# Patient Record
Sex: MALE | Race: OTHER | HISPANIC OR LATINO | URBAN - METROPOLITAN AREA
[De-identification: names, ages, dates, MRNs, and addresses within clinical notes are randomized per-mention and may not be internally consistent; named-entity substitution may affect disease eponyms.]

---

## 2017-01-31 ENCOUNTER — EMERGENCY (EMERGENCY)
Facility: HOSPITAL | Age: 52
LOS: 1 days | Discharge: HOME | End: 2017-01-31
Attending: EMERGENCY MEDICINE
Payer: MEDICAID

## 2017-01-31 VITALS
HEART RATE: 89 BPM | WEIGHT: 182.1 LBS | HEIGHT: 65 IN | OXYGEN SATURATION: 98 % | SYSTOLIC BLOOD PRESSURE: 118 MMHG | RESPIRATION RATE: 24 BRPM | TEMPERATURE: 98 F | DIASTOLIC BLOOD PRESSURE: 86 MMHG

## 2017-01-31 VITALS
RESPIRATION RATE: 20 BRPM | SYSTOLIC BLOOD PRESSURE: 122 MMHG | OXYGEN SATURATION: 98 % | HEART RATE: 89 BPM | DIASTOLIC BLOOD PRESSURE: 70 MMHG

## 2017-01-31 DIAGNOSIS — I10 ESSENTIAL (PRIMARY) HYPERTENSION: ICD-10-CM

## 2017-01-31 DIAGNOSIS — R06.02 SHORTNESS OF BREATH: ICD-10-CM

## 2017-01-31 DIAGNOSIS — Z95.0 PRESENCE OF CARDIAC PACEMAKER: ICD-10-CM

## 2017-01-31 DIAGNOSIS — Z95.0 PRESENCE OF CARDIAC PACEMAKER: Chronic | ICD-10-CM

## 2017-01-31 DIAGNOSIS — Z79.01 LONG TERM (CURRENT) USE OF ANTICOAGULANTS: ICD-10-CM

## 2017-01-31 DIAGNOSIS — E78.5 HYPERLIPIDEMIA, UNSPECIFIED: ICD-10-CM

## 2017-01-31 DIAGNOSIS — Z87.81 PERSONAL HISTORY OF (HEALED) TRAUMATIC FRACTURE: Chronic | ICD-10-CM

## 2017-01-31 DIAGNOSIS — I25.10 ATHEROSCLEROTIC HEART DISEASE OF NATIVE CORONARY ARTERY WITHOUT ANGINA PECTORIS: ICD-10-CM

## 2017-01-31 DIAGNOSIS — J45.901 UNSPECIFIED ASTHMA WITH (ACUTE) EXACERBATION: ICD-10-CM

## 2017-01-31 LAB
ALBUMIN SERPL ELPH-MCNC: 4.3 G/DL — SIGNIFICANT CHANGE UP (ref 3.3–5.2)
ALP SERPL-CCNC: 88 U/L — SIGNIFICANT CHANGE UP (ref 40–120)
ALT FLD-CCNC: 54 U/L — HIGH
ANION GAP SERPL CALC-SCNC: 13 MMOL/L — SIGNIFICANT CHANGE UP (ref 5–17)
AST SERPL-CCNC: 35 U/L — SIGNIFICANT CHANGE UP
BASOPHILS # BLD AUTO: 0 K/UL — SIGNIFICANT CHANGE UP (ref 0–0.2)
BASOPHILS NFR BLD AUTO: 0.3 % — SIGNIFICANT CHANGE UP (ref 0–2)
BILIRUB SERPL-MCNC: 0.9 MG/DL — SIGNIFICANT CHANGE UP (ref 0.4–2)
BUN SERPL-MCNC: 14 MG/DL — SIGNIFICANT CHANGE UP (ref 8–20)
CALCIUM SERPL-MCNC: 9.6 MG/DL — SIGNIFICANT CHANGE UP (ref 8.6–10.2)
CHLORIDE SERPL-SCNC: 94 MMOL/L — LOW (ref 98–107)
CK SERPL-CCNC: 81 U/L — SIGNIFICANT CHANGE UP (ref 30–200)
CO2 SERPL-SCNC: 24 MMOL/L — SIGNIFICANT CHANGE UP (ref 22–29)
CREAT SERPL-MCNC: 0.84 MG/DL — SIGNIFICANT CHANGE UP (ref 0.5–1.3)
EOSINOPHIL # BLD AUTO: 0.1 K/UL — SIGNIFICANT CHANGE UP (ref 0–0.5)
EOSINOPHIL NFR BLD AUTO: 1.2 % — SIGNIFICANT CHANGE UP (ref 0–5)
GLUCOSE SERPL-MCNC: 136 MG/DL — HIGH (ref 70–115)
HCT VFR BLD CALC: 42.7 % — SIGNIFICANT CHANGE UP (ref 42–52)
HGB BLD-MCNC: 14.5 G/DL — SIGNIFICANT CHANGE UP (ref 14–18)
INR BLD: 1.24 RATIO — HIGH (ref 0.88–1.16)
LYMPHOCYTES # BLD AUTO: 2.6 K/UL — SIGNIFICANT CHANGE UP (ref 1–4.8)
LYMPHOCYTES # BLD AUTO: 24.9 % — SIGNIFICANT CHANGE UP (ref 20–55)
MAGNESIUM SERPL-MCNC: 1.7 MG/DL — LOW (ref 1.8–2.5)
MCHC RBC-ENTMCNC: 31.2 PG — HIGH (ref 27–31)
MCHC RBC-ENTMCNC: 34 G/DL — SIGNIFICANT CHANGE UP (ref 32–36)
MCV RBC AUTO: 91.8 FL — SIGNIFICANT CHANGE UP (ref 80–94)
MONOCYTES # BLD AUTO: 1.1 K/UL — HIGH (ref 0–0.8)
MONOCYTES NFR BLD AUTO: 10.5 % — HIGH (ref 3–10)
NEUTROPHILS # BLD AUTO: 6.4 K/UL — SIGNIFICANT CHANGE UP (ref 1.8–8)
NEUTROPHILS NFR BLD AUTO: 62.6 % — SIGNIFICANT CHANGE UP (ref 37–73)
NT-PROBNP SERPL-SCNC: 7063 PG/ML — HIGH (ref 0–300)
PHOSPHATE SERPL-MCNC: 2.9 MG/DL — SIGNIFICANT CHANGE UP (ref 2.4–4.7)
PLATELET # BLD AUTO: 234 K/UL — SIGNIFICANT CHANGE UP (ref 150–400)
POTASSIUM SERPL-MCNC: 4.7 MMOL/L — SIGNIFICANT CHANGE UP (ref 3.5–5.3)
POTASSIUM SERPL-SCNC: 4.7 MMOL/L — SIGNIFICANT CHANGE UP (ref 3.5–5.3)
PROT SERPL-MCNC: 8 G/DL — SIGNIFICANT CHANGE UP (ref 6.6–8.7)
PROTHROM AB SERPL-ACNC: 13.7 SEC — HIGH (ref 10–13.1)
RBC # BLD: 4.65 M/UL — SIGNIFICANT CHANGE UP (ref 4.6–6.2)
RBC # FLD: 12.7 % — SIGNIFICANT CHANGE UP (ref 11–15.6)
SODIUM SERPL-SCNC: 131 MMOL/L — LOW (ref 135–145)
TROPONIN T SERPL-MCNC: <0.01 NG/ML — SIGNIFICANT CHANGE UP (ref 0–0.06)
WBC # BLD: 10.25 K/UL — SIGNIFICANT CHANGE UP (ref 4.8–10.8)
WBC # FLD AUTO: 10.25 K/UL — SIGNIFICANT CHANGE UP (ref 4.8–10.8)

## 2017-01-31 PROCEDURE — 82550 ASSAY OF CK (CPK): CPT

## 2017-01-31 PROCEDURE — 99284 EMERGENCY DEPT VISIT MOD MDM: CPT | Mod: 25

## 2017-01-31 PROCEDURE — 85027 COMPLETE CBC AUTOMATED: CPT

## 2017-01-31 PROCEDURE — 96374 THER/PROPH/DIAG INJ IV PUSH: CPT

## 2017-01-31 PROCEDURE — 85610 PROTHROMBIN TIME: CPT

## 2017-01-31 PROCEDURE — 93010 ELECTROCARDIOGRAM REPORT: CPT

## 2017-01-31 PROCEDURE — 83735 ASSAY OF MAGNESIUM: CPT

## 2017-01-31 PROCEDURE — 93005 ELECTROCARDIOGRAM TRACING: CPT

## 2017-01-31 PROCEDURE — 71020: CPT | Mod: 26

## 2017-01-31 PROCEDURE — 80053 COMPREHEN METABOLIC PANEL: CPT

## 2017-01-31 PROCEDURE — 84100 ASSAY OF PHOSPHORUS: CPT

## 2017-01-31 PROCEDURE — 94640 AIRWAY INHALATION TREATMENT: CPT

## 2017-01-31 PROCEDURE — 84484 ASSAY OF TROPONIN QUANT: CPT

## 2017-01-31 PROCEDURE — T1013: CPT

## 2017-01-31 PROCEDURE — 96375 TX/PRO/DX INJ NEW DRUG ADDON: CPT

## 2017-01-31 PROCEDURE — 83880 ASSAY OF NATRIURETIC PEPTIDE: CPT

## 2017-01-31 PROCEDURE — 99285 EMERGENCY DEPT VISIT HI MDM: CPT

## 2017-01-31 PROCEDURE — 71046 X-RAY EXAM CHEST 2 VIEWS: CPT

## 2017-01-31 RX ORDER — IPRATROPIUM/ALBUTEROL SULFATE 18-103MCG
3 AEROSOL WITH ADAPTER (GRAM) INHALATION ONCE
Qty: 0 | Refills: 0 | Status: COMPLETED | OUTPATIENT
Start: 2017-01-31 | End: 2017-01-31

## 2017-01-31 RX ORDER — MAGNESIUM SULFATE 500 MG/ML
2 VIAL (ML) INJECTION ONCE
Qty: 0 | Refills: 0 | Status: COMPLETED | OUTPATIENT
Start: 2017-01-31 | End: 2017-01-31

## 2017-01-31 RX ORDER — ALBUTEROL 90 UG/1
3 AEROSOL, METERED ORAL
Qty: 48 | Refills: 0 | OUTPATIENT
Start: 2017-01-31 | End: 2017-03-02

## 2017-01-31 RX ORDER — AZITHROMYCIN 500 MG/1
1 TABLET, FILM COATED ORAL
Qty: 3 | Refills: 0 | OUTPATIENT
Start: 2017-01-31 | End: 2017-02-03

## 2017-01-31 RX ADMIN — Medication 3 MILLILITER(S): at 10:08

## 2017-01-31 RX ADMIN — Medication 50 GRAM(S): at 17:21

## 2017-01-31 RX ADMIN — Medication 3 MILLILITER(S): at 14:15

## 2017-01-31 RX ADMIN — Medication 125 MILLIGRAM(S): at 10:36

## 2017-01-31 NOTE — ED ADULT NURSE NOTE - OBJECTIVE STATEMENT
pt AOX3 c/o SOB HX of asthma, and throat pain 5/10, states he has been suffering with a productive cough since MOnday. pt AOX3 c/o SOB HX of asthma, and throat pain 5/10, states he has been suffering with a productive cough and abdominal pain since Monday. HX of CAD, HTN, Hyperlipidemia.

## 2017-01-31 NOTE — ED ADULT NURSE NOTE - CHIEF COMPLAINT QUOTE
Patient arrived to ED today with c/o shortness of breath and abdominal pain for the past 4 days.  Patient states he has cardiac history.  Patient states he feels SOB at rest.

## 2017-01-31 NOTE — ED PROVIDER NOTE - OBJECTIVE STATEMENT
This patient is a 51 year old man with a past medical history of asthma and NICM with EF 20-25& with AICD who presents to the ED c/o cough and SOB.  Patient states that he has been experiencing chest and nasal congestion with cough x 1 week.  He was seen by his PMD 4 days ago and started on augmentin and his cough has been improving.  For the past 2 days he has had SOB consistent with asthma.  Patient has been using his inhaler infrequently.  He denies leg swelling and chest pain.

## 2017-01-31 NOTE — ED PROVIDER NOTE - MEDICAL DECISION MAKING DETAILS
SOB and wheezing.  Will check labs, CXR, check peak flow, administer duonebs and solumedrol; Re-eval

## 2017-01-31 NOTE — ED ADULT NURSE NOTE - PMH
Asthma    Atrial fibrillation    CAD (coronary artery disease)    Cardiomyopathy, nonischemic    Mitral regurgitation  severe MR

## 2017-01-31 NOTE — ED ADULT NURSE REASSESSMENT NOTE - NS ED NURSE REASSESS COMMENT FT1
pt aware of low magnesium and the reason he is receiving MG IV, pt verbalized understanding of plan of care.  Denies any pain or discomfort.

## 2017-03-29 ENCOUNTER — OUTPATIENT (OUTPATIENT)
Dept: OUTPATIENT SERVICES | Facility: HOSPITAL | Age: 52
LOS: 1 days | End: 2017-03-29
Payer: SELF-PAY

## 2017-03-29 ENCOUNTER — APPOINTMENT (OUTPATIENT)
Dept: CARDIOLOGY | Facility: CLINIC | Age: 52
End: 2017-03-29

## 2017-03-29 VITALS — RESPIRATION RATE: 16 BRPM | HEART RATE: 75 BPM | DIASTOLIC BLOOD PRESSURE: 83 MMHG | SYSTOLIC BLOOD PRESSURE: 116 MMHG

## 2017-03-29 VITALS — HEART RATE: 78 BPM | RESPIRATION RATE: 16 BRPM | DIASTOLIC BLOOD PRESSURE: 82 MMHG | SYSTOLIC BLOOD PRESSURE: 114 MMHG

## 2017-03-29 DIAGNOSIS — Z95.0 PRESENCE OF CARDIAC PACEMAKER: Chronic | ICD-10-CM

## 2017-03-29 DIAGNOSIS — I25.10 ATHEROSCLEROTIC HEART DISEASE OF NATIVE CORONARY ARTERY WITHOUT ANGINA PECTORIS: ICD-10-CM

## 2017-03-29 DIAGNOSIS — Z87.81 PERSONAL HISTORY OF (HEALED) TRAUMATIC FRACTURE: Chronic | ICD-10-CM

## 2017-03-29 PROCEDURE — G0463: CPT

## 2017-04-30 ENCOUNTER — INPATIENT (INPATIENT)
Facility: HOSPITAL | Age: 52
LOS: 1 days | Discharge: HOME | DRG: 419 | End: 2017-05-02
Attending: SURGERY | Admitting: SURGERY
Payer: MEDICAID

## 2017-04-30 VITALS
HEIGHT: 70.08 IN | SYSTOLIC BLOOD PRESSURE: 142 MMHG | OXYGEN SATURATION: 98 % | HEART RATE: 73 BPM | TEMPERATURE: 97 F | DIASTOLIC BLOOD PRESSURE: 89 MMHG | WEIGHT: 182.1 LBS | RESPIRATION RATE: 20 BRPM

## 2017-04-30 DIAGNOSIS — Z95.0 PRESENCE OF CARDIAC PACEMAKER: Chronic | ICD-10-CM

## 2017-04-30 DIAGNOSIS — Z87.81 PERSONAL HISTORY OF (HEALED) TRAUMATIC FRACTURE: Chronic | ICD-10-CM

## 2017-04-30 DIAGNOSIS — K81.0 ACUTE CHOLECYSTITIS: ICD-10-CM

## 2017-04-30 LAB
ALBUMIN SERPL ELPH-MCNC: 4.2 G/DL — SIGNIFICANT CHANGE UP (ref 3.3–5.2)
ALP SERPL-CCNC: 98 U/L — SIGNIFICANT CHANGE UP (ref 40–120)
ALT FLD-CCNC: 47 U/L — HIGH
ANION GAP SERPL CALC-SCNC: 15 MMOL/L — SIGNIFICANT CHANGE UP (ref 5–17)
ANION GAP SERPL CALC-SCNC: 16 MMOL/L — SIGNIFICANT CHANGE UP (ref 5–17)
APPEARANCE UR: CLEAR — SIGNIFICANT CHANGE UP
AST SERPL-CCNC: 44 U/L — HIGH
BACTERIA # UR AUTO: ABNORMAL
BASOPHILS # BLD AUTO: 0 K/UL — SIGNIFICANT CHANGE UP (ref 0–0.2)
BASOPHILS NFR BLD AUTO: 0.4 % — SIGNIFICANT CHANGE UP (ref 0–2)
BILIRUB SERPL-MCNC: 0.9 MG/DL — SIGNIFICANT CHANGE UP (ref 0.4–2)
BILIRUB UR-MCNC: NEGATIVE — SIGNIFICANT CHANGE UP
BUN SERPL-MCNC: 15 MG/DL — SIGNIFICANT CHANGE UP (ref 8–20)
BUN SERPL-MCNC: 19 MG/DL — SIGNIFICANT CHANGE UP (ref 8–20)
CALCIUM SERPL-MCNC: 8.7 MG/DL — SIGNIFICANT CHANGE UP (ref 8.6–10.2)
CALCIUM SERPL-MCNC: 8.8 MG/DL — SIGNIFICANT CHANGE UP (ref 8.6–10.2)
CHLORIDE SERPL-SCNC: 97 MMOL/L — LOW (ref 98–107)
CHLORIDE SERPL-SCNC: 98 MMOL/L — SIGNIFICANT CHANGE UP (ref 98–107)
CO2 SERPL-SCNC: 23 MMOL/L — SIGNIFICANT CHANGE UP (ref 22–29)
CO2 SERPL-SCNC: 26 MMOL/L — SIGNIFICANT CHANGE UP (ref 22–29)
COLOR SPEC: YELLOW — SIGNIFICANT CHANGE UP
CREAT SERPL-MCNC: 0.67 MG/DL — SIGNIFICANT CHANGE UP (ref 0.5–1.3)
CREAT SERPL-MCNC: 1.01 MG/DL — SIGNIFICANT CHANGE UP (ref 0.5–1.3)
DIFF PNL FLD: ABNORMAL
EOSINOPHIL # BLD AUTO: 0 K/UL — SIGNIFICANT CHANGE UP (ref 0–0.5)
EOSINOPHIL NFR BLD AUTO: 0.4 % — SIGNIFICANT CHANGE UP (ref 0–5)
EPI CELLS # UR: NEGATIVE — SIGNIFICANT CHANGE UP
GLUCOSE SERPL-MCNC: 116 MG/DL — HIGH (ref 70–115)
GLUCOSE SERPL-MCNC: 95 MG/DL — SIGNIFICANT CHANGE UP (ref 70–115)
GLUCOSE UR QL: NEGATIVE MG/DL — SIGNIFICANT CHANGE UP
HCT VFR BLD CALC: 43.1 % — SIGNIFICANT CHANGE UP (ref 42–52)
HGB BLD-MCNC: 14.3 G/DL — SIGNIFICANT CHANGE UP (ref 14–18)
INR BLD: 4.23 RATIO — HIGH (ref 0.88–1.16)
KETONES UR-MCNC: NEGATIVE — SIGNIFICANT CHANGE UP
LACTATE BLDV-MCNC: 1.6 MMOL/L — SIGNIFICANT CHANGE UP (ref 0.7–2)
LEUKOCYTE ESTERASE UR-ACNC: NEGATIVE — SIGNIFICANT CHANGE UP
LIDOCAIN IGE QN: 40 U/L — SIGNIFICANT CHANGE UP (ref 22–51)
LYMPHOCYTES # BLD AUTO: 2 K/UL — SIGNIFICANT CHANGE UP (ref 1–4.8)
LYMPHOCYTES # BLD AUTO: 25.1 % — SIGNIFICANT CHANGE UP (ref 20–55)
MAGNESIUM SERPL-MCNC: 1.7 MG/DL — LOW (ref 1.8–2.5)
MAGNESIUM SERPL-MCNC: 1.9 MG/DL — SIGNIFICANT CHANGE UP (ref 1.8–2.5)
MCHC RBC-ENTMCNC: 30.5 PG — SIGNIFICANT CHANGE UP (ref 27–31)
MCHC RBC-ENTMCNC: 33.2 G/DL — SIGNIFICANT CHANGE UP (ref 32–36)
MCV RBC AUTO: 91.9 FL — SIGNIFICANT CHANGE UP (ref 80–94)
MONOCYTES # BLD AUTO: 0.8 K/UL — SIGNIFICANT CHANGE UP (ref 0–0.8)
MONOCYTES NFR BLD AUTO: 10.7 % — HIGH (ref 3–10)
NEUTROPHILS # BLD AUTO: 4.9 K/UL — SIGNIFICANT CHANGE UP (ref 1.8–8)
NEUTROPHILS NFR BLD AUTO: 63.1 % — SIGNIFICANT CHANGE UP (ref 37–73)
NITRITE UR-MCNC: NEGATIVE — SIGNIFICANT CHANGE UP
PH UR: 5 — SIGNIFICANT CHANGE UP (ref 5–8)
PLATELET # BLD AUTO: 226 K/UL — SIGNIFICANT CHANGE UP (ref 150–400)
POTASSIUM SERPL-MCNC: 3.8 MMOL/L — SIGNIFICANT CHANGE UP (ref 3.5–5.3)
POTASSIUM SERPL-MCNC: 4.2 MMOL/L — SIGNIFICANT CHANGE UP (ref 3.5–5.3)
POTASSIUM SERPL-SCNC: 3.8 MMOL/L — SIGNIFICANT CHANGE UP (ref 3.5–5.3)
POTASSIUM SERPL-SCNC: 4.2 MMOL/L — SIGNIFICANT CHANGE UP (ref 3.5–5.3)
PROT SERPL-MCNC: 8 G/DL — SIGNIFICANT CHANGE UP (ref 6.6–8.7)
PROT UR-MCNC: NEGATIVE MG/DL — SIGNIFICANT CHANGE UP
PROTHROM AB SERPL-ACNC: 47.9 SEC — HIGH (ref 9.8–12.7)
RBC # BLD: 4.69 M/UL — SIGNIFICANT CHANGE UP (ref 4.6–6.2)
RBC # FLD: 14.2 % — SIGNIFICANT CHANGE UP (ref 11–15.6)
RBC CASTS # UR COMP ASSIST: SIGNIFICANT CHANGE UP /HPF (ref 0–4)
SODIUM SERPL-SCNC: 137 MMOL/L — SIGNIFICANT CHANGE UP (ref 135–145)
SODIUM SERPL-SCNC: 138 MMOL/L — SIGNIFICANT CHANGE UP (ref 135–145)
SP GR SPEC: 1.02 — SIGNIFICANT CHANGE UP (ref 1.01–1.02)
TROPONIN T SERPL-MCNC: <0.01 NG/ML — SIGNIFICANT CHANGE UP (ref 0–0.06)
UROBILINOGEN FLD QL: NEGATIVE MG/DL — SIGNIFICANT CHANGE UP
WBC # BLD: 7.8 K/UL — SIGNIFICANT CHANGE UP (ref 4.8–10.8)
WBC # FLD AUTO: 7.8 K/UL — SIGNIFICANT CHANGE UP (ref 4.8–10.8)
WBC UR QL: SIGNIFICANT CHANGE UP

## 2017-04-30 PROCEDURE — 93010 ELECTROCARDIOGRAM REPORT: CPT | Mod: 77

## 2017-04-30 PROCEDURE — 71260 CT THORAX DX C+: CPT | Mod: 26

## 2017-04-30 PROCEDURE — 99285 EMERGENCY DEPT VISIT HI MDM: CPT | Mod: 25

## 2017-04-30 PROCEDURE — 76705 ECHO EXAM OF ABDOMEN: CPT | Mod: 26

## 2017-04-30 PROCEDURE — 74177 CT ABD & PELVIS W/CONTRAST: CPT | Mod: 26

## 2017-04-30 PROCEDURE — 99223 1ST HOSP IP/OBS HIGH 75: CPT

## 2017-04-30 PROCEDURE — 93010 ELECTROCARDIOGRAM REPORT: CPT

## 2017-04-30 RX ORDER — ATORVASTATIN CALCIUM 80 MG/1
10 TABLET, FILM COATED ORAL AT BEDTIME
Qty: 0 | Refills: 0 | Status: DISCONTINUED | OUTPATIENT
Start: 2017-04-30 | End: 2017-05-02

## 2017-04-30 RX ORDER — METOPROLOL TARTRATE 50 MG
12.5 TABLET ORAL
Qty: 0 | Refills: 0 | Status: DISCONTINUED | OUTPATIENT
Start: 2017-04-30 | End: 2017-05-02

## 2017-04-30 RX ORDER — DIGOXIN 250 MCG
0.12 TABLET ORAL DAILY
Qty: 0 | Refills: 0 | Status: DISCONTINUED | OUTPATIENT
Start: 2017-04-30 | End: 2017-05-02

## 2017-04-30 RX ORDER — ENOXAPARIN SODIUM 100 MG/ML
40 INJECTION SUBCUTANEOUS EVERY 24 HOURS
Qty: 0 | Refills: 0 | Status: DISCONTINUED | OUTPATIENT
Start: 2017-04-30 | End: 2017-05-02

## 2017-04-30 RX ORDER — ALBUTEROL 90 UG/1
2.5 AEROSOL, METERED ORAL EVERY 6 HOURS
Qty: 0 | Refills: 0 | Status: DISCONTINUED | OUTPATIENT
Start: 2017-04-30 | End: 2017-05-02

## 2017-04-30 RX ORDER — PHYTONADIONE (VIT K1) 5 MG
10 TABLET ORAL ONCE
Qty: 0 | Refills: 0 | Status: COMPLETED | OUTPATIENT
Start: 2017-04-30 | End: 2017-04-30

## 2017-04-30 RX ORDER — SOTALOL HCL 120 MG
120 TABLET ORAL EVERY 12 HOURS
Qty: 0 | Refills: 0 | Status: DISCONTINUED | OUTPATIENT
Start: 2017-04-30 | End: 2017-05-02

## 2017-04-30 RX ORDER — PIPERACILLIN AND TAZOBACTAM 4; .5 G/20ML; G/20ML
3.38 INJECTION, POWDER, LYOPHILIZED, FOR SOLUTION INTRAVENOUS ONCE
Qty: 0 | Refills: 0 | Status: COMPLETED | OUTPATIENT
Start: 2017-04-30 | End: 2017-04-30

## 2017-04-30 RX ORDER — LISINOPRIL 2.5 MG/1
5 TABLET ORAL DAILY
Qty: 0 | Refills: 0 | Status: DISCONTINUED | OUTPATIENT
Start: 2017-04-30 | End: 2017-05-02

## 2017-04-30 RX ORDER — SODIUM CHLORIDE 9 MG/ML
3 INJECTION INTRAMUSCULAR; INTRAVENOUS; SUBCUTANEOUS ONCE
Qty: 0 | Refills: 0 | Status: COMPLETED | OUTPATIENT
Start: 2017-04-30 | End: 2017-04-30

## 2017-04-30 RX ORDER — SODIUM CHLORIDE 9 MG/ML
1000 INJECTION, SOLUTION INTRAVENOUS
Qty: 0 | Refills: 0 | Status: DISCONTINUED | OUTPATIENT
Start: 2017-04-30 | End: 2017-05-02

## 2017-04-30 RX ORDER — MORPHINE SULFATE 50 MG/1
2 CAPSULE, EXTENDED RELEASE ORAL EVERY 4 HOURS
Qty: 0 | Refills: 0 | Status: DISCONTINUED | OUTPATIENT
Start: 2017-04-30 | End: 2017-05-02

## 2017-04-30 RX ADMIN — ATORVASTATIN CALCIUM 10 MILLIGRAM(S): 80 TABLET, FILM COATED ORAL at 21:56

## 2017-04-30 RX ADMIN — SODIUM CHLORIDE 100 MILLILITER(S): 9 INJECTION, SOLUTION INTRAVENOUS at 17:54

## 2017-04-30 RX ADMIN — SODIUM CHLORIDE 3 MILLILITER(S): 9 INJECTION INTRAMUSCULAR; INTRAVENOUS; SUBCUTANEOUS at 05:34

## 2017-04-30 RX ADMIN — Medication 12.5 MILLIGRAM(S): at 17:54

## 2017-04-30 RX ADMIN — MORPHINE SULFATE 2 MILLIGRAM(S): 50 CAPSULE, EXTENDED RELEASE ORAL at 18:01

## 2017-04-30 RX ADMIN — Medication 120 MILLIGRAM(S): at 17:54

## 2017-04-30 RX ADMIN — MORPHINE SULFATE 2 MILLIGRAM(S): 50 CAPSULE, EXTENDED RELEASE ORAL at 19:53

## 2017-04-30 RX ADMIN — PIPERACILLIN AND TAZOBACTAM 200 GRAM(S): 4; .5 INJECTION, POWDER, LYOPHILIZED, FOR SOLUTION INTRAVENOUS at 07:17

## 2017-04-30 RX ADMIN — SODIUM CHLORIDE 100 MILLILITER(S): 9 INJECTION, SOLUTION INTRAVENOUS at 17:11

## 2017-04-30 NOTE — H&P ADULT - ATTENDING COMMENTS
51 yr old wm with 2 day h/o abd pain ruq   no n/v fever or chills  PE 51 yr old  male NAD  Abd Globus nontendwer no guarding or rebound  PT 43  [facility] wnl  THickened gb   no stones or dilated ducts    Doubty acute carissa  Hida scan

## 2017-04-30 NOTE — ED ADULT NURSE REASSESSMENT NOTE - NS ED NURSE REASSESS COMMENT FT1
Spoke with Dr. Gabriel in regards to patient medication and timing set for medication.  MD states to given medication as ordered.

## 2017-04-30 NOTE — ED ADULT NURSE REASSESSMENT NOTE - NS ED NURSE REASSESS COMMENT FT1
Received report from [place].  Patient currently awake, alert, and oriented times 3, breathing unlabored.  patient states improvement, however pain still present to epigastric area.  Patient was transported to US.  Will continue to monitor

## 2017-04-30 NOTE — ED ADULT NURSE REASSESSMENT NOTE - NS ED NURSE REASSESS COMMENT FT1
With , patient states improvement.  Awake, alert, and oriented times 3, breathing unlabored.  Patient states improvement.  Awaiting US results and plan of care.  Patient aware of plan of care.  Will continue to monitor. Ambulatory without difficulty, steady gait.

## 2017-04-30 NOTE — ED PROVIDER NOTE - PHYSICAL EXAMINATION
VS: reviewed in triage note...  HEENT: NC/AT   CV:s1,s2 no m/r/g  Lungs: cta b/l, no r/r/w  Abd: soft,+ epigastric pain  EXT: no c/c/e  Neuro:no focal defecits  skin: no rash  Pulses: dpp, pt 2+ b/l

## 2017-04-30 NOTE — ED PROVIDER NOTE - OBJECTIVE STATEMENT
50 yo M with ruq pain that is constant, present X 1 day. pt has no h/o similar sx. sx started slowly and got worse. + nausea. no fever, or cp.

## 2017-04-30 NOTE — CONSULT NOTE ADULT - ASSESSMENT
Assessment and plan:      51 M hx Idiopathic Dilated non-ischemic cardiomyopathy (biventricular failure), NYHA class I, EF 20-25% with functional severe mitral regurgitation, PAF on Coumadin, Biotronik ICD, VT storm 3/2016 s/p epicardial VT ablation 3/25/16, PAF on coumadin and sotalol presents with ICD,s/p ICD shock in 12/2016:appropriate 40J shock with conversion to NSR, appropriate 40J shock with conversion to NSR maintained on Sotalol  presented to ED with 2 days h/o  RUQ abdominal pain and diagnosed with bilary colic and surgery is planned preoperative cardiovascular examination requested.  He denied weight gain or loss, dyspnea, orthopnea (uses 2 pillows as baseline which is unchanged), PND, edema, chest pian, pressure, discomfort palpitation, ICD shocks, melena, rectal bleed, syncope, near syncope.  He reports good exercise tolerance Good >4.0 METS, Walks several blocks, Climbs up 1 flight.  He denied exertional symptoms     1) Patient is currently optimized for the planned surgery from cardiac standpoint: Given his h/o arrhyhtmia,  CMP and Mitral regurgitation he represents increased risk. Cardiac monitoring is recommended . Hemodynamic status and Volume status must be closely monitored.    2) Idiopathic Dilated non-ischemic cardiomyopathy (biventricular failure),   NYHA class I   Euvolemic.   Continue: lisinopril, digoxin, sotalol , metoprolol, spironolactone    3) h/o ICD shock in 12/2016: appropriate 40J shock with conversion to NSR, appropriate 40J shock with conversion to NSR  Hx of sustained VT s/p  epicardial ablation in March 2016.  No recent ICD shocks.    4) PAF: CHADSVASC = 1. severe CMP. On sotalol and coumadin. HJold coumadin for the planned surgery and resume when deemed safe.    5) Severe mitral regurgitation, functional :   He had HANNAH in the past which  shows mild flail leaflet tip. with moderate MR with BP of 80.  Underestimation of MR on HANNAH. morphology appears functional. Severe MR based on TTE  NYHA class 1 currently.   Unsure if patient will benefit from MR surgery. unclear if CMP is idiopathic dilated. If considered functional MR, patient is NYHA class I. no recurrent admissions for CHF.   MV surgery may not benefit his functional MR since clinical CHF controlled and deferred.  Continue medical management.  Continue: lisinopril, digoxin, sotalol , metoprolol, spironolactone  TTE is requested.    6) EKG showed prolonged QT interval Check K, Mg, Keep K>4.0, Mg>2.0m repeat EKG     QTc= 480 msec. F/u Qtc tomorrow before the dose.  ct sotalol 120 Q12.   ct PO mag. decrease  beta-blocker dose as heart rate decreasing. May discontinue metoprolol if marked bradycardia on ambulation..Q-T Interval 450 ms  QTC Calculation(Bezet) 492 ms

## 2017-04-30 NOTE — H&P ADULT - HISTORY OF PRESENT ILLNESS
50 yo M who comes to the ED c/o RUQ abdominal pain x2 days. Pain is sharp in nature, 10/10, radiating to the back, worst with food. Associated nausea, He denies any similar episodes in the past, recent sick contact or recent travel. Denies fever, chills, SOB, chest pain or any other symptom

## 2017-04-30 NOTE — ED ADULT NURSE NOTE - OBJECTIVE STATEMENT
Pt received in B14-L c/o generalized abd pain that is tender to palpation x2 days but pt can pin point the pain at his epigastrium. Pt also c/o nausea. Pt abd is distended. +BSx4. Pt denies bladder/bowel sx's and vomiting. Pt with hx of pacemaker, CAD, mitral valve regurg and A-Fib. Pt currently NSR on CM.

## 2017-04-30 NOTE — ED PROVIDER NOTE - PROGRESS NOTE DETAILS
pt signed out to Dr. Jones awaiting surgery consult. antibiotics given Patient stable with mild discomfort. as per signout from Dr. Vale he is currently awaiting surgical consultation for possible cholecystitis. Patient to be admitted to surgical management of cholecystitis. Patient stable with mild discomfort. as per sign-out from Dr. Vale he is currently awaiting surgical consultation for possible cholecystitis.

## 2017-05-01 DIAGNOSIS — I25.10 ATHEROSCLEROTIC HEART DISEASE OF NATIVE CORONARY ARTERY WITHOUT ANGINA PECTORIS: ICD-10-CM

## 2017-05-01 DIAGNOSIS — K81.0 ACUTE CHOLECYSTITIS: ICD-10-CM

## 2017-05-01 PROCEDURE — 78226 HEPATOBILIARY SYSTEM IMAGING: CPT | Mod: 26

## 2017-05-01 RX ORDER — ONDANSETRON 8 MG/1
4 TABLET, FILM COATED ORAL EVERY 6 HOURS
Qty: 0 | Refills: 0 | Status: DISCONTINUED | OUTPATIENT
Start: 2017-05-01 | End: 2017-05-02

## 2017-05-01 RX ORDER — MAGNESIUM SULFATE 500 MG/ML
2 VIAL (ML) INJECTION ONCE
Qty: 0 | Refills: 0 | Status: COMPLETED | OUTPATIENT
Start: 2017-05-01 | End: 2017-05-01

## 2017-05-01 RX ADMIN — ENOXAPARIN SODIUM 40 MILLIGRAM(S): 100 INJECTION SUBCUTANEOUS at 11:59

## 2017-05-01 RX ADMIN — Medication 50 GRAM(S): at 11:58

## 2017-05-01 RX ADMIN — LISINOPRIL 5 MILLIGRAM(S): 2.5 TABLET ORAL at 05:36

## 2017-05-01 RX ADMIN — Medication 12.5 MILLIGRAM(S): at 05:37

## 2017-05-01 RX ADMIN — Medication 12.5 MILLIGRAM(S): at 16:56

## 2017-05-01 RX ADMIN — MORPHINE SULFATE 2 MILLIGRAM(S): 50 CAPSULE, EXTENDED RELEASE ORAL at 23:05

## 2017-05-01 RX ADMIN — Medication 102 MILLIGRAM(S): at 00:05

## 2017-05-01 RX ADMIN — Medication 0.12 MILLIGRAM(S): at 05:36

## 2017-05-01 RX ADMIN — SODIUM CHLORIDE 100 MILLILITER(S): 9 INJECTION, SOLUTION INTRAVENOUS at 16:57

## 2017-05-01 RX ADMIN — Medication 120 MILLIGRAM(S): at 16:56

## 2017-05-01 RX ADMIN — MORPHINE SULFATE 2 MILLIGRAM(S): 50 CAPSULE, EXTENDED RELEASE ORAL at 23:09

## 2017-05-01 RX ADMIN — SODIUM CHLORIDE 100 MILLILITER(S): 9 INJECTION, SOLUTION INTRAVENOUS at 11:59

## 2017-05-01 RX ADMIN — Medication 120 MILLIGRAM(S): at 05:36

## 2017-05-01 RX ADMIN — ATORVASTATIN CALCIUM 10 MILLIGRAM(S): 80 TABLET, FILM COATED ORAL at 23:10

## 2017-05-01 NOTE — PROGRESS NOTE ADULT - PROBLEM SELECTOR PLAN 2
cardiology following, on cardiac medications, EKG and ECHO performed. Supra-therapeutic on coumadin, vitamin k given, f/u INR ordered. Will see if any further reversal agents are needed.

## 2017-05-01 NOTE — PROGRESS NOTE ADULT - SUBJECTIVE AND OBJECTIVE BOX
SUBJECTIVE/24 hour events:  Patient is a 51yMale comes in with bilary colic will go to the OR tomorrow for lap carissa, Dr. Burciaga consulted for rising total bilirubin. INR supratherapeutic to 4.32 follow up INR now, 1 dose of vitamin k given       Vital Signs Last 24 Hrs  T(C): 36.9, Max: 37.1 (04-30 @ 23:15)  T(F): 98.4, Max: 98.8 (04-30 @ 23:15)  HR: 80 (74 - 80)  BP: 110/80 (110/80 - 135/95)  BP(mean): --  RR: 18 (17 - 20)  SpO2: 94% (93% - 98%)  Drug Dosing Weight  Height (cm): 178 (30 Apr 2017 02:58)  Weight (kg): 82.6 (30 Apr 2017 02:58)  BMI (kg/m2): 26.1 (30 Apr 2017 02:58)  BSA (m2): 2.01 (30 Apr 2017 02:58)  I&O's Detail    I & Os for current day (as of 01 May 2017 14:56)  =============================================  IN:    lactated ringers.: 1400 ml    Total IN: 1400 ml  ---------------------------------------------  OUT:    Voided: 475 ml    Total OUT: 475 ml  ---------------------------------------------  Total NET: 925 ml    Allergies    No Known Allergies    Intolerances                              13.4   6.7   )-----------( 214      ( 01 May 2017 06:26 )             40.8   05-01    138  |  98  |  14.0  ----------------------------<  93  4.1   |  24.0  |  0.81    Ca    8.8      01 May 2017 06:26  Phos  3.3     05-01  Mg     1.7     05-01    TPro  7.2  /  Alb  3.9  /  TBili  2.4<H>  /  DBili  x   /  AST  30  /  ALT  37  /  AlkPhos  91  05-01  PT/INR - ( 30 Apr 2017 04:57 )   PT: 47.9 sec;   INR: 4.23 ratio             ROS:    PHYSICAL EXAM:  Constitutional: in good spirits     Respiratory: in no respiratory distress, NSR    Cardiovascular: on coumadin supra-therapeutic , holding dose, on all other home meds. ECHO and EKG performed, [place] [facility] following.     Gastrointestinal: softly distended, no pain on palpation, hida negative, increasing bilirubin, GI to see to evaluate for increasing total bili. Lap carissa  tomorrow 5/2    Extremities: atraumatic , normal rom    Neurological: GCS 15, A&Ox3    Skin: warm, dry, and no rashes       MEDICATIONS  (STANDING):  enoxaparin Injectable 40milliGRAM(s) SubCutaneous every 24 hours  lactated ringers. 1000milliLiter(s) IV Continuous <Continuous>  lisinopril 5milliGRAM(s) Oral daily  digoxin     Tablet 0.125milliGRAM(s) Oral daily  sotalol 120milliGRAM(s) Oral every 12 hours  atorvastatin 10milliGRAM(s) Oral at bedtime  metoprolol 12.5milliGRAM(s) Oral two times a day    MEDICATIONS  (PRN):  morphine  - Injectable 2milliGRAM(s) IV Push every 4 hours PRN Moderate Pain  ALBUTerol   0.5% 2.5milliGRAM(s) Nebulizer every 6 hours PRN Shortness of Breath and/or Wheezing  ondansetron Injectable 4milliGRAM(s) IV Push every 6 hours PRN Nausea and/or Vomiting      RADIOLOGY STUDIES:    CULTURES:

## 2017-05-01 NOTE — CONSULT NOTE ADULT - SUBJECTIVE AND OBJECTIVE BOX
CARDIOLOGY CONSULTATION NOTE        History obtained by: Patient, family and medical record     obtained: No    Chief complaint:      Preoperative cardiovascular examination      HPI:      51 M presented to ED with 2 days h/o  RUQ abdominal pain and diagnosed with bilary colic and surgery is planned preoperative cardiovascular examination requested.  He denied weight gain or loss, dyspnea, orthopnea (uses 2 pillows as baseline which is unchanged), PND, edema, chest pian, pressure, discomfort palpitation,            Functional Capacity:  Good >4.0 METS  Walks several blocks  Climbs up 1 flight  ,      Chronic Stable Conditions:  h/o ICD shock in 2016: appropriate 40J shock with conversion to NSR, appropriate 40J shock with conversion to NSR    Hx of sustained VT s/p  epicardial ablation in 2016.    PAF: CHADSVASC = 1. severe CMP. On sotalol and coumadin    Idiopathic Dilated non-ischemic cardiomyopathy (biventricular failure), NYHA class I. Euvolemic.     Severe mitral regurgitation, functional :   He had HANNAH in the past which  shows mild flail leaflet tip. with moderate MR with BP of 80.  Underestimation of MR on HANNAH. morphology appears functional. Severe MR based on TTE  NYHA class 1 currently.   Unsure if patient will benefit from MR surgery. unclear if CMP is idiopathic dilated. If considered functional MR, patient is NYHA class I. no recurrent admissions for CHF.   MV surgery may not benefit his functional MR since clinical CHF controlled and deferred.       QTc= 480 msec. F/u Qtc tomorrow before the dose.  ct sotalol 120 Q12.   ct PO mag. decrease  beta-blocker dose as heart rate decreasing. May discontinue metoprolol if marked bradycardia on ambulation..  Prior Cardiac Testing:   X-ray:  2016 Cardiomegaly. No radiologically active cardiopulmonary process seen.  EC2016 Sinus rhythm with occasional Premature ventricular complexes. T wave abnormality, consider lateral ischemia. Prolonged QTc 480 msec.   Echocardiogram:	2016  Left ventricular ejection fraction, by visual estimation, is 20 to 25%. The right ventricular size is mildly enlarged. RV systolic function is mildly reduced .  Severe mitral valve regurgitation.    ECHO FINDINGS: Date:  10/2016 : LVEF= 20-25% ; RV function: moderately reduced. ; Valvular abnormalities:  Mitral valve: severe MR  ; Pulmonary pressures:    53 mm Hg. Pericardium:  no effusion.     CATHETERIZATION FINDINGS:  Date: 3/26/2016  :  Normal cath        REVIEW OF SYMPTOMS:   Constitutional: Denied: fever, chills, weight loss or gain  Eyes: Denied: reddened ayes, eye discharge, eye pain  ENMT: Denied: ear pain, nasal discharge, mouth pain, throat pain or swelling  Cardiovascular: Denied: chest pain,  Chest pressure, palpitation, arrhythmia, irregular or fast heart beats, edema  Respiratory: Denied: cough, phlegm production, wheezes, dyspnea, orthopnea, PND,   GI: Denied: diarrhea, constipation, melena, rectal bleed  : Denied: hematuria, frequency  Musculoskeletal: Denied: Muscle aches, weakness, pain  Hematology/lymp: Denied: Bleeding disorder, anemia, blood clotting  Neuro: Denied: Headache, light headedness, dizziness, numbness, aphasia, dysarthria, seizure,  syncope, near syncope  Psych: Denied: depression, anxiety  Integumentary/skin: Denied: rash, bruises, ecchymosis, itching  Allergy/Immunology: denied environmental or drug allergies    ALL OTHER REVIEW OF SYSTEMS ARE NEGATIVE.    MEDICATIONS  (STANDING):  enoxaparin Injectable 40milliGRAM(s) SubCutaneous every 24 hours  lactated ringers. 1000milliLiter(s) IV Continuous <Continuous>  lisinopril 5milliGRAM(s) Oral daily  digoxin     Tablet 0.125milliGRAM(s) Oral daily  sotalol 120milliGRAM(s) Oral every 12 hours  atorvastatin 10milliGRAM(s) Oral at bedtime  metoprolol 12.5milliGRAM(s) Oral two times a day    MEDICATIONS  (PRN):  morphine  - Injectable 2milliGRAM(s) IV Push every 4 hours PRN Moderate Pain  ALBUTerol   0.5% 2.5milliGRAM(s) Nebulizer every 6 hours PRN Shortness of Breath and/or Wheezing        PAST MEDICAL & SURGICAL HISTORY:  Mitral regurgitation: severe MR  Cardiomyopathy, nonischemic  CAD (coronary artery disease)  Asthma  Atrial fibrillation  Heart disease  History of humerus fracture: Metal pins in Left Humerus  Artificial pacemaker      FAMILY HISTORY:  No pertinent family history in first degree relatives      SOCIAL HISTORY:    CIGARETTES:  No    ALCOHOL: No    DRUGS: No    Vital Signs Last 24 Hrs  T(C): 36.6, Max: 36.6 ( @ 11:37)  T(F): 97.9, Max: 97.9 ( @ 11:37)  HR: 74 (61 - 74)  BP: 123/86 (120/87 - 142/89)  BP(mean): --  RR: 20 (20 - 20)  SpO2: 98% (96% - 98%)    PHYSICAL EXAM:      Constitutional: No fever, chills, NAD, Comfortable    Eyes: Not reddened, no discharge    ENMT: No discharge, No pain    Neck: No JVD, No Bruit    Back: No CVA tenderness    Respiratory: Clear to auscultation bilaterally    Cardiovascular: RRR, Normal S1-2, No S3-, No friction rub 3/6 SM at apex    Gastrointestinal: Soft, NT/ND. BS+, No organomegaly    Extremities: No edema    Vascular: Distal pulses intact    Neurological: Alert, awake, oriented, able to move extremities, speech is clear    Skin: No ecchymosis, no rash    Musculoskeletal: Non tender, no weakness    Psychiatric: Appropriate mood, no anxiety        INTERPRETATION OF TELEMETRY:    ECG:  Ventricular Rate 72 BPM    Atrial Rate 72 BPM    P-R Interval 174 ms    QRS Duration 82 ms    Q-T Interval 450 ms    QTC Calculation(Bezet) 492 ms    P Axis 41 degrees    R Axis 39 degrees    T Axis 94 degrees    Diagnosis Line Normal sinus rhythm  Possible Left atrial enlargement  Nonspecific T wave abnormality  Prolonged QT  Abnormal ECG    Confirmed by ALYSHA HUMPHREYS ([id number]) on 2017 3:03:56 PM    I&O's Detail      LABS:                        14.3   7.8   )-----------( 226      ( 2017 04:57 )             43.1     04-30    138  |  97<L>  |  19.0  ----------------------------<  116<H>  4.2   |  26.0  |  1.01    Ca    8.7      2017 04:57  Mg     1.9     04-30    TPro  8.0  /  Alb  4.2  /  TBili  0.9  /  DBili  x   /  AST  44<H>  /  ALT  47<H>  /  AlkPhos  98  04-30    CARDIAC MARKERS ( 2017 04:57 )  x     / <0.01 ng/mL / x     / x     / x          PT/INR - ( 2017 04:57 )   PT: 47.9 sec;   INR: 4.23 ratio           Urinalysis Basic - ( 2017 05:46 )    Color: Yellow / Appearance: Clear / S.020 / pH: x  Gluc: x / Ketone: Negative  / Bili: Negative / Urobili: Negative mg/dL   Blood: x / Protein: Negative mg/dL / Nitrite: Negative   Leuk Esterase: Negative / RBC: 0-2 /HPF / WBC 0-2   Sq Epi: x / Non Sq Epi: Negative / Bacteria: Occasional      I&O's Summary      RADIOLOGY & ADDITIONAL STUDIES:  X-ray:    PREVIOUS DIAGNOSTIC TESTING:
HPI:  50 yo M who comes to the ED c/o RUQ abdominal pain x2 days. Pain is sharp in nature, 10/10, radiating to the back, worst with food. Associated nausea, He denies any similar episodes in the past, recent sick contact or recent travel. Denies fever, chills, SOB, chest pain or any other symptom (2017 09:56)    abdominal sonogram showed: Pericholecystic/ subserosal gallbladder wall edema. The gallbladder is   not dilated. No gallstones or biliary ductal dilatation demonstrated. No   sonographic Ruiz's sign elicited during exam. Although can reflect   cholecystitis, the subserosal gallbladder wall edema is nonspecific and   can be related to right heart failure or liver dysfunction. Correlation   with clinical findings is recommended. Fatty infiltration of the liver.    however, HIDA scan was unremarkable. serum transaminases improved. however, total bilirubin went up to 2.4 today. abd pain improving.        PAST MEDICAL & SURGICAL HISTORY:  Mitral regurgitation: severe MR  Cardiomyopathy, nonischemic  CAD (coronary artery disease)  Asthma  Atrial fibrillation  Heart disease  History of humerus fracture: Metal pins in Left Humerus  Artificial pacemaker      REVIEW OF SYSTEMS    General: unremarkable, no fever    Skin/Breast: unremarkable  	  Ophthalmologic: unremarkable  	  ENMT:	unremarkable    Respiratory and Thorax: unremarkable  	  Cardiovascular:	unremarkable    Gastrointestinal:	 as above    Genitourinary:	unremarkable    Musculoskeletal:	unremarkable    Neurological:	unremarkable    Psychiatric:	unremarkable    Hematology/Lymphatics:	unremarkable    Endocrine:	unremarkable    Allergic/Immunologic:	unremarkable      MEDICATIONS  (STANDING):  enoxaparin Injectable 40milliGRAM(s) SubCutaneous every 24 hours  lactated ringers. 1000milliLiter(s) IV Continuous <Continuous>  lisinopril 5milliGRAM(s) Oral daily  digoxin     Tablet 0.125milliGRAM(s) Oral daily  sotalol 120milliGRAM(s) Oral every 12 hours  atorvastatin 10milliGRAM(s) Oral at bedtime  metoprolol 12.5milliGRAM(s) Oral two times a day    MEDICATIONS  (PRN):  morphine  - Injectable 2milliGRAM(s) IV Push every 4 hours PRN Moderate Pain  ALBUTerol   0.5% 2.5milliGRAM(s) Nebulizer every 6 hours PRN Shortness of Breath and/or Wheezing  ondansetron Injectable 4milliGRAM(s) IV Push every 6 hours PRN Nausea and/or Vomiting      Allergies    No Known Allergies    Intolerances        SOCIAL HISTORY:    FAMILY HISTORY:  No pertinent family history in first degree relatives      Vital Signs Last 24 Hrs  T(C): 36.7, Max: 37.1 ( @ 23:15)  T(F): 98.1, Max: 98.8 ( @ 23:15)  HR: 74 (74 - 80)  BP: 112/77 (110/80 - 124/89)  BP(mean): --  RR: 18 (17 - 20)  SpO2: 94% (93% - 97%)      PHYSICAL EXAM:    Constitutional: no fever, hemodynamically stable    Eyes: unremarkable    ENMT: unremarkable    Neck: unremarkable    Breasts: deferred    Back: unremarkable    Respiratory: unremarkable    Cardiovascular: unremarkable    Gastrointestinal: bowel sounds present, soft, non-tender, no organomegaly    Genitourinary: deferred    Rectal: deferred    Extremities: unremarkable    Vascular: unremarkable    Neurological: Awake, alert, oriented x3, no focal neurological deficit    Skin: unremarkable    Lymph Nodes: deferred    Musculoskeletal: unremarkable    Psychiatric: unremarkable    LABS:                        13.4   6.7   )-----------( 214      ( 01 May 2017 06:26 )             40.8     05-    138  |  98  |  14.0  ----------------------------<  93  4.1   |  24.0  |  0.81    Ca    8.8      01 May 2017 06:26  Phos  3.3     05-  Mg     1.7     -    TPro  7.2  /  Alb  3.9  /  TBili  2.4<H>  /  DBili  x   /  AST  30  /  ALT  37  /  AlkPhos  91  -    PT/INR - ( 2017 04:57 )   PT: 47.9 sec;   INR: 4.23 ratio           Urinalysis Basic - ( 2017 05:46 )    Color: Yellow / Appearance: Clear / S.020 / pH: x  Gluc: x / Ketone: Negative  / Bili: Negative / Urobili: Negative mg/dL   Blood: x / Protein: Negative mg/dL / Nitrite: Negative   Leuk Esterase: Negative / RBC: 0-2 /HPF / WBC 0-2   Sq Epi: x / Non Sq Epi: Negative / Bacteria: Occasional        RADIOLOGY & ADDITIONAL STUDIES:        IMPRESSION:  50 yo M who comes to the ED c/o RUQ abdominal pain x2 days and abnormal LFTs. abd sonogram showed pericholecystic edema. HIDA scan unremarkable. GI consult for abnormal LFTs (serum transaminases improved. However, total bilirubin went up)- due to biliary colic. There is no evidence of cbd stones.      PLAN:  patient going for lap carissa tomorrow  no need for ercp at this time  monitor LFTs. if rise, consider MRI/MRCP    thanks for the consult.
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
## 2017-05-02 ENCOUNTER — TRANSCRIPTION ENCOUNTER (OUTPATIENT)
Age: 52
End: 2017-05-02

## 2017-05-02 ENCOUNTER — RESULT REVIEW (OUTPATIENT)
Age: 52
End: 2017-05-02

## 2017-05-02 VITALS — OXYGEN SATURATION: 94 %

## 2017-05-02 DIAGNOSIS — J45.909 UNSPECIFIED ASTHMA, UNCOMPLICATED: ICD-10-CM

## 2017-05-02 DIAGNOSIS — I48.91 UNSPECIFIED ATRIAL FIBRILLATION: ICD-10-CM

## 2017-05-02 LAB
ALBUMIN SERPL ELPH-MCNC: 3.3 G/DL — SIGNIFICANT CHANGE UP (ref 3.3–5.2)
ALP SERPL-CCNC: 77 U/L — SIGNIFICANT CHANGE UP (ref 40–120)
ALT FLD-CCNC: 29 U/L — SIGNIFICANT CHANGE UP
ANION GAP SERPL CALC-SCNC: 16 MMOL/L — SIGNIFICANT CHANGE UP (ref 5–17)
APTT BLD: 33.8 SEC — SIGNIFICANT CHANGE UP (ref 27.5–37.4)
AST SERPL-CCNC: 25 U/L — SIGNIFICANT CHANGE UP
BILIRUB SERPL-MCNC: 2.2 MG/DL — HIGH (ref 0.4–2)
BUN SERPL-MCNC: 13 MG/DL — SIGNIFICANT CHANGE UP (ref 8–20)
CALCIUM SERPL-MCNC: 8.3 MG/DL — LOW (ref 8.6–10.2)
CHLORIDE SERPL-SCNC: 98 MMOL/L — SIGNIFICANT CHANGE UP (ref 98–107)
CO2 SERPL-SCNC: 23 MMOL/L — SIGNIFICANT CHANGE UP (ref 22–29)
CREAT SERPL-MCNC: 0.75 MG/DL — SIGNIFICANT CHANGE UP (ref 0.5–1.3)
GLUCOSE SERPL-MCNC: 73 MG/DL — SIGNIFICANT CHANGE UP (ref 70–115)
HAV IGM SER-ACNC: SIGNIFICANT CHANGE UP
HBV CORE IGM SER-ACNC: SIGNIFICANT CHANGE UP
HBV SURFACE AG SER-ACNC: SIGNIFICANT CHANGE UP
HCT VFR BLD CALC: 38.3 % — LOW (ref 42–52)
HCV AB S/CO SERPL IA: 0.15 S/CO — SIGNIFICANT CHANGE UP
HCV AB SERPL-IMP: SIGNIFICANT CHANGE UP
HGB BLD-MCNC: 12.7 G/DL — LOW (ref 14–18)
INR BLD: 1.33 RATIO — HIGH (ref 0.88–1.16)
MAGNESIUM SERPL-MCNC: 1.6 MG/DL — LOW (ref 1.8–2.5)
MCHC RBC-ENTMCNC: 30.5 PG — SIGNIFICANT CHANGE UP (ref 27–31)
MCHC RBC-ENTMCNC: 33.2 G/DL — SIGNIFICANT CHANGE UP (ref 32–36)
MCV RBC AUTO: 91.8 FL — SIGNIFICANT CHANGE UP (ref 80–94)
PHOSPHATE SERPL-MCNC: 3.3 MG/DL — SIGNIFICANT CHANGE UP (ref 2.4–4.7)
PLATELET # BLD AUTO: 190 K/UL — SIGNIFICANT CHANGE UP (ref 150–400)
POTASSIUM SERPL-MCNC: 3.6 MMOL/L — SIGNIFICANT CHANGE UP (ref 3.5–5.3)
POTASSIUM SERPL-SCNC: 3.6 MMOL/L — SIGNIFICANT CHANGE UP (ref 3.5–5.3)
PROT SERPL-MCNC: 6.5 G/DL — LOW (ref 6.6–8.7)
PROTHROM AB SERPL-ACNC: 14.7 SEC — HIGH (ref 9.8–12.7)
RBC # BLD: 4.17 M/UL — LOW (ref 4.6–6.2)
RBC # FLD: 13.8 % — SIGNIFICANT CHANGE UP (ref 11–15.6)
SODIUM SERPL-SCNC: 137 MMOL/L — SIGNIFICANT CHANGE UP (ref 135–145)
WBC # BLD: 5.8 K/UL — SIGNIFICANT CHANGE UP (ref 4.8–10.8)
WBC # FLD AUTO: 5.8 K/UL — SIGNIFICANT CHANGE UP (ref 4.8–10.8)

## 2017-05-02 PROCEDURE — 86850 RBC ANTIBODY SCREEN: CPT

## 2017-05-02 PROCEDURE — A9537: CPT

## 2017-05-02 PROCEDURE — 78226 HEPATOBILIARY SYSTEM IMAGING: CPT

## 2017-05-02 PROCEDURE — 85610 PROTHROMBIN TIME: CPT

## 2017-05-02 PROCEDURE — 83735 ASSAY OF MAGNESIUM: CPT

## 2017-05-02 PROCEDURE — 84100 ASSAY OF PHOSPHORUS: CPT

## 2017-05-02 PROCEDURE — 76705 ECHO EXAM OF ABDOMEN: CPT

## 2017-05-02 PROCEDURE — 88304 TISSUE EXAM BY PATHOLOGIST: CPT

## 2017-05-02 PROCEDURE — 93306 TTE W/DOPPLER COMPLETE: CPT

## 2017-05-02 PROCEDURE — 47562 LAPAROSCOPIC CHOLECYSTECTOMY: CPT

## 2017-05-02 PROCEDURE — 74177 CT ABD & PELVIS W/CONTRAST: CPT

## 2017-05-02 PROCEDURE — 86900 BLOOD TYPING SEROLOGIC ABO: CPT

## 2017-05-02 PROCEDURE — T1013: CPT

## 2017-05-02 PROCEDURE — 88304 TISSUE EXAM BY PATHOLOGIST: CPT | Mod: 26

## 2017-05-02 PROCEDURE — 81001 URINALYSIS AUTO W/SCOPE: CPT

## 2017-05-02 PROCEDURE — 84484 ASSAY OF TROPONIN QUANT: CPT

## 2017-05-02 PROCEDURE — 36415 COLL VENOUS BLD VENIPUNCTURE: CPT

## 2017-05-02 PROCEDURE — 83690 ASSAY OF LIPASE: CPT

## 2017-05-02 PROCEDURE — 80048 BASIC METABOLIC PNL TOTAL CA: CPT

## 2017-05-02 PROCEDURE — 85027 COMPLETE CBC AUTOMATED: CPT

## 2017-05-02 PROCEDURE — 80074 ACUTE HEPATITIS PANEL: CPT

## 2017-05-02 PROCEDURE — 93005 ELECTROCARDIOGRAM TRACING: CPT

## 2017-05-02 PROCEDURE — 80053 COMPREHEN METABOLIC PANEL: CPT

## 2017-05-02 PROCEDURE — 94640 AIRWAY INHALATION TREATMENT: CPT

## 2017-05-02 PROCEDURE — 96374 THER/PROPH/DIAG INJ IV PUSH: CPT | Mod: XU

## 2017-05-02 PROCEDURE — 85730 THROMBOPLASTIN TIME PARTIAL: CPT

## 2017-05-02 PROCEDURE — 99285 EMERGENCY DEPT VISIT HI MDM: CPT | Mod: 25

## 2017-05-02 PROCEDURE — 86901 BLOOD TYPING SEROLOGIC RH(D): CPT

## 2017-05-02 PROCEDURE — 83605 ASSAY OF LACTIC ACID: CPT

## 2017-05-02 PROCEDURE — 71260 CT THORAX DX C+: CPT

## 2017-05-02 RX ORDER — ALBUTEROL 90 UG/1
2.5 AEROSOL, METERED ORAL EVERY 6 HOURS
Qty: 0 | Refills: 0 | Status: DISCONTINUED | OUTPATIENT
Start: 2017-05-02 | End: 2017-05-02

## 2017-05-02 RX ORDER — DIGOXIN 250 MCG
0.12 TABLET ORAL DAILY
Qty: 0 | Refills: 0 | Status: DISCONTINUED | OUTPATIENT
Start: 2017-05-02 | End: 2017-05-02

## 2017-05-02 RX ORDER — ENOXAPARIN SODIUM 100 MG/ML
40 INJECTION SUBCUTANEOUS EVERY 24 HOURS
Qty: 0 | Refills: 0 | Status: DISCONTINUED | OUTPATIENT
Start: 2017-05-02 | End: 2017-05-02

## 2017-05-02 RX ORDER — SODIUM CHLORIDE 9 MG/ML
1000 INJECTION, SOLUTION INTRAVENOUS
Qty: 0 | Refills: 0 | Status: DISCONTINUED | OUTPATIENT
Start: 2017-05-02 | End: 2017-05-02

## 2017-05-02 RX ORDER — SOTALOL HCL 120 MG
120 TABLET ORAL EVERY 12 HOURS
Qty: 0 | Refills: 0 | Status: DISCONTINUED | OUTPATIENT
Start: 2017-05-02 | End: 2017-05-02

## 2017-05-02 RX ORDER — IPRATROPIUM/ALBUTEROL SULFATE 18-103MCG
3 AEROSOL WITH ADAPTER (GRAM) INHALATION EVERY 6 HOURS
Qty: 0 | Refills: 0 | Status: DISCONTINUED | OUTPATIENT
Start: 2017-05-02 | End: 2017-05-02

## 2017-05-02 RX ORDER — MAGNESIUM SULFATE 500 MG/ML
2 VIAL (ML) INJECTION ONCE
Qty: 0 | Refills: 0 | Status: COMPLETED | OUTPATIENT
Start: 2017-05-02 | End: 2017-05-02

## 2017-05-02 RX ORDER — LISINOPRIL 2.5 MG/1
5 TABLET ORAL DAILY
Qty: 0 | Refills: 0 | Status: DISCONTINUED | OUTPATIENT
Start: 2017-05-02 | End: 2017-05-02

## 2017-05-02 RX ORDER — METOPROLOL TARTRATE 50 MG
0 TABLET ORAL
Qty: 0 | Refills: 0 | COMMUNITY
Start: 2017-05-02

## 2017-05-02 RX ORDER — MORPHINE SULFATE 50 MG/1
2 CAPSULE, EXTENDED RELEASE ORAL EVERY 4 HOURS
Qty: 0 | Refills: 0 | Status: DISCONTINUED | OUTPATIENT
Start: 2017-05-02 | End: 2017-05-02

## 2017-05-02 RX ORDER — SODIUM CHLORIDE 9 MG/ML
1000 INJECTION INTRAMUSCULAR; INTRAVENOUS; SUBCUTANEOUS
Qty: 0 | Refills: 0 | Status: DISCONTINUED | OUTPATIENT
Start: 2017-05-02 | End: 2017-05-02

## 2017-05-02 RX ORDER — HYDROMORPHONE HYDROCHLORIDE 2 MG/ML
0.5 INJECTION INTRAMUSCULAR; INTRAVENOUS; SUBCUTANEOUS
Qty: 0 | Refills: 0 | Status: DISCONTINUED | OUTPATIENT
Start: 2017-05-02 | End: 2017-05-02

## 2017-05-02 RX ORDER — LISINOPRIL 2.5 MG/1
1 TABLET ORAL
Qty: 0 | Refills: 0 | COMMUNITY
Start: 2017-05-02

## 2017-05-02 RX ORDER — METOPROLOL TARTRATE 50 MG
12.5 TABLET ORAL
Qty: 0 | Refills: 0 | Status: DISCONTINUED | OUTPATIENT
Start: 2017-05-02 | End: 2017-05-02

## 2017-05-02 RX ORDER — ATORVASTATIN CALCIUM 80 MG/1
1 TABLET, FILM COATED ORAL
Qty: 0 | Refills: 0 | COMMUNITY
Start: 2017-05-02

## 2017-05-02 RX ORDER — ATORVASTATIN CALCIUM 80 MG/1
10 TABLET, FILM COATED ORAL AT BEDTIME
Qty: 0 | Refills: 0 | Status: DISCONTINUED | OUTPATIENT
Start: 2017-05-02 | End: 2017-05-02

## 2017-05-02 RX ORDER — ALBUTEROL 90 UG/1
1 AEROSOL, METERED ORAL EVERY 4 HOURS
Qty: 0 | Refills: 0 | Status: DISCONTINUED | OUTPATIENT
Start: 2017-05-02 | End: 2017-05-02

## 2017-05-02 RX ORDER — DIGOXIN 250 MCG
1 TABLET ORAL
Qty: 0 | Refills: 0 | COMMUNITY
Start: 2017-05-02

## 2017-05-02 RX ORDER — SOTALOL HCL 120 MG
1 TABLET ORAL
Qty: 0 | Refills: 0 | COMMUNITY
Start: 2017-05-02

## 2017-05-02 RX ORDER — ALBUTEROL 90 UG/1
1 AEROSOL, METERED ORAL
Qty: 0 | Refills: 0 | COMMUNITY
Start: 2017-05-02

## 2017-05-02 RX ORDER — IPRATROPIUM/ALBUTEROL SULFATE 18-103MCG
3 AEROSOL WITH ADAPTER (GRAM) INHALATION
Qty: 0 | Refills: 0 | COMMUNITY
Start: 2017-05-02

## 2017-05-02 RX ORDER — ONDANSETRON 8 MG/1
4 TABLET, FILM COATED ORAL ONCE
Qty: 0 | Refills: 0 | Status: DISCONTINUED | OUTPATIENT
Start: 2017-05-02 | End: 2017-05-02

## 2017-05-02 RX ADMIN — LISINOPRIL 5 MILLIGRAM(S): 2.5 TABLET ORAL at 06:18

## 2017-05-02 RX ADMIN — Medication 12.5 MILLIGRAM(S): at 06:21

## 2017-05-02 RX ADMIN — Medication 50 GRAM(S): at 13:27

## 2017-05-02 RX ADMIN — HYDROMORPHONE HYDROCHLORIDE 0.5 MILLIGRAM(S): 2 INJECTION INTRAMUSCULAR; INTRAVENOUS; SUBCUTANEOUS at 10:35

## 2017-05-02 RX ADMIN — SODIUM CHLORIDE 125 MILLILITER(S): 9 INJECTION INTRAMUSCULAR; INTRAVENOUS; SUBCUTANEOUS at 10:00

## 2017-05-02 RX ADMIN — Medication 3 MILLILITER(S): at 20:23

## 2017-05-02 RX ADMIN — Medication 120 MILLIGRAM(S): at 06:20

## 2017-05-02 RX ADMIN — ENOXAPARIN SODIUM 40 MILLIGRAM(S): 100 INJECTION SUBCUTANEOUS at 13:27

## 2017-05-02 RX ADMIN — Medication 0.12 MILLIGRAM(S): at 06:18

## 2017-05-02 NOTE — DISCHARGE NOTE ADULT - CARE PLAN
Principal Discharge DX:	Biliary colic  Goal:	GOAL  Instructions for follow-up, activity and diet:	Follow up with your surgeon in 2 weeks. No lifting anything heavier than 10 pounds. Resume usual diet.  Contact a physician or present to the nearest emergency room for fever, chills, or pain not relieved by medications. Do not drive while taking pain medications. Principal Discharge DX:	Biliary colic  Goal:	GOAL  Instructions for follow-up, activity and diet:	Resume all outpatient medications upon discharge.  Follow up with your surgeon in 2 weeks. No lifting anything heavier than 10 pounds. Resume usual diet.  Contact a physician or present to the nearest emergency room for fever, chills, or pain not relieved by medications. Do not drive while taking pain medications.

## 2017-05-02 NOTE — DISCHARGE NOTE ADULT - PATIENT PORTAL LINK FT
“You can access the [facility] Patient Portal, offered by [facility], by registering with the following website: [email or web address]”

## 2017-05-02 NOTE — DISCHARGE NOTE ADULT - HOSPITAL COURSE
Pt is a 51 year old male admitted with biliary colic. On hospital day 1 the total bilirubin was found to be elevated. A GI consult was called. ERCP was recommended only if the bilirubin continued to rise but on hospital day 2 there was a slight decrease in bilirubin. The patient's INR was reversed with vitamin K and a lap carissa was performed on 5/2. Patient is stable: tolerating diet, voiding, ambulating, pain well controlled. Pt will continue all outpatient medications and follow up in 2 weeks.

## 2017-05-02 NOTE — PROGRESS NOTE ADULT - PROBLEM SELECTOR PLAN 1
GI ( ) to evaluate for possible ERCP, going to OR for lap carissa 5/2, NPO, IV fluids, monitor abdominal exams,
Continue regular diet  oral pain management  encourage ambulation  plan for discharge  pt explained about his discharge condition and recommendations, shows verbal understanding

## 2017-05-02 NOTE — DISCHARGE NOTE ADULT - CARE PROVIDER_API CALL
[facility],   [address] [place] [address] [place] [address]  Phone: [phone]  Fax: (   )    -

## 2017-05-02 NOTE — PROGRESS NOTE ADULT - SUBJECTIVE AND OBJECTIVE BOX
Post Op check  Procedure: Lap carissa    Pt examined at bed side  Ambulating  tolerated diet after surgery  has voided  denies N/V/F    OE:  pt alert, Ox3  Chest: CTAB  CVS: S1S2  abd: soft, tender to palpation over the incision site  dressing in place, minimal soakage

## 2017-05-02 NOTE — PROGRESS NOTE ADULT - SUBJECTIVE AND OBJECTIVE BOX
INTERVAL HPI/OVERNIGHT EVENTS: No acute events overnight. Patient went to the OR today for laparoscopic cholecystectomy. Patient in PACU recovering. Pain well controlled at this time. Will re-evaluate when patient more alert.     STATUS POST:  Laparoscopic Cholecystectomy    POST OPERATIVE DAY #: 0    SUBJECTIVE:  Flatus: [ ] YES [ ] NO             Bowel Movement: [ ] YES [ ] NO  Pain (0-10):            Pain Control Adequate: [ ] YES [ ] NO  Nausea: [ ] YES [ ] NO            Vomiting: [ ] YES [ ] NO  Diarrhea: [ ] YES [ ] NO         Constipation: [ ] YES [ ] NO     Chest Pain: [ ] YES [ ] NO    SOB:  [ ] YES [ ] NO    MEDICATIONS  (STANDING):  sodium chloride 0.9%. 1000milliLiter(s) IV Continuous <Continuous>    MEDICATIONS  (PRN):  HYDROmorphone  Injectable 0.5milliGRAM(s) IV Push every 10 minutes PRN Severe Pain (7 - 10)  ondansetron Injectable 4milliGRAM(s) IV Push once PRN Nausea and/or Vomiting      Vital Signs Last 24 Hrs  T(C): 36.4, Max: 37.2 (05-02 @ 00:14)  T(F): 97.5, Max: 99 (05-02 @ 00:14)  HR: 67 (63 - 80)  BP: 115/75 (110/75 - 138/99)  BP(mean): --  RR: 12 (12 - 20)  SpO2: 100% (94% - 100%)    Constitutional: NAD, well-groomed, well-developed  HEENT: PERRLA, EOMI, no drainage or redness  Neck: No bruits; no thyromegaly or nodules,  No JVD  Back: Normal spine flexure, No CVA tenderness, No deformity or limitation of movement  Respiratory: Breath Sounds equal & clear to percussion & auscultation, no accessory muscle use  Cardiovascular: Regular rate & rhythm, normal S1, S2; no murmurs, gallops or rubs; no S3, S4  Gastrointestinal: Soft, non-tender, no hepatosplenomegaly, normal bowel sounds  Extremities: No peripheral edema, No cyanosis, clubbing   Vascular: Equal and normal pulses: 2+ peripheral pulses throughout  Neurological: GCS:   (  /   /   ). A&O x 3; no sensory, motor or coordination deficits, normal reflexes  Psychiatric: Normal mood, normal affect  Musculoskeletal: No joint pain, swelling or deformity; no limitation of movement  Skin: No rashes      I&O's Detail  I & Os for 24h ending 02 May 2017 07:00  =============================================  IN:    lactated ringers.: 1100 ml    Oral Fluid: 150 ml    Total IN: 1250 ml  ---------------------------------------------  OUT:    Total OUT: 0 ml  ---------------------------------------------  Total NET: 1250 ml    I & Os for current day (as of 02 May 2017 10:37)  =============================================  IN:    Oral Fluid: 120 ml    Total IN: 120 ml  ---------------------------------------------  OUT:    Total OUT: 0 ml  ---------------------------------------------  Total NET: 120 ml      LABS:                        12.7   5.8   )-----------( 190      ( 02 May 2017 05:33 )             38.3     05-02    137  |  98  |  13.0  ----------------------------<  73  3.6   |  23.0  |  0.75    Ca    8.3<L>      02 May 2017 05:33  Phos  3.3     05-02  Mg     1.6     05-02    TPro  6.5<L>  /  Alb  3.3  /  TBili  2.2<H>  /  DBili  x   /  AST  25  /  ALT  29  /  AlkPhos  77  05-02    PT/INR - ( 02 May 2017 05:33 )   PT: 14.7 sec;   INR: 1.33 ratio         PTT - ( 02 May 2017 05:33 )  PTT:33.8 sec      RADIOLOGY & ADDITIONAL STUDIES:

## 2017-05-02 NOTE — DISCHARGE NOTE ADULT - PROVIDER TOKENS
FREE:[LAST:[[facility]],PHONE:[phone]],FAX:[(   [phone]    -],ADDRESS:[[address] [place] [address]]]

## 2017-05-02 NOTE — DISCHARGE NOTE ADULT - PLAN OF CARE
GOAL Follow up with your surgeon in 2 weeks. No lifting anything heavier than 10 pounds. Resume usual diet.  Contact a physician or present to the nearest emergency room for fever, chills, or pain not relieved by medications. Do not drive while taking pain medications. Resume all outpatient medications upon discharge.  Follow up with your surgeon in 2 weeks. No lifting anything heavier than 10 pounds. Resume usual diet.  Contact a physician or present to the nearest emergency room for fever, chills, or pain not relieved by medications. Do not drive while taking pain medications.

## 2017-05-02 NOTE — PROGRESS NOTE ADULT - ATTENDING COMMENTS
The patient was seen and examined  I have reviewed his diagnostic imaging  He states his pain is improved.  Will ask GI to see the patient for possible ERCP  Will obtain hepatitis profile  Plan for cholecystectomy tomorrow
Expected post-operative course

## 2017-05-02 NOTE — DISCHARGE NOTE ADULT - MEDICATION SUMMARY - MEDICATIONS TO TAKE
I will START or STAY ON the medications listed below when I get home from the hospital:    predniSONE 20 mg oral tablet  -- 1 tab(s) by mouth once a day  -- It is very important that you take or use this exactly as directed.  Do not skip doses or discontinue unless directed by your doctor.  Obtain medical advice before taking any non-prescription drugs as some may affect the action of this medication.  Take with food or milk.    -- Indication: For Home med    Percocet 5/325 oral tablet  -- 1-2 tab(s) by mouth every 4 hours prn pain MDD:10  -- Indication: For Pain    lisinopril 5 mg oral tablet  -- 1 tab(s) by mouth once a day MDD:4  -- Indication: For Home med    digoxin 125 mcg (0.125 mg) oral tablet  -- 1 tab(s) by mouth once a day  -- Indication: For Home med    sotalol 120 mg oral tablet  -- 1 tab(s) by mouth every 12 hours  -- Indication: For Home med    Coumadin 4 mg oral tablet  -- 1 tab(s) by mouth once a day  -- Do not take this drug if you are pregnant.  It is very important that you take or use this exactly as directed.  Do not skip doses or discontinue unless directed by your doctor.  Obtain medical advice before taking any non-prescription drugs as some may affect the action of this medication.    -- Indication: For Home med    atorvastatin 10 mg oral tablet  -- 1 tab(s) by mouth once a day (at bedtime)  -- Indication: For Home med    metoprolol 25 mg oral tablet  -- 0.5 tab(s) by mouth 2 times a day  -- It is very important that you take or use this exactly as directed.  Do not skip doses or discontinue unless directed by your doctor.  May cause drowsiness.  Alcohol may intensify this effect.  Use care when operating dangerous machinery.  Some non-prescription drugs may aggravate your condition.  Read all labels carefully.  If a warning appears, check with your doctor before taking.  Take with food or milk.  This drug may impair the ability to drive or operate machinery.  Use care until you become familiar with its effects.    -- Indication: For Home med    Dulera 200 mcg-5 mcg/inh inhalation aerosol  --  inhaled   -- Indication: For Home med    albuterol 2.5 mg/3 mL (0.083%) inhalation solution  -- 3 milliliter(s) inhaled every 6 hours, As Needed  -- For inhalation only.  It is very important that you take or use this exactly as directed.  Do not skip doses or discontinue unless directed by your doctor.  Obtain medical advice before taking any non-prescription drugs as some may affect the action of this medication.    -- Indication: For Home med    furosemide 40 mg oral tablet  -- 1 tab(s) by mouth once a day  -- Indication: For Home med    Colace 100 mg oral capsule  -- 1 cap(s) by mouth 2 times a day  -- Indication: For Constipation prevention    magnesium oxide 400 mg (241.3 mg elemental magnesium) oral tablet  -- 1 tab(s) by mouth 3 times a day (with meals)  -- Indication: For Home med

## 2017-05-03 NOTE — PROGRESS NOTE ADULT - SUBJECTIVE AND OBJECTIVE BOX
pt pod 1 sp lap carissa under GETA. Tolerated well. Denies any A/c.   pt with no n/v @ this time. using pain meds as ordered/needed with some pain relief. vss. spont vent. no issues with anesthesia at this time. pt resting comfortably @ this time.

## 2017-05-04 ENCOUNTER — INPATIENT (INPATIENT)
Facility: HOSPITAL | Age: 52
LOS: 91 days | DRG: 356 | End: 2017-08-04
Attending: SURGERY | Admitting: SURGERY
Payer: MEDICAID

## 2017-05-04 VITALS
TEMPERATURE: 97 F | HEIGHT: 65 IN | SYSTOLIC BLOOD PRESSURE: 134 MMHG | OXYGEN SATURATION: 97 % | RESPIRATION RATE: 20 BRPM | WEIGHT: 181 LBS | HEART RATE: 69 BPM | DIASTOLIC BLOOD PRESSURE: 76 MMHG

## 2017-05-04 DIAGNOSIS — Z87.81 PERSONAL HISTORY OF (HEALED) TRAUMATIC FRACTURE: Chronic | ICD-10-CM

## 2017-05-04 DIAGNOSIS — R10.9 UNSPECIFIED ABDOMINAL PAIN: ICD-10-CM

## 2017-05-04 DIAGNOSIS — Z95.0 PRESENCE OF CARDIAC PACEMAKER: Chronic | ICD-10-CM

## 2017-05-04 LAB
ALBUMIN SERPL ELPH-MCNC: 3.6 G/DL — SIGNIFICANT CHANGE UP (ref 3.3–5.2)
ALP SERPL-CCNC: 99 U/L — SIGNIFICANT CHANGE UP (ref 40–120)
ALT FLD-CCNC: 41 U/L — HIGH
ANION GAP SERPL CALC-SCNC: 15 MMOL/L — SIGNIFICANT CHANGE UP (ref 5–17)
APPEARANCE UR: CLEAR — SIGNIFICANT CHANGE UP
AST SERPL-CCNC: 35 U/L — SIGNIFICANT CHANGE UP
BACTERIA # UR AUTO: ABNORMAL
BASOPHILS # BLD AUTO: 0 K/UL — SIGNIFICANT CHANGE UP (ref 0–0.2)
BASOPHILS NFR BLD AUTO: 0 % — SIGNIFICANT CHANGE UP (ref 0–2)
BILIRUB SERPL-MCNC: 2.4 MG/DL — HIGH (ref 0.4–2)
BILIRUB UR-MCNC: ABNORMAL
BLD GP AB SCN SERPL QL: SIGNIFICANT CHANGE UP
BUN SERPL-MCNC: 18 MG/DL — SIGNIFICANT CHANGE UP (ref 8–20)
CALCIUM SERPL-MCNC: 8.4 MG/DL — LOW (ref 8.6–10.2)
CHLORIDE SERPL-SCNC: 88 MMOL/L — LOW (ref 98–107)
CO2 SERPL-SCNC: 24 MMOL/L — SIGNIFICANT CHANGE UP (ref 22–29)
COLOR SPEC: YELLOW — SIGNIFICANT CHANGE UP
CREAT SERPL-MCNC: 0.91 MG/DL — SIGNIFICANT CHANGE UP (ref 0.5–1.3)
DIFF PNL FLD: NEGATIVE — SIGNIFICANT CHANGE UP
EOSINOPHIL # BLD AUTO: 0 K/UL — SIGNIFICANT CHANGE UP (ref 0–0.5)
EOSINOPHIL NFR BLD AUTO: 0 % — SIGNIFICANT CHANGE UP (ref 0–5)
EPI CELLS # UR: SIGNIFICANT CHANGE UP
GLUCOSE SERPL-MCNC: 121 MG/DL — HIGH (ref 70–115)
GLUCOSE UR QL: NEGATIVE MG/DL — SIGNIFICANT CHANGE UP
HCT VFR BLD CALC: 38.5 % — LOW (ref 42–52)
HGB BLD-MCNC: 12.9 G/DL — LOW (ref 14–18)
HYALINE CASTS # UR AUTO: ABNORMAL /LPF
KETONES UR-MCNC: NEGATIVE — SIGNIFICANT CHANGE UP
LEUKOCYTE ESTERASE UR-ACNC: ABNORMAL
LIDOCAIN IGE QN: 26 U/L — SIGNIFICANT CHANGE UP (ref 22–51)
LYMPHOCYTES # BLD AUTO: 1.4 K/UL — SIGNIFICANT CHANGE UP (ref 1–4.8)
LYMPHOCYTES # BLD AUTO: 5 % — LOW (ref 20–55)
MCHC RBC-ENTMCNC: 30.4 PG — SIGNIFICANT CHANGE UP (ref 27–31)
MCHC RBC-ENTMCNC: 33.5 G/DL — SIGNIFICANT CHANGE UP (ref 32–36)
MCV RBC AUTO: 90.8 FL — SIGNIFICANT CHANGE UP (ref 80–94)
MONOCYTES # BLD AUTO: 1.9 K/UL — HIGH (ref 0–0.8)
MONOCYTES NFR BLD AUTO: 5 % — SIGNIFICANT CHANGE UP (ref 3–10)
NEUTROPHILS # BLD AUTO: 9.6 K/UL — HIGH (ref 1.8–8)
NEUTROPHILS NFR BLD AUTO: 89 % — HIGH (ref 37–73)
NITRITE UR-MCNC: NEGATIVE — SIGNIFICANT CHANGE UP
NT-PROBNP SERPL-SCNC: 5328 PG/ML — HIGH (ref 0–300)
PH UR: 5 — SIGNIFICANT CHANGE UP (ref 5–8)
PLAT MORPH BLD: NORMAL — SIGNIFICANT CHANGE UP
PLATELET # BLD AUTO: 201 K/UL — SIGNIFICANT CHANGE UP (ref 150–400)
POTASSIUM SERPL-MCNC: 4 MMOL/L — SIGNIFICANT CHANGE UP (ref 3.5–5.3)
POTASSIUM SERPL-SCNC: 4 MMOL/L — SIGNIFICANT CHANGE UP (ref 3.5–5.3)
PROT SERPL-MCNC: 7.2 G/DL — SIGNIFICANT CHANGE UP (ref 6.6–8.7)
PROT UR-MCNC: 15 MG/DL
RBC # BLD: 4.24 M/UL — LOW (ref 4.6–6.2)
RBC # FLD: 14.3 % — SIGNIFICANT CHANGE UP (ref 11–15.6)
RBC BLD AUTO: NORMAL — SIGNIFICANT CHANGE UP
RBC CASTS # UR COMP ASSIST: SIGNIFICANT CHANGE UP /HPF (ref 0–4)
SODIUM SERPL-SCNC: 127 MMOL/L — LOW (ref 135–145)
SP GR SPEC: 1.01 — SIGNIFICANT CHANGE UP (ref 1.01–1.02)
TYPE + AB SCN PNL BLD: SIGNIFICANT CHANGE UP
UROBILINOGEN FLD QL: 4 MG/DL
VARIANT LYMPHS # BLD: 1 % — SIGNIFICANT CHANGE UP (ref 0–6)
WBC # BLD: 12.8 K/UL — HIGH (ref 4.8–10.8)
WBC # FLD AUTO: 12.8 K/UL — HIGH (ref 4.8–10.8)
WBC UR QL: SIGNIFICANT CHANGE UP

## 2017-05-04 PROCEDURE — 99223 1ST HOSP IP/OBS HIGH 75: CPT | Mod: 57

## 2017-05-04 PROCEDURE — 99285 EMERGENCY DEPT VISIT HI MDM: CPT

## 2017-05-04 PROCEDURE — 76857 US EXAM PELVIC LIMITED: CPT | Mod: 26

## 2017-05-04 PROCEDURE — 74022 RADEX COMPL AQT ABD SERIES: CPT | Mod: 26

## 2017-05-04 PROCEDURE — 76700 US EXAM ABDOM COMPLETE: CPT | Mod: 26

## 2017-05-04 RX ORDER — DOCUSATE SODIUM 100 MG
1 CAPSULE ORAL
Qty: 0 | Refills: 0 | COMMUNITY

## 2017-05-04 RX ORDER — OXYCODONE AND ACETAMINOPHEN 5; 325 MG/1; MG/1
0 TABLET ORAL
Qty: 0 | Refills: 0 | COMMUNITY

## 2017-05-04 RX ORDER — SODIUM CHLORIDE 9 MG/ML
1000 INJECTION, SOLUTION INTRAVENOUS ONCE
Qty: 0 | Refills: 0 | Status: COMPLETED | OUTPATIENT
Start: 2017-05-04 | End: 2017-05-04

## 2017-05-04 RX ORDER — SODIUM CHLORIDE 9 MG/ML
3 INJECTION INTRAMUSCULAR; INTRAVENOUS; SUBCUTANEOUS EVERY 8 HOURS
Qty: 0 | Refills: 0 | Status: DISCONTINUED | OUTPATIENT
Start: 2017-05-04 | End: 2017-05-09

## 2017-05-04 RX ORDER — ACETAMINOPHEN 500 MG
1000 TABLET ORAL ONCE
Qty: 0 | Refills: 0 | Status: COMPLETED | OUTPATIENT
Start: 2017-05-04 | End: 2017-05-04

## 2017-05-04 RX ADMIN — SODIUM CHLORIDE 3 MILLILITER(S): 9 INJECTION INTRAMUSCULAR; INTRAVENOUS; SUBCUTANEOUS at 21:12

## 2017-05-04 RX ADMIN — SODIUM CHLORIDE 3 MILLILITER(S): 9 INJECTION INTRAMUSCULAR; INTRAVENOUS; SUBCUTANEOUS at 16:21

## 2017-05-04 RX ADMIN — Medication 400 MILLIGRAM(S): at 18:24

## 2017-05-04 RX ADMIN — SODIUM CHLORIDE 2000 MILLILITER(S): 9 INJECTION, SOLUTION INTRAVENOUS at 21:12

## 2017-05-04 RX ADMIN — Medication 1000 MILLIGRAM(S): at 18:45

## 2017-05-04 NOTE — ED PROVIDER NOTE - OBJECTIVE STATEMENT
52 yo Male with NICM, AICD placed in 2014, VT and PVC's s/p epicardial VT ablation march 2016(), Severe MR, Afib on coumadin, asthma presents to the ED with abdominal distension and pain. His last BM was yesterday. He is s/p lap cholecystectomy.

## 2017-05-04 NOTE — ED ADULT TRIAGE NOTE - CHIEF COMPLAINT QUOTE
Patient arrived to ED today with c/o abdominal pain.  Patient states on Tuesday he had a cholecystectomy.  Patient states he last had BM yesterday but feels constipated. Patient arrived to ED today with c/o abdominal pain.  Patient states on Tuesday he had a cholecystectomy.  Patient states he last had BM yesterday but feels constipated and has abdominal pain and heartburn.

## 2017-05-04 NOTE — ED ADULT NURSE NOTE - ED STAT RN HANDOFF DETAILS
Pt alert and oriented, in no distress,  transported to unit without incident. Pt handed off in stable condition.

## 2017-05-04 NOTE — ED ADULT NURSE REASSESSMENT NOTE - NS ED NURSE REASSESS COMMENT FT1
pt ambulated to bathroom without incident.  Pt states he did not have a bowel movement. will continue to monitor.

## 2017-05-04 NOTE — ED PROVIDER NOTE - NS ED ROS FT
Review of Systems:  	•	CONSTITUTIONAL : no fever or weight change  	•	SKIN : no rash  	•	HEMATOLOGIC : no petechia, no bruising  	•	EYES : no eye pain, no blurred vision  	•	ENT : no change in hearing, no sore throat  	•	RESPIRATORY : no shortness of breath, no cough  	•	CARDIAC : lower leg swelling  	•	GI : abdominal distention and constipation  	•	MUSCULOSKELETAL : no joint paint, no swelling, no redness  	•	NEUROLOGIC : no weakness, no headache, no anesthesia, no paresthesias

## 2017-05-04 NOTE — ED STATDOCS - PROGRESS NOTE DETAILS
50 y/o M pt w/ PMHx of CAD, A-fib, presents to the ED c/o abd pain, heartburn, abd distension and leg swelling x2 days. Pt also notes vomiting today. Pt is s/p cholecystectomy on Tuesday morning. Pt denies fever, chills, chest pain, diaphoresis, numbness/tingling, focal weakness, constipation, diarrhea, or any other complaints. NKDA. Abd distended, dull to percussion. Bowel sounds hypoactive. Will do CT abd. Focused evaluation, orders entered, placed in main for further evaluation by another provider.

## 2017-05-04 NOTE — ED ADULT NURSE REASSESSMENT NOTE - NS ED NURSE REASSESS COMMENT FT1
Pt care assumed at this time, no apparent distress noted at this time, charting as noted. Pt received Alert and Oriented to person, place, and time sitting in bed with family members at the bedside. Pt states he did not have a bowel movement yet, abd is distended and hard to touch. Pt is awaiting US to be performed. Pt denies any complaints at this time. Will continue to monitor.

## 2017-05-04 NOTE — ED ADULT NURSE NOTE - OBJECTIVE STATEMENT
Pt received Alert and Oriented to person, place, and time, pt had gallbladder surgery laparoscopic on Tuesday.  Pt was sent home on oxycodone.  Pt states he did not have a BM today and has developed abdominal pain, not passing gas, vomited today once.  Steri strips are dry and intact ABD is largely distended with hypoactive bowel sounds in all 4 quads.  Pt has a pacemaker with b/l lower ext edema.  Lung sounds diminished b/l

## 2017-05-04 NOTE — ED ADULT NURSE NOTE - CHIEF COMPLAINT QUOTE
Patient arrived to ED today with c/o abdominal pain.  Patient states on Tuesday he had a cholecystectomy.  Patient states he last had BM yesterday but feels constipated and has abdominal pain and heartburn.

## 2017-05-04 NOTE — ED PROVIDER NOTE - CARE PLAN
Principal Discharge DX:	Abdominal pain  Secondary Diagnosis:	Constipation  Secondary Diagnosis:	Congestive heart failure

## 2017-05-05 DIAGNOSIS — R33.9 RETENTION OF URINE, UNSPECIFIED: ICD-10-CM

## 2017-05-05 DIAGNOSIS — K59.00 CONSTIPATION, UNSPECIFIED: ICD-10-CM

## 2017-05-05 DIAGNOSIS — Z90.49 ACQUIRED ABSENCE OF OTHER SPECIFIED PARTS OF DIGESTIVE TRACT: Chronic | ICD-10-CM

## 2017-05-05 DIAGNOSIS — I42.8 OTHER CARDIOMYOPATHIES: ICD-10-CM

## 2017-05-05 DIAGNOSIS — I50.9 HEART FAILURE, UNSPECIFIED: ICD-10-CM

## 2017-05-05 DIAGNOSIS — R10.9 UNSPECIFIED ABDOMINAL PAIN: ICD-10-CM

## 2017-05-05 DIAGNOSIS — J45.909 UNSPECIFIED ASTHMA, UNCOMPLICATED: ICD-10-CM

## 2017-05-05 DIAGNOSIS — I48.91 UNSPECIFIED ATRIAL FIBRILLATION: ICD-10-CM

## 2017-05-05 LAB — SURGICAL PATHOLOGY FINAL REPORT - CH: SIGNIFICANT CHANGE UP

## 2017-05-05 PROCEDURE — 93306 TTE W/DOPPLER COMPLETE: CPT | Mod: 26

## 2017-05-05 RX ORDER — HYDROMORPHONE HYDROCHLORIDE 2 MG/ML
0.5 INJECTION INTRAMUSCULAR; INTRAVENOUS; SUBCUTANEOUS ONCE
Qty: 0 | Refills: 0 | Status: DISCONTINUED | OUTPATIENT
Start: 2017-05-05 | End: 2017-05-05

## 2017-05-05 RX ORDER — DOCUSATE SODIUM 100 MG
100 CAPSULE ORAL THREE TIMES A DAY
Qty: 0 | Refills: 0 | Status: DISCONTINUED | OUTPATIENT
Start: 2017-05-04 | End: 2017-05-09

## 2017-05-05 RX ORDER — ONDANSETRON 8 MG/1
4 TABLET, FILM COATED ORAL EVERY 6 HOURS
Qty: 0 | Refills: 0 | Status: DISCONTINUED | OUTPATIENT
Start: 2017-05-04 | End: 2017-05-09

## 2017-05-05 RX ORDER — ALBUTEROL 90 UG/1
1 AEROSOL, METERED ORAL EVERY 4 HOURS
Qty: 0 | Refills: 0 | Status: DISCONTINUED | OUTPATIENT
Start: 2017-05-05 | End: 2017-05-09

## 2017-05-05 RX ORDER — SOTALOL HCL 120 MG
120 TABLET ORAL EVERY 12 HOURS
Qty: 0 | Refills: 0 | Status: DISCONTINUED | OUTPATIENT
Start: 2017-05-05 | End: 2017-05-07

## 2017-05-05 RX ORDER — ATORVASTATIN CALCIUM 80 MG/1
10 TABLET, FILM COATED ORAL AT BEDTIME
Qty: 0 | Refills: 0 | Status: DISCONTINUED | OUTPATIENT
Start: 2017-05-05 | End: 2017-05-09

## 2017-05-05 RX ORDER — SODIUM CHLORIDE 9 MG/ML
1000 INJECTION INTRAMUSCULAR; INTRAVENOUS; SUBCUTANEOUS
Qty: 0 | Refills: 0 | Status: DISCONTINUED | OUTPATIENT
Start: 2017-05-05 | End: 2017-05-05

## 2017-05-05 RX ORDER — ENOXAPARIN SODIUM 100 MG/ML
40 INJECTION SUBCUTANEOUS EVERY 24 HOURS
Qty: 0 | Refills: 0 | Status: DISCONTINUED | OUTPATIENT
Start: 2017-05-04 | End: 2017-05-09

## 2017-05-05 RX ORDER — DIGOXIN 250 MCG
0.12 TABLET ORAL DAILY
Qty: 0 | Refills: 0 | Status: DISCONTINUED | OUTPATIENT
Start: 2017-05-05 | End: 2017-05-09

## 2017-05-05 RX ORDER — FUROSEMIDE 40 MG
40 TABLET ORAL ONCE
Qty: 0 | Refills: 0 | Status: COMPLETED | OUTPATIENT
Start: 2017-05-05 | End: 2017-05-05

## 2017-05-05 RX ORDER — SENNA PLUS 8.6 MG/1
2 TABLET ORAL AT BEDTIME
Qty: 0 | Refills: 0 | Status: DISCONTINUED | OUTPATIENT
Start: 2017-05-04 | End: 2017-05-09

## 2017-05-05 RX ORDER — LACTULOSE 10 G/15ML
10 SOLUTION ORAL ONCE
Qty: 0 | Refills: 0 | Status: COMPLETED | OUTPATIENT
Start: 2017-05-04 | End: 2017-05-05

## 2017-05-05 RX ORDER — FUROSEMIDE 40 MG
40 TABLET ORAL DAILY
Qty: 0 | Refills: 0 | Status: DISCONTINUED | OUTPATIENT
Start: 2017-05-05 | End: 2017-05-09

## 2017-05-05 RX ORDER — OXYCODONE HYDROCHLORIDE 5 MG/1
5 TABLET ORAL EVERY 4 HOURS
Qty: 0 | Refills: 0 | Status: DISCONTINUED | OUTPATIENT
Start: 2017-05-05 | End: 2017-05-09

## 2017-05-05 RX ORDER — ACETAMINOPHEN 500 MG
325 TABLET ORAL EVERY 4 HOURS
Qty: 0 | Refills: 0 | Status: DISCONTINUED | OUTPATIENT
Start: 2017-05-05 | End: 2017-05-09

## 2017-05-05 RX ORDER — LISINOPRIL 2.5 MG/1
5 TABLET ORAL DAILY
Qty: 0 | Refills: 0 | Status: DISCONTINUED | OUTPATIENT
Start: 2017-05-05 | End: 2017-05-09

## 2017-05-05 RX ORDER — BUDESONIDE AND FORMOTEROL FUMARATE DIHYDRATE 160; 4.5 UG/1; UG/1
2 AEROSOL RESPIRATORY (INHALATION)
Qty: 0 | Refills: 0 | Status: DISCONTINUED | OUTPATIENT
Start: 2017-05-05 | End: 2017-05-09

## 2017-05-05 RX ADMIN — Medication 0.12 MILLIGRAM(S): at 05:20

## 2017-05-05 RX ADMIN — OXYCODONE HYDROCHLORIDE 5 MILLIGRAM(S): 5 TABLET ORAL at 21:09

## 2017-05-05 RX ADMIN — Medication 100 MILLIGRAM(S): at 21:09

## 2017-05-05 RX ADMIN — OXYCODONE HYDROCHLORIDE 5 MILLIGRAM(S): 5 TABLET ORAL at 22:23

## 2017-05-05 RX ADMIN — SODIUM CHLORIDE 3 MILLILITER(S): 9 INJECTION INTRAMUSCULAR; INTRAVENOUS; SUBCUTANEOUS at 21:05

## 2017-05-05 RX ADMIN — Medication 40 MILLIGRAM(S): at 05:20

## 2017-05-05 RX ADMIN — ENOXAPARIN SODIUM 40 MILLIGRAM(S): 100 INJECTION SUBCUTANEOUS at 05:20

## 2017-05-05 RX ADMIN — BUDESONIDE AND FORMOTEROL FUMARATE DIHYDRATE 2 PUFF(S): 160; 4.5 AEROSOL RESPIRATORY (INHALATION) at 09:09

## 2017-05-05 RX ADMIN — OXYCODONE HYDROCHLORIDE 5 MILLIGRAM(S): 5 TABLET ORAL at 07:00

## 2017-05-05 RX ADMIN — Medication 10 MILLIGRAM(S): at 09:43

## 2017-05-05 RX ADMIN — ALBUTEROL 1 PUFF(S): 90 AEROSOL, METERED ORAL at 11:49

## 2017-05-05 RX ADMIN — ALBUTEROL 1 PUFF(S): 90 AEROSOL, METERED ORAL at 04:40

## 2017-05-05 RX ADMIN — BUDESONIDE AND FORMOTEROL FUMARATE DIHYDRATE 2 PUFF(S): 160; 4.5 AEROSOL RESPIRATORY (INHALATION) at 20:49

## 2017-05-05 RX ADMIN — SODIUM CHLORIDE 3 MILLILITER(S): 9 INJECTION INTRAMUSCULAR; INTRAVENOUS; SUBCUTANEOUS at 13:22

## 2017-05-05 RX ADMIN — LACTULOSE 10 GRAM(S): 10 SOLUTION ORAL at 02:21

## 2017-05-05 RX ADMIN — HYDROMORPHONE HYDROCHLORIDE 0.5 MILLIGRAM(S): 2 INJECTION INTRAMUSCULAR; INTRAVENOUS; SUBCUTANEOUS at 09:06

## 2017-05-05 RX ADMIN — SODIUM CHLORIDE 100 MILLILITER(S): 9 INJECTION INTRAMUSCULAR; INTRAVENOUS; SUBCUTANEOUS at 02:21

## 2017-05-05 RX ADMIN — Medication 20 MILLIGRAM(S): at 05:20

## 2017-05-05 RX ADMIN — LISINOPRIL 5 MILLIGRAM(S): 2.5 TABLET ORAL at 05:20

## 2017-05-05 RX ADMIN — ALBUTEROL 1 PUFF(S): 90 AEROSOL, METERED ORAL at 23:23

## 2017-05-05 RX ADMIN — Medication 120 MILLIGRAM(S): at 17:40

## 2017-05-05 RX ADMIN — HYDROMORPHONE HYDROCHLORIDE 0.5 MILLIGRAM(S): 2 INJECTION INTRAMUSCULAR; INTRAVENOUS; SUBCUTANEOUS at 08:51

## 2017-05-05 RX ADMIN — Medication 100 MILLIGRAM(S): at 05:20

## 2017-05-05 RX ADMIN — ATORVASTATIN CALCIUM 10 MILLIGRAM(S): 80 TABLET, FILM COATED ORAL at 21:09

## 2017-05-05 RX ADMIN — Medication 40 MILLIGRAM(S): at 13:18

## 2017-05-05 RX ADMIN — Medication 100 MILLIGRAM(S): at 00:24

## 2017-05-05 RX ADMIN — Medication 120 MILLIGRAM(S): at 05:20

## 2017-05-05 RX ADMIN — ALBUTEROL 1 PUFF(S): 90 AEROSOL, METERED ORAL at 20:48

## 2017-05-05 RX ADMIN — SODIUM CHLORIDE 3 MILLILITER(S): 9 INJECTION INTRAMUSCULAR; INTRAVENOUS; SUBCUTANEOUS at 05:22

## 2017-05-05 RX ADMIN — Medication 100 MILLIGRAM(S): at 13:22

## 2017-05-05 RX ADMIN — OXYCODONE HYDROCHLORIDE 5 MILLIGRAM(S): 5 TABLET ORAL at 06:33

## 2017-05-05 RX ADMIN — ALBUTEROL 1 PUFF(S): 90 AEROSOL, METERED ORAL at 09:00

## 2017-05-05 RX ADMIN — ALBUTEROL 1 PUFF(S): 90 AEROSOL, METERED ORAL at 15:46

## 2017-05-05 NOTE — H&P ADULT - NSHPPHYSICALEXAM_GEN_ALL_CORE
Vital Signs Last 24 Hrs  T(C): 36.4, Max: 36.4 (05-04 @ 19:23)  T(F): 97.5, Max: 97.5 (05-04 @ 19:23)  HR: 62 (62 - 69)  BP: 113/77 (113/77 - 134/76)  BP(mean): --  RR: 20 (20 - 20)  SpO2: 98% (97% - 98%)    PE  Gen: distress due to pain, alert and oriented  Pulm: Nonlabored respirations  Abd: obese, tense, distended, TTP lower abdomen, no rebound, no guarding  Ext: 5/5 bilateral upper and lower extremity , no sensory or motor deficit  Neuro: GCS15

## 2017-05-05 NOTE — H&P ADULT - ATTENDING COMMENTS
has acute urinary retention post lap carissa, no evidence of fluid collection in RUQ on ultrasound but bladder very distended. Also constipated. Will admit, place montoya, bowel regimen. He also has hyponatremia and hypochloremia c/w his diagnosis of CHF; a BNP is 5600. He may need another ECHO but did have one on Monday immediately pre-op.

## 2017-05-05 NOTE — H&P ADULT - HISTORY OF PRESENT ILLNESS
The patient is a 51 year old male with significant cardiovascular history including atrial fibrillation and cardiomyopathy s/p laparoscopic cholecystectomy POD #2. The patient was discharged and returned to the ED today complaining of lower abdominal pain, constipation, and urinary retention. The patient states that he has not had a bowel movement in 3 days. He also states that he has urinated in approximately 16 hours. The patient is tolerating diet without nausea and vomiting but states that his appetite is diminished due to abdominal distention. The patient denies fever and chills. He also denies chest pain and shortness of breath. Ultrasound of the bladder performed in the ED showed a dilated bladder. Ultrasound of the right upper quadrant did not demonstrate any abdominal collection such as biloma.

## 2017-05-05 NOTE — H&P ADULT - PROBLEM SELECTOR PLAN 1
- PRN analgesia  - serial abdominal exam  - IVF hydration  - regular diet as tolerated  - Rogers catheter insertion for urinary retention

## 2017-05-05 NOTE — PROGRESS NOTE ADULT - SUBJECTIVE AND OBJECTIVE BOX
INTERVAL HPI/OVERNIGHT EVENTS/SUBJECTIVE:  Pt admitted with urinary retention and constipation.  Rogers placed with relief of retention, still no BM overnight.  No other complaints.      ICU Vital Signs Last 24 Hrs  T(C): 36.3, Max: 36.4 ( @ 19:23)  T(F): 97.4, Max: 97.6 ( @ 01:45)  HR: 74 (62 - 79)  BP: 115/80 (113/77 - 134/76)  BP(mean): --  ABP: --  ABP(mean): --  RR: 18 (17 - 20)  SpO2: 91% (91% - 98%)      I&O's Detail    I & Os for current day (as of 05 May 2017 11:35)  =============================================  IN:    sodium chloride 0.9%: 200 ml    Total IN: 200 ml  ---------------------------------------------  OUT:    Total OUT: 0 ml  ---------------------------------------------  Total NET: 200 ml            MEDICATIONS  (STANDING):  sodium chloride 0.9% lock flush 3milliLiter(s) IV Push every 8 hours  enoxaparin Injectable 40milliGRAM(s) SubCutaneous every 24 hours  docusate sodium 100milliGRAM(s) Oral three times a day  predniSONE   Tablet 20milliGRAM(s) Oral daily  lisinopril 5milliGRAM(s) Oral daily  digoxin     Tablet 0.125milliGRAM(s) Oral daily  sotalol 120milliGRAM(s) Oral every 12 hours  atorvastatin 10milliGRAM(s) Oral at bedtime  ALBUTerol    90 MICROgram(s) HFA Inhaler 1Puff(s) Inhalation every 4 hours  furosemide    Tablet 40milliGRAM(s) Oral daily  buDESOnide  80 MICROgram(s)/formoterol 4.5 MICROgram(s) Inhaler 2Puff(s) Inhalation two times a day    MEDICATIONS  (PRN):  ondansetron Injectable 4milliGRAM(s) IV Push every 6 hours PRN Nausea  senna 2Tablet(s) Oral at bedtime PRN Constipation  acetaminophen   Tablet. 325milliGRAM(s) Oral every 4 hours PRN Mild Pain (1 - 3)  oxyCODONE IR 5milliGRAM(s) Oral every 4 hours PRN Moderate Pain (4 - 6)      NUTRITION/IVF: Regular/IVL      PHYSICAL EXAM:      Gen:  NAD, sitting in chair    Eyes:  EOMI's    Neurological:  A&O x3    ENMT:  MMM    Neck:  Supple    Pulmonary:  Unlabored, no wheezes, rales or rhonchi    Cardiovascular:  NSR    Gastrointestinal:  Softly distended, grossly nontender    Genitourinary:  Rogers    Extremities:   AFROM    Skin:  Intact    Musculoskeletal:  +1 pitting edema BL LE          LABS:  CBC Full  -  ( 05 May 2017 08:58 )  WBC Count : 13.2 K/uL  Hemoglobin : 14.4 g/dL  Hematocrit : 42.3 %  Platelet Count - Automated : 223 K/uL  Mean Cell Volume : 91.6 fl  Mean Cell Hemoglobin : 31.2 pg  Mean Cell Hemoglobin Concentration : 34.0 g/dL  Auto Neutrophil # : x  Auto Lymphocyte # : x  Auto Monocyte # : x  Auto Eosinophil # : x  Auto Basophil # : x  Auto Neutrophil % : x  Auto Lymphocyte % : x  Auto Monocyte % : x  Auto Eosinophil % : x  Auto Basophil % : x    05-05    129<L>  |  91<L>  |  14.0  ----------------------------<  117<H>  4.2   |  25.0  |  0.83    Ca    8.7      05 May 2017 08:58  Phos  3.4     05-05  Mg     1.8     05-05    TPro  7.9  /  Alb  3.8  /  TBili  3.4<H>  /  DBili  1.6<H>  /  AST  32  /  ALT  38  /  AlkPhos  123<H>  05-05    PT/INR - ( 05 May 2017 08:58 )   PT: 17.5 sec;   INR: 1.58 ratio         PTT - ( 05 May 2017 08:58 )  PTT:31.6 sec  Urinalysis Basic - ( 04 May 2017 22:04 )    Color: Yellow / Appearance: Clear / S.010 / pH: x  Gluc: x / Ketone: Negative  / Bili: Small / Urobili: 4 mg/dL   Blood: x / Protein: 15 mg/dL / Nitrite: Negative   Leuk Esterase: Trace / RBC: 0-2 /HPF / WBC 0-2   Sq Epi: x / Non Sq Epi: Occasional / Bacteria: Occasional      RECENT CULTURES:      LIVER FUNCTIONS - ( 05 May 2017 08:58 )  Alb: 3.8 g/dL / Pro: 7.9 g/dL / ALK PHOS: 123 U/L / ALT: 38 U/L / AST: 32 U/L / GGT: x               CAPILLARY BLOOD GLUCOSE      RADIOLOGY & ADDITIONAL STUDIES:    ASSESSMENT/PLAN:  51yMale presenting with:  constipation and urinary retention, POD lap carissa    Neuro:  Pain well controlled.    CV:   NSR    Pulm:  IS, ambulate    GI/Nutrition:  Regular diet, suppository given for obstipation    /Renal:  Rogers for now.      ID:  None    Lines/Tubes:  Rogers    Endo:  No issues    Skin:  Intact    Proph:  Lovenox    Dispo:  Will reeval after suppository given.  Rogers today      CRITICAL CARE TIME SPENT:

## 2017-05-05 NOTE — H&P ADULT - PSH
Artificial pacemaker    History of humerus fracture  Metal pins in Left Humerus  S/P laparoscopic cholecystectomy

## 2017-05-05 NOTE — H&P ADULT - NSHPLABSRESULTS_GEN_ALL_CORE
LABS:                        12.9   12.8  )-----------( 201      ( 04 May 2017 17:19 )             38.5     05-04    127<L>  |  88<L>  |  18.0  ----------------------------<  121<H>  4.0   |  24.0  |  0.91    Ca    8.4<L>      04 May 2017 17:19    TPro  7.2  /  Alb  3.6  /  TBili  2.4<H>  /  DBili  1.0<H>  /  AST  35  /  ALT  41<H>  /  AlkPhos  99  05-04      Urinalysis Basic - ( 04 May 2017 22:04 )    Color: Yellow / Appearance: Clear / S.010 / pH: x  Gluc: x / Ketone: Negative  / Bili: Small / Urobili: 4 mg/dL   Blood: x / Protein: 15 mg/dL / Nitrite: Negative   Leuk Esterase: Trace / RBC: 0-2 /HPF / WBC 0-2   Sq Epi: x / Non Sq Epi: Occasional / Bacteria: Occasional

## 2017-05-06 LAB
ALBUMIN SERPL ELPH-MCNC: 3.4 G/DL — SIGNIFICANT CHANGE UP (ref 3.3–5.2)
ALP SERPL-CCNC: 127 U/L — HIGH (ref 40–120)
ALT FLD-CCNC: 28 U/L — SIGNIFICANT CHANGE UP
ANION GAP SERPL CALC-SCNC: 13 MMOL/L — SIGNIFICANT CHANGE UP (ref 5–17)
ANION GAP SERPL CALC-SCNC: 17 MMOL/L — SIGNIFICANT CHANGE UP (ref 5–17)
AST SERPL-CCNC: 25 U/L — SIGNIFICANT CHANGE UP
BILIRUB DIRECT SERPL-MCNC: 1.6 MG/DL — HIGH (ref 0–0.3)
BILIRUB SERPL-MCNC: 3.5 MG/DL — HIGH (ref 0.4–2)
BUN SERPL-MCNC: 12 MG/DL — SIGNIFICANT CHANGE UP (ref 8–20)
BUN SERPL-MCNC: 13 MG/DL — SIGNIFICANT CHANGE UP (ref 8–20)
CALCIUM SERPL-MCNC: 8.6 MG/DL — SIGNIFICANT CHANGE UP (ref 8.6–10.2)
CALCIUM SERPL-MCNC: 8.9 MG/DL — SIGNIFICANT CHANGE UP (ref 8.6–10.2)
CHLORIDE SERPL-SCNC: 91 MMOL/L — LOW (ref 98–107)
CHLORIDE SERPL-SCNC: 94 MMOL/L — LOW (ref 98–107)
CO2 SERPL-SCNC: 27 MMOL/L — SIGNIFICANT CHANGE UP (ref 22–29)
CO2 SERPL-SCNC: 28 MMOL/L — SIGNIFICANT CHANGE UP (ref 22–29)
CREAT SERPL-MCNC: 0.65 MG/DL — SIGNIFICANT CHANGE UP (ref 0.5–1.3)
CREAT SERPL-MCNC: 0.68 MG/DL — SIGNIFICANT CHANGE UP (ref 0.5–1.3)
GLUCOSE SERPL-MCNC: 86 MG/DL — SIGNIFICANT CHANGE UP (ref 70–115)
GLUCOSE SERPL-MCNC: 95 MG/DL — SIGNIFICANT CHANGE UP (ref 70–115)
HCT VFR BLD CALC: 40.2 % — LOW (ref 42–52)
HGB BLD-MCNC: 13.4 G/DL — LOW (ref 14–18)
INR BLD: 1.31 RATIO — HIGH (ref 0.88–1.16)
MAGNESIUM SERPL-MCNC: 1.8 MG/DL — SIGNIFICANT CHANGE UP (ref 1.8–2.5)
MCHC RBC-ENTMCNC: 30.2 PG — SIGNIFICANT CHANGE UP (ref 27–31)
MCHC RBC-ENTMCNC: 33.3 G/DL — SIGNIFICANT CHANGE UP (ref 32–36)
MCV RBC AUTO: 90.7 FL — SIGNIFICANT CHANGE UP (ref 80–94)
NT-PROBNP SERPL-SCNC: 2613 PG/ML — HIGH (ref 0–300)
PHOSPHATE SERPL-MCNC: 2.5 MG/DL — SIGNIFICANT CHANGE UP (ref 2.4–4.7)
PLATELET # BLD AUTO: 229 K/UL — SIGNIFICANT CHANGE UP (ref 150–400)
POTASSIUM SERPL-MCNC: 3.7 MMOL/L — SIGNIFICANT CHANGE UP (ref 3.5–5.3)
POTASSIUM SERPL-MCNC: 3.7 MMOL/L — SIGNIFICANT CHANGE UP (ref 3.5–5.3)
POTASSIUM SERPL-SCNC: 3.7 MMOL/L — SIGNIFICANT CHANGE UP (ref 3.5–5.3)
POTASSIUM SERPL-SCNC: 3.7 MMOL/L — SIGNIFICANT CHANGE UP (ref 3.5–5.3)
PROT SERPL-MCNC: 7 G/DL — SIGNIFICANT CHANGE UP (ref 6.6–8.7)
PROTHROM AB SERPL-ACNC: 14.5 SEC — HIGH (ref 9.8–12.7)
RBC # BLD: 4.43 M/UL — LOW (ref 4.6–6.2)
RBC # FLD: 14.4 % — SIGNIFICANT CHANGE UP (ref 11–15.6)
SODIUM SERPL-SCNC: 135 MMOL/L — SIGNIFICANT CHANGE UP (ref 135–145)
SODIUM SERPL-SCNC: 135 MMOL/L — SIGNIFICANT CHANGE UP (ref 135–145)
WBC # BLD: 10.1 K/UL — SIGNIFICANT CHANGE UP (ref 4.8–10.8)
WBC # FLD AUTO: 10.1 K/UL — SIGNIFICANT CHANGE UP (ref 4.8–10.8)

## 2017-05-06 RX ORDER — TAMSULOSIN HYDROCHLORIDE 0.4 MG/1
0.4 CAPSULE ORAL AT BEDTIME
Qty: 0 | Refills: 0 | Status: DISCONTINUED | OUTPATIENT
Start: 2017-05-06 | End: 2017-05-09

## 2017-05-06 RX ADMIN — Medication 100 MILLIGRAM(S): at 05:29

## 2017-05-06 RX ADMIN — ALBUTEROL 1 PUFF(S): 90 AEROSOL, METERED ORAL at 15:32

## 2017-05-06 RX ADMIN — ALBUTEROL 1 PUFF(S): 90 AEROSOL, METERED ORAL at 23:58

## 2017-05-06 RX ADMIN — ALBUTEROL 1 PUFF(S): 90 AEROSOL, METERED ORAL at 20:32

## 2017-05-06 RX ADMIN — Medication 20 MILLIGRAM(S): at 05:29

## 2017-05-06 RX ADMIN — LISINOPRIL 5 MILLIGRAM(S): 2.5 TABLET ORAL at 05:29

## 2017-05-06 RX ADMIN — ATORVASTATIN CALCIUM 10 MILLIGRAM(S): 80 TABLET, FILM COATED ORAL at 21:54

## 2017-05-06 RX ADMIN — SODIUM CHLORIDE 3 MILLILITER(S): 9 INJECTION INTRAMUSCULAR; INTRAVENOUS; SUBCUTANEOUS at 15:19

## 2017-05-06 RX ADMIN — ALBUTEROL 1 PUFF(S): 90 AEROSOL, METERED ORAL at 12:07

## 2017-05-06 RX ADMIN — SODIUM CHLORIDE 3 MILLILITER(S): 9 INJECTION INTRAMUSCULAR; INTRAVENOUS; SUBCUTANEOUS at 05:30

## 2017-05-06 RX ADMIN — Medication 100 MILLIGRAM(S): at 21:54

## 2017-05-06 RX ADMIN — SODIUM CHLORIDE 3 MILLILITER(S): 9 INJECTION INTRAMUSCULAR; INTRAVENOUS; SUBCUTANEOUS at 21:53

## 2017-05-06 RX ADMIN — ALBUTEROL 1 PUFF(S): 90 AEROSOL, METERED ORAL at 04:24

## 2017-05-06 RX ADMIN — OXYCODONE HYDROCHLORIDE 5 MILLIGRAM(S): 5 TABLET ORAL at 18:48

## 2017-05-06 RX ADMIN — TAMSULOSIN HYDROCHLORIDE 0.4 MILLIGRAM(S): 0.4 CAPSULE ORAL at 21:54

## 2017-05-06 RX ADMIN — OXYCODONE HYDROCHLORIDE 5 MILLIGRAM(S): 5 TABLET ORAL at 13:54

## 2017-05-06 RX ADMIN — Medication 120 MILLIGRAM(S): at 17:10

## 2017-05-06 RX ADMIN — ENOXAPARIN SODIUM 40 MILLIGRAM(S): 100 INJECTION SUBCUTANEOUS at 05:29

## 2017-05-06 RX ADMIN — OXYCODONE HYDROCHLORIDE 5 MILLIGRAM(S): 5 TABLET ORAL at 22:21

## 2017-05-06 RX ADMIN — Medication 10 MILLIGRAM(S): at 17:10

## 2017-05-06 RX ADMIN — BUDESONIDE AND FORMOTEROL FUMARATE DIHYDRATE 2 PUFF(S): 160; 4.5 AEROSOL RESPIRATORY (INHALATION) at 20:32

## 2017-05-06 RX ADMIN — Medication 0.12 MILLIGRAM(S): at 05:29

## 2017-05-06 RX ADMIN — Medication 40 MILLIGRAM(S): at 05:29

## 2017-05-06 RX ADMIN — Medication 120 MILLIGRAM(S): at 05:29

## 2017-05-06 RX ADMIN — OXYCODONE HYDROCHLORIDE 5 MILLIGRAM(S): 5 TABLET ORAL at 12:54

## 2017-05-06 RX ADMIN — Medication 100 MILLIGRAM(S): at 15:19

## 2017-05-06 NOTE — PROGRESS NOTE ADULT - ASSESSMENT
52 yo male s/p lap carissa 5/2/17, returns with urinary retention and constipation and abdominal pain   / continue ambulation   / continue montoya, starting flomax today   / SARAH   / Regular diet   / DVT prophylaxis   / Plan discussed with Dr. Yin

## 2017-05-06 NOTE — PROGRESS NOTE ADULT - SUBJECTIVE AND OBJECTIVE BOX
INTERVAL HPI/OVERNIGHT EVENTS/SUBJECTIVE:  Pt re-admitted s/p lap cholecystectomy 5/2/17, POD #4 with urinary retention and constipation.  Rogers placed with relief of retention, starting flomax today, endorses flatus and small BM last night. Endorses abdominal pain.         Vital Signs Last 24 Hrs  T(C): 36.5, Max: 36.8 (05-05 @ 16:30)  T(F): 97.7, Max: 98.3 (05-05 @ 16:30)  HR: 81 (66 - 81)  BP: 100/58 (100/58 - 125/80)  BP(mean): --  RR: 10 (10 - 20)  SpO2: 94% (94% - 97%)            MEDICATIONS  (STANDING):  sodium chloride 0.9% lock flush 3milliLiter(s) IV Push every 8 hours  enoxaparin Injectable 40milliGRAM(s) SubCutaneous every 24 hours  docusate sodium 100milliGRAM(s) Oral three times a day  predniSONE   Tablet 20milliGRAM(s) Oral daily  lisinopril 5milliGRAM(s) Oral daily  digoxin     Tablet 0.125milliGRAM(s) Oral daily  sotalol 120milliGRAM(s) Oral every 12 hours  atorvastatin 10milliGRAM(s) Oral at bedtime  ALBUTerol    90 MICROgram(s) HFA Inhaler 1Puff(s) Inhalation every 4 hours  furosemide    Tablet 40milliGRAM(s) Oral daily  buDESOnide  80 MICROgram(s)/formoterol 4.5 MICROgram(s) Inhaler 2Puff(s) Inhalation two times a day    MEDICATIONS  (PRN):  ondansetron Injectable 4milliGRAM(s) IV Push every 6 hours PRN Nausea  senna 2Tablet(s) Oral at bedtime PRN Constipation  acetaminophen   Tablet. 325milliGRAM(s) Oral every 4 hours PRN Mild Pain (1 - 3)  oxyCODONE IR 5milliGRAM(s) Oral every 4 hours PRN Moderate Pain (4 - 6)      NUTRITION/IVF: Regular/IVL      PHYSICAL EXAM:      Gen:  NAD, sitting in chair.     Eyes:  EOMI    Neurological:  A&O x3    Pulmonary:  Unlabored, no accessory muscle use, breathing comfortably on room air.     Cardiovascular:  NSR    Gastrointestinal:  Softly distended, grossly nontender, tympanic to palpation.     Genitourinary:  Rogers in place, draining.     Extremities:   ambulating independently, FROM    Skin:  Intact, warm, dry.         05-06    135  |  94<L>  |  12.0  ----------------------------<  95  3.7   |  28.0  |  0.65    Ca    8.6      06 May 2017 06:35  Phos  2.5     05-06  Mg     1.8     05-06    TPro  x   /  Alb  x   /  TBili  x   /  DBili  1.6<H>  /  AST  x   /  ALT  x   /  AlkPhos  x   05-06                            13.4   10.1  )-----------( 229      ( 06 May 2017 06:34 )             40.2             RADIOLOGY & ADDITIONAL STUDIES:    ASSESSMENT/PLAN:    51yMale presenting with:  constipation and urinary retention, POD #4 lap carissa    Neuro:  Pain well controlled.    CV:   NSR    Pulm:  IS, ambulate    GI/Nutrition:  Regular diet, suppository given for obstipation    /Renal:  Rogers for now.      Lines/Tubes:  Rogers    Skin:  Intact    Proph:  Lovenox

## 2017-05-07 LAB
ALBUMIN SERPL ELPH-MCNC: 3.2 G/DL — LOW (ref 3.3–5.2)
ALP SERPL-CCNC: 164 U/L — HIGH (ref 40–120)
ALT FLD-CCNC: 23 U/L — SIGNIFICANT CHANGE UP
ANION GAP SERPL CALC-SCNC: 17 MMOL/L — SIGNIFICANT CHANGE UP (ref 5–17)
AST SERPL-CCNC: 23 U/L — SIGNIFICANT CHANGE UP
BILIRUB SERPL-MCNC: 3.8 MG/DL — HIGH (ref 0.4–2)
BUN SERPL-MCNC: 12 MG/DL — SIGNIFICANT CHANGE UP (ref 8–20)
CALCIUM SERPL-MCNC: 9 MG/DL — SIGNIFICANT CHANGE UP (ref 8.6–10.2)
CHLORIDE SERPL-SCNC: 90 MMOL/L — LOW (ref 98–107)
CO2 SERPL-SCNC: 26 MMOL/L — SIGNIFICANT CHANGE UP (ref 22–29)
CREAT SERPL-MCNC: 0.57 MG/DL — SIGNIFICANT CHANGE UP (ref 0.5–1.3)
GLUCOSE SERPL-MCNC: 100 MG/DL — SIGNIFICANT CHANGE UP (ref 70–115)
POTASSIUM SERPL-MCNC: 3.6 MMOL/L — SIGNIFICANT CHANGE UP (ref 3.5–5.3)
POTASSIUM SERPL-SCNC: 3.6 MMOL/L — SIGNIFICANT CHANGE UP (ref 3.5–5.3)
PROT SERPL-MCNC: 6.8 G/DL — SIGNIFICANT CHANGE UP (ref 6.6–8.7)
SODIUM SERPL-SCNC: 133 MMOL/L — LOW (ref 135–145)

## 2017-05-07 PROCEDURE — 74177 CT ABD & PELVIS W/CONTRAST: CPT | Mod: 26

## 2017-05-07 RX ORDER — ACETAMINOPHEN 500 MG
1000 TABLET ORAL ONCE
Qty: 0 | Refills: 0 | Status: COMPLETED | OUTPATIENT
Start: 2017-05-07 | End: 2017-05-07

## 2017-05-07 RX ORDER — SOTALOL HCL 120 MG
120 TABLET ORAL EVERY 12 HOURS
Qty: 0 | Refills: 0 | Status: DISCONTINUED | OUTPATIENT
Start: 2017-05-07 | End: 2017-05-09

## 2017-05-07 RX ADMIN — Medication 120 MILLIGRAM(S): at 05:24

## 2017-05-07 RX ADMIN — Medication 120 MILLIGRAM(S): at 18:51

## 2017-05-07 RX ADMIN — Medication 100 MILLIGRAM(S): at 22:28

## 2017-05-07 RX ADMIN — Medication 20 MILLIGRAM(S): at 05:23

## 2017-05-07 RX ADMIN — BUDESONIDE AND FORMOTEROL FUMARATE DIHYDRATE 2 PUFF(S): 160; 4.5 AEROSOL RESPIRATORY (INHALATION) at 09:05

## 2017-05-07 RX ADMIN — ENOXAPARIN SODIUM 40 MILLIGRAM(S): 100 INJECTION SUBCUTANEOUS at 05:23

## 2017-05-07 RX ADMIN — Medication 100 MILLIGRAM(S): at 05:23

## 2017-05-07 RX ADMIN — Medication 1000 MILLIGRAM(S): at 03:58

## 2017-05-07 RX ADMIN — ALBUTEROL 1 PUFF(S): 90 AEROSOL, METERED ORAL at 15:33

## 2017-05-07 RX ADMIN — ATORVASTATIN CALCIUM 10 MILLIGRAM(S): 80 TABLET, FILM COATED ORAL at 22:28

## 2017-05-07 RX ADMIN — ALBUTEROL 1 PUFF(S): 90 AEROSOL, METERED ORAL at 23:58

## 2017-05-07 RX ADMIN — ALBUTEROL 1 PUFF(S): 90 AEROSOL, METERED ORAL at 19:40

## 2017-05-07 RX ADMIN — BUDESONIDE AND FORMOTEROL FUMARATE DIHYDRATE 2 PUFF(S): 160; 4.5 AEROSOL RESPIRATORY (INHALATION) at 19:40

## 2017-05-07 RX ADMIN — ALBUTEROL 1 PUFF(S): 90 AEROSOL, METERED ORAL at 09:05

## 2017-05-07 RX ADMIN — SODIUM CHLORIDE 3 MILLILITER(S): 9 INJECTION INTRAMUSCULAR; INTRAVENOUS; SUBCUTANEOUS at 22:27

## 2017-05-07 RX ADMIN — SODIUM CHLORIDE 3 MILLILITER(S): 9 INJECTION INTRAMUSCULAR; INTRAVENOUS; SUBCUTANEOUS at 05:24

## 2017-05-07 RX ADMIN — Medication 400 MILLIGRAM(S): at 02:01

## 2017-05-07 RX ADMIN — OXYCODONE HYDROCHLORIDE 5 MILLIGRAM(S): 5 TABLET ORAL at 09:59

## 2017-05-07 RX ADMIN — LISINOPRIL 5 MILLIGRAM(S): 2.5 TABLET ORAL at 05:23

## 2017-05-07 RX ADMIN — OXYCODONE HYDROCHLORIDE 5 MILLIGRAM(S): 5 TABLET ORAL at 17:16

## 2017-05-07 RX ADMIN — ALBUTEROL 1 PUFF(S): 90 AEROSOL, METERED ORAL at 03:23

## 2017-05-07 RX ADMIN — SODIUM CHLORIDE 3 MILLILITER(S): 9 INJECTION INTRAMUSCULAR; INTRAVENOUS; SUBCUTANEOUS at 14:08

## 2017-05-07 RX ADMIN — Medication 0.12 MILLIGRAM(S): at 05:23

## 2017-05-07 RX ADMIN — OXYCODONE HYDROCHLORIDE 5 MILLIGRAM(S): 5 TABLET ORAL at 05:21

## 2017-05-07 RX ADMIN — OXYCODONE HYDROCHLORIDE 5 MILLIGRAM(S): 5 TABLET ORAL at 08:59

## 2017-05-07 RX ADMIN — OXYCODONE HYDROCHLORIDE 5 MILLIGRAM(S): 5 TABLET ORAL at 18:36

## 2017-05-07 RX ADMIN — TAMSULOSIN HYDROCHLORIDE 0.4 MILLIGRAM(S): 0.4 CAPSULE ORAL at 22:28

## 2017-05-07 RX ADMIN — Medication 10 MILLIGRAM(S): at 09:00

## 2017-05-07 RX ADMIN — ALBUTEROL 1 PUFF(S): 90 AEROSOL, METERED ORAL at 12:27

## 2017-05-07 RX ADMIN — Medication 40 MILLIGRAM(S): at 05:23

## 2017-05-07 NOTE — PROGRESS NOTE ADULT - PROBLEM SELECTOR PLAN 1
-bowel regiment senna, colace  -dulcolax suppositories PRN  -serial abdominal exams  -monitor bowel function

## 2017-05-07 NOTE — PROGRESS NOTE ADULT - SUBJECTIVE AND OBJECTIVE BOX
SUBJECTIVE: Patient seen and examined at bedside. No acute events overnight. Pt still constipated, complaining of abdominal pain, denies flatus. Pt tolerating diet without N/V. Pt denies fever, chest pain, shortness of breath.    MEDICATIONS  (STANDING):  sodium chloride 0.9% lock flush 3milliLiter(s) IV Push every 8 hours  enoxaparin Injectable 40milliGRAM(s) SubCutaneous every 24 hours  docusate sodium 100milliGRAM(s) Oral three times a day  predniSONE   Tablet 20milliGRAM(s) Oral daily  lisinopril 5milliGRAM(s) Oral daily  digoxin     Tablet 0.125milliGRAM(s) Oral daily  atorvastatin 10milliGRAM(s) Oral at bedtime  ALBUTerol    90 MICROgram(s) HFA Inhaler 1Puff(s) Inhalation every 4 hours  furosemide    Tablet 40milliGRAM(s) Oral daily  buDESOnide  80 MICROgram(s)/formoterol 4.5 MICROgram(s) Inhaler 2Puff(s) Inhalation two times a day  tamsulosin 0.4milliGRAM(s) Oral at bedtime  sotalol 120milliGRAM(s) Oral every 12 hours    MEDICATIONS  (PRN):  ondansetron Injectable 4milliGRAM(s) IV Push every 6 hours PRN Nausea  senna 2Tablet(s) Oral at bedtime PRN Constipation  acetaminophen   Tablet. 325milliGRAM(s) Oral every 4 hours PRN Mild Pain (1 - 3)  oxyCODONE IR 5milliGRAM(s) Oral every 4 hours PRN Moderate Pain (4 - 6)  bisacodyl Suppository 10milliGRAM(s) Rectal daily PRN Constipation    Vital Signs Last 24 Hrs  T(C): 37.1, Max: 37.1 (05-07 @ 17:53)  T(F): 98.7, Max: 98.7 (05-07 @ 17:53)  HR: 86 (73 - 91)  BP: 135/95 (106/80 - 135/95)  BP(mean): --  RR: 18 (16 - 18)  SpO2: 95% (95% - 97%)    PE  Gen: NAD, AAOx3  Pulm: CTAB  CV: RRR, normal intensity s1/s2  Abd: soft, distended, tympanic, nontender  Ext: moving all extremities  : montoya in place  Neuro: GCS15, nonfocal      I&O's Detail  I & Os for 24h ending 07 May 2017 07:00  =============================================  IN:    Oral Fluid: 480 ml    Total IN: 480 ml  ---------------------------------------------  OUT:    Voided: 400 ml    Indwelling Catheter - Urethral: 375 ml    Total OUT: 775 ml  ---------------------------------------------  Total NET: -295 ml    I & Os for current day (as of 07 May 2017 20:21)  =============================================  IN:    Total IN: 0 ml  ---------------------------------------------  OUT:    Indwelling Catheter - Urethral: 600 ml    Total OUT: 600 ml  ---------------------------------------------  Total NET: -600 ml      LABS:                        13.4   10.1  )-----------( 229      ( 06 May 2017 06:34 )             40.2     05-07    133<L>  |  90<L>  |  12.0  ----------------------------<  100  3.6   |  26.0  |  0.57    Ca    9.0      07 May 2017 06:52  Phos  2.5     05-06  Mg     1.8     05-06    TPro  6.8  /  Alb  3.2<L>  /  TBili  3.8<H>  /  DBili  x   /  AST  23  /  ALT  23  /  AlkPhos  164<H>  05-07    PT/INR - ( 06 May 2017 06:33 )   PT: 14.5 sec;   INR: 1.31 ratio

## 2017-05-08 DIAGNOSIS — K65.9 PERITONITIS, UNSPECIFIED: ICD-10-CM

## 2017-05-08 LAB
ALBUMIN SERPL ELPH-MCNC: 3.1 G/DL — LOW (ref 3.3–5.2)
ALP SERPL-CCNC: 234 U/L — HIGH (ref 40–120)
ALT FLD-CCNC: 20 U/L — SIGNIFICANT CHANGE UP
ANION GAP SERPL CALC-SCNC: 14 MMOL/L — SIGNIFICANT CHANGE UP (ref 5–17)
ANION GAP SERPL CALC-SCNC: 17 MMOL/L — SIGNIFICANT CHANGE UP (ref 5–17)
APTT BLD: 28.4 SEC — SIGNIFICANT CHANGE UP (ref 27.5–37.4)
AST SERPL-CCNC: 23 U/L — SIGNIFICANT CHANGE UP
BASE EXCESS BLDA CALC-SCNC: 3.2 MMOL/L — HIGH (ref -3–3)
BASOPHILS # BLD AUTO: 0 K/UL — SIGNIFICANT CHANGE UP (ref 0–0.2)
BASOPHILS NFR BLD AUTO: 0.1 % — SIGNIFICANT CHANGE UP (ref 0–2)
BILIRUB DIRECT SERPL-MCNC: 2.1 MG/DL — HIGH (ref 0–0.3)
BILIRUB SERPL-MCNC: 4.3 MG/DL — HIGH (ref 0.4–2)
BLD GP AB SCN SERPL QL: SIGNIFICANT CHANGE UP
BUN SERPL-MCNC: 11 MG/DL — SIGNIFICANT CHANGE UP (ref 8–20)
BUN SERPL-MCNC: 13 MG/DL — SIGNIFICANT CHANGE UP (ref 8–20)
CALCIUM SERPL-MCNC: 9 MG/DL — SIGNIFICANT CHANGE UP (ref 8.6–10.2)
CALCIUM SERPL-MCNC: 9.1 MG/DL — SIGNIFICANT CHANGE UP (ref 8.6–10.2)
CHLORIDE SERPL-SCNC: 89 MMOL/L — LOW (ref 98–107)
CHLORIDE SERPL-SCNC: 91 MMOL/L — LOW (ref 98–107)
CO2 SERPL-SCNC: 27 MMOL/L — SIGNIFICANT CHANGE UP (ref 22–29)
CO2 SERPL-SCNC: 29 MMOL/L — SIGNIFICANT CHANGE UP (ref 22–29)
CREAT SERPL-MCNC: 0.64 MG/DL — SIGNIFICANT CHANGE UP (ref 0.5–1.3)
CREAT SERPL-MCNC: 0.64 MG/DL — SIGNIFICANT CHANGE UP (ref 0.5–1.3)
EOSINOPHIL # BLD AUTO: 0 K/UL — SIGNIFICANT CHANGE UP (ref 0–0.5)
EOSINOPHIL NFR BLD AUTO: 0.1 % — SIGNIFICANT CHANGE UP (ref 0–5)
GAS PNL BLDA: SIGNIFICANT CHANGE UP
GLUCOSE SERPL-MCNC: 113 MG/DL — SIGNIFICANT CHANGE UP (ref 70–115)
GLUCOSE SERPL-MCNC: 134 MG/DL — HIGH (ref 70–115)
HAPTOGLOB SERPL-MCNC: 188 MG/DL — SIGNIFICANT CHANGE UP (ref 34–200)
HCO3 BLDA-SCNC: 27 MMOL/L — HIGH (ref 20–26)
HCT VFR BLD CALC: 41.1 % — LOW (ref 42–52)
HGB BLD-MCNC: 14.1 G/DL — SIGNIFICANT CHANGE UP (ref 14–18)
HOROWITZ INDEX BLDA+IHG-RTO: 0.21 — SIGNIFICANT CHANGE UP
INR BLD: 1.25 RATIO — HIGH (ref 0.88–1.16)
LYMPHOCYTES # BLD AUTO: 1.2 K/UL — SIGNIFICANT CHANGE UP (ref 1–4.8)
LYMPHOCYTES # BLD AUTO: 10.2 % — LOW (ref 20–55)
MAGNESIUM SERPL-MCNC: 1.8 MG/DL — SIGNIFICANT CHANGE UP (ref 1.8–2.5)
MAGNESIUM SERPL-MCNC: 2.4 MG/DL — SIGNIFICANT CHANGE UP (ref 1.8–2.5)
MCHC RBC-ENTMCNC: 30.5 PG — SIGNIFICANT CHANGE UP (ref 27–31)
MCHC RBC-ENTMCNC: 34.3 G/DL — SIGNIFICANT CHANGE UP (ref 32–36)
MCV RBC AUTO: 89 FL — SIGNIFICANT CHANGE UP (ref 80–94)
MONOCYTES # BLD AUTO: 1.3 K/UL — HIGH (ref 0–0.8)
MONOCYTES NFR BLD AUTO: 10.6 % — HIGH (ref 3–10)
NEUTROPHILS # BLD AUTO: 9.5 K/UL — HIGH (ref 1.8–8)
NEUTROPHILS NFR BLD AUTO: 78.6 % — HIGH (ref 37–73)
PCO2 BLDA: 35 MMHG — SIGNIFICANT CHANGE UP (ref 35–45)
PH BLDA: 7.48 — HIGH (ref 7.35–7.45)
PHOSPHATE SERPL-MCNC: 4.2 MG/DL — SIGNIFICANT CHANGE UP (ref 2.4–4.7)
PHOSPHATE SERPL-MCNC: 4.2 MG/DL — SIGNIFICANT CHANGE UP (ref 2.4–4.7)
PLATELET # BLD AUTO: 285 K/UL — SIGNIFICANT CHANGE UP (ref 150–400)
PO2 BLDA: 75 MMHG — LOW (ref 83–108)
POTASSIUM SERPL-MCNC: 4.2 MMOL/L — SIGNIFICANT CHANGE UP (ref 3.5–5.3)
POTASSIUM SERPL-MCNC: 4.3 MMOL/L — SIGNIFICANT CHANGE UP (ref 3.5–5.3)
POTASSIUM SERPL-SCNC: 4.2 MMOL/L — SIGNIFICANT CHANGE UP (ref 3.5–5.3)
POTASSIUM SERPL-SCNC: 4.3 MMOL/L — SIGNIFICANT CHANGE UP (ref 3.5–5.3)
PROT SERPL-MCNC: 7.3 G/DL — SIGNIFICANT CHANGE UP (ref 6.6–8.7)
PROTHROM AB SERPL-ACNC: 13.8 SEC — HIGH (ref 9.8–12.7)
RBC # BLD: 4.62 M/UL — SIGNIFICANT CHANGE UP (ref 4.6–6.2)
RBC # BLD: 4.79 M/UL — SIGNIFICANT CHANGE UP (ref 4.6–6.2)
RBC # FLD: 14.2 % — SIGNIFICANT CHANGE UP (ref 11–15.6)
RETICS #: 132.7 K/UL — HIGH (ref 25–125)
RETICS/RBC NFR: 2.8 % — HIGH (ref 0.5–2.6)
SAO2 % BLDA: 96 % — SIGNIFICANT CHANGE UP (ref 95–99)
SODIUM SERPL-SCNC: 133 MMOL/L — LOW (ref 135–145)
SODIUM SERPL-SCNC: 134 MMOL/L — LOW (ref 135–145)
TYPE + AB SCN PNL BLD: SIGNIFICANT CHANGE UP
WBC # BLD: 12.1 K/UL — HIGH (ref 4.8–10.8)
WBC # FLD AUTO: 12.1 K/UL — HIGH (ref 4.8–10.8)

## 2017-05-08 PROCEDURE — 78226 HEPATOBILIARY SYSTEM IMAGING: CPT | Mod: 26

## 2017-05-08 PROCEDURE — 71010: CPT | Mod: 26

## 2017-05-08 RX ORDER — MAGNESIUM SULFATE 500 MG/ML
2 VIAL (ML) INJECTION ONCE
Qty: 0 | Refills: 0 | Status: COMPLETED | OUTPATIENT
Start: 2017-05-08 | End: 2017-05-08

## 2017-05-08 RX ORDER — HYDROMORPHONE HYDROCHLORIDE 2 MG/ML
1 INJECTION INTRAMUSCULAR; INTRAVENOUS; SUBCUTANEOUS EVERY 4 HOURS
Qty: 0 | Refills: 0 | Status: DISCONTINUED | OUTPATIENT
Start: 2017-05-08 | End: 2017-05-09

## 2017-05-08 RX ORDER — HYDROMORPHONE HYDROCHLORIDE 2 MG/ML
1 INJECTION INTRAMUSCULAR; INTRAVENOUS; SUBCUTANEOUS EVERY 4 HOURS
Qty: 0 | Refills: 0 | Status: DISCONTINUED | OUTPATIENT
Start: 2017-05-08 | End: 2017-05-08

## 2017-05-08 RX ORDER — SODIUM CHLORIDE 9 MG/ML
1000 INJECTION, SOLUTION INTRAVENOUS ONCE
Qty: 0 | Refills: 0 | Status: COMPLETED | OUTPATIENT
Start: 2017-05-08 | End: 2017-05-08

## 2017-05-08 RX ORDER — PIPERACILLIN AND TAZOBACTAM 4; .5 G/20ML; G/20ML
3.38 INJECTION, POWDER, LYOPHILIZED, FOR SOLUTION INTRAVENOUS ONCE
Qty: 0 | Refills: 0 | Status: COMPLETED | OUTPATIENT
Start: 2017-05-08 | End: 2017-05-08

## 2017-05-08 RX ORDER — MAGNESIUM SULFATE 500 MG/ML
2 VIAL (ML) INJECTION ONCE
Qty: 0 | Refills: 0 | Status: DISCONTINUED | OUTPATIENT
Start: 2017-05-08 | End: 2017-05-08

## 2017-05-08 RX ORDER — PIPERACILLIN AND TAZOBACTAM 4; .5 G/20ML; G/20ML
3.38 INJECTION, POWDER, LYOPHILIZED, FOR SOLUTION INTRAVENOUS EVERY 8 HOURS
Qty: 0 | Refills: 0 | Status: DISCONTINUED | OUTPATIENT
Start: 2017-05-08 | End: 2017-05-09

## 2017-05-08 RX ORDER — SODIUM CHLORIDE 9 MG/ML
1000 INJECTION, SOLUTION INTRAVENOUS
Qty: 0 | Refills: 0 | Status: DISCONTINUED | OUTPATIENT
Start: 2017-05-08 | End: 2017-05-09

## 2017-05-08 RX ADMIN — Medication 20 MILLIGRAM(S): at 05:53

## 2017-05-08 RX ADMIN — ENOXAPARIN SODIUM 40 MILLIGRAM(S): 100 INJECTION SUBCUTANEOUS at 05:53

## 2017-05-08 RX ADMIN — HYDROMORPHONE HYDROCHLORIDE 1 MILLIGRAM(S): 2 INJECTION INTRAMUSCULAR; INTRAVENOUS; SUBCUTANEOUS at 18:08

## 2017-05-08 RX ADMIN — Medication 40 MILLIGRAM(S): at 05:53

## 2017-05-08 RX ADMIN — ALBUTEROL 1 PUFF(S): 90 AEROSOL, METERED ORAL at 08:10

## 2017-05-08 RX ADMIN — ALBUTEROL 1 PUFF(S): 90 AEROSOL, METERED ORAL at 03:12

## 2017-05-08 RX ADMIN — Medication 100 MILLIGRAM(S): at 22:17

## 2017-05-08 RX ADMIN — Medication 10 MILLIGRAM(S): at 11:32

## 2017-05-08 RX ADMIN — OXYCODONE HYDROCHLORIDE 5 MILLIGRAM(S): 5 TABLET ORAL at 08:09

## 2017-05-08 RX ADMIN — HYDROMORPHONE HYDROCHLORIDE 1 MILLIGRAM(S): 2 INJECTION INTRAMUSCULAR; INTRAVENOUS; SUBCUTANEOUS at 14:30

## 2017-05-08 RX ADMIN — HYDROMORPHONE HYDROCHLORIDE 1 MILLIGRAM(S): 2 INJECTION INTRAMUSCULAR; INTRAVENOUS; SUBCUTANEOUS at 22:26

## 2017-05-08 RX ADMIN — SODIUM CHLORIDE 100 MILLILITER(S): 9 INJECTION, SOLUTION INTRAVENOUS at 18:16

## 2017-05-08 RX ADMIN — SODIUM CHLORIDE 3 MILLILITER(S): 9 INJECTION INTRAMUSCULAR; INTRAVENOUS; SUBCUTANEOUS at 13:25

## 2017-05-08 RX ADMIN — BUDESONIDE AND FORMOTEROL FUMARATE DIHYDRATE 2 PUFF(S): 160; 4.5 AEROSOL RESPIRATORY (INHALATION) at 20:38

## 2017-05-08 RX ADMIN — Medication 0.12 MILLIGRAM(S): at 05:54

## 2017-05-08 RX ADMIN — Medication 100 MILLIGRAM(S): at 05:53

## 2017-05-08 RX ADMIN — PIPERACILLIN AND TAZOBACTAM 200 GRAM(S): 4; .5 INJECTION, POWDER, LYOPHILIZED, FOR SOLUTION INTRAVENOUS at 13:24

## 2017-05-08 RX ADMIN — HYDROMORPHONE HYDROCHLORIDE 1 MILLIGRAM(S): 2 INJECTION INTRAMUSCULAR; INTRAVENOUS; SUBCUTANEOUS at 18:23

## 2017-05-08 RX ADMIN — Medication 120 MILLIGRAM(S): at 05:53

## 2017-05-08 RX ADMIN — SODIUM CHLORIDE 3 MILLILITER(S): 9 INJECTION INTRAMUSCULAR; INTRAVENOUS; SUBCUTANEOUS at 22:18

## 2017-05-08 RX ADMIN — SODIUM CHLORIDE 100 MILLILITER(S): 9 INJECTION, SOLUTION INTRAVENOUS at 12:20

## 2017-05-08 RX ADMIN — ALBUTEROL 1 PUFF(S): 90 AEROSOL, METERED ORAL at 20:37

## 2017-05-08 RX ADMIN — PIPERACILLIN AND TAZOBACTAM 25 GRAM(S): 4; .5 INJECTION, POWDER, LYOPHILIZED, FOR SOLUTION INTRAVENOUS at 22:17

## 2017-05-08 RX ADMIN — ALBUTEROL 1 PUFF(S): 90 AEROSOL, METERED ORAL at 23:43

## 2017-05-08 RX ADMIN — LISINOPRIL 5 MILLIGRAM(S): 2.5 TABLET ORAL at 05:53

## 2017-05-08 RX ADMIN — OXYCODONE HYDROCHLORIDE 5 MILLIGRAM(S): 5 TABLET ORAL at 04:12

## 2017-05-08 RX ADMIN — OXYCODONE HYDROCHLORIDE 5 MILLIGRAM(S): 5 TABLET ORAL at 09:09

## 2017-05-08 RX ADMIN — TAMSULOSIN HYDROCHLORIDE 0.4 MILLIGRAM(S): 0.4 CAPSULE ORAL at 22:17

## 2017-05-08 RX ADMIN — BUDESONIDE AND FORMOTEROL FUMARATE DIHYDRATE 2 PUFF(S): 160; 4.5 AEROSOL RESPIRATORY (INHALATION) at 08:10

## 2017-05-08 RX ADMIN — SODIUM CHLORIDE 3 MILLILITER(S): 9 INJECTION INTRAMUSCULAR; INTRAVENOUS; SUBCUTANEOUS at 05:54

## 2017-05-08 RX ADMIN — SODIUM CHLORIDE 1000 MILLILITER(S): 9 INJECTION, SOLUTION INTRAVENOUS at 12:50

## 2017-05-08 RX ADMIN — ATORVASTATIN CALCIUM 10 MILLIGRAM(S): 80 TABLET, FILM COATED ORAL at 22:17

## 2017-05-08 RX ADMIN — HYDROMORPHONE HYDROCHLORIDE 1 MILLIGRAM(S): 2 INJECTION INTRAMUSCULAR; INTRAVENOUS; SUBCUTANEOUS at 13:37

## 2017-05-08 RX ADMIN — Medication 50 GRAM(S): at 11:43

## 2017-05-08 RX ADMIN — OXYCODONE HYDROCHLORIDE 5 MILLIGRAM(S): 5 TABLET ORAL at 01:41

## 2017-05-08 NOTE — CONSULT NOTE ADULT - SUBJECTIVE AND OBJECTIVE BOX
HPI:  The patient is a 51 year old male with significant cardiovascular history including atrial fibrillation and cardiomyopathy s/p laparoscopic cholecystectomy POD #2. The patient was discharged and returned to the ED today complaining of lower abdominal pain, constipation, and urinary retention. The patient states that he has not had a bowel movement in 3 days. He also states that he has urinated in approximately 16 hours. The patient is tolerating diet without nausea and vomiting but states that his appetite is diminished due to abdominal distention. The patient denies fever and chills. He also denies chest pain and shortness of breath. Ultrasound of the bladder performed in the ED showed a dilated bladder. Ultrasound of the right upper quadrant did not demonstrate any abdominal collection such as biloma. (05 May 2017 00:11)    ct abdomen: small collection in GB fossa. HIDA scan positive for bile leak      PAST MEDICAL & SURGICAL HISTORY:  Mitral regurgitation: severe MR  Cardiomyopathy, nonischemic  CAD (coronary artery disease)  Asthma  Atrial fibrillation  Heart disease  S/P laparoscopic cholecystectomy  History of humerus fracture: Metal pins in Left Humerus  Artificial pacemaker      REVIEW OF SYSTEMS    General: unremarkable, no fever    Skin/Breast: unremarkable  	  Ophthalmologic: unremarkable  	  ENMT:	unremarkable    Respiratory and Thorax: unremarkable  	  Cardiovascular:	unremarkable    Gastrointestinal:	 as above    Genitourinary:	unremarkable    Musculoskeletal:	unremarkable    Neurological:	unremarkable    Psychiatric:	unremarkable    Hematology/Lymphatics:	unremarkable    Endocrine:	unremarkable    Allergic/Immunologic:	unremarkable      MEDICATIONS  (STANDING):  sodium chloride 0.9% lock flush 3milliLiter(s) IV Push every 8 hours  enoxaparin Injectable 40milliGRAM(s) SubCutaneous every 24 hours  docusate sodium 100milliGRAM(s) Oral three times a day  predniSONE   Tablet 20milliGRAM(s) Oral daily  lisinopril 5milliGRAM(s) Oral daily  digoxin     Tablet 0.125milliGRAM(s) Oral daily  atorvastatin 10milliGRAM(s) Oral at bedtime  ALBUTerol    90 MICROgram(s) HFA Inhaler 1Puff(s) Inhalation every 4 hours  furosemide    Tablet 40milliGRAM(s) Oral daily  buDESOnide  80 MICROgram(s)/formoterol 4.5 MICROgram(s) Inhaler 2Puff(s) Inhalation two times a day  tamsulosin 0.4milliGRAM(s) Oral at bedtime  sotalol 120milliGRAM(s) Oral every 12 hours  lactated ringers. 1000milliLiter(s) IV Continuous <Continuous>  piperacillin/tazobactam IVPB. 3.375Gram(s) IV Intermittent every 8 hours  lactated ringers Bolus 1000milliLiter(s) IV Bolus once    MEDICATIONS  (PRN):  ondansetron Injectable 4milliGRAM(s) IV Push every 6 hours PRN Nausea  senna 2Tablet(s) Oral at bedtime PRN Constipation  acetaminophen   Tablet. 325milliGRAM(s) Oral every 4 hours PRN Mild Pain (1 - 3)  oxyCODONE IR 5milliGRAM(s) Oral every 4 hours PRN Moderate Pain (4 - 6)  bisacodyl Suppository 10milliGRAM(s) Rectal daily PRN Constipation  HYDROmorphone  Injectable 1milliGRAM(s) IV Push every 4 hours PRN Severe Pain (7 - 10)      Allergies    No Known Allergies    Intolerances        SOCIAL HISTORY:    FAMILY HISTORY:  No pertinent family history in first degree relatives      Vital Signs Last 24 Hrs  T(C): 36.7, Max: 37.8 (05-07 @ 23:43)  T(F): 98.1, Max: 100 (05-07 @ 23:43)  HR: 74 (74 - 108)  BP: 95/71 (95/71 - 135/95)  BP(mean): --  RR: 18 (16 - 18)  SpO2: 97% (97% - 99%)      PHYSICAL EXAM:    Constitutional: no fever, hemodynamically stable    Eyes: unremarkable    ENMT: unremarkable    Neck: unremarkable    Breasts: deferred    Back: unremarkable    Respiratory: unremarkable    Cardiovascular: unremarkable    Gastrointestinal: bowel sounds present, soft, non-tender, no organomegaly    Genitourinary: deferred    Rectal: deferred    Extremities: unremarkable    Vascular: unremarkable    Neurological: Awake, alert, oriented x3, no focal neurological deficit    Skin: unremarkable    Lymph Nodes: deferred    Musculoskeletal: unremarkable    Psychiatric: unremarkable    LABS:                        14.1   12.1  )-----------( 285      ( 08 May 2017 13:02 )             41.1     05-08    133<L>  |  89<L>  |  13.0  ----------------------------<  134<H>  4.3   |  27.0  |  0.64    Ca    9.1      08 May 2017 13:02  Phos  4.2     05-08  Mg     2.4     05-08    TPro  7.3  /  Alb  3.1<L>  /  TBili  4.3<H>  /  DBili  2.1<H>  /  AST  23  /  ALT  20  /  AlkPhos  234<H>  05-08    PT/INR - ( 08 May 2017 13:02 )   PT: 13.8 sec;   INR: 1.25 ratio         PTT - ( 08 May 2017 13:02 )  PTT:28.4 sec      RADIOLOGY & ADDITIONAL STUDIES:        IMPRESSION:  The patient is a 51 year old male with significant cardiovascular history including atrial fibrillation and cardiomyopathy s/p laparoscopic cholecystectomy POD #2. GI consult for bile leak on HIDA scan.      PLAN:  NPO  IV antibiotics  ERCP tomorrow  d/w surgery and IR teams    thanks for the consults

## 2017-05-08 NOTE — PROGRESS NOTE ADULT - PROBLEM SELECTOR PLAN 1
elevated bilirubin 4.3 and direct component 2.1  - HIDA scan  - Consult GI for ERCP  - prn analgesia  - serial abdominal exam

## 2017-05-08 NOTE — PROGRESS NOTE ADULT - SUBJECTIVE AND OBJECTIVE BOX
SUBJECTIVE: Patient seen during rounds by attending. Sitting in chair. States that he has severe pain in his abdomen. He denies nausea and vomiting No bowel movement s/p lap carissa POD#6. Endorses very little flatus. Patient endorses decreased appetite. The patient denies chest pain and shortness of breath.       MEDICATIONS  (STANDING):  sodium chloride 0.9% lock flush 3milliLiter(s) IV Push every 8 hours  enoxaparin Injectable 40milliGRAM(s) SubCutaneous every 24 hours  docusate sodium 100milliGRAM(s) Oral three times a day  predniSONE   Tablet 20milliGRAM(s) Oral daily  lisinopril 5milliGRAM(s) Oral daily  digoxin     Tablet 0.125milliGRAM(s) Oral daily  atorvastatin 10milliGRAM(s) Oral at bedtime  ALBUTerol    90 MICROgram(s) HFA Inhaler 1Puff(s) Inhalation every 4 hours  furosemide    Tablet 40milliGRAM(s) Oral daily  buDESOnide  80 MICROgram(s)/formoterol 4.5 MICROgram(s) Inhaler 2Puff(s) Inhalation two times a day  tamsulosin 0.4milliGRAM(s) Oral at bedtime  sotalol 120milliGRAM(s) Oral every 12 hours  lactated ringers. 1000milliLiter(s) IV Continuous <Continuous>  piperacillin/tazobactam IVPB. 3.375Gram(s) IV Intermittent every 8 hours  lactated ringers Bolus 1000milliLiter(s) IV Bolus once    MEDICATIONS  (PRN):  ondansetron Injectable 4milliGRAM(s) IV Push every 6 hours PRN Nausea  senna 2Tablet(s) Oral at bedtime PRN Constipation  acetaminophen   Tablet. 325milliGRAM(s) Oral every 4 hours PRN Mild Pain (1 - 3)  oxyCODONE IR 5milliGRAM(s) Oral every 4 hours PRN Moderate Pain (4 - 6)  bisacodyl Suppository 10milliGRAM(s) Rectal daily PRN Constipation  HYDROmorphone  Injectable 1milliGRAM(s) IV Push every 4 hours PRN Severe Pain (7 - 10)      Vital Signs Last 24 Hrs  T(C): 36.7, Max: 37.8 (05-07 @ 23:43)  T(F): 98.1, Max: 100 (05-07 @ 23:43)  HR: 74 (74 - 108)  BP: 95/71 (95/71 - 135/95)  BP(mean): --  RR: 18 (16 - 18)  SpO2: 97% (97% - 99%)    PE  Gen: sitting in chair, distress due to pain, jaundiced  Pulm: Nonlabored respirations  Abd: tense, TTP diffusely no rebound, + guarding  : montoya intact with dark urine  Neuro: GCS15 alert and oriented x3      I&O's Detail    I & Os for current day (as of 08 May 2017 14:14)  =============================================  IN:    Total IN: 0 ml  ---------------------------------------------  OUT:    Indwelling Catheter - Urethral: 1175 ml    Total OUT: 1175 ml  ---------------------------------------------  Total NET: -1175 ml      LABS:                        14.1   12.1  )-----------( 285      ( 08 May 2017 13:02 )             41.1     05-08    133<L>  |  89<L>  |  13.0  ----------------------------<  134<H>  4.3   |  27.0  |  0.64    Ca    9.1      08 May 2017 13:02  Phos  4.2     05-08  Mg     2.4     05-08    TPro  7.3  /  Alb  3.1<L>  /  TBili  4.3<H>  /  DBili  2.1<H>  /  AST  23  /  ALT  20  /  AlkPhos  234<H>  05-08    PT/INR - ( 08 May 2017 13:02 )   PT: 13.8 sec;   INR: 1.25 ratio         PTT - ( 08 May 2017 13:02 )  PTT:28.4 sec

## 2017-05-08 NOTE — PROGRESS NOTE ADULT - ATTENDING COMMENTS
Patient seen and examined on ACS rounds.  NO toxic but comfortable, icteric sclerae.  abdomen is distended with voluntary guarding and rebound tenderness.  CT from yesterday reviewed, severe ascites.  Patient probably does have bile peritonitis, injury to hollow viscus is unlikely in the absences of a toxic patient and no pneumoperitoneum on CT.  WBC on the rise. plan for HIDA to asses source of leak and then possible ERCP to delineate anatomy before exploring.  Plan discussed with ACS tea, IR and patient, all questions answered.

## 2017-05-09 ENCOUNTER — TRANSCRIPTION ENCOUNTER (OUTPATIENT)
Age: 52
End: 2017-05-09

## 2017-05-09 LAB
ALBUMIN SERPL ELPH-MCNC: 2.2 G/DL — LOW (ref 3.3–5.2)
ALP SERPL-CCNC: 224 U/L — HIGH (ref 40–120)
ALT FLD-CCNC: 58 U/L — HIGH
ANION GAP SERPL CALC-SCNC: 15 MMOL/L — SIGNIFICANT CHANGE UP (ref 5–17)
APTT BLD: 24.6 SEC — LOW (ref 27.5–37.4)
APTT BLD: 27.7 SEC — SIGNIFICANT CHANGE UP (ref 27.5–37.4)
AST SERPL-CCNC: 113 U/L — HIGH
BASE EXCESS BLDV CALC-SCNC: -1.9 MMOL/L — SIGNIFICANT CHANGE UP (ref -3–3)
BASE EXCESS BLDV CALC-SCNC: 2.5 MMOL/L — SIGNIFICANT CHANGE UP (ref -3–3)
BASE EXCESS BLDV CALC-SCNC: 5.9 MMOL/L — HIGH (ref -3–3)
BASOPHILS # BLD AUTO: 0 K/UL — SIGNIFICANT CHANGE UP (ref 0–0.2)
BASOPHILS NFR BLD AUTO: 0.1 % — SIGNIFICANT CHANGE UP (ref 0–2)
BILIRUB SERPL-MCNC: 4.6 MG/DL — HIGH (ref 0.4–2)
BLD GP AB SCN SERPL QL: SIGNIFICANT CHANGE UP
BUN SERPL-MCNC: 17 MG/DL — SIGNIFICANT CHANGE UP (ref 8–20)
CA-I SERPL-SCNC: 1.05 MMOL/L — LOW (ref 1.15–1.29)
CALCIUM SERPL-MCNC: 8.5 MG/DL — LOW (ref 8.6–10.2)
CHLORIDE BLDV-SCNC: 98 MMOL/L — SIGNIFICANT CHANGE UP (ref 98–106)
CHLORIDE SERPL-SCNC: 94 MMOL/L — LOW (ref 98–107)
CO2 SERPL-SCNC: 27 MMOL/L — SIGNIFICANT CHANGE UP (ref 22–29)
CREAT SERPL-MCNC: 0.77 MG/DL — SIGNIFICANT CHANGE UP (ref 0.5–1.3)
EOSINOPHIL # BLD AUTO: 0 K/UL — SIGNIFICANT CHANGE UP (ref 0–0.5)
GAS PNL BLDA: SIGNIFICANT CHANGE UP
GAS PNL BLDV: 132 MMOL/L — LOW (ref 136–146)
GAS PNL BLDV: SIGNIFICANT CHANGE UP
GLUCOSE BLDV-MCNC: 217 MG/DL — HIGH (ref 70–99)
GLUCOSE SERPL-MCNC: 214 MG/DL — HIGH (ref 70–115)
HCO3 BLDV-SCNC: 22 MMOL/L — SIGNIFICANT CHANGE UP (ref 20–26)
HCO3 BLDV-SCNC: 26 MMOL/L — SIGNIFICANT CHANGE UP (ref 20–26)
HCO3 BLDV-SCNC: 29 MMOL/L — HIGH (ref 20–26)
HCT VFR BLD CALC: 35.3 % — LOW (ref 42–52)
HCT VFR BLD CALC: 35.8 % — LOW (ref 42–52)
HCT VFR BLDA CALC: 39 — SIGNIFICANT CHANGE UP (ref 39–50)
HGB BLD CALC-MCNC: 12.8 G/DL — LOW (ref 13–17)
HGB BLD-MCNC: 12 G/DL — LOW (ref 14–18)
HGB BLD-MCNC: 12.2 G/DL — LOW (ref 14–18)
HOROWITZ INDEX BLDV+IHG-RTO: 50 — SIGNIFICANT CHANGE UP
INR BLD: 1.37 RATIO — HIGH (ref 0.88–1.16)
INR BLD: 1.49 RATIO — HIGH (ref 0.88–1.16)
LACTATE BLDV-MCNC: 4.3 MMOL/L — CRITICAL HIGH (ref 0.5–2)
LACTATE SERPL-SCNC: 6 MMOL/L — CRITICAL HIGH (ref 0.5–2)
LYMPHOCYTES # BLD AUTO: 1 K/UL — SIGNIFICANT CHANGE UP (ref 1–4.8)
LYMPHOCYTES # BLD AUTO: 1.9 K/UL — SIGNIFICANT CHANGE UP (ref 1–4.8)
LYMPHOCYTES # BLD AUTO: 5.8 % — LOW (ref 20–55)
LYMPHOCYTES # BLD AUTO: 7 % — LOW (ref 20–55)
MCHC RBC-ENTMCNC: 30 PG — SIGNIFICANT CHANGE UP (ref 27–31)
MCHC RBC-ENTMCNC: 30.6 PG — SIGNIFICANT CHANGE UP (ref 27–31)
MCHC RBC-ENTMCNC: 34 G/DL — SIGNIFICANT CHANGE UP (ref 32–36)
MCHC RBC-ENTMCNC: 34.1 G/DL — SIGNIFICANT CHANGE UP (ref 32–36)
MCV RBC AUTO: 88.3 FL — SIGNIFICANT CHANGE UP (ref 80–94)
MCV RBC AUTO: 89.7 FL — SIGNIFICANT CHANGE UP (ref 80–94)
METAMYELOCYTES # FLD: 1 % — HIGH (ref 0–0)
MONOCYTES # BLD AUTO: 1.4 K/UL — HIGH (ref 0–0.8)
MONOCYTES # BLD AUTO: 2.2 K/UL — HIGH (ref 0–0.8)
MONOCYTES NFR BLD AUTO: 8 % — SIGNIFICANT CHANGE UP (ref 3–10)
MONOCYTES NFR BLD AUTO: 8.5 % — SIGNIFICANT CHANGE UP (ref 3–10)
MYELOCYTES NFR BLD: 3 % — HIGH (ref 0–0)
NEUTROPHILS # BLD AUTO: 14.3 K/UL — HIGH (ref 1.8–8)
NEUTROPHILS # BLD AUTO: 21.2 K/UL — HIGH (ref 1.8–8)
NEUTROPHILS NFR BLD AUTO: 76 % — HIGH (ref 37–73)
NEUTROPHILS NFR BLD AUTO: 84.9 % — HIGH (ref 37–73)
NEUTS BAND # BLD: 5 % — SIGNIFICANT CHANGE UP (ref 0–8)
OTHER CELLS CSF MANUAL: 12 ML/DL — LOW (ref 18–22)
PCO2 BLDV: 46 MMHG — SIGNIFICANT CHANGE UP (ref 35–50)
PCO2 BLDV: 53 MMHG — HIGH (ref 35–50)
PCO2 BLDV: 56 MMHG — HIGH (ref 35–50)
PH BLDV: 7.27 — LOW (ref 7.35–7.45)
PH BLDV: 7.35 — SIGNIFICANT CHANGE UP (ref 7.35–7.45)
PH BLDV: 7.44 — SIGNIFICANT CHANGE UP (ref 7.35–7.45)
PLAT MORPH BLD: NORMAL — SIGNIFICANT CHANGE UP
PLATELET # BLD AUTO: 262 K/UL — SIGNIFICANT CHANGE UP (ref 150–400)
PLATELET # BLD AUTO: 268 K/UL — SIGNIFICANT CHANGE UP (ref 150–400)
PO2 BLDV: 38 MMHG — SIGNIFICANT CHANGE UP (ref 25–45)
PO2 BLDV: 39 MMHG — SIGNIFICANT CHANGE UP (ref 25–45)
PO2 BLDV: 41 MMHG — SIGNIFICANT CHANGE UP (ref 25–45)
POTASSIUM BLDV-SCNC: 3.3 MMOL/L — LOW (ref 3.4–4.5)
POTASSIUM SERPL-MCNC: 3.8 MMOL/L — SIGNIFICANT CHANGE UP (ref 3.5–5.3)
POTASSIUM SERPL-SCNC: 3.8 MMOL/L — SIGNIFICANT CHANGE UP (ref 3.5–5.3)
PROT SERPL-MCNC: 5.1 G/DL — LOW (ref 6.6–8.7)
PROTHROM AB SERPL-ACNC: 15.2 SEC — HIGH (ref 9.8–12.7)
PROTHROM AB SERPL-ACNC: 16.5 SEC — HIGH (ref 9.8–12.7)
RBC # BLD: 3.99 M/UL — LOW (ref 4.6–6.2)
RBC # BLD: 4 M/UL — LOW (ref 4.6–6.2)
RBC # FLD: 14.1 % — SIGNIFICANT CHANGE UP (ref 11–15.6)
RBC # FLD: 14.1 % — SIGNIFICANT CHANGE UP (ref 11–15.6)
RBC BLD AUTO: NORMAL — SIGNIFICANT CHANGE UP
SAO2 % BLDV: 60 % — LOW (ref 67–88)
SAO2 % BLDV: 68 % — SIGNIFICANT CHANGE UP (ref 67–88)
SAO2 % BLDV: 68 % — SIGNIFICANT CHANGE UP (ref 67–88)
SODIUM SERPL-SCNC: 136 MMOL/L — SIGNIFICANT CHANGE UP (ref 135–145)
TROPONIN T SERPL-MCNC: 0.03 NG/ML — SIGNIFICANT CHANGE UP (ref 0–0.06)
TYPE + AB SCN PNL BLD: SIGNIFICANT CHANGE UP
WBC # BLD: 16.8 K/UL — HIGH (ref 4.8–10.8)
WBC # BLD: 25.9 K/UL — HIGH (ref 4.8–10.8)
WBC # FLD AUTO: 16.8 K/UL — HIGH (ref 4.8–10.8)
WBC # FLD AUTO: 25.9 K/UL — HIGH (ref 4.8–10.8)

## 2017-05-09 PROCEDURE — 71010: CPT | Mod: 26

## 2017-05-09 PROCEDURE — 49000 EXPLORATION OF ABDOMEN: CPT

## 2017-05-09 PROCEDURE — 36013 PLACE CATHETER IN ARTERY: CPT

## 2017-05-09 RX ORDER — NOREPINEPHRINE BITARTRATE/D5W 8 MG/250ML
0.1 PLASTIC BAG, INJECTION (ML) INTRAVENOUS
Qty: 16 | Refills: 0 | Status: DISCONTINUED | OUTPATIENT
Start: 2017-05-09 | End: 2017-05-10

## 2017-05-09 RX ORDER — DEXTROSE 50 % IN WATER 50 %
12.5 SYRINGE (ML) INTRAVENOUS ONCE
Qty: 0 | Refills: 0 | Status: DISCONTINUED | OUTPATIENT
Start: 2017-05-09 | End: 2017-05-18

## 2017-05-09 RX ORDER — VASOPRESSIN 20 [USP'U]/ML
0.04 INJECTION INTRAVENOUS
Qty: 100 | Refills: 0 | Status: DISCONTINUED | OUTPATIENT
Start: 2017-05-09 | End: 2017-05-10

## 2017-05-09 RX ORDER — INSULIN LISPRO 100/ML
VIAL (ML) SUBCUTANEOUS
Qty: 0 | Refills: 0 | Status: DISCONTINUED | OUTPATIENT
Start: 2017-05-09 | End: 2017-05-10

## 2017-05-09 RX ORDER — DEXTROSE 50 % IN WATER 50 %
25 SYRINGE (ML) INTRAVENOUS ONCE
Qty: 0 | Refills: 0 | Status: DISCONTINUED | OUTPATIENT
Start: 2017-05-09 | End: 2017-05-18

## 2017-05-09 RX ORDER — FLUCONAZOLE 150 MG/1
TABLET ORAL
Qty: 0 | Refills: 0 | Status: DISCONTINUED | OUTPATIENT
Start: 2017-05-09 | End: 2017-05-11

## 2017-05-09 RX ORDER — DOBUTAMINE HCL 250MG/20ML
2.5 VIAL (ML) INTRAVENOUS
Qty: 500 | Refills: 0 | Status: DISCONTINUED | OUTPATIENT
Start: 2017-05-09 | End: 2017-05-10

## 2017-05-09 RX ORDER — FENTANYL CITRATE 50 UG/ML
1 INJECTION INTRAVENOUS
Qty: 5000 | Refills: 0 | Status: DISCONTINUED | OUTPATIENT
Start: 2017-05-09 | End: 2017-05-09

## 2017-05-09 RX ORDER — FENTANYL CITRATE 50 UG/ML
100 INJECTION INTRAVENOUS ONCE
Qty: 0 | Refills: 0 | Status: DISCONTINUED | OUTPATIENT
Start: 2017-05-09 | End: 2017-05-09

## 2017-05-09 RX ORDER — AMIODARONE HYDROCHLORIDE 400 MG/1
1 TABLET ORAL
Qty: 900 | Refills: 0 | Status: DISCONTINUED | OUTPATIENT
Start: 2017-05-09 | End: 2017-05-10

## 2017-05-09 RX ORDER — GLUCAGON INJECTION, SOLUTION 0.5 MG/.1ML
1 INJECTION, SOLUTION SUBCUTANEOUS ONCE
Qty: 0 | Refills: 0 | Status: DISCONTINUED | OUTPATIENT
Start: 2017-05-09 | End: 2017-05-18

## 2017-05-09 RX ORDER — MEROPENEM 1 G/30ML
500 INJECTION INTRAVENOUS EVERY 8 HOURS
Qty: 0 | Refills: 0 | Status: DISCONTINUED | OUTPATIENT
Start: 2017-05-09 | End: 2017-05-11

## 2017-05-09 RX ORDER — DEXTROSE 50 % IN WATER 50 %
1 SYRINGE (ML) INTRAVENOUS ONCE
Qty: 0 | Refills: 0 | Status: DISCONTINUED | OUTPATIENT
Start: 2017-05-09 | End: 2017-05-18

## 2017-05-09 RX ORDER — SODIUM CHLORIDE 9 MG/ML
1000 INJECTION, SOLUTION INTRAVENOUS
Qty: 0 | Refills: 0 | Status: DISCONTINUED | OUTPATIENT
Start: 2017-05-09 | End: 2017-05-18

## 2017-05-09 RX ORDER — FLUCONAZOLE 150 MG/1
400 TABLET ORAL ONCE
Qty: 0 | Refills: 0 | Status: COMPLETED | OUTPATIENT
Start: 2017-05-09 | End: 2017-05-09

## 2017-05-09 RX ORDER — AMIODARONE HYDROCHLORIDE 400 MG/1
150 TABLET ORAL ONCE
Qty: 0 | Refills: 0 | Status: COMPLETED | OUTPATIENT
Start: 2017-05-09 | End: 2017-05-09

## 2017-05-09 RX ORDER — MEROPENEM 1 G/30ML
INJECTION INTRAVENOUS
Qty: 0 | Refills: 0 | Status: DISCONTINUED | OUTPATIENT
Start: 2017-05-09 | End: 2017-05-11

## 2017-05-09 RX ORDER — MEROPENEM 1 G/30ML
500 INJECTION INTRAVENOUS ONCE
Qty: 0 | Refills: 0 | Status: COMPLETED | OUTPATIENT
Start: 2017-05-09 | End: 2017-05-09

## 2017-05-09 RX ORDER — SODIUM CHLORIDE 9 MG/ML
1000 INJECTION, SOLUTION INTRAVENOUS
Qty: 0 | Refills: 0 | Status: DISCONTINUED | OUTPATIENT
Start: 2017-05-09 | End: 2017-05-15

## 2017-05-09 RX ORDER — HEPARIN SODIUM 5000 [USP'U]/ML
5000 INJECTION INTRAVENOUS; SUBCUTANEOUS EVERY 8 HOURS
Qty: 0 | Refills: 0 | Status: DISCONTINUED | OUTPATIENT
Start: 2017-05-09 | End: 2017-05-13

## 2017-05-09 RX ORDER — CHLORHEXIDINE GLUCONATE 213 G/1000ML
15 SOLUTION TOPICAL
Qty: 0 | Refills: 0 | Status: DISCONTINUED | OUTPATIENT
Start: 2017-05-09 | End: 2017-05-27

## 2017-05-09 RX ORDER — FENTANYL CITRATE 50 UG/ML
1 INJECTION INTRAVENOUS
Qty: 5000 | Refills: 0 | Status: DISCONTINUED | OUTPATIENT
Start: 2017-05-09 | End: 2017-05-11

## 2017-05-09 RX ORDER — PANTOPRAZOLE SODIUM 20 MG/1
40 TABLET, DELAYED RELEASE ORAL EVERY 12 HOURS
Qty: 0 | Refills: 0 | Status: DISCONTINUED | OUTPATIENT
Start: 2017-05-09 | End: 2017-05-20

## 2017-05-09 RX ORDER — DOBUTAMINE HCL 250MG/20ML
5 VIAL (ML) INTRAVENOUS
Qty: 500 | Refills: 0 | Status: DISCONTINUED | OUTPATIENT
Start: 2017-05-09 | End: 2017-05-09

## 2017-05-09 RX ORDER — SODIUM BICARBONATE 1 MEQ/ML
0.24 SYRINGE (ML) INTRAVENOUS
Qty: 150 | Refills: 0 | Status: DISCONTINUED | OUTPATIENT
Start: 2017-05-09 | End: 2017-05-09

## 2017-05-09 RX ORDER — AMIODARONE HYDROCHLORIDE 400 MG/1
0.5 TABLET ORAL
Qty: 900 | Refills: 0 | Status: DISCONTINUED | OUTPATIENT
Start: 2017-05-09 | End: 2017-05-11

## 2017-05-09 RX ORDER — EPINEPHRINE 0.3 MG/.3ML
0.01 INJECTION INTRAMUSCULAR; SUBCUTANEOUS
Qty: 4 | Refills: 0 | Status: DISCONTINUED | OUTPATIENT
Start: 2017-05-09 | End: 2017-05-10

## 2017-05-09 RX ORDER — FLUCONAZOLE 150 MG/1
400 TABLET ORAL EVERY 24 HOURS
Qty: 0 | Refills: 0 | Status: DISCONTINUED | OUTPATIENT
Start: 2017-05-10 | End: 2017-05-11

## 2017-05-09 RX ADMIN — CHLORHEXIDINE GLUCONATE 15 MILLILITER(S): 213 SOLUTION TOPICAL at 17:06

## 2017-05-09 RX ADMIN — PANTOPRAZOLE SODIUM 40 MILLIGRAM(S): 20 TABLET, DELAYED RELEASE ORAL at 18:44

## 2017-05-09 RX ADMIN — ENOXAPARIN SODIUM 40 MILLIGRAM(S): 100 INJECTION SUBCUTANEOUS at 05:04

## 2017-05-09 RX ADMIN — FENTANYL CITRATE 7.04 MICROGRAM(S)/KG/HR: 50 INJECTION INTRAVENOUS at 14:00

## 2017-05-09 RX ADMIN — FENTANYL CITRATE 100 MICROGRAM(S): 50 INJECTION INTRAVENOUS at 13:00

## 2017-05-09 RX ADMIN — SODIUM CHLORIDE 100 MILLILITER(S): 9 INJECTION, SOLUTION INTRAVENOUS at 15:06

## 2017-05-09 RX ADMIN — FLUCONAZOLE 100 MILLIGRAM(S): 150 TABLET ORAL at 13:08

## 2017-05-09 RX ADMIN — Medication 2 MILLIGRAM(S): at 16:48

## 2017-05-09 RX ADMIN — Medication 0.12 MILLIGRAM(S): at 05:04

## 2017-05-09 RX ADMIN — HYDROMORPHONE HYDROCHLORIDE 1 MILLIGRAM(S): 2 INJECTION INTRAMUSCULAR; INTRAVENOUS; SUBCUTANEOUS at 05:39

## 2017-05-09 RX ADMIN — Medication 120 MILLIGRAM(S): at 05:04

## 2017-05-09 RX ADMIN — VASOPRESSIN 2.4 UNIT(S)/MIN: 20 INJECTION INTRAVENOUS at 11:00

## 2017-05-09 RX ADMIN — LISINOPRIL 5 MILLIGRAM(S): 2.5 TABLET ORAL at 05:05

## 2017-05-09 RX ADMIN — Medication 40 MILLIGRAM(S): at 05:04

## 2017-05-09 RX ADMIN — AMIODARONE HYDROCHLORIDE 618 MILLIGRAM(S): 400 TABLET ORAL at 10:30

## 2017-05-09 RX ADMIN — PIPERACILLIN AND TAZOBACTAM 25 GRAM(S): 4; .5 INJECTION, POWDER, LYOPHILIZED, FOR SOLUTION INTRAVENOUS at 05:05

## 2017-05-09 RX ADMIN — Medication 1 MILLIGRAM(S): at 18:32

## 2017-05-09 RX ADMIN — HEPARIN SODIUM 5000 UNIT(S): 5000 INJECTION INTRAVENOUS; SUBCUTANEOUS at 23:03

## 2017-05-09 RX ADMIN — AMIODARONE HYDROCHLORIDE 33.33 MG/MIN: 400 TABLET ORAL at 13:00

## 2017-05-09 RX ADMIN — Medication 21.13 MICROGRAM(S)/KG/MIN: at 14:55

## 2017-05-09 RX ADMIN — MEROPENEM 200 MILLIGRAM(S): 1 INJECTION INTRAVENOUS at 13:00

## 2017-05-09 RX ADMIN — AMIODARONE HYDROCHLORIDE 16.67 MG/MIN: 400 TABLET ORAL at 19:50

## 2017-05-09 RX ADMIN — Medication 100 MILLIGRAM(S): at 05:04

## 2017-05-09 RX ADMIN — Medication 2 MILLIGRAM(S): at 14:47

## 2017-05-09 RX ADMIN — SODIUM CHLORIDE 3 MILLILITER(S): 9 INJECTION INTRAMUSCULAR; INTRAVENOUS; SUBCUTANEOUS at 05:05

## 2017-05-09 RX ADMIN — ALBUTEROL 1 PUFF(S): 90 AEROSOL, METERED ORAL at 04:57

## 2017-05-09 RX ADMIN — MEROPENEM 200 MILLIGRAM(S): 1 INJECTION INTRAVENOUS at 23:03

## 2017-05-09 RX ADMIN — Medication 20 MILLIGRAM(S): at 05:04

## 2017-05-09 RX ADMIN — FENTANYL CITRATE 4.7 MICROGRAM(S)/KG/HR: 50 INJECTION INTRAVENOUS at 13:00

## 2017-05-09 RX ADMIN — HEPARIN SODIUM 5000 UNIT(S): 5000 INJECTION INTRAVENOUS; SUBCUTANEOUS at 16:50

## 2017-05-09 RX ADMIN — Medication 14.09 MICROGRAM(S)/KG/MIN: at 11:00

## 2017-05-09 RX ADMIN — Medication 2 MILLIGRAM(S): at 20:35

## 2017-05-09 RX ADMIN — HYDROMORPHONE HYDROCHLORIDE 1 MILLIGRAM(S): 2 INJECTION INTRAMUSCULAR; INTRAVENOUS; SUBCUTANEOUS at 00:05

## 2017-05-09 RX ADMIN — FENTANYL CITRATE 100 MICROGRAM(S): 50 INJECTION INTRAVENOUS at 13:15

## 2017-05-09 RX ADMIN — EPINEPHRINE 3.52 MICROGRAM(S)/KG/MIN: 0.3 INJECTION INTRAMUSCULAR; SUBCUTANEOUS at 11:00

## 2017-05-09 NOTE — BRIEF OPERATIVE NOTE - OPERATION/FINDINGS
ercp: papilla appeared to be stenosed. During attempts at biliary cannulation, patient developed hypotension. Procedure aborted. Patient coded. Intubated. Patient resuscitated. ACS team took over, and performed exploratory laparotomy.

## 2017-05-09 NOTE — BRIEF OPERATIVE NOTE - POST-OP DX
Bile leak, postoperative  05/09/2017    Active  Sue Christopher Bile leak, postoperative  05/09/2017    Active  Shree Burciaga

## 2017-05-09 NOTE — BRIEF OPERATIVE NOTE - PRE-OP DX
Non-traumatic compartment syndrome of abdomen  05/09/2017    Active  Sue Christopher Bile leak, postoperative  05/09/2017    Active  Shree Burciaga  Non-traumatic compartment syndrome of abdomen  05/09/2017    Active  Sue Christopher

## 2017-05-09 NOTE — BRIEF OPERATIVE NOTE - PROCEDURE
Exploratory laparotomy  05/09/2017    Active  AUBRIE Abdominal washout  05/09/2017    Active  RICARDA  Exploratory laparotomy  05/09/2017    Active  Sue Christopher

## 2017-05-09 NOTE — PROCEDURE NOTE - SUPERVISORY STATEMENT
I placed the PAC with resident and confirmed position by appropriate waveform analysis and blood gas analysis; positioned in left pulmonary artery with PAOP at 52 cm.

## 2017-05-09 NOTE — BRIEF OPERATIVE NOTE - OPERATION/FINDINGS
2000 ml bile intraperitoneally. small bowel, stomach, d1 showed no perforation. clip seen near gall bladder fossa. No active bile leakage noted 2000 ml bile intraperitoneally. small bowel, stomach, d1 showed no perforation. clip seen on possible ?duct near gallbladder fossa. No active bile leakage noted

## 2017-05-09 NOTE — PROGRESS NOTE ADULT - SUBJECTIVE AND OBJECTIVE BOX
Called into OR to interrogate patient's device emergently.     Make and Model: Biotronik Ilest 7 VR-T implanted by Dr. Bowling at [facility] on 11/19/2014  Battery: JUSTYN, 3.11 Volts  Charge time: 9.1s  Programmed: VVI 40  VT-1: 150: ATP only  VT-2: 182: ATP, 40J  VF: 207: ATPx1 40J    RV lead impedance: 1288 Ohms  Sensitivity: 0.3mV  Atrial Sensing Threshold: 1.7mV  Ventricular Sensing Threshold: 11.7mV  RV Pacing output: 4.5V @ 0.4ms  RV Pacing Threshold: Not performed due to rapid Afib  Note: Patient has VDD RV lead.     Arrhythmias: Multiple episodes of rapid atrial fibrillation most recently on 5/9/217 which started around 8:11AM. Overall Mode Switch: 10.2%  Impression: Excellent single chamber ICD pacing and sensing. Please place a magnet over the device if you choose to use electrocautery. Note rapid Afib with rates up to 140-150bpm. Patient may follow up with [facility] for future device checks.

## 2017-05-09 NOTE — PROCEDURE NOTE - ADDITIONAL PROCEDURE DETAILS
A previously placed right IJ cordis introducer was cleaned and area around catheter cleaned with chlorhexidine scrub.  Patient was draped in sterile manner, and swan noel catheter was inserted into the introducer up to 20cm.  The balloon was inflated and waveform watched on the monitor.  The catheter was advanced through the sheath slowly as arterial waveform identified signifying right ventricular location of catheter tip.  The catheter was advanced further until PA waveform identified, at which point the catheter was manipulated until it wedged with balloon inflated at approximately 53cm.  The Wedge pressure was 19 and PAD was 21.  A dry sterile dressing was applied, and a confirmatory CXR shows the tip of the catheter in a branch of the Left PA.  There were no immediate complications.

## 2017-05-09 NOTE — CHART NOTE - NSCHARTNOTEFT_GEN_A_CORE
during ERCP in the or , we were informed that the pt developed PEA . Pt received cpr by anesthesia services and ROSC was achieved and pt was started on pressors and an epinephrine drip.  It was at this point that we suspected compartment syndrome and decided to perform a laparotomy, details of the procedure will be dictated. during ERCP in the or , we were informed that the pt developed PEA . Pt received cpr by anesthesia services and ROSC was achieved and pt was started on pressors and an epinephrine drip.  It was at this point that we suspected compartment syndrome and decided to perform a laparotomy, details of the procedure will be dictated.    Attending attestation:  Came to OR after patient went into cardiac arrest during ERCP.   ACLS started by anesthesia team, ROSC achieved.  On my arrival, patient appeared to have an abdominal compartment syndrome, proceeded with emergent laparotomy.  See operative dictation.

## 2017-05-09 NOTE — BRIEF OPERATIVE NOTE - PRE-OP DX
Bile leak, postoperative  05/09/2017    Active  Shree Burciaga  Non-traumatic compartment syndrome of abdomen  05/09/2017    Active  Sue Christopher

## 2017-05-09 NOTE — CONSULT NOTE ADULT - SUBJECTIVE AND OBJECTIVE BOX
INTERVAL HPI/OVERNIGHT EVENTS:  ercp today went into asystole in the OR, converted to EX lap, washout and AB THERA vac placed.    PRESSORS: [x ] YES [ ] NO  WHICH: epi, dobutamine, vaso  DOSE:    ANTIBIOTICS:     merrem             DATE STARTED:  ANTIBIOTICS:      fluconazole            DATE STARTED:  ANTIBIOTICS:                  DATE STARTED:    MEDICATIONS  (STANDING):  amiodarone Infusion 1mG/Min IV Continuous <Continuous>  EPINEPHrine     Infusion 0.01MICROgram(s)/kG/Min IV Continuous <Continuous>  vasopressin Infusion 0.04Unit(s)/Min IV Continuous <Continuous>  meropenem IVPB  IV Intermittent   fluconAZOLE IVPB  IV Intermittent   meropenem IVPB 500milliGRAM(s) IV Intermittent every 8 hours  fentaNYL   Infusion 1MICROgram(s)/kG/Hr IV Continuous <Continuous>  heparin  Injectable 5000Unit(s) SubCutaneous every 8 hours  multiple electrolytes Injection Type 1 1000milliLiter(s) IV Continuous <Continuous>  DOBUTamine Infusion 7.5MICROgram(s)/kG/Min IV Continuous <Continuous>    MEDICATIONS  (PRN):      Drug Dosing Weight  Height (cm): 165.1 (04 May 2017 13:12)  Weight (kg): 93.9 (09 May 2017 05:12)  BMI (kg/m2): 34.4 (09 May 2017 05:12)  BSA (m2): 2.01 (09 May 2017 05:12)    CENTRAL LINE: [x ] YES [ ] NO  LOCATION:   DATE INSERTED: today  REMOVE: [ ] YES [ ] NO  EXPLAIN:    ARMAS: [x ] YES [ ] NO    DATE INSERTED: today  REMOVE: [ ] YES [ ] NO  EXPLAIN:    A-LINE: [x ] YES [ ] NO  LOCATION:   DATE INSERTED: today  REMOVE: [ ] YES [ ] NO  EXPLAIN:    PAST MEDICAL & SURGICAL HISTORY:  Mitral regurgitation: severe MR  Cardiomyopathy, nonischemic  CAD (coronary artery disease)  Asthma  Atrial fibrillation  Heart disease  S/P laparoscopic cholecystectomy  History of humerus fracture: Metal pins in Left Humerus  Artificial pacemaker      ICU Vital Signs Last 24 Hrs  T(C): 36.7, Max: 37 (05-08 @ 23:56)  T(F): 98.1, Max: 98.6 (05-08 @ 23:56)  HR: 87 (0 - 154)  BP: 103/70 (88/62 - 191/79)  BP(mean): 79 (70 - 114)  ABP: 104/72 (77/57 - 195/60)  ABP(mean): 84 (65 - 142)  RR: 23 (16 - 23)  SpO2: 99% (81% - 100%)      ABG - ( 09 May 2017 14:15 )  pH: 7.36  /  pCO2: 47    /  pO2: 133   / HCO3: 25    / Base Excess: 0.5   /  SaO2: 99                  I&O's Detail  I & Os for 24h ending 09 May 2017 07:00  =============================================  IN:    Total IN: 0 ml  ---------------------------------------------  OUT:    Indwelling Catheter - Urethral: 450 ml    Total OUT: 450 ml  ---------------------------------------------  Total NET: -450 ml    I & Os for current day (as of 09 May 2017 14:54)  =============================================  IN:    IV PiggyBack: 300 ml    EPINEPHrine Infusion: 135 ml    amiodarone Infusion: 99.9 ml    DOBUTamine Infusion: 42 ml    fentaNYL  Infusion: 9.8 ml    vasopressin Infusion: 7.2 ml    Total IN: 593.9 ml  ---------------------------------------------  OUT:    Indwelling Catheter - Urethral: 1690 ml    Total OUT: 1690 ml  ---------------------------------------------  Total NET: -1096.1 ml      Mode: AC/ CMV (Assist Control/ Continuous Mandatory Ventilation)  RR (machine): 14  TV (machine): 500  FiO2: 50  PEEP: 5  MAP: 13  PIP: 35      Physical Exam:    Neurological:  gcs 3 T currently     HEENT: PERRLA, EOMI, no drainage or redness    Neck: No bruits; no thyromegaly or nodules,  No JVD    Back: Normal spine flexure, No CVA tenderness, No deformity or limitation of movement    Respiratory: Breath Sounds equal & clear to auscultation, no accessory muscle use, ET tube in good position    Cardiovascular: tachycardic,     Gastrointestinal:open abd, abthera vac inplace    Extremities: No peripheral edema, No cyanosis, clubbing     Vascular: Equal and normal pulses: 2+ peripheral pulses throughout    Musculoskeletal: No joint pain, swelling or deformity; no limitation of movement    Skin: No rashes    LABS:  CBC Full  -  ( 09 May 2017 11:09 )  WBC Count : 25.9 K/uL  Hemoglobin : 12.2 g/dL  Hematocrit : 35.8 %  Platelet Count - Automated : 268 K/uL  Mean Cell Volume : 89.7 fl  Mean Cell Hemoglobin : 30.6 pg  Mean Cell Hemoglobin Concentration : 34.1 g/dL  Auto Neutrophil # : 21.2 K/uL  Auto Lymphocyte # : 1.9 K/uL  Auto Monocyte # : 2.2 K/uL  Auto Eosinophil # : 0.0 K/uL  Auto Basophil # : x  Auto Neutrophil % : 76.0 %  Auto Lymphocyte % : 7.0 %  Auto Monocyte % : 8.0 %  Auto Eosinophil % : x  Auto Basophil % : x    05-09    136  |  94<L>  |  17.0  ----------------------------<  214<H>  3.8   |  27.0  |  0.77    Ca    8.5<L>      09 May 2017 11:56  Phos  4.2     05-08  Mg     2.4     05-08    TPro  5.1<L>  /  Alb  2.2<L>  /  TBili  4.6<H>  /  DBili  x   /  AST  113<H>  /  ALT  58<H>  /  AlkPhos  224<H>  05-09    PT/INR - ( 09 May 2017 11:09 )   PT: 16.5 sec;   INR: 1.49 ratio         PTT - ( 09 May 2017 11:09 )  PTT:24.6 sec      Assessment and Plan:    Neuro: . Monitor for delirium.  Continue to optimize pain control. Serial Neurologic assessments. ativan for sedation fentanyl for pain     HEENT: no issues    CV: Continue hemodynamic monitoring, on epi / dobu / vaso . added PA catheter    Pulm: Pulmonary toilet.  ET tube in place, Vent management    GI/Nutrition: Bowel Regimen, npo, plan for re exploration tomorrow.     /Renal: monitor UOP. Monitor BMP.  Replete Lytes as needed      HEME- DVT prophylaxis, SCDs    ID: change to merrem / fluconazole     Lines/Tubes: keep central access    Endo:     Skin:     Dispo: sicu ,critical

## 2017-05-09 NOTE — PROCEDURE NOTE - ADDITIONAL PROCEDURE DETAILS
Patient is septic, in the OR.  Need for central venous monitoring and swan que catheter placement was needed.  Right neck prepped and draped in maximal sterile manner.  Ultrasound was used to visualize the right internal jugular vein, which was entered with a needle and venous blood withdrawn.  A guidewire was inserted without difficulty.  A nick was made with #11 blade scalpel and cordis introducer exchanged over the guidewire.  The line was flushed and sutured in place with silk suture.  Patient was taken to SICU where [place] Que catheter was placed. Patient is septic, in the OR.  Need for central venous monitoring and swan que catheter placement was needed.  Right neck prepped and draped in maximal sterile manner.  Ultrasound was used to visualize the right internal jugular vein, which was entered with a needle and venous blood withdrawn.  A guidewire was inserted without difficulty.  A nick was made with #11 blade scalpel and cordis introducer exchanged over the guidewire.  The line was flushed and sutured in place with silk suture.  Patient was taken to SICU where [place] Que catheter was placed.  Confirmatory CXR shows line in good position without pneumothorax.

## 2017-05-10 LAB
ALBUMIN SERPL ELPH-MCNC: 2 G/DL — LOW (ref 3.3–5.2)
ALBUMIN SERPL ELPH-MCNC: 2.3 G/DL — LOW (ref 3.3–5.2)
ALP SERPL-CCNC: 166 U/L — HIGH (ref 40–120)
ALP SERPL-CCNC: 195 U/L — HIGH (ref 40–120)
ALT FLD-CCNC: 40 U/L — SIGNIFICANT CHANGE UP
ALT FLD-CCNC: 49 U/L — HIGH
ANION GAP SERPL CALC-SCNC: 11 MMOL/L — SIGNIFICANT CHANGE UP (ref 5–17)
ANION GAP SERPL CALC-SCNC: 12 MMOL/L — SIGNIFICANT CHANGE UP (ref 5–17)
ANION GAP SERPL CALC-SCNC: 13 MMOL/L — SIGNIFICANT CHANGE UP (ref 5–17)
ANISOCYTOSIS BLD QL: SLIGHT — SIGNIFICANT CHANGE UP
AST SERPL-CCNC: 51 U/L — HIGH
AST SERPL-CCNC: 88 U/L — HIGH
BASE EXCESS BLDV CALC-SCNC: 5.2 MMOL/L — HIGH (ref -3–3)
BASE EXCESS BLDV CALC-SCNC: 5.3 MMOL/L — HIGH (ref -3–3)
BASE EXCESS BLDV CALC-SCNC: 6.8 MMOL/L — HIGH (ref -3–3)
BASOPHILS # BLD AUTO: 0 K/UL — SIGNIFICANT CHANGE UP (ref 0–0.2)
BASOPHILS NFR BLD AUTO: 0.1 % — SIGNIFICANT CHANGE UP (ref 0–2)
BILIRUB SERPL-MCNC: 1.9 MG/DL — SIGNIFICANT CHANGE UP (ref 0.4–2)
BILIRUB SERPL-MCNC: 2.2 MG/DL — HIGH (ref 0.4–2)
BUN SERPL-MCNC: 12 MG/DL — SIGNIFICANT CHANGE UP (ref 8–20)
BUN SERPL-MCNC: 14 MG/DL — SIGNIFICANT CHANGE UP (ref 8–20)
BUN SERPL-MCNC: 16 MG/DL — SIGNIFICANT CHANGE UP (ref 8–20)
CALCIUM SERPL-MCNC: 7.6 MG/DL — LOW (ref 8.6–10.2)
CALCIUM SERPL-MCNC: 8.1 MG/DL — LOW (ref 8.6–10.2)
CALCIUM SERPL-MCNC: 8.1 MG/DL — LOW (ref 8.6–10.2)
CHLORIDE SERPL-SCNC: 94 MMOL/L — LOW (ref 98–107)
CHLORIDE SERPL-SCNC: 95 MMOL/L — LOW (ref 98–107)
CHLORIDE SERPL-SCNC: 95 MMOL/L — LOW (ref 98–107)
CHLORIDE UR-SCNC: 49 MMOL/L — SIGNIFICANT CHANGE UP
CO2 SERPL-SCNC: 28 MMOL/L — SIGNIFICANT CHANGE UP (ref 22–29)
CO2 SERPL-SCNC: 29 MMOL/L — SIGNIFICANT CHANGE UP (ref 22–29)
CO2 SERPL-SCNC: 30 MMOL/L — HIGH (ref 22–29)
CREAT ?TM UR-MCNC: 70 MG/DL — SIGNIFICANT CHANGE UP
CREAT SERPL-MCNC: 0.65 MG/DL — SIGNIFICANT CHANGE UP (ref 0.5–1.3)
CREAT SERPL-MCNC: 0.73 MG/DL — SIGNIFICANT CHANGE UP (ref 0.5–1.3)
CREAT SERPL-MCNC: 0.9 MG/DL — SIGNIFICANT CHANGE UP (ref 0.5–1.3)
EOSINOPHIL # BLD AUTO: 0 K/UL — SIGNIFICANT CHANGE UP (ref 0–0.5)
EOSINOPHIL NFR BLD AUTO: 0.1 % — SIGNIFICANT CHANGE UP (ref 0–5)
GAS PNL BLDA: SIGNIFICANT CHANGE UP
GAS PNL BLDV: SIGNIFICANT CHANGE UP
GLUCOSE SERPL-MCNC: 122 MG/DL — HIGH (ref 70–115)
GLUCOSE SERPL-MCNC: 147 MG/DL — HIGH (ref 70–115)
GLUCOSE SERPL-MCNC: 178 MG/DL — HIGH (ref 70–115)
HBA1C BLD-MCNC: 5.6 % — SIGNIFICANT CHANGE UP (ref 4–5.6)
HCO3 BLDV-SCNC: 28 MMOL/L — HIGH (ref 20–26)
HCO3 BLDV-SCNC: 28 MMOL/L — HIGH (ref 20–26)
HCO3 BLDV-SCNC: 30 MMOL/L — HIGH (ref 20–26)
HCT VFR BLD CALC: 33.8 % — LOW (ref 42–52)
HCT VFR BLD CALC: 34.8 % — LOW (ref 42–52)
HGB BLD-MCNC: 11.3 G/DL — LOW (ref 14–18)
HGB BLD-MCNC: 11.9 G/DL — LOW (ref 14–18)
HOROWITZ INDEX BLDV+IHG-RTO: SIGNIFICANT CHANGE UP
LYMPHOCYTES # BLD AUTO: 1 K/UL — SIGNIFICANT CHANGE UP (ref 1–4.8)
LYMPHOCYTES # BLD AUTO: 8 % — LOW (ref 20–55)
LYMPHOCYTES # BLD AUTO: 8.3 % — LOW (ref 20–55)
MACROCYTES BLD QL: SLIGHT — SIGNIFICANT CHANGE UP
MAGNESIUM SERPL-MCNC: 1.5 MG/DL — LOW (ref 1.8–2.5)
MAGNESIUM SERPL-MCNC: 2 MG/DL — SIGNIFICANT CHANGE UP (ref 1.8–2.5)
MAGNESIUM SERPL-MCNC: 2.5 MG/DL — SIGNIFICANT CHANGE UP (ref 1.8–2.5)
MCHC RBC-ENTMCNC: 29.5 PG — SIGNIFICANT CHANGE UP (ref 27–31)
MCHC RBC-ENTMCNC: 30.1 PG — SIGNIFICANT CHANGE UP (ref 27–31)
MCHC RBC-ENTMCNC: 33.4 G/DL — SIGNIFICANT CHANGE UP (ref 32–36)
MCHC RBC-ENTMCNC: 34.2 G/DL — SIGNIFICANT CHANGE UP (ref 32–36)
MCV RBC AUTO: 88.1 FL — SIGNIFICANT CHANGE UP (ref 80–94)
MCV RBC AUTO: 88.3 FL — SIGNIFICANT CHANGE UP (ref 80–94)
MONOCYTES # BLD AUTO: 1.1 K/UL — HIGH (ref 0–0.8)
MONOCYTES NFR BLD AUTO: 8.8 % — SIGNIFICANT CHANGE UP (ref 3–10)
MONOCYTES NFR BLD AUTO: 9 % — SIGNIFICANT CHANGE UP (ref 3–10)
MYELOCYTES NFR BLD: 1 % — HIGH (ref 0–0)
NEUTROPHILS # BLD AUTO: 10.4 K/UL — HIGH (ref 1.8–8)
NEUTROPHILS NFR BLD AUTO: 82 % — HIGH (ref 37–73)
NEUTROPHILS NFR BLD AUTO: 82.2 % — HIGH (ref 37–73)
OVALOCYTES BLD QL SMEAR: SLIGHT — SIGNIFICANT CHANGE UP
PCO2 BLDV: 43 MMHG — SIGNIFICANT CHANGE UP (ref 35–50)
PCO2 BLDV: 54 MMHG — HIGH (ref 35–50)
PCO2 BLDV: 58 MMHG — HIGH (ref 35–50)
PH BLDV: 7.35 — SIGNIFICANT CHANGE UP (ref 7.35–7.45)
PH BLDV: 7.38 — SIGNIFICANT CHANGE UP (ref 7.35–7.45)
PH BLDV: 7.47 — HIGH (ref 7.35–7.45)
PHOSPHATE SERPL-MCNC: 2.6 MG/DL — SIGNIFICANT CHANGE UP (ref 2.4–4.7)
PHOSPHATE SERPL-MCNC: 2.9 MG/DL — SIGNIFICANT CHANGE UP (ref 2.4–4.7)
PHOSPHATE SERPL-MCNC: 3.8 MG/DL — SIGNIFICANT CHANGE UP (ref 2.4–4.7)
PLAT MORPH BLD: NORMAL — SIGNIFICANT CHANGE UP
PLATELET # BLD AUTO: 230 K/UL — SIGNIFICANT CHANGE UP (ref 150–400)
PLATELET # BLD AUTO: 261 K/UL — SIGNIFICANT CHANGE UP (ref 150–400)
PO2 BLDV: 36 MMHG — SIGNIFICANT CHANGE UP (ref 25–45)
PO2 BLDV: 37 MMHG — SIGNIFICANT CHANGE UP (ref 25–45)
PO2 BLDV: 45 MMHG — SIGNIFICANT CHANGE UP (ref 25–45)
POIKILOCYTOSIS BLD QL AUTO: SLIGHT — SIGNIFICANT CHANGE UP
POTASSIUM SERPL-MCNC: 3.3 MMOL/L — LOW (ref 3.5–5.3)
POTASSIUM SERPL-MCNC: 3.5 MMOL/L — SIGNIFICANT CHANGE UP (ref 3.5–5.3)
POTASSIUM SERPL-MCNC: 3.8 MMOL/L — SIGNIFICANT CHANGE UP (ref 3.5–5.3)
POTASSIUM SERPL-SCNC: 3.3 MMOL/L — LOW (ref 3.5–5.3)
POTASSIUM SERPL-SCNC: 3.5 MMOL/L — SIGNIFICANT CHANGE UP (ref 3.5–5.3)
POTASSIUM SERPL-SCNC: 3.8 MMOL/L — SIGNIFICANT CHANGE UP (ref 3.5–5.3)
POTASSIUM UR-SCNC: 45 MMOL/L — SIGNIFICANT CHANGE UP
PROT SERPL-MCNC: 5.1 G/DL — LOW (ref 6.6–8.7)
PROT SERPL-MCNC: 5.4 G/DL — LOW (ref 6.6–8.7)
RBC # BLD: 3.83 M/UL — LOW (ref 4.6–6.2)
RBC # BLD: 3.95 M/UL — LOW (ref 4.6–6.2)
RBC # FLD: 14.2 % — SIGNIFICANT CHANGE UP (ref 11–15.6)
RBC # FLD: 14.3 % — SIGNIFICANT CHANGE UP (ref 11–15.6)
RBC BLD AUTO: ABNORMAL
SAO2 % BLDV: 64 % — LOW (ref 67–88)
SAO2 % BLDV: 65 % — LOW (ref 67–88)
SAO2 % BLDV: 73 % — SIGNIFICANT CHANGE UP (ref 67–88)
SODIUM SERPL-SCNC: 135 MMOL/L — SIGNIFICANT CHANGE UP (ref 135–145)
SODIUM SERPL-SCNC: 136 MMOL/L — SIGNIFICANT CHANGE UP (ref 135–145)
SODIUM SERPL-SCNC: 136 MMOL/L — SIGNIFICANT CHANGE UP (ref 135–145)
SODIUM UR-SCNC: 76 MMOL/L — SIGNIFICANT CHANGE UP
TROPONIN T SERPL-MCNC: 0.04 NG/ML — SIGNIFICANT CHANGE UP (ref 0–0.06)
WBC # BLD: 12.7 K/UL — HIGH (ref 4.8–10.8)
WBC # BLD: 14.8 K/UL — HIGH (ref 4.8–10.8)
WBC # FLD AUTO: 12.7 K/UL — HIGH (ref 4.8–10.8)
WBC # FLD AUTO: 14.8 K/UL — HIGH (ref 4.8–10.8)

## 2017-05-10 PROCEDURE — 74000: CPT | Mod: 26

## 2017-05-10 PROCEDURE — 49000 EXPLORATION OF ABDOMEN: CPT | Mod: 78

## 2017-05-10 PROCEDURE — 71010: CPT | Mod: 26,77

## 2017-05-10 PROCEDURE — 49000 EXPLORATION OF ABDOMEN: CPT | Mod: AS,78

## 2017-05-10 PROCEDURE — 71010: CPT | Mod: 26

## 2017-05-10 RX ORDER — INSULIN LISPRO 100/ML
VIAL (ML) SUBCUTANEOUS EVERY 4 HOURS
Qty: 0 | Refills: 0 | Status: DISCONTINUED | OUTPATIENT
Start: 2017-05-10 | End: 2017-05-11

## 2017-05-10 RX ORDER — SODIUM CHLORIDE 9 MG/ML
1000 INJECTION, SOLUTION INTRAVENOUS ONCE
Qty: 0 | Refills: 0 | Status: COMPLETED | OUTPATIENT
Start: 2017-05-10 | End: 2017-05-10

## 2017-05-10 RX ORDER — MAGNESIUM SULFATE 500 MG/ML
2 VIAL (ML) INJECTION
Qty: 0 | Refills: 0 | Status: COMPLETED | OUTPATIENT
Start: 2017-05-10 | End: 2017-05-10

## 2017-05-10 RX ORDER — SODIUM CHLORIDE 9 MG/ML
1000 INJECTION INTRAMUSCULAR; INTRAVENOUS; SUBCUTANEOUS ONCE
Qty: 0 | Refills: 0 | Status: COMPLETED | OUTPATIENT
Start: 2017-05-10 | End: 2017-05-10

## 2017-05-10 RX ORDER — POTASSIUM CHLORIDE 20 MEQ
20 PACKET (EA) ORAL
Qty: 0 | Refills: 0 | Status: COMPLETED | OUTPATIENT
Start: 2017-05-10 | End: 2017-05-10

## 2017-05-10 RX ORDER — FENTANYL CITRATE 50 UG/ML
100 INJECTION INTRAVENOUS ONCE
Qty: 0 | Refills: 0 | Status: DISCONTINUED | OUTPATIENT
Start: 2017-05-10 | End: 2017-05-10

## 2017-05-10 RX ORDER — POTASSIUM PHOSPHATE, MONOBASIC POTASSIUM PHOSPHATE, DIBASIC 236; 224 MG/ML; MG/ML
15 INJECTION, SOLUTION INTRAVENOUS ONCE
Qty: 0 | Refills: 0 | Status: COMPLETED | OUTPATIENT
Start: 2017-05-10 | End: 2017-05-10

## 2017-05-10 RX ADMIN — FLUCONAZOLE 100 MILLIGRAM(S): 150 TABLET ORAL at 12:45

## 2017-05-10 RX ADMIN — HEPARIN SODIUM 5000 UNIT(S): 5000 INJECTION INTRAVENOUS; SUBCUTANEOUS at 14:03

## 2017-05-10 RX ADMIN — Medication 100 MILLIEQUIVALENT(S): at 01:08

## 2017-05-10 RX ADMIN — PANTOPRAZOLE SODIUM 40 MILLIGRAM(S): 20 TABLET, DELAYED RELEASE ORAL at 06:14

## 2017-05-10 RX ADMIN — MEROPENEM 200 MILLIGRAM(S): 1 INJECTION INTRAVENOUS at 21:57

## 2017-05-10 RX ADMIN — PANTOPRAZOLE SODIUM 40 MILLIGRAM(S): 20 TABLET, DELAYED RELEASE ORAL at 18:26

## 2017-05-10 RX ADMIN — Medication 8.8 MICROGRAM(S)/KG/MIN: at 00:26

## 2017-05-10 RX ADMIN — SODIUM CHLORIDE 50 MILLILITER(S): 9 INJECTION, SOLUTION INTRAVENOUS at 23:22

## 2017-05-10 RX ADMIN — HEPARIN SODIUM 5000 UNIT(S): 5000 INJECTION INTRAVENOUS; SUBCUTANEOUS at 06:15

## 2017-05-10 RX ADMIN — FENTANYL CITRATE 100 MICROGRAM(S): 50 INJECTION INTRAVENOUS at 17:20

## 2017-05-10 RX ADMIN — Medication 50 GRAM(S): at 01:07

## 2017-05-10 RX ADMIN — Medication 100 MILLIEQUIVALENT(S): at 18:25

## 2017-05-10 RX ADMIN — HEPARIN SODIUM 5000 UNIT(S): 5000 INJECTION INTRAVENOUS; SUBCUTANEOUS at 21:58

## 2017-05-10 RX ADMIN — FENTANYL CITRATE 100 MICROGRAM(S): 50 INJECTION INTRAVENOUS at 17:11

## 2017-05-10 RX ADMIN — Medication 100 MILLIEQUIVALENT(S): at 14:03

## 2017-05-10 RX ADMIN — POTASSIUM PHOSPHATE, MONOBASIC POTASSIUM PHOSPHATE, DIBASIC 62.5 MILLIMOLE(S): 236; 224 INJECTION, SOLUTION INTRAVENOUS at 08:07

## 2017-05-10 RX ADMIN — MEROPENEM 200 MILLIGRAM(S): 1 INJECTION INTRAVENOUS at 06:14

## 2017-05-10 RX ADMIN — SODIUM CHLORIDE 2000 MILLILITER(S): 9 INJECTION, SOLUTION INTRAVENOUS at 08:15

## 2017-05-10 RX ADMIN — Medication 100 MILLIEQUIVALENT(S): at 02:09

## 2017-05-10 RX ADMIN — CHLORHEXIDINE GLUCONATE 15 MILLILITER(S): 213 SOLUTION TOPICAL at 06:14

## 2017-05-10 RX ADMIN — Medication 50 GRAM(S): at 02:09

## 2017-05-10 RX ADMIN — CHLORHEXIDINE GLUCONATE 15 MILLILITER(S): 213 SOLUTION TOPICAL at 18:25

## 2017-05-10 RX ADMIN — SODIUM CHLORIDE 1000 MILLILITER(S): 9 INJECTION INTRAMUSCULAR; INTRAVENOUS; SUBCUTANEOUS at 22:03

## 2017-05-10 RX ADMIN — Medication 100 MILLIEQUIVALENT(S): at 15:22

## 2017-05-10 RX ADMIN — MEROPENEM 200 MILLIGRAM(S): 1 INJECTION INTRAVENOUS at 14:03

## 2017-05-10 NOTE — DIETITIAN INITIAL EVALUATION ADULT. - ETIOLOGY
Related to recent lap carissa, post op Bile leak, exp lap with bile removal, cardiac arrest now on vent support

## 2017-05-10 NOTE — BRIEF OPERATIVE NOTE - OPERATION/FINDINGS
ercp: papillary stenosis and bile leak near cystic duct stump; s/p biliary sphincterotomy and placement of a 10 Fr by 7 cm plastic stent

## 2017-05-10 NOTE — PROGRESS NOTE ADULT - SUBJECTIVE AND OBJECTIVE BOX
surgeon pre-op note  the patient was admitted one week ago about 3 days post lap carissa. He had some vague abdominal complaints mostly describing constipation. On physical exam at that time he was mildly distended with no tenderness. We performed an abdominal ultrasound to see if a fluid collection was present and we saw none. We had planned MRCP if the US was positive but a pacer / AICD would have precluded this. His bladder seemed a bit distended and a montoya was placed. The patient had a normal WBC at that time. Over the ensuing 3 day period he did not improve and became more distended. On the third hospital day a CT scan was done that DID show free intra-abdominal fluid and we approached radiology about MRCP but were again counseled it was not possible due to pacer / AICD. A HIDA was done that then showed uptake of tracer but no entrance of same into GI tract with pooling in subhepatic space and lesser sac. We discussed with GI that an ERCP would be of benefit in identifying anatomy and possibly treating the post-operative bile leak. See ERCP note from Dr Lofton, stenosis of papilla identified. The patient became very unstable, an attempt at intubation resulted in esophageal intubation and cardiac arrest. This was rapidly recognized and corrected, a brief period of ACLS driven CPR occurred and ROSC was obtained. Due to concern about contribution of abdominal hypertension, compartment syndrome, laparotomy was performed. The patient was found to have 2 liters of bile in the abdomen and clips on cystic duct and artery were identified without obvious source of bile leakage. Due to clinical condition drain placed and abdomen left open. Over night marked improvement; decreased pressor requirement and increased wakefulness. Lactate cleared, no evidence of myocardial ischemia, MVO2 65%.     Today we plan return to OR, attempt to image biliary tree via intraoperative ERCP. If successful furhter plans dependent upon findings of that study. If not successful attempt intraoperative cholangiogram and subsequent treatment based upon findings. IF a bile duct injury is identified we do NOT plan to attempt definitive repair given high-risk time period (intermediate) and will focus on drainage / control. We will attempt to close abdomen.    All of the above discussed through  with patients wife at bedside.

## 2017-05-11 ENCOUNTER — TRANSCRIPTION ENCOUNTER (OUTPATIENT)
Age: 52
End: 2017-05-11

## 2017-05-11 DIAGNOSIS — A41.9 SEPSIS, UNSPECIFIED ORGANISM: ICD-10-CM

## 2017-05-11 DIAGNOSIS — J96.01 ACUTE RESPIRATORY FAILURE WITH HYPOXIA: ICD-10-CM

## 2017-05-11 DIAGNOSIS — I46.9 CARDIAC ARREST, CAUSE UNSPECIFIED: ICD-10-CM

## 2017-05-11 DIAGNOSIS — K91.89 OTHER POSTPROCEDURAL COMPLICATIONS AND DISORDERS OF DIGESTIVE SYSTEM: ICD-10-CM

## 2017-05-11 LAB
ALBUMIN SERPL ELPH-MCNC: 2.4 G/DL — LOW (ref 3.3–5.2)
ALP SERPL-CCNC: 162 U/L — HIGH (ref 40–120)
ALT FLD-CCNC: 34 U/L — SIGNIFICANT CHANGE UP
AMMONIA BLD-MCNC: 15 UMOL/L — SIGNIFICANT CHANGE UP (ref 11–55)
ANION GAP SERPL CALC-SCNC: 10 MMOL/L — SIGNIFICANT CHANGE UP (ref 5–17)
ANION GAP SERPL CALC-SCNC: 13 MMOL/L — SIGNIFICANT CHANGE UP (ref 5–17)
ANISOCYTOSIS BLD QL: SLIGHT — SIGNIFICANT CHANGE UP
APTT BLD: 27.3 SEC — LOW (ref 27.5–37.4)
AST SERPL-CCNC: 42 U/L — HIGH
BASE EXCESS BLDV CALC-SCNC: 5.8 MMOL/L — HIGH (ref -3–3)
BASE EXCESS BLDV CALC-SCNC: 7.2 MMOL/L — HIGH (ref -3–3)
BASOPHILS # BLD AUTO: 0 K/UL — SIGNIFICANT CHANGE UP (ref 0–0.2)
BASOPHILS NFR BLD AUTO: 0.1 % — SIGNIFICANT CHANGE UP (ref 0–2)
BILIRUB DIRECT SERPL-MCNC: 0.7 MG/DL — HIGH (ref 0–0.3)
BILIRUB INDIRECT FLD-MCNC: 0.6 MG/DL — SIGNIFICANT CHANGE UP (ref 0.2–1)
BILIRUB SERPL-MCNC: 1.3 MG/DL — SIGNIFICANT CHANGE UP (ref 0.4–2)
BLOOD GAS COMMENTS, VENOUS: SIGNIFICANT CHANGE UP
BUN SERPL-MCNC: 19 MG/DL — SIGNIFICANT CHANGE UP (ref 8–20)
BUN SERPL-MCNC: 22 MG/DL — HIGH (ref 8–20)
CA-I SERPL-SCNC: 1.22 MMOL/L — SIGNIFICANT CHANGE UP (ref 1.15–1.29)
CALCIUM SERPL-MCNC: 7.8 MG/DL — LOW (ref 8.6–10.2)
CALCIUM SERPL-MCNC: 8.3 MG/DL — LOW (ref 8.6–10.2)
CHLORIDE BLDV-SCNC: 99 MMOL/L — SIGNIFICANT CHANGE UP (ref 98–106)
CHLORIDE SERPL-SCNC: 98 MMOL/L — SIGNIFICANT CHANGE UP (ref 98–107)
CHLORIDE SERPL-SCNC: 98 MMOL/L — SIGNIFICANT CHANGE UP (ref 98–107)
CO2 SERPL-SCNC: 27 MMOL/L — SIGNIFICANT CHANGE UP (ref 22–29)
CO2 SERPL-SCNC: 30 MMOL/L — HIGH (ref 22–29)
CREAT SERPL-MCNC: 0.79 MG/DL — SIGNIFICANT CHANGE UP (ref 0.5–1.3)
CREAT SERPL-MCNC: 0.89 MG/DL — SIGNIFICANT CHANGE UP (ref 0.5–1.3)
GAS PNL BLDA: SIGNIFICANT CHANGE UP
GAS PNL BLDA: SIGNIFICANT CHANGE UP
GAS PNL BLDV: 134 MMOL/L — LOW (ref 136–146)
GAS PNL BLDV: SIGNIFICANT CHANGE UP
GLUCOSE BLDV-MCNC: 128 MG/DL — HIGH (ref 70–99)
GLUCOSE SERPL-MCNC: 124 MG/DL — HIGH (ref 70–115)
GLUCOSE SERPL-MCNC: 129 MG/DL — HIGH (ref 70–115)
HCO3 BLDV-SCNC: 29 MMOL/L — HIGH (ref 20–26)
HCO3 BLDV-SCNC: 30 MMOL/L — HIGH (ref 20–26)
HCT VFR BLD CALC: 32.9 % — LOW (ref 42–52)
HCT VFR BLD CALC: 35.9 % — LOW (ref 42–52)
HCT VFR BLDA CALC: 32 — LOW (ref 39–50)
HGB BLD CALC-MCNC: 10.5 G/DL — LOW (ref 13–17)
HGB BLD-MCNC: 10.6 G/DL — LOW (ref 14–18)
HGB BLD-MCNC: 11.6 G/DL — LOW (ref 14–18)
HOROWITZ INDEX BLDV+IHG-RTO: 40 — SIGNIFICANT CHANGE UP
INR BLD: 1.21 RATIO — HIGH (ref 0.88–1.16)
LACTATE BLDV-MCNC: 1.6 MMOL/L — SIGNIFICANT CHANGE UP (ref 0.5–2)
LYMPHOCYTES # BLD AUTO: 0.9 K/UL — LOW (ref 1–4.8)
LYMPHOCYTES # BLD AUTO: 3 % — LOW (ref 20–55)
LYMPHOCYTES # BLD AUTO: 5.9 % — LOW (ref 20–55)
MACROCYTES BLD QL: SLIGHT — SIGNIFICANT CHANGE UP
MAGNESIUM SERPL-MCNC: 2 MG/DL — SIGNIFICANT CHANGE UP (ref 1.6–2.6)
MAGNESIUM SERPL-MCNC: 2.2 MG/DL — SIGNIFICANT CHANGE UP (ref 1.8–2.5)
MCHC RBC-ENTMCNC: 29.6 PG — SIGNIFICANT CHANGE UP (ref 27–31)
MCHC RBC-ENTMCNC: 29.7 PG — SIGNIFICANT CHANGE UP (ref 27–31)
MCHC RBC-ENTMCNC: 32.2 G/DL — SIGNIFICANT CHANGE UP (ref 32–36)
MCHC RBC-ENTMCNC: 32.3 G/DL — SIGNIFICANT CHANGE UP (ref 32–36)
MCV RBC AUTO: 91.9 FL — SIGNIFICANT CHANGE UP (ref 80–94)
MCV RBC AUTO: 92.1 FL — SIGNIFICANT CHANGE UP (ref 80–94)
MONOCYTES # BLD AUTO: 1.3 K/UL — HIGH (ref 0–0.8)
MONOCYTES NFR BLD AUTO: 3 % — SIGNIFICANT CHANGE UP (ref 3–10)
MONOCYTES NFR BLD AUTO: 8.4 % — SIGNIFICANT CHANGE UP (ref 3–10)
NEUTROPHILS # BLD AUTO: 13.3 K/UL — HIGH (ref 1.8–8)
NEUTROPHILS NFR BLD AUTO: 85.3 % — HIGH (ref 37–73)
NEUTROPHILS NFR BLD AUTO: 94 % — HIGH (ref 37–73)
NRBC # BLD: 1 /100 — HIGH (ref 0–0)
OTHER CELLS CSF MANUAL: 10 ML/DL — LOW (ref 18–22)
OVALOCYTES BLD QL SMEAR: SLIGHT — SIGNIFICANT CHANGE UP
PCO2 BLDV: 46 MMHG — SIGNIFICANT CHANGE UP (ref 35–50)
PCO2 BLDV: 50 MMHG — SIGNIFICANT CHANGE UP (ref 35–50)
PH BLDV: 7.43 — SIGNIFICANT CHANGE UP (ref 7.35–7.45)
PH BLDV: 7.43 — SIGNIFICANT CHANGE UP (ref 7.35–7.45)
PHOSPHATE SERPL-MCNC: 2.7 MG/DL — SIGNIFICANT CHANGE UP (ref 2.4–4.7)
PHOSPHATE SERPL-MCNC: 3.7 MG/DL — SIGNIFICANT CHANGE UP (ref 2.4–4.7)
PLAT MORPH BLD: NORMAL — SIGNIFICANT CHANGE UP
PLATELET # BLD AUTO: 250 K/UL — SIGNIFICANT CHANGE UP (ref 150–400)
PLATELET # BLD AUTO: 281 K/UL — SIGNIFICANT CHANGE UP (ref 150–400)
PO2 BLDV: 38 MMHG — SIGNIFICANT CHANGE UP (ref 25–45)
PO2 BLDV: 45 MMHG — SIGNIFICANT CHANGE UP (ref 25–45)
POIKILOCYTOSIS BLD QL AUTO: SLIGHT — SIGNIFICANT CHANGE UP
POTASSIUM BLDV-SCNC: 4.3 MMOL/L — SIGNIFICANT CHANGE UP (ref 3.4–4.5)
POTASSIUM SERPL-MCNC: 4.4 MMOL/L — SIGNIFICANT CHANGE UP (ref 3.5–5.3)
POTASSIUM SERPL-MCNC: 4.9 MMOL/L — SIGNIFICANT CHANGE UP (ref 3.5–5.3)
POTASSIUM SERPL-SCNC: 4.4 MMOL/L — SIGNIFICANT CHANGE UP (ref 3.5–5.3)
POTASSIUM SERPL-SCNC: 4.9 MMOL/L — SIGNIFICANT CHANGE UP (ref 3.5–5.3)
PROT SERPL-MCNC: 5.7 G/DL — LOW (ref 6.6–8.7)
PROTHROM AB SERPL-ACNC: 13.4 SEC — HIGH (ref 9.8–12.7)
RBC # BLD: 3.58 M/UL — LOW (ref 4.6–6.2)
RBC # BLD: 3.9 M/UL — LOW (ref 4.6–6.2)
RBC # FLD: 14.4 % — SIGNIFICANT CHANGE UP (ref 11–15.6)
RBC # FLD: 14.8 % — SIGNIFICANT CHANGE UP (ref 11–15.6)
RBC BLD AUTO: ABNORMAL
SAO2 % BLDV: 69 % — SIGNIFICANT CHANGE UP (ref 67–88)
SAO2 % BLDV: 78 % — SIGNIFICANT CHANGE UP (ref 67–88)
SODIUM SERPL-SCNC: 138 MMOL/L — SIGNIFICANT CHANGE UP (ref 135–145)
SODIUM SERPL-SCNC: 138 MMOL/L — SIGNIFICANT CHANGE UP (ref 135–145)
WBC # BLD: 13.8 K/UL — HIGH (ref 4.8–10.8)
WBC # BLD: 15.6 K/UL — HIGH (ref 4.8–10.8)
WBC # FLD AUTO: 13.8 K/UL — HIGH (ref 4.8–10.8)
WBC # FLD AUTO: 15.6 K/UL — HIGH (ref 4.8–10.8)

## 2017-05-11 PROCEDURE — 99291 CRITICAL CARE FIRST HOUR: CPT

## 2017-05-11 PROCEDURE — 71010: CPT | Mod: 26

## 2017-05-11 RX ORDER — SODIUM BICARBONATE 1 MEQ/ML
50 SYRINGE (ML) INTRAVENOUS ONCE
Qty: 0 | Refills: 0 | Status: COMPLETED | OUTPATIENT
Start: 2017-05-11 | End: 2017-05-11

## 2017-05-11 RX ORDER — CALCIUM CHLORIDE
1000 POWDER (GRAM) MISCELLANEOUS ONCE
Qty: 0 | Refills: 0 | Status: COMPLETED | OUTPATIENT
Start: 2017-05-11 | End: 2017-05-11

## 2017-05-11 RX ORDER — MAGNESIUM SULFATE 500 MG/ML
1 VIAL (ML) INJECTION ONCE
Qty: 0 | Refills: 0 | Status: COMPLETED | OUTPATIENT
Start: 2017-05-11 | End: 2017-05-11

## 2017-05-11 RX ORDER — AMIODARONE HYDROCHLORIDE 400 MG/1
0.5 TABLET ORAL
Qty: 900 | Refills: 0 | Status: DISCONTINUED | OUTPATIENT
Start: 2017-05-11 | End: 2017-05-12

## 2017-05-11 RX ORDER — AMIODARONE HYDROCHLORIDE 400 MG/1
150 TABLET ORAL ONCE
Qty: 0 | Refills: 0 | Status: COMPLETED | OUTPATIENT
Start: 2017-05-11 | End: 2017-05-11

## 2017-05-11 RX ORDER — SODIUM CHLORIDE 9 MG/ML
250 INJECTION, SOLUTION INTRAVENOUS ONCE
Qty: 0 | Refills: 0 | Status: COMPLETED | OUTPATIENT
Start: 2017-05-11 | End: 2017-05-11

## 2017-05-11 RX ORDER — DOBUTAMINE HCL 250MG/20ML
2.5 VIAL (ML) INTRAVENOUS
Qty: 500 | Refills: 0 | Status: DISCONTINUED | OUTPATIENT
Start: 2017-05-11 | End: 2017-05-12

## 2017-05-11 RX ORDER — PROPOFOL 10 MG/ML
10 INJECTION, EMULSION INTRAVENOUS
Qty: 1000 | Refills: 0 | Status: DISCONTINUED | OUTPATIENT
Start: 2017-05-11 | End: 2017-05-12

## 2017-05-11 RX ORDER — FENTANYL CITRATE 50 UG/ML
50 INJECTION INTRAVENOUS ONCE
Qty: 0 | Refills: 0 | Status: DISCONTINUED | OUTPATIENT
Start: 2017-05-11 | End: 2017-05-11

## 2017-05-11 RX ORDER — AMIODARONE HYDROCHLORIDE 400 MG/1
200 TABLET ORAL DAILY
Qty: 0 | Refills: 0 | Status: DISCONTINUED | OUTPATIENT
Start: 2017-05-11 | End: 2017-05-11

## 2017-05-11 RX ORDER — MEROPENEM 1 G/30ML
1000 INJECTION INTRAVENOUS EVERY 8 HOURS
Qty: 0 | Refills: 0 | Status: DISCONTINUED | OUTPATIENT
Start: 2017-05-11 | End: 2017-05-13

## 2017-05-11 RX ADMIN — SODIUM CHLORIDE 50 MILLILITER(S): 9 INJECTION, SOLUTION INTRAVENOUS at 14:29

## 2017-05-11 RX ADMIN — MEROPENEM 200 MILLIGRAM(S): 1 INJECTION INTRAVENOUS at 05:02

## 2017-05-11 RX ADMIN — FENTANYL CITRATE 50 MICROGRAM(S): 50 INJECTION INTRAVENOUS at 12:20

## 2017-05-11 RX ADMIN — HEPARIN SODIUM 5000 UNIT(S): 5000 INJECTION INTRAVENOUS; SUBCUTANEOUS at 21:43

## 2017-05-11 RX ADMIN — MEROPENEM 200 MILLIGRAM(S): 1 INJECTION INTRAVENOUS at 13:34

## 2017-05-11 RX ADMIN — FENTANYL CITRATE 4.7 MICROGRAM(S)/KG/HR: 50 INJECTION INTRAVENOUS at 00:45

## 2017-05-11 RX ADMIN — PROPOFOL 5.63 MICROGRAM(S)/KG/MIN: 10 INJECTION, EMULSION INTRAVENOUS at 12:00

## 2017-05-11 RX ADMIN — Medication 1000 MILLIGRAM(S): at 18:41

## 2017-05-11 RX ADMIN — HEPARIN SODIUM 5000 UNIT(S): 5000 INJECTION INTRAVENOUS; SUBCUTANEOUS at 13:33

## 2017-05-11 RX ADMIN — PANTOPRAZOLE SODIUM 40 MILLIGRAM(S): 20 TABLET, DELAYED RELEASE ORAL at 17:20

## 2017-05-11 RX ADMIN — HEPARIN SODIUM 5000 UNIT(S): 5000 INJECTION INTRAVENOUS; SUBCUTANEOUS at 05:02

## 2017-05-11 RX ADMIN — CHLORHEXIDINE GLUCONATE 15 MILLILITER(S): 213 SOLUTION TOPICAL at 05:02

## 2017-05-11 RX ADMIN — Medication 100 GRAM(S): at 12:06

## 2017-05-11 RX ADMIN — Medication 7.04 MICROGRAM(S)/KG/MIN: at 09:50

## 2017-05-11 RX ADMIN — Medication 50 MILLIEQUIVALENT(S): at 18:40

## 2017-05-11 RX ADMIN — CHLORHEXIDINE GLUCONATE 15 MILLILITER(S): 213 SOLUTION TOPICAL at 17:19

## 2017-05-11 RX ADMIN — AMIODARONE HYDROCHLORIDE 618 MILLIGRAM(S): 400 TABLET ORAL at 18:42

## 2017-05-11 RX ADMIN — PANTOPRAZOLE SODIUM 40 MILLIGRAM(S): 20 TABLET, DELAYED RELEASE ORAL at 05:02

## 2017-05-11 RX ADMIN — FENTANYL CITRATE 50 MICROGRAM(S): 50 INJECTION INTRAVENOUS at 12:03

## 2017-05-11 RX ADMIN — AMIODARONE HYDROCHLORIDE 16.67 MG/MIN: 400 TABLET ORAL at 19:33

## 2017-05-11 RX ADMIN — SODIUM CHLORIDE 750 MILLILITER(S): 9 INJECTION, SOLUTION INTRAVENOUS at 09:30

## 2017-05-11 RX ADMIN — AMIODARONE HYDROCHLORIDE 16.67 MG/MIN: 400 TABLET ORAL at 08:49

## 2017-05-11 RX ADMIN — AMIODARONE HYDROCHLORIDE 200 MILLIGRAM(S): 400 TABLET ORAL at 11:28

## 2017-05-11 RX ADMIN — MEROPENEM 200 MILLIGRAM(S): 1 INJECTION INTRAVENOUS at 21:43

## 2017-05-11 NOTE — PROGRESS NOTE ADULT - ASSESSMENT
IMPRESSION:  The patient is a 51 year old male with significant cardiovascular history including atrial fibrillation and cardiomyopathy s/p laparoscopic cholecystectomy POD #2. GI f/u for bile leak.   - first ercp 5/9/17: papilla appeared to be stenosed. During attempts at biliary cannulation, patient developed hypotension. Procedure aborted. Patient coded. Intubated. Patient resuscitated. ACS team took over, and performed exploratory laparotomy.  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 Off vasopressors now. still intubated      PLAN:  NPO  IV antibiotics  SICU monitoring  monitor cbc, serum lytes, and LFts  d/w surgery team  will f/u

## 2017-05-11 NOTE — PROGRESS NOTE ADULT - SUBJECTIVE AND OBJECTIVE BOX
Anesthesia post op note:    POD #2, S/P incomplete ERCP and Exploratory laparotomy on May 9th. Procedure initiated under sedation.     Patient developed severe hypotension during the procedure. BP not responding to 1 mg epinephrine IVP. Patient developed bradycardia and progressed to PEA. CPR started, patient intubated with low end tidal CO2 (<8) noticed.  ETT removed. Patient reintubated, still low end tidal CO2 detected (<8). ? due to low blood pressure and decreased lung perfusion. With continued CPR and epinephrine 1 mg IVP x2. BP increased and end tidal CO2 detected. An endoscopy performed by the GI attending after intubation revealed trace amount of air in the stomach.     Patient maintained on epinephrine and vasopressin IV drips until transfer to ICU.    Currently, patient remain intubated, sedated with IV propofol and fentanyl drips. Vital signs stable.

## 2017-05-11 NOTE — PROGRESS NOTE ADULT - ATTENDING COMMENTS
Pt seen and examined.      Remains poorly responsive.      No pressors.  Dobutamine titrated off this AM.  UOP decreased since.  SVO2 also decreased now.  Will restart as fluid challenges did not help.      Turn fentanyl off and monitor for neurologic state.  has not woken up yet.      Attempt CPAP/PS.  Follow paco2 to determine if spontaneous MV sufficient.  Given resolution of apparent sepsis and OR findings of ductal, not bowel injury, will stop antifungal coverage.  Plan for 4 days of abx from closure if no signs of infection.    CC time 45 min

## 2017-05-11 NOTE — PROGRESS NOTE ADULT - SUBJECTIVE AND OBJECTIVE BOX
INTERVAL HPI/OVERNIGHT EVENTS:  Patient seen and examined    MEDICATIONS  (STANDING):  heparin  Injectable 5000Unit(s) SubCutaneous every 8 hours  multiple electrolytes Injection Type 1 1000milliLiter(s) IV Continuous <Continuous>  pantoprazole  Injectable 40milliGRAM(s) IV Push every 12 hours  chlorhexidine 0.12% Liquid 15milliLiter(s) Swish and Spit two times a day  dextrose 5%. 1000milliLiter(s) IV Continuous <Continuous>  dextrose 50% Injectable 12.5Gram(s) IV Push once  dextrose 50% Injectable 25Gram(s) IV Push once  dextrose 50% Injectable 25Gram(s) IV Push once  DOBUTamine Infusion 2.5MICROgram(s)/kG/Min IV Continuous <Continuous>  amiodarone    Tablet 200milliGRAM(s) Oral daily  meropenem IVPB 1000milliGRAM(s) IV Intermittent every 8 hours  propofol Infusion 10MICROgram(s)/kG/Min IV Continuous <Continuous>  calcium chloride Injectable 1000milliGRAM(s) IV Push once  sodium bicarbonate  Injectable 50milliEquivalent(s) IV Push once    MEDICATIONS  (PRN):  dextrose Gel 1Dose(s) Oral once PRN Blood Glucose LESS THAN 70 milliGRAM(s)/deciliter  glucagon  Injectable 1milliGRAM(s) IntraMuscular once PRN Glucose LESS THAN 70 milligrams/deciliter      Allergies    No Known Allergies    Intolerances        Vital Signs Last 24 Hrs  T(C): 37.1, Max: 37.9 (05-11 @ 04:00)  T(F): 98.8, Max: 100.2 (05-11 @ 04:00)  HR: 88 (83 - 125)  BP: 123/71 (123/71 - 132/94)  BP(mean): 90 (90 - 108)  RR: 14 (11 - 16)  SpO2: 100% (98% - 100%)    PHYSICAL EXAM:  General: NAD.  CVS: S1, S2  Chest: air entry bilaterally present  Abd: BS present, soft, non-tender      LABS:                        10.6   15.6  )-----------( 250      ( 11 May 2017 18:34 )             32.9     05-11    138  |  98  |  19.0  ----------------------------<  124<H>  4.9   |  27.0  |  0.89    Ca    8.3<L>      11 May 2017 06:12  Phos  3.7     05-11  Mg     2.2     05-11    TPro  5.7<L>  /  Alb  2.4<L>  /  TBili  1.3  /  DBili  0.7<H>  /  AST  42<H>  /  ALT  34  /  AlkPhos  162<H>  05-11    PT/INR - ( 11 May 2017 06:12 )   PT: 13.4 sec;   INR: 1.21 ratio         PTT - ( 11 May 2017 06:12 )  PTT:27.3 sec    RADIOLOGY & ADDITIONAL TESTS:

## 2017-05-11 NOTE — PROGRESS NOTE ADULT - SUBJECTIVE AND OBJECTIVE BOX
INTERVAL HPI/OVERNIGHT EVENTS/SUBJECTIVE:Taken to OR with GI and Surgery for Stent placement and sphincterotomy. Tolerated well. Off pressors and inotropes. [place] d/c. Cordis removed. Remains on vent. Afebrile. Difficult to arouse. BOlus overnight for presumed contraction alkalosis    ICU Vital Signs Last 24 Hrs  T(C): 37.8, Max: 37.8 (05-11 @ 00:00)  T(F): 100, Max: 100 (05-11 @ 00:00)  HR: 85 (71 - 103)  BP: 132/94 (132/94 - 132/94)  BP(mean): 108 (108 - 108)  ABP: 132/87 (103/61 - 144/94)  ABP(mean): 100 (70 - 109)  RR: 11 (11 - 20)  SpO2: 99% (98% - 100%)      I&O's Detail  I & Os for 24h ending 10 May 2017 07:00  =============================================  IN:    multiple electrolytes Injection Type 1: 1000 ml    Solution: 400 ml    EPINEPHrine Infusion: 295 ml    DOBUTamine Infusion: 279 ml    amiodarone Infusion: 266.4 ml    amiodarone Infusion: 200.4 ml    Solution: 200 ml    Solution: 200 ml    fentaNYL  Infusion: 103.6 ml    norepinephrine Infusion: 61.6 ml    vasopressin Infusion: 43.2 ml    DOBUTamine Infusion: 42 ml    fentaNYL  Infusion: 32 ml    DOBUTamine Infusion: 14 ml    Total IN: 3137.2 ml  ---------------------------------------------  OUT:    Indwelling Catheter - Urethral: 3475 ml    VAC (Vacuum Assisted Closure) System: 675 ml    Nasoenteral Tube: 70 ml    Bulb: 5 ml    Total OUT: 4225 ml  ---------------------------------------------  Total NET: -1087.8 ml    I & Os for current day (as of 11 May 2017 01:20)  =============================================  IN:    Lactated Ringers IV Bolus: 1000 ml    multiple electrolytes Injection Type 1: 850 ml    Solution: 575 ml    Solution: 300 ml    amiodarone Infusion: 250.5 ml    Solution: 100 ml    DOBUTamine Infusion: 42 ml    fentaNYL  Infusion: 39.2 ml    fentaNYL  Infusion: 37 ml    Total IN: 3193.7 ml  ---------------------------------------------  OUT:    Indwelling Catheter - Urethral: 1000 ml    Bulb: 50 ml    Total OUT: 1050 ml  ---------------------------------------------  Total NET: 2143.7 ml      Mode: SIMV (Synchronized Intermittent Mandatory Ventilation)  RR (machine): 6  TV (machine): 500  FiO2: 40  PEEP: 5  PS: 12  MAP: 7  PIP: 26    ABG - ( 10 May 2017 17:53 )  pH: 7.40  /  pCO2: 49    /  pO2: 116   / HCO3: 28    / Base Excess: 4.5   /  SaO2: 99        MEDICATIONS  (STANDING):  meropenem IVPB  IV Intermittent   fluconAZOLE IVPB  IV Intermittent   fluconAZOLE IVPB 400milliGRAM(s) IV Intermittent every 24 hours  meropenem IVPB 500milliGRAM(s) IV Intermittent every 8 hours  heparin  Injectable 5000Unit(s) SubCutaneous every 8 hours  multiple electrolytes Injection Type 1 1000milliLiter(s) IV Continuous <Continuous>  pantoprazole  Injectable 40milliGRAM(s) IV Push every 12 hours  chlorhexidine 0.12% Liquid 15milliLiter(s) Swish and Spit two times a day  fentaNYL   Infusion 1MICROgram(s)/kG/Hr IV Continuous <Continuous>  dextrose 5%. 1000milliLiter(s) IV Continuous <Continuous>  dextrose 50% Injectable 12.5Gram(s) IV Push once  dextrose 50% Injectable 25Gram(s) IV Push once  dextrose 50% Injectable 25Gram(s) IV Push once  amiodarone Infusion 0.5mG/Min IV Continuous <Continuous>  insulin lispro (HumaLOG) corrective regimen sliding scale  SubCutaneous every 4 hours    MEDICATIONS  (PRN):  dextrose Gel 1Dose(s) Oral once PRN Blood Glucose LESS THAN 70 milliGRAM(s)/deciliter  glucagon  Injectable 1milliGRAM(s) IntraMuscular once PRN Glucose LESS THAN 70 milligrams/deciliter      NUTRITION/IVF: NPO. Plasmalyte @ 75cc/hr    CENTRAL LINE: YES  LOCATION: [facility]  DATE INSERTED:  CVP:  12 SVO2:68%    MONTOYA:  YES  DATE INSERTED:    A-LINE:   YES  LOCATION:  Right Femoral  DATE INSERTED:   CO/CI: /2    CHEST TUBE:  LOCATION:  DATE INSERTED: OUTPUT/24 HRS:  SUCTION/WATER SEAL:     NG/OG TUBE: yes    MISC:     PHYSICAL EXAM:    Gen: intubated and sedated    Neurological: RASS -3    Pulmonary: Coarse BS Bilaterally    Cardiovascular: NSR     Gastrointestinal: Midline  incision dressing c/d/i, Drain in place.     Genitourinary:Montoya in place    Extremities: no cc/ce    Skin: intact      LABS:  CBC Full  -  ( 10 May 2017 12:31 )  WBC Count : 12.7 K/uL  Hemoglobin : 11.3 g/dL  Hematocrit : 33.8 %  Platelet Count - Automated : 230 K/uL  Mean Cell Volume : 88.3 fl  Mean Cell Hemoglobin : 29.5 pg  Mean Cell Hemoglobin Concentration : 33.4 g/dL  Auto Neutrophil # : 10.4 K/uL  Auto Lymphocyte # : 1.0 K/uL  Auto Monocyte # : 1.1 K/uL  Auto Eosinophil # : 0.0 K/uL  Auto Basophil # : 0.0 K/uL  Auto Neutrophil % : 82.2 %  Auto Lymphocyte % : 8.3 %  Auto Monocyte % : 8.8 %  Auto Eosinophil % : 0.1 %  Auto Basophil % : 0.1 %    05-10    136  |  95<L>  |  12.0  ----------------------------<  122<H>  3.3<L>   |  30.0<H>  |  0.65    Ca    7.6<L>      10 May 2017 12:31  Phos  2.6     05-10  Mg     2.0     05-10    TPro  5.1<L>  /  Alb  2.0<L>  /  TBili  1.9  /  DBili  x   /  AST  51<H>  /  ALT  40  /  AlkPhos  166<H>  05-10    PT/INR - ( 09 May 2017 23:45 )   PT: 15.2 sec;   INR: 1.37 ratio         PTT - ( 09 May 2017 23:45 )  PTT:27.7 sec    RECENT CULTURES:      LIVER FUNCTIONS - ( 10 May 2017 12:31 )  Alb: 2.0 g/dL / Pro: 5.1 g/dL / ALK PHOS: 166 U/L / ALT: 40 U/L / AST: 51 U/L / GGT: x           CARDIAC MARKERS ( 10 May 2017 12:31 )  x     / 0.04 ng/mL / x     / x     / x      CARDIAC MARKERS ( 09 May 2017 11:09 )  x     / 0.03 ng/mL / x     / x     / x          CAPILLARY BLOOD GLUCOSE  133 (10 May 2017 04:00)      RADIOLOGY & ADDITIONAL STUDIES:    ASSESSMENT/PLAN:  51yMale presenting with:    Neuro:d/c continuous narcotic infusion in hopes of extubating the patient. monitor mental status.    CV: observe off dobutamine. Noted extraction ratio worse with discontinuation, but meeting HD endpoitns    Pulm: SIMV at present. Transition to CPAP and extubate if meets criteria    GI/Nutrition: start enteral feeds this day. Monitor abdominal exam, drain output.     /Renal: maintain montoya for strict I&Os    ID: continue abx    Lines/Tubes: Maintain all lines and tubes at present time    Endo: No issue    Skin: intact    Proph: SQH    Dispo: continue SICU care. wean to extubate      CRITICAL CARE TIME SPENT: 45 min INTERVAL HPI/OVERNIGHT EVENTS/SUBJECTIVE:Taken to OR with GI and Surgery for Stent placement and sphincterotomy. Tolerated well. Off pressors and inotropes. [place] d/c. Cordis removed. Remains on vent. Afebrile. Difficult to arouse. BOlus overnight for presumed contraction alkalosis    ICU Vital Signs Last 24 Hrs  T(C): 37.8, Max: 37.8 (05-11 @ 00:00)  T(F): 100, Max: 100 (05-11 @ 00:00)  HR: 85 (71 - 103)  BP: 132/94 (132/94 - 132/94)  BP(mean): 108 (108 - 108)  ABP: 132/87 (103/61 - 144/94)  ABP(mean): 100 (70 - 109)  RR: 11 (11 - 20)  SpO2: 99% (98% - 100%)      I&O's Detail  I & Os for 24h ending 10 May 2017 07:00  =============================================  IN:    multiple electrolytes Injection Type 1: 1000 ml    Solution: 400 ml    EPINEPHrine Infusion: 295 ml    DOBUTamine Infusion: 279 ml    amiodarone Infusion: 266.4 ml    amiodarone Infusion: 200.4 ml    Solution: 200 ml    Solution: 200 ml    fentaNYL  Infusion: 103.6 ml    norepinephrine Infusion: 61.6 ml    vasopressin Infusion: 43.2 ml    DOBUTamine Infusion: 42 ml    fentaNYL  Infusion: 32 ml    DOBUTamine Infusion: 14 ml    Total IN: 3137.2 ml  ---------------------------------------------  OUT:    Indwelling Catheter - Urethral: 3475 ml    VAC (Vacuum Assisted Closure) System: 675 ml    Nasoenteral Tube: 70 ml    Bulb: 5 ml    Total OUT: 4225 ml  ---------------------------------------------  Total NET: -1087.8 ml    I & Os for current day (as of 11 May 2017 01:20)  =============================================  IN:    Lactated Ringers IV Bolus: 1000 ml    multiple electrolytes Injection Type 1: 850 ml    Solution: 575 ml    Solution: 300 ml    amiodarone Infusion: 250.5 ml    Solution: 100 ml    DOBUTamine Infusion: 42 ml    fentaNYL  Infusion: 39.2 ml    fentaNYL  Infusion: 37 ml    Total IN: 3193.7 ml  ---------------------------------------------  OUT:    Indwelling Catheter - Urethral: 1000 ml    Bulb: 50 ml    Total OUT: 1050 ml  ---------------------------------------------  Total NET: 2143.7 ml      Mode: SIMV (Synchronized Intermittent Mandatory Ventilation)  RR (machine): 6  TV (machine): 500  FiO2: 40  PEEP: 5  PS: 12  MAP: 7  PIP: 26    ABG - ( 10 May 2017 17:53 )  pH: 7.40  /  pCO2: 49    /  pO2: 116   / HCO3: 28    / Base Excess: 4.5   /  SaO2: 99    ABG - ( 11 May 2017 04:45 )  pH: 7.40  /  pCO2: 46    /  pO2: 114   / HCO3: 27    / Base Excess: 3.1   /  SaO2: 99                    MEDICATIONS  (STANDING):  meropenem IVPB  IV Intermittent   fluconAZOLE IVPB  IV Intermittent   fluconAZOLE IVPB 400milliGRAM(s) IV Intermittent every 24 hours  meropenem IVPB 500milliGRAM(s) IV Intermittent every 8 hours  heparin  Injectable 5000Unit(s) SubCutaneous every 8 hours  multiple electrolytes Injection Type 1 1000milliLiter(s) IV Continuous <Continuous>  pantoprazole  Injectable 40milliGRAM(s) IV Push every 12 hours  chlorhexidine 0.12% Liquid 15milliLiter(s) Swish and Spit two times a day  fentaNYL   Infusion 1MICROgram(s)/kG/Hr IV Continuous <Continuous>  dextrose 5%. 1000milliLiter(s) IV Continuous <Continuous>  dextrose 50% Injectable 12.5Gram(s) IV Push once  dextrose 50% Injectable 25Gram(s) IV Push once  dextrose 50% Injectable 25Gram(s) IV Push once  amiodarone Infusion 0.5mG/Min IV Continuous <Continuous>  insulin lispro (HumaLOG) corrective regimen sliding scale  SubCutaneous every 4 hours    MEDICATIONS  (PRN):  dextrose Gel 1Dose(s) Oral once PRN Blood Glucose LESS THAN 70 milliGRAM(s)/deciliter  glucagon  Injectable 1milliGRAM(s) IntraMuscular once PRN Glucose LESS THAN 70 milligrams/deciliter      NUTRITION/IVF: NPO. Plasmalyte @ 75cc/hr    CENTRAL LINE: YES  LOCATION: [facility]  DATE INSERTED:  CVP:  12 SVO2:68%    MONTOYA:  YES  DATE INSERTED:    A-LINE:   YES  LOCATION:  Right Femoral  DATE INSERTED:   CO/CI: /2    CHEST TUBE:  LOCATION:  DATE INSERTED: OUTPUT/24 HRS:  SUCTION/WATER SEAL:     NG/OG TUBE: yes    MISC:     PHYSICAL EXAM:    Gen: intubated and sedated    Neurological: RASS -3    Pulmonary: Coarse BS Bilaterally    Cardiovascular: NSR     Gastrointestinal: Midline  incision dressing c/d/i, Drain in place.     Genitourinary:Montoya in place    Extremities: no cc/ce    Skin: intact      LABS:  CBC Full  -  ( 10 May 2017 12:31 )  WBC Count : 12.7 K/uL  Hemoglobin : 11.3 g/dL  Hematocrit : 33.8 %  Platelet Count - Automated : 230 K/uL  Mean Cell Volume : 88.3 fl  Mean Cell Hemoglobin : 29.5 pg  Mean Cell Hemoglobin Concentration : 33.4 g/dL  Auto Neutrophil # : 10.4 K/uL  Auto Lymphocyte # : 1.0 K/uL  Auto Monocyte # : 1.1 K/uL  Auto Eosinophil # : 0.0 K/uL  Auto Basophil # : 0.0 K/uL  Auto Neutrophil % : 82.2 %  Auto Lymphocyte % : 8.3 %  Auto Monocyte % : 8.8 %  Auto Eosinophil % : 0.1 %  Auto Basophil % : 0.1 %  CBC Full  -  ( 11 May 2017 06:12 )  WBC Count : 13.8 K/uL  Hemoglobin : 11.6 g/dL  Hematocrit : 35.9 %  Platelet Count - Automated : 281 K/uL  Mean Cell Volume : 92.1 fl  Mean Cell Hemoglobin : 29.7 pg  Mean Cell Hemoglobin Concentration : 32.3 g/dL  Auto Neutrophil # : x  Auto Lymphocyte # : x  Auto Monocyte # : x  Auto Eosinophil # : x  Auto Basophil # : x  Auto Neutrophil % : 94.0 %  Auto Lymphocyte % : 3.0 %  Auto Monocyte % : 3.0 %  Auto Eosinophil % : x  Auto Basophil % : x      05-10    136  |  95<L>  |  12.0  ----------------------------<  122<H>  3.3<L>   |  30.0<H>  |  0.65    Ca    7.6<L>      10 May 2017 12:31  Phos  2.6     05-10  Mg     2.0     05-10  05-11    138  |  98  |  19.0  ----------------------------<  124<H>  4.9   |  27.0  |  0.89    Ca    8.3<L>      11 May 2017 06:12  Phos  3.7     05-11  Mg     2.2     05-11    TPro  5.7<L>  /  Alb  2.4<L>  /  TBili  1.3  /  DBili  0.7<H>  /  AST  42<H>  /  ALT  34  /  AlkPhos  162<H>  05-11    TPro  5.1<L>  /  Alb  2.0<L>  /  TBili  1.9  /  DBili  x   /  AST  51<H>  /  ALT  40  /  AlkPhos  166<H>  05-10    PT/INR - ( 09 May 2017 23:45 )   PT: 15.2 sec;   INR: 1.37 ratio         PTT - ( 09 May 2017 23:45 )  PTT:27.7 sec    RECENT CULTURES:      LIVER FUNCTIONS - ( 10 May 2017 12:31 )  Alb: 2.0 g/dL / Pro: 5.1 g/dL / ALK PHOS: 166 U/L / ALT: 40 U/L / AST: 51 U/L / GGT: x           CARDIAC MARKERS ( 10 May 2017 12:31 )  x     / 0.04 ng/mL / x     / x     / x      CARDIAC MARKERS ( 09 May 2017 11:09 )  x     / 0.03 ng/mL / x     / x     / x          CAPILLARY BLOOD GLUCOSE  133 (10 May 2017 04:00)      RADIOLOGY & ADDITIONAL STUDIES:    ASSESSMENT/PLAN:  51yMale presenting with:    Neuro:d/c continuous narcotic infusion in hopes of extubating the patient. monitor mental status.    CV: observe off dobutamine. Noted extraction ratio worse with discontinuation, but meeting HD endpoitns    Pulm: SIMV at present. Transition to CPAP and extubate if meets criteria    GI/Nutrition: start enteral feeds this day. Monitor abdominal exam, drain output.     /Renal: maintain montoya for strict I&Os    ID: continue abx    Lines/Tubes: Maintain all lines and tubes at present time    Endo: No issue    Skin: intact    Proph: SQH    Dispo: continue SICU care. wean to extubate      CRITICAL CARE TIME SPENT: 45 min

## 2017-05-12 DIAGNOSIS — I47.2 VENTRICULAR TACHYCARDIA: ICD-10-CM

## 2017-05-12 LAB
ALBUMIN SERPL ELPH-MCNC: 2.6 G/DL — LOW (ref 3.3–5.2)
ALP SERPL-CCNC: 172 U/L — HIGH (ref 40–120)
ALT FLD-CCNC: 30 U/L — SIGNIFICANT CHANGE UP
ANION GAP SERPL CALC-SCNC: 12 MMOL/L — SIGNIFICANT CHANGE UP (ref 5–17)
AST SERPL-CCNC: 39 U/L — SIGNIFICANT CHANGE UP
BILIRUB DIRECT SERPL-MCNC: 0.5 MG/DL — HIGH (ref 0–0.3)
BILIRUB INDIRECT FLD-MCNC: 0.7 MG/DL — SIGNIFICANT CHANGE UP (ref 0.2–1)
BILIRUB SERPL-MCNC: 1.2 MG/DL — SIGNIFICANT CHANGE UP (ref 0.4–2)
BUN SERPL-MCNC: 25 MG/DL — HIGH (ref 8–20)
CALCIUM SERPL-MCNC: 8.3 MG/DL — LOW (ref 8.6–10.2)
CHLORIDE SERPL-SCNC: 97 MMOL/L — LOW (ref 98–107)
CO2 SERPL-SCNC: 28 MMOL/L — SIGNIFICANT CHANGE UP (ref 22–29)
CREAT SERPL-MCNC: 0.85 MG/DL — SIGNIFICANT CHANGE UP (ref 0.5–1.3)
GAS PNL BLDA: SIGNIFICANT CHANGE UP
GLUCOSE SERPL-MCNC: 147 MG/DL — HIGH (ref 70–115)
HCT VFR BLD CALC: 35.8 % — LOW (ref 42–52)
HGB BLD-MCNC: 11.5 G/DL — LOW (ref 14–18)
LYMPHOCYTES # BLD AUTO: 1.6 K/UL — SIGNIFICANT CHANGE UP (ref 1–4.8)
LYMPHOCYTES # BLD AUTO: 10.1 % — LOW (ref 20–55)
MAGNESIUM SERPL-MCNC: 2 MG/DL — SIGNIFICANT CHANGE UP (ref 1.6–2.6)
MCHC RBC-ENTMCNC: 29.4 PG — SIGNIFICANT CHANGE UP (ref 27–31)
MCHC RBC-ENTMCNC: 32.1 G/DL — SIGNIFICANT CHANGE UP (ref 32–36)
MCV RBC AUTO: 91.6 FL — SIGNIFICANT CHANGE UP (ref 80–94)
MONOCYTES # BLD AUTO: 1.3 K/UL — HIGH (ref 0–0.8)
MONOCYTES NFR BLD AUTO: 8.2 % — SIGNIFICANT CHANGE UP (ref 3–10)
NEUTROPHILS # BLD AUTO: 12.9 K/UL — HIGH (ref 1.8–8)
NEUTROPHILS NFR BLD AUTO: 81.2 % — HIGH (ref 37–73)
PHOSPHATE SERPL-MCNC: 3.3 MG/DL — SIGNIFICANT CHANGE UP (ref 2.4–4.7)
PLATELET # BLD AUTO: 295 K/UL — SIGNIFICANT CHANGE UP (ref 150–400)
POTASSIUM SERPL-MCNC: 4.4 MMOL/L — SIGNIFICANT CHANGE UP (ref 3.5–5.3)
POTASSIUM SERPL-SCNC: 4.4 MMOL/L — SIGNIFICANT CHANGE UP (ref 3.5–5.3)
PROT SERPL-MCNC: 6 G/DL — LOW (ref 6.6–8.7)
RBC # BLD: 3.91 M/UL — LOW (ref 4.6–6.2)
RBC # FLD: 14.6 % — SIGNIFICANT CHANGE UP (ref 11–15.6)
SODIUM SERPL-SCNC: 137 MMOL/L — SIGNIFICANT CHANGE UP (ref 135–145)
WBC # BLD: 15.9 K/UL — HIGH (ref 4.8–10.8)
WBC # FLD AUTO: 15.9 K/UL — HIGH (ref 4.8–10.8)

## 2017-05-12 PROCEDURE — 71010: CPT | Mod: 26

## 2017-05-12 PROCEDURE — 70450 CT HEAD/BRAIN W/O DYE: CPT | Mod: 26

## 2017-05-12 PROCEDURE — 71010: CPT | Mod: 26,77

## 2017-05-12 PROCEDURE — 99291 CRITICAL CARE FIRST HOUR: CPT

## 2017-05-12 RX ORDER — FENTANYL CITRATE 50 UG/ML
50 INJECTION INTRAVENOUS ONCE
Qty: 0 | Refills: 0 | Status: DISCONTINUED | OUTPATIENT
Start: 2017-05-12 | End: 2017-05-12

## 2017-05-12 RX ORDER — FENTANYL CITRATE 50 UG/ML
100 INJECTION INTRAVENOUS ONCE
Qty: 0 | Refills: 0 | Status: DISCONTINUED | OUTPATIENT
Start: 2017-05-12 | End: 2017-05-12

## 2017-05-12 RX ORDER — AMIODARONE HYDROCHLORIDE 400 MG/1
0.5 TABLET ORAL
Qty: 900 | Refills: 0 | Status: DISCONTINUED | OUTPATIENT
Start: 2017-05-12 | End: 2017-05-14

## 2017-05-12 RX ORDER — METOPROLOL TARTRATE 50 MG
2.5 TABLET ORAL EVERY 6 HOURS
Qty: 0 | Refills: 0 | Status: DISCONTINUED | OUTPATIENT
Start: 2017-05-12 | End: 2017-05-16

## 2017-05-12 RX ORDER — DEXMEDETOMIDINE HYDROCHLORIDE IN 0.9% SODIUM CHLORIDE 4 UG/ML
0.5 INJECTION INTRAVENOUS
Qty: 200 | Refills: 0 | Status: DISCONTINUED | OUTPATIENT
Start: 2017-05-12 | End: 2017-05-12

## 2017-05-12 RX ORDER — METOPROLOL TARTRATE 50 MG
5 TABLET ORAL EVERY 6 HOURS
Qty: 0 | Refills: 0 | Status: DISCONTINUED | OUTPATIENT
Start: 2017-05-12 | End: 2017-05-12

## 2017-05-12 RX ORDER — ACETAMINOPHEN 500 MG
1000 TABLET ORAL ONCE
Qty: 0 | Refills: 0 | Status: COMPLETED | OUTPATIENT
Start: 2017-05-12 | End: 2017-05-12

## 2017-05-12 RX ORDER — MAGNESIUM SULFATE 500 MG/ML
2 VIAL (ML) INJECTION ONCE
Qty: 0 | Refills: 0 | Status: COMPLETED | OUTPATIENT
Start: 2017-05-12 | End: 2017-05-12

## 2017-05-12 RX ADMIN — FENTANYL CITRATE 50 MICROGRAM(S): 50 INJECTION INTRAVENOUS at 17:54

## 2017-05-12 RX ADMIN — CHLORHEXIDINE GLUCONATE 15 MILLILITER(S): 213 SOLUTION TOPICAL at 17:53

## 2017-05-12 RX ADMIN — MEROPENEM 200 MILLIGRAM(S): 1 INJECTION INTRAVENOUS at 21:44

## 2017-05-12 RX ADMIN — FENTANYL CITRATE 100 MICROGRAM(S): 50 INJECTION INTRAVENOUS at 09:58

## 2017-05-12 RX ADMIN — FENTANYL CITRATE 100 MICROGRAM(S): 50 INJECTION INTRAVENOUS at 10:10

## 2017-05-12 RX ADMIN — PANTOPRAZOLE SODIUM 40 MILLIGRAM(S): 20 TABLET, DELAYED RELEASE ORAL at 17:54

## 2017-05-12 RX ADMIN — DEXMEDETOMIDINE HYDROCHLORIDE IN 0.9% SODIUM CHLORIDE 11.74 MICROGRAM(S)/KG/HR: 4 INJECTION INTRAVENOUS at 08:31

## 2017-05-12 RX ADMIN — FENTANYL CITRATE 50 MICROGRAM(S): 50 INJECTION INTRAVENOUS at 17:47

## 2017-05-12 RX ADMIN — CHLORHEXIDINE GLUCONATE 15 MILLILITER(S): 213 SOLUTION TOPICAL at 06:23

## 2017-05-12 RX ADMIN — FENTANYL CITRATE 100 MICROGRAM(S): 50 INJECTION INTRAVENOUS at 23:00

## 2017-05-12 RX ADMIN — FENTANYL CITRATE 100 MICROGRAM(S): 50 INJECTION INTRAVENOUS at 22:26

## 2017-05-12 RX ADMIN — AMIODARONE HYDROCHLORIDE 16.67 MG/MIN: 400 TABLET ORAL at 21:50

## 2017-05-12 RX ADMIN — FENTANYL CITRATE 50 MICROGRAM(S): 50 INJECTION INTRAVENOUS at 08:28

## 2017-05-12 RX ADMIN — Medication 2.5 MILLIGRAM(S): at 17:55

## 2017-05-12 RX ADMIN — MEROPENEM 200 MILLIGRAM(S): 1 INJECTION INTRAVENOUS at 06:24

## 2017-05-12 RX ADMIN — HEPARIN SODIUM 5000 UNIT(S): 5000 INJECTION INTRAVENOUS; SUBCUTANEOUS at 14:19

## 2017-05-12 RX ADMIN — HEPARIN SODIUM 5000 UNIT(S): 5000 INJECTION INTRAVENOUS; SUBCUTANEOUS at 06:23

## 2017-05-12 RX ADMIN — MEROPENEM 200 MILLIGRAM(S): 1 INJECTION INTRAVENOUS at 14:20

## 2017-05-12 RX ADMIN — FENTANYL CITRATE 50 MICROGRAM(S): 50 INJECTION INTRAVENOUS at 17:32

## 2017-05-12 RX ADMIN — Medication 400 MILLIGRAM(S): at 18:42

## 2017-05-12 RX ADMIN — PROPOFOL 5.63 MICROGRAM(S)/KG/MIN: 10 INJECTION, EMULSION INTRAVENOUS at 08:14

## 2017-05-12 RX ADMIN — FENTANYL CITRATE 50 MICROGRAM(S): 50 INJECTION INTRAVENOUS at 08:13

## 2017-05-12 RX ADMIN — FENTANYL CITRATE 50 MICROGRAM(S): 50 INJECTION INTRAVENOUS at 18:06

## 2017-05-12 RX ADMIN — PANTOPRAZOLE SODIUM 40 MILLIGRAM(S): 20 TABLET, DELAYED RELEASE ORAL at 06:25

## 2017-05-12 RX ADMIN — Medication 50 GRAM(S): at 17:54

## 2017-05-12 RX ADMIN — HEPARIN SODIUM 5000 UNIT(S): 5000 INJECTION INTRAVENOUS; SUBCUTANEOUS at 21:44

## 2017-05-12 RX ADMIN — Medication 2.5 MILLIGRAM(S): at 23:35

## 2017-05-12 NOTE — PROGRESS NOTE ADULT - ASSESSMENT
ASSESSMENT/PLAN:  51yMale presenting with: Cardiac arrest SP ERCP leading to septic and worsened cardiogenic shock/ respiratory failure.  Increased Ectopy / V-Tach over past 24 hr.        Neurological: Will give PRN push analgesics or sedatives and avoid deeper sedation or drips. Head CT Non acute.  Will contact BIU when more purposeful      Pulmonary: Wean ventilator as tolerated.      Cardiovascular: Continue Amio drip given continued V-tach and improved ectopy after.  Contact [facility] again for interrogation.    Gastrointestinal: Will hold TF at this point.    Genitourinary: Continue [place] to monitor for signs of DI etc.    Heme: Continue SQH    ID: Continue Meropenum.    Skin: Avoid pressure ulcer.    Lines/ Tubes: Will transition Femoral A-line out to UE.    Dispo: Critical care as above.  Re-address goals of care with family.            CRITICAL CARE TIME SPENT: 60 min

## 2017-05-12 NOTE — PROGRESS NOTE ADULT - SUBJECTIVE AND OBJECTIVE BOX
INTERVAL HPI/OVERNIGHT EVENTS:  Patient seen and examined    MEDICATIONS  (STANDING):  heparin  Injectable 5000Unit(s) SubCutaneous every 8 hours  multiple electrolytes Injection Type 1 1000milliLiter(s) IV Continuous <Continuous>  pantoprazole  Injectable 40milliGRAM(s) IV Push every 12 hours  chlorhexidine 0.12% Liquid 15milliLiter(s) Swish and Spit two times a day  dextrose 5%. 1000milliLiter(s) IV Continuous <Continuous>  dextrose 50% Injectable 12.5Gram(s) IV Push once  dextrose 50% Injectable 25Gram(s) IV Push once  dextrose 50% Injectable 25Gram(s) IV Push once  meropenem IVPB 1000milliGRAM(s) IV Intermittent every 8 hours  propofol Infusion 10MICROgram(s)/kG/Min IV Continuous <Continuous>  amiodarone Infusion 0.5mG/Min IV Continuous <Continuous>  dexmedetomidine Infusion 0.5MICROgram(s)/kG/Hr IV Continuous <Continuous>    MEDICATIONS  (PRN):  dextrose Gel 1Dose(s) Oral once PRN Blood Glucose LESS THAN 70 milliGRAM(s)/deciliter  glucagon  Injectable 1milliGRAM(s) IntraMuscular once PRN Glucose LESS THAN 70 milligrams/deciliter      Allergies    No Known Allergies    Intolerances        Vital Signs Last 24 Hrs  T(C): 37.6, Max: 38.1 (05-12 @ 04:00)  T(F): 99.7, Max: 100.6 (05-12 @ 04:00)  HR: 96 (76 - 125)  BP: 131/93 (107/61 - 143/104)  BP(mean): 107 (79 - 111)  RR: 18 (11 - 18)  SpO2: 100% (98% - 100%)    PHYSICAL EXAM:  General: NAD.  CVS: S1, S2  Chest: air entry bilaterally present  Abd: BS present, soft, non-tender      LABS:                        11.5   15.9  )-----------( 295      ( 12 May 2017 04:37 )             35.8     05-12    137  |  97<L>  |  25.0<H>  ----------------------------<  147<H>  4.4   |  28.0  |  0.85    Ca    8.3<L>      12 May 2017 04:37  Phos  3.3     05-12  Mg     2.0     05-12    TPro  6.0<L>  /  Alb  2.6<L>  /  TBili  1.2  /  DBili  0.5<H>  /  AST  39  /  ALT  30  /  AlkPhos  172<H>  05-12    PT/INR - ( 11 May 2017 06:12 )   PT: 13.4 sec;   INR: 1.21 ratio         PTT - ( 11 May 2017 06:12 )  PTT:27.3 sec

## 2017-05-12 NOTE — PROGRESS NOTE ADULT - SUBJECTIVE AND OBJECTIVE BOX
INTERVAL HPI/OVERNIGHT EVENTS/SUBJECTIVE: On/off various forms of sedation such as Fentanyl then Propofol then Precidex but became hypotensive with Precedex and Propofol.  Pt had significantly increased runs of V-tach last night.  Dobutamine stopped and Amio load started again which improved runs. Still poor neuro function.  Only needs sedation for biting tube.    ICU Vital Signs Last 24 Hrs  T(C): 37.9, Max: 38.1 (05-12 @ 04:00)  T(F): 100.2, Max: 100.6 (05-12 @ 04:00)  HR: 82 (76 - 119)  BP: 106/82 (106/82 - 143/104)  BP(mean): 90 (79 - 111)  ABP: 132/80 (94/74 - 177/96)  ABP(mean): 95 (83 - 130)  RR: 18 (11 - 18)  SpO2: 100% (98% - 100%)      I&O's Detail  I & Os for 24h ending 12 May 2017 07:00  =============================================  IN:    multiple electrolytes Injection Type 1: 1200 ml    Solution: 300 ml    amiodarone Infusion: 200.4 ml    Oral Fluid: 120 ml    Pivot: 110 ml    Solution: 100 ml    amiodarone Infusion: 66.8 ml    DOBUTamine Infusion: 61.2 ml    Solution: 50 ml    propofol Infusion: 30.5 ml    fentaNYL  Infusion: 9.8 ml    Total IN: 2248.7 ml  ---------------------------------------------  OUT:    Indwelling Catheter - Urethral: 1085 ml    Bulb: 195 ml    Total OUT: 1280 ml  ---------------------------------------------  Total NET: 968.7 ml    I & Os for current day (as of 12 May 2017 15:02)  =============================================  IN:    multiple electrolytes Injection Type 1: 400 ml    amiodarone Infusion: 66.8 ml    amiodarone Infusion: 66.8 ml    propofol Infusion: 8.4 ml    dexmedetomidine Infusion: 7 ml    Total IN: 549 ml  ---------------------------------------------  OUT:    Indwelling Catheter - Urethral: 485 ml    Bulb: 350 ml    Total OUT: 835 ml  ---------------------------------------------  Total NET: -286 ml      Mode: AC/ CMV (Assist Control/ Continuous Mandatory Ventilation)  RR (machine): 6  TV (machine): 500  FiO2: 40  PEEP: 5  MAP: 9  PIP: 25    ABG - ( 12 May 2017 05:53 )  pH: 7.45  /  pCO2: 42    /  pO2: 127   / HCO3: 29    / Base Excess: 4.8   /  SaO2: 100                 MEDICATIONS  (STANDING):  heparin  Injectable 5000Unit(s) SubCutaneous every 8 hours  multiple electrolytes Injection Type 1 1000milliLiter(s) IV Continuous <Continuous>  pantoprazole  Injectable 40milliGRAM(s) IV Push every 12 hours  chlorhexidine 0.12% Liquid 15milliLiter(s) Swish and Spit two times a day  dextrose 5%. 1000milliLiter(s) IV Continuous <Continuous>  dextrose 50% Injectable 12.5Gram(s) IV Push once  dextrose 50% Injectable 25Gram(s) IV Push once  dextrose 50% Injectable 25Gram(s) IV Push once  meropenem IVPB 1000milliGRAM(s) IV Intermittent every 8 hours  dexmedetomidine Infusion 0.5MICROgram(s)/kG/Hr IV Continuous <Continuous>  amiodarone Infusion 0.5mG/Min IV Continuous <Continuous>    MEDICATIONS  (PRN):  dextrose Gel 1Dose(s) Oral once PRN Blood Glucose LESS THAN 70 milliGRAM(s)/deciliter  glucagon  Injectable 1milliGRAM(s) IntraMuscular once PRN Glucose LESS THAN 70 milligrams/deciliter      NUTRITION/IVF:     CENTRAL LINE:  LOCATION:   DATE INSERTED:  CVP:  SCVO2:    ARMAS:   DATE INSERTED:    A-LINE:    LOCATION:   DATE INSERTED:   SVV:  CO/CI:     CHEST TUBE:  LOCATION:  DATE INSERTED: OUTPUT/24 HRS:  SUCTION/WATER SEAL:     NG/OG TUBE:  DATE INSERTED:  OUTPUT/24 HRS:    MISC:     PHYSICAL EXAM:     Gen:NAD, Well appearing, No cyanosis, Pallor.  On Ventilator    Eyes: PERRL ~ 3mm, EOMI,     Neurological: A&O, GCS 4/1T/6, No focal defficit.     ENMT: Clear canals,    Neck: Supple. NT AT, FROM no pain.  No JVD. No meningeal signs    Pulmonary: NAD, CTA, = BL .  Minimal secreations.    Cardiovascular: RRR, S1, S2, No murmurs noted.    Gastrointestinal:ND, Soft, NT.    Genitourinary:     Back:     Extremities: NT, AT, Mild-Moderate 2+ pitting edema.  No erythema or palpable cord noted.  FROM, = 2+ pulses throughout.    Skin:     Musculoskeletal:          LABS:  CBC Full  -  ( 12 May 2017 04:37 )  WBC Count : 15.9 K/uL  Hemoglobin : 11.5 g/dL  Hematocrit : 35.8 %  Platelet Count - Automated : 295 K/uL  Mean Cell Volume : 91.6 fl  Mean Cell Hemoglobin : 29.4 pg  Mean Cell Hemoglobin Concentration : 32.1 g/dL  Auto Neutrophil # : 12.9 K/uL  Auto Lymphocyte # : 1.6 K/uL  Auto Monocyte # : 1.3 K/uL  Auto Eosinophil # : x  Auto Basophil # : x  Auto Neutrophil % : 81.2 %  Auto Lymphocyte % : 10.1 %  Auto Monocyte % : 8.2 %  Auto Eosinophil % : x  Auto Basophil % : x    05-12    137  |  97<L>  |  25.0<H>  ----------------------------<  147<H>  4.4   |  28.0  |  0.85    Ca    8.3<L>      12 May 2017 04:37  Phos  3.3     05-12  Mg     2.0     05-12    TPro  6.0<L>  /  Alb  2.6<L>  /  TBili  1.2  /  DBili  0.5<H>  /  AST  39  /  ALT  30  /  AlkPhos  172<H>  05-12    PT/INR - ( 11 May 2017 06:12 )   PT: 13.4 sec;   INR: 1.21 ratio         PTT - ( 11 May 2017 06:12 )  PTT:27.3 sec    RECENT CULTURES:  05-09 .Blood Blood XXXX XXXX   No growth at 48 hours    05-09 .Blood Blood XXXX XXXX   No growth at 48 hours        LIVER FUNCTIONS - ( 12 May 2017 04:37 )  Alb: 2.6 g/dL / Pro: 6.0 g/dL / ALK PHOS: 172 U/L / ALT: 30 U/L / AST: 39 U/L / GGT: x               CAPILLARY BLOOD GLUCOSE  131 (12 May 2017 12:00)  134 (12 May 2017 08:00)  144 (12 May 2017 04:00)  138 (12 May 2017 00:00)      RADIOLOGY & ADDITIONAL STUDIES:    ASSESSMENT/PLAN:  51yMale presenting with:        Neurological:    ENMT:    Neck:    Pulmonary:    Cardiovascular:    Gastrointestinal:    Genitourinary:    Heme:    ID:    Skin:    Lines/ Tubes:    Dispo:            CRITICAL CARE TIME SPENT: INTERVAL HPI/OVERNIGHT EVENTS/SUBJECTIVE: On/off various forms of sedation such as Fentanyl then Propofol then Precidex but became hypotensive with Precedex and Propofol.  Pt had significantly increased runs of V-tach last night.  Dobutamine stopped and Amio load started again which improved runs. Still poor neuro function.  Only needs sedation for biting tube.    ICU Vital Signs Last 24 Hrs  T(C): 37.9, Max: 38.1 (05-12 @ 04:00)  T(F): 100.2, Max: 100.6 (05-12 @ 04:00)  HR: 82 (76 - 119)  BP: 106/82 (106/82 - 143/104)  BP(mean): 90 (79 - 111)  ABP: 132/80 (94/74 - 177/96)  ABP(mean): 95 (83 - 130)  RR: 18 (11 - 18)  SpO2: 100% (98% - 100%)      I&O's Detail  I & Os for 24h ending 12 May 2017 07:00  =============================================  IN:    multiple electrolytes Injection Type 1: 1200 ml    Solution: 300 ml    amiodarone Infusion: 200.4 ml    Oral Fluid: 120 ml    Pivot: 110 ml    Solution: 100 ml    amiodarone Infusion: 66.8 ml    DOBUTamine Infusion: 61.2 ml    Solution: 50 ml    propofol Infusion: 30.5 ml    fentaNYL  Infusion: 9.8 ml    Total IN: 2248.7 ml  ---------------------------------------------  OUT:    Indwelling Catheter - Urethral: 1085 ml    Bulb: 195 ml    Total OUT: 1280 ml  ---------------------------------------------  Total NET: 968.7 ml    I & Os for current day (as of 12 May 2017 15:02)  =============================================  IN:    multiple electrolytes Injection Type 1: 400 ml    amiodarone Infusion: 66.8 ml    amiodarone Infusion: 66.8 ml    propofol Infusion: 8.4 ml    dexmedetomidine Infusion: 7 ml    Total IN: 549 ml  ---------------------------------------------  OUT:    Indwelling Catheter - Urethral: 485 ml    Bulb: 350 ml    Total OUT: 835 ml  ---------------------------------------------  Total NET: -286 ml      Mode: AC/ CMV (Assist Control/ Continuous Mandatory Ventilation)  RR (machine): 6  TV (machine): 500  FiO2: 40  PEEP: 5  MAP: 9  PIP: 25    ABG - ( 12 May 2017 05:53 )  pH: 7.45  /  pCO2: 42    /  pO2: 127   / HCO3: 29    / Base Excess: 4.8   /  SaO2: 100                 MEDICATIONS  (STANDING):  heparin  Injectable 5000Unit(s) SubCutaneous every 8 hours  multiple electrolytes Injection Type 1 1000milliLiter(s) IV Continuous <Continuous>  pantoprazole  Injectable 40milliGRAM(s) IV Push every 12 hours  chlorhexidine 0.12% Liquid 15milliLiter(s) Swish and Spit two times a day  dextrose 5%. 1000milliLiter(s) IV Continuous <Continuous>  dextrose 50% Injectable 12.5Gram(s) IV Push once  dextrose 50% Injectable 25Gram(s) IV Push once  dextrose 50% Injectable 25Gram(s) IV Push once  meropenem IVPB 1000milliGRAM(s) IV Intermittent every 8 hours  dexmedetomidine Infusion 0.5MICROgram(s)/kG/Hr IV Continuous <Continuous>  amiodarone Infusion 0.5mG/Min IV Continuous <Continuous>    MEDICATIONS  (PRN):  dextrose Gel 1Dose(s) Oral once PRN Blood Glucose LESS THAN 70 milliGRAM(s)/deciliter  glucagon  Injectable 1milliGRAM(s) IntraMuscular once PRN Glucose LESS THAN 70 milligrams/deciliter      NUTRITION/IVF: NPO/ P-lyte @ 50    CENTRAL LINE:  LOCATION:   DATE INSERTED:  CVP:  SCVO2: R SC TLC 5/10/  R Fem A-line    ARMAS:   DATE INSERTED:    A-LINE:    LOCATION:   DATE INSERTED:   SVV:  CO/CI: R Fem A-line 5/9      NG/OG TUBE:  DATE INSERTED:  OUTPUT/24 HRS:    PHYSICAL EXAM:     Gen: NAD, Well appearing, No cyanosis, Pallor.  On Ventilator    Eyes: PERRL ~ 3mm, EOMI,     Neurological: GCS 3/1T/4.  + Corneal , + Gag reflex.  PERRL ~ 3 mm    ENMT: Clear canals,    Neck: Supple. NT AT, FROM no pain.  No JVD. No meningeal signs    Pulmonary: NAD, CTA, = BL .  Minimal secreations.    Cardiovascular: Irreg Irreg, S1, S2, No murmurs noted.    Gastrointestinal:ND, Soft, NT.    Genitourinary: Mild edema.  Armas in place.    Back: No SN of skin breakdown    Extremities: NT, AT, Mild-Moderate 2+ pitting edema.  No erythema or palpable cord noted.  FROM, = 2+ pulses throughout.      LABS:  CBC Full  -  ( 12 May 2017 04:37 )  WBC Count : 15.9 K/uL  Hemoglobin : 11.5 g/dL  Hematocrit : 35.8 %  Platelet Count - Automated : 295 K/uL  Mean Cell Volume : 91.6 fl  Mean Cell Hemoglobin : 29.4 pg  Mean Cell Hemoglobin Concentration : 32.1 g/dL  Auto Neutrophil # : 12.9 K/uL  Auto Lymphocyte # : 1.6 K/uL  Auto Monocyte # : 1.3 K/uL  Auto Eosinophil # : x  Auto Basophil # : x  Auto Neutrophil % : 81.2 %  Auto Lymphocyte % : 10.1 %  Auto Monocyte % : 8.2 %  Auto Eosinophil % : x  Auto Basophil % : x    05-12    137  |  97<L>  |  25.0<H>  ----------------------------<  147<H>  4.4   |  28.0  |  0.85    Ca    8.3<L>      12 May 2017 04:37  Phos  3.3     05-12  Mg     2.0     05-12    TPro  6.0<L>  /  Alb  2.6<L>  /  TBili  1.2  /  DBili  0.5<H>  /  AST  39  /  ALT  30  /  AlkPhos  172<H>  05-12    PT/INR - ( 11 May 2017 06:12 )   PT: 13.4 sec;   INR: 1.21 ratio         PTT - ( 11 May 2017 06:12 )  PTT:27.3 sec    RECENT CULTURES:  05-09 .Blood Blood XXXX XXXX   No growth at 48 hours    05-09 .Blood Blood XXXX XXXX   No growth at 48 hours        LIVER FUNCTIONS - ( 12 May 2017 04:37 )  Alb: 2.6 g/dL / Pro: 6.0 g/dL / ALK PHOS: 172 U/L / ALT: 30 U/L / AST: 39 U/L / GGT: x               CAPILLARY BLOOD GLUCOSE  131 (12 May 2017 12:00)  134 (12 May 2017 08:00)  144 (12 May 2017 04:00)  138 (12 May 2017 00:00)      RADIOLOGY & ADDITIONAL STUDIES:    ASSESSMENT/PLAN:  51yMale presenting with: Cardiac arrest SP ERCP leading to septic and worsened cardiogenic shock/ respiratory failure.  Increased Ectopy / V-Tach over past 24 hr.        Neurological: Will give PRN push analgesics or sedatives and avoid deeper sedation or drips. Head CT Non acute.  Will contact BIU when more purposeful      Pulmonary: Wean ventilator as tolerated.      Cardiovascular: Continue Amio drip given continued V-tach and improved ectopy after.  Contact varinode again for interrogation.    Gastrointestinal: Will hold TF at this point.    Genitourinary: Continue Armas to monitor for signs of DI etc.    Heme: Continue SQH    ID: Continue Meropenum.    Skin: Avoid pressure ulcer.    Lines/ Tubes: Will transition Femoral A-line out to UE.    Dispo: Critical care as above.  Re-address goals of care with family.            CRITICAL CARE TIME SPENT: 60 min

## 2017-05-12 NOTE — PROGRESS NOTE ADULT - ASSESSMENT
IMPRESSION:  The patient is a 51 year old male with significant cardiovascular history including atrial fibrillation and cardiomyopathy s/p laparoscopic cholecystectomy POD #2. GI f/u for bile leak.   - first ercp 5/9/17: papilla appeared to be stenosed. During attempts at biliary cannulation, patient developed hypotension. Procedure aborted. Patient coded. Intubated. Patient resuscitated. ACS team took over, and performed exploratory laparotomy.  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   - Off vasopressors now. Still intubated. LFTs improving. On NGT feeding.      PLAN:  NGT feeding  IV antibiotics  SICU monitoring  monitor cbc, serum lytes, and LFTs  d/w surgery team  will f/u

## 2017-05-12 NOTE — PROGRESS NOTE ADULT - ATTENDING COMMENTS
Pt seen and examined.    HD stable.  Intolerant of tube feeds.  Able to breath spontaneously.  Of all sedation desynchronous with vent.  Does not wake up appropriately yet.  Not tolerating propofol or precedex.  Will use PRN push dose fentanyl.  CT head today w/o signs of significant anoxic injury. Gray white differentiation appears intact given motion degradation.  Will continue to monitor neuro state closely.  Reloaded on IV amio for worsening ectopy.  Continue to hold inotropic agents.  Hold tube feeds today.  Will retry tomorrow if NG output small.      CC time 35 min

## 2017-05-13 LAB
ANION GAP SERPL CALC-SCNC: 13 MMOL/L — SIGNIFICANT CHANGE UP (ref 5–17)
APTT BLD: 48.1 SEC — HIGH (ref 27.5–37.4)
BASOPHILS # BLD AUTO: 0 K/UL — SIGNIFICANT CHANGE UP (ref 0–0.2)
BASOPHILS NFR BLD AUTO: 0.1 % — SIGNIFICANT CHANGE UP (ref 0–2)
BUN SERPL-MCNC: 31 MG/DL — HIGH (ref 8–20)
CALCIUM SERPL-MCNC: 8 MG/DL — LOW (ref 8.6–10.2)
CHLORIDE SERPL-SCNC: 98 MMOL/L — SIGNIFICANT CHANGE UP (ref 98–107)
CO2 SERPL-SCNC: 28 MMOL/L — SIGNIFICANT CHANGE UP (ref 22–29)
CREAT SERPL-MCNC: 0.91 MG/DL — SIGNIFICANT CHANGE UP (ref 0.5–1.3)
GAS PNL BLDA: SIGNIFICANT CHANGE UP
GAS PNL BLDA: SIGNIFICANT CHANGE UP
GLUCOSE SERPL-MCNC: 153 MG/DL — HIGH (ref 70–115)
HCT VFR BLD CALC: 37.3 % — LOW (ref 42–52)
HGB BLD-MCNC: 12 G/DL — LOW (ref 14–18)
INR BLD: 1.51 RATIO — HIGH (ref 0.88–1.16)
LYMPHOCYTES # BLD AUTO: 14.6 % — LOW (ref 20–55)
LYMPHOCYTES # BLD AUTO: 2 K/UL — SIGNIFICANT CHANGE UP (ref 1–4.8)
MAGNESIUM SERPL-MCNC: 2.2 MG/DL — SIGNIFICANT CHANGE UP (ref 1.6–2.6)
MCHC RBC-ENTMCNC: 29.6 PG — SIGNIFICANT CHANGE UP (ref 27–31)
MCHC RBC-ENTMCNC: 32.2 G/DL — SIGNIFICANT CHANGE UP (ref 32–36)
MCV RBC AUTO: 91.9 FL — SIGNIFICANT CHANGE UP (ref 80–94)
MONOCYTES # BLD AUTO: 1.3 K/UL — HIGH (ref 0–0.8)
MONOCYTES NFR BLD AUTO: 9 % — SIGNIFICANT CHANGE UP (ref 3–10)
NEUTROPHILS # BLD AUTO: 10.6 K/UL — HIGH (ref 1.8–8)
NEUTROPHILS NFR BLD AUTO: 75.7 % — HIGH (ref 37–73)
PHOSPHATE SERPL-MCNC: 4 MG/DL — SIGNIFICANT CHANGE UP (ref 2.4–4.7)
PLATELET # BLD AUTO: 316 K/UL — SIGNIFICANT CHANGE UP (ref 150–400)
POTASSIUM SERPL-MCNC: 4.1 MMOL/L — SIGNIFICANT CHANGE UP (ref 3.5–5.3)
POTASSIUM SERPL-SCNC: 4.1 MMOL/L — SIGNIFICANT CHANGE UP (ref 3.5–5.3)
PROTHROM AB SERPL-ACNC: 16.8 SEC — HIGH (ref 9.8–12.7)
RBC # BLD: 4.06 M/UL — LOW (ref 4.6–6.2)
RBC # FLD: 14.8 % — SIGNIFICANT CHANGE UP (ref 11–15.6)
SODIUM SERPL-SCNC: 139 MMOL/L — SIGNIFICANT CHANGE UP (ref 135–145)
WBC # BLD: 13.9 K/UL — HIGH (ref 4.8–10.8)
WBC # FLD AUTO: 13.9 K/UL — HIGH (ref 4.8–10.8)

## 2017-05-13 PROCEDURE — 71010: CPT | Mod: 26

## 2017-05-13 PROCEDURE — 93010 ELECTROCARDIOGRAM REPORT: CPT

## 2017-05-13 RX ORDER — FENTANYL CITRATE 50 UG/ML
100 INJECTION INTRAVENOUS ONCE
Qty: 0 | Refills: 0 | Status: DISCONTINUED | OUTPATIENT
Start: 2017-05-13 | End: 2017-05-13

## 2017-05-13 RX ORDER — HALOPERIDOL DECANOATE 100 MG/ML
5 INJECTION INTRAMUSCULAR ONCE
Qty: 0 | Refills: 0 | Status: COMPLETED | OUTPATIENT
Start: 2017-05-13 | End: 2017-05-13

## 2017-05-13 RX ORDER — DIGOXIN 250 MCG
0.12 TABLET ORAL DAILY
Qty: 0 | Refills: 0 | Status: DISCONTINUED | OUTPATIENT
Start: 2017-05-13 | End: 2017-05-25

## 2017-05-13 RX ORDER — HEPARIN SODIUM 5000 [USP'U]/ML
1100 INJECTION INTRAVENOUS; SUBCUTANEOUS
Qty: 25000 | Refills: 0 | Status: DISCONTINUED | OUTPATIENT
Start: 2017-05-13 | End: 2017-05-22

## 2017-05-13 RX ORDER — SODIUM CHLORIDE 9 MG/ML
1000 INJECTION, SOLUTION INTRAVENOUS ONCE
Qty: 0 | Refills: 0 | Status: COMPLETED | OUTPATIENT
Start: 2017-05-13 | End: 2017-05-13

## 2017-05-13 RX ADMIN — PANTOPRAZOLE SODIUM 40 MILLIGRAM(S): 20 TABLET, DELAYED RELEASE ORAL at 05:26

## 2017-05-13 RX ADMIN — Medication 1 APPLICATION(S): at 18:13

## 2017-05-13 RX ADMIN — SODIUM CHLORIDE 2000 MILLILITER(S): 9 INJECTION, SOLUTION INTRAVENOUS at 05:03

## 2017-05-13 RX ADMIN — HEPARIN SODIUM 5000 UNIT(S): 5000 INJECTION INTRAVENOUS; SUBCUTANEOUS at 05:27

## 2017-05-13 RX ADMIN — Medication 2.5 MILLIGRAM(S): at 05:26

## 2017-05-13 RX ADMIN — Medication 2.5 MILLIGRAM(S): at 11:33

## 2017-05-13 RX ADMIN — CHLORHEXIDINE GLUCONATE 15 MILLILITER(S): 213 SOLUTION TOPICAL at 05:27

## 2017-05-13 RX ADMIN — Medication 2.5 MILLIGRAM(S): at 23:46

## 2017-05-13 RX ADMIN — FENTANYL CITRATE 100 MICROGRAM(S): 50 INJECTION INTRAVENOUS at 05:03

## 2017-05-13 RX ADMIN — HEPARIN SODIUM 11 UNIT(S)/HR: 5000 INJECTION INTRAVENOUS; SUBCUTANEOUS at 13:27

## 2017-05-13 RX ADMIN — FENTANYL CITRATE 100 MICROGRAM(S): 50 INJECTION INTRAVENOUS at 04:20

## 2017-05-13 RX ADMIN — SODIUM CHLORIDE 50 MILLILITER(S): 9 INJECTION, SOLUTION INTRAVENOUS at 06:09

## 2017-05-13 RX ADMIN — Medication 0.12 MILLIGRAM(S): at 13:27

## 2017-05-13 RX ADMIN — CHLORHEXIDINE GLUCONATE 15 MILLILITER(S): 213 SOLUTION TOPICAL at 18:13

## 2017-05-13 RX ADMIN — MEROPENEM 200 MILLIGRAM(S): 1 INJECTION INTRAVENOUS at 05:27

## 2017-05-13 RX ADMIN — Medication 2.5 MILLIGRAM(S): at 18:13

## 2017-05-13 RX ADMIN — PANTOPRAZOLE SODIUM 40 MILLIGRAM(S): 20 TABLET, DELAYED RELEASE ORAL at 18:13

## 2017-05-13 RX ADMIN — HALOPERIDOL DECANOATE 5 MILLIGRAM(S): 100 INJECTION INTRAMUSCULAR at 12:33

## 2017-05-13 NOTE — PROGRESS NOTE ADULT - SUBJECTIVE AND OBJECTIVE BOX
INTERVAL HPI/OVERNIGHT EVENTS/SUBJECTIVE: Patient continues to     Vital Signs Last 24 Hrs  T(C): 38.1, Max: 38.2 (05-12 @ 19:00)  T(F): 100.6, Max: 100.8 (05-12 @ 19:00)  HR: 107 (73 - 143)  BP: 123/76 (106/68 - 155/103)  BP(mean): 90 (82 - 119)  RR: 29 (14 - 29)  SpO2: 98% (98% - 100%)    I&O's Detail  I & Os for 24h ending 12 May 2017 07:00  =============================================  IN:    multiple electrolytes Injection Type 1: 1200 ml    Solution: 300 ml    amiodarone Infusion: 200.4 ml    Oral Fluid: 120 ml    Pivot: 110 ml    Solution: 100 ml    amiodarone Infusion: 66.8 ml    DOBUTamine Infusion: 61.2 ml    Solution: 50 ml    propofol Infusion: 30.5 ml    fentaNYL  Infusion: 9.8 ml    Total IN: 2248.7 ml  ---------------------------------------------  OUT:    Indwelling Catheter - Urethral: 1085 ml    Bulb: 195 ml    Total OUT: 1280 ml  ---------------------------------------------  Total NET: 968.7 ml    I & Os for current day (as of 13 May 2017 04:17)  =============================================  IN:    multiple electrolytes Injection Type 1: 1000 ml    amiodarone Infusion: 267.2 ml    Solution: 200 ml    Solution: 100 ml    amiodarone Infusion: 66.8 ml    Solution: 50 ml    propofol Infusion: 8.4 ml    dexmedetomidine Infusion: 7 ml    Total IN: 1699.4 ml  ---------------------------------------------  OUT:    Indwelling Catheter - Urethral: 911 ml    Bulb: 670 ml    Nasoenteral Tube: 100 ml    Total OUT: 1681 ml  ---------------------------------------------  Total NET: 18.4 ml      ABG - ( 12 May 2017 05:53 )  pH: 7.45  /  pCO2: 42    /  pO2: 127   / HCO3: 29    / Base Excess: 4.8   /  SaO2: 100         MEDICATIONS  (STANDING):  heparin  Injectable 5000Unit(s) SubCutaneous every 8 hours  multiple electrolytes Injection Type 1 1000milliLiter(s) IV Continuous <Continuous>  pantoprazole  Injectable 40milliGRAM(s) IV Push every 12 hours  chlorhexidine 0.12% Liquid 15milliLiter(s) Swish and Spit two times a day  dextrose 5%. 1000milliLiter(s) IV Continuous <Continuous>  dextrose 50% Injectable 12.5Gram(s) IV Push once  dextrose 50% Injectable 25Gram(s) IV Push once  dextrose 50% Injectable 25Gram(s) IV Push once  meropenem IVPB 1000milliGRAM(s) IV Intermittent every 8 hours  amiodarone Infusion 0.5mG/Min IV Continuous <Continuous>  metoprolol Injectable 2.5milliGRAM(s) IV Push every 6 hours  fentaNYL    Injectable 100MICROGram(s) IV Push once    MEDICATIONS  (PRN):  dextrose Gel 1Dose(s) Oral once PRN Blood Glucose LESS THAN 70 milliGRAM(s)/deciliter  glucagon  Injectable 1milliGRAM(s) IntraMuscular once PRN Glucose LESS THAN 70 milligrams/deciliter    NUTRITION/IVF: NPO/ P-lyte @ 50    CENTRAL LINE:R IJ TLC 5/10    ARMAS:   Yes    A-LINE:    R Fem A-line 5/9      NG/OG TUBE:  DATE INSERTED:  OUTPUT/24 HRS:    PHYSICAL EXAM:     Gen: NAD, Well appearing, No cyanosis, Pallor.  On Ventilator    Eyes: PERRL ~ 3mm, EOMI,     Neurological: GCS 3/1T/1  + Corneal , + Gag reflex.  PERRL ~ 3 mm    ENMT: Clear canals,    Neck: Supple. NT AT, FROM no pain.  No JVD. No meningeal signs    Pulmonary: NAD, CTA, = BL .  Minimal secretions.    Cardiovascular: AflutterS1, S2, No murmurs noted.    Gastrointestinal:ND, Soft, NT.midline incision packed wet-dry. Drain with bilious output    Genitourinary: Mild edema.  Armas in place.    Back: No SN of skin breakdown    Extremities: NT, AT, Mild-Moderate 2+ pitting edema.  No erythema or palpable cord noted.  FROM, = 2+ pulses throughout.      LABS:      CAPILLARY BLOOD GLUCOSE  113 (12 May 2017 23:00)  130 (12 May 2017 18:00)  131 (12 May 2017 12:00)  134 (12 May 2017 08:00)        RADIOLOGY & ADDITIONAL STUDIES:    ASSESSMENT/PLAN:  51yMale presenting with: Cardiac arrest SP ERCP leading to septic and worsened cardiogenic shock/ respiratory failure.  Increased Ectopy / V-Tach over past 24 hr.        Neurological: Will give PRN push analgesics or sedatives and avoid deeper sedation or drips. Head CT Non acute.  Will contact BIU when more purposeful      Pulmonary: Wean ventilator as tolerated. currently on SIMV     Cardiovascular: Continue Amio drip given continued V-tach and improved ectopy after.     Gastrointestinal: Will hold TF at this point.    Genitourinary: Continue Armas to monitor for signs of DI etc.    Heme: Continue SQH    ID: Continue Meropenem.    Skin: Avoid pressure ulcer.    Lines/ Tubes: Maintain all lines and tubes    Dispo: Critical care as above.  Re-address goals of care with family.            CRITICAL CARE TIME SPENT: 60 min INTERVAL HPI/OVERNIGHT EVENTS/SUBJECTIVE: Patient continues to     Vital Signs Last 24 Hrs  T(C): 38.1, Max: 38.2 (05-12 @ 19:00)  T(F): 100.6, Max: 100.8 (05-12 @ 19:00)  HR: 107 (73 - 143)  BP: 123/76 (106/68 - 155/103)  BP(mean): 90 (82 - 119)  RR: 29 (14 - 29)  SpO2: 98% (98% - 100%)    I&O's Detail  I & Os for 24h ending 12 May 2017 07:00  =============================================  IN:    multiple electrolytes Injection Type 1: 1200 ml    Solution: 300 ml    amiodarone Infusion: 200.4 ml    Oral Fluid: 120 ml    Pivot: 110 ml    Solution: 100 ml    amiodarone Infusion: 66.8 ml    DOBUTamine Infusion: 61.2 ml    Solution: 50 ml    propofol Infusion: 30.5 ml    fentaNYL  Infusion: 9.8 ml    Total IN: 2248.7 ml  ---------------------------------------------  OUT:    Indwelling Catheter - Urethral: 1085 ml    Bulb: 195 ml    Total OUT: 1280 ml  ---------------------------------------------  Total NET: 968.7 ml    I & Os for current day (as of 13 May 2017 04:17)  =============================================  IN:    multiple electrolytes Injection Type 1: 1000 ml    amiodarone Infusion: 267.2 ml    Solution: 200 ml    Solution: 100 ml    amiodarone Infusion: 66.8 ml    Solution: 50 ml    propofol Infusion: 8.4 ml    dexmedetomidine Infusion: 7 ml    Total IN: 1699.4 ml  ---------------------------------------------  OUT:    Indwelling Catheter - Urethral: 911 ml    Bulb: 670 ml    Nasoenteral Tube: 100 ml    Total OUT: 1681 ml  ---------------------------------------------  Total NET: 18.4 ml      ABG - ( 12 May 2017 05:53 )  pH: 7.45  /  pCO2: 42    /  pO2: 127   / HCO3: 29    / Base Excess: 4.8   /  SaO2: 100         MEDICATIONS  (STANDING):  heparin  Injectable 5000Unit(s) SubCutaneous every 8 hours  multiple electrolytes Injection Type 1 1000milliLiter(s) IV Continuous <Continuous>  pantoprazole  Injectable 40milliGRAM(s) IV Push every 12 hours  chlorhexidine 0.12% Liquid 15milliLiter(s) Swish and Spit two times a day  dextrose 5%. 1000milliLiter(s) IV Continuous <Continuous>  dextrose 50% Injectable 12.5Gram(s) IV Push once  dextrose 50% Injectable 25Gram(s) IV Push once  dextrose 50% Injectable 25Gram(s) IV Push once  meropenem IVPB 1000milliGRAM(s) IV Intermittent every 8 hours  amiodarone Infusion 0.5mG/Min IV Continuous <Continuous>  metoprolol Injectable 2.5milliGRAM(s) IV Push every 6 hours  fentaNYL    Injectable 100MICROGram(s) IV Push once    MEDICATIONS  (PRN):  dextrose Gel 1Dose(s) Oral once PRN Blood Glucose LESS THAN 70 milliGRAM(s)/deciliter  glucagon  Injectable 1milliGRAM(s) IntraMuscular once PRN Glucose LESS THAN 70 milligrams/deciliter    NUTRITION/IVF: NPO/ P-lyte @ 50    CENTRAL LINE:R IJ TLC 5/10    ARMAS:   Yes    A-LINE:    R Fem A-line 5/9      NG/OG TUBE:  DATE INSERTED:  OUTPUT/24 HRS:    PHYSICAL EXAM:     Gen: NAD, Well appearing, No cyanosis, Pallor.  On Ventilator    Eyes: PERRL ~ 3mm, EOMI,     Neurological: GCS 3/1T/1  + Corneal , + Gag reflex.  PERRL ~ 3 mm    ENMT: Clear canals,    Neck: Supple. NT AT, FROM no pain.  No JVD. No meningeal signs    Pulmonary: NAD, CTA, = BL .  Minimal secretions.    Cardiovascular: AflutterS1, S2, No murmurs noted.    Gastrointestinal:ND, Soft, NT.midline incision packed wet-dry. Drain with bilious output    Genitourinary: Mild edema.  Armas in place.    Back: No SN of skin breakdown    Extremities: NT, AT, Mild-Moderate 2+ pitting edema.  No erythema or palpable cord noted.  FROM, = 2+ pulses throughout.      LABS:  05-13    139  |  98  |  31.0<H>  ----------------------------<  153<H>  4.1   |  28.0  |  0.91    Ca    8.0<L>      13 May 2017 04:52  Phos  4.0     05-13  Mg     2.2     05-13    TPro  6.0<L>  /  Alb  2.6<L>  /  TBili  1.2  /  DBili  0.5<H>  /  AST  39  /  ALT  30  /  AlkPhos  172<H>  05-12                        12.0   13.9  )-----------( 316      ( 13 May 2017 04:52 )             37.3       CAPILLARY BLOOD GLUCOSE  113 (12 May 2017 23:00)  130 (12 May 2017 18:00)  131 (12 May 2017 12:00)  134 (12 May 2017 08:00)        RADIOLOGY & ADDITIONAL STUDIES:    ASSESSMENT/PLAN:  51yMale presenting with: Cardiac arrest SP ERCP leading to septic and worsened cardiogenic shock/ respiratory failure.  Increased Ectopy / V-Tach over past 24 hr.        Neurological: Will give PRN push analgesics or sedatives and avoid deeper sedation or drips. Head CT Non acute.  Will contact BIU when more purposeful      Pulmonary: Wean ventilator as tolerated. currently on SIMV     Cardiovascular: Continue Amio drip given continued V-tach and improved ectopy after.     Gastrointestinal: Will hold TF at this point.    Genitourinary: Continue Armas to monitor for signs of DI etc.    Heme: Continue SQH    ID: Continue Meropenem.    Skin: Avoid pressure ulcer.    Lines/ Tubes: Maintain all lines and tubes    Dispo: Critical care as above.  Re-address goals of care with family.            CRITICAL CARE TIME SPENT: 60 min

## 2017-05-13 NOTE — PROGRESS NOTE ADULT - ATTENDING COMMENTS
The patient was seen and examined  No new problems    Neurologic:  GCS=5T, pupils are equal  Respiratory:  SIMV with PEEP+5--AHV=706  Hemodynamically okay, lactate=2.6 mmol/L  Renal:  Urine flow okay  GI:  NPO  Hgb Okay  ID:  On meropenem and fluconazole, blood cultures are negative    Plan:  Lung recruitment and increase PEEP.  Walk down IMV rate   Start nutritional support  Restart anticoagulation  Stop antibiotics  Hold narcotics

## 2017-05-13 NOTE — PROGRESS NOTE ADULT - ASSESSMENT
ASSESSMENT/PLAN:  51yMale presenting with: Cardiac arrest SP ERCP leading to septic and worsened cardiogenic shock/ respiratory failure.  Increased Ectopy / V-Tach over past 24 hr.        Neurological: Will give PRN push analgesics or sedatives and avoid deeper sedation or drips. Head CT Non acute.  Will contact BIU when more purposeful      Pulmonary: Wean ventilator as tolerated.      Cardiovascular: Continue Amio drip given continued V-tach and improved ectopy after.  Contact [employer] again for interrogation.    Gastrointestinal: Will hold TF at this point.    Genitourinary: Continue Rogers to monitor for signs of DI etc.    Heme: Continue SQH    ID: Continue Meropenum.    Skin: Avoid pressure ulcer.    Lines/ Tubes: Will transition Femoral A-line out to UE.    Dispo: Critical care as above.  Re-address goals of care with family.            CRITICAL CARE TIME SPENT: 60 min

## 2017-05-13 NOTE — PROGRESS NOTE ADULT - SUBJECTIVE AND OBJECTIVE BOX
INTERVAL HPI/OVERNIGHT EVENTS:  Patient seen and examined. Remains intubated to vent. Febrile. RN reporting VANESSA drain outpt increased, serosanguinous, non-bilious appearing.     MEDICATIONS  (STANDING):  heparin  Injectable 5000Unit(s) SubCutaneous every 8 hours  multiple electrolytes Injection Type 1 1000milliLiter(s) IV Continuous <Continuous>  pantoprazole  Injectable 40milliGRAM(s) IV Push every 12 hours  chlorhexidine 0.12% Liquid 15milliLiter(s) Swish and Spit two times a day  dextrose 5%. 1000milliLiter(s) IV Continuous <Continuous>  dextrose 50% Injectable 12.5Gram(s) IV Push once  dextrose 50% Injectable 25Gram(s) IV Push once  dextrose 50% Injectable 25Gram(s) IV Push once  meropenem IVPB 1000milliGRAM(s) IV Intermittent every 8 hours  amiodarone Infusion 0.5mG/Min IV Continuous <Continuous>  metoprolol Injectable 2.5milliGRAM(s) IV Push every 6 hours    MEDICATIONS  (PRN):  dextrose Gel 1Dose(s) Oral once PRN Blood Glucose LESS THAN 70 milliGRAM(s)/deciliter  glucagon  Injectable 1milliGRAM(s) IntraMuscular once PRN Glucose LESS THAN 70 milligrams/deciliter      Allergies    No Known Allergies        Vital Signs Last 24 Hrs  T(C): 38.1, Max: 38.2 (05-12 @ 19:00)  T(F): 100.6, Max: 100.8 (05-12 @ 19:00)  HR: 80 (73 - 143)  BP: 148/70 (106/68 - 155/103)  BP(mean): 88 (82 - 119)  RR: 18 (15 - 29)  SpO2: 99% (98% - 100%)    PHYSICAL EXAM:  General: NAD. intubated to vent  HEENT: mmm  Abd: s, mild distention    LABS:                        12.0   13.9  )-----------( 316      ( 13 May 2017 04:52 )             37.3     05-13    139  |  98  |  31.0<H>  ----------------------------<  153<H>  4.1   |  28.0  |  0.91    Ca    8.0<L>      13 May 2017 04:52  Phos  4.0     05-13  Mg     2.2     05-13    TPro  6.0<L>  /  Alb  2.6<L>  /  TBili  1.2  /  DBili  0.5<H>  /  AST  39  /  ALT  30  /  AlkPhos  172<H>  05-12

## 2017-05-13 NOTE — PROGRESS NOTE ADULT - ASSESSMENT
IMPRESSION:  The patient is a 51 year old male with significant cardiovascular history including atrial fibrillation and cardiomyopathy s/p laparoscopic cholecystectomy. GI f/u for bile leak.   - first ercp 5/9/17: papilla appeared to be stenosed. During attempts at biliary cannulation, patient developed hypotension. Procedure aborted. Patient coded. Intubated. Patient resuscitated. ACS team took over, and performed exploratory laparotomy.  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   - Off vasopressors now. Still intubated. LFTs improving. On NGT feeding.      PLAN:  NGT feeding  IV antibiotics  SICU monitoring  monitor cbc, serum lytes, and LFTs  consider culture of VANESSA drain fluid given persistent fevers

## 2017-05-14 LAB
ALBUMIN SERPL ELPH-MCNC: 2.3 G/DL — LOW (ref 3.3–5.2)
ALBUMIN SERPL ELPH-MCNC: 2.5 G/DL — LOW (ref 3.3–5.2)
ALP SERPL-CCNC: 166 U/L — HIGH (ref 40–120)
ALP SERPL-CCNC: 193 U/L — HIGH (ref 40–120)
ALT FLD-CCNC: 106 U/L — HIGH
ALT FLD-CCNC: 242 U/L — HIGH
AMMONIA BLD-MCNC: 50 UMOL/L — SIGNIFICANT CHANGE UP (ref 11–55)
ANION GAP SERPL CALC-SCNC: 12 MMOL/L — SIGNIFICANT CHANGE UP (ref 5–17)
ANION GAP SERPL CALC-SCNC: 16 MMOL/L — SIGNIFICANT CHANGE UP (ref 5–17)
APPEARANCE UR: ABNORMAL
APTT BLD: 107.6 SEC — HIGH (ref 27.5–37.4)
APTT BLD: 52.6 SEC — HIGH (ref 27.5–37.4)
APTT BLD: 71.4 SEC — HIGH (ref 27.5–37.4)
APTT BLD: 89.5 SEC — HIGH (ref 27.5–37.4)
AST SERPL-CCNC: 150 U/L — HIGH
AST SERPL-CCNC: 394 U/L — HIGH
BACTERIA # UR AUTO: ABNORMAL
BASE EXCESS BLDV CALC-SCNC: 7.2 MMOL/L — HIGH (ref -3–3)
BASOPHILS # BLD AUTO: 0 K/UL — SIGNIFICANT CHANGE UP (ref 0–0.2)
BASOPHILS # BLD AUTO: 0 K/UL — SIGNIFICANT CHANGE UP (ref 0–0.2)
BASOPHILS NFR BLD AUTO: 0 % — SIGNIFICANT CHANGE UP (ref 0–2)
BASOPHILS NFR BLD AUTO: 0.1 % — SIGNIFICANT CHANGE UP (ref 0–2)
BILIRUB SERPL-MCNC: 1.1 MG/DL — SIGNIFICANT CHANGE UP (ref 0.4–2)
BILIRUB SERPL-MCNC: 1.4 MG/DL — SIGNIFICANT CHANGE UP (ref 0.4–2)
BILIRUB UR-MCNC: ABNORMAL
BLOOD GAS COMMENTS, VENOUS: SIGNIFICANT CHANGE UP
BUN SERPL-MCNC: 34 MG/DL — HIGH (ref 8–20)
BUN SERPL-MCNC: 39 MG/DL — HIGH (ref 8–20)
CALCIUM SERPL-MCNC: 7.2 MG/DL — LOW (ref 8.6–10.2)
CALCIUM SERPL-MCNC: 7.7 MG/DL — LOW (ref 8.6–10.2)
CHLORIDE SERPL-SCNC: 102 MMOL/L — SIGNIFICANT CHANGE UP (ref 98–107)
CHLORIDE SERPL-SCNC: 103 MMOL/L — SIGNIFICANT CHANGE UP (ref 98–107)
CK MB CFR SERPL CALC: 3.6 NG/ML — SIGNIFICANT CHANGE UP (ref 0–6.7)
CK SERPL-CCNC: 259 U/L — HIGH (ref 30–200)
CO2 SERPL-SCNC: 25 MMOL/L — SIGNIFICANT CHANGE UP (ref 22–29)
CO2 SERPL-SCNC: 28 MMOL/L — SIGNIFICANT CHANGE UP (ref 22–29)
COLOR SPEC: ABNORMAL
CREAT SERPL-MCNC: 0.92 MG/DL — SIGNIFICANT CHANGE UP (ref 0.5–1.3)
CREAT SERPL-MCNC: 1.28 MG/DL — SIGNIFICANT CHANGE UP (ref 0.5–1.3)
CULTURE RESULTS: SIGNIFICANT CHANGE UP
CULTURE RESULTS: SIGNIFICANT CHANGE UP
DIFF PNL FLD: ABNORMAL
EOSINOPHIL # BLD AUTO: 0 K/UL — SIGNIFICANT CHANGE UP (ref 0–0.5)
EOSINOPHIL # BLD AUTO: 0 K/UL — SIGNIFICANT CHANGE UP (ref 0–0.5)
EOSINOPHIL NFR BLD AUTO: 0.3 % — SIGNIFICANT CHANGE UP (ref 0–5)
GAS PNL BLDA: SIGNIFICANT CHANGE UP
GAS PNL BLDV: SIGNIFICANT CHANGE UP
GLUCOSE SERPL-MCNC: 161 MG/DL — HIGH (ref 70–115)
GLUCOSE SERPL-MCNC: 168 MG/DL — HIGH (ref 70–115)
GLUCOSE UR QL: NEGATIVE MG/DL — SIGNIFICANT CHANGE UP
HCO3 BLDV-SCNC: 30 MMOL/L — HIGH (ref 20–26)
HCT VFR BLD CALC: 35.3 % — LOW (ref 42–52)
HCT VFR BLD CALC: 37.2 % — LOW (ref 42–52)
HGB BLD-MCNC: 11.5 G/DL — LOW (ref 14–18)
HGB BLD-MCNC: 12 G/DL — LOW (ref 14–18)
HOROWITZ INDEX BLDV+IHG-RTO: 0.5 — SIGNIFICANT CHANGE UP
INR BLD: 1.43 RATIO — HIGH (ref 0.88–1.16)
INR BLD: 1.59 RATIO — HIGH (ref 0.88–1.16)
INR BLD: 1.67 RATIO — HIGH (ref 0.88–1.16)
KETONES UR-MCNC: NEGATIVE — SIGNIFICANT CHANGE UP
LEUKOCYTE ESTERASE UR-ACNC: ABNORMAL
LYMPHOCYTES # BLD AUTO: 0.6 K/UL — LOW (ref 1–4.8)
LYMPHOCYTES # BLD AUTO: 1.5 K/UL — SIGNIFICANT CHANGE UP (ref 1–4.8)
LYMPHOCYTES # BLD AUTO: 10.3 % — LOW (ref 20–55)
LYMPHOCYTES # BLD AUTO: 8 % — LOW (ref 20–55)
MAGNESIUM SERPL-MCNC: 2.1 MG/DL — SIGNIFICANT CHANGE UP (ref 1.8–2.6)
MAGNESIUM SERPL-MCNC: 2.2 MG/DL — SIGNIFICANT CHANGE UP (ref 1.6–2.6)
MCHC RBC-ENTMCNC: 29.8 PG — SIGNIFICANT CHANGE UP (ref 27–31)
MCHC RBC-ENTMCNC: 30 PG — SIGNIFICANT CHANGE UP (ref 27–31)
MCHC RBC-ENTMCNC: 32.3 G/DL — SIGNIFICANT CHANGE UP (ref 32–36)
MCHC RBC-ENTMCNC: 32.6 G/DL — SIGNIFICANT CHANGE UP (ref 32–36)
MCV RBC AUTO: 91.5 FL — SIGNIFICANT CHANGE UP (ref 80–94)
MCV RBC AUTO: 93 FL — SIGNIFICANT CHANGE UP (ref 80–94)
MONOCYTES # BLD AUTO: 1.5 K/UL — HIGH (ref 0–0.8)
MONOCYTES # BLD AUTO: 2 K/UL — HIGH (ref 0–0.8)
MONOCYTES NFR BLD AUTO: 10.2 % — HIGH (ref 3–10)
MONOCYTES NFR BLD AUTO: 7 % — SIGNIFICANT CHANGE UP (ref 3–10)
MYELOCYTES NFR BLD: 3 % — HIGH (ref 0–0)
NEUTROPHILS # BLD AUTO: 11.3 K/UL — HIGH (ref 1.8–8)
NEUTROPHILS # BLD AUTO: 17.2 K/UL — HIGH (ref 1.8–8)
NEUTROPHILS NFR BLD AUTO: 78.1 % — HIGH (ref 37–73)
NEUTROPHILS NFR BLD AUTO: 82 % — HIGH (ref 37–73)
NITRITE UR-MCNC: POSITIVE
PCO2 BLDV: 51 MMHG — HIGH (ref 35–50)
PH BLDV: 7.42 — SIGNIFICANT CHANGE UP (ref 7.35–7.45)
PH UR: 5 — SIGNIFICANT CHANGE UP (ref 5–8)
PHOSPHATE SERPL-MCNC: 2.2 MG/DL — LOW (ref 2.4–4.7)
PHOSPHATE SERPL-MCNC: 5.1 MG/DL — HIGH (ref 2.4–4.7)
PLAT MORPH BLD: NORMAL — SIGNIFICANT CHANGE UP
PLATELET # BLD AUTO: 317 K/UL — SIGNIFICANT CHANGE UP (ref 150–400)
PLATELET # BLD AUTO: 375 K/UL — SIGNIFICANT CHANGE UP (ref 150–400)
PO2 BLDV: 38 MMHG — SIGNIFICANT CHANGE UP (ref 25–45)
POTASSIUM SERPL-MCNC: 3.8 MMOL/L — SIGNIFICANT CHANGE UP (ref 3.5–5.3)
POTASSIUM SERPL-MCNC: 4.3 MMOL/L — SIGNIFICANT CHANGE UP (ref 3.5–5.3)
POTASSIUM SERPL-SCNC: 3.8 MMOL/L — SIGNIFICANT CHANGE UP (ref 3.5–5.3)
POTASSIUM SERPL-SCNC: 4.3 MMOL/L — SIGNIFICANT CHANGE UP (ref 3.5–5.3)
PROT SERPL-MCNC: 5.6 G/DL — LOW (ref 6.6–8.7)
PROT SERPL-MCNC: 6 G/DL — LOW (ref 6.6–8.7)
PROT UR-MCNC: 500 MG/DL
PROTHROM AB SERPL-ACNC: 15.9 SEC — HIGH (ref 9.8–12.7)
PROTHROM AB SERPL-ACNC: 17.6 SEC — HIGH (ref 9.8–12.7)
PROTHROM AB SERPL-ACNC: 18.6 SEC — HIGH (ref 9.8–12.7)
RBC # BLD: 3.86 M/UL — LOW (ref 4.6–6.2)
RBC # BLD: 4 M/UL — LOW (ref 4.6–6.2)
RBC # FLD: 14.8 % — SIGNIFICANT CHANGE UP (ref 11–15.6)
RBC # FLD: 15.1 % — SIGNIFICANT CHANGE UP (ref 11–15.6)
RBC BLD AUTO: NORMAL — SIGNIFICANT CHANGE UP
RBC CASTS # UR COMP ASSIST: SIGNIFICANT CHANGE UP /HPF (ref 0–4)
SAO2 % BLDV: 68 % — SIGNIFICANT CHANGE UP (ref 67–88)
SODIUM SERPL-SCNC: 142 MMOL/L — SIGNIFICANT CHANGE UP (ref 135–145)
SODIUM SERPL-SCNC: 144 MMOL/L — SIGNIFICANT CHANGE UP (ref 135–145)
SP GR SPEC: 1.02 — SIGNIFICANT CHANGE UP (ref 1.01–1.02)
SPECIMEN SOURCE: SIGNIFICANT CHANGE UP
SPECIMEN SOURCE: SIGNIFICANT CHANGE UP
UROBILINOGEN FLD QL: 8 MG/DL
WBC # BLD: 14.4 K/UL — HIGH (ref 4.8–10.8)
WBC # BLD: 20.2 K/UL — HIGH (ref 4.8–10.8)
WBC # FLD AUTO: 14.4 K/UL — HIGH (ref 4.8–10.8)
WBC # FLD AUTO: 20.2 K/UL — HIGH (ref 4.8–10.8)
WBC UR QL: SIGNIFICANT CHANGE UP

## 2017-05-14 PROCEDURE — 71010: CPT | Mod: 26,77

## 2017-05-14 PROCEDURE — 99291 CRITICAL CARE FIRST HOUR: CPT

## 2017-05-14 PROCEDURE — 71010: CPT | Mod: 26

## 2017-05-14 RX ORDER — HALOPERIDOL DECANOATE 100 MG/ML
5 INJECTION INTRAMUSCULAR ONCE
Qty: 0 | Refills: 0 | Status: COMPLETED | OUTPATIENT
Start: 2017-05-14 | End: 2017-05-14

## 2017-05-14 RX ORDER — SODIUM CHLORIDE 9 MG/ML
1000 INJECTION, SOLUTION INTRAVENOUS
Qty: 0 | Refills: 0 | Status: DISCONTINUED | OUTPATIENT
Start: 2017-05-14 | End: 2017-05-14

## 2017-05-14 RX ORDER — POTASSIUM PHOSPHATE, MONOBASIC POTASSIUM PHOSPHATE, DIBASIC 236; 224 MG/ML; MG/ML
15 INJECTION, SOLUTION INTRAVENOUS ONCE
Qty: 0 | Refills: 0 | Status: COMPLETED | OUTPATIENT
Start: 2017-05-14 | End: 2017-05-14

## 2017-05-14 RX ORDER — IBUPROFEN 200 MG
400 TABLET ORAL ONCE
Qty: 0 | Refills: 0 | Status: COMPLETED | OUTPATIENT
Start: 2017-05-14 | End: 2017-05-14

## 2017-05-14 RX ORDER — VANCOMYCIN HCL 1 G
1400 VIAL (EA) INTRAVENOUS EVERY 12 HOURS
Qty: 0 | Refills: 0 | Status: DISCONTINUED | OUTPATIENT
Start: 2017-05-15 | End: 2017-05-18

## 2017-05-14 RX ORDER — HYDROMORPHONE HYDROCHLORIDE 2 MG/ML
0.5 INJECTION INTRAMUSCULAR; INTRAVENOUS; SUBCUTANEOUS
Qty: 0 | Refills: 0 | Status: DISCONTINUED | OUTPATIENT
Start: 2017-05-14 | End: 2017-05-21

## 2017-05-14 RX ORDER — CEFEPIME 1 G/1
INJECTION, POWDER, FOR SOLUTION INTRAMUSCULAR; INTRAVENOUS
Qty: 0 | Refills: 0 | Status: DISCONTINUED | OUTPATIENT
Start: 2017-05-14 | End: 2017-05-18

## 2017-05-14 RX ORDER — ACETAMINOPHEN 500 MG
650 TABLET ORAL EVERY 6 HOURS
Qty: 0 | Refills: 0 | Status: DISCONTINUED | OUTPATIENT
Start: 2017-05-14 | End: 2017-05-25

## 2017-05-14 RX ORDER — VANCOMYCIN HCL 1 G
VIAL (EA) INTRAVENOUS
Qty: 0 | Refills: 0 | Status: DISCONTINUED | OUTPATIENT
Start: 2017-05-14 | End: 2017-05-18

## 2017-05-14 RX ORDER — HYDROMORPHONE HYDROCHLORIDE 2 MG/ML
1 INJECTION INTRAMUSCULAR; INTRAVENOUS; SUBCUTANEOUS ONCE
Qty: 0 | Refills: 0 | Status: DISCONTINUED | OUTPATIENT
Start: 2017-05-14 | End: 2017-05-14

## 2017-05-14 RX ORDER — CEFEPIME 1 G/1
2000 INJECTION, POWDER, FOR SOLUTION INTRAMUSCULAR; INTRAVENOUS ONCE
Qty: 0 | Refills: 0 | Status: COMPLETED | OUTPATIENT
Start: 2017-05-14 | End: 2017-05-14

## 2017-05-14 RX ORDER — SODIUM CHLORIDE 9 MG/ML
1000 INJECTION, SOLUTION INTRAVENOUS ONCE
Qty: 0 | Refills: 0 | Status: COMPLETED | OUTPATIENT
Start: 2017-05-14 | End: 2017-05-14

## 2017-05-14 RX ORDER — ACETAMINOPHEN 500 MG
650 TABLET ORAL EVERY 6 HOURS
Qty: 0 | Refills: 0 | Status: DISCONTINUED | OUTPATIENT
Start: 2017-05-14 | End: 2017-05-14

## 2017-05-14 RX ORDER — FENTANYL CITRATE 50 UG/ML
100 INJECTION INTRAVENOUS ONCE
Qty: 0 | Refills: 0 | Status: DISCONTINUED | OUTPATIENT
Start: 2017-05-14 | End: 2017-05-14

## 2017-05-14 RX ORDER — CEFEPIME 1 G/1
2000 INJECTION, POWDER, FOR SOLUTION INTRAMUSCULAR; INTRAVENOUS EVERY 8 HOURS
Qty: 0 | Refills: 0 | Status: DISCONTINUED | OUTPATIENT
Start: 2017-05-15 | End: 2017-05-18

## 2017-05-14 RX ORDER — SODIUM CHLORIDE 9 MG/ML
500 INJECTION, SOLUTION INTRAVENOUS ONCE
Qty: 0 | Refills: 0 | Status: COMPLETED | OUTPATIENT
Start: 2017-05-14 | End: 2017-05-14

## 2017-05-14 RX ORDER — AMIODARONE HYDROCHLORIDE 400 MG/1
200 TABLET ORAL DAILY
Qty: 0 | Refills: 0 | Status: DISCONTINUED | OUTPATIENT
Start: 2017-05-14 | End: 2017-05-26

## 2017-05-14 RX ORDER — VANCOMYCIN HCL 1 G
1400 VIAL (EA) INTRAVENOUS ONCE
Qty: 0 | Refills: 0 | Status: COMPLETED | OUTPATIENT
Start: 2017-05-14 | End: 2017-05-14

## 2017-05-14 RX ADMIN — AMIODARONE HYDROCHLORIDE 200 MILLIGRAM(S): 400 TABLET ORAL at 22:07

## 2017-05-14 RX ADMIN — Medication 650 MILLIGRAM(S): at 11:16

## 2017-05-14 RX ADMIN — SODIUM CHLORIDE 2000 MILLILITER(S): 9 INJECTION, SOLUTION INTRAVENOUS at 22:04

## 2017-05-14 RX ADMIN — HALOPERIDOL DECANOATE 5 MILLIGRAM(S): 100 INJECTION INTRAMUSCULAR at 10:09

## 2017-05-14 RX ADMIN — HALOPERIDOL DECANOATE 5 MILLIGRAM(S): 100 INJECTION INTRAMUSCULAR at 18:21

## 2017-05-14 RX ADMIN — HYDROMORPHONE HYDROCHLORIDE 0.5 MILLIGRAM(S): 2 INJECTION INTRAMUSCULAR; INTRAVENOUS; SUBCUTANEOUS at 17:53

## 2017-05-14 RX ADMIN — Medication 1 APPLICATION(S): at 06:21

## 2017-05-14 RX ADMIN — SODIUM CHLORIDE 50 MILLILITER(S): 9 INJECTION, SOLUTION INTRAVENOUS at 22:11

## 2017-05-14 RX ADMIN — SODIUM CHLORIDE 50 MILLILITER(S): 9 INJECTION, SOLUTION INTRAVENOUS at 00:10

## 2017-05-14 RX ADMIN — HYDROMORPHONE HYDROCHLORIDE 1 MILLIGRAM(S): 2 INJECTION INTRAMUSCULAR; INTRAVENOUS; SUBCUTANEOUS at 10:40

## 2017-05-14 RX ADMIN — Medication 0.12 MILLIGRAM(S): at 06:21

## 2017-05-14 RX ADMIN — CHLORHEXIDINE GLUCONATE 15 MILLILITER(S): 213 SOLUTION TOPICAL at 06:20

## 2017-05-14 RX ADMIN — POTASSIUM PHOSPHATE, MONOBASIC POTASSIUM PHOSPHATE, DIBASIC 62.5 MILLIMOLE(S): 236; 224 INJECTION, SOLUTION INTRAVENOUS at 06:22

## 2017-05-14 RX ADMIN — CEFEPIME 100 MILLIGRAM(S): 1 INJECTION, POWDER, FOR SOLUTION INTRAMUSCULAR; INTRAVENOUS at 23:55

## 2017-05-14 RX ADMIN — Medication 333.33 MILLIGRAM(S): at 23:56

## 2017-05-14 RX ADMIN — PANTOPRAZOLE SODIUM 40 MILLIGRAM(S): 20 TABLET, DELAYED RELEASE ORAL at 06:21

## 2017-05-14 RX ADMIN — AMIODARONE HYDROCHLORIDE 16.67 MG/MIN: 400 TABLET ORAL at 06:22

## 2017-05-14 RX ADMIN — Medication 400 MILLIGRAM(S): at 22:02

## 2017-05-14 RX ADMIN — Medication 2.5 MILLIGRAM(S): at 06:20

## 2017-05-14 RX ADMIN — FENTANYL CITRATE 100 MICROGRAM(S): 50 INJECTION INTRAVENOUS at 20:01

## 2017-05-14 RX ADMIN — Medication 1 APPLICATION(S): at 17:26

## 2017-05-14 RX ADMIN — Medication 650 MILLIGRAM(S): at 06:21

## 2017-05-14 RX ADMIN — FENTANYL CITRATE 100 MICROGRAM(S): 50 INJECTION INTRAVENOUS at 19:40

## 2017-05-14 RX ADMIN — Medication 2.5 MILLIGRAM(S): at 11:16

## 2017-05-14 RX ADMIN — CHLORHEXIDINE GLUCONATE 15 MILLILITER(S): 213 SOLUTION TOPICAL at 17:25

## 2017-05-14 RX ADMIN — PANTOPRAZOLE SODIUM 40 MILLIGRAM(S): 20 TABLET, DELAYED RELEASE ORAL at 17:26

## 2017-05-14 RX ADMIN — Medication 400 MILLIGRAM(S): at 20:42

## 2017-05-14 RX ADMIN — Medication 650 MILLIGRAM(S): at 00:07

## 2017-05-14 RX ADMIN — HYDROMORPHONE HYDROCHLORIDE 1 MILLIGRAM(S): 2 INJECTION INTRAMUSCULAR; INTRAVENOUS; SUBCUTANEOUS at 10:25

## 2017-05-14 RX ADMIN — HYDROMORPHONE HYDROCHLORIDE 0.5 MILLIGRAM(S): 2 INJECTION INTRAMUSCULAR; INTRAVENOUS; SUBCUTANEOUS at 18:10

## 2017-05-14 RX ADMIN — Medication 2.5 MILLIGRAM(S): at 17:26

## 2017-05-14 RX ADMIN — Medication 650 MILLIGRAM(S): at 17:26

## 2017-05-14 NOTE — PROGRESS NOTE ADULT - SUBJECTIVE AND OBJECTIVE BOX
INTERVAL HPI/OVERNIGHT EVENTS/SUBJECTIVE:  Increased activity and responsiveness.      ICU Vital Signs Last 24 Hrs  T(C): 38.9, Max: 38.9 (05-14 @ 12:00)  T(F): 102, Max: 102 (05-14 @ 12:00)  HR: 92 (60 - 143)  BP: 118/75 (91/71 - 154/106)  BP(mean): 99 (77 - 112)  ABP: 118/86 (93/68 - 153/90)  ABP(mean): 109 (76 - 121)  RR: 20 (14 - 67)  SpO2: 100% (99% - 100%)      I&O's Detail  I & Os for 24h ending 14 May 2017 07:00  =============================================  IN:    multiple electrolytes Injection Type 1: 1200 ml    Pivot: 891 ml    amiodarone Infusion: 400.8 ml    heparin Infusion: 215 ml    Oral Fluid: 190 ml    Solution: 125 ml    Total IN: 3021.8 ml  ---------------------------------------------  OUT:    Indwelling Catheter - Urethral: 1177 ml    Bulb: 765 ml    Nasoenteral Tube: 60 ml    Total OUT: 2002 ml  ---------------------------------------------  Total NET: 1019.8 ml    I & Os for current day (as of 14 May 2017 14:03)  =============================================  IN:    Pivot: 357 ml    multiple electrolytes Injection Type 1: 350 ml    Solution: 187.5 ml    amiodarone Infusion: 116.9 ml    heparin Infusion: 98 ml    Total IN: 1109.4 ml  ---------------------------------------------  OUT:    Indwelling Catheter - Urethral: 230 ml    Total OUT: 230 ml  ---------------------------------------------  Total NET: 879.4 ml      Mode: CPAP with PS  FiO2: 30  PEEP: 8  PS: 12  MAP: 12  PIP: 21    ABG - ( 14 May 2017 09:17 )  pH: 7.52  /  pCO2: 29    /  pO2: 114   / HCO3: 26    / Base Excess: 1.6   /  SaO2: 99                  MEDICATIONS  (STANDING):  multiple electrolytes Injection Type 1 1000milliLiter(s) IV Continuous <Continuous>  pantoprazole  Injectable 40milliGRAM(s) IV Push every 12 hours  chlorhexidine 0.12% Liquid 15milliLiter(s) Swish and Spit two times a day  dextrose 5%. 1000milliLiter(s) IV Continuous <Continuous>  dextrose 50% Injectable 12.5Gram(s) IV Push once  dextrose 50% Injectable 25Gram(s) IV Push once  dextrose 50% Injectable 25Gram(s) IV Push once  amiodarone Infusion 0.5mG/Min IV Continuous <Continuous>  metoprolol Injectable 2.5milliGRAM(s) IV Push every 6 hours  petrolatum Ophthalmic Ointment 1Application(s) Both EYES two times a day  heparin  Infusion 1100Unit(s)/Hr IV Continuous <Continuous>  digoxin     Tablet 0.125milliGRAM(s) Oral daily  acetaminophen   Tablet 650milliGRAM(s) Oral every 6 hours    MEDICATIONS  (PRN):  dextrose Gel 1Dose(s) Oral once PRN Blood Glucose LESS THAN 70 milliGRAM(s)/deciliter  glucagon  Injectable 1milliGRAM(s) IntraMuscular once PRN Glucose LESS THAN 70 milligrams/deciliter  HYDROmorphone  Injectable 0.5milliGRAM(s) IV Push every 3 hours PRN Severe Pain (7 - 10)      NUTRITION/IVF: Tube feeds      PHYSICAL EXAM:    Gen:  Aggitated and unconfortable appearing, biting ET tube    Eyes: Open spontaneously, PERRLA    Neurological: Withdrawal to noxious stimuli, Spontaneously moving head and extremities.    Pulmonary: Rapid breathing with high minute ventilation    Cardiovascular: Rapid and irregular    Gastrointestinal: soft abd, tolerating tube feeds    Genitourinary:Rogers              LABS:  CBC Full  -  ( 14 May 2017 04:28 )  WBC Count : 14.4 K/uL  Hemoglobin : 11.5 g/dL  Hematocrit : 35.3 %  Platelet Count - Automated : 317 K/uL  Mean Cell Volume : 91.5 fl  Mean Cell Hemoglobin : 29.8 pg  Mean Cell Hemoglobin Concentration : 32.6 g/dL  Auto Neutrophil # : 11.3 K/uL  Auto Lymphocyte # : 1.5 K/uL  Auto Monocyte # : 1.5 K/uL  Auto Eosinophil # : 0.0 K/uL  Auto Basophil # : 0.0 K/uL  Auto Neutrophil % : 78.1 %  Auto Lymphocyte % : 10.3 %  Auto Monocyte % : 10.2 %  Auto Eosinophil % : 0.3 %  Auto Basophil % : 0.1 %    05-14    142  |  102  |  34.0<H>  ----------------------------<  161<H>  3.8   |  28.0  |  0.92    Ca    7.7<L>      14 May 2017 04:28  Phos  2.2     05-14  Mg     2.1     05-14    TPro  5.6<L>  /  Alb  2.3<L>  /  TBili  1.1  /  DBili  x   /  AST  150<H>  /  ALT  106<H>  /  AlkPhos  166<H>  05-14    PT/INR - ( 14 May 2017 04:28 )   PT: 15.9 sec;   INR: 1.43 ratio         PTT - ( 14 May 2017 11:49 )  PTT:71.4 sec    RECENT CULTURES:  05-09 .Blood Blood XXXX XXXX   No growth at 48 hours    05-09 .Blood Blood XXXX XXXX   No growth at 48 hours        LIVER FUNCTIONS - ( 14 May 2017 04:28 )  Alb: 2.3 g/dL / Pro: 5.6 g/dL / ALK PHOS: 166 U/L / ALT: 106 U/L / AST: 150 U/L / GGT: x               CAPILLARY BLOOD GLUCOSE  132 (14 May 2017 06:00)  147 (13 May 2017 23:38)  140 (13 May 2017 16:00)      RADIOLOGY & ADDITIONAL STUDIES:    ASSESSMENT/PLAN:  51yMale presenting with:    Neuro: Encephalopathy, likely multifactorial, meds, cardiac arrest and possibel general ischemic insult.  Appears in pain.  Will give IV pain meds and revaluate.    CV: Rapid a-fib.  Pain control.  Start rate control if not improved with agitation control.  Home cardiac meds.  Anticoagulation.    Pulm: Resp failure.  Continue vent support.  Tolerating CPAP/PS.      GI/Nutrition:  Tube feeds.    /Renal: UOP adeqaute.    Lines/Tubes: Fem a-line to be relocated.              CRITICAL CARE TIME SPENT: 45 min

## 2017-05-15 LAB
AMYLASE P1 CFR SERPL: 141 U/L — HIGH (ref 36–128)
ANION GAP SERPL CALC-SCNC: 10 MMOL/L — SIGNIFICANT CHANGE UP (ref 5–17)
ANISOCYTOSIS BLD QL: SLIGHT — SIGNIFICANT CHANGE UP
APTT BLD: 104.9 SEC — HIGH (ref 27.5–37.4)
APTT BLD: 69.2 SEC — HIGH (ref 27.5–37.4)
APTT BLD: 99 SEC — HIGH (ref 27.5–37.4)
BUN SERPL-MCNC: 38 MG/DL — HIGH (ref 8–20)
CALCIUM SERPL-MCNC: 6.9 MG/DL — LOW (ref 8.6–10.2)
CHLORIDE SERPL-SCNC: 103 MMOL/L — SIGNIFICANT CHANGE UP (ref 98–107)
CK MB CFR SERPL CALC: 3.4 NG/ML — SIGNIFICANT CHANGE UP (ref 0–6.7)
CK SERPL-CCNC: 211 U/L — HIGH (ref 30–200)
CO2 SERPL-SCNC: 32 MMOL/L — HIGH (ref 22–29)
CREAT SERPL-MCNC: 1.03 MG/DL — SIGNIFICANT CHANGE UP (ref 0.5–1.3)
GAS PNL BLDA: SIGNIFICANT CHANGE UP
GLUCOSE SERPL-MCNC: 119 MG/DL — HIGH (ref 70–115)
GRAM STN FLD: SIGNIFICANT CHANGE UP
HCT VFR BLD CALC: 36.3 % — LOW (ref 42–52)
HGB BLD-MCNC: 11.7 G/DL — LOW (ref 14–18)
INR BLD: 1.88 RATIO — HIGH (ref 0.88–1.16)
INR BLD: 1.88 RATIO — HIGH (ref 0.88–1.16)
LIDOCAIN IGE QN: 179 U/L — HIGH (ref 22–51)
LYMPHOCYTES # BLD AUTO: 10 % — LOW (ref 20–55)
MACROCYTES BLD QL: SLIGHT — SIGNIFICANT CHANGE UP
MAGNESIUM SERPL-MCNC: 2 MG/DL — SIGNIFICANT CHANGE UP (ref 1.6–2.6)
MCHC RBC-ENTMCNC: 30 PG — SIGNIFICANT CHANGE UP (ref 27–31)
MCHC RBC-ENTMCNC: 32.2 G/DL — SIGNIFICANT CHANGE UP (ref 32–36)
MCV RBC AUTO: 93.1 FL — SIGNIFICANT CHANGE UP (ref 80–94)
MONOCYTES NFR BLD AUTO: 9 % — SIGNIFICANT CHANGE UP (ref 3–10)
NEUTROPHILS NFR BLD AUTO: 81 % — HIGH (ref 37–73)
NRBC # BLD: 1 /100 — HIGH (ref 0–0)
OVALOCYTES BLD QL SMEAR: SLIGHT — SIGNIFICANT CHANGE UP
PHOSPHATE SERPL-MCNC: 2.1 MG/DL — LOW (ref 2.4–4.7)
PLAT MORPH BLD: NORMAL — SIGNIFICANT CHANGE UP
PLATELET # BLD AUTO: 312 K/UL — SIGNIFICANT CHANGE UP (ref 150–400)
POIKILOCYTOSIS BLD QL AUTO: SLIGHT — SIGNIFICANT CHANGE UP
POLYCHROMASIA BLD QL SMEAR: SLIGHT — SIGNIFICANT CHANGE UP
POTASSIUM SERPL-MCNC: 4.1 MMOL/L — SIGNIFICANT CHANGE UP (ref 3.5–5.3)
POTASSIUM SERPL-SCNC: 4.1 MMOL/L — SIGNIFICANT CHANGE UP (ref 3.5–5.3)
PROTHROM AB SERPL-ACNC: 20.9 SEC — HIGH (ref 9.8–12.7)
PROTHROM AB SERPL-ACNC: 21 SEC — HIGH (ref 9.8–12.7)
RBC # BLD: 3.9 M/UL — LOW (ref 4.6–6.2)
RBC # FLD: 15.3 % — SIGNIFICANT CHANGE UP (ref 11–15.6)
RBC BLD AUTO: ABNORMAL
SODIUM SERPL-SCNC: 145 MMOL/L — SIGNIFICANT CHANGE UP (ref 135–145)
SPECIMEN SOURCE: SIGNIFICANT CHANGE UP
WBC # BLD: 26.4 K/UL — HIGH (ref 4.8–10.8)
WBC # FLD AUTO: 26.4 K/UL — HIGH (ref 4.8–10.8)

## 2017-05-15 PROCEDURE — 71010: CPT | Mod: 26

## 2017-05-15 PROCEDURE — 99291 CRITICAL CARE FIRST HOUR: CPT | Mod: 24

## 2017-05-15 RX ORDER — MAGNESIUM SULFATE 500 MG/ML
2 VIAL (ML) INJECTION ONCE
Qty: 0 | Refills: 0 | Status: COMPLETED | OUTPATIENT
Start: 2017-05-15 | End: 2017-05-15

## 2017-05-15 RX ORDER — HYDROMORPHONE HYDROCHLORIDE 2 MG/ML
1 INJECTION INTRAMUSCULAR; INTRAVENOUS; SUBCUTANEOUS ONCE
Qty: 0 | Refills: 0 | Status: DISCONTINUED | OUTPATIENT
Start: 2017-05-15 | End: 2017-05-15

## 2017-05-15 RX ORDER — FENTANYL CITRATE 50 UG/ML
50 INJECTION INTRAVENOUS ONCE
Qty: 0 | Refills: 0 | Status: DISCONTINUED | OUTPATIENT
Start: 2017-05-15 | End: 2017-05-15

## 2017-05-15 RX ORDER — PROPOFOL 10 MG/ML
5 INJECTION, EMULSION INTRAVENOUS
Qty: 1000 | Refills: 0 | Status: DISCONTINUED | OUTPATIENT
Start: 2017-05-15 | End: 2017-05-16

## 2017-05-15 RX ORDER — CALCIUM GLUCONATE 100 MG/ML
2 VIAL (ML) INTRAVENOUS
Qty: 0 | Refills: 0 | Status: COMPLETED | OUTPATIENT
Start: 2017-05-15 | End: 2017-05-15

## 2017-05-15 RX ADMIN — Medication 650 MILLIGRAM(S): at 00:14

## 2017-05-15 RX ADMIN — Medication 63.75 MILLIMOLE(S): at 08:34

## 2017-05-15 RX ADMIN — Medication 2.5 MILLIGRAM(S): at 06:08

## 2017-05-15 RX ADMIN — Medication 0.12 MILLIGRAM(S): at 06:11

## 2017-05-15 RX ADMIN — Medication 333.33 MILLIGRAM(S): at 06:08

## 2017-05-15 RX ADMIN — Medication 2.5 MILLIGRAM(S): at 17:21

## 2017-05-15 RX ADMIN — AMIODARONE HYDROCHLORIDE 200 MILLIGRAM(S): 400 TABLET ORAL at 06:11

## 2017-05-15 RX ADMIN — Medication 50 GRAM(S): at 06:37

## 2017-05-15 RX ADMIN — PANTOPRAZOLE SODIUM 40 MILLIGRAM(S): 20 TABLET, DELAYED RELEASE ORAL at 17:21

## 2017-05-15 RX ADMIN — PROPOFOL 2.82 MICROGRAM(S)/KG/MIN: 10 INJECTION, EMULSION INTRAVENOUS at 20:02

## 2017-05-15 RX ADMIN — CEFEPIME 100 MILLIGRAM(S): 1 INJECTION, POWDER, FOR SOLUTION INTRAMUSCULAR; INTRAVENOUS at 15:05

## 2017-05-15 RX ADMIN — HYDROMORPHONE HYDROCHLORIDE 0.5 MILLIGRAM(S): 2 INJECTION INTRAMUSCULAR; INTRAVENOUS; SUBCUTANEOUS at 15:20

## 2017-05-15 RX ADMIN — Medication 650 MILLIGRAM(S): at 17:33

## 2017-05-15 RX ADMIN — Medication 333.33 MILLIGRAM(S): at 17:21

## 2017-05-15 RX ADMIN — FENTANYL CITRATE 50 MICROGRAM(S): 50 INJECTION INTRAVENOUS at 07:30

## 2017-05-15 RX ADMIN — PANTOPRAZOLE SODIUM 40 MILLIGRAM(S): 20 TABLET, DELAYED RELEASE ORAL at 06:11

## 2017-05-15 RX ADMIN — Medication 200 GRAM(S): at 09:20

## 2017-05-15 RX ADMIN — Medication 1 APPLICATION(S): at 06:12

## 2017-05-15 RX ADMIN — PROPOFOL 2.82 MICROGRAM(S)/KG/MIN: 10 INJECTION, EMULSION INTRAVENOUS at 10:19

## 2017-05-15 RX ADMIN — Medication 650 MILLIGRAM(S): at 11:52

## 2017-05-15 RX ADMIN — FENTANYL CITRATE 50 MICROGRAM(S): 50 INJECTION INTRAVENOUS at 07:15

## 2017-05-15 RX ADMIN — CHLORHEXIDINE GLUCONATE 15 MILLILITER(S): 213 SOLUTION TOPICAL at 17:21

## 2017-05-15 RX ADMIN — Medication 1 APPLICATION(S): at 17:17

## 2017-05-15 RX ADMIN — CHLORHEXIDINE GLUCONATE 15 MILLILITER(S): 213 SOLUTION TOPICAL at 06:08

## 2017-05-15 RX ADMIN — CEFEPIME 100 MILLIGRAM(S): 1 INJECTION, POWDER, FOR SOLUTION INTRAMUSCULAR; INTRAVENOUS at 06:08

## 2017-05-15 RX ADMIN — Medication 2.5 MILLIGRAM(S): at 11:52

## 2017-05-15 RX ADMIN — HYDROMORPHONE HYDROCHLORIDE 1 MILLIGRAM(S): 2 INJECTION INTRAMUSCULAR; INTRAVENOUS; SUBCUTANEOUS at 10:30

## 2017-05-15 RX ADMIN — Medication 650 MILLIGRAM(S): at 06:11

## 2017-05-15 RX ADMIN — HYDROMORPHONE HYDROCHLORIDE 0.5 MILLIGRAM(S): 2 INJECTION INTRAMUSCULAR; INTRAVENOUS; SUBCUTANEOUS at 15:05

## 2017-05-15 RX ADMIN — CEFEPIME 100 MILLIGRAM(S): 1 INJECTION, POWDER, FOR SOLUTION INTRAMUSCULAR; INTRAVENOUS at 21:31

## 2017-05-15 RX ADMIN — Medication 2.5 MILLIGRAM(S): at 00:00

## 2017-05-15 RX ADMIN — HYDROMORPHONE HYDROCHLORIDE 1 MILLIGRAM(S): 2 INJECTION INTRAMUSCULAR; INTRAVENOUS; SUBCUTANEOUS at 10:14

## 2017-05-15 RX ADMIN — Medication 200 GRAM(S): at 08:34

## 2017-05-15 NOTE — PROGRESS NOTE ADULT - ATTENDING COMMENTS
Agree with note as written; assessment of events follows. high temp and physiologic evidence of sepsis; all invasive lines removed, cultures pending, immediate improvement after removal. This am much better. Normotensive with HR still in 70's, no further v-tach. Pacer interrogated. On exam today he will open eyes but has low tone all extremities; hyporeflexic. Appears as polyneuropathy of critical illness vs anoxic injury. NO bile leak, bilirubin 1.2. We will recommend trach and peg and full support.

## 2017-05-15 NOTE — PROGRESS NOTE ADULT - ASSESSMENT
· Assessment		  IMPRESSION:  The patient is a 51 year old male with significant cardiovascular history including atrial fibrillation and cardiomyopathy s/p laparoscopic cholecystectomy. GI f/u for bile leak.   - first ercp 5/9/17: papilla appeared to be stenosed. During attempts at biliary cannulation, patient developed hypotension. Procedure aborted. Patient coded. Intubated. Patient resuscitated. ACS team took over, and performed exploratory laparotomy.  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   - Off vasopressors now. Still intubated. LFTs improving. On NGT feeding.  - febrile 101.8 F    PLAN:  NGT feeding  IV antibiotics  SICU monitoring  monitor cbc, serum lytes, and LFTs  f/u septic w/u  d/w SICU team  will f/u

## 2017-05-15 NOTE — PROCEDURE NOTE - ADDITIONAL PROCEDURE DETAILS
Patient with multiple lacerations and crush injury to the tongue. He was agitated in an altered state and bit down on the tongue. multiple flaps aligned and devitalized tissue removed.

## 2017-05-15 NOTE — PROGRESS NOTE ADULT - SUBJECTIVE AND OBJECTIVE BOX
INTERVAL HPI/OVERNIGHT EVENTS/SUBJECTIVE: Was agitated yesterday AM causing tongue bite.  Still not following commands. Given Haldol and Dilaudid.  Febrile, Tachycardic, increased RR.  Increased La of ~ 5. CK NL and still flacid exam.  Improved after increased PEEP, Fentanyl and IVF.  Started on Abx. Heparin adjusted for .    ICU Vital Signs Last 24 Hrs  T(C): 37.7, Max: 40.3 (-14 @ 20:00)  T(F): 99.9, Max: 104.5 (05-14 @ 20:00)  HR: 78 (75 - 143)  BP: 103/76 (99/62 - 154/106)  BP(mean): 86 (75 - 110)  ABP: 138/89 (112/86 - 149/95)  ABP(mean): 103 (103 - 121)  RR: 20 (14 - 42)  SpO2: 100% (78% - 100%)      I&O's Detail  I & Os for 24h ending 14 May 2017 07:00  =============================================  IN:    multiple electrolytes Injection Type 1: 1200 ml    Pivot: 891 ml    amiodarone Infusion: 400.8 ml    heparin Infusion: 215 ml    Oral Fluid: 190 ml    Solution: 125 ml    Total IN: 3021.8 ml  ---------------------------------------------  OUT:    Indwelling Catheter - Urethral: 1177 ml    Bulb: 765 ml    Nasoenteral Tube: 60 ml    Total OUT: 2002 ml  ---------------------------------------------  Total NET: 1019.8 ml    I & Os for current day (as of 15 May 2017 04:28)  =============================================  IN:    multiple electrolytes Injection Type 1: 1955 ml    Pivot: 969 ml    Solution: 550 ml    amiodarone Infusion: 350.7 ml    heparin Infusion: 269.5 ml    Solution: 187.5 ml    Oral Fluid: 80 ml    Total IN: 4361.7 ml  ---------------------------------------------  OUT:    Indwelling Catheter - Urethral: 1065 ml    Bulb: 270 ml    Rectal Tube: 170 ml    Total OUT: 1505 ml  ---------------------------------------------  Total NET: 2856.7 ml      Mode: SIMV with PS  RR (machine): 10  TV (machine): 500  FiO2: 50  PEEP: 12  PS: 15  MAP: 16  PIP: 28    ABG - ( 15 May 2017 04:14 )  pH: 7.51  /  pCO2: 38    /  pO2: 195   / HCO3: 31    / Base Excess: 6.7   /  SaO2: 100                 MEDICATIONS  (STANDING):  multiple electrolytes Injection Type 1 1000milliLiter(s) IV Continuous <Continuous>  pantoprazole  Injectable 40milliGRAM(s) IV Push every 12 hours  chlorhexidine 0.12% Liquid 15milliLiter(s) Swish and Spit two times a day  dextrose 5%. 1000milliLiter(s) IV Continuous <Continuous>  dextrose 50% Injectable 12.5Gram(s) IV Push once  dextrose 50% Injectable 25Gram(s) IV Push once  dextrose 50% Injectable 25Gram(s) IV Push once  metoprolol Injectable 2.5milliGRAM(s) IV Push every 6 hours  petrolatum Ophthalmic Ointment 1Application(s) Both EYES two times a day  heparin  Infusion 1100Unit(s)/Hr IV Continuous <Continuous>  digoxin     Tablet 0.125milliGRAM(s) Oral daily  amiodarone    Tablet 200milliGRAM(s) Oral daily  cefepime  IVPB 2000milliGRAM(s) IV Intermittent every 8 hours  vancomycin  IVPB 1400milliGRAM(s) IV Intermittent every 12 hours  cefepime  IVPB  IV Intermittent   vancomycin  IVPB  IV Intermittent   acetaminophen    Suspension 650milliGRAM(s) Oral every 6 hours    MEDICATIONS  (PRN):  dextrose Gel 1Dose(s) Oral once PRN Blood Glucose LESS THAN 70 milliGRAM(s)/deciliter  glucagon  Injectable 1milliGRAM(s) IntraMuscular once PRN Glucose LESS THAN 70 milligrams/deciliter  HYDROmorphone  Injectable 0.5milliGRAM(s) IV Push every 3 hours PRN Severe Pain (7 - 10)      NUTRITION/IVF: Pivot @ 51.    CENTRAL LINE:  LOCATION:   DATE INSERTED:  R SC  CVP:  SCVO2 68%    ARMAS:  Yes    A-LINE:   No       NGT    PHYSICAL EXAM:     Gen: NAD, No cyanosis, Pallor.  On Ventilator    Eyes: PERRL ~ 3mm    Neurological: GCS 4/1T/5. Not moving BL LE.  Minimal response to BL UE. Does not track to movement or attend to voice    Neck: Supple. NT AT, FROM no pain.  No JVD. No meningeal signs    Pulmonary: NAD, CTA, = BL .  Minimal secretions.    Cardiovascular: Sinus tachy, S1, S2, No murmurs noted.    Gastrointestinal: ND, Soft, NT.    Extremities: NT, AT, Mild-Moderate 2+ pitting edema.  No erythema or palpable cord noted.  FROM, = 2+ pulses throughout.          LABS:  CBC Full  -  ( 14 May 2017 20:45 )  WBC Count : 20.2 K/uL  Hemoglobin : 12.0 g/dL  Hematocrit : 37.2 %  Platelet Count - Automated : 375 K/uL  Mean Cell Volume : 93.0 fl  Mean Cell Hemoglobin : 30.0 pg  Mean Cell Hemoglobin Concentration : 32.3 g/dL  Auto Neutrophil # : 17.2 K/uL  Auto Lymphocyte # : 0.6 K/uL  Auto Monocyte # : 2.0 K/uL  Auto Eosinophil # : 0.0 K/uL  Auto Basophil # : 0.0 K/uL  Auto Neutrophil % : 82.0 %  Auto Lymphocyte % : 8.0 %  Auto Monocyte % : 7.0 %  Auto Eosinophil % : x  Auto Basophil % : 0.0 %    -    144  |  103  |  39.0<H>  ----------------------------<  168<H>  4.3   |  25.0  |  1.28    Ca    7.2<L>      14 May 2017 20:45  Phos  5.1       Mg     2.2         TPro  6.0<L>  /  Alb  2.5<L>  /  TBili  1.4  /  DBili  x   /  AST  394<H>  /  ALT  242<H>  /  AlkPhos  193<H>      PT/INR - ( 14 May 2017 20:45 )   PT: 18.6 sec;   INR: 1.67 ratio         PTT - ( 14 May 2017 20:45 )  PTT:107.6 sec  Urinalysis Basic - ( 14 May 2017 20:48 )    Color: Red / Appearance: Slightly Turbid / S.025 / pH: x  Gluc: x / Ketone: Negative  / Bili: Small / Urobili: 8 mg/dL   Blood: x / Protein: 500 mg/dL / Nitrite: Positive   Leuk Esterase: Small / RBC: TNTC /HPF / WBC 0-2   Sq Epi: x / Non Sq Epi: x / Bacteria: Occasional      RECENT CULTURES:   .Blood Blood XXXX XXXX   No growth at 5 days.     .Blood Blood XXXX XXXX   No growth at 5 days.        LIVER FUNCTIONS - ( 14 May 2017 20:45 )  Alb: 2.5 g/dL / Pro: 6.0 g/dL / ALK PHOS: 193 U/L / ALT: 242 U/L / AST: 394 U/L / GGT: x           CARDIAC MARKERS ( 14 May 2017 20:45 )  x     / x     / 259 U/L / x     / 3.6 ng/mL      CAPILLARY BLOOD GLUCOSE  119 (15 May 2017 00:00)  132 (14 May 2017 06:00)      RADIOLOGY & ADDITIONAL STUDIES:    ASSESSMENT/PLAN:  51yMale presenting with: Cardiac arrest following ERCP for bile leak SP Lap carissa.    Neurological:Continue to allow pt to awake.  Avoid sedatives or deleriogenic meds since possible hypoactive delirium is in DDX.    Pulmonary: Maintain current PEEP and vent settings. Discuss Trach with family    Cardiovascular: Continue rate control meds as written.    Gastrointestinal: Continue TF at goal. Discuss PEG with family    Genitourinary: Maintain Armas at this time for monitoring since pt newly septic    Heme:     ID: Continue Abx for UTI vs VAP.  FU CX.  Will consider Repeat Abd CT to look for additional source.    Skin: Avoid skin breakdown.    Lines/ Tubes: Maintain as above.    Dispo: Critical care as above.            CRITICAL CARE TIME SPENT: 60 min

## 2017-05-15 NOTE — PROGRESS NOTE ADULT - ASSESSMENT
51yMale presenting with: Cardiac arrest following ERCP for bile leak SP Lap carissa.    Neurological:Continue to allow pt to awake.  Avoid sedatives or deleriogenic meds since possible hypoactive delirium is in DDX.    Pulmonary: Maintain current PEEP and vent settings. Discuss Trach with family    Cardiovascular: Continue rate control meds as written.    Gastrointestinal: Continue TF at goal. Discuss PEG with family    Genitourinary: Maintain Rogers at this time for monitoring since pt newly septic    Heme:     ID: Continue Abx for UTI vs VAP.  FU CX.  Will consider Repeat Abd CT to look for additional source.    Skin: Avoid skin breakdown.    Lines/ Tubes: Maintain as above.    Dispo: Critical care as above.            CRITICAL CARE TIME SPENT: 60 min

## 2017-05-15 NOTE — PROGRESS NOTE ADULT - SUBJECTIVE AND OBJECTIVE BOX
INTERVAL HPI/OVERNIGHT EVENTS:  Patient seen and examined    MEDICATIONS  (STANDING):  multiple electrolytes Injection Type 1 1000milliLiter(s) IV Continuous <Continuous>  pantoprazole  Injectable 40milliGRAM(s) IV Push every 12 hours  chlorhexidine 0.12% Liquid 15milliLiter(s) Swish and Spit two times a day  dextrose 5%. 1000milliLiter(s) IV Continuous <Continuous>  dextrose 50% Injectable 12.5Gram(s) IV Push once  dextrose 50% Injectable 25Gram(s) IV Push once  dextrose 50% Injectable 25Gram(s) IV Push once  metoprolol Injectable 2.5milliGRAM(s) IV Push every 6 hours  petrolatum Ophthalmic Ointment 1Application(s) Both EYES two times a day  heparin  Infusion 1100Unit(s)/Hr IV Continuous <Continuous>  digoxin     Tablet 0.125milliGRAM(s) Oral daily  amiodarone    Tablet 200milliGRAM(s) Oral daily  cefepime  IVPB 2000milliGRAM(s) IV Intermittent every 8 hours  vancomycin  IVPB 1400milliGRAM(s) IV Intermittent every 12 hours  cefepime  IVPB  IV Intermittent   vancomycin  IVPB  IV Intermittent   acetaminophen    Suspension 650milliGRAM(s) Oral every 6 hours  propofol Infusion 5MICROgram(s)/kG/Min IV Continuous <Continuous>    MEDICATIONS  (PRN):  dextrose Gel 1Dose(s) Oral once PRN Blood Glucose LESS THAN 70 milliGRAM(s)/deciliter  glucagon  Injectable 1milliGRAM(s) IntraMuscular once PRN Glucose LESS THAN 70 milligrams/deciliter  HYDROmorphone  Injectable 0.5milliGRAM(s) IV Push every 3 hours PRN Severe Pain (7 - 10)      Allergies    No Known Allergies    Intolerances        Vital Signs Last 24 Hrs  T(C): 37.4, Max: 40.3 (05-14 @ 20:00)  T(F): 99.3, Max: 104.5 (05-14 @ 20:00)  HR: 69 (65 - 138)  BP: 109/67 (99/62 - 185/83)  BP(mean): 119 (75 - 119)  RR: 24 (14 - 33)  SpO2: 100% (78% - 100%)    PHYSICAL EXAM:  General: NAD.  CVS: S1, S2  Chest: air entry bilaterally present  Abd: BS present, soft, non-tender      LABS:                        11.7   26.4  )-----------( 312      ( 15 May 2017 05:04 )             36.3     05-15    145  |  103  |  38.0<H>  ----------------------------<  119<H>  4.1   |  32.0<H>  |  1.03    Ca    6.9<L>      15 May 2017 05:04  Phos  2.1     05-15  Mg     2.0     05-15    TPro  6.0<L>  /  Alb  2.5<L>  /  TBili  1.4  /  DBili  x   /  AST  394<H>  /  ALT  242<H>  /  AlkPhos  193<H>  05-14    PT/INR - ( 15 May 2017 05:05 )   PT: 21.0 sec;   INR: 1.88 ratio         PTT - ( 15 May 2017 05:05 )  PTT:104.9 sec

## 2017-05-16 LAB
ANION GAP SERPL CALC-SCNC: 8 MMOL/L — SIGNIFICANT CHANGE UP (ref 5–17)
APTT BLD: 73.7 SEC — HIGH (ref 27.5–37.4)
BASOPHILS # BLD AUTO: 0 K/UL — SIGNIFICANT CHANGE UP (ref 0–0.2)
BUN SERPL-MCNC: 28 MG/DL — HIGH (ref 8–20)
CALCIUM SERPL-MCNC: 7 MG/DL — LOW (ref 8.6–10.2)
CHLORIDE SERPL-SCNC: 104 MMOL/L — SIGNIFICANT CHANGE UP (ref 98–107)
CO2 SERPL-SCNC: 33 MMOL/L — HIGH (ref 22–29)
CREAT SERPL-MCNC: 0.65 MG/DL — SIGNIFICANT CHANGE UP (ref 0.5–1.3)
CULTURE RESULTS: SIGNIFICANT CHANGE UP
EOSINOPHIL # BLD AUTO: 0.1 K/UL — SIGNIFICANT CHANGE UP (ref 0–0.5)
EOSINOPHIL NFR BLD AUTO: 0.4 % — SIGNIFICANT CHANGE UP (ref 0–5)
GAS PNL BLDA: SIGNIFICANT CHANGE UP
GLUCOSE SERPL-MCNC: 128 MG/DL — HIGH (ref 70–115)
HCT VFR BLD CALC: 33 % — LOW (ref 42–52)
HGB BLD-MCNC: 10.5 G/DL — LOW (ref 14–18)
INR BLD: 1.81 RATIO — HIGH (ref 0.88–1.16)
LYMPHOCYTES # BLD AUTO: 1.3 K/UL — SIGNIFICANT CHANGE UP (ref 1–4.8)
LYMPHOCYTES # BLD AUTO: 6.2 % — LOW (ref 20–55)
MAGNESIUM SERPL-MCNC: 1.7 MG/DL — SIGNIFICANT CHANGE UP (ref 1.6–2.6)
MCHC RBC-ENTMCNC: 29.7 PG — SIGNIFICANT CHANGE UP (ref 27–31)
MCHC RBC-ENTMCNC: 31.8 G/DL — LOW (ref 32–36)
MCV RBC AUTO: 93.2 FL — SIGNIFICANT CHANGE UP (ref 80–94)
MONOCYTES # BLD AUTO: 1.2 K/UL — HIGH (ref 0–0.8)
MONOCYTES NFR BLD AUTO: 5.9 % — SIGNIFICANT CHANGE UP (ref 3–10)
NEUTROPHILS # BLD AUTO: 18.2 K/UL — HIGH (ref 1.8–8)
NEUTROPHILS NFR BLD AUTO: 86.6 % — HIGH (ref 37–73)
PHOSPHATE SERPL-MCNC: 1.7 MG/DL — LOW (ref 2.4–4.7)
PLATELET # BLD AUTO: 292 K/UL — SIGNIFICANT CHANGE UP (ref 150–400)
POTASSIUM SERPL-MCNC: 3.3 MMOL/L — LOW (ref 3.5–5.3)
POTASSIUM SERPL-SCNC: 3.3 MMOL/L — LOW (ref 3.5–5.3)
PROTHROM AB SERPL-ACNC: 20.1 SEC — HIGH (ref 9.8–12.7)
RBC # BLD: 3.54 M/UL — LOW (ref 4.6–6.2)
RBC # FLD: 15.4 % — SIGNIFICANT CHANGE UP (ref 11–15.6)
SODIUM SERPL-SCNC: 145 MMOL/L — SIGNIFICANT CHANGE UP (ref 135–145)
SPECIMEN SOURCE: SIGNIFICANT CHANGE UP
VANCOMYCIN TROUGH SERPL-MCNC: 12.3 UG/ML — SIGNIFICANT CHANGE UP (ref 10–20)
WBC # BLD: 21 K/UL — HIGH (ref 4.8–10.8)
WBC # FLD AUTO: 21 K/UL — HIGH (ref 4.8–10.8)

## 2017-05-16 PROCEDURE — 99291 CRITICAL CARE FIRST HOUR: CPT | Mod: 24

## 2017-05-16 RX ORDER — POTASSIUM CHLORIDE 20 MEQ
40 PACKET (EA) ORAL ONCE
Qty: 0 | Refills: 0 | Status: DISCONTINUED | OUTPATIENT
Start: 2017-05-16 | End: 2017-05-16

## 2017-05-16 RX ORDER — MAGNESIUM SULFATE 500 MG/ML
2 VIAL (ML) INJECTION ONCE
Qty: 0 | Refills: 0 | Status: COMPLETED | OUTPATIENT
Start: 2017-05-16 | End: 2017-05-16

## 2017-05-16 RX ORDER — LANOLIN ALCOHOL/MO/W.PET/CERES
6 CREAM (GRAM) TOPICAL AT BEDTIME
Qty: 0 | Refills: 0 | Status: DISCONTINUED | OUTPATIENT
Start: 2017-05-16 | End: 2017-05-25

## 2017-05-16 RX ORDER — METOPROLOL TARTRATE 50 MG
2.5 TABLET ORAL EVERY 6 HOURS
Qty: 0 | Refills: 0 | Status: DISCONTINUED | OUTPATIENT
Start: 2017-05-16 | End: 2017-05-20

## 2017-05-16 RX ORDER — PROPOFOL 10 MG/ML
5 INJECTION, EMULSION INTRAVENOUS
Qty: 1000 | Refills: 0 | Status: DISCONTINUED | OUTPATIENT
Start: 2017-05-16 | End: 2017-05-19

## 2017-05-16 RX ORDER — POTASSIUM PHOSPHATE, MONOBASIC POTASSIUM PHOSPHATE, DIBASIC 236; 224 MG/ML; MG/ML
15 INJECTION, SOLUTION INTRAVENOUS ONCE
Qty: 0 | Refills: 0 | Status: COMPLETED | OUTPATIENT
Start: 2017-05-16 | End: 2017-05-16

## 2017-05-16 RX ORDER — POTASSIUM CHLORIDE 20 MEQ
40 PACKET (EA) ORAL ONCE
Qty: 0 | Refills: 0 | Status: COMPLETED | OUTPATIENT
Start: 2017-05-16 | End: 2017-05-16

## 2017-05-16 RX ORDER — QUETIAPINE FUMARATE 200 MG/1
50 TABLET, FILM COATED ORAL DAILY
Qty: 0 | Refills: 0 | Status: DISCONTINUED | OUTPATIENT
Start: 2017-05-16 | End: 2017-05-25

## 2017-05-16 RX ADMIN — Medication 650 MILLIGRAM(S): at 01:09

## 2017-05-16 RX ADMIN — Medication 2.5 MILLIGRAM(S): at 01:08

## 2017-05-16 RX ADMIN — CEFEPIME 100 MILLIGRAM(S): 1 INJECTION, POWDER, FOR SOLUTION INTRAMUSCULAR; INTRAVENOUS at 22:34

## 2017-05-16 RX ADMIN — Medication 1 APPLICATION(S): at 17:03

## 2017-05-16 RX ADMIN — CEFEPIME 100 MILLIGRAM(S): 1 INJECTION, POWDER, FOR SOLUTION INTRAMUSCULAR; INTRAVENOUS at 13:30

## 2017-05-16 RX ADMIN — QUETIAPINE FUMARATE 50 MILLIGRAM(S): 200 TABLET, FILM COATED ORAL at 22:33

## 2017-05-16 RX ADMIN — PROPOFOL 2.82 MICROGRAM(S)/KG/MIN: 10 INJECTION, EMULSION INTRAVENOUS at 17:02

## 2017-05-16 RX ADMIN — HYDROMORPHONE HYDROCHLORIDE 0.5 MILLIGRAM(S): 2 INJECTION INTRAMUSCULAR; INTRAVENOUS; SUBCUTANEOUS at 06:52

## 2017-05-16 RX ADMIN — Medication 6 MILLIGRAM(S): at 22:33

## 2017-05-16 RX ADMIN — Medication 40 MILLIEQUIVALENT(S): at 06:51

## 2017-05-16 RX ADMIN — Medication 1 APPLICATION(S): at 06:12

## 2017-05-16 RX ADMIN — CHLORHEXIDINE GLUCONATE 15 MILLILITER(S): 213 SOLUTION TOPICAL at 17:01

## 2017-05-16 RX ADMIN — PANTOPRAZOLE SODIUM 40 MILLIGRAM(S): 20 TABLET, DELAYED RELEASE ORAL at 06:13

## 2017-05-16 RX ADMIN — POTASSIUM PHOSPHATE, MONOBASIC POTASSIUM PHOSPHATE, DIBASIC 62.5 MILLIMOLE(S): 236; 224 INJECTION, SOLUTION INTRAVENOUS at 06:52

## 2017-05-16 RX ADMIN — AMIODARONE HYDROCHLORIDE 200 MILLIGRAM(S): 400 TABLET ORAL at 06:12

## 2017-05-16 RX ADMIN — Medication 650 MILLIGRAM(S): at 11:21

## 2017-05-16 RX ADMIN — Medication 333.33 MILLIGRAM(S): at 07:03

## 2017-05-16 RX ADMIN — Medication 0.12 MILLIGRAM(S): at 06:12

## 2017-05-16 RX ADMIN — HYDROMORPHONE HYDROCHLORIDE 0.5 MILLIGRAM(S): 2 INJECTION INTRAMUSCULAR; INTRAVENOUS; SUBCUTANEOUS at 07:03

## 2017-05-16 RX ADMIN — CHLORHEXIDINE GLUCONATE 15 MILLILITER(S): 213 SOLUTION TOPICAL at 06:12

## 2017-05-16 RX ADMIN — PANTOPRAZOLE SODIUM 40 MILLIGRAM(S): 20 TABLET, DELAYED RELEASE ORAL at 17:02

## 2017-05-16 RX ADMIN — Medication 650 MILLIGRAM(S): at 17:01

## 2017-05-16 RX ADMIN — Medication 333.33 MILLIGRAM(S): at 17:02

## 2017-05-16 RX ADMIN — Medication 50 GRAM(S): at 06:52

## 2017-05-16 RX ADMIN — Medication 650 MILLIGRAM(S): at 06:12

## 2017-05-16 RX ADMIN — CEFEPIME 100 MILLIGRAM(S): 1 INJECTION, POWDER, FOR SOLUTION INTRAMUSCULAR; INTRAVENOUS at 06:13

## 2017-05-16 RX ADMIN — Medication 2.5 MILLIGRAM(S): at 11:21

## 2017-05-16 NOTE — PROGRESS NOTE ADULT - ATTENDING COMMENTS
agree with some elements of note as written: he is better neurologically, opens eyes, lifts both UE less his LE. seems to track weakly. All off propofol. seems to tolerate PSV pretty well. placed back on AC late last night. No issues with drain or stent, principal barrier to recovery is neurologic recovery and ability to separate from vent.

## 2017-05-16 NOTE — PROGRESS NOTE ADULT - SUBJECTIVE AND OBJECTIVE BOX
INTERVAL HPI/OVERNIGHT EVENTS: Tongue sewed up, no change in mental status, likely trach and PEG tomorrow, sepsis improved. Started back on sedation in evening for agitation.    PRESSORS: [ ] YES [x] NO  WHICH:  DOSE:    ANTIBIOTICS: Cefepime                 DATE STARTED:   ANTIBIOTICS:  Vanco                DATE STARTED:   ANTIBIOTICS:                  DATE STARTED:    MEDICATIONS  (STANDING):  pantoprazole  Injectable 40milliGRAM(s) IV Push every 12 hours  chlorhexidine 0.12% Liquid 15milliLiter(s) Swish and Spit two times a day  dextrose 5%. 1000milliLiter(s) IV Continuous <Continuous>  dextrose 50% Injectable 12.5Gram(s) IV Push once  dextrose 50% Injectable 25Gram(s) IV Push once  dextrose 50% Injectable 25Gram(s) IV Push once  metoprolol Injectable 2.5milliGRAM(s) IV Push every 6 hours  petrolatum Ophthalmic Ointment 1Application(s) Both EYES two times a day  heparin  Infusion 1100Unit(s)/Hr IV Continuous <Continuous>  digoxin     Tablet 0.125milliGRAM(s) Oral daily  amiodarone    Tablet 200milliGRAM(s) Oral daily  cefepime  IVPB 2000milliGRAM(s) IV Intermittent every 8 hours  vancomycin  IVPB 1400milliGRAM(s) IV Intermittent every 12 hours  cefepime  IVPB  IV Intermittent   vancomycin  IVPB  IV Intermittent   acetaminophen    Suspension 650milliGRAM(s) Oral every 6 hours  propofol Infusion 5MICROgram(s)/kG/Min IV Continuous <Continuous>    MEDICATIONS  (PRN):  dextrose Gel 1Dose(s) Oral once PRN Blood Glucose LESS THAN 70 milliGRAM(s)/deciliter  glucagon  Injectable 1milliGRAM(s) IntraMuscular once PRN Glucose LESS THAN 70 milligrams/deciliter  HYDROmorphone  Injectable 0.5milliGRAM(s) IV Push every 3 hours PRN Severe Pain (7 - 10)      Drug Dosing Weight  Height (cm): 165.1 (04 May 2017 13:12)  Weight (kg): 93.9 (09 May 2017 05:12)  BMI (kg/m2): 34.4 (09 May 2017 05:12)  BSA (m2): 2.01 (09 May 2017 05:12)    CENTRAL LINE: [x] YES [ ] NO  LOCATION:   DATE INSERTED:  REMOVE: [ ] YES [x] NO  EXPLAIN:    ARMAS: [x] YES [ ] NO    DATE INSERTED:  REMOVE: [ ] YES [x] NO  EXPLAIN:    A-LINE: [ ] YES [x] NO  LOCATION:   DATE INSERTED:  REMOVE: [ ] YES [x] NO  EXPLAIN:    PAST MEDICAL & SURGICAL HISTORY:  Mitral regurgitation: severe MR  Cardiomyopathy, nonischemic  CAD (coronary artery disease)  Asthma  Atrial fibrillation  Heart disease  S/P laparoscopic cholecystectomy  History of humerus fracture: Metal pins in Left Humerus  Artificial pacemaker      ICU Vital Signs Last 24 Hrs  T(C): 37.6, Max: 37.8 (05-15 @ 20:00)  T(F): 99.7, Max: 100 (05-15 @ 20:00)  HR: 74 (60 - 106)  BP: 91/57 (86/60 - 185/83)  BP(mean): 70 (70 - 119)  ABP: --  ABP(mean): --  RR: 23 (13 - 46)  SpO2: 100% (96% - 100%)      ABG - ( 15 May 2017 04:14 )  pH: 7.51  /  pCO2: 38    /  pO2: 195   / HCO3: 31    / Base Excess: 6.7   /  SaO2: 100                 I&O's Detail  I & Os for 24h ending 15 May 2017 07:00  =============================================  IN:    multiple electrolytes Injection Type 1multiple electrolytes Injection Type 1: 2105 ml    Pivot: 1122 ml    Solution: 1110 ml    amiodarone Infusion: 350.7 ml    heparin Infusion: 293 ml    Oral Fluid: 200 ml    Solution: 187.5 ml    Solution: 50 ml    Total IN: 5418.2 ml  ---------------------------------------------  OUT:    Indwelling Catheter - Urethral: 1190 ml    Bulb: 350 ml    Rectal Tube: 170 ml    Total OUT: 1710 ml  ---------------------------------------------  Total NET: 3708.2 ml    I & Os for current day (as of 16 May 2017 03:15)  =============================================  IN:    Pivot: 969 ml    Solution: 600 ml    Solution: 250 ml    heparin Infusion: 209 ml    multiple electrolytes Injection Type 1multiple electrolytes Injection Type 1: 200 ml    Solution: 200 ml    Enteral Tube Flush: 100 ml    propofol Infusion: 36.4 ml    Total IN: 2564.4 ml  ---------------------------------------------  OUT:    Indwelling Catheter - Urethral: 1240 ml    Bulb: 460 ml    Rectal Tube: 200 ml    Total OUT: 1900 ml  ---------------------------------------------  Total NET: 664.4 ml      Mode: SIMV with PS  RR (machine): 10  TV (machine): 450  FiO2: 50  PEEP: 12  PS: 15  MAP: 17  PIP: 27      PHYSICAL EXAM:    Gen: NAD, No cyanosis, Pallor.  On Ventilator  Eyes: PERRL ~ 3mm  Neurological: GCS 4/1T/5. Not moving BL LE.  Minimal response to BL UE. Does not track to movement or attend to voice  Neck: Supple. NT AT, FROM no pain.  No JVD. No meningeal signs  Pulmonary: NAD, CTA, = BL .  Minimal secretions.  Cardiovascular: Sinus tachy, S1, S2, No murmurs noted.  Gastrointestinal: ND, Soft, NT.  Extremities: NT, AT, Mild-Moderate 2+ pitting edema.  No erythema or palpable cord noted.  FROM, = 2+ pulses throughout.          LABS:  CBC Full  -  ( 15 May 2017 05:04 )  WBC Count : 26.4 K/uL  Hemoglobin : 11.7 g/dL  Hematocrit : 36.3 %  Platelet Count - Automated : 312 K/uL  Mean Cell Volume : 93.1 fl  Mean Cell Hemoglobin : 30.0 pg  Mean Cell Hemoglobin Concentration : 32.2 g/dL  Auto Neutrophil # : x  Auto Lymphocyte # : x  Auto Monocyte # : x  Auto Eosinophil # : x  Auto Basophil # : x  Auto Neutrophil % : 81.0 %  Auto Lymphocyte % : 10.0 %  Auto Monocyte % : 9.0 %  Auto Eosinophil % : x  Auto Basophil % : x    05-15    145  |  103  |  38.0<H>  ----------------------------<  119<H>  4.1   |  32.0<H>  |  1.03    Ca    6.9<L>      15 May 2017 05:04  Phos  2.1     05-15  Mg     2.0     05-15    TPro  6.0<L>  /  Alb  2.5<L>  /  TBili  1.4  /  DBili  x   /  AST  394<H>  /  ALT  242<H>  /  AlkPhos  193<H>      PT/INR - ( 15 May 2017 21:39 )   PT: 20.9 sec;   INR: 1.88 ratio         PTT - ( 15 May 2017 21:39 )  PTT:99.0 sec  Urinalysis Basic - ( 14 May 2017 20:48 )    Color: Red / Appearance: Slightly Turbid / S.025 / pH: x  Gluc: x / Ketone: Negative  / Bili: Small / Urobili: 8 mg/dL   Blood: x / Protein: 500 mg/dL / Nitrite: Positive   Leuk Esterase: Small / RBC: TNTC /HPF / WBC 0-2   Sq Epi: x / Non Sq Epi: x / Bacteria: Occasional        Assessment and Plan:    51yMale presenting with: Cardiac arrest following ERCP for bile leak SP Lap carissa.    Neurological:Continue to allow pt to awake.  Avoid sedatives or deleriogenic meds since possible hypoactive delirium is in DDX.    Pulmonary: Maintain current PEEP and vent settings. Discuss Trach with family    Cardiovascular: Continue rate control meds as written.    Gastrointestinal: Continue TF at goal. Discuss PEG with family    Genitourinary: Maintain Armas at this time for monitoring since pt newly septic    Heme:     ID: Continue Abx for UTI vs VAP.  FU CX.  Will consider Repeat Abd CT to look for additional source.    Skin: Avoid skin breakdown.    Lines/ Tubes: Maintain as above.    Dispo: Critical care as above.

## 2017-05-17 LAB
ANION GAP SERPL CALC-SCNC: 7 MMOL/L — SIGNIFICANT CHANGE UP (ref 5–17)
APTT BLD: 65.9 SEC — HIGH (ref 27.5–37.4)
BASOPHILS # BLD AUTO: 0 K/UL — SIGNIFICANT CHANGE UP (ref 0–0.2)
BASOPHILS NFR BLD AUTO: 0.1 % — SIGNIFICANT CHANGE UP (ref 0–2)
BUN SERPL-MCNC: 24 MG/DL — HIGH (ref 8–20)
CALCIUM SERPL-MCNC: 7.1 MG/DL — LOW (ref 8.6–10.2)
CHLORIDE SERPL-SCNC: 103 MMOL/L — SIGNIFICANT CHANGE UP (ref 98–107)
CO2 SERPL-SCNC: 33 MMOL/L — HIGH (ref 22–29)
CREAT SERPL-MCNC: 0.63 MG/DL — SIGNIFICANT CHANGE UP (ref 0.5–1.3)
CULTURE RESULTS: SIGNIFICANT CHANGE UP
EOSINOPHIL # BLD AUTO: 0.2 K/UL — SIGNIFICANT CHANGE UP (ref 0–0.5)
EOSINOPHIL NFR BLD AUTO: 1.3 % — SIGNIFICANT CHANGE UP (ref 0–5)
GAS PNL BLDA: SIGNIFICANT CHANGE UP
GLUCOSE SERPL-MCNC: 112 MG/DL — SIGNIFICANT CHANGE UP (ref 70–115)
HCT VFR BLD CALC: 31.4 % — LOW (ref 42–52)
HGB BLD-MCNC: 10.1 G/DL — LOW (ref 14–18)
LYMPHOCYTES # BLD AUTO: 1.3 K/UL — SIGNIFICANT CHANGE UP (ref 1–4.8)
LYMPHOCYTES # BLD AUTO: 10.5 % — LOW (ref 20–55)
MAGNESIUM SERPL-MCNC: 1.9 MG/DL — SIGNIFICANT CHANGE UP (ref 1.6–2.6)
MCHC RBC-ENTMCNC: 30 PG — SIGNIFICANT CHANGE UP (ref 27–31)
MCHC RBC-ENTMCNC: 32.2 G/DL — SIGNIFICANT CHANGE UP (ref 32–36)
MCV RBC AUTO: 93.2 FL — SIGNIFICANT CHANGE UP (ref 80–94)
MONOCYTES # BLD AUTO: 0.8 K/UL — SIGNIFICANT CHANGE UP (ref 0–0.8)
MONOCYTES NFR BLD AUTO: 6.3 % — SIGNIFICANT CHANGE UP (ref 3–10)
NEUTROPHILS # BLD AUTO: 10.2 K/UL — HIGH (ref 1.8–8)
NEUTROPHILS NFR BLD AUTO: 80.7 % — HIGH (ref 37–73)
PHOSPHATE SERPL-MCNC: 1.8 MG/DL — LOW (ref 2.4–4.7)
PLATELET # BLD AUTO: 288 K/UL — SIGNIFICANT CHANGE UP (ref 150–400)
POTASSIUM SERPL-MCNC: 3.3 MMOL/L — LOW (ref 3.5–5.3)
POTASSIUM SERPL-SCNC: 3.3 MMOL/L — LOW (ref 3.5–5.3)
RBC # BLD: 3.37 M/UL — LOW (ref 4.6–6.2)
RBC # FLD: 15.7 % — HIGH (ref 11–15.6)
SODIUM SERPL-SCNC: 143 MMOL/L — SIGNIFICANT CHANGE UP (ref 135–145)
SPECIMEN SOURCE: SIGNIFICANT CHANGE UP
TSH SERPL-MCNC: 3.99 UIU/ML — SIGNIFICANT CHANGE UP (ref 0.27–4.2)
VANCOMYCIN TROUGH SERPL-MCNC: 12 UG/ML — SIGNIFICANT CHANGE UP (ref 10–20)
WBC # BLD: 12.6 K/UL — HIGH (ref 4.8–10.8)
WBC # FLD AUTO: 12.6 K/UL — HIGH (ref 4.8–10.8)

## 2017-05-17 PROCEDURE — 99291 CRITICAL CARE FIRST HOUR: CPT | Mod: 24

## 2017-05-17 RX ORDER — POTASSIUM PHOSPHATE, MONOBASIC POTASSIUM PHOSPHATE, DIBASIC 236; 224 MG/ML; MG/ML
15 INJECTION, SOLUTION INTRAVENOUS ONCE
Qty: 0 | Refills: 0 | Status: DISCONTINUED | OUTPATIENT
Start: 2017-05-17 | End: 2017-05-17

## 2017-05-17 RX ORDER — POTASSIUM CHLORIDE 20 MEQ
40 PACKET (EA) ORAL EVERY 4 HOURS
Qty: 0 | Refills: 0 | Status: COMPLETED | OUTPATIENT
Start: 2017-05-17 | End: 2017-05-17

## 2017-05-17 RX ORDER — POTASSIUM PHOSPHATE, MONOBASIC POTASSIUM PHOSPHATE, DIBASIC 236; 224 MG/ML; MG/ML
15 INJECTION, SOLUTION INTRAVENOUS EVERY 6 HOURS
Qty: 0 | Refills: 0 | Status: COMPLETED | OUTPATIENT
Start: 2017-05-17 | End: 2017-05-17

## 2017-05-17 RX ORDER — SODIUM CHLORIDE 9 MG/ML
1000 INJECTION, SOLUTION INTRAVENOUS ONCE
Qty: 0 | Refills: 0 | Status: COMPLETED | OUTPATIENT
Start: 2017-05-17 | End: 2017-05-17

## 2017-05-17 RX ORDER — POTASSIUM CHLORIDE 20 MEQ
40 PACKET (EA) ORAL EVERY 4 HOURS
Qty: 0 | Refills: 0 | Status: DISCONTINUED | OUTPATIENT
Start: 2017-05-17 | End: 2017-05-17

## 2017-05-17 RX ADMIN — Medication 2.5 MILLIGRAM(S): at 23:46

## 2017-05-17 RX ADMIN — Medication 6 MILLIGRAM(S): at 21:16

## 2017-05-17 RX ADMIN — Medication 40 MILLIEQUIVALENT(S): at 18:19

## 2017-05-17 RX ADMIN — Medication 1 APPLICATION(S): at 06:19

## 2017-05-17 RX ADMIN — AMIODARONE HYDROCHLORIDE 200 MILLIGRAM(S): 400 TABLET ORAL at 06:17

## 2017-05-17 RX ADMIN — CHLORHEXIDINE GLUCONATE 15 MILLILITER(S): 213 SOLUTION TOPICAL at 18:19

## 2017-05-17 RX ADMIN — Medication 0.12 MILLIGRAM(S): at 06:17

## 2017-05-17 RX ADMIN — Medication 2.5 MILLIGRAM(S): at 18:19

## 2017-05-17 RX ADMIN — HEPARIN SODIUM 11 UNIT(S)/HR: 5000 INJECTION INTRAVENOUS; SUBCUTANEOUS at 07:31

## 2017-05-17 RX ADMIN — Medication 650 MILLIGRAM(S): at 18:19

## 2017-05-17 RX ADMIN — CEFEPIME 100 MILLIGRAM(S): 1 INJECTION, POWDER, FOR SOLUTION INTRAMUSCULAR; INTRAVENOUS at 14:22

## 2017-05-17 RX ADMIN — CEFEPIME 100 MILLIGRAM(S): 1 INJECTION, POWDER, FOR SOLUTION INTRAMUSCULAR; INTRAVENOUS at 21:16

## 2017-05-17 RX ADMIN — POTASSIUM PHOSPHATE, MONOBASIC POTASSIUM PHOSPHATE, DIBASIC 62.5 MILLIMOLE(S): 236; 224 INJECTION, SOLUTION INTRAVENOUS at 12:23

## 2017-05-17 RX ADMIN — CEFEPIME 100 MILLIGRAM(S): 1 INJECTION, POWDER, FOR SOLUTION INTRAMUSCULAR; INTRAVENOUS at 06:18

## 2017-05-17 RX ADMIN — HEPARIN SODIUM 11 UNIT(S)/HR: 5000 INJECTION INTRAVENOUS; SUBCUTANEOUS at 14:23

## 2017-05-17 RX ADMIN — Medication 2.5 MILLIGRAM(S): at 00:27

## 2017-05-17 RX ADMIN — Medication 650 MILLIGRAM(S): at 12:21

## 2017-05-17 RX ADMIN — Medication 2.5 MILLIGRAM(S): at 12:22

## 2017-05-17 RX ADMIN — Medication 650 MILLIGRAM(S): at 06:17

## 2017-05-17 RX ADMIN — Medication 40 MILLIEQUIVALENT(S): at 12:21

## 2017-05-17 RX ADMIN — PANTOPRAZOLE SODIUM 40 MILLIGRAM(S): 20 TABLET, DELAYED RELEASE ORAL at 06:17

## 2017-05-17 RX ADMIN — Medication 333.33 MILLIGRAM(S): at 06:21

## 2017-05-17 RX ADMIN — Medication 1 APPLICATION(S): at 18:19

## 2017-05-17 RX ADMIN — POTASSIUM PHOSPHATE, MONOBASIC POTASSIUM PHOSPHATE, DIBASIC 62.5 MILLIMOLE(S): 236; 224 INJECTION, SOLUTION INTRAVENOUS at 06:20

## 2017-05-17 RX ADMIN — Medication 650 MILLIGRAM(S): at 23:46

## 2017-05-17 RX ADMIN — Medication 40 MILLIEQUIVALENT(S): at 14:21

## 2017-05-17 RX ADMIN — Medication 333.33 MILLIGRAM(S): at 18:18

## 2017-05-17 RX ADMIN — QUETIAPINE FUMARATE 50 MILLIGRAM(S): 200 TABLET, FILM COATED ORAL at 12:23

## 2017-05-17 RX ADMIN — PANTOPRAZOLE SODIUM 40 MILLIGRAM(S): 20 TABLET, DELAYED RELEASE ORAL at 18:20

## 2017-05-17 RX ADMIN — Medication 650 MILLIGRAM(S): at 00:28

## 2017-05-17 RX ADMIN — Medication 2.5 MILLIGRAM(S): at 06:18

## 2017-05-17 RX ADMIN — CHLORHEXIDINE GLUCONATE 15 MILLILITER(S): 213 SOLUTION TOPICAL at 06:17

## 2017-05-17 RX ADMIN — SODIUM CHLORIDE 2000 MILLILITER(S): 9 INJECTION, SOLUTION INTRAVENOUS at 06:18

## 2017-05-17 NOTE — PROGRESS NOTE ADULT - SUBJECTIVE AND OBJECTIVE BOX
S: no major changes over past 24 h, continues very gradual improvement in neurologic exam off propofol. Better UE movement.    O:   N: as documented above weakly opens eyes to voice  R P/F ration > 400, mild metabolic alkalosis 7.5 /40/ 235/ 32, cl- is normal. on AC now but will switch to psv with sigh breath IMV of 2 / minute x 1000 cc, lungs clear on exam  FEN: remains mildly fluid positive, 800 cc p 24h, creat normal, lytes also wnl except mild persistent hypokalemia, alkalosis as noted   GI: minimal drain output, abdomen soft non-tender, tolerating TF well  ID: wbc 12.6, no fevers, blood cx neg to date    A  1. 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 sepsis improved from Sunday  3. vent dependent resp failure secondary to 1  4. mild metabolic alkalosis  5. mild hypokalemia    p  1. replace K to above 4 may excrete more HCO3  2. diamox if this doesnt work  3. push PSV down and likely trach   4. dc antibiotics for sepsis (lines?) if cx still NG late tomorrow  5. conitnue goal tf

## 2017-05-17 NOTE — PROGRESS NOTE ADULT - SUBJECTIVE AND OBJECTIVE BOX
INTERVAL HPI/OVERNIGHT EVENTS:  Patient seen and examined    MEDICATIONS  (STANDING):  pantoprazole  Injectable 40milliGRAM(s) IV Push every 12 hours  chlorhexidine 0.12% Liquid 15milliLiter(s) Swish and Spit two times a day  dextrose 5%. 1000milliLiter(s) IV Continuous <Continuous>  dextrose 50% Injectable 12.5Gram(s) IV Push once  dextrose 50% Injectable 25Gram(s) IV Push once  dextrose 50% Injectable 25Gram(s) IV Push once  petrolatum Ophthalmic Ointment 1Application(s) Both EYES two times a day  heparin  Infusion 1100Unit(s)/Hr IV Continuous <Continuous>  digoxin     Tablet 0.125milliGRAM(s) Oral daily  amiodarone    Tablet 200milliGRAM(s) Oral daily  cefepime  IVPB 2000milliGRAM(s) IV Intermittent every 8 hours  vancomycin  IVPB 1400milliGRAM(s) IV Intermittent every 12 hours  cefepime  IVPB  IV Intermittent   vancomycin  IVPB  IV Intermittent   acetaminophen    Suspension 650milliGRAM(s) Oral every 6 hours  melatonin 6milliGRAM(s) Oral at bedtime  QUEtiapine 50milliGRAM(s) Oral daily  propofol Infusion 5MICROgram(s)/kG/Min IV Continuous <Continuous>  metoprolol Injectable 2.5milliGRAM(s) IV Push every 6 hours  potassium phosphate IVPB 15milliMole(s) IV Intermittent every 6 hours  potassium chloride   Powder 40milliEquivalent(s) Oral every 4 hours    MEDICATIONS  (PRN):  dextrose Gel 1Dose(s) Oral once PRN Blood Glucose LESS THAN 70 milliGRAM(s)/deciliter  glucagon  Injectable 1milliGRAM(s) IntraMuscular once PRN Glucose LESS THAN 70 milligrams/deciliter  HYDROmorphone  Injectable 0.5milliGRAM(s) IV Push every 3 hours PRN Severe Pain (7 - 10)      Allergies    No Known Allergies    Intolerances        Vital Signs Last 24 Hrs  T(C): 37.6, Max: 37.9 (05-16 @ 12:00)  T(F): 99.7, Max: 100.2 (05-16 @ 12:00)  HR: 84 (38 - 87)  BP: 111/76 (89/63 - 130/89)  BP(mean): 89 (72 - 104)  RR: 23 (18 - 27)  SpO2: 100% (98% - 100%)    PHYSICAL EXAM:  General: NAD.  CVS: S1, S2  Chest: air entry bilaterally present  Abd: BS present, soft, non-tender      LABS:                        10.1   12.6  )-----------( 288      ( 17 May 2017 04:42 )             31.4     05-17    143  |  103  |  24.0<H>  ----------------------------<  112  3.3<L>   |  33.0<H>  |  0.63    Ca    7.1<L>      17 May 2017 04:42  Phos  1.8     05-17  Mg     1.9     05-17      PT/INR - ( 16 May 2017 04:27 )   PT: 20.1 sec;   INR: 1.81 ratio         PTT - ( 17 May 2017 04:42 )  PTT:65.9 sec

## 2017-05-18 LAB
ANION GAP SERPL CALC-SCNC: 6 MMOL/L — SIGNIFICANT CHANGE UP (ref 5–17)
APTT BLD: 64.3 SEC — HIGH (ref 27.5–37.4)
BASOPHILS # BLD AUTO: 0 K/UL — SIGNIFICANT CHANGE UP (ref 0–0.2)
BASOPHILS NFR BLD AUTO: 0.1 % — SIGNIFICANT CHANGE UP (ref 0–2)
BUN SERPL-MCNC: 23 MG/DL — HIGH (ref 8–20)
CALCIUM SERPL-MCNC: 7.3 MG/DL — LOW (ref 8.6–10.2)
CHLORIDE SERPL-SCNC: 104 MMOL/L — SIGNIFICANT CHANGE UP (ref 98–107)
CO2 SERPL-SCNC: 30 MMOL/L — HIGH (ref 22–29)
CREAT SERPL-MCNC: 0.57 MG/DL — SIGNIFICANT CHANGE UP (ref 0.5–1.3)
EOSINOPHIL # BLD AUTO: 0.1 K/UL — SIGNIFICANT CHANGE UP (ref 0–0.5)
EOSINOPHIL NFR BLD AUTO: 1.1 % — SIGNIFICANT CHANGE UP (ref 0–5)
GAS PNL BLDA: SIGNIFICANT CHANGE UP
GLUCOSE SERPL-MCNC: 134 MG/DL — HIGH (ref 70–115)
HCT VFR BLD CALC: 31.6 % — LOW (ref 42–52)
HGB BLD-MCNC: 10 G/DL — LOW (ref 14–18)
LYMPHOCYTES # BLD AUTO: 1.3 K/UL — SIGNIFICANT CHANGE UP (ref 1–4.8)
LYMPHOCYTES # BLD AUTO: 11.7 % — LOW (ref 20–55)
MAGNESIUM SERPL-MCNC: 1.7 MG/DL — SIGNIFICANT CHANGE UP (ref 1.6–2.6)
MCHC RBC-ENTMCNC: 29.7 PG — SIGNIFICANT CHANGE UP (ref 27–31)
MCHC RBC-ENTMCNC: 31.6 G/DL — LOW (ref 32–36)
MCV RBC AUTO: 93.8 FL — SIGNIFICANT CHANGE UP (ref 80–94)
MONOCYTES # BLD AUTO: 1 K/UL — HIGH (ref 0–0.8)
MONOCYTES NFR BLD AUTO: 8.6 % — SIGNIFICANT CHANGE UP (ref 3–10)
NEUTROPHILS # BLD AUTO: 8.6 K/UL — HIGH (ref 1.8–8)
NEUTROPHILS NFR BLD AUTO: 76.7 % — HIGH (ref 37–73)
PHOSPHATE SERPL-MCNC: 2 MG/DL — LOW (ref 2.4–4.7)
PLATELET # BLD AUTO: 300 K/UL — SIGNIFICANT CHANGE UP (ref 150–400)
POTASSIUM SERPL-MCNC: 4.4 MMOL/L — SIGNIFICANT CHANGE UP (ref 3.5–5.3)
POTASSIUM SERPL-SCNC: 4.4 MMOL/L — SIGNIFICANT CHANGE UP (ref 3.5–5.3)
RBC # BLD: 3.37 M/UL — LOW (ref 4.6–6.2)
RBC # FLD: 16.2 % — HIGH (ref 11–15.6)
SODIUM SERPL-SCNC: 140 MMOL/L — SIGNIFICANT CHANGE UP (ref 135–145)
WBC # BLD: 11.2 K/UL — HIGH (ref 4.8–10.8)
WBC # FLD AUTO: 11.2 K/UL — HIGH (ref 4.8–10.8)

## 2017-05-18 PROCEDURE — 99291 CRITICAL CARE FIRST HOUR: CPT | Mod: 24

## 2017-05-18 RX ORDER — MAGNESIUM SULFATE 500 MG/ML
2 VIAL (ML) INJECTION ONCE
Qty: 0 | Refills: 0 | Status: COMPLETED | OUTPATIENT
Start: 2017-05-18 | End: 2017-05-18

## 2017-05-18 RX ORDER — CALCIUM GLUCONATE 100 MG/ML
2 VIAL (ML) INTRAVENOUS ONCE
Qty: 0 | Refills: 0 | Status: COMPLETED | OUTPATIENT
Start: 2017-05-18 | End: 2017-05-18

## 2017-05-18 RX ADMIN — PANTOPRAZOLE SODIUM 40 MILLIGRAM(S): 20 TABLET, DELAYED RELEASE ORAL at 17:30

## 2017-05-18 RX ADMIN — HYDROMORPHONE HYDROCHLORIDE 0.5 MILLIGRAM(S): 2 INJECTION INTRAMUSCULAR; INTRAVENOUS; SUBCUTANEOUS at 00:58

## 2017-05-18 RX ADMIN — Medication 650 MILLIGRAM(S): at 23:07

## 2017-05-18 RX ADMIN — CEFEPIME 100 MILLIGRAM(S): 1 INJECTION, POWDER, FOR SOLUTION INTRAMUSCULAR; INTRAVENOUS at 05:20

## 2017-05-18 RX ADMIN — Medication 650 MILLIGRAM(S): at 14:52

## 2017-05-18 RX ADMIN — Medication 1 APPLICATION(S): at 05:26

## 2017-05-18 RX ADMIN — HEPARIN SODIUM 11 UNIT(S)/HR: 5000 INJECTION INTRAVENOUS; SUBCUTANEOUS at 08:55

## 2017-05-18 RX ADMIN — CHLORHEXIDINE GLUCONATE 15 MILLILITER(S): 213 SOLUTION TOPICAL at 05:26

## 2017-05-18 RX ADMIN — Medication 6 MILLIGRAM(S): at 23:05

## 2017-05-18 RX ADMIN — AMIODARONE HYDROCHLORIDE 200 MILLIGRAM(S): 400 TABLET ORAL at 05:26

## 2017-05-18 RX ADMIN — Medication 1 APPLICATION(S): at 17:30

## 2017-05-18 RX ADMIN — Medication 650 MILLIGRAM(S): at 17:29

## 2017-05-18 RX ADMIN — HYDROMORPHONE HYDROCHLORIDE 0.5 MILLIGRAM(S): 2 INJECTION INTRAMUSCULAR; INTRAVENOUS; SUBCUTANEOUS at 17:31

## 2017-05-18 RX ADMIN — HYDROMORPHONE HYDROCHLORIDE 0.5 MILLIGRAM(S): 2 INJECTION INTRAMUSCULAR; INTRAVENOUS; SUBCUTANEOUS at 17:45

## 2017-05-18 RX ADMIN — Medication 2.5 MILLIGRAM(S): at 11:43

## 2017-05-18 RX ADMIN — Medication 50 GRAM(S): at 09:33

## 2017-05-18 RX ADMIN — Medication 0.12 MILLIGRAM(S): at 05:26

## 2017-05-18 RX ADMIN — Medication 200 GRAM(S): at 06:20

## 2017-05-18 RX ADMIN — Medication 650 MILLIGRAM(S): at 05:26

## 2017-05-18 RX ADMIN — Medication 2.5 MILLIGRAM(S): at 05:26

## 2017-05-18 RX ADMIN — Medication 63.75 MILLIMOLE(S): at 05:21

## 2017-05-18 RX ADMIN — QUETIAPINE FUMARATE 50 MILLIGRAM(S): 200 TABLET, FILM COATED ORAL at 11:43

## 2017-05-18 RX ADMIN — HYDROMORPHONE HYDROCHLORIDE 0.5 MILLIGRAM(S): 2 INJECTION INTRAMUSCULAR; INTRAVENOUS; SUBCUTANEOUS at 00:43

## 2017-05-18 RX ADMIN — Medication 2.5 MILLIGRAM(S): at 17:30

## 2017-05-18 RX ADMIN — CHLORHEXIDINE GLUCONATE 15 MILLILITER(S): 213 SOLUTION TOPICAL at 17:30

## 2017-05-18 RX ADMIN — Medication 2.5 MILLIGRAM(S): at 23:07

## 2017-05-18 RX ADMIN — HYDROMORPHONE HYDROCHLORIDE 0.5 MILLIGRAM(S): 2 INJECTION INTRAMUSCULAR; INTRAVENOUS; SUBCUTANEOUS at 23:30

## 2017-05-18 RX ADMIN — Medication 333.33 MILLIGRAM(S): at 06:39

## 2017-05-18 RX ADMIN — PANTOPRAZOLE SODIUM 40 MILLIGRAM(S): 20 TABLET, DELAYED RELEASE ORAL at 05:28

## 2017-05-18 RX ADMIN — HYDROMORPHONE HYDROCHLORIDE 0.5 MILLIGRAM(S): 2 INJECTION INTRAMUSCULAR; INTRAVENOUS; SUBCUTANEOUS at 23:18

## 2017-05-18 RX ADMIN — PROPOFOL 2.82 MICROGRAM(S)/KG/MIN: 10 INJECTION, EMULSION INTRAVENOUS at 12:07

## 2017-05-18 RX ADMIN — HEPARIN SODIUM 11 UNIT(S)/HR: 5000 INJECTION INTRAVENOUS; SUBCUTANEOUS at 07:08

## 2017-05-18 NOTE — PROGRESS NOTE ADULT - SUBJECTIVE AND OBJECTIVE BOX
INTERVAL HPI/OVERNIGHT EVENTS:  Patient seen and examined    MEDICATIONS  (STANDING):  pantoprazole  Injectable 40milliGRAM(s) IV Push every 12 hours  chlorhexidine 0.12% Liquid 15milliLiter(s) Swish and Spit two times a day  dextrose 5%. 1000milliLiter(s) IV Continuous <Continuous>  dextrose 50% Injectable 12.5Gram(s) IV Push once  dextrose 50% Injectable 25Gram(s) IV Push once  dextrose 50% Injectable 25Gram(s) IV Push once  petrolatum Ophthalmic Ointment 1Application(s) Both EYES two times a day  heparin  Infusion 1100Unit(s)/Hr IV Continuous <Continuous>  digoxin     Tablet 0.125milliGRAM(s) Oral daily  amiodarone    Tablet 200milliGRAM(s) Oral daily  cefepime  IVPB 2000milliGRAM(s) IV Intermittent every 8 hours  vancomycin  IVPB 1400milliGRAM(s) IV Intermittent every 12 hours  cefepime  IVPB  IV Intermittent   vancomycin  IVPB  IV Intermittent   acetaminophen    Suspension 650milliGRAM(s) Oral every 6 hours  melatonin 6milliGRAM(s) Oral at bedtime  QUEtiapine 50milliGRAM(s) Oral daily  propofol Infusion 5MICROgram(s)/kG/Min IV Continuous <Continuous>  metoprolol Injectable 2.5milliGRAM(s) IV Push every 6 hours    MEDICATIONS  (PRN):  dextrose Gel 1Dose(s) Oral once PRN Blood Glucose LESS THAN 70 milliGRAM(s)/deciliter  glucagon  Injectable 1milliGRAM(s) IntraMuscular once PRN Glucose LESS THAN 70 milligrams/deciliter  HYDROmorphone  Injectable 0.5milliGRAM(s) IV Push every 3 hours PRN Severe Pain (7 - 10)      Allergies    No Known Allergies    Intolerances        Vital Signs Last 24 Hrs  T(C): 37.3, Max: 38.2 (05-17 @ 16:00)  T(F): 99.1, Max: 100.8 (05-17 @ 16:00)  HR: 84 (73 - 116)  BP: 117/73 (95/71 - 124/91)  BP(mean): 88 (76 - 102)  RR: 20 (13 - 36)  SpO2: 100% (98% - 100%)    PHYSICAL EXAM:  General: NAD.  CVS: S1, S2  Chest: air entry bilaterally present  Abd: BS present, soft, non-tender      LABS:                        10.0   11.2  )-----------( 300      ( 18 May 2017 04:24 )             31.6     05-18    140  |  104  |  23.0<H>  ----------------------------<  134<H>  4.4   |  30.0<H>  |  0.57    Ca    7.3<L>      18 May 2017 04:24  Phos  2.0     05-18  Mg     1.7     05-18      PTT - ( 18 May 2017 04:24 )  PTT:64.3 sec

## 2017-05-18 NOTE — PROGRESS NOTE ADULT - ATTENDING COMMENTS
I believe he continues to make slow but consistent progress. He becomes somewhat agitated but definitely opens eyes and moves both ue and now some right le movement as well. hemodynamics are fine; on PSV he will generate reasonable Vt 300-450 on 8/8 and good volumes on high support 550-800 on 15/8. gas exchange is good with PF greater than 300. No major lab abnormalities; mild alkalosis correcting with attention to K above 4. We will trach tomorrow. His drain puts out 350-400 serous fluid a day, bile in stool, no Gi issues, wound doing well.

## 2017-05-18 NOTE — PROGRESS NOTE ADULT - SUBJECTIVE AND OBJECTIVE BOX
INTERVAL HPI/OVERNIGHT EVENTS:    Neurologic exam remains without significant improvement. Moved Bilateral upper extremities off Propofol. TSH WNL. Metabolic alkalosis noted.  Vanco 12.  ON:  Propofol increased due to agitation and attempting to bite tongue again. No new trauma noted to oropharynx.  No further acute events.        MEDICATIONS  (STANDING):  pantoprazole  Injectable 40milliGRAM(s) IV Push every 12 hours  chlorhexidine 0.12% Liquid 15milliLiter(s) Swish and Spit two times a day  dextrose 5%. 1000milliLiter(s) IV Continuous <Continuous>  dextrose 50% Injectable 12.5Gram(s) IV Push once  dextrose 50% Injectable 25Gram(s) IV Push once  dextrose 50% Injectable 25Gram(s) IV Push once  petrolatum Ophthalmic Ointment 1Application(s) Both EYES two times a day  heparin  Infusion 1100Unit(s)/Hr IV Continuous <Continuous>  digoxin     Tablet 0.125milliGRAM(s) Oral daily  amiodarone    Tablet 200milliGRAM(s) Oral daily  cefepime  IVPB 2000milliGRAM(s) IV Intermittent every 8 hours  vancomycin  IVPB 1400milliGRAM(s) IV Intermittent every 12 hours  cefepime  IVPB  IV Intermittent   vancomycin  IVPB  IV Intermittent   acetaminophen    Suspension 650milliGRAM(s) Oral every 6 hours  melatonin 6milliGRAM(s) Oral at bedtime  QUEtiapine 50milliGRAM(s) Oral daily  propofol Infusion 5MICROgram(s)/kG/Min IV Continuous <Continuous>  metoprolol Injectable 2.5milliGRAM(s) IV Push every 6 hours  magnesium sulfate  IVPB 2Gram(s) IV Intermittent once    MEDICATIONS  (PRN):  dextrose Gel 1Dose(s) Oral once PRN Blood Glucose LESS THAN 70 milliGRAM(s)/deciliter  glucagon  Injectable 1milliGRAM(s) IntraMuscular once PRN Glucose LESS THAN 70 milligrams/deciliter  HYDROmorphone  Injectable 0.5milliGRAM(s) IV Push every 3 hours PRN Severe Pain (7 - 10)      Drug Dosing Weight  Height (cm): 165.1 (04 May 2017 13:12)  Weight (kg): 93.9 (09 May 2017 05:12)  BMI (kg/m2): 34.4 (09 May 2017 05:12)  BSA (m2): 2.01 (09 May 2017 05:12)    CENTRAL LINE: [ ] YES [ ] NO  LOCATION:   DATE INSERTED:  REMOVE: [ ] YES [ ] NO  EXPLAIN:    ARMAS: [ ] YES [ ] NO    DATE INSERTED:  REMOVE: [ ] YES [ ] NO  EXPLAIN:    A-LINE: [ ] YES [ ] NO  LOCATION:   DATE INSERTED:  REMOVE: [ ] YES [ ] NO  EXPLAIN:    PAST MEDICAL & SURGICAL HISTORY:  Mitral regurgitation: severe MR  Cardiomyopathy, nonischemic  CAD (coronary artery disease)  Asthma  Atrial fibrillation  Heart disease  S/P laparoscopic cholecystectomy  History of humerus fracture: Metal pins in Left Humerus  Artificial pacemaker      ICU Vital Signs Last 24 Hrs  T(C): 37.3, Max: 38.2 ( @ 16:00)  T(F): 99.1, Max: 100.8 ( @ 16:00)  HR: 89 (73 - 116)  BP: 111/82 (94/63 - 124/91)  BP(mean): 93 (75 - 102)  ABP: --  ABP(mean): --  RR: 23 (13 - 36)  SpO2: 100% (98% - 100%)      ABG - ( 18 May 2017 04:27 )  pH: 7.47  /  pCO2: 41    /  pO2: 187   / HCO3: 30    / Base Excess: 5.6   /  SaO2: 100                 I&O's Detail  I & Os for 24h ending 17 May 2017 07:00  =============================================  IN:    Pivot: 1224 ml    Solution: 667 ml    Enteral Tube Flush: 400 ml    heparin Infusion: 264 ml    Solution: 150 ml    Solution: 62.5 ml    propofol Infusion: 42 ml    Total IN: 2809.5 ml  ---------------------------------------------  OUT:    Indwelling Catheter - Urethral: 1130 ml    Bulb: 350 ml    Rectal Tube: 300 ml    Total OUT: 1780 ml  ---------------------------------------------  Total NET: 1029.5 ml    I & Os for current day (as of 18 May 2017 06:21)  =============================================  IN:    Pivot: 1173 ml    Solution: 500 ml    Enteral Tube Flush: 255 ml    heparin Infusion: 253 ml    Solution: 150 ml    propofol Infusion: 134.7 ml    Solution: 100 ml    Solution: 50 ml    Total IN: 2615.7 ml  ---------------------------------------------  OUT:    Indwelling Catheter - Urethral: 1295 ml    Bulb: 470 ml    Rectal Tube: 200 ml    Total OUT: 1965 ml  ---------------------------------------------  Total NET: 650.7 ml      Mode: SIMV with PS  RR (machine): 2  TV (machine): 900  FiO2: 40  PEEP: 10  PS: 15  MAP: 16  PIP: 25      PHYSICAL EXAM:    Gen: NAD, No cyanosis, Pallor.  On Ventilator    Eyes: PERRL ~ 3mm    Neurological: GCS 4/1T/5. Not moving BL LE.  Minimal response to BL UE. Does not track to movement or attend to voice    Neck: Supple. NT AT, FROM no pain.  No JVD. No meningeal signs    Pulmonary: NAD, CTA, = BL .  Minimal secretions.    Cardiovascular: Sinus tachy, S1, S2, No murmurs noted.    Gastrointestinal: ND, Soft, NT.    Extremities: NT, AT, Mild-Moderate 2+ pitting edema.  No erythema or palpable cord noted.  FROM, = 2+ pulses throughout.      LABS:  CBC Full  -  ( 18 May 2017 04:24 )  WBC Count : 11.2 K/uL  Hemoglobin : 10.0 g/dL  Hematocrit : 31.6 %  Platelet Count - Automated : 300 K/uL  Mean Cell Volume : 93.8 fl  Mean Cell Hemoglobin : 29.7 pg  Mean Cell Hemoglobin Concentration : 31.6 g/dL  Auto Neutrophil # : 8.6 K/uL  Auto Lymphocyte # : 1.3 K/uL  Auto Monocyte # : 1.0 K/uL  Auto Eosinophil # : 0.1 K/uL  Auto Basophil # : 0.0 K/uL  Auto Neutrophil % : 76.7 %  Auto Lymphocyte % : 11.7 %  Auto Monocyte % : 8.6 %  Auto Eosinophil % : 1.1 %  Auto Basophil % : 0.1 %    -18    140  |  104  |  23.0<H>  ----------------------------<  134<H>  4.4   |  30.0<H>  |  0.57    Ca    7.3<L>      18 May 2017 04:24  Phos  2.0     05-18  Mg     1.7     -18      PTT - ( 18 May 2017 04:24 )  PTT:64.3 sec        Cardiovascular: Sinus tachy, S1, S2, No murmurs noted.  Gastrointestinal: ND, Soft, NT.  Extremities: NT, AT, Mild-Moderate 2+ pitting edema.  No erythema or palpable cord noted.  FROM, = 2+ pulses throughout.          LABS:  CBC Full  -  ( 15 May 2017 05:04 )  WBC Count : 26.4 K/uL  Hemoglobin : 11.7 g/dL  Hematocrit : 36.3 %  Platelet Count - Automated : 312 K/uL  Mean Cell Volume : 93.1 fl  Mean Cell Hemoglobin : 30.0 pg  Mean Cell Hemoglobin Concentration : 32.2 g/dL  Auto Neutrophil # : x  Auto Lymphocyte # : x  Auto Monocyte # : x  Auto Eosinophil # : x  Auto Basophil # : x  Auto Neutrophil % : 81.0 %  Auto Lymphocyte % : 10.0 %  Auto Monocyte % : 9.0 %  Auto Eosinophil % : x  Auto Basophil % : x    05-15    145  |  103  |  38.0<H>  ----------------------------<  119<H>  4.1   |  32.0<H>  |  1.03    Ca    6.9<L>      15 May 2017 05:04  Phos  2.1     05-15  Mg     2.0     05-15    TPro  6.0<L>  /  Alb  2.5<L>  /  TBili  1.4  /  DBili  x   /  AST  394<H>  /  ALT  242<H>  /  AlkPhos  193<H>  05-14    PT/INR - ( 15 May 2017 21:39 )   PT: 20.9 sec;   INR: 1.88 ratio         PTT - ( 15 May 2017 21:39 )  PTT:99.0 sec  Urinalysis Basic - ( 14 May 2017 20:48 )    Color: Red / Appearance: Slightly Turbid / S.025 / pH: x  Gluc: x / Ketone: Negative  / Bili: Small / Urobili: 8 mg/dL   Blood: x / Protein: 500 mg/dL / Nitrite: Positive   Leuk Esterase: Small / RBC: TNTC /HPF / WBC 0-2   Sq Epi: x / Non Sq Epi: x / Bacteria: Occasional        Assessment and Plan:    50 yo Male presenting with: Cardiac arrest following ERCP for bile leak SP Lap carissa.    Neurological: Continue to allow pt to awake.  Avoid sedatives or deleriogenic meds as much as possible.  Low dose propofol for agitation.      Pulmonary: Maintain current PEEP and vent settings. Discuss Trach with family    Cardiovascular: Continue rate control meds as written.    Gastrointestinal: Continue TF at goal. Discuss PEG with family    Genitourinary: Maintain Armas at this time for monitoring since pt newly septic    Heme:     ID: Continue Abx for UTI vs VAP.  FU CX.  Will consider Repeat Abd CT to look for additional source.    Skin: Avoid skin breakdown.    Lines/ Tubes: Maintain as above.    Dispo: Critical care as above.

## 2017-05-18 NOTE — PROGRESS NOTE ADULT - ASSESSMENT
· Assessment		  IMPRESSION:  The patient is a 51 year old male with significant cardiovascular history including atrial fibrillation and cardiomyopathy s/p laparoscopic cholecystectomy. GI f/u for bile leak.   - first ercp 5/9/17: papilla appeared to be stenosed. During attempts at biliary cannulation, patient developed hypotension. Procedure aborted. Patient coded. Intubated. Patient resuscitated. ACS team took over, and performed exploratory laparotomy.  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   - Off vasopressors now. Intubated. LFTs improving. On NGT feeding.  - afebrile today    PLAN:  NGT feeding  IV antibiotics  SICU monitoring  monitor cbc, serum lytes, and LFTs  f/u septic w/u  will f/u

## 2017-05-19 DIAGNOSIS — I25.118 ATHEROSCLEROTIC HEART DISEASE OF NATIVE CORONARY ARTERY WITH OTHER FORMS OF ANGINA PECTORIS: ICD-10-CM

## 2017-05-19 DIAGNOSIS — I48.2 CHRONIC ATRIAL FIBRILLATION: ICD-10-CM

## 2017-05-19 LAB
ANION GAP SERPL CALC-SCNC: 8 MMOL/L — SIGNIFICANT CHANGE UP (ref 5–17)
ANION GAP SERPL CALC-SCNC: 9 MMOL/L — SIGNIFICANT CHANGE UP (ref 5–17)
APTT BLD: 82.1 SEC — HIGH (ref 27.5–37.4)
BASOPHILS # BLD AUTO: 0 K/UL — SIGNIFICANT CHANGE UP (ref 0–0.2)
BASOPHILS NFR BLD AUTO: 0.2 % — SIGNIFICANT CHANGE UP (ref 0–2)
BUN SERPL-MCNC: 25 MG/DL — HIGH (ref 8–20)
BUN SERPL-MCNC: 26 MG/DL — HIGH (ref 8–20)
CALCIUM SERPL-MCNC: 7.7 MG/DL — LOW (ref 8.6–10.2)
CALCIUM SERPL-MCNC: 7.8 MG/DL — LOW (ref 8.6–10.2)
CHLORIDE SERPL-SCNC: 100 MMOL/L — SIGNIFICANT CHANGE UP (ref 98–107)
CHLORIDE SERPL-SCNC: 100 MMOL/L — SIGNIFICANT CHANGE UP (ref 98–107)
CO2 SERPL-SCNC: 30 MMOL/L — HIGH (ref 22–29)
CO2 SERPL-SCNC: 32 MMOL/L — HIGH (ref 22–29)
CREAT SERPL-MCNC: 0.58 MG/DL — SIGNIFICANT CHANGE UP (ref 0.5–1.3)
CREAT SERPL-MCNC: 0.7 MG/DL — SIGNIFICANT CHANGE UP (ref 0.5–1.3)
CULTURE RESULTS: SIGNIFICANT CHANGE UP
CULTURE RESULTS: SIGNIFICANT CHANGE UP
EOSINOPHIL # BLD AUTO: 0.1 K/UL — SIGNIFICANT CHANGE UP (ref 0–0.5)
EOSINOPHIL NFR BLD AUTO: 1 % — SIGNIFICANT CHANGE UP (ref 0–5)
GAS PNL BLDA: SIGNIFICANT CHANGE UP
GLUCOSE SERPL-MCNC: 113 MG/DL — SIGNIFICANT CHANGE UP (ref 70–115)
GLUCOSE SERPL-MCNC: 134 MG/DL — HIGH (ref 70–115)
HCT VFR BLD CALC: 27.7 % — LOW (ref 42–52)
HGB BLD-MCNC: 8.7 G/DL — LOW (ref 14–18)
LYMPHOCYTES # BLD AUTO: 1.2 K/UL — SIGNIFICANT CHANGE UP (ref 1–4.8)
LYMPHOCYTES # BLD AUTO: 10.6 % — LOW (ref 20–55)
MAGNESIUM SERPL-MCNC: 1.8 MG/DL — SIGNIFICANT CHANGE UP (ref 1.6–2.6)
MAGNESIUM SERPL-MCNC: 1.9 MG/DL — SIGNIFICANT CHANGE UP (ref 1.6–2.6)
MCHC RBC-ENTMCNC: 29.4 PG — SIGNIFICANT CHANGE UP (ref 27–31)
MCHC RBC-ENTMCNC: 31.4 G/DL — LOW (ref 32–36)
MCV RBC AUTO: 93.6 FL — SIGNIFICANT CHANGE UP (ref 80–94)
MONOCYTES # BLD AUTO: 1.2 K/UL — HIGH (ref 0–0.8)
MONOCYTES NFR BLD AUTO: 10.3 % — HIGH (ref 3–10)
NEUTROPHILS # BLD AUTO: 8.9 K/UL — HIGH (ref 1.8–8)
NEUTROPHILS NFR BLD AUTO: 75.4 % — HIGH (ref 37–73)
PHOSPHATE SERPL-MCNC: 2.9 MG/DL — SIGNIFICANT CHANGE UP (ref 2.4–4.7)
PHOSPHATE SERPL-MCNC: 3 MG/DL — SIGNIFICANT CHANGE UP (ref 2.4–4.7)
PLATELET # BLD AUTO: 310 K/UL — SIGNIFICANT CHANGE UP (ref 150–400)
POTASSIUM SERPL-MCNC: 4.3 MMOL/L — SIGNIFICANT CHANGE UP (ref 3.5–5.3)
POTASSIUM SERPL-MCNC: 4.3 MMOL/L — SIGNIFICANT CHANGE UP (ref 3.5–5.3)
POTASSIUM SERPL-SCNC: 4.3 MMOL/L — SIGNIFICANT CHANGE UP (ref 3.5–5.3)
POTASSIUM SERPL-SCNC: 4.3 MMOL/L — SIGNIFICANT CHANGE UP (ref 3.5–5.3)
RBC # BLD: 2.96 M/UL — LOW (ref 4.6–6.2)
RBC # FLD: 16.8 % — HIGH (ref 11–15.6)
SODIUM SERPL-SCNC: 139 MMOL/L — SIGNIFICANT CHANGE UP (ref 135–145)
SODIUM SERPL-SCNC: 140 MMOL/L — SIGNIFICANT CHANGE UP (ref 135–145)
SPECIMEN SOURCE: SIGNIFICANT CHANGE UP
SPECIMEN SOURCE: SIGNIFICANT CHANGE UP
WBC # BLD: 11.8 K/UL — HIGH (ref 4.8–10.8)
WBC # FLD AUTO: 11.8 K/UL — HIGH (ref 4.8–10.8)

## 2017-05-19 PROCEDURE — 99291 CRITICAL CARE FIRST HOUR: CPT | Mod: 24

## 2017-05-19 RX ORDER — SODIUM,POTASSIUM PHOSPHATES 278-250MG
1 POWDER IN PACKET (EA) ORAL ONCE
Qty: 0 | Refills: 0 | Status: DISCONTINUED | OUTPATIENT
Start: 2017-05-19 | End: 2017-05-19

## 2017-05-19 RX ORDER — MAGNESIUM SULFATE 500 MG/ML
2 VIAL (ML) INJECTION ONCE
Qty: 0 | Refills: 0 | Status: COMPLETED | OUTPATIENT
Start: 2017-05-19 | End: 2017-05-19

## 2017-05-19 RX ORDER — DEXMEDETOMIDINE HYDROCHLORIDE IN 0.9% SODIUM CHLORIDE 4 UG/ML
0.2 INJECTION INTRAVENOUS
Qty: 200 | Refills: 0 | Status: DISCONTINUED | OUTPATIENT
Start: 2017-05-19 | End: 2017-05-22

## 2017-05-19 RX ORDER — CALCIUM GLUCONATE 100 MG/ML
1 VIAL (ML) INTRAVENOUS ONCE
Qty: 0 | Refills: 0 | Status: COMPLETED | OUTPATIENT
Start: 2017-05-19 | End: 2017-05-19

## 2017-05-19 RX ORDER — FUROSEMIDE 40 MG
40 TABLET ORAL ONCE
Qty: 0 | Refills: 0 | Status: COMPLETED | OUTPATIENT
Start: 2017-05-19 | End: 2017-05-19

## 2017-05-19 RX ADMIN — Medication 1 APPLICATION(S): at 05:08

## 2017-05-19 RX ADMIN — Medication 650 MILLIGRAM(S): at 23:22

## 2017-05-19 RX ADMIN — Medication 650 MILLIGRAM(S): at 11:06

## 2017-05-19 RX ADMIN — DEXMEDETOMIDINE HYDROCHLORIDE IN 0.9% SODIUM CHLORIDE 4.7 MICROGRAM(S)/KG/HR: 4 INJECTION INTRAVENOUS at 17:21

## 2017-05-19 RX ADMIN — Medication 1 APPLICATION(S): at 17:19

## 2017-05-19 RX ADMIN — PANTOPRAZOLE SODIUM 40 MILLIGRAM(S): 20 TABLET, DELAYED RELEASE ORAL at 05:07

## 2017-05-19 RX ADMIN — PANTOPRAZOLE SODIUM 40 MILLIGRAM(S): 20 TABLET, DELAYED RELEASE ORAL at 17:18

## 2017-05-19 RX ADMIN — Medication 0.12 MILLIGRAM(S): at 05:07

## 2017-05-19 RX ADMIN — CHLORHEXIDINE GLUCONATE 15 MILLILITER(S): 213 SOLUTION TOPICAL at 05:07

## 2017-05-19 RX ADMIN — HYDROMORPHONE HYDROCHLORIDE 0.5 MILLIGRAM(S): 2 INJECTION INTRAMUSCULAR; INTRAVENOUS; SUBCUTANEOUS at 02:22

## 2017-05-19 RX ADMIN — HYDROMORPHONE HYDROCHLORIDE 0.5 MILLIGRAM(S): 2 INJECTION INTRAMUSCULAR; INTRAVENOUS; SUBCUTANEOUS at 02:40

## 2017-05-19 RX ADMIN — PROPOFOL 2.82 MICROGRAM(S)/KG/MIN: 10 INJECTION, EMULSION INTRAVENOUS at 00:26

## 2017-05-19 RX ADMIN — Medication 650 MILLIGRAM(S): at 05:07

## 2017-05-19 RX ADMIN — QUETIAPINE FUMARATE 50 MILLIGRAM(S): 200 TABLET, FILM COATED ORAL at 11:06

## 2017-05-19 RX ADMIN — Medication 200 GRAM(S): at 05:47

## 2017-05-19 RX ADMIN — CHLORHEXIDINE GLUCONATE 15 MILLILITER(S): 213 SOLUTION TOPICAL at 17:18

## 2017-05-19 RX ADMIN — Medication 40 MILLIGRAM(S): at 11:02

## 2017-05-19 RX ADMIN — Medication 6 MILLIGRAM(S): at 21:01

## 2017-05-19 RX ADMIN — Medication 50 GRAM(S): at 23:29

## 2017-05-19 RX ADMIN — DEXMEDETOMIDINE HYDROCHLORIDE IN 0.9% SODIUM CHLORIDE 4.7 MICROGRAM(S)/KG/HR: 4 INJECTION INTRAVENOUS at 11:01

## 2017-05-19 RX ADMIN — PROPOFOL 2.82 MICROGRAM(S)/KG/MIN: 10 INJECTION, EMULSION INTRAVENOUS at 08:09

## 2017-05-19 RX ADMIN — Medication 2.5 MILLIGRAM(S): at 11:06

## 2017-05-19 RX ADMIN — HEPARIN SODIUM 11 UNIT(S)/HR: 5000 INJECTION INTRAVENOUS; SUBCUTANEOUS at 06:29

## 2017-05-19 RX ADMIN — Medication 50 GRAM(S): at 06:49

## 2017-05-19 RX ADMIN — AMIODARONE HYDROCHLORIDE 200 MILLIGRAM(S): 400 TABLET ORAL at 05:07

## 2017-05-19 RX ADMIN — Medication 650 MILLIGRAM(S): at 17:18

## 2017-05-19 NOTE — PROGRESS NOTE ADULT - SUBJECTIVE AND OBJECTIVE BOX
INTERVAL HPI/OVERNIGHT EVENTS:  Patient seen and examined    MEDICATIONS  (STANDING):  pantoprazole  Injectable 40milliGRAM(s) IV Push every 12 hours  chlorhexidine 0.12% Liquid 15milliLiter(s) Swish and Spit two times a day  petrolatum Ophthalmic Ointment 1Application(s) Both EYES two times a day  heparin  Infusion 1100Unit(s)/Hr IV Continuous <Continuous>  digoxin     Tablet 0.125milliGRAM(s) Oral daily  amiodarone    Tablet 200milliGRAM(s) Oral daily  acetaminophen    Suspension 650milliGRAM(s) Oral every 6 hours  melatonin 6milliGRAM(s) Oral at bedtime  QUEtiapine 50milliGRAM(s) Oral daily  propofol Infusion 5MICROgram(s)/kG/Min IV Continuous <Continuous>  metoprolol Injectable 2.5milliGRAM(s) IV Push every 6 hours    MEDICATIONS  (PRN):  HYDROmorphone  Injectable 0.5milliGRAM(s) IV Push every 3 hours PRN Severe Pain (7 - 10)      Allergies    No Known Allergies    Intolerances        Vital Signs Last 24 Hrs  T(C): 36.9, Max: 37.9 (05-18 @ 16:00)  T(F): 98.4, Max: 100.2 (05-18 @ 16:00)  HR: 93 (73 - 109)  BP: 112/74 (90/60 - 134/79)  BP(mean): 86 (72 - 101)  RR: 20 (7 - 49)  SpO2: 100% (98% - 100%)    PHYSICAL EXAM:  General: NAD.  CVS: S1, S2  Chest: air entry bilaterally present  Abd: BS present, soft, non-tender      LABS:                        8.7    11.8  )-----------( 310      ( 19 May 2017 05:25 )             27.7     05-19    139  |  100  |  26.0<H>  ----------------------------<  113  4.3   |  30.0<H>  |  0.58    Ca    7.7<L>      19 May 2017 05:25  Phos  2.9     05-19  Mg     1.8     05-19      PTT - ( 19 May 2017 05:25 )  PTT:82.1 sec    RADIOLOGY & ADDITIONAL TESTS:

## 2017-05-19 NOTE — PROGRESS NOTE ADULT - SUBJECTIVE AND OBJECTIVE BOX
INTERVAL HPI/OVERNIGHT EVENTS:    Similar Neuro status to yesterday. Bicarb 30 from 32.  No Diamox.  Abx off, Fs OFF. PTT 63.  Worked on PS. 6 beats V-T.  ON:  Pt opened eyes and attempted to punch RN.  Intermittent bursts of agitation controlled with propofol. Back to previous neurologic exam.        MEDICATIONS  (STANDING):  pantoprazole  Injectable 40milliGRAM(s) IV Push every 12 hours  chlorhexidine 0.12% Liquid 15milliLiter(s) Swish and Spit two times a day  petrolatum Ophthalmic Ointment 1Application(s) Both EYES two times a day  heparin  Infusion 1100Unit(s)/Hr IV Continuous <Continuous>  digoxin     Tablet 0.125milliGRAM(s) Oral daily  amiodarone    Tablet 200milliGRAM(s) Oral daily  acetaminophen    Suspension 650milliGRAM(s) Oral every 6 hours  melatonin 6milliGRAM(s) Oral at bedtime  QUEtiapine 50milliGRAM(s) Oral daily  propofol Infusion 5MICROgram(s)/kG/Min IV Continuous <Continuous>  metoprolol Injectable 2.5milliGRAM(s) IV Push every 6 hours  calcium gluconate IVPB 1Gram(s) IV Intermittent once    MEDICATIONS  (PRN):  HYDROmorphone  Injectable 0.5milliGRAM(s) IV Push every 3 hours PRN Severe Pain (7 - 10)      Drug Dosing Weight  Height (cm): 165.1 (04 May 2017 13:12)  Weight (kg): 93.9 (09 May 2017 05:12)  BMI (kg/m2): 34.4 (09 May 2017 05:12)  BSA (m2): 2.01 (09 May 2017 05:12)    CENTRAL LINE: [ ] YES [ ] NO  LOCATION:   DATE INSERTED:  REMOVE: [ ] YES [ ] NO  EXPLAIN:    ARMAS: [ ] YES [ ] NO    DATE INSERTED:  REMOVE: [ ] YES [ ] NO  EXPLAIN:    A-LINE: [ ] YES [ ] NO  LOCATION:   DATE INSERTED:  REMOVE: [ ] YES [ ] NO  EXPLAIN:    PAST MEDICAL & SURGICAL HISTORY:  Mitral regurgitation: severe MR  Cardiomyopathy, nonischemic  CAD (coronary artery disease)  Asthma  Atrial fibrillation  Heart disease  S/P laparoscopic cholecystectomy  History of humerus fracture: Metal pins in Left Humerus  Artificial pacemaker      ICU Vital Signs Last 24 Hrs  T(C): 37.5, Max: 37.9 (05-18 @ 16:00)  T(F): 99.5, Max: 100.2 (05-18 @ 16:00)  HR: 81 (73 - 109)  BP: 92/72 (90/60 - 134/79)  BP(mean): 79 (72 - 101)  ABP: --  ABP(mean): --  RR: 17 (7 - 49)  SpO2: 100% (98% - 100%)      ABG - ( 19 May 2017 04:00 )  pH: 7.44  /  pCO2: 45    /  pO2: 119   / HCO3: 30    / Base Excess: 5.9   /  SaO2: 100                 I&O's Detail  I & Os for 24h ending 18 May 2017 07:00  =============================================  IN:    Pivot: 1224 ml    Solution: 1000 ml    heparin Infusion: 264 ml    Enteral Tube Flush: 255 ml    Solution: 187.5 ml    Solution: 150 ml    propofol Infusion: 134.7 ml    Solution: 100 ml    Total IN: 3315.2 ml  ---------------------------------------------  OUT:    Indwelling Catheter - Urethral: 1360 ml    Rectal Tube: 500 ml    Bulb: 470 ml    Total OUT: 2330 ml  ---------------------------------------------  Total NET: 985.2 ml    I & Os for current day (as of 19 May 2017 05:43)  =============================================  IN:    Pivot: 1173 ml    heparin Infusion: 242 ml    Enteral Tube Flush: 150 ml    propofol Infusion: 123.2 ml    Solution: 62.5 ml    Solution: 50 ml    Total IN: 1800.7 ml  ---------------------------------------------  OUT:    Indwelling Catheter - Urethral: 1275 ml    Bulb: 320 ml    Rectal Tube: 50 ml    Total OUT: 1645 ml  ---------------------------------------------  Total NET: 155.7 ml      Mode: SIMV (Synchronized Intermittent Mandatory Ventilation)  RR (machine): 2  TV (machine): 900  FiO2: 30  PEEP: 8  PS: 10  MAP: 12  PIP: 19      PHYSICAL EXAM:    Neurological: GCS 4/1T/5. Not moving BL LE.  Minimal response to BL UE with painful stimuli, will attempt to localize. Pt with occasional purposeful mocementDoes not track to movement or attend to voice    Eyes: PERRL ~ 3mm    Neck: Supple. NT AT, FROM no pain.  No JVD. No meningeal signs    Pulmonary: NAD, CTA, = BL .  Minimal secretions.    Cardiovascular: Sinus tachy, S1, S2, No murmurs noted.    Gastrointestinal: ND, Soft, NT.    Extremities: NT, AT, Mild-Moderate 2+ pitting edema.  No erythema or palpable cord noted.  FROM, = 2+ pulses throughout.    LABS:  CBC Full  -  ( 18 May 2017 04:24 )  WBC Count : 11.2 K/uL  Hemoglobin : 10.0 g/dL  Hematocrit : 31.6 %  Platelet Count - Automated : 300 K/uL  Mean Cell Volume : 93.8 fl  Mean Cell Hemoglobin : 29.7 pg  Mean Cell Hemoglobin Concentration : 31.6 g/dL  Auto Neutrophil # : 8.6 K/uL  Auto Lymphocyte # : 1.3 K/uL  Auto Monocyte # : 1.0 K/uL  Auto Eosinophil # : 0.1 K/uL  Auto Basophil # : 0.0 K/uL  Auto Neutrophil % : 76.7 %  Auto Lymphocyte % : 11.7 %  Auto Monocyte % : 8.6 %  Auto Eosinophil % : 1.1 %  Auto Basophil % : 0.1 %    05-18    140  |  104  |  23.0<H>  ----------------------------<  134<H>  4.4   |  30.0<H>  |  0.57    Ca    7.3<L>      18 May 2017 04:24  Phos  2.0     05-18  Mg     1.7     05-18      PTT - ( 18 May 2017 04:24 )  PTT:64.3 sec      Assessment and Plan:    50 yo Male presenting with: Cardiac arrest following ERCP for bile leak SP Lap carissa.    Neurological: Continue to allow pt to awake.  Avoid sedatives or deleriogenic meds as much as possible.  Low dose propofol for agitation.      Pulmonary: Maintain current PEEP and vent settings. Discuss Trach with family    Cardiovascular: Continue rate control meds as written.    Gastrointestinal: Continue TF at goal. Discuss PEG with family    Genitourinary: Maintain Armas at this time for monitoring since pt newly septic    Heme: SCD, Hep gtt.  Monitor ptt    ID: Continue Abx for UTI vs VAP.  FU CX.  Will consider Repeat Abd CT to look for additional source.    Skin: Avoid skin breakdown.    Lines/ Tubes: Maintain as above.    Dispo: Critical care as above.  Plan for trach/PEG today INTERVAL HPI/OVERNIGHT EVENTS:    Similar Neuro status to yesterday. Bicarb 30 from 32.  No Diamox.  Abx off, Fs OFF. PTT 63.  Worked on PS. 6 beats V-T.  ON:  Pt opened eyes and attempted to punch RN.  Intermittent bursts of agitation controlled with propofol. Back to previous neurologic exam.        MEDICATIONS  (STANDING):  pantoprazole  Injectable 40milliGRAM(s) IV Push every 12 hours  chlorhexidine 0.12% Liquid 15milliLiter(s) Swish and Spit two times a day  petrolatum Ophthalmic Ointment 1Application(s) Both EYES two times a day  heparin  Infusion 1100Unit(s)/Hr IV Continuous <Continuous>  digoxin     Tablet 0.125milliGRAM(s) Oral daily  amiodarone    Tablet 200milliGRAM(s) Oral daily  acetaminophen    Suspension 650milliGRAM(s) Oral every 6 hours  melatonin 6milliGRAM(s) Oral at bedtime  QUEtiapine 50milliGRAM(s) Oral daily  propofol Infusion 5MICROgram(s)/kG/Min IV Continuous <Continuous>  metoprolol Injectable 2.5milliGRAM(s) IV Push every 6 hours  calcium gluconate IVPB 1Gram(s) IV Intermittent once    MEDICATIONS  (PRN):  HYDROmorphone  Injectable 0.5milliGRAM(s) IV Push every 3 hours PRN Severe Pain (7 - 10)      Drug Dosing Weight  Height (cm): 165.1 (04 May 2017 13:12)  Weight (kg): 93.9 (09 May 2017 05:12)  BMI (kg/m2): 34.4 (09 May 2017 05:12)  BSA (m2): 2.01 (09 May 2017 05:12)    CENTRAL LINE: [ ] YES [ ] NO  LOCATION:   DATE INSERTED:  REMOVE: [ ] YES [ ] NO  EXPLAIN:    ARMAS: [ ] YES [ ] NO    DATE INSERTED:  REMOVE: [ ] YES [ ] NO  EXPLAIN:    A-LINE: [ ] YES [ ] NO  LOCATION:   DATE INSERTED:  REMOVE: [ ] YES [ ] NO  EXPLAIN:    PAST MEDICAL & SURGICAL HISTORY:  Mitral regurgitation: severe MR  Cardiomyopathy, nonischemic  CAD (coronary artery disease)  Asthma  Atrial fibrillation  Heart disease  S/P laparoscopic cholecystectomy  History of humerus fracture: Metal pins in Left Humerus  Artificial pacemaker      ICU Vital Signs Last 24 Hrs  T(C): 37.5, Max: 37.9 (05-18 @ 16:00)  T(F): 99.5, Max: 100.2 (05-18 @ 16:00)  HR: 81 (73 - 109)  BP: 92/72 (90/60 - 134/79)  BP(mean): 79 (72 - 101)  ABP: --  ABP(mean): --  RR: 17 (7 - 49)  SpO2: 100% (98% - 100%)      ABG - ( 19 May 2017 04:00 )  pH: 7.44  /  pCO2: 45    /  pO2: 119   / HCO3: 30    / Base Excess: 5.9   /  SaO2: 100                 I&O's Detail  I & Os for 24h ending 18 May 2017 07:00  =============================================  IN:    Pivot: 1224 ml    Solution: 1000 ml    heparin Infusion: 264 ml    Enteral Tube Flush: 255 ml    Solution: 187.5 ml    Solution: 150 ml    propofol Infusion: 134.7 ml    Solution: 100 ml    Total IN: 3315.2 ml  ---------------------------------------------  OUT:    Indwelling Catheter - Urethral: 1360 ml    Rectal Tube: 500 ml    Bulb: 470 ml    Total OUT: 2330 ml  ---------------------------------------------  Total NET: 985.2 ml    I & Os for current day (as of 19 May 2017 05:43)  =============================================  IN:    Pivot: 1173 ml    heparin Infusion: 242 ml    Enteral Tube Flush: 150 ml    propofol Infusion: 123.2 ml    Solution: 62.5 ml    Solution: 50 ml    Total IN: 1800.7 ml  ---------------------------------------------  OUT:    Indwelling Catheter - Urethral: 1275 ml    Bulb: 320 ml    Rectal Tube: 50 ml    Total OUT: 1645 ml  ---------------------------------------------  Total NET: 155.7 ml      Mode: SIMV (Synchronized Intermittent Mandatory Ventilation)  RR (machine): 2  TV (machine): 900  FiO2: 30  PEEP: 8  PS: 10  MAP: 12  PIP: 19      PHYSICAL EXAM:    Neurological: GCS 4/1T/5. Not moving BL LE.  Minimal response to BL UE with painful stimuli, will attempt to localize. Pt with occasional purposeful mocementDoes not track to movement or attend to voice.  Pt began to briskly localize as morning progressed    Eyes: PERRL ~ 3mm    Neck: Supple. NT AT, FROM no pain.  No JVD. No meningeal signs    Pulmonary: NAD, CTA, = BL .  Minimal secretions.    Cardiovascular: Sinus tachy, S1, S2, No murmurs noted.    Gastrointestinal: ND, Soft, NT.    Extremities: NT, AT, Mild-Moderate 2+ pitting edema.  No erythema or palpable cord noted.  FROM, = 2+ pulses throughout.    LABS:  CBC Full  -  ( 18 May 2017 04:24 )  WBC Count : 11.2 K/uL  Hemoglobin : 10.0 g/dL  Hematocrit : 31.6 %  Platelet Count - Automated : 300 K/uL  Mean Cell Volume : 93.8 fl  Mean Cell Hemoglobin : 29.7 pg  Mean Cell Hemoglobin Concentration : 31.6 g/dL  Auto Neutrophil # : 8.6 K/uL  Auto Lymphocyte # : 1.3 K/uL  Auto Monocyte # : 1.0 K/uL  Auto Eosinophil # : 0.1 K/uL  Auto Basophil # : 0.0 K/uL  Auto Neutrophil % : 76.7 %  Auto Lymphocyte % : 11.7 %  Auto Monocyte % : 8.6 %  Auto Eosinophil % : 1.1 %  Auto Basophil % : 0.1 %    05-18    140  |  104  |  23.0<H>  ----------------------------<  134<H>  4.4   |  30.0<H>  |  0.57    Ca    7.3<L>      18 May 2017 04:24  Phos  2.0     05-18  Mg     1.7     05-18      PTT - ( 18 May 2017 04:24 )  PTT:64.3 sec      Assessment and Plan:    52 yo Male presenting with: Cardiac arrest following ERCP for bile leak SP Lap carissa.    Neurological: Propofol d/c, recedex started to allow pt to awake more.  Comfortable.  Localizing briskly    Pulmonary: Ventilator settings switched to PS 5/8, pt maintaining TV >400.  Lasix 40give w/ 1.5L output.  Possible extubation in am    Cardiovascular: Continue rate control meds as written.    Gastrointestinal: Continue TF at goal. Discuss PEG with family    Genitourinary: Maintain Armas at this time for monitoring since pt newly septic    Heme: SCD, Hep gtt.  Monitor ptt, no greater than 90.    ID: Continue Abx for UTI vs VAP.  FU CX.  Will consider Repeat Abd CT to look for additional source.    Skin: Avoid skin breakdown.    Lines/ Tubes: Maintain as above.    Dispo: Critical care as above.  Plan for trach/PEG next week if unable to extubate

## 2017-05-19 NOTE — PROGRESS NOTE ADULT - ASSESSMENT
IMPRESSION:  The patient is a 51 year old male with significant cardiovascular history including atrial fibrillation and cardiomyopathy s/p laparoscopic cholecystectomy. GI f/u for bile leak.   - first ercp 5/9/17: papilla appeared to be stenosed. During attempts at biliary cannulation, patient developed hypotension. Procedure aborted. Patient coded. Intubated. Patient resuscitated. ACS team took over, and performed exploratory laparotomy.  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   - Off vasopressors now. Intubated. LFTs improving. On NGT feeding.  - remains afebrile     PLAN:  NGT feeding  IV antibiotics  SICU monitoring  monitor cbc, serum lytes, and LFTs  d/w family (brother) bedside  will f/u

## 2017-05-20 LAB
ANION GAP SERPL CALC-SCNC: 9 MMOL/L — SIGNIFICANT CHANGE UP (ref 5–17)
APTT BLD: 74.4 SEC — HIGH (ref 27.5–37.4)
BASOPHILS # BLD AUTO: 0 K/UL — SIGNIFICANT CHANGE UP (ref 0–0.2)
BASOPHILS NFR BLD AUTO: 0.1 % — SIGNIFICANT CHANGE UP (ref 0–2)
BUN SERPL-MCNC: 27 MG/DL — HIGH (ref 8–20)
CALCIUM SERPL-MCNC: 7.9 MG/DL — LOW (ref 8.6–10.2)
CHLORIDE SERPL-SCNC: 102 MMOL/L — SIGNIFICANT CHANGE UP (ref 98–107)
CO2 SERPL-SCNC: 30 MMOL/L — HIGH (ref 22–29)
CREAT SERPL-MCNC: 0.64 MG/DL — SIGNIFICANT CHANGE UP (ref 0.5–1.3)
EOSINOPHIL # BLD AUTO: 0.1 K/UL — SIGNIFICANT CHANGE UP (ref 0–0.5)
EOSINOPHIL NFR BLD AUTO: 0.5 % — SIGNIFICANT CHANGE UP (ref 0–5)
GLUCOSE SERPL-MCNC: 129 MG/DL — HIGH (ref 70–115)
HCT VFR BLD CALC: 28.9 % — LOW (ref 42–52)
HGB BLD-MCNC: 9 G/DL — LOW (ref 14–18)
LYMPHOCYTES # BLD AUTO: 1.4 K/UL — SIGNIFICANT CHANGE UP (ref 1–4.8)
LYMPHOCYTES # BLD AUTO: 11.6 % — LOW (ref 20–55)
MAGNESIUM SERPL-MCNC: 2.3 MG/DL — SIGNIFICANT CHANGE UP (ref 1.6–2.6)
MCHC RBC-ENTMCNC: 29.3 PG — SIGNIFICANT CHANGE UP (ref 27–31)
MCHC RBC-ENTMCNC: 31.1 G/DL — LOW (ref 32–36)
MCV RBC AUTO: 94.1 FL — HIGH (ref 80–94)
MONOCYTES # BLD AUTO: 1 K/UL — HIGH (ref 0–0.8)
MONOCYTES NFR BLD AUTO: 8.2 % — SIGNIFICANT CHANGE UP (ref 3–10)
NEUTROPHILS # BLD AUTO: 9.6 K/UL — HIGH (ref 1.8–8)
NEUTROPHILS NFR BLD AUTO: 77.6 % — HIGH (ref 37–73)
PHOSPHATE SERPL-MCNC: 3.3 MG/DL — SIGNIFICANT CHANGE UP (ref 2.4–4.7)
PLATELET # BLD AUTO: 304 K/UL — SIGNIFICANT CHANGE UP (ref 150–400)
POTASSIUM SERPL-MCNC: 4.5 MMOL/L — SIGNIFICANT CHANGE UP (ref 3.5–5.3)
POTASSIUM SERPL-SCNC: 4.5 MMOL/L — SIGNIFICANT CHANGE UP (ref 3.5–5.3)
RBC # BLD: 3.07 M/UL — LOW (ref 4.6–6.2)
RBC # FLD: 16.7 % — HIGH (ref 11–15.6)
SODIUM SERPL-SCNC: 141 MMOL/L — SIGNIFICANT CHANGE UP (ref 135–145)
WBC # BLD: 12.3 K/UL — HIGH (ref 4.8–10.8)
WBC # FLD AUTO: 12.3 K/UL — HIGH (ref 4.8–10.8)

## 2017-05-20 PROCEDURE — 71010: CPT | Mod: 26

## 2017-05-20 RX ORDER — KETOROLAC TROMETHAMINE 30 MG/ML
15 SYRINGE (ML) INJECTION ONCE
Qty: 0 | Refills: 0 | Status: DISCONTINUED | OUTPATIENT
Start: 2017-05-20 | End: 2017-05-20

## 2017-05-20 RX ORDER — METOPROLOL TARTRATE 50 MG
12.5 TABLET ORAL
Qty: 0 | Refills: 0 | Status: DISCONTINUED | OUTPATIENT
Start: 2017-05-20 | End: 2017-05-25

## 2017-05-20 RX ORDER — PANTOPRAZOLE SODIUM 20 MG/1
40 TABLET, DELAYED RELEASE ORAL DAILY
Qty: 0 | Refills: 0 | Status: DISCONTINUED | OUTPATIENT
Start: 2017-05-20 | End: 2017-05-26

## 2017-05-20 RX ADMIN — HYDROMORPHONE HYDROCHLORIDE 0.5 MILLIGRAM(S): 2 INJECTION INTRAMUSCULAR; INTRAVENOUS; SUBCUTANEOUS at 21:47

## 2017-05-20 RX ADMIN — Medication 12.5 MILLIGRAM(S): at 17:53

## 2017-05-20 RX ADMIN — Medication 15 MILLIGRAM(S): at 04:05

## 2017-05-20 RX ADMIN — DEXMEDETOMIDINE HYDROCHLORIDE IN 0.9% SODIUM CHLORIDE 4.7 MICROGRAM(S)/KG/HR: 4 INJECTION INTRAVENOUS at 04:05

## 2017-05-20 RX ADMIN — HEPARIN SODIUM 11 UNIT(S)/HR: 5000 INJECTION INTRAVENOUS; SUBCUTANEOUS at 07:10

## 2017-05-20 RX ADMIN — Medication 650 MILLIGRAM(S): at 17:50

## 2017-05-20 RX ADMIN — Medication 650 MILLIGRAM(S): at 05:05

## 2017-05-20 RX ADMIN — DEXMEDETOMIDINE HYDROCHLORIDE IN 0.9% SODIUM CHLORIDE 4.7 MICROGRAM(S)/KG/HR: 4 INJECTION INTRAVENOUS at 10:51

## 2017-05-20 RX ADMIN — Medication 650 MILLIGRAM(S): at 11:02

## 2017-05-20 RX ADMIN — AMIODARONE HYDROCHLORIDE 200 MILLIGRAM(S): 400 TABLET ORAL at 05:05

## 2017-05-20 RX ADMIN — CHLORHEXIDINE GLUCONATE 15 MILLILITER(S): 213 SOLUTION TOPICAL at 17:50

## 2017-05-20 RX ADMIN — Medication 0.12 MILLIGRAM(S): at 05:05

## 2017-05-20 RX ADMIN — HYDROMORPHONE HYDROCHLORIDE 0.5 MILLIGRAM(S): 2 INJECTION INTRAMUSCULAR; INTRAVENOUS; SUBCUTANEOUS at 01:45

## 2017-05-20 RX ADMIN — PANTOPRAZOLE SODIUM 40 MILLIGRAM(S): 20 TABLET, DELAYED RELEASE ORAL at 05:05

## 2017-05-20 RX ADMIN — CHLORHEXIDINE GLUCONATE 15 MILLILITER(S): 213 SOLUTION TOPICAL at 05:04

## 2017-05-20 RX ADMIN — Medication 1 APPLICATION(S): at 17:50

## 2017-05-20 RX ADMIN — Medication 6 MILLIGRAM(S): at 22:45

## 2017-05-20 RX ADMIN — QUETIAPINE FUMARATE 50 MILLIGRAM(S): 200 TABLET, FILM COATED ORAL at 11:02

## 2017-05-20 RX ADMIN — Medication 15 MILLIGRAM(S): at 04:12

## 2017-05-20 RX ADMIN — Medication 1 APPLICATION(S): at 05:05

## 2017-05-20 RX ADMIN — HYDROMORPHONE HYDROCHLORIDE 0.5 MILLIGRAM(S): 2 INJECTION INTRAMUSCULAR; INTRAVENOUS; SUBCUTANEOUS at 01:54

## 2017-05-20 RX ADMIN — Medication 650 MILLIGRAM(S): at 23:00

## 2017-05-20 RX ADMIN — PANTOPRAZOLE SODIUM 40 MILLIGRAM(S): 20 TABLET, DELAYED RELEASE ORAL at 11:02

## 2017-05-20 NOTE — PROGRESS NOTE ADULT - SUBJECTIVE AND OBJECTIVE BOX
INTERVAL HPI/OVERNIGHT EVENTS:    Changed to PS, doing well w/ TV in 4-500’s.  More awake today, localizing, tracking slightly.  Propofol d/c, Precedex started.  Lasix 40mg x1 given with 1 L given.  ON:  Awoke agitated and attempted to self extubate.  Pt still not following commands and does not track but more purposeful with movements.     MEDICATIONS  (STANDING):  pantoprazole  Injectable 40milliGRAM(s) IV Push every 12 hours  chlorhexidine 0.12% Liquid 15milliLiter(s) Swish and Spit two times a day  petrolatum Ophthalmic Ointment 1Application(s) Both EYES two times a day  heparin  Infusion 1100Unit(s)/Hr IV Continuous <Continuous>  digoxin     Tablet 0.125milliGRAM(s) Oral daily  amiodarone    Tablet 200milliGRAM(s) Oral daily  acetaminophen    Suspension 650milliGRAM(s) Oral every 6 hours  melatonin 6milliGRAM(s) Oral at bedtime  QUEtiapine 50milliGRAM(s) Oral daily  metoprolol Injectable 2.5milliGRAM(s) IV Push every 6 hours  dexmedetomidine Infusion 0.2MICROgram(s)/kG/Hr IV Continuous <Continuous>    MEDICATIONS  (PRN):  HYDROmorphone  Injectable 0.5milliGRAM(s) IV Push every 3 hours PRN Severe Pain (7 - 10)      Drug Dosing Weight  Height (cm): 165.1 (04 May 2017 13:12)  Weight (kg): 93.9 (09 May 2017 05:12)  BMI (kg/m2): 34.4 (09 May 2017 05:12)  BSA (m2): 2.01 (09 May 2017 05:12)    CENTRAL LINE: [ ] YES [ ] NO  LOCATION:   DATE INSERTED:  REMOVE: [ ] YES [ ] NO  EXPLAIN:    ARMAS: [ ] YES [ ] NO    DATE INSERTED:  REMOVE: [ ] YES [ ] NO  EXPLAIN:    A-LINE: [ ] YES [ ] NO  LOCATION:   DATE INSERTED:  REMOVE: [ ] YES [ ] NO  EXPLAIN:    PAST MEDICAL & SURGICAL HISTORY:  Mitral regurgitation: severe MR  Cardiomyopathy, nonischemic  CAD (coronary artery disease)  Asthma  Atrial fibrillation  Heart disease  S/P laparoscopic cholecystectomy  History of humerus fracture: Metal pins in Left Humerus  Artificial pacemaker      ICU Vital Signs Last 24 Hrs  T(C): 38.4, Max: 38.4 (05-20 @ 04:00)  T(F): 101.1, Max: 101.1 (05-20 @ 04:00)  HR: 67 (64 - 129)  BP: 97/69 (87/62 - 124/88)  BP(mean): 78 (69 - 103)  ABP: --  ABP(mean): --  RR: 24 (17 - 39)  SpO2: 100% (100% - 100%)      ABG - ( 19 May 2017 04:00 )  pH: 7.44  /  pCO2: 45    /  pO2: 119   / HCO3: 30    / Base Excess: 5.9   /  SaO2: 100                 I&O's Detail  I & Os for 24h ending 19 May 2017 07:00  =============================================  IN:    Pivot: 1224 ml    Enteral Tube Flush: 270 ml    heparin Infusion: 264 ml    propofol Infusion: 134.4 ml    Solution: 100 ml    Solution: 100 ml    Solution: 62.5 ml    Total IN: 2154.9 ml  ---------------------------------------------  OUT:    Indwelling Catheter - Urethral: 1340 ml    Bulb: 350 ml    Rectal Tube: 300 ml    Total OUT: 1990 ml  ---------------------------------------------  Total NET: 164.9 ml    I & Os for current day (as of 20 May 2017 04:23)  =============================================  IN:    Pivot: 969 ml    Enteral Tube Flush: 275 ml    heparin Infusion: 231 ml    dexmedetomidine Infusion: 117.1 ml    Solution: 50 ml    propofol Infusion: 11.2 ml    Total IN: 1653.3 ml  ---------------------------------------------  OUT:    Indwelling Catheter - Urethral: 2390 ml    Bulb: 295 ml    Total OUT: 2685 ml  ---------------------------------------------  Total NET: -1031.7 ml      Mode: CPAP with PS  FiO2: 40  PEEP: 8  PS: 10  MAP: 12    PHYSICAL EXAM:    Neurological: GCS 4/1T/5. Not moving BL LE.  Minimal response to BL UE with painful stimuli, will attempt to localize. Pt with occasional purposeful movements. Does not track to movement or attend to voice.  Pt does briskly localize    Eyes: PERRL ~ 3mm    Neck: Supple. NT AT, FROM no pain.  No JVD. No meningeal signs    Pulmonary: NAD, CTA, = BL .  Minimal secretions.    Cardiovascular: Sinus tachy, S1, S2, No murmurs noted.    Gastrointestinal: ND, Soft, NT.    Extremities: NT, AT, Mild-Moderate 2+ pitting edema.  No erythema or palpable cord noted.  FROM, = 2+ pulses throughout.      LABS:  CBC Full  -  ( 19 May 2017 05:25 )  WBC Count : 11.8 K/uL  Hemoglobin : 8.7 g/dL  Hematocrit : 27.7 %  Platelet Count - Automated : 310 K/uL  Mean Cell Volume : 93.6 fl  Mean Cell Hemoglobin : 29.4 pg  Mean Cell Hemoglobin Concentration : 31.4 g/dL  Auto Neutrophil # : 8.9 K/uL  Auto Lymphocyte # : 1.2 K/uL  Auto Monocyte # : 1.2 K/uL  Auto Eosinophil # : 0.1 K/uL  Auto Basophil # : 0.0 K/uL  Auto Neutrophil % : 75.4 %  Auto Lymphocyte % : 10.6 %  Auto Monocyte % : 10.3 %  Auto Eosinophil % : 1.0 %  Auto Basophil % : 0.2 %    05-19    140  |  100  |  25.0<H>  ----------------------------<  134<H>  4.3   |  32.0<H>  |  0.70    Ca    7.8<L>      19 May 2017 22:48  Phos  3.0     05-19  Mg     1.9     05-19      PTT - ( 19 May 2017 05:25 )  PTT:82.1 sec        Assessment and Plan:    52 yo Male presenting with: Cardiac arrest following ERCP for bile leak SP Lap carissa.    Neurological: Propofol d/c, Precedex started to allow pt to awake more.  Comfortable.  Localizing briskly    Pulmonary: Ventilator settings switched to PS 5/8, pt maintaining TV >400.  Lasix 40give w/ 1.5L output.  Possible extubation in am    Cardiovascular: Continue rate control meds as written.    Gastrointestinal: Continue TF at goal. Discuss PEG with family    Genitourinary: Maintain Armas at this time for monitoring since pt newly septic    Heme: SCD, Hep gtt.  Monitor ptt, no greater than 90.    ID: Continue Abx for UTI vs VAP.  FU CX.  Will consider Repeat Abd CT to look for additional source.    Skin: Avoid skin breakdown.    Lines/ Tubes: Maintain as above.    Dispo: Critical care as above.  Plan for trach/PEG next week if unable to extubate

## 2017-05-21 LAB
ANION GAP SERPL CALC-SCNC: 10 MMOL/L — SIGNIFICANT CHANGE UP (ref 5–17)
APTT BLD: 52.4 SEC — HIGH (ref 27.5–37.4)
APTT BLD: 63.6 SEC — HIGH (ref 27.5–37.4)
BASE EXCESS BLDA CALC-SCNC: 5.5 MMOL/L — HIGH (ref -3–3)
BASOPHILS # BLD AUTO: 0 K/UL — SIGNIFICANT CHANGE UP (ref 0–0.2)
BASOPHILS NFR BLD AUTO: 0.2 % — SIGNIFICANT CHANGE UP (ref 0–2)
BLOOD GAS COMMENTS ARTERIAL: SIGNIFICANT CHANGE UP
BUN SERPL-MCNC: 37 MG/DL — HIGH (ref 8–20)
CALCIUM SERPL-MCNC: 8.1 MG/DL — LOW (ref 8.6–10.2)
CHLORIDE SERPL-SCNC: 99 MMOL/L — SIGNIFICANT CHANGE UP (ref 98–107)
CO2 SERPL-SCNC: 30 MMOL/L — HIGH (ref 22–29)
CREAT SERPL-MCNC: 0.73 MG/DL — SIGNIFICANT CHANGE UP (ref 0.5–1.3)
CULTURE RESULTS: NO GROWTH — SIGNIFICANT CHANGE UP
EOSINOPHIL # BLD AUTO: 0 K/UL — SIGNIFICANT CHANGE UP (ref 0–0.5)
EOSINOPHIL NFR BLD AUTO: 0.3 % — SIGNIFICANT CHANGE UP (ref 0–5)
GAS PNL BLDA: SIGNIFICANT CHANGE UP
GLUCOSE SERPL-MCNC: 131 MG/DL — HIGH (ref 70–115)
HCO3 BLDA-SCNC: 29 MMOL/L — HIGH (ref 20–26)
HCT VFR BLD CALC: 28.9 % — LOW (ref 42–52)
HGB BLD-MCNC: 9.2 G/DL — LOW (ref 14–18)
HOROWITZ INDEX BLDA+IHG-RTO: 30 — SIGNIFICANT CHANGE UP
LYMPHOCYTES # BLD AUTO: 17.4 % — LOW (ref 20–55)
LYMPHOCYTES # BLD AUTO: 2.2 K/UL — SIGNIFICANT CHANGE UP (ref 1–4.8)
MAGNESIUM SERPL-MCNC: 2 MG/DL — SIGNIFICANT CHANGE UP (ref 1.6–2.6)
MCHC RBC-ENTMCNC: 30.2 PG — SIGNIFICANT CHANGE UP (ref 27–31)
MCHC RBC-ENTMCNC: 31.8 G/DL — LOW (ref 32–36)
MCV RBC AUTO: 94.8 FL — HIGH (ref 80–94)
MONOCYTES # BLD AUTO: 1.1 K/UL — HIGH (ref 0–0.8)
MONOCYTES NFR BLD AUTO: 8.7 % — SIGNIFICANT CHANGE UP (ref 3–10)
NEUTROPHILS # BLD AUTO: 8.9 K/UL — HIGH (ref 1.8–8)
NEUTROPHILS NFR BLD AUTO: 72 % — SIGNIFICANT CHANGE UP (ref 37–73)
PCO2 BLDA: 37 MMHG — SIGNIFICANT CHANGE UP (ref 35–45)
PH BLDA: 7.5 — HIGH (ref 7.35–7.45)
PHOSPHATE SERPL-MCNC: 3.2 MG/DL — SIGNIFICANT CHANGE UP (ref 2.4–4.7)
PLATELET # BLD AUTO: 307 K/UL — SIGNIFICANT CHANGE UP (ref 150–400)
PO2 BLDA: 96 MMHG — SIGNIFICANT CHANGE UP (ref 83–108)
POTASSIUM SERPL-MCNC: 4.8 MMOL/L — SIGNIFICANT CHANGE UP (ref 3.5–5.3)
POTASSIUM SERPL-SCNC: 4.8 MMOL/L — SIGNIFICANT CHANGE UP (ref 3.5–5.3)
RBC # BLD: 3.05 M/UL — LOW (ref 4.6–6.2)
RBC # FLD: 16.7 % — HIGH (ref 11–15.6)
SAO2 % BLDA: 99 % — SIGNIFICANT CHANGE UP (ref 95–99)
SODIUM SERPL-SCNC: 139 MMOL/L — SIGNIFICANT CHANGE UP (ref 135–145)
SPECIMEN SOURCE: SIGNIFICANT CHANGE UP
WBC # BLD: 12.4 K/UL — HIGH (ref 4.8–10.8)
WBC # FLD AUTO: 12.4 K/UL — HIGH (ref 4.8–10.8)

## 2017-05-21 PROCEDURE — 71010: CPT | Mod: 26

## 2017-05-21 RX ORDER — VANCOMYCIN HCL 1 G
VIAL (EA) INTRAVENOUS
Qty: 0 | Refills: 0 | Status: DISCONTINUED | OUTPATIENT
Start: 2017-05-21 | End: 2017-05-21

## 2017-05-21 RX ORDER — VANCOMYCIN HCL 1 G
VIAL (EA) INTRAVENOUS
Qty: 0 | Refills: 0 | Status: DISCONTINUED | OUTPATIENT
Start: 2017-05-21 | End: 2017-05-25

## 2017-05-21 RX ORDER — VANCOMYCIN HCL 1 G
1000 VIAL (EA) INTRAVENOUS EVERY 12 HOURS
Qty: 0 | Refills: 0 | Status: DISCONTINUED | OUTPATIENT
Start: 2017-05-21 | End: 2017-05-21

## 2017-05-21 RX ORDER — PIPERACILLIN AND TAZOBACTAM 4; .5 G/20ML; G/20ML
3.38 INJECTION, POWDER, LYOPHILIZED, FOR SOLUTION INTRAVENOUS EVERY 8 HOURS
Qty: 0 | Refills: 0 | Status: DISCONTINUED | OUTPATIENT
Start: 2017-05-21 | End: 2017-05-28

## 2017-05-21 RX ORDER — HYDROMORPHONE HYDROCHLORIDE 2 MG/ML
0.5 INJECTION INTRAMUSCULAR; INTRAVENOUS; SUBCUTANEOUS ONCE
Qty: 0 | Refills: 0 | Status: DISCONTINUED | OUTPATIENT
Start: 2017-05-21 | End: 2017-05-21

## 2017-05-21 RX ORDER — IBUPROFEN 200 MG
400 TABLET ORAL ONCE
Qty: 0 | Refills: 0 | Status: COMPLETED | OUTPATIENT
Start: 2017-05-21 | End: 2017-05-21

## 2017-05-21 RX ORDER — ACETAMINOPHEN 500 MG
1000 TABLET ORAL ONCE
Qty: 0 | Refills: 0 | Status: COMPLETED | OUTPATIENT
Start: 2017-05-21 | End: 2017-05-21

## 2017-05-21 RX ORDER — PIPERACILLIN AND TAZOBACTAM 4; .5 G/20ML; G/20ML
3.38 INJECTION, POWDER, LYOPHILIZED, FOR SOLUTION INTRAVENOUS ONCE
Qty: 0 | Refills: 0 | Status: COMPLETED | OUTPATIENT
Start: 2017-05-21 | End: 2017-05-21

## 2017-05-21 RX ORDER — VANCOMYCIN HCL 1 G
1000 VIAL (EA) INTRAVENOUS EVERY 8 HOURS
Qty: 0 | Refills: 0 | Status: DISCONTINUED | OUTPATIENT
Start: 2017-05-21 | End: 2017-05-25

## 2017-05-21 RX ORDER — VANCOMYCIN HCL 1 G
1000 VIAL (EA) INTRAVENOUS ONCE
Qty: 0 | Refills: 0 | Status: COMPLETED | OUTPATIENT
Start: 2017-05-21 | End: 2017-05-21

## 2017-05-21 RX ADMIN — CHLORHEXIDINE GLUCONATE 15 MILLILITER(S): 213 SOLUTION TOPICAL at 17:03

## 2017-05-21 RX ADMIN — Medication 12.5 MILLIGRAM(S): at 05:18

## 2017-05-21 RX ADMIN — Medication 250 MILLIGRAM(S): at 15:50

## 2017-05-21 RX ADMIN — PANTOPRAZOLE SODIUM 40 MILLIGRAM(S): 20 TABLET, DELAYED RELEASE ORAL at 14:12

## 2017-05-21 RX ADMIN — Medication 650 MILLIGRAM(S): at 05:17

## 2017-05-21 RX ADMIN — HYDROMORPHONE HYDROCHLORIDE 0.5 MILLIGRAM(S): 2 INJECTION INTRAMUSCULAR; INTRAVENOUS; SUBCUTANEOUS at 00:22

## 2017-05-21 RX ADMIN — DEXMEDETOMIDINE HYDROCHLORIDE IN 0.9% SODIUM CHLORIDE 4.7 MICROGRAM(S)/KG/HR: 4 INJECTION INTRAVENOUS at 11:00

## 2017-05-21 RX ADMIN — Medication 1 APPLICATION(S): at 05:15

## 2017-05-21 RX ADMIN — PIPERACILLIN AND TAZOBACTAM 25 GRAM(S): 4; .5 INJECTION, POWDER, LYOPHILIZED, FOR SOLUTION INTRAVENOUS at 22:10

## 2017-05-21 RX ADMIN — Medication 250 MILLIGRAM(S): at 22:10

## 2017-05-21 RX ADMIN — Medication 400 MILLIGRAM(S): at 10:56

## 2017-05-21 RX ADMIN — HYDROMORPHONE HYDROCHLORIDE 0.5 MILLIGRAM(S): 2 INJECTION INTRAMUSCULAR; INTRAVENOUS; SUBCUTANEOUS at 13:45

## 2017-05-21 RX ADMIN — DEXMEDETOMIDINE HYDROCHLORIDE IN 0.9% SODIUM CHLORIDE 4.7 MICROGRAM(S)/KG/HR: 4 INJECTION INTRAVENOUS at 15:50

## 2017-05-21 RX ADMIN — HYDROMORPHONE HYDROCHLORIDE 0.5 MILLIGRAM(S): 2 INJECTION INTRAMUSCULAR; INTRAVENOUS; SUBCUTANEOUS at 05:15

## 2017-05-21 RX ADMIN — HYDROMORPHONE HYDROCHLORIDE 0.5 MILLIGRAM(S): 2 INJECTION INTRAMUSCULAR; INTRAVENOUS; SUBCUTANEOUS at 13:30

## 2017-05-21 RX ADMIN — CHLORHEXIDINE GLUCONATE 15 MILLILITER(S): 213 SOLUTION TOPICAL at 05:15

## 2017-05-21 RX ADMIN — Medication 400 MILLIGRAM(S): at 22:10

## 2017-05-21 RX ADMIN — Medication 400 MILLIGRAM(S): at 15:50

## 2017-05-21 RX ADMIN — Medication 0.12 MILLIGRAM(S): at 05:17

## 2017-05-21 RX ADMIN — Medication 1 APPLICATION(S): at 17:03

## 2017-05-21 RX ADMIN — PIPERACILLIN AND TAZOBACTAM 200 GRAM(S): 4; .5 INJECTION, POWDER, LYOPHILIZED, FOR SOLUTION INTRAVENOUS at 15:49

## 2017-05-21 RX ADMIN — Medication 6 MILLIGRAM(S): at 22:10

## 2017-05-21 RX ADMIN — AMIODARONE HYDROCHLORIDE 200 MILLIGRAM(S): 400 TABLET ORAL at 05:17

## 2017-05-21 RX ADMIN — QUETIAPINE FUMARATE 50 MILLIGRAM(S): 200 TABLET, FILM COATED ORAL at 14:11

## 2017-05-21 NOTE — PROGRESS NOTE ADULT - ATTENDING COMMENTS
Patient seen and examined on ICU rounds.  febrile, leukocytosis.  TLC insertion with no erythema.  D/C TLC, panculture,   continue SBT/

## 2017-05-21 NOTE — PROGRESS NOTE ADULT - SUBJECTIVE AND OBJECTIVE BOX
INTERVAL HPI/OVERNIGHT EVENTS:    Placed on PSV 5/5, struggled. Placed on 12/5 with a better response. Awake, not following commands during the day, began following commands when asked by family in [place]. Dressing changed. Looks good. Lopressor changed to PO. Febrile to 101.5 . Cultures sent.      MEDICATIONS  (STANDING):  pantoprazole  Injectable 40milliGRAM(s) IV Push every 12 hours  chlorhexidine 0.12% Liquid 15milliLiter(s) Swish and Spit two times a day  petrolatum Ophthalmic Ointment 1Application(s) Both EYES two times a day  heparin  Infusion 1100Unit(s)/Hr IV Continuous <Continuous>  digoxin     Tablet 0.125milliGRAM(s) Oral daily  amiodarone    Tablet 200milliGRAM(s) Oral daily  acetaminophen    Suspension 650milliGRAM(s) Oral every 6 hours  melatonin 6milliGRAM(s) Oral at bedtime  QUEtiapine 50milliGRAM(s) Oral daily  metoprolol Injectable 2.5milliGRAM(s) IV Push every 6 hours  dexmedetomidine Infusion 0.2MICROgram(s)/kG/Hr IV Continuous <Continuous>    MEDICATIONS  (PRN):  HYDROmorphone  Injectable 0.5milliGRAM(s) IV Push every 3 hours PRN Severe Pain (7 - 10)      Drug Dosing Weight  Height (cm): 165.1 (04 May 2017 13:12)  Weight (kg): 93.9 (09 May 2017 05:12)  BMI (kg/m2): 34.4 (09 May 2017 05:12)  BSA (m2): 2.01 (09 May 2017 05:12)    CENTRAL LINE:  YES 5/14 RT SC TLC    ARMAS:YES    A-LINE:NO    PAST MEDICAL & SURGICAL HISTORY:  Mitral regurgitation: severe MR  Cardiomyopathy, nonischemic  CAD (coronary artery disease)  Asthma  Atrial fibrillation  Heart disease  S/P laparoscopic cholecystectomy  History of humerus fracture: Metal pins in Left Humerus  Artificial pacemaker    ICU Vital Signs Last 24 Hrs  T(C): 36.7, Max: 38.6 (05-20 @ 16:00)  T(F): 98, Max: 101.5 (05-20 @ 16:00)  HR: 80 (56 - 126)  BP: 145/94 (86/53 - 173/80)  BP(mean): 113 (63 - 117)  ABP: --  ABP(mean): --  RR: 12 (12 - 94)  SpO2: 100% (97% - 100%)      ABG - ( 21 May 2017 04:46 )  pH: 7.50  /  pCO2: 37    /  pO2: 96    / HCO3: 29    / Base Excess: 5.5   /  SaO2: 99          I&O's Summary  I & Os for 24h ending 20 May 2017 07:00  =============================================  IN: 2035.4 ml / OUT: 2795 ml / NET: -759.6 ml    I & Os for current day (as of 21 May 2017 05:26)  =============================================  IN: 1923 ml / OUT: 1170 ml / NET: 753 ml    Mode: CPAP with PS  FiO2: 30  PEEP: 8  PS: 12  MAP: 12    PHYSICAL EXAM:    Neurological: GCS 11T 4/1T/6. Not moving BL LE.  Minimal response to BL UE with painful stimuli, will attempt to give thumbs up on left. Pt with occasional purposeful movements. Attempts to track to voice.  Pt does briskly localize    Eyes: PERRL ~ 3mm    Neck: Supple. NT AT, FROM no pain.  No JVD. No meningeal signs    Pulmonary: NAD, CTA,on RT, light rhonchi on left.  Minimal secretions.    Cardiovascular: Sinus tachy, S1, S2, No murmurs noted.    Gastrointestinal: ND, Soft, NT.    Extremities: NT, AT, Mild-Moderate 2+ pitting edema.  No erythema or palpable cord noted.  FROM, = 2+ pulses throughout.      LABS:            Assessment and Plan:    52 yo Male presenting with: Cardiac arrest following ERCP for bile leak SP Lap carissa.    Neurological: Precedex to continue.  Comfortable.  Localizing briskly    Pulmonary: Ventilator settings switched to PS 5/8, pt maintaining TV >400.  Lasix 40give w/ 1.5L output.  Possible extubation in am    Cardiovascular: Continue rate control meds as written.    Gastrointestinal: Continue TF at goal. Discuss PEG with family    Genitourinary: Maintain Armas at this time for monitoring since pt newly septic    Heme: SCD, Hep gtt.  Monitor ptt, no greater than 90.    ID: Continue Abx for UTI vs VAP.  FU CX.  Will consider Repeat Abd CT to look for additional source.    Skin: Avoid skin breakdown.    Lines/ Tubes: Maintain as above.    Dispo: Critical care as above.  Plan for trach/PEG next week if unable to extubate INTERVAL HPI/OVERNIGHT EVENTS:    Placed on PSV 5/5, struggled. Placed on 12/5 with a better response. Awake, not following commands during the day, began following commands when asked by family in [place]. Dressing changed. Looks good. Lopressor changed to PO. Febrile to 101.5 . Cultures sent.      MEDICATIONS  (STANDING):  pantoprazole  Injectable 40milliGRAM(s) IV Push every 12 hours  chlorhexidine 0.12% Liquid 15milliLiter(s) Swish and Spit two times a day  petrolatum Ophthalmic Ointment 1Application(s) Both EYES two times a day  heparin  Infusion 1100Unit(s)/Hr IV Continuous <Continuous>  digoxin     Tablet 0.125milliGRAM(s) Oral daily  amiodarone    Tablet 200milliGRAM(s) Oral daily  acetaminophen    Suspension 650milliGRAM(s) Oral every 6 hours  melatonin 6milliGRAM(s) Oral at bedtime  QUEtiapine 50milliGRAM(s) Oral daily  metoprolol Injectable 2.5milliGRAM(s) IV Push every 6 hours  dexmedetomidine Infusion 0.2MICROgram(s)/kG/Hr IV Continuous <Continuous>    MEDICATIONS  (PRN):  HYDROmorphone  Injectable 0.5milliGRAM(s) IV Push every 3 hours PRN Severe Pain (7 - 10)      Drug Dosing Weight  Height (cm): 165.1 (04 May 2017 13:12)  Weight (kg): 93.9 (09 May 2017 05:12)  BMI (kg/m2): 34.4 (09 May 2017 05:12)  BSA (m2): 2.01 (09 May 2017 05:12)    CENTRAL LINE:  YES 5/14 RT SC TLC    ARMAS:YES    A-LINE:NO    PAST MEDICAL & SURGICAL HISTORY:  Mitral regurgitation: severe MR  Cardiomyopathy, nonischemic  CAD (coronary artery disease)  Asthma  Atrial fibrillation  Heart disease  S/P laparoscopic cholecystectomy  History of humerus fracture: Metal pins in Left Humerus  Artificial pacemaker    ICU Vital Signs Last 24 Hrs  T(C): 36.7, Max: 38.6 (05-20 @ 16:00)  T(F): 98, Max: 101.5 (05-20 @ 16:00)  HR: 80 (56 - 126)  BP: 145/94 (86/53 - 173/80)  BP(mean): 113 (63 - 117)  ABP: --  ABP(mean): --  RR: 12 (12 - 94)  SpO2: 100% (97% - 100%)      ABG - ( 21 May 2017 04:46 )  pH: 7.50  /  pCO2: 37    /  pO2: 96    / HCO3: 29    / Base Excess: 5.5   /  SaO2: 99          I&O's Summary  I & Os for 24h ending 20 May 2017 07:00  =============================================  IN: 2035.4 ml / OUT: 2795 ml / NET: -759.6 ml    I & Os for current day (as of 21 May 2017 05:26)  =============================================  IN: 1923 ml / OUT: 1170 ml / NET: 753 ml    Mode: CPAP with PS  FiO2: 30  PEEP: 8  PS: 12  MAP: 12    PHYSICAL EXAM:    Neurological: GCS 11T 4/1T/6. Not moving BL LE.  Minimal response to BL UE with painful stimuli, will attempt to give thumbs up on left. Pt with occasional purposeful movements. Attempts to track to voice.  Pt does briskly localize    Eyes: PERRL ~ 3mm    Neck: Supple. NT AT, FROM no pain.  No JVD. No meningeal signs    Pulmonary: NAD, CTA,on RT, light rhonchi on left.  Minimal secretions.    Cardiovascular: Sinus tachy, S1, S2, No murmurs noted.    Gastrointestinal: ND, Soft, NT.    Extremities: NT, AT, Mild-Moderate 2+ pitting edema.  No erythema or palpable cord noted.  FROM, = 2+ pulses throughout.      LABS:                          9.2    12.4  )-----------( 307      ( 21 May 2017 04:54 )             28.9   05-21    139  |  99  |  37.0<H>  ----------------------------<  131<H>  4.8   |  30.0<H>  |  0.73    Ca    8.1<L>      21 May 2017 04:54  Phos  3.2     05-21  Mg     2.0     05-21            Assessment and Plan:    52 yo Male presenting with: Cardiac arrest following ERCP for bile leak SP Lap carissa.    Neurological: Precedex to continue.  Comfortable.  Localizing briskly    Pulmonary: Ventilator settings switched to PS 5/8, pt maintaining TV >400.  Lasix 40give w/ 1.5L output.  Possible extubation in am    Cardiovascular: Continue rate control meds as written.    Gastrointestinal: Continue TF at goal. Discuss PEG with family    Genitourinary: Maintain Armas at this time for monitoring since pt newly septic    Heme: SCD, Hep gtt.  Monitor ptt, no greater than 90.    ID: Continue Abx for UTI vs VAP.  FU CX.  Will consider Repeat Abd CT to look for additional source.    Skin: Avoid skin breakdown.    Lines/ Tubes: Maintain as above.    Dispo: Critical care as above.  Plan for trach/PEG next week if unable to extubate

## 2017-05-22 LAB
ALBUMIN SERPL ELPH-MCNC: 2.5 G/DL — LOW (ref 3.3–5.2)
ALP SERPL-CCNC: 145 U/L — HIGH (ref 40–120)
ALT FLD-CCNC: 201 U/L — HIGH
AMYLASE P1 CFR SERPL: 123 U/L — SIGNIFICANT CHANGE UP (ref 36–128)
ANION GAP SERPL CALC-SCNC: 12 MMOL/L — SIGNIFICANT CHANGE UP (ref 5–17)
ANION GAP SERPL CALC-SCNC: 14 MMOL/L — SIGNIFICANT CHANGE UP (ref 5–17)
ANISOCYTOSIS BLD QL: SLIGHT — SIGNIFICANT CHANGE UP
ANISOCYTOSIS BLD QL: SLIGHT — SIGNIFICANT CHANGE UP
APTT BLD: 46.8 SEC — HIGH (ref 27.5–37.4)
AST SERPL-CCNC: 86 U/L — HIGH
BASOPHILS # BLD AUTO: 0 K/UL — SIGNIFICANT CHANGE UP (ref 0–0.2)
BILIRUB SERPL-MCNC: 0.8 MG/DL — SIGNIFICANT CHANGE UP (ref 0.4–2)
BLD GP AB SCN SERPL QL: SIGNIFICANT CHANGE UP
BUN SERPL-MCNC: 32 MG/DL — HIGH (ref 8–20)
BUN SERPL-MCNC: 36 MG/DL — HIGH (ref 8–20)
CALCIUM SERPL-MCNC: 8.1 MG/DL — LOW (ref 8.6–10.2)
CALCIUM SERPL-MCNC: 8.2 MG/DL — LOW (ref 8.6–10.2)
CHLORIDE SERPL-SCNC: 100 MMOL/L — SIGNIFICANT CHANGE UP (ref 98–107)
CHLORIDE SERPL-SCNC: 98 MMOL/L — SIGNIFICANT CHANGE UP (ref 98–107)
CO2 SERPL-SCNC: 25 MMOL/L — SIGNIFICANT CHANGE UP (ref 22–29)
CO2 SERPL-SCNC: 28 MMOL/L — SIGNIFICANT CHANGE UP (ref 22–29)
CREAT SERPL-MCNC: 0.72 MG/DL — SIGNIFICANT CHANGE UP (ref 0.5–1.3)
CREAT SERPL-MCNC: 0.84 MG/DL — SIGNIFICANT CHANGE UP (ref 0.5–1.3)
ELLIPTOCYTES BLD QL SMEAR: SLIGHT — SIGNIFICANT CHANGE UP
ELLIPTOCYTES BLD QL SMEAR: SLIGHT — SIGNIFICANT CHANGE UP
EOSINOPHIL # BLD AUTO: 0 K/UL — SIGNIFICANT CHANGE UP (ref 0–0.5)
GAS PNL BLDA: SIGNIFICANT CHANGE UP
GLUCOSE SERPL-MCNC: 146 MG/DL — HIGH (ref 70–115)
GLUCOSE SERPL-MCNC: 149 MG/DL — HIGH (ref 70–115)
HCT VFR BLD CALC: 28.3 % — LOW (ref 42–52)
HCT VFR BLD CALC: 29.2 % — LOW (ref 42–52)
HGB BLD-MCNC: 8.7 G/DL — LOW (ref 14–18)
HGB BLD-MCNC: 9 G/DL — LOW (ref 14–18)
HYPOCHROMIA BLD QL: SLIGHT — SIGNIFICANT CHANGE UP
HYPOCHROMIA BLD QL: SLIGHT — SIGNIFICANT CHANGE UP
INR BLD: 1.22 RATIO — HIGH (ref 0.88–1.16)
LIDOCAIN IGE QN: 83 U/L — HIGH (ref 22–51)
LYMPHOCYTES # BLD AUTO: 1.6 K/UL — SIGNIFICANT CHANGE UP (ref 1–4.8)
LYMPHOCYTES # BLD AUTO: 1.7 K/UL — SIGNIFICANT CHANGE UP (ref 1–4.8)
LYMPHOCYTES # BLD AUTO: 12 % — LOW (ref 20–55)
LYMPHOCYTES # BLD AUTO: 12 % — LOW (ref 20–55)
MACROCYTES BLD QL: SLIGHT — SIGNIFICANT CHANGE UP
MAGNESIUM SERPL-MCNC: 1.9 MG/DL — SIGNIFICANT CHANGE UP (ref 1.8–2.6)
MCHC RBC-ENTMCNC: 29.1 PG — SIGNIFICANT CHANGE UP (ref 27–31)
MCHC RBC-ENTMCNC: 29.1 PG — SIGNIFICANT CHANGE UP (ref 27–31)
MCHC RBC-ENTMCNC: 30.7 G/DL — LOW (ref 32–36)
MCHC RBC-ENTMCNC: 30.8 G/DL — LOW (ref 32–36)
MCV RBC AUTO: 94.5 FL — HIGH (ref 80–94)
MCV RBC AUTO: 94.6 FL — HIGH (ref 80–94)
METAMYELOCYTES # FLD: 1 % — HIGH (ref 0–0)
METAMYELOCYTES # FLD: 1 % — HIGH (ref 0–0)
MONOCYTES # BLD AUTO: 1.4 K/UL — HIGH (ref 0–0.8)
MONOCYTES # BLD AUTO: 1.5 K/UL — HIGH (ref 0–0.8)
MONOCYTES NFR BLD AUTO: 10 % — SIGNIFICANT CHANGE UP (ref 3–10)
MONOCYTES NFR BLD AUTO: 9 % — SIGNIFICANT CHANGE UP (ref 3–10)
MYELOCYTES NFR BLD: 1 % — HIGH (ref 0–0)
MYELOCYTES NFR BLD: 1 % — HIGH (ref 0–0)
NEUTROPHILS # BLD AUTO: 10.4 K/UL — HIGH (ref 1.8–8)
NEUTROPHILS # BLD AUTO: 9.9 K/UL — HIGH (ref 1.8–8)
NEUTROPHILS NFR BLD AUTO: 72 % — SIGNIFICANT CHANGE UP (ref 37–73)
NEUTROPHILS NFR BLD AUTO: 76 % — HIGH (ref 37–73)
NEUTS BAND # BLD: 4 % — SIGNIFICANT CHANGE UP (ref 0–8)
NRBC # BLD: 2 /100 — HIGH (ref 0–0)
OVALOCYTES BLD QL SMEAR: SLIGHT — SIGNIFICANT CHANGE UP
OVALOCYTES BLD QL SMEAR: SLIGHT — SIGNIFICANT CHANGE UP
PHOSPHATE SERPL-MCNC: 4 MG/DL — SIGNIFICANT CHANGE UP (ref 2.4–4.7)
PLAT MORPH BLD: NORMAL — SIGNIFICANT CHANGE UP
PLAT MORPH BLD: NORMAL — SIGNIFICANT CHANGE UP
PLATELET # BLD AUTO: 235 K/UL — SIGNIFICANT CHANGE UP (ref 150–400)
PLATELET # BLD AUTO: 319 K/UL — SIGNIFICANT CHANGE UP (ref 150–400)
POIKILOCYTOSIS BLD QL AUTO: SLIGHT — SIGNIFICANT CHANGE UP
POIKILOCYTOSIS BLD QL AUTO: SLIGHT — SIGNIFICANT CHANGE UP
POLYCHROMASIA BLD QL SMEAR: SLIGHT — SIGNIFICANT CHANGE UP
POLYCHROMASIA BLD QL SMEAR: SLIGHT — SIGNIFICANT CHANGE UP
POTASSIUM SERPL-MCNC: 4.1 MMOL/L — SIGNIFICANT CHANGE UP (ref 3.5–5.3)
POTASSIUM SERPL-MCNC: 4.4 MMOL/L — SIGNIFICANT CHANGE UP (ref 3.5–5.3)
POTASSIUM SERPL-SCNC: 4.1 MMOL/L — SIGNIFICANT CHANGE UP (ref 3.5–5.3)
POTASSIUM SERPL-SCNC: 4.4 MMOL/L — SIGNIFICANT CHANGE UP (ref 3.5–5.3)
PROCALCITONIN SERPL-MCNC: 0.67 NG/ML — HIGH (ref 0–0.05)
PROT SERPL-MCNC: 6.3 G/DL — LOW (ref 6.6–8.7)
PROTHROM AB SERPL-ACNC: 13.5 SEC — HIGH (ref 9.8–12.7)
RBC # BLD: 2.99 M/UL — LOW (ref 4.6–6.2)
RBC # BLD: 3.09 M/UL — LOW (ref 4.6–6.2)
RBC # FLD: 16.8 % — HIGH (ref 11–15.6)
RBC # FLD: 16.9 % — HIGH (ref 11–15.6)
RBC BLD AUTO: ABNORMAL
RBC BLD AUTO: ABNORMAL
SCHISTOCYTES BLD QL AUTO: SLIGHT — SIGNIFICANT CHANGE UP
SODIUM SERPL-SCNC: 138 MMOL/L — SIGNIFICANT CHANGE UP (ref 135–145)
SODIUM SERPL-SCNC: 139 MMOL/L — SIGNIFICANT CHANGE UP (ref 135–145)
VANCOMYCIN TROUGH SERPL-MCNC: 16.5 UG/ML — SIGNIFICANT CHANGE UP (ref 10–20)
VARIANT LYMPHS # BLD: 1 % — SIGNIFICANT CHANGE UP (ref 0–6)
WBC # BLD: 13.1 K/UL — HIGH (ref 4.8–10.8)
WBC # BLD: 13.6 K/UL — HIGH (ref 4.8–10.8)
WBC # FLD AUTO: 13.1 K/UL — HIGH (ref 4.8–10.8)
WBC # FLD AUTO: 13.6 K/UL — HIGH (ref 4.8–10.8)

## 2017-05-22 PROCEDURE — 74177 CT ABD & PELVIS W/CONTRAST: CPT | Mod: 26

## 2017-05-22 PROCEDURE — 99291 CRITICAL CARE FIRST HOUR: CPT

## 2017-05-22 PROCEDURE — 71260 CT THORAX DX C+: CPT | Mod: 26

## 2017-05-22 RX ORDER — IBUPROFEN 200 MG
400 TABLET ORAL ONCE
Qty: 0 | Refills: 0 | Status: COMPLETED | OUTPATIENT
Start: 2017-05-22 | End: 2017-05-22

## 2017-05-22 RX ORDER — MAGNESIUM SULFATE 500 MG/ML
2 VIAL (ML) INJECTION ONCE
Qty: 0 | Refills: 0 | Status: COMPLETED | OUTPATIENT
Start: 2017-05-22 | End: 2017-05-22

## 2017-05-22 RX ORDER — HEPARIN SODIUM 5000 [USP'U]/ML
5000 INJECTION INTRAVENOUS; SUBCUTANEOUS EVERY 8 HOURS
Qty: 0 | Refills: 0 | Status: DISCONTINUED | OUTPATIENT
Start: 2017-05-22 | End: 2017-05-29

## 2017-05-22 RX ORDER — ONDANSETRON 8 MG/1
4 TABLET, FILM COATED ORAL ONCE
Qty: 0 | Refills: 0 | Status: COMPLETED | OUTPATIENT
Start: 2017-05-22 | End: 2017-05-22

## 2017-05-22 RX ORDER — SODIUM CHLORIDE 9 MG/ML
1000 INJECTION, SOLUTION INTRAVENOUS
Qty: 0 | Refills: 0 | Status: DISCONTINUED | OUTPATIENT
Start: 2017-05-22 | End: 2017-05-23

## 2017-05-22 RX ORDER — NYSTATIN CREAM 100000 [USP'U]/G
1 CREAM TOPICAL
Qty: 0 | Refills: 0 | Status: DISCONTINUED | OUTPATIENT
Start: 2017-05-22 | End: 2017-06-12

## 2017-05-22 RX ORDER — DEXMEDETOMIDINE HYDROCHLORIDE IN 0.9% SODIUM CHLORIDE 4 UG/ML
0.2 INJECTION INTRAVENOUS
Qty: 200 | Refills: 0 | Status: DISCONTINUED | OUTPATIENT
Start: 2017-05-22 | End: 2017-05-27

## 2017-05-22 RX ORDER — MIDAZOLAM HYDROCHLORIDE 1 MG/ML
2 INJECTION, SOLUTION INTRAMUSCULAR; INTRAVENOUS ONCE
Qty: 0 | Refills: 0 | Status: DISCONTINUED | OUTPATIENT
Start: 2017-05-22 | End: 2017-05-22

## 2017-05-22 RX ADMIN — SODIUM CHLORIDE 75 MILLILITER(S): 9 INJECTION, SOLUTION INTRAVENOUS at 20:47

## 2017-05-22 RX ADMIN — Medication 650 MILLIGRAM(S): at 05:43

## 2017-05-22 RX ADMIN — CHLORHEXIDINE GLUCONATE 15 MILLILITER(S): 213 SOLUTION TOPICAL at 05:43

## 2017-05-22 RX ADMIN — HEPARIN SODIUM 5000 UNIT(S): 5000 INJECTION INTRAVENOUS; SUBCUTANEOUS at 22:55

## 2017-05-22 RX ADMIN — QUETIAPINE FUMARATE 50 MILLIGRAM(S): 200 TABLET, FILM COATED ORAL at 11:46

## 2017-05-22 RX ADMIN — Medication 1 APPLICATION(S): at 05:44

## 2017-05-22 RX ADMIN — PIPERACILLIN AND TAZOBACTAM 25 GRAM(S): 4; .5 INJECTION, POWDER, LYOPHILIZED, FOR SOLUTION INTRAVENOUS at 22:55

## 2017-05-22 RX ADMIN — DEXMEDETOMIDINE HYDROCHLORIDE IN 0.9% SODIUM CHLORIDE 4.7 MICROGRAM(S)/KG/HR: 4 INJECTION INTRAVENOUS at 09:21

## 2017-05-22 RX ADMIN — Medication 650 MILLIGRAM(S): at 17:16

## 2017-05-22 RX ADMIN — CHLORHEXIDINE GLUCONATE 15 MILLILITER(S): 213 SOLUTION TOPICAL at 17:14

## 2017-05-22 RX ADMIN — NYSTATIN CREAM 1 APPLICATION(S): 100000 CREAM TOPICAL at 17:14

## 2017-05-22 RX ADMIN — Medication 50 GRAM(S): at 06:34

## 2017-05-22 RX ADMIN — Medication 650 MILLIGRAM(S): at 11:45

## 2017-05-22 RX ADMIN — Medication 12.5 MILLIGRAM(S): at 17:16

## 2017-05-22 RX ADMIN — Medication 12.5 MILLIGRAM(S): at 08:44

## 2017-05-22 RX ADMIN — Medication 650 MILLIGRAM(S): at 01:15

## 2017-05-22 RX ADMIN — ONDANSETRON 4 MILLIGRAM(S): 8 TABLET, FILM COATED ORAL at 11:50

## 2017-05-22 RX ADMIN — Medication 250 MILLIGRAM(S): at 05:43

## 2017-05-22 RX ADMIN — DEXMEDETOMIDINE HYDROCHLORIDE IN 0.9% SODIUM CHLORIDE 4.7 MICROGRAM(S)/KG/HR: 4 INJECTION INTRAVENOUS at 08:08

## 2017-05-22 RX ADMIN — DEXMEDETOMIDINE HYDROCHLORIDE IN 0.9% SODIUM CHLORIDE 4.7 MICROGRAM(S)/KG/HR: 4 INJECTION INTRAVENOUS at 11:43

## 2017-05-22 RX ADMIN — Medication 250 MILLIGRAM(S): at 13:36

## 2017-05-22 RX ADMIN — Medication 250 MILLIGRAM(S): at 22:49

## 2017-05-22 RX ADMIN — Medication 400 MILLIGRAM(S): at 08:45

## 2017-05-22 RX ADMIN — Medication 1 APPLICATION(S): at 17:30

## 2017-05-22 RX ADMIN — MIDAZOLAM HYDROCHLORIDE 2 MILLIGRAM(S): 1 INJECTION, SOLUTION INTRAMUSCULAR; INTRAVENOUS at 21:45

## 2017-05-22 RX ADMIN — AMIODARONE HYDROCHLORIDE 200 MILLIGRAM(S): 400 TABLET ORAL at 05:43

## 2017-05-22 RX ADMIN — PIPERACILLIN AND TAZOBACTAM 25 GRAM(S): 4; .5 INJECTION, POWDER, LYOPHILIZED, FOR SOLUTION INTRAVENOUS at 05:43

## 2017-05-22 RX ADMIN — HEPARIN SODIUM 5000 UNIT(S): 5000 INJECTION INTRAVENOUS; SUBCUTANEOUS at 13:36

## 2017-05-22 RX ADMIN — PIPERACILLIN AND TAZOBACTAM 25 GRAM(S): 4; .5 INJECTION, POWDER, LYOPHILIZED, FOR SOLUTION INTRAVENOUS at 14:42

## 2017-05-22 RX ADMIN — Medication 0.12 MILLIGRAM(S): at 05:43

## 2017-05-22 RX ADMIN — PANTOPRAZOLE SODIUM 40 MILLIGRAM(S): 20 TABLET, DELAYED RELEASE ORAL at 11:45

## 2017-05-22 NOTE — PROGRESS NOTE ADULT - SUBJECTIVE AND OBJECTIVE BOX
INTERVAL HPI/OVERNIGHT EVENTS/SUBJECTIVE:  Febrile in past 24 hours.  Central line removed.  Still febrile this AM.  GI bleeding from rectum.      ICU Vital Signs Last 24 Hrs  T(C): 39.1, Max: 39.6 (05-21 @ 12:08)  T(F): 102.3, Max: 103.2 (05-21 @ 12:08)  HR: 82 (64 - 133)  BP: 113/76 (94/65 - 118/80)  BP(mean): 90 (71 - 95)  ABP: --  ABP(mean): --  RR: 28 (24 - 33)  SpO2: 100% (94% - 100%)      I&O's Detail  I & Os for 24h ending 22 May 2017 07:00  =============================================  IN:    Pivot: 1224 ml    Solution: 500 ml    dexmedetomidine Infusion: 322.2 ml    heparin Infusion: 242 ml    Solution: 125 ml    Enteral Tube Flush: 120 ml    Solution: 50 ml    Total IN: 2583.2 ml  ---------------------------------------------  OUT:    Indwelling Catheter - Urethral: 935 ml    Bulb: 230 ml    Total OUT: 1165 ml  ---------------------------------------------  Total NET: 1418.2 ml    I & Os for current day (as of 22 May 2017 09:17)  =============================================  IN:    Pivot: 51 ml    dexmedetomidine Infusion: 32.6 ml    Solution: 25 ml    Total IN: 108.6 ml  ---------------------------------------------  OUT:    Indwelling Catheter - Urethral: 150 ml    Bulb: 45 ml    Total OUT: 195 ml  ---------------------------------------------  Total NET: -86.4 ml      Mode: SIMV (Synchronized Intermittent Mandatory Ventilation)  RR (machine): 10  TV (machine): 450  FiO2: 30  PEEP: 8  PS: 12  MAP: 13  PIP: 22    ABG - ( 22 May 2017 08:07 )  pH: 7.49  /  pCO2: 38    /  pO2: 116   / HCO3: 29    / Base Excess: 5.3   /  SaO2: 100                 MEDICATIONS  (STANDING):  chlorhexidine 0.12% Liquid 15milliLiter(s) Swish and Spit two times a day  petrolatum Ophthalmic Ointment 1Application(s) Both EYES two times a day  digoxin     Tablet 0.125milliGRAM(s) Oral daily  amiodarone    Tablet 200milliGRAM(s) Oral daily  acetaminophen    Suspension 650milliGRAM(s) Oral every 6 hours  melatonin 6milliGRAM(s) Oral at bedtime  QUEtiapine 50milliGRAM(s) Oral daily  metoprolol 12.5milliGRAM(s) Enteral Tube two times a day  pantoprazole  Injectable 40milliGRAM(s) IV Push daily  piperacillin/tazobactam IVPB. 3.375Gram(s) IV Intermittent every 8 hours  vancomycin  IVPB 1000milliGRAM(s) IV Intermittent every 8 hours  vancomycin  IVPB  IV Intermittent   dexmedetomidine Infusion 0.2MICROgram(s)/kG/Hr IV Continuous <Continuous>    MEDICATIONS  (PRN):      NUTRITION/IVF: Tube feeding    CENTRAL LINE: N   RAMAS: Y   DATE INSERTED: 5/21    A-LINE: N          PHYSICAL EXAM:    Gen:  NAD    Eyes:PERRLA    Neurological: Awake and tracks, intermittently follows commands, moving all extremities spontaneously    ENMT: Complex laceration to tongue with devitalize tissue.    Pulmonary: No resp distress on SIMV    Cardiovascular: Irregular rhythm, normal rate    Gastrointestinal: Soft, nontender.  Midline wound vac in place.  Clot in rectal vault on exam, no hemorrhoids, no obvious active bleeding    Genitourinary: Armas    Extremities: 3+ pitting edema all extremities.              LABS:  CBC Full  -  ( 22 May 2017 05:43 )  WBC Count : 13.1 K/uL  Hemoglobin : 8.7 g/dL  Hematocrit : 28.3 %  Platelet Count - Automated : 235 K/uL  Mean Cell Volume : 94.6 fl  Mean Cell Hemoglobin : 29.1 pg  Mean Cell Hemoglobin Concentration : 30.7 g/dL  Auto Neutrophil # : 9.9 K/uL  Auto Lymphocyte # : 1.6 K/uL  Auto Monocyte # : 1.5 K/uL  Auto Eosinophil # : 0.0 K/uL  Auto Basophil # : 0.0 K/uL  Auto Neutrophil % : 76.0 %  Auto Lymphocyte % : 12.0 %  Auto Monocyte % : 9.0 %  Auto Eosinophil % : x  Auto Basophil % : x    05-22    139  |  100  |  36.0<H>  ----------------------------<  149<H>  4.4   |  25.0  |  0.84    Ca    8.1<L>      22 May 2017 05:43  Phos  4.0     05-22  Mg     1.9     05-22      PT/INR - ( 22 May 2017 05:40 )   PT: 13.5 sec;   INR: 1.22 ratio         PTT - ( 22 May 2017 05:40 )  PTT:46.8 sec    RECENT CULTURES:  05-20 .Urine Catheterized XXXX XXXX   No growth            RADIOLOGY & ADDITIONAL STUDIES:    ASSESSMENT/PLAN:  51yMale presenting with:    Neuro: Encephalopathy: Multifactorial, s/p cardiac arrest, toxic from sedatives, ICU related delirium.    HEENT: Tongue lac from biting.  Bite block in.  Plastics consult for possible debridement.     CV: Self limited nonsustained V-tach.  Continue antiarrythmic.    Pulm: Respiratory failure.  Still unable to liberate from vent, fails SBT, mentation not good enough.  Feel wound benefit from tracheostomy      GI/Nutrition: Feeding on hold for now.    Renal: Function appears stable.  Anasarca.  Related to fluid shifts from acute illness and malnutrition. May benefit from albumin.  Will check levels today.      ID: Persistent fever over past 24 hrs.  Unclear source.  Cxr unimpressive.  Lines removed, not resolved.  Check LFT's, lipase.  CT chest abdomen pelvis.  Cont abx for now.      Proph: Will start sub Q heparin for DVT ppx.  Theraputic heparin on hold for GI bleed.      Dispo:  Continue Crit care.        CRITICAL CARE TIME SPENT:  45min

## 2017-05-22 NOTE — PROGRESS NOTE ADULT - SUBJECTIVE AND OBJECTIVE BOX
INTERVAL HPI/OVERNIGHT EVENTS/SUBJECTIVE:  24 hour events significant for Tmax of 103.5F. Blood cultures ordered. Started on vanco and zosyn. Central line removed in favor of peripheral access.    ICU Vital Signs Last 24 Hrs  T(C): 38.8, Max: 39.6 (05-21 @ 12:08)  T(F): 101.8, Max: 103.2 (05-21 @ 12:08)  HR: 67 (64 - 108)  BP: 110/66 (95/61 - 145/94)  BP(mean): 82 (71 - 113)  ABP: --  ABP(mean): --  RR: 28 (12 - 33)  SpO2: 100% (94% - 100%)      I&O's Detail  I & Os for 24h ending 21 May 2017 07:00  =============================================  IN:    Pivot: 1224 ml    heparin Infusion: 264 ml    Enteral Tube Flush: 220 ml    dexmedetomidine Infusion: 207 ml    Oral Fluid: 160 ml    Total IN: 2075 ml  ---------------------------------------------  OUT:    Indwelling Catheter - Urethral: 1085 ml    Bulb: 215 ml    Total OUT: 1300 ml  ---------------------------------------------  Total NET: 775 ml    I & Os for current day (as of 22 May 2017 01:10)  =============================================  IN:    Pivot: 867 ml    Solution: 250 ml    dexmedetomidine Infusion: 231.1 ml    heparin Infusion: 187 ml    Enteral Tube Flush: 120 ml    Total IN: 1655.1 ml  ---------------------------------------------  OUT:    Indwelling Catheter - Urethral: 665 ml    Bulb: 170 ml    Total OUT: 835 ml  ---------------------------------------------  Total NET: 820.1 ml      Mode: AC/ CMV (Assist Control/ Continuous Mandatory Ventilation)  RR (machine): 10  TV (machine): 450  FiO2: 30  PEEP: 8  PS: 12  MAP: 13  PIP: 25    ABG - ( 21 May 2017 04:46 )  pH: 7.50  /  pCO2: 37    /  pO2: 96    / HCO3: 29    / Base Excess: 5.5   /  SaO2: 99                  MEDICATIONS  (STANDING):  chlorhexidine 0.12% Liquid 15milliLiter(s) Swish and Spit two times a day  petrolatum Ophthalmic Ointment 1Application(s) Both EYES two times a day  heparin  Infusion 1100Unit(s)/Hr IV Continuous <Continuous>  digoxin     Tablet 0.125milliGRAM(s) Oral daily  amiodarone    Tablet 200milliGRAM(s) Oral daily  acetaminophen    Suspension 650milliGRAM(s) Oral every 6 hours  melatonin 6milliGRAM(s) Oral at bedtime  QUEtiapine 50milliGRAM(s) Oral daily  dexmedetomidine Infusion 0.2MICROgram(s)/kG/Hr IV Continuous <Continuous>  metoprolol 12.5milliGRAM(s) Enteral Tube two times a day  pantoprazole  Injectable 40milliGRAM(s) IV Push daily  piperacillin/tazobactam IVPB. 3.375Gram(s) IV Intermittent every 8 hours  vancomycin  IVPB 1000milliGRAM(s) IV Intermittent every 8 hours  vancomycin  IVPB  IV Intermittent     MEDICATIONS  (PRN):  HYDROmorphone  Injectable 0.5milliGRAM(s) IV Push every 3 hours PRN Severe Pain (7 - 10)      PHYSICAL EXAM:    Neurological: GCS 10T 4/1T/5. Minimal response to BL UE with painful stimuli. Pt with intermittent purposeful movements. Attempts to track to voice.  Pt does briskly localize    Eyes: PERRL ~ 3mm    Neck: Supple. NT AT, FROM no pain.  No JVD. No meningeal signs    Pulmonary: NAD, CTA on RT, light rhonchi on left.  Minimal secretions.    Cardiovascular: Sinus tachy, S1, S2, No murmurs noted.    Gastrointestinal: ND, Soft, NT.    Extremities: NT, AT, Mild-Moderate 2+ pitting edema.  No erythema or palpable cord noted.  FROM, = 2+ pulses throughout.    LABS:    Pending              CAPILLARY BLOOD GLUCOSE        ASSESSMENT/PLAN:  51y male with: cardiac arrect s/p ERCP for bile leak s/p lap carissa    Neuro: Pain control with dilaudid, precedex for agitation.     CV: Continue current regiment, hep gtt for afib    Pulm: Will wean ventilator as tolerated, SBT failed yesterday due to tachypnea and hypoxia    GI/Nutrition: Pivot @ 51    /Renal: Rogers for strict I&O    ID: Vanco and zosyn, awaiting culture results    Endo: No issues    Skin: Repositioning for DTI prevention    DVT Prophylaxis: heparin drip, SCDs    Dispo: Plan for trach and peg at bedside today

## 2017-05-22 NOTE — PROGRESS NOTE ADULT - SUBJECTIVE AND OBJECTIVE BOX
Multiple attempts made to reach out to family for consent for CT with IV contrast. Unable to reach next of kin, we will proceed [facility] CT chest abdomen and pelvis with intravenous contrast as benefits out weigh the risks. D/w Dr. Garcia.

## 2017-05-23 LAB
ANION GAP SERPL CALC-SCNC: 11 MMOL/L — SIGNIFICANT CHANGE UP (ref 5–17)
BUN SERPL-MCNC: 27 MG/DL — HIGH (ref 8–20)
CALCIUM SERPL-MCNC: 7.8 MG/DL — LOW (ref 8.6–10.2)
CHLORIDE SERPL-SCNC: 101 MMOL/L — SIGNIFICANT CHANGE UP (ref 98–107)
CO2 SERPL-SCNC: 28 MMOL/L — SIGNIFICANT CHANGE UP (ref 22–29)
CREAT SERPL-MCNC: 0.7 MG/DL — SIGNIFICANT CHANGE UP (ref 0.5–1.3)
GLUCOSE SERPL-MCNC: 125 MG/DL — HIGH (ref 70–115)
GRAM STN FLD: SIGNIFICANT CHANGE UP
HCT VFR BLD CALC: 30 % — LOW (ref 42–52)
HGB BLD-MCNC: 9.3 G/DL — LOW (ref 14–18)
MAGNESIUM SERPL-MCNC: 2 MG/DL — SIGNIFICANT CHANGE UP (ref 1.6–2.6)
MCHC RBC-ENTMCNC: 29.5 PG — SIGNIFICANT CHANGE UP (ref 27–31)
MCHC RBC-ENTMCNC: 31 G/DL — LOW (ref 32–36)
MCV RBC AUTO: 95.2 FL — HIGH (ref 80–94)
PHOSPHATE SERPL-MCNC: 3.9 MG/DL — SIGNIFICANT CHANGE UP (ref 2.4–4.7)
PLATELET # BLD AUTO: 286 K/UL — SIGNIFICANT CHANGE UP (ref 150–400)
POTASSIUM SERPL-MCNC: 4.2 MMOL/L — SIGNIFICANT CHANGE UP (ref 3.5–5.3)
POTASSIUM SERPL-SCNC: 4.2 MMOL/L — SIGNIFICANT CHANGE UP (ref 3.5–5.3)
RBC # BLD: 3.15 M/UL — LOW (ref 4.6–6.2)
RBC # FLD: 16.6 % — HIGH (ref 11–15.6)
SODIUM SERPL-SCNC: 140 MMOL/L — SIGNIFICANT CHANGE UP (ref 135–145)
SPECIMEN SOURCE: SIGNIFICANT CHANGE UP
WBC # BLD: 11.1 K/UL — HIGH (ref 4.8–10.8)
WBC # FLD AUTO: 11.1 K/UL — HIGH (ref 4.8–10.8)

## 2017-05-23 PROCEDURE — 93010 ELECTROCARDIOGRAM REPORT: CPT

## 2017-05-23 PROCEDURE — 49406 IMAGE CATH FLUID PERI/RETRO: CPT

## 2017-05-23 PROCEDURE — 99291 CRITICAL CARE FIRST HOUR: CPT

## 2017-05-23 RX ORDER — MIDAZOLAM HYDROCHLORIDE 1 MG/ML
2 INJECTION, SOLUTION INTRAMUSCULAR; INTRAVENOUS ONCE
Qty: 0 | Refills: 0 | Status: DISCONTINUED | OUTPATIENT
Start: 2017-05-23 | End: 2017-05-23

## 2017-05-23 RX ADMIN — Medication 650 MILLIGRAM(S): at 00:24

## 2017-05-23 RX ADMIN — DEXMEDETOMIDINE HYDROCHLORIDE IN 0.9% SODIUM CHLORIDE 4.7 MICROGRAM(S)/KG/HR: 4 INJECTION INTRAVENOUS at 13:54

## 2017-05-23 RX ADMIN — PIPERACILLIN AND TAZOBACTAM 25 GRAM(S): 4; .5 INJECTION, POWDER, LYOPHILIZED, FOR SOLUTION INTRAVENOUS at 13:53

## 2017-05-23 RX ADMIN — PIPERACILLIN AND TAZOBACTAM 25 GRAM(S): 4; .5 INJECTION, POWDER, LYOPHILIZED, FOR SOLUTION INTRAVENOUS at 22:04

## 2017-05-23 RX ADMIN — Medication 650 MILLIGRAM(S): at 06:10

## 2017-05-23 RX ADMIN — Medication 6 MILLIGRAM(S): at 00:17

## 2017-05-23 RX ADMIN — Medication 650 MILLIGRAM(S): at 17:14

## 2017-05-23 RX ADMIN — MIDAZOLAM HYDROCHLORIDE 2 MILLIGRAM(S): 1 INJECTION, SOLUTION INTRAMUSCULAR; INTRAVENOUS at 16:25

## 2017-05-23 RX ADMIN — HEPARIN SODIUM 5000 UNIT(S): 5000 INJECTION INTRAVENOUS; SUBCUTANEOUS at 06:10

## 2017-05-23 RX ADMIN — Medication 6 MILLIGRAM(S): at 22:35

## 2017-05-23 RX ADMIN — CHLORHEXIDINE GLUCONATE 15 MILLILITER(S): 213 SOLUTION TOPICAL at 17:14

## 2017-05-23 RX ADMIN — Medication 0.12 MILLIGRAM(S): at 06:10

## 2017-05-23 RX ADMIN — CHLORHEXIDINE GLUCONATE 15 MILLILITER(S): 213 SOLUTION TOPICAL at 06:19

## 2017-05-23 RX ADMIN — DEXMEDETOMIDINE HYDROCHLORIDE IN 0.9% SODIUM CHLORIDE 4.7 MICROGRAM(S)/KG/HR: 4 INJECTION INTRAVENOUS at 06:53

## 2017-05-23 RX ADMIN — Medication 1 APPLICATION(S): at 06:19

## 2017-05-23 RX ADMIN — AMIODARONE HYDROCHLORIDE 200 MILLIGRAM(S): 400 TABLET ORAL at 06:10

## 2017-05-23 RX ADMIN — Medication 1 APPLICATION(S): at 17:14

## 2017-05-23 RX ADMIN — Medication 250 MILLIGRAM(S): at 13:54

## 2017-05-23 RX ADMIN — DEXMEDETOMIDINE HYDROCHLORIDE IN 0.9% SODIUM CHLORIDE 4.7 MICROGRAM(S)/KG/HR: 4 INJECTION INTRAVENOUS at 17:15

## 2017-05-23 RX ADMIN — PIPERACILLIN AND TAZOBACTAM 25 GRAM(S): 4; .5 INJECTION, POWDER, LYOPHILIZED, FOR SOLUTION INTRAVENOUS at 05:59

## 2017-05-23 RX ADMIN — HEPARIN SODIUM 5000 UNIT(S): 5000 INJECTION INTRAVENOUS; SUBCUTANEOUS at 22:35

## 2017-05-23 RX ADMIN — NYSTATIN CREAM 1 APPLICATION(S): 100000 CREAM TOPICAL at 06:19

## 2017-05-23 RX ADMIN — SODIUM CHLORIDE 75 MILLILITER(S): 9 INJECTION, SOLUTION INTRAVENOUS at 17:15

## 2017-05-23 RX ADMIN — Medication 250 MILLIGRAM(S): at 22:04

## 2017-05-23 RX ADMIN — PANTOPRAZOLE SODIUM 40 MILLIGRAM(S): 20 TABLET, DELAYED RELEASE ORAL at 13:53

## 2017-05-23 RX ADMIN — NYSTATIN CREAM 1 APPLICATION(S): 100000 CREAM TOPICAL at 17:14

## 2017-05-23 RX ADMIN — DEXMEDETOMIDINE HYDROCHLORIDE IN 0.9% SODIUM CHLORIDE 4.7 MICROGRAM(S)/KG/HR: 4 INJECTION INTRAVENOUS at 01:46

## 2017-05-23 RX ADMIN — Medication 250 MILLIGRAM(S): at 05:59

## 2017-05-23 NOTE — PROCEDURE NOTE - GENERAL PROCEDURE DETAILS
Large edna drain removed intact, 1 cm skin defect closed with 3, 4-0 Monocryl sutures due to high volume out put

## 2017-05-23 NOTE — PROGRESS NOTE ADULT - SUBJECTIVE AND OBJECTIVE BOX
INTERVAL HPI/OVERNIGHT EVENTS/SUBJECTIVE:  CT overnight with gall bladder fossa abscess.  Fever curve improved overnight.      ICU Vital Signs Last 24 Hrs  T(C): 36.4, Max: 39 (05-22 @ 11:43)  T(F): 97.6, Max: 102.2 (05-22 @ 11:43)  HR: 75 (62 - 90)  BP: 106/74 (93/57 - 129/63)  BP(mean): 85 (70 - 98)  ABP: --  ABP(mean): --  RR: 28 (10 - 30)  SpO2: 100% (98% - 100%)      I&O's Detail  I & Os for 24h ending 23 May 2017 07:00  =============================================  IN:    multiple electrolytes Injection Type 1: 825 ml    Solution: 750 ml    Enteral Tube Flush: 360 ml    Solution: 325 ml    dexmedetomidine Infusion: 220.7 ml    Pivot: 51 ml    dexmedetomidine Infusion: 32.6 ml    Total IN: 2564.3 ml  ---------------------------------------------  OUT:    Indwelling Catheter - Urethral: 1045 ml    Bulb: 495 ml    Nasoenteral Tube: 140 ml    Total OUT: 1680 ml  ---------------------------------------------  Total NET: 884.3 ml    I & Os for current day (as of 23 May 2017 11:27)  =============================================  IN:    multiple electrolytes Injection Type 1: 300 ml    Solution: 50 ml    dexmedetomidine Infusion: 27 ml    Total IN: 377 ml  ---------------------------------------------  OUT:    Indwelling Catheter - Urethral: 135 ml    Bulb: 90 ml    Total OUT: 225 ml  ---------------------------------------------  Total NET: 152 ml      Mode: SIMV (Synchronized Intermittent Mandatory Ventilation)  RR (machine): 10  TV (machine): 450  FiO2: 30  PEEP: 8  PS: 8  MAP: 12  PIP: 20            MEDICATIONS  (STANDING):  chlorhexidine 0.12% Liquid 15milliLiter(s) Swish and Spit two times a day  petrolatum Ophthalmic Ointment 1Application(s) Both EYES two times a day  digoxin     Tablet 0.125milliGRAM(s) Oral daily  amiodarone    Tablet 200milliGRAM(s) Oral daily  acetaminophen    Suspension 650milliGRAM(s) Oral every 6 hours  melatonin 6milliGRAM(s) Oral at bedtime  QUEtiapine 50milliGRAM(s) Oral daily  metoprolol 12.5milliGRAM(s) Enteral Tube two times a day  pantoprazole  Injectable 40milliGRAM(s) IV Push daily  piperacillin/tazobactam IVPB. 3.375Gram(s) IV Intermittent every 8 hours  vancomycin  IVPB 1000milliGRAM(s) IV Intermittent every 8 hours  vancomycin  IVPB  IV Intermittent   dexmedetomidine Infusion 0.2MICROgram(s)/kG/Hr IV Continuous <Continuous>  heparin  Injectable 5000Unit(s) SubCutaneous every 8 hours  nystatin Powder 1Application(s) Topical two times a day  multiple electrolytes Injection Type 1 1000milliLiter(s) IV Continuous <Continuous>    MEDICATIONS  (PRN):      NUTRITION/IVF:     CENTRAL LINE: N     MONTOYA: Y  DATE INSERTED: 5/21    A-LINE: N         PHYSICAL EXAM:    Gen:  NAD    Eyes: PERRLA    Neurological: RASS -1 to -2, tracks consistently, beginning to follow some but not all simple commands     ENMT: macerated, devitalized tip of tongue    Pulmonary: Non labored.    Cardiovascular: Irregular rhythm, normal rate    Gastrointestinal: Soft, mild distention.     Genitourinary: Montoya, scrotal and penile edema.    Extremities: Moderate to severe pitting edema.        LABS:  CBC Full  -  ( 23 May 2017 04:15 )  WBC Count : 11.1 K/uL  Hemoglobin : 9.3 g/dL  Hematocrit : 30.0 %  Platelet Count - Automated : 286 K/uL  Mean Cell Volume : 95.2 fl  Mean Cell Hemoglobin : 29.5 pg  Mean Cell Hemoglobin Concentration : 31.0 g/dL  Auto Neutrophil # : x  Auto Lymphocyte # : x  Auto Monocyte # : x  Auto Eosinophil # : x  Auto Basophil # : x  Auto Neutrophil % : x  Auto Lymphocyte % : x  Auto Monocyte % : x  Auto Eosinophil % : x  Auto Basophil % : x    05-23    140  |  101  |  27.0<H>  ----------------------------<  125<H>  4.2   |  28.0  |  0.70    Ca    7.8<L>      23 May 2017 04:15  Phos  3.9     05-23  Mg     2.0     05-23         RECENT CULTURES:  05-20 .Blood Blood XXXX XXXX   No growth at 48 hours        LIVER FUNCTIONS - ( 22 May 2017 13:27 )  Alb: 2.5 g/dL / Pro: 6.3 g/dL / ALK PHOS: 145 U/L / ALT: 201 U/L / AST: 86 U/L / GGT: x                 RADIOLOGY & ADDITIONAL STUDIES:  CT chest abd pelvis reviewed.  Images consistent with possible gall bladder fossa abscess    ASSESSMENT/PLAN:  51yMale presenting with:    Neuro: Encephalopathy continues to improve.  Treat sepsis, sleep wake cycle, delirium, environmental factors.        HEENT: Tongue lac from teeth injury.  Plastics consult    CV: Decreasing ectopy. Rate controlled a-fib.  Cont meds.      Pulm:  Resp failure.      GI/Nutrition:  Tube feeds on hold- gall bladder fossa abscess likely.  IR for drainage.  Discussed with Dr. Mercado.   Large drain output from OR drain.  All ascites.  Will d/c.      /Renal: UOP adequate.  Function normal.  Maintain montoya secondary to penile edema/failed voiding trial.      ID:  Leukocytosis and feer curve improving on abx.  Feel likely related to intraabdominal source.  Will attempt to drain.  Tawny abx as appropriate.      Proph: DVT ppx.      Dispo: Cont ICU care      CRITICAL CARE TIME SPENT:  35 min

## 2017-05-23 NOTE — PROGRESS NOTE ADULT - SUBJECTIVE AND OBJECTIVE BOX
INTERVAL HPI/OVERNIGHT EVENTS:  Left on SIMV, not following commands but definitely tracking, CT torso ordered. Vanco trough ok. Found to be at PSV of 0 by myself on evening rounds; corrected. CT done @ 2200.     PRESSORS: [ ] YES [x] NO  WHICH:  DOSE:    ANTIBIOTICS:         Vanco         DATE STARTED: 5/21  ANTIBIOTICS:         Zosyn        DATE STARTED: 5/21  ANTIBIOTICS:                  DATE STARTED:    MEDICATIONS  (STANDING):  chlorhexidine 0.12% Liquid 15milliLiter(s) Swish and Spit two times a day  petrolatum Ophthalmic Ointment 1Application(s) Both EYES two times a day  digoxin     Tablet 0.125milliGRAM(s) Oral daily  amiodarone    Tablet 200milliGRAM(s) Oral daily  acetaminophen    Suspension 650milliGRAM(s) Oral every 6 hours  melatonin 6milliGRAM(s) Oral at bedtime  QUEtiapine 50milliGRAM(s) Oral daily  metoprolol 12.5milliGRAM(s) Enteral Tube two times a day  pantoprazole  Injectable 40milliGRAM(s) IV Push daily  piperacillin/tazobactam IVPB. 3.375Gram(s) IV Intermittent every 8 hours  vancomycin  IVPB 1000milliGRAM(s) IV Intermittent every 8 hours  vancomycin  IVPB  IV Intermittent   dexmedetomidine Infusion 0.2MICROgram(s)/kG/Hr IV Continuous <Continuous>  heparin  Injectable 5000Unit(s) SubCutaneous every 8 hours  nystatin Powder 1Application(s) Topical two times a day  multiple electrolytes Injection Type 1 1000milliLiter(s) IV Continuous <Continuous>    MEDICATIONS  (PRN):      Drug Dosing Weight  Height (cm): 165.1 (04 May 2017 13:12)  Weight (kg): 93.9 (09 May 2017 05:12)  BMI (kg/m2): 34.4 (09 May 2017 05:12)  BSA (m2): 2.01 (09 May 2017 05:12)    CENTRAL LINE: [ ] YES [x] NO  LOCATION:   DATE INSERTED:  REMOVE: [ ] YES [ ] NO  EXPLAIN:    ARMAS: [x] YES [ ] NO    DATE INSERTED:  REMOVE: [ ] YES [x] NO  EXPLAIN:    A-LINE: [ ] YES [ ] NO  LOCATION:   DATE INSERTED:  REMOVE: [ ] YES [x] NO  EXPLAIN:    PAST MEDICAL & SURGICAL HISTORY:  Mitral regurgitation: severe MR  Cardiomyopathy, nonischemic  CAD (coronary artery disease)  Asthma  Atrial fibrillation  Heart disease  S/P laparoscopic cholecystectomy  History of humerus fracture: Metal pins in Left Humerus  Artificial pacemaker      ICU Vital Signs Last 24 Hrs  T(C): 37.5, Max: 39.1 (05-22 @ 08:00)  T(F): 99.5, Max: 102.3 (05-22 @ 08:00)  HR: 88 (62 - 133)  BP: 100/74 (93/57 - 129/63)  BP(mean): 82 (70 - 98)  ABP: --  ABP(mean): --  RR: 22 (10 - 30)  SpO2: 99% (98% - 100%)      ABG - ( 22 May 2017 08:07 )  pH: 7.49  /  pCO2: 38    /  pO2: 116   / HCO3: 29    / Base Excess: 5.3   /  SaO2: 100                 I&O's Detail  I & Os for 24h ending 22 May 2017 07:00  =============================================  IN:    Pivot: 1224 ml    Solution: 500 ml    dexmedetomidine Infusion: 322.2 ml    heparin Infusion: 242 ml    Solution: 125 ml    Enteral Tube Flush: 120 ml    Solution: 50 ml    Total IN: 2583.2 ml  ---------------------------------------------  OUT:    Indwelling Catheter - Urethral: 935 ml    Bulb: 230 ml    Total OUT: 1165 ml  ---------------------------------------------  Total NET: 1418.2 ml    I & Os for current day (as of 23 May 2017 04:43)  =============================================  IN:    multiple electrolytes Injection Type 1: 800 ml    Solution: 500 ml    Enteral Tube Flush: 360 ml    Solution: 275 ml    dexmedetomidine Infusion: 193.7 ml    Pivot: 51 ml    dexmedetomidine Infusion: 32.6 ml    Total IN: 2212.3 ml  ---------------------------------------------  OUT:    Indwelling Catheter - Urethral: 950 ml    Bulb: 420 ml    Nasoenteral Tube: 140 ml    Total OUT: 1510 ml  ---------------------------------------------  Total NET: 702.3 ml      Mode: SIMV with PS  RR (machine): 10  TV (machine): 450  FiO2: 30  PEEP: 8  PS: 8  MAP: 12  PIP: 24      PHYSICAL EXAM:    Neurological: GCS 10T 4/1T/5. Minimal response to BL UE with painful stimuli. Pt with intermittent purposeful movements. Attempts to track to voice.  Pt does briskly localize  Eyes: PERRL ~ 3mm  Neck: Supple.NT AT, FROM no pain.  No JVD. No meningeal signs  Pulmonary: NAD, CTA on RT, light rhonchi on left.  Minimal secretions.  Cardiovascular: Sinus tachy, S1, S2, No murmurs noted.  Gastrointestinal: ND, Soft, NT.  Extremities: NT, AT, Mild-Moderate 2+ pitting edema.  No erythema or palpable cord noted.  FROM, = 2+ pulses throughout.        LABS:  CBC Full  -  ( 22 May 2017 13:27 )  WBC Count : 13.6 K/uL  Hemoglobin : 9.0 g/dL  Hematocrit : 29.2 %  Platelet Count - Automated : 319 K/uL  Mean Cell Volume : 94.5 fl  Mean Cell Hemoglobin : 29.1 pg  Mean Cell Hemoglobin Concentration : 30.8 g/dL  Auto Neutrophil # : 10.4 K/uL  Auto Lymphocyte # : 1.7 K/uL  Auto Monocyte # : 1.4 K/uL  Auto Eosinophil # : x  Auto Basophil # : x  Auto Neutrophil % : 72.0 %  Auto Lymphocyte % : 12.0 %  Auto Monocyte % : 10.0 %  Auto Eosinophil % : x  Auto Basophil % : x    05-22    138  |  98  |  32.0<H>  ----------------------------<  146<H>  4.1   |  28.0  |  0.72    Ca    8.2<L>      22 May 2017 13:27  Phos  4.0     05-22  Mg     1.9     05-22    TPro  6.3<L>  /  Alb  2.5<L>  /  TBili  0.8  /  DBili  x   /  AST  86<H>  /  ALT  201<H>  /  AlkPhos  145<H>  05-22    PT/INR - ( 22 May 2017 05:40 )   PT: 13.5 sec;   INR: 1.22 ratio         PTT - ( 22 May 2017 05:40 )  PTT: 46.8 sec      Assessment and Plan:    51y male with: cardiac arrect s/p ERCP for bile leak s/p lap carissa    Neuro: Pain control with dilaudid, precedex for agitation.     CV: Continue current regiment, hep gtt for afib    Pulm: Cont mechanical ventilation, wean as tolerated. Pulm toileting.     GI/Nutrition: NPO for now, resume tube feeds when able.    /Renal: Armas for strict I&O    ID: Vanco and zosyn, awaiting culture results    Endo: No issues    Skin: Repositioning for DTI prevention    DVT Prophylaxis: heparin drip, SCDs    Dispo: f/u read on CT. Continue abx. Discuss when trach/peg will happen.

## 2017-05-23 NOTE — PROGRESS NOTE ADULT - SUBJECTIVE AND OBJECTIVE BOX
Attempts made to call next of kin, patient's wife Patti Branch-Jeremy, message left and no call back. Patient needs drainage of gallbladder fossa abscess for source control. Patient has no contraindications to the procedure and the benefits out weigh the risks. D/w Dr. Garcia of SICU and Dr. Mercado of IR we will proceed with the percutaneous drainage of the abscess as planed

## 2017-05-24 LAB
ALBUMIN SERPL ELPH-MCNC: 2.7 G/DL — LOW (ref 3.3–5.2)
ALP SERPL-CCNC: 128 U/L — HIGH (ref 40–120)
ALT FLD-CCNC: 132 U/L — HIGH
ANION GAP SERPL CALC-SCNC: 14 MMOL/L — SIGNIFICANT CHANGE UP (ref 5–17)
AST SERPL-CCNC: 62 U/L — HIGH
BASOPHILS # BLD AUTO: 0 K/UL — SIGNIFICANT CHANGE UP (ref 0–0.2)
BASOPHILS NFR BLD AUTO: 0.2 % — SIGNIFICANT CHANGE UP (ref 0–2)
BILIRUB SERPL-MCNC: 0.7 MG/DL — SIGNIFICANT CHANGE UP (ref 0.4–2)
BUN SERPL-MCNC: 26 MG/DL — HIGH (ref 8–20)
CALCIUM SERPL-MCNC: 8.1 MG/DL — LOW (ref 8.6–10.2)
CHLORIDE SERPL-SCNC: 98 MMOL/L — SIGNIFICANT CHANGE UP (ref 98–107)
CO2 SERPL-SCNC: 27 MMOL/L — SIGNIFICANT CHANGE UP (ref 22–29)
CREAT SERPL-MCNC: 0.8 MG/DL — SIGNIFICANT CHANGE UP (ref 0.5–1.3)
EOSINOPHIL # BLD AUTO: 0.2 K/UL — SIGNIFICANT CHANGE UP (ref 0–0.5)
EOSINOPHIL NFR BLD AUTO: 1.6 % — SIGNIFICANT CHANGE UP (ref 0–5)
GLUCOSE SERPL-MCNC: 134 MG/DL — HIGH (ref 70–115)
HCT VFR BLD CALC: 30.3 % — LOW (ref 42–52)
HGB BLD-MCNC: 9.4 G/DL — LOW (ref 14–18)
LYMPHOCYTES # BLD AUTO: 1.4 K/UL — SIGNIFICANT CHANGE UP (ref 1–4.8)
LYMPHOCYTES # BLD AUTO: 14.8 % — LOW (ref 20–55)
MAGNESIUM SERPL-MCNC: 1.9 MG/DL — SIGNIFICANT CHANGE UP (ref 1.8–2.6)
MCHC RBC-ENTMCNC: 29.1 PG — SIGNIFICANT CHANGE UP (ref 27–31)
MCHC RBC-ENTMCNC: 31 G/DL — LOW (ref 32–36)
MCV RBC AUTO: 93.8 FL — SIGNIFICANT CHANGE UP (ref 80–94)
MONOCYTES # BLD AUTO: 0.8 K/UL — SIGNIFICANT CHANGE UP (ref 0–0.8)
MONOCYTES NFR BLD AUTO: 8.1 % — SIGNIFICANT CHANGE UP (ref 3–10)
NEUTROPHILS # BLD AUTO: 7.2 K/UL — SIGNIFICANT CHANGE UP (ref 1.8–8)
NEUTROPHILS NFR BLD AUTO: 74.8 % — HIGH (ref 37–73)
PHOSPHATE SERPL-MCNC: 3.9 MG/DL — SIGNIFICANT CHANGE UP (ref 2.4–4.7)
PLATELET # BLD AUTO: 338 K/UL — SIGNIFICANT CHANGE UP (ref 150–400)
POTASSIUM SERPL-MCNC: 3.8 MMOL/L — SIGNIFICANT CHANGE UP (ref 3.5–5.3)
POTASSIUM SERPL-SCNC: 3.8 MMOL/L — SIGNIFICANT CHANGE UP (ref 3.5–5.3)
PROT SERPL-MCNC: 6.3 G/DL — LOW (ref 6.6–8.7)
RBC # BLD: 3.23 M/UL — LOW (ref 4.6–6.2)
RBC # FLD: 16.8 % — HIGH (ref 11–15.6)
SODIUM SERPL-SCNC: 139 MMOL/L — SIGNIFICANT CHANGE UP (ref 135–145)
WBC # BLD: 9.6 K/UL — SIGNIFICANT CHANGE UP (ref 4.8–10.8)
WBC # FLD AUTO: 9.6 K/UL — SIGNIFICANT CHANGE UP (ref 4.8–10.8)

## 2017-05-24 PROCEDURE — 99291 CRITICAL CARE FIRST HOUR: CPT

## 2017-05-24 PROCEDURE — 71010: CPT | Mod: 26

## 2017-05-24 RX ORDER — POTASSIUM CHLORIDE 20 MEQ
40 PACKET (EA) ORAL EVERY 4 HOURS
Qty: 0 | Refills: 0 | Status: COMPLETED | OUTPATIENT
Start: 2017-05-24 | End: 2017-05-24

## 2017-05-24 RX ORDER — HALOPERIDOL DECANOATE 100 MG/ML
5 INJECTION INTRAMUSCULAR ONCE
Qty: 0 | Refills: 0 | Status: COMPLETED | OUTPATIENT
Start: 2017-05-24 | End: 2017-05-24

## 2017-05-24 RX ORDER — FUROSEMIDE 40 MG
40 TABLET ORAL DAILY
Qty: 0 | Refills: 0 | Status: DISCONTINUED | OUTPATIENT
Start: 2017-05-24 | End: 2017-05-25

## 2017-05-24 RX ORDER — FUROSEMIDE 40 MG
20 TABLET ORAL ONCE
Qty: 0 | Refills: 0 | Status: COMPLETED | OUTPATIENT
Start: 2017-05-24 | End: 2017-05-24

## 2017-05-24 RX ORDER — FENTANYL CITRATE 50 UG/ML
100 INJECTION INTRAVENOUS ONCE
Qty: 0 | Refills: 0 | Status: DISCONTINUED | OUTPATIENT
Start: 2017-05-24 | End: 2017-05-24

## 2017-05-24 RX ORDER — MIDAZOLAM HYDROCHLORIDE 1 MG/ML
2 INJECTION, SOLUTION INTRAMUSCULAR; INTRAVENOUS ONCE
Qty: 0 | Refills: 0 | Status: DISCONTINUED | OUTPATIENT
Start: 2017-05-24 | End: 2017-05-24

## 2017-05-24 RX ADMIN — MIDAZOLAM HYDROCHLORIDE 2 MILLIGRAM(S): 1 INJECTION, SOLUTION INTRAMUSCULAR; INTRAVENOUS at 22:51

## 2017-05-24 RX ADMIN — QUETIAPINE FUMARATE 50 MILLIGRAM(S): 200 TABLET, FILM COATED ORAL at 11:59

## 2017-05-24 RX ADMIN — Medication 1 APPLICATION(S): at 06:09

## 2017-05-24 RX ADMIN — PIPERACILLIN AND TAZOBACTAM 25 GRAM(S): 4; .5 INJECTION, POWDER, LYOPHILIZED, FOR SOLUTION INTRAVENOUS at 15:22

## 2017-05-24 RX ADMIN — Medication 1 APPLICATION(S): at 17:25

## 2017-05-24 RX ADMIN — PIPERACILLIN AND TAZOBACTAM 25 GRAM(S): 4; .5 INJECTION, POWDER, LYOPHILIZED, FOR SOLUTION INTRAVENOUS at 22:52

## 2017-05-24 RX ADMIN — Medication 650 MILLIGRAM(S): at 00:38

## 2017-05-24 RX ADMIN — FENTANYL CITRATE 100 MICROGRAM(S): 50 INJECTION INTRAVENOUS at 22:45

## 2017-05-24 RX ADMIN — DEXMEDETOMIDINE HYDROCHLORIDE IN 0.9% SODIUM CHLORIDE 4.7 MICROGRAM(S)/KG/HR: 4 INJECTION INTRAVENOUS at 20:58

## 2017-05-24 RX ADMIN — DEXMEDETOMIDINE HYDROCHLORIDE IN 0.9% SODIUM CHLORIDE 4.7 MICROGRAM(S)/KG/HR: 4 INJECTION INTRAVENOUS at 01:12

## 2017-05-24 RX ADMIN — NYSTATIN CREAM 1 APPLICATION(S): 100000 CREAM TOPICAL at 06:10

## 2017-05-24 RX ADMIN — FENTANYL CITRATE 100 MICROGRAM(S): 50 INJECTION INTRAVENOUS at 22:51

## 2017-05-24 RX ADMIN — Medication 250 MILLIGRAM(S): at 05:16

## 2017-05-24 RX ADMIN — Medication 40 MILLIGRAM(S): at 14:16

## 2017-05-24 RX ADMIN — PANTOPRAZOLE SODIUM 40 MILLIGRAM(S): 20 TABLET, DELAYED RELEASE ORAL at 11:59

## 2017-05-24 RX ADMIN — PIPERACILLIN AND TAZOBACTAM 25 GRAM(S): 4; .5 INJECTION, POWDER, LYOPHILIZED, FOR SOLUTION INTRAVENOUS at 05:16

## 2017-05-24 RX ADMIN — Medication 650 MILLIGRAM(S): at 11:59

## 2017-05-24 RX ADMIN — Medication 40 MILLIEQUIVALENT(S): at 18:54

## 2017-05-24 RX ADMIN — HEPARIN SODIUM 5000 UNIT(S): 5000 INJECTION INTRAVENOUS; SUBCUTANEOUS at 15:22

## 2017-05-24 RX ADMIN — AMIODARONE HYDROCHLORIDE 200 MILLIGRAM(S): 400 TABLET ORAL at 06:16

## 2017-05-24 RX ADMIN — NYSTATIN CREAM 1 APPLICATION(S): 100000 CREAM TOPICAL at 17:23

## 2017-05-24 RX ADMIN — DEXMEDETOMIDINE HYDROCHLORIDE IN 0.9% SODIUM CHLORIDE 4.7 MICROGRAM(S)/KG/HR: 4 INJECTION INTRAVENOUS at 06:26

## 2017-05-24 RX ADMIN — Medication 40 MILLIEQUIVALENT(S): at 21:12

## 2017-05-24 RX ADMIN — Medication 20 MILLIGRAM(S): at 04:15

## 2017-05-24 RX ADMIN — HALOPERIDOL DECANOATE 5 MILLIGRAM(S): 100 INJECTION INTRAMUSCULAR at 22:51

## 2017-05-24 RX ADMIN — CHLORHEXIDINE GLUCONATE 15 MILLILITER(S): 213 SOLUTION TOPICAL at 17:24

## 2017-05-24 RX ADMIN — Medication 6 MILLIGRAM(S): at 21:11

## 2017-05-24 RX ADMIN — Medication 250 MILLIGRAM(S): at 15:22

## 2017-05-24 RX ADMIN — Medication 0.12 MILLIGRAM(S): at 06:16

## 2017-05-24 RX ADMIN — DEXMEDETOMIDINE HYDROCHLORIDE IN 0.9% SODIUM CHLORIDE 4.7 MICROGRAM(S)/KG/HR: 4 INJECTION INTRAVENOUS at 10:50

## 2017-05-24 RX ADMIN — Medication 650 MILLIGRAM(S): at 06:16

## 2017-05-24 RX ADMIN — HEPARIN SODIUM 5000 UNIT(S): 5000 INJECTION INTRAVENOUS; SUBCUTANEOUS at 06:09

## 2017-05-24 RX ADMIN — Medication 650 MILLIGRAM(S): at 17:23

## 2017-05-24 RX ADMIN — Medication 250 MILLIGRAM(S): at 21:02

## 2017-05-24 RX ADMIN — HEPARIN SODIUM 5000 UNIT(S): 5000 INJECTION INTRAVENOUS; SUBCUTANEOUS at 21:05

## 2017-05-24 RX ADMIN — CHLORHEXIDINE GLUCONATE 15 MILLILITER(S): 213 SOLUTION TOPICAL at 06:09

## 2017-05-24 NOTE — CHART NOTE - NSCHARTNOTEFT_GEN_A_CORE
Wound VAC dressing changed at bedside. Wound bed with healthy granulation tissue, no areas of necrosis, erythema, or discharge noted. Wound measures 24cm x3cm x1cm. New dressing applied and dated, holding suction well.

## 2017-05-24 NOTE — PROGRESS NOTE ADULT - SUBJECTIVE AND OBJECTIVE BOX
INTERVAL HPI/OVERNIGHT EVENTS/SUBJECTIVE:  IR drainage of gall bladder fossa collection.    ICU Vital Signs Last 24 Hrs  T(C): 37.2, Max: 37.2 (05-23 @ 20:00)  T(F): 98.9, Max: 98.9 (05-23 @ 20:00)  HR: 63 (63 - 94)  BP: 104/66 (85/68 - 130/72)  BP(mean): 79 (73 - 99)  ABP: --  ABP(mean): --  RR: 23 (20 - 31)  SpO2: 100% (100% - 100%)      I&O's Detail  I & Os for 24h ending 24 May 2017 07:00  =============================================  IN:    multiple electrolytes Injection Type 1multiple electrolytes Injection Type 1: 900 ml    Pivot: 561 ml    Solution: 500 ml    dexmedetomidine Infusion: 227.6 ml    Solution: 150 ml    Enteral Tube Flush: 60 ml    Total IN: 2398.6 ml  ---------------------------------------------  OUT:    Indwelling Catheter - Urethral: 1550 ml    Bulb: 90 ml    Total OUT: 1640 ml  ---------------------------------------------  Total NET: 758.6 ml    I & Os for current day (as of 24 May 2017 12:05)  =============================================  IN:    Pivot: 153 ml    Solution: 50 ml    dexmedetomidine Infusion: 42 ml    Total IN: 245 ml  ---------------------------------------------  OUT:    Indwelling Catheter - Urethral: 150 ml    Total OUT: 150 ml  ---------------------------------------------  Total NET: 95 ml      Mode: SIMV with PS  RR (machine): 10  TV (machine): 450  FiO2: 30  PEEP: 8  PS: 8  MAP: 12  PIP: 25        MEDICATIONS  (STANDING):  chlorhexidine 0.12% Liquid 15milliLiter(s) Swish and Spit two times a day  petrolatum Ophthalmic Ointment 1Application(s) Both EYES two times a day  digoxin     Tablet 0.125milliGRAM(s) Oral daily  amiodarone    Tablet 200milliGRAM(s) Oral daily  acetaminophen    Suspension 650milliGRAM(s) Oral every 6 hours  melatonin 6milliGRAM(s) Oral at bedtime  QUEtiapine 50milliGRAM(s) Oral daily  metoprolol 12.5milliGRAM(s) Enteral Tube two times a day  pantoprazole  Injectable 40milliGRAM(s) IV Push daily  piperacillin/tazobactam IVPB. 3.375Gram(s) IV Intermittent every 8 hours  vancomycin  IVPB 1000milliGRAM(s) IV Intermittent every 8 hours  vancomycin  IVPB  IV Intermittent   dexmedetomidine Infusion 0.2MICROgram(s)/kG/Hr IV Continuous <Continuous>  heparin  Injectable 5000Unit(s) SubCutaneous every 8 hours  nystatin Powder 1Application(s) Topical two times a day    MEDICATIONS  (PRN):      NUTRITION/IVF: Tube feeds.    CENTRAL LINE: N :    MONTOYA: Y  DATE INSERTED: 5/21    A-LINE: N      MISC: VANESSA from IR minimal output.    PHYSICAL EXAM:    Gen: NAD    Eyes: PERRLA    Neurological: Tracks to voice.  Will not follow commands.     ENMT: Tongue laceration    Pulmonary: Non labored.      Cardiovascular: Irregular.    Gastrointestinal: Soft NT.  Vac to midline wound. RUQ drain    Genitourinary: Montoya    Back:     Extremities:    Skin:    Musculoskeletal:          LABS:  CBC Full  -  ( 24 May 2017 03:49 )  WBC Count : 9.6 K/uL  Hemoglobin : 9.4 g/dL  Hematocrit : 30.3 %  Platelet Count - Automated : 338 K/uL  Mean Cell Volume : 93.8 fl  Mean Cell Hemoglobin : 29.1 pg  Mean Cell Hemoglobin Concentration : 31.0 g/dL  Auto Neutrophil # : 7.2 K/uL  Auto Lymphocyte # : 1.4 K/uL  Auto Monocyte # : 0.8 K/uL  Auto Eosinophil # : 0.2 K/uL  Auto Basophil # : 0.0 K/uL  Auto Neutrophil % : 74.8 %  Auto Lymphocyte % : 14.8 %  Auto Monocyte % : 8.1 %  Auto Eosinophil % : 1.6 %  Auto Basophil % : 0.2 %    05-24    139  |  98  |  26.0<H>  ----------------------------<  134<H>  3.8   |  27.0  |  0.80    Ca    8.1<L>      24 May 2017 03:49  Phos  3.9     05-24  Mg     1.9     05-24    TPro  6.3<L>  /  Alb  2.7<L>  /  TBili  0.7  /  DBili  x   /  AST  62<H>  /  ALT  132<H>  /  AlkPhos  128<H>  05-24        RECENT CULTURES:  05-23 .Abscess gallbladder fossa XXXX   Few White blood cells  Numerous Gram Positive Cocci in Pairs and Chains  Few Gram Positive Rods  Few Gram Positive Cocci in Clusters XXXX    05-21 .Blood Blood-Peripheral XXXX XXXX   No growth at 48 hours    05-20 .Blood Blood XXXX XXXX   No growth at 48 hours        LIVER FUNCTIONS - ( 24 May 2017 03:49 )  Alb: 2.7 g/dL / Pro: 6.3 g/dL / ALK PHOS: 128 U/L / ALT: 132 U/L / AST: 62 U/L / GGT: x               ASSESSMENT/PLAN:  51yMale presenting with:    Neuro: Encephalopathy continues.  Will wean precedex and see if can get pt to follow commands more reliably.      HEENT: Plastic following for tongue injury.      CV: A-fib rate controlled.  Hold anticoagulation secondary to bleeding risk.      Pulm: Resp failure.  Try to move to PS/CPAP.  Wean as tolerated.      GI/Nutrition: Tube feeds back on, tolerating.  No further GI bleeding.      /Renal: Home lasix.  Function appears baseline.  Failed tov a few days ago. Cont montoya for retention.      ID: Likely gall bladder fossa abscess.  Now drained.  Leukocytosis improving.  Tawny abx to cultures.      Proph: Heparin ppx    Dispo: Probable tracheostomy this week.  If wakes up will try to extubate.        CRITICAL CARE TIME SPENT: 35 min

## 2017-05-24 NOTE — PROGRESS NOTE ADULT - ASSESSMENT
IMPRESSION:  The patient is a 51 year old male with significant cardiovascular history including atrial fibrillation and cardiomyopathy s/p laparoscopic cholecystectomy. GI f/u for bile leak.   - first ercp 5/9/17: papilla appeared to be stenosed. During attempts at biliary cannulation, patient developed hypotension. Procedure aborted. Patient coded. Intubated. Patient resuscitated. ACS team took over, and performed exploratory laparotomy.  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   - Intubated. LFTs improving. On NGT feeding.  - afebrile   - moves limbs and open eyes to verbal commands    PLAN:  NGT feeding  IV antibiotics  SICU monitoring  monitor cbc, serum lytes, and LFTs  will f/u

## 2017-05-24 NOTE — PROGRESS NOTE ADULT - SUBJECTIVE AND OBJECTIVE BOX
INTERVAL HPI/OVERNIGHT EVENTS:  Patient seen and examined    MEDICATIONS  (STANDING):  chlorhexidine 0.12% Liquid 15milliLiter(s) Swish and Spit two times a day  petrolatum Ophthalmic Ointment 1Application(s) Both EYES two times a day  digoxin     Tablet 0.125milliGRAM(s) Oral daily  amiodarone    Tablet 200milliGRAM(s) Oral daily  acetaminophen    Suspension 650milliGRAM(s) Oral every 6 hours  melatonin 6milliGRAM(s) Oral at bedtime  QUEtiapine 50milliGRAM(s) Oral daily  metoprolol 12.5milliGRAM(s) Enteral Tube two times a day  pantoprazole  Injectable 40milliGRAM(s) IV Push daily  piperacillin/tazobactam IVPB. 3.375Gram(s) IV Intermittent every 8 hours  vancomycin  IVPB 1000milliGRAM(s) IV Intermittent every 8 hours  vancomycin  IVPB  IV Intermittent   dexmedetomidine Infusion 0.2MICROgram(s)/kG/Hr IV Continuous <Continuous>  heparin  Injectable 5000Unit(s) SubCutaneous every 8 hours  nystatin Powder 1Application(s) Topical two times a day    MEDICATIONS  (PRN):      Allergies    No Known Allergies    Intolerances        Vital Signs Last 24 Hrs  T(C): 37.2, Max: 37.2 (05-23 @ 20:00)  T(F): 98.9, Max: 98.9 (05-23 @ 20:00)  HR: 63 (63 - 94)  BP: 104/66 (85/68 - 130/72)  BP(mean): 79 (73 - 99)  RR: 23 (20 - 31)  SpO2: 100% (100% - 100%)    PHYSICAL EXAM:  General: NAD.  CVS: S1, S2  Chest: air entry bilaterally present  Abd: BS present, soft, non-tender      LABS:                        9.4    9.6   )-----------( 338      ( 24 May 2017 03:49 )             30.3     05-24    139  |  98  |  26.0<H>  ----------------------------<  134<H>  3.8   |  27.0  |  0.80    Ca    8.1<L>      24 May 2017 03:49  Phos  3.9     05-24  Mg     1.9     05-24    TPro  6.3<L>  /  Alb  2.7<L>  /  TBili  0.7  /  DBili  x   /  AST  62<H>  /  ALT  132<H>  /  AlkPhos  128<H>  05-24        RADIOLOGY & ADDITIONAL TESTS:

## 2017-05-25 LAB
-  AMPICILLIN: SIGNIFICANT CHANGE UP
-  ERYTHROMYCIN: SIGNIFICANT CHANGE UP
-  TETRACYCLINE: SIGNIFICANT CHANGE UP
-  VANCOMYCIN: SIGNIFICANT CHANGE UP
ANION GAP SERPL CALC-SCNC: 12 MMOL/L — SIGNIFICANT CHANGE UP (ref 5–17)
BASE EXCESS BLDA CALC-SCNC: 10.6 MMOL/L — HIGH (ref -3–3)
BASOPHILS # BLD AUTO: 0 K/UL — SIGNIFICANT CHANGE UP (ref 0–0.2)
BASOPHILS NFR BLD AUTO: 0.2 % — SIGNIFICANT CHANGE UP (ref 0–2)
BLOOD GAS COMMENTS ARTERIAL: SIGNIFICANT CHANGE UP
BUN SERPL-MCNC: 22 MG/DL — HIGH (ref 8–20)
CALCIUM SERPL-MCNC: 8 MG/DL — LOW (ref 8.6–10.2)
CHLORIDE SERPL-SCNC: 97 MMOL/L — LOW (ref 98–107)
CO2 SERPL-SCNC: 29 MMOL/L — SIGNIFICANT CHANGE UP (ref 22–29)
CREAT SERPL-MCNC: 0.89 MG/DL — SIGNIFICANT CHANGE UP (ref 0.5–1.3)
CULTURE RESULTS: SIGNIFICANT CHANGE UP
CULTURE RESULTS: SIGNIFICANT CHANGE UP
EOSINOPHIL # BLD AUTO: 0.1 K/UL — SIGNIFICANT CHANGE UP (ref 0–0.5)
EOSINOPHIL NFR BLD AUTO: 1.3 % — SIGNIFICANT CHANGE UP (ref 0–5)
GAS PNL BLDA: SIGNIFICANT CHANGE UP
GAS PNL BLDA: SIGNIFICANT CHANGE UP
GLUCOSE SERPL-MCNC: 141 MG/DL — HIGH (ref 70–115)
HCO3 BLDA-SCNC: 34 MMOL/L — HIGH (ref 20–26)
HCT VFR BLD CALC: 28.3 % — LOW (ref 42–52)
HGB BLD-MCNC: 8.7 G/DL — LOW (ref 14–18)
HOROWITZ INDEX BLDA+IHG-RTO: SIGNIFICANT CHANGE UP
LYMPHOCYTES # BLD AUTO: 1.2 K/UL — SIGNIFICANT CHANGE UP (ref 1–4.8)
LYMPHOCYTES # BLD AUTO: 13.1 % — LOW (ref 20–55)
MAGNESIUM SERPL-MCNC: 1.8 MG/DL — SIGNIFICANT CHANGE UP (ref 1.6–2.6)
MCHC RBC-ENTMCNC: 29.4 PG — SIGNIFICANT CHANGE UP (ref 27–31)
MCHC RBC-ENTMCNC: 30.7 G/DL — LOW (ref 32–36)
MCV RBC AUTO: 95.6 FL — HIGH (ref 80–94)
METHOD TYPE: SIGNIFICANT CHANGE UP
MONOCYTES # BLD AUTO: 0.5 K/UL — SIGNIFICANT CHANGE UP (ref 0–0.8)
MONOCYTES NFR BLD AUTO: 5.5 % — SIGNIFICANT CHANGE UP (ref 3–10)
NEUTROPHILS # BLD AUTO: 7.6 K/UL — SIGNIFICANT CHANGE UP (ref 1.8–8)
NEUTROPHILS NFR BLD AUTO: 79.4 % — HIGH (ref 37–73)
PCO2 BLDA: 42 MMHG — SIGNIFICANT CHANGE UP (ref 35–45)
PH BLDA: 7.53 — HIGH (ref 7.35–7.45)
PHOSPHATE SERPL-MCNC: 3.5 MG/DL — SIGNIFICANT CHANGE UP (ref 2.4–4.7)
PLATELET # BLD AUTO: 320 K/UL — SIGNIFICANT CHANGE UP (ref 150–400)
PO2 BLDA: 95 MMHG — SIGNIFICANT CHANGE UP (ref 83–108)
POTASSIUM SERPL-MCNC: 3.6 MMOL/L — SIGNIFICANT CHANGE UP (ref 3.5–5.3)
POTASSIUM SERPL-SCNC: 3.6 MMOL/L — SIGNIFICANT CHANGE UP (ref 3.5–5.3)
RBC # BLD: 2.96 M/UL — LOW (ref 4.6–6.2)
RBC # FLD: 17.3 % — HIGH (ref 11–15.6)
SAO2 % BLDA: 98 % — SIGNIFICANT CHANGE UP (ref 95–99)
SODIUM SERPL-SCNC: 138 MMOL/L — SIGNIFICANT CHANGE UP (ref 135–145)
SPECIMEN SOURCE: SIGNIFICANT CHANGE UP
SPECIMEN SOURCE: SIGNIFICANT CHANGE UP
VANCOMYCIN TROUGH SERPL-MCNC: 25.6 UG/ML — CRITICAL HIGH (ref 10–20)
WBC # BLD: 9.6 K/UL — SIGNIFICANT CHANGE UP (ref 4.8–10.8)
WBC # FLD AUTO: 9.6 K/UL — SIGNIFICANT CHANGE UP (ref 4.8–10.8)

## 2017-05-25 PROCEDURE — 71010: CPT | Mod: 26

## 2017-05-25 PROCEDURE — 99291 CRITICAL CARE FIRST HOUR: CPT

## 2017-05-25 PROCEDURE — 74000: CPT | Mod: 26

## 2017-05-25 RX ORDER — FUROSEMIDE 40 MG
40 TABLET ORAL ONCE
Qty: 0 | Refills: 0 | Status: COMPLETED | OUTPATIENT
Start: 2017-05-25 | End: 2017-05-25

## 2017-05-25 RX ORDER — EPINEPHRINE 11.25MG/ML
5 SOLUTION, NON-ORAL INHALATION ONCE
Qty: 0 | Refills: 0 | Status: COMPLETED | OUTPATIENT
Start: 2017-05-25 | End: 2017-05-25

## 2017-05-25 RX ORDER — VANCOMYCIN HCL 1 G
1000 VIAL (EA) INTRAVENOUS EVERY 12 HOURS
Qty: 0 | Refills: 0 | Status: DISCONTINUED | OUTPATIENT
Start: 2017-05-26 | End: 2017-05-26

## 2017-05-25 RX ORDER — DIGOXIN 250 MCG
0.12 TABLET ORAL DAILY
Qty: 0 | Refills: 0 | Status: DISCONTINUED | OUTPATIENT
Start: 2017-05-25 | End: 2017-05-30

## 2017-05-25 RX ORDER — FUROSEMIDE 40 MG
40 TABLET ORAL DAILY
Qty: 0 | Refills: 0 | Status: DISCONTINUED | OUTPATIENT
Start: 2017-05-25 | End: 2017-05-27

## 2017-05-25 RX ORDER — IPRATROPIUM/ALBUTEROL SULFATE 18-103MCG
3 AEROSOL WITH ADAPTER (GRAM) INHALATION EVERY 6 HOURS
Qty: 0 | Refills: 0 | Status: DISCONTINUED | OUTPATIENT
Start: 2017-05-25 | End: 2017-06-03

## 2017-05-25 RX ORDER — CALCIUM GLUCONATE 100 MG/ML
2 VIAL (ML) INTRAVENOUS ONCE
Qty: 0 | Refills: 0 | Status: COMPLETED | OUTPATIENT
Start: 2017-05-25 | End: 2017-05-25

## 2017-05-25 RX ORDER — METOPROLOL TARTRATE 50 MG
2.5 TABLET ORAL EVERY 6 HOURS
Qty: 0 | Refills: 0 | Status: DISCONTINUED | OUTPATIENT
Start: 2017-05-25 | End: 2017-05-28

## 2017-05-25 RX ORDER — MAGNESIUM SULFATE 500 MG/ML
2 VIAL (ML) INJECTION ONCE
Qty: 0 | Refills: 0 | Status: COMPLETED | OUTPATIENT
Start: 2017-05-25 | End: 2017-05-25

## 2017-05-25 RX ADMIN — Medication 650 MILLIGRAM(S): at 00:07

## 2017-05-25 RX ADMIN — PIPERACILLIN AND TAZOBACTAM 25 GRAM(S): 4; .5 INJECTION, POWDER, LYOPHILIZED, FOR SOLUTION INTRAVENOUS at 22:28

## 2017-05-25 RX ADMIN — Medication 2.5 MILLIGRAM(S): at 18:29

## 2017-05-25 RX ADMIN — Medication 50 GRAM(S): at 07:29

## 2017-05-25 RX ADMIN — NYSTATIN CREAM 1 APPLICATION(S): 100000 CREAM TOPICAL at 05:07

## 2017-05-25 RX ADMIN — NYSTATIN CREAM 1 APPLICATION(S): 100000 CREAM TOPICAL at 18:30

## 2017-05-25 RX ADMIN — AMIODARONE HYDROCHLORIDE 200 MILLIGRAM(S): 400 TABLET ORAL at 05:05

## 2017-05-25 RX ADMIN — Medication 40 MILLIGRAM(S): at 14:35

## 2017-05-25 RX ADMIN — Medication 250 MILLIGRAM(S): at 05:07

## 2017-05-25 RX ADMIN — Medication 200 GRAM(S): at 04:47

## 2017-05-25 RX ADMIN — Medication 1 APPLICATION(S): at 05:06

## 2017-05-25 RX ADMIN — HEPARIN SODIUM 5000 UNIT(S): 5000 INJECTION INTRAVENOUS; SUBCUTANEOUS at 22:28

## 2017-05-25 RX ADMIN — Medication 3 MILLILITER(S): at 20:30

## 2017-05-25 RX ADMIN — PIPERACILLIN AND TAZOBACTAM 25 GRAM(S): 4; .5 INJECTION, POWDER, LYOPHILIZED, FOR SOLUTION INTRAVENOUS at 08:16

## 2017-05-25 RX ADMIN — HEPARIN SODIUM 5000 UNIT(S): 5000 INJECTION INTRAVENOUS; SUBCUTANEOUS at 14:35

## 2017-05-25 RX ADMIN — Medication 650 MILLIGRAM(S): at 05:04

## 2017-05-25 RX ADMIN — PIPERACILLIN AND TAZOBACTAM 25 GRAM(S): 4; .5 INJECTION, POWDER, LYOPHILIZED, FOR SOLUTION INTRAVENOUS at 14:34

## 2017-05-25 RX ADMIN — Medication 40 MILLIGRAM(S): at 20:00

## 2017-05-25 RX ADMIN — Medication 5 MILLILITER(S): at 12:33

## 2017-05-25 RX ADMIN — Medication 40 MILLIGRAM(S): at 05:05

## 2017-05-25 RX ADMIN — DEXMEDETOMIDINE HYDROCHLORIDE IN 0.9% SODIUM CHLORIDE 4.7 MICROGRAM(S)/KG/HR: 4 INJECTION INTRAVENOUS at 00:08

## 2017-05-25 RX ADMIN — Medication 0.12 MILLIGRAM(S): at 05:04

## 2017-05-25 RX ADMIN — PANTOPRAZOLE SODIUM 40 MILLIGRAM(S): 20 TABLET, DELAYED RELEASE ORAL at 12:00

## 2017-05-25 RX ADMIN — CHLORHEXIDINE GLUCONATE 15 MILLILITER(S): 213 SOLUTION TOPICAL at 05:04

## 2017-05-25 RX ADMIN — HEPARIN SODIUM 5000 UNIT(S): 5000 INJECTION INTRAVENOUS; SUBCUTANEOUS at 05:07

## 2017-05-25 RX ADMIN — CHLORHEXIDINE GLUCONATE 15 MILLILITER(S): 213 SOLUTION TOPICAL at 18:29

## 2017-05-25 NOTE — PROGRESS NOTE ADULT - ASSESSMENT
IMPRESSION:  The patient is a 51 year old male with significant cardiovascular history including atrial fibrillation and cardiomyopathy s/p laparoscopic cholecystectomy. GI f/u for bile leak.   - first ercp 5/9/17: papilla appeared to be stenosed. During attempts at biliary cannulation, patient developed hypotension. Procedure aborted. Patient coded. Intubated. Patient resuscitated. ACS team took over, and performed exploratory laparotomy.  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   - Intubated. LFTs improving. On NGT feeding.  - afebrile now  - moves limbs and open eyes to verbal commands    PLAN:  continue NGT feeding  IV antibiotics  SICU monitoring  monitor cbc, serum lytes, and LFTs  will f/u

## 2017-05-25 NOTE — PROGRESS NOTE ADULT - SUBJECTIVE AND OBJECTIVE BOX
INTERVAL HPI/OVERNIGHT EVENTS:  Patient seen and examined    MEDICATIONS  (STANDING):  chlorhexidine 0.12% Liquid 15milliLiter(s) Swish and Spit two times a day  petrolatum Ophthalmic Ointment 1Application(s) Both EYES two times a day  amiodarone    Tablet 200milliGRAM(s) Oral daily  pantoprazole  Injectable 40milliGRAM(s) IV Push daily  piperacillin/tazobactam IVPB. 3.375Gram(s) IV Intermittent every 8 hours  vancomycin  IVPB 1000milliGRAM(s) IV Intermittent every 8 hours  vancomycin  IVPB  IV Intermittent   dexmedetomidine Infusion 0.2MICROgram(s)/kG/Hr IV Continuous <Continuous>  heparin  Injectable 5000Unit(s) SubCutaneous every 8 hours  nystatin Powder 1Application(s) Topical two times a day  digoxin  Injectable 0.125milliGRAM(s) IV Push daily  furosemide   Injectable 40milliGRAM(s) IV Push daily  metoprolol Injectable 2.5milliGRAM(s) IV Push every 6 hours    MEDICATIONS  (PRN):      Allergies    No Known Allergies    Intolerances        Vital Signs Last 24 Hrs  T(C): 36.9, Max: 37.9 (05-25 @ 00:00)  T(F): 98.4, Max: 100.2 (05-25 @ 00:00)  HR: 115 (56 - 162)  BP: 118/91 (96/74 - 159/72)  BP(mean): 101 (74 - 104)  RR: 39 (17 - 39)  SpO2: 92% (65% - 100%)    PHYSICAL EXAM:  General: NAD.  CVS: S1, S2  Chest: air entry bilaterally present  Abd: BS present, soft, non-tender      LABS:                        8.7    9.6   )-----------( 320      ( 25 May 2017 04:06 )             28.3     05-25    138  |  97<L>  |  22.0<H>  ----------------------------<  141<H>  3.6   |  29.0  |  0.89    Ca    8.0<L>      25 May 2017 04:06  Phos  3.5     05-25  Mg     1.8     05-25    TPro  6.3<L>  /  Alb  2.7<L>  /  TBili  0.7  /  DBili  x   /  AST  62<H>  /  ALT  132<H>  /  AlkPhos  128<H>  05-24

## 2017-05-25 NOTE — PROGRESS NOTE ADULT - SUBJECTIVE AND OBJECTIVE BOX
INTERVAL HPI/OVERNIGHT EVENTS/SUBJECTIVE:  Agitation and SVT overnight.  Improved now.      ICU Vital Signs Last 24 Hrs  T(C): 36.9, Max: 37.9 (05-25 @ 00:00)  T(F): 98.4, Max: 100.2 (05-25 @ 00:00)  HR: 88 (56 - 162)  BP: 130/83 (96/74 - 149/82)  BP(mean): 95 (74 - 104)  ABP: --  ABP(mean): --  RR: 24 (17 - 32)  SpO2: 100% (65% - 100%)      I&O's Detail  I & Os for 24h ending 25 May 2017 07:00  =============================================  IN:    Pivot: 1020 ml    Enteral Tube Flush: 900 ml    Solution: 500 ml    Solution: 250 ml    dexmedetomidine Infusion: 210.6 ml    Solution: 100 ml    Solution: 50 ml    Total IN: 3030.6 ml  ---------------------------------------------  OUT:    Indwelling Catheter - Urethral: 3245 ml    Nasoenteral Tube: 500 ml    Drain: 50 ml    Total OUT: 3795 ml  ---------------------------------------------  Total NET: -764.4 ml    I & Os for current day (as of 25 May 2017 11:28)  =============================================  IN:    Pivot: 204 ml    dexmedetomidine Infusion: 14.1 ml    Total IN: 218.1 ml  ---------------------------------------------  OUT:    Indwelling Catheter - Urethral: 1000 ml    Total OUT: 1000 ml  ---------------------------------------------  Total NET: -781.9 ml      Mode: CPAP with PS  FiO2: 40  PEEP: 8  PS: 8  MAP: 11    ABG - ( 25 May 2017 03:45 )  pH: 7.49  /  pCO2: 39    /  pO2: 150   / HCO3: 30    / Base Excess: 6.1   /  SaO2: 100                 MEDICATIONS  (STANDING):  chlorhexidine 0.12% Liquid 15milliLiter(s) Swish and Spit two times a day  petrolatum Ophthalmic Ointment 1Application(s) Both EYES two times a day  digoxin     Tablet 0.125milliGRAM(s) Oral daily  amiodarone    Tablet 200milliGRAM(s) Oral daily  acetaminophen    Suspension 650milliGRAM(s) Oral every 6 hours  melatonin 6milliGRAM(s) Oral at bedtime  QUEtiapine 50milliGRAM(s) Oral daily  metoprolol 12.5milliGRAM(s) Enteral Tube two times a day  pantoprazole  Injectable 40milliGRAM(s) IV Push daily  piperacillin/tazobactam IVPB. 3.375Gram(s) IV Intermittent every 8 hours  vancomycin  IVPB 1000milliGRAM(s) IV Intermittent every 8 hours  vancomycin  IVPB  IV Intermittent   dexmedetomidine Infusion 0.2MICROgram(s)/kG/Hr IV Continuous <Continuous>  heparin  Injectable 5000Unit(s) SubCutaneous every 8 hours  nystatin Powder 1Application(s) Topical two times a day  furosemide    Tablet 40milliGRAM(s) Oral daily    MEDICATIONS  (PRN):      NUTRITION/IVF: Vital    CENTRAL LINE: N    ARMAS:  Y     A-LINE:   N      MISC: IR drain - 50cc    PHYSICAL EXAM:    Gen: NAD    Eyes: PERRLA    Neurological: Awake and alert, not consistently following commands.    ENMT:  Jazzy lac    Pulmonary:  Non labored on CPAP/PS    Cardiovascular:  Irregular    Gastrointestinal: Soft, NT    Genitourinary: Armas    Extremities:  Moderate pitting edema.          LABS:  CBC Full  -  ( 25 May 2017 04:06 )  WBC Count : 9.6 K/uL  Hemoglobin : 8.7 g/dL  Hematocrit : 28.3 %  Platelet Count - Automated : 320 K/uL  Mean Cell Volume : 95.6 fl  Mean Cell Hemoglobin : 29.4 pg  Mean Cell Hemoglobin Concentration : 30.7 g/dL  Auto Neutrophil # : 7.6 K/uL  Auto Lymphocyte # : 1.2 K/uL  Auto Monocyte # : 0.5 K/uL  Auto Eosinophil # : 0.1 K/uL  Auto Basophil # : 0.0 K/uL  Auto Neutrophil % : 79.4 %  Auto Lymphocyte % : 13.1 %  Auto Monocyte % : 5.5 %  Auto Eosinophil % : 1.3 %  Auto Basophil % : 0.2 %    05-25    138  |  97<L>  |  22.0<H>  ----------------------------<  141<H>  3.6   |  29.0  |  0.89    Ca    8.0<L>      25 May 2017 04:06  Phos  3.5     05-25  Mg     1.8     05-25    TPro  6.3<L>  /  Alb  2.7<L>  /  TBili  0.7  /  DBili  x   /  AST  62<H>  /  ALT  132<H>  /  AlkPhos  128<H>  05-24        RECENT CULTURES:  05-23 .Abscess gallbladder fossa XXXX   Few White blood cells  Numerous Gram Positive Cocci in Pairs and Chains  Few Gram Positive Rods  Few Gram Positive Cocci in Clusters   Moderate Enterococcus species Identification and susceptibility to follow.  Culture in progress    05-21 .Blood Blood-Peripheral XXXX XXXX   No growth at 48 hours    05-20 .Blood Blood XXXX XXXX   No growth at 48 hours        LIVER FUNCTIONS - ( 24 May 2017 03:49 )  Alb: 2.7 g/dL / Pro: 6.3 g/dL / ALK PHOS: 128 U/L / ALT: 132 U/L / AST: 62 U/L / GGT: x               CAPILLARY BLOOD GLUCOSE      RADIOLOGY & ADDITIONAL STUDIES:    ASSESSMENT/PLAN:  51yMale presenting with:    Neuro: Encephalopathy/delirium continues.  Will hold sedation and reevaluate.     HEENT: Tongue lac.  Plastics to treat once off vent.    CV: HTN, afib, CA.  Continue meds.  No anticoagulation.      Pulm:  Wean vent.  May be near ready for extubation.  Concern for neuro status.  Would trial extubation if can get to consistently follow commands.  Trach tomorrow if not able to attempt extubation.      GI/Nutrition: Tube feeds. No further GI bleeding     /Renal:  Armas    ID: Abx for abscess  Awaiting sensitivities      Lines/Tubes:    Proph: Heparin    Dispo: ICU      CRITICAL CARE TIME SPENT:  40 min

## 2017-05-26 LAB
ANION GAP SERPL CALC-SCNC: 14 MMOL/L — SIGNIFICANT CHANGE UP (ref 5–17)
ANISOCYTOSIS BLD QL: SLIGHT — SIGNIFICANT CHANGE UP
BUN SERPL-MCNC: 18 MG/DL — SIGNIFICANT CHANGE UP (ref 8–20)
CALCIUM SERPL-MCNC: 8.5 MG/DL — LOW (ref 8.6–10.2)
CHLORIDE SERPL-SCNC: 94 MMOL/L — LOW (ref 98–107)
CO2 SERPL-SCNC: 34 MMOL/L — HIGH (ref 22–29)
CREAT SERPL-MCNC: 0.97 MG/DL — SIGNIFICANT CHANGE UP (ref 0.5–1.3)
CULTURE RESULTS: SIGNIFICANT CHANGE UP
CULTURE RESULTS: SIGNIFICANT CHANGE UP
ELLIPTOCYTES BLD QL SMEAR: SLIGHT — SIGNIFICANT CHANGE UP
GLUCOSE SERPL-MCNC: 118 MG/DL — HIGH (ref 70–115)
HCT VFR BLD CALC: 29 % — LOW (ref 42–52)
HGB BLD-MCNC: 9 G/DL — LOW (ref 14–18)
HYPOCHROMIA BLD QL: SLIGHT — SIGNIFICANT CHANGE UP
LYMPHOCYTES # BLD AUTO: 8 % — LOW (ref 20–55)
MACROCYTES BLD QL: SLIGHT — SIGNIFICANT CHANGE UP
MAGNESIUM SERPL-MCNC: 1.9 MG/DL — SIGNIFICANT CHANGE UP (ref 1.6–2.6)
MCHC RBC-ENTMCNC: 29.2 PG — SIGNIFICANT CHANGE UP (ref 27–31)
MCHC RBC-ENTMCNC: 31 G/DL — LOW (ref 32–36)
MCV RBC AUTO: 94.2 FL — HIGH (ref 80–94)
MICROCYTES BLD QL: SLIGHT — SIGNIFICANT CHANGE UP
MONOCYTES NFR BLD AUTO: 6 % — SIGNIFICANT CHANGE UP (ref 3–10)
NEUTROPHILS NFR BLD AUTO: 80 % — HIGH (ref 37–73)
NEUTS BAND # BLD: 1 % — SIGNIFICANT CHANGE UP (ref 0–8)
OVALOCYTES BLD QL SMEAR: SLIGHT — SIGNIFICANT CHANGE UP
PHOSPHATE SERPL-MCNC: 4.8 MG/DL — HIGH (ref 2.4–4.7)
PLAT MORPH BLD: SIGNIFICANT CHANGE UP
PLATELET # BLD AUTO: 394 K/UL — SIGNIFICANT CHANGE UP (ref 150–400)
POIKILOCYTOSIS BLD QL AUTO: SLIGHT — SIGNIFICANT CHANGE UP
POLYCHROMASIA BLD QL SMEAR: SIGNIFICANT CHANGE UP
POTASSIUM SERPL-MCNC: 3.3 MMOL/L — LOW (ref 3.5–5.3)
POTASSIUM SERPL-SCNC: 3.3 MMOL/L — LOW (ref 3.5–5.3)
RBC # BLD: 3.08 M/UL — LOW (ref 4.6–6.2)
RBC # FLD: 17.3 % — HIGH (ref 11–15.6)
RBC BLD AUTO: ABNORMAL
SCHISTOCYTES BLD QL AUTO: SLIGHT — SIGNIFICANT CHANGE UP
SODIUM SERPL-SCNC: 142 MMOL/L — SIGNIFICANT CHANGE UP (ref 135–145)
SPECIMEN SOURCE: SIGNIFICANT CHANGE UP
SPECIMEN SOURCE: SIGNIFICANT CHANGE UP
STOMATOCYTES BLD QL SMEAR: SLIGHT — SIGNIFICANT CHANGE UP
TARGETS BLD QL SMEAR: SLIGHT — SIGNIFICANT CHANGE UP
VANCOMYCIN TROUGH SERPL-MCNC: 13.6 UG/ML — SIGNIFICANT CHANGE UP (ref 10–20)
VARIANT LYMPHS # BLD: 5 % — SIGNIFICANT CHANGE UP (ref 0–6)
WBC # BLD: 10.9 K/UL — HIGH (ref 4.8–10.8)
WBC # FLD AUTO: 10.9 K/UL — HIGH (ref 4.8–10.8)

## 2017-05-26 PROCEDURE — 99223 1ST HOSP IP/OBS HIGH 75: CPT

## 2017-05-26 PROCEDURE — 71010: CPT | Mod: 26,77

## 2017-05-26 PROCEDURE — 99291 CRITICAL CARE FIRST HOUR: CPT

## 2017-05-26 PROCEDURE — 71010: CPT | Mod: 26

## 2017-05-26 RX ORDER — THIAMINE MONONITRATE (VIT B1) 100 MG
100 TABLET ORAL DAILY
Qty: 0 | Refills: 0 | Status: DISCONTINUED | OUTPATIENT
Start: 2017-05-29 | End: 2017-06-12

## 2017-05-26 RX ORDER — AMIODARONE HYDROCHLORIDE 400 MG/1
100 TABLET ORAL DAILY
Qty: 0 | Refills: 0 | Status: DISCONTINUED | OUTPATIENT
Start: 2017-05-26 | End: 2017-05-26

## 2017-05-26 RX ORDER — FUROSEMIDE 40 MG
40 TABLET ORAL ONCE
Qty: 0 | Refills: 0 | Status: COMPLETED | OUTPATIENT
Start: 2017-05-26 | End: 2017-05-26

## 2017-05-26 RX ORDER — POTASSIUM CHLORIDE 20 MEQ
10 PACKET (EA) ORAL
Qty: 0 | Refills: 0 | Status: COMPLETED | OUTPATIENT
Start: 2017-05-26 | End: 2017-05-26

## 2017-05-26 RX ORDER — FUROSEMIDE 40 MG
20 TABLET ORAL ONCE
Qty: 0 | Refills: 0 | Status: COMPLETED | OUTPATIENT
Start: 2017-05-26 | End: 2017-05-26

## 2017-05-26 RX ORDER — THIAMINE MONONITRATE (VIT B1) 100 MG
500 TABLET ORAL DAILY
Qty: 0 | Refills: 0 | Status: COMPLETED | OUTPATIENT
Start: 2017-05-26 | End: 2017-05-28

## 2017-05-26 RX ORDER — FOLIC ACID 0.8 MG
1 TABLET ORAL DAILY
Qty: 0 | Refills: 0 | Status: DISCONTINUED | OUTPATIENT
Start: 2017-05-26 | End: 2017-06-12

## 2017-05-26 RX ADMIN — Medication 105 MILLIGRAM(S): at 12:53

## 2017-05-26 RX ADMIN — Medication 100 MILLIEQUIVALENT(S): at 08:56

## 2017-05-26 RX ADMIN — Medication 2.5 MILLIGRAM(S): at 06:20

## 2017-05-26 RX ADMIN — Medication 250 MILLIGRAM(S): at 06:18

## 2017-05-26 RX ADMIN — Medication 1 APPLICATION(S): at 18:51

## 2017-05-26 RX ADMIN — Medication 1 MILLIGRAM(S): at 12:54

## 2017-05-26 RX ADMIN — Medication 2.5 MILLIGRAM(S): at 23:13

## 2017-05-26 RX ADMIN — HEPARIN SODIUM 5000 UNIT(S): 5000 INJECTION INTRAVENOUS; SUBCUTANEOUS at 15:50

## 2017-05-26 RX ADMIN — NYSTATIN CREAM 1 APPLICATION(S): 100000 CREAM TOPICAL at 06:17

## 2017-05-26 RX ADMIN — Medication 20 MILLIGRAM(S): at 19:14

## 2017-05-26 RX ADMIN — Medication 3 MILLILITER(S): at 16:19

## 2017-05-26 RX ADMIN — Medication 2.5 MILLIGRAM(S): at 00:15

## 2017-05-26 RX ADMIN — PIPERACILLIN AND TAZOBACTAM 25 GRAM(S): 4; .5 INJECTION, POWDER, LYOPHILIZED, FOR SOLUTION INTRAVENOUS at 22:06

## 2017-05-26 RX ADMIN — NYSTATIN CREAM 1 APPLICATION(S): 100000 CREAM TOPICAL at 18:51

## 2017-05-26 RX ADMIN — CHLORHEXIDINE GLUCONATE 15 MILLILITER(S): 213 SOLUTION TOPICAL at 18:51

## 2017-05-26 RX ADMIN — Medication 100 MILLIEQUIVALENT(S): at 11:01

## 2017-05-26 RX ADMIN — Medication 2.5 MILLIGRAM(S): at 12:36

## 2017-05-26 RX ADMIN — Medication 0.12 MILLIGRAM(S): at 12:35

## 2017-05-26 RX ADMIN — Medication 3 MILLILITER(S): at 20:10

## 2017-05-26 RX ADMIN — Medication 3 MILLILITER(S): at 03:25

## 2017-05-26 RX ADMIN — PIPERACILLIN AND TAZOBACTAM 25 GRAM(S): 4; .5 INJECTION, POWDER, LYOPHILIZED, FOR SOLUTION INTRAVENOUS at 01:55

## 2017-05-26 RX ADMIN — DEXMEDETOMIDINE HYDROCHLORIDE IN 0.9% SODIUM CHLORIDE 4.7 MICROGRAM(S)/KG/HR: 4 INJECTION INTRAVENOUS at 00:55

## 2017-05-26 RX ADMIN — PANTOPRAZOLE SODIUM 40 MILLIGRAM(S): 20 TABLET, DELAYED RELEASE ORAL at 12:36

## 2017-05-26 RX ADMIN — HEPARIN SODIUM 5000 UNIT(S): 5000 INJECTION INTRAVENOUS; SUBCUTANEOUS at 22:06

## 2017-05-26 RX ADMIN — Medication 40 MILLIGRAM(S): at 15:50

## 2017-05-26 RX ADMIN — DEXMEDETOMIDINE HYDROCHLORIDE IN 0.9% SODIUM CHLORIDE 4.7 MICROGRAM(S)/KG/HR: 4 INJECTION INTRAVENOUS at 12:35

## 2017-05-26 RX ADMIN — DEXMEDETOMIDINE HYDROCHLORIDE IN 0.9% SODIUM CHLORIDE 4.7 MICROGRAM(S)/KG/HR: 4 INJECTION INTRAVENOUS at 06:54

## 2017-05-26 RX ADMIN — Medication 1 APPLICATION(S): at 06:17

## 2017-05-26 RX ADMIN — PIPERACILLIN AND TAZOBACTAM 25 GRAM(S): 4; .5 INJECTION, POWDER, LYOPHILIZED, FOR SOLUTION INTRAVENOUS at 06:18

## 2017-05-26 RX ADMIN — Medication 100 MILLIEQUIVALENT(S): at 10:00

## 2017-05-26 RX ADMIN — CHLORHEXIDINE GLUCONATE 15 MILLILITER(S): 213 SOLUTION TOPICAL at 06:17

## 2017-05-26 RX ADMIN — Medication 40 MILLIGRAM(S): at 06:27

## 2017-05-26 RX ADMIN — HEPARIN SODIUM 5000 UNIT(S): 5000 INJECTION INTRAVENOUS; SUBCUTANEOUS at 06:17

## 2017-05-26 RX ADMIN — Medication 3 MILLILITER(S): at 08:00

## 2017-05-26 NOTE — PROGRESS NOTE ADULT - ASSESSMENT
IMPRESSION:  The patient is a 51 year old male with significant cardiovascular history including atrial fibrillation and cardiomyopathy s/p laparoscopic cholecystectomy. GI f/u for bile leak.   - first ercp 5/9/17: papilla appeared to be stenosed. During attempts at biliary cannulation, patient developed hypotension. Procedure aborted. Patient coded. Intubated. Patient resuscitated. ACS team took over, and performed exploratory laparotomy.  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   - Extubated now. LFTs improving. On NGT feeding.  - afebrile now  - moves limbs and follows simple verbal commands    PLAN:  continue NGT feeding  IV antibiotics  SICU monitoring  monitor cbc, serum lytes, and LFTs  will f/u

## 2017-05-26 NOTE — CONSULT NOTE ADULT - SUBJECTIVE AND OBJECTIVE BOX
51M presented to [facility] on 5/4/17 with post-op abdominal pain. He is was  laparoscopic cholecystectomy POD #2 with no BM and no urine for 16 hrs. Found to have a distended bladder, s/p Rogers. Workup showed small collection in GB fossa on CT and the HIDA scan was positive for bile leak. He is s/p ERCP on 5/9 but developed PEA. He is s/p on washout and emergent ex-lap by  Dr. Yin. He is s/p stent placement and sphincterotomy.     Course complicated by prolonged intubation and cognitive/behavioral deficits which included agitation, requiring ongoing sedation, possibly due to cardiac arrest.           REVIEW OF SYSTEMS  Constitutional - No fever, No weight loss, No fatigue  HEENT - No eye pain, No visual disturbances, No difficulty hearing, No tinnitus, No vertigo, No neck pain  Respiratory - No cough, No wheezing, No shortness of breath  Cardiovascular - No chest pain, No palpitations  Gastrointestinal - No abdominal pain, No nausea, No vomiting, No diarrhea, No constipation  Genitourinary - No dysuria, No frequency, No hematuria, No incontinence  Neurological - No headaches, No memory loss, No loss of strength, No numbness, No tremors  Skin - No itching, No rashes, No lesions   Endocrine - No temperature intolerance  Musculoskeletal - No joint pain, No joint swelling, No muscle pain  Psychiatric - No depression, No anxiety    PAST MEDICAL & SURGICAL HISTORY  Mitral regurgitation  Cardiomyopathy, nonischemic  CAD (coronary artery disease)  Asthma  Atrial fibrillation  Heart disease  S/P laparoscopic cholecystectomy  History of humerus fracture  Artificial pacemaker      SOCIAL HISTORY  Smoking - Denied  EtOH - Denied   Drugs - Denied    FUNCTIONAL HISTORY  Lives   Independent    CURRENT FUNCTIONAL STATUS      FAMILY HISTORY   No pertinent family history in first degree relatives      RECENT LABS/IMAGING  CBC Full  -  ( 26 May 2017 05:45 )  WBC Count : 10.9 K/uL  Hemoglobin : 9.0 g/dL  Hematocrit : 29.0 %  Platelet Count - Automated : 394 K/uL  Mean Cell Volume : 94.2 fl  Mean Cell Hemoglobin : 29.2 pg  Mean Cell Hemoglobin Concentration : 31.0 g/dL  Auto Neutrophil # : x  Auto Lymphocyte # : x  Auto Monocyte # : x  Auto Eosinophil # : x  Auto Basophil # : x  Auto Neutrophil % : 80.0 %  Auto Lymphocyte % : 8.0 %  Auto Monocyte % : 6.0 %  Auto Eosinophil % : x  Auto Basophil % : x    05-26    142  |  94<L>  |  18.0  ----------------------------<  118<H>  3.3<L>   |  34.0<H>  |  0.97    Ca    8.5<L>      26 May 2017 05:45  Phos  4.8     05-26  Mg     1.9     05-26          VITALS  T(C): 37.7, Max: 38 (05-25 @ 19:00)  HR: 79 (71 - 129)  BP: 121/83 (96/70 - 152/77)  RR: 38 (21 - 48)  SpO2: 100% (90% - 100%)  Wt(kg): --    ALLERGIES  No Known Allergies      MEDICATIONS   chlorhexidine 0.12% Liquid 15milliLiter(s) Swish and Spit two times a day  petrolatum Ophthalmic Ointment 1Application(s) Both EYES two times a day  pantoprazole  Injectable 40milliGRAM(s) IV Push daily  piperacillin/tazobactam IVPB. 3.375Gram(s) IV Intermittent every 8 hours  dexmedetomidine Infusion 0.2MICROgram(s)/kG/Hr IV Continuous <Continuous>  heparin  Injectable 5000Unit(s) SubCutaneous every 8 hours  nystatin Powder 1Application(s) Topical two times a day  digoxin  Injectable 0.125milliGRAM(s) IV Push daily  furosemide   Injectable 40milliGRAM(s) IV Push daily  metoprolol Injectable 2.5milliGRAM(s) IV Push every 6 hours  ALBUTerol/ipratropium for Nebulization 3milliLiter(s) Nebulizer every 6 hours  thiamine IVPB 500milliGRAM(s) IV Intermittent daily  folic acid Injectable 1milliGRAM(s) IV Push daily      ----------------------------------------------------------------------------------------  PHYSICAL EXAM  Constitutional - NAD, Comfortable  HEENT - NCAT, EOMI  Neck - Supple, No limited ROM  Chest - CTA bilaterally, No wheeze, No rhonchi, No crackles  Cardiovascular - RRR, S1S2, No murmurs  Abdomen - BS+, Soft, NTND  Extremities - No C/C/E, No calf tenderness   Neurologic Exam -                    Cognitive - Awake, Alert, AAO to self, place, date, year, situation     Communication - Fluent, No dysarthria     Cranial Nerves - CN 2-12 intact     Motor - No focal deficits                    LEFT    UE - ShAB 5/5, EF 5/5, EE 5/5, WE 5/5,  5/5                    RIGHT UE - ShAB 5/5, EF 5/5, EE 5/5, WE 5/5,  5/5                    LEFT    LE - HF 5/5, KE 5/5, DF 5/5, PF 5/5                    RIGHT LE - HF 5/5, KE 5/5, DF 5/5, PF 5/5        Sensory - Intact to LT     Reflexes - DTR Intact, No primitive reflexive     Coordination - FTN intact     OculoVestibular - No saccades, No nystagmus, VOR         Balance - WNL Static  Psychiatric - Mood stable, Affect WNL  ----------------------------------------------------------------------------------------  ASSESSMENT/PLAN    Pain -  DVT PPX -   Rehab Dispo -    Recommend ACUTE inpatient rehabilitation for the functional deficits consisting of 3 hours of therapy/day & 24 hour RN/daily PMR physician for comorbid medical management. Will continue to follow for ongoing rehab needs and recommendations.   Recommend HEATHER, patient DOES NOT meet acute inpatient rehabilitation criteria   Recommend DC HOME with outpatient   Recommend DC HOME with homecare   Follow up with CONCUSSION PROGRAM - Call [phone] for an appointment 51M presented to [facility] on 5/4/17 with post-op abdominal pain. He is was  laparoscopic cholecystectomy POD #2 with no BM and no urine for 16 hrs. Found to have a distended bladder, s/p Rogers. Workup showed small collection in GB fossa on CT and the HIDA scan was positive for bile leak. He is s/p ERCP on 5/9 but developed PEA. He is s/p on washout and emergent ex-lap by  Dr. Yin. He is s/p stent placement and sphincterotomy.     Course complicated by prolonged intubation and cognitive/behavioral deficits which included agitation, requiring ongoing sedation with Haldol/Ativan/Precedex and possibly related to cardiac arrest and anoxic brain injury.     Patient has history of EtOH with 20% EF, pitting edema of the BLE, scrotal edema, ascites with abdominal drain and self-inflicted tongue injury.       REVIEW OF SYSTEMS  Unable to provide.     PAST MEDICAL & SURGICAL HISTORY  Mitral regurgitation  Cardiomyopathy, nonischemic  CAD (coronary artery disease)  Asthma  Atrial fibrillation  Heart disease  S/P laparoscopic cholecystectomy  History of humerus fracture  Artificial pacemaker      SOCIAL HISTORY  Smoking - As per chat, unknown  EtOH - +  Drugs - Unknown    FUNCTIONAL HISTORY  Independent    CURRENT FUNCTIONAL STATUS  Total assist    FAMILY HISTORY   No pertinent family history in first degree relatives      RECENT LABS/IMAGING  CBC Full  -  ( 26 May 2017 05:45 )  WBC Count : 10.9 K/uL  Hemoglobin : 9.0 g/dL  Hematocrit : 29.0 %  Platelet Count - Automated : 394 K/uL  Mean Cell Volume : 94.2 fl  Mean Cell Hemoglobin : 29.2 pg  Mean Cell Hemoglobin Concentration : 31.0 g/dL  Auto Neutrophil # : x  Auto Lymphocyte # : x  Auto Monocyte # : x  Auto Eosinophil # : x  Auto Basophil # : x  Auto Neutrophil % : 80.0 %  Auto Lymphocyte % : 8.0 %  Auto Monocyte % : 6.0 %  Auto Eosinophil % : x  Auto Basophil % : x    05-26    142  |  94<L>  |  18.0  ----------------------------<  118<H>  3.3<L>   |  34.0<H>  |  0.97    Ca    8.5<L>      26 May 2017 05:45  Phos  4.8     05-26  Mg     1.9     05-26          VITALS  T(C): 37.7, Max: 38 (05-25 @ 19:00)  HR: 79 (71 - 129)  BP: 121/83 (96/70 - 152/77)  RR: 38 (21 - 48)  SpO2: 100% (90% - 100%)  Wt(kg): --    ALLERGIES  No Known Allergies      MEDICATIONS   chlorhexidine 0.12% Liquid 15milliLiter(s) Swish and Spit two times a day  petrolatum Ophthalmic Ointment 1Application(s) Both EYES two times a day  pantoprazole  Injectable 40milliGRAM(s) IV Push daily  piperacillin/tazobactam IVPB. 3.375Gram(s) IV Intermittent every 8 hours  dexmedetomidine Infusion 0.2MICROgram(s)/kG/Hr IV Continuous <Continuous>  heparin  Injectable 5000Unit(s) SubCutaneous every 8 hours  nystatin Powder 1Application(s) Topical two times a day  digoxin  Injectable 0.125milliGRAM(s) IV Push daily  furosemide   Injectable 40milliGRAM(s) IV Push daily  metoprolol Injectable 2.5milliGRAM(s) IV Push every 6 hours  ALBUTerol/ipratropium for Nebulization 3milliLiter(s) Nebulizer every 6 hours  thiamine IVPB 500milliGRAM(s) IV Intermittent daily  folic acid Injectable 1milliGRAM(s) IV Push daily      ----------------------------------------------------------------------------------------  PHYSICAL EXAM  Constitutional - Appears comfortable  HEENT -   Neck - Supple, No limited ROM -   Abdomen - Distended, + drain  Extremities - +pitting edema Bilaterally  Neurologic Exam -                    Cognitive - Awake, Alert, followed one command inconsistently - improved with visual cue     Communication - nonverbal     Motor - No focal deficits                    LEFT    UE - ShAB 0/5, EF 1/5, EE 1/5, WE 1/5,  2/5                    RIGHT UE - ShAB 1/5, EF 1/5, EE 1/5, WE 1/5,  1/5                    LEFT    LE - HF 1/5, KE 1/5, DF 0/5, PF 0/5                    RIGHT LE - HF 0/5, KE 1/5, DF 0/5, PF 0/5        Coordination - Poor isolated movements, athetoid/choreiform-like of the neck/UE/LE  Psychiatric - flat affect, appears dazed, smiled once  ----------------------------------------------------------------------------------------  ASSESSMENT/PLAN  51M with cardiac arrest s/p ERCP for bile duct leak with prolonged hospitalization related to prolonged intubation and cognitive/behavioral deficits.    It is possible that patient may have multiple components to his musculoskeletal and neurological state, complicated by the course of hospital events which can include critical illness neuropathy/myopathy, anoxic brain injury, delirium and medications.     Would consider diffusion weighted brain MRI to assist in identifying an organic cause to findings.     The patient has no PO access and is planned for NGT. Not sure if medications such as a stimulant would assist in the improvement of his neurological state. Patient on thiamine and folate.     As soon as it is feasible and patient is stable, continue ongoing attempt to wean all sedatives and use environmental modifications for improvement of cognitive status. Also recommend OT and PT for OOB throughout the day to improve diurnal cycle and prevention of secondary complications. Would add TEDs to help with LE edema as SCDs are creating isolated pedal edema. Lasix has been ordered.

## 2017-05-26 NOTE — PROGRESS NOTE ADULT - SUBJECTIVE AND OBJECTIVE BOX
INTERVAL HPI/OVERNIGHT EVENTS/SUBJECTIVE: Successfully extubated yesterday.      ICU Vital Signs Last 24 Hrs  T(C): 37.7, Max: 38 (05-25 @ 19:00)  T(F): 99.8, Max: 100.4 (05-25 @ 19:00)  HR: 77 (71 - 129)  BP: 105/77 (96/70 - 159/72)  BP(mean): 86 (75 - 107)  ABP: --  ABP(mean): --  RR: 27 (21 - 48)  SpO2: 100% (90% - 100%)      I&O's Detail  I & Os for 24h ending 26 May 2017 07:00  =============================================  IN:    Solution: 250 ml    Solution: 225 ml    Pivot: 204 ml    dexmedetomidine Infusion: 125.7 ml    Total IN: 804.7 ml  ---------------------------------------------  OUT:    Indwelling Catheter - Urethral: 5500 ml    Drain: 40 ml    Total OUT: 5540 ml  ---------------------------------------------  Total NET: -4735.3 ml    I & Os for current day (as of 26 May 2017 11:15)  =============================================  IN:    Solution: 300 ml    dexmedetomidine Infusion: 24.9 ml    Total IN: 324.9 ml  ---------------------------------------------  OUT:    Indwelling Catheter - Urethral: 575 ml    Total OUT: 575 ml  ---------------------------------------------  Total NET: -250.1 ml        ABG - ( 25 May 2017 19:58 )  pH: 7.53  /  pCO2: 42    /  pO2: 95    / HCO3: 34    / Base Excess: 10.6  /  SaO2: 98                  MEDICATIONS  (STANDING):  chlorhexidine 0.12% Liquid 15milliLiter(s) Swish and Spit two times a day  petrolatum Ophthalmic Ointment 1Application(s) Both EYES two times a day  pantoprazole  Injectable 40milliGRAM(s) IV Push daily  piperacillin/tazobactam IVPB. 3.375Gram(s) IV Intermittent every 8 hours  dexmedetomidine Infusion 0.2MICROgram(s)/kG/Hr IV Continuous <Continuous>  heparin  Injectable 5000Unit(s) SubCutaneous every 8 hours  nystatin Powder 1Application(s) Topical two times a day  digoxin  Injectable 0.125milliGRAM(s) IV Push daily  furosemide   Injectable 40milliGRAM(s) IV Push daily  metoprolol Injectable 2.5milliGRAM(s) IV Push every 6 hours  vancomycin  IVPB 1000milliGRAM(s) IV Intermittent every 12 hours  ALBUTerol/ipratropium for Nebulization 3milliLiter(s) Nebulizer every 6 hours    MEDICATIONS  (PRN):      MONTOYA:  Y  DATE INSERTED:    MISC: IR drain 40 cc    PHYSICAL EXAM:    Gen:  NAD.      Eyes:  PERRL, disconjugate    Neurological: Awake and alert.  Moves all extremities spontaneously.  Intermittently follows simple commands.  Will not speak.      Pulmonary: CTA, mild retractions.      Cardiovascular:  irregular    Gastrointestinal: Soft NT    Genitourinary: Montoya    Extremities:  Moderate lower extremity pitting edema            LABS:  CBC Full  -  ( 26 May 2017 05:45 )  WBC Count : 10.9 K/uL  Hemoglobin : 9.0 g/dL  Hematocrit : 29.0 %  Platelet Count - Automated : 394 K/uL  Mean Cell Volume : 94.2 fl  Mean Cell Hemoglobin : 29.2 pg  Mean Cell Hemoglobin Concentration : 31.0 g/dL  Auto Neutrophil # : x  Auto Lymphocyte # : x  Auto Monocyte # : x  Auto Eosinophil # : x  Auto Basophil # : x  Auto Neutrophil % : 80.0 %  Auto Lymphocyte % : 8.0 %  Auto Monocyte % : 6.0 %  Auto Eosinophil % : x  Auto Basophil % : x    05-26    142  |  94<L>  |  18.0  ----------------------------<  118<H>  3.3<L>   |  34.0<H>  |  0.97    Ca    8.5<L>      26 May 2017 05:45  Phos  4.8     05-26  Mg     1.9     05-26          RECENT CULTURES:  05-23 .Abscess gallbladder fossa Enterococcus raffinosus   Few White blood cells  Numerous Gram Positive Cocci in Pairs and Chains  Few Gram Positive Rods  Few Gram Positive Cocci in Clusters   Moderate Enterococcus raffinosus  Culture in progress    05-21 .Blood Blood-Peripheral XXXX XXXX   No growth at 5 days.    05-20 .Blood Blood XXXX XXXX   No growth at 5 days.      ASSESSMENT/PLAN:  51yMale presenting with:    Neuro:  Encephalopathy.  Improving mildly.  Unclear if still medication or anoxic from arrest.  Will attempt to limit sedatives.  Consult brain injury rehab.  Consider neurostim.  Possible Wernicke's with disconjugate gaze.  Will give IV thiamine.     HEENT: Tongue lac. Plastics in future.      CV: Shock resolved.  A-fib controlled.  Cont meds. Still volume overloaded.  Lasix.      Pulm: Off vent.  Doing well.  Diuresis.  Pulm toilet, chest pt.      GI/Nutrition:  NPO.  Will not be able ot eat.  Place NG tube and restart tube feeds.  No further GI bleeding.     /Renal: Continue montoya for retention and strict monitoring.      Proph: PPI, sqh    Dispo: Cont ICU care.        CRITICAL CARE TIME SPENT: 40 min

## 2017-05-26 NOTE — PROGRESS NOTE ADULT - SUBJECTIVE AND OBJECTIVE BOX
INTERVAL HPI/OVERNIGHT EVENTS:    Pt awaiting floor transfer.  Pt in no apparent distress.  Free water added with tube feeds.      MEDICATIONS  (STANDING):  chlorhexidine 0.12% Liquid 15milliLiter(s) Swish and Spit two times a day  petrolatum Ophthalmic Ointment 1Application(s) Both EYES two times a day  pantoprazole  Injectable 40milliGRAM(s) IV Push daily  piperacillin/tazobactam IVPB. 3.375Gram(s) IV Intermittent every 8 hours  dexmedetomidine Infusion 0.2MICROgram(s)/kG/Hr IV Continuous <Continuous>  heparin  Injectable 5000Unit(s) SubCutaneous every 8 hours  nystatin Powder 1Application(s) Topical two times a day  digoxin  Injectable 0.125milliGRAM(s) IV Push daily  furosemide   Injectable 40milliGRAM(s) IV Push daily  metoprolol Injectable 2.5milliGRAM(s) IV Push every 6 hours  vancomycin  IVPB 1000milliGRAM(s) IV Intermittent every 12 hours  ALBUTerol/ipratropium for Nebulization 3milliLiter(s) Nebulizer every 6 hours  amiodarone IVPB 100milliGRAM(s) IV Intermittent daily    MEDICATIONS  (PRN):      Drug Dosing Weight  Height (cm): 165.1 (04 May 2017 13:12)  Weight (kg): 93.9 (09 May 2017 05:12)  BMI (kg/m2): 34.4 (09 May 2017 05:12)  BSA (m2): 2.01 (09 May 2017 05:12)        PAST MEDICAL & SURGICAL HISTORY:  Mitral regurgitation: severe MR  Cardiomyopathy, nonischemic  CAD (coronary artery disease)  Asthma  Atrial fibrillation  Heart disease  S/P laparoscopic cholecystectomy  History of humerus fracture: Metal pins in Left Humerus  Artificial pacemaker      ICU Vital Signs Last 24 Hrs  T(C): 37.4, Max: 38 (05-25 @ 19:00)  T(F): 99.3, Max: 100.4 (05-25 @ 19:00)  HR: 77 (65 - 129)  BP: 106/59 (96/70 - 159/72)  BP(mean): 81 (75 - 107)  ABP: --  ABP(mean): --  RR: 31 (17 - 48)  SpO2: 100% (90% - 100%)          I&O's Detail  I & Os for 24h ending 25 May 2017 07:00  =============================================  IN:    Pivot: 1020 ml    Enteral Tube Flush: 900 ml    Solution: 500 ml    Solution: 250 ml    dexmedetomidine Infusion: 210.6 ml    Solution: 100 ml    Solution: 50 ml    Total IN: 3030.6 ml  ---------------------------------------------  OUT:    Indwelling Catheter - Urethral: 3245 ml    Nasoenteral Tube: 500 ml    Drain: 50 ml    Total OUT: 3795 ml  ---------------------------------------------  Total NET: -764.4 ml    I & Os for current day (as of 26 May 2017 05:32)  =============================================  IN:    Pivot: 204 ml    Solution: 200 ml    dexmedetomidine Infusion: 97.8 ml    Total IN: 501.8 ml  ---------------------------------------------  OUT:    Indwelling Catheter - Urethral: 4975 ml    Drain: 20 ml    Total OUT: 4995 ml  ---------------------------------------------  Total NET: -4493.2 ml      Mode: CPAP with PS  FiO2: 40  PEEP: 5  PS: 5  MAP: 7      NUTRITION/IVF: Tube feeds.    CENTRAL LINE: N :    MONTOYA: Y  DATE INSERTED: 5/21    A-LINE: N      MISC: VANESSA from IR minimal output.    PHYSICAL EXAM:    Gen:  NAD    Eyes: PERRLA    Neurological: RASS -1 to -2, tracks consistently, beginning to follow some but not all simple commands     ENMT: macerated, devitalized tip of tongue    Pulmonary: Non labored.    Cardiovascular: Irregular rhythm, normal rate    Gastrointestinal: Soft, mild distention.     Genitourinary: Montoya, scrotal and penile edema.    Extremities: Moderate to severe pitting edema.      LABS:              ASSESSMENT/PLAN:    51yMale presenting with:    Neuro: Encephalopathy continues.  Will wean precedex and see if can get pt to follow commands more reliably, however patient with severe agitation as sedation weaned off.        HEENT: Plastic following for tongue injury.      CV: Pt with pulmonary edema ON requiring additional dose of lasix.  Consider bid dosing.  A-fib rate controlled.  Hold anticoagulation secondary to bleeding risk.      Pulm:   Requires frequent NP/OP suctioning.  Pulmonary toilet, percussion therapy.  OOB to chair.      GI/Nutrition:  Abdomen remains benign.  Vac in place and will be changed 5/27.  No feeding modality at this time.  F/up speech pathologist though unlikely will pass swallow.      /Renal: Home lasix.  Consider bid dosing.  Failed tov a few days ago. Cont montoya for retention.      ID: Likely gall bladder fossa abscess.  Now drained.  Leukocytosis improving.  Tawny abx to cultures.      Proph: Heparin ppx    Dispo: Continue aggressive pulmonary toileting.  Manage encephalopathy INTERVAL HPI/OVERNIGHT EVENTS:    Pt more awake w/ Precedex off.  Follows commands.  Extubated to venti with racemic for some stridor.  Improved after.   Abscess grew Enteroccus.  ON: Pt with acute resp distress and not tolerating secretions.  Noted rale/rhonchi.  Lasix 40 IVP given with immediate ~400 cc output and nebs.  Required frequent oropharyngeal sunctioning but improved work of breathing and did not require re-intubation.        MEDICATIONS  (STANDING):  chlorhexidine 0.12% Liquid 15milliLiter(s) Swish and Spit two times a day  petrolatum Ophthalmic Ointment 1Application(s) Both EYES two times a day  pantoprazole  Injectable 40milliGRAM(s) IV Push daily  piperacillin/tazobactam IVPB. 3.375Gram(s) IV Intermittent every 8 hours  dexmedetomidine Infusion 0.2MICROgram(s)/kG/Hr IV Continuous <Continuous>  heparin  Injectable 5000Unit(s) SubCutaneous every 8 hours  nystatin Powder 1Application(s) Topical two times a day  digoxin  Injectable 0.125milliGRAM(s) IV Push daily  furosemide   Injectable 40milliGRAM(s) IV Push daily  metoprolol Injectable 2.5milliGRAM(s) IV Push every 6 hours  vancomycin  IVPB 1000milliGRAM(s) IV Intermittent every 12 hours  ALBUTerol/ipratropium for Nebulization 3milliLiter(s) Nebulizer every 6 hours  amiodarone IVPB 100milliGRAM(s) IV Intermittent daily    MEDICATIONS  (PRN):      Drug Dosing Weight  Height (cm): 165.1 (04 May 2017 13:12)  Weight (kg): 93.9 (09 May 2017 05:12)  BMI (kg/m2): 34.4 (09 May 2017 05:12)  BSA (m2): 2.01 (09 May 2017 05:12)        PAST MEDICAL & SURGICAL HISTORY:  Mitral regurgitation: severe MR  Cardiomyopathy, nonischemic  CAD (coronary artery disease)  Asthma  Atrial fibrillation  Heart disease  S/P laparoscopic cholecystectomy  History of humerus fracture: Metal pins in Left Humerus  Artificial pacemaker      ICU Vital Signs Last 24 Hrs  T(C): 37.4, Max: 38 (05-25 @ 19:00)  T(F): 99.3, Max: 100.4 (05-25 @ 19:00)  HR: 77 (65 - 129)  BP: 106/59 (96/70 - 159/72)  BP(mean): 81 (75 - 107)  ABP: --  ABP(mean): --  RR: 31 (17 - 48)  SpO2: 100% (90% - 100%)          I&O's Detail  I & Os for 24h ending 25 May 2017 07:00  =============================================  IN:    Pivot: 1020 ml    Enteral Tube Flush: 900 ml    Solution: 500 ml    Solution: 250 ml    dexmedetomidine Infusion: 210.6 ml    Solution: 100 ml    Solution: 50 ml    Total IN: 3030.6 ml  ---------------------------------------------  OUT:    Indwelling Catheter - Urethral: 3245 ml    Nasoenteral Tube: 500 ml    Drain: 50 ml    Total OUT: 3795 ml  ---------------------------------------------  Total NET: -764.4 ml    I & Os for current day (as of 26 May 2017 05:32)  =============================================  IN:    Pivot: 204 ml    Solution: 200 ml    dexmedetomidine Infusion: 97.8 ml    Total IN: 501.8 ml  ---------------------------------------------  OUT:    Indwelling Catheter - Urethral: 4975 ml    Drain: 20 ml    Total OUT: 4995 ml  ---------------------------------------------  Total NET: -4493.2 ml      Mode: CPAP with PS  FiO2: 40  PEEP: 5  PS: 5  MAP: 7      NUTRITION/IVF: Tube feeds.    CENTRAL LINE: N :    MONTOYA: Y  DATE INSERTED: 5/21    A-LINE: N      MISC: VANESSA from IR minimal output.    PHYSICAL EXAM:    Gen:  NAD    Eyes: PERRLA    Neurological: RASS -1 to -2, tracks consistently, beginning to follow some but not all simple commands     ENMT: macerated, devitalized tip of tongue    Pulmonary: Non labored.    Cardiovascular: Irregular rhythm, normal rate    Gastrointestinal: Soft, mild distention.     Genitourinary: Montoya, scrotal and penile edema.    Extremities: Moderate to severe pitting edema.      LABS:              ASSESSMENT/PLAN:    51yMale presenting with:    Neuro: Encephalopathy continues.  Will wean precedex and see if can get pt to follow commands more reliably, however patient with severe agitation as sedation weaned off.        HEENT: Plastic following for tongue injury.      CV: Pt with pulmonary edema ON requiring additional dose of lasix.  Consider bid dosing.  A-fib rate controlled.  Hold anticoagulation secondary to bleeding risk.      Pulm:   Requires frequent NP/OP suctioning.  Pulmonary toilet, percussion therapy.  OOB to chair.      GI/Nutrition:  Abdomen remains benign.  Vac in place and will be changed 5/27.  No feeding modality at this time.  F/up speech pathologist though unlikely will pass swallow.      /Renal: Home lasix.  Consider bid dosing.  Failed tov a few days ago. Cont montoya for retention.      ID: Likely gall bladder fossa abscess.  Now drained.  Leukocytosis improving.  Tawny abx to cultures.      Proph: Heparin ppx    Dispo: Continue aggressive pulmonary toileting.  Manage encephalopathy

## 2017-05-26 NOTE — PROGRESS NOTE ADULT - SUBJECTIVE AND OBJECTIVE BOX
INTERVAL HPI/OVERNIGHT EVENTS:  Patient seen and examined    MEDICATIONS  (STANDING):  chlorhexidine 0.12% Liquid 15milliLiter(s) Swish and Spit two times a day  petrolatum Ophthalmic Ointment 1Application(s) Both EYES two times a day  pantoprazole  Injectable 40milliGRAM(s) IV Push daily  piperacillin/tazobactam IVPB. 3.375Gram(s) IV Intermittent every 8 hours  dexmedetomidine Infusion 0.2MICROgram(s)/kG/Hr IV Continuous <Continuous>  heparin  Injectable 5000Unit(s) SubCutaneous every 8 hours  nystatin Powder 1Application(s) Topical two times a day  digoxin  Injectable 0.125milliGRAM(s) IV Push daily  furosemide   Injectable 40milliGRAM(s) IV Push daily  metoprolol Injectable 2.5milliGRAM(s) IV Push every 6 hours  ALBUTerol/ipratropium for Nebulization 3milliLiter(s) Nebulizer every 6 hours  thiamine IVPB 500milliGRAM(s) IV Intermittent daily  folic acid Injectable 1milliGRAM(s) IV Push daily    MEDICATIONS  (PRN):      Allergies    No Known Allergies    Intolerances        Vital Signs Last 24 Hrs  T(C): 37.7, Max: 38 (05-25 @ 19:00)  T(F): 99.8, Max: 100.4 (05-25 @ 19:00)  HR: 79 (71 - 129)  BP: 121/83 (96/70 - 152/77)  BP(mean): 95 (75 - 107)  RR: 38 (21 - 48)  SpO2: 100% (90% - 100%)    PHYSICAL EXAM:  General: NAD.  CVS: S1, S2  Chest: air entry bilaterally present  Abd: BS present, soft, non-tender      LABS:                        9.0    10.9  )-----------( 394      ( 26 May 2017 05:45 )             29.0     05-26    142  |  94<L>  |  18.0  ----------------------------<  118<H>  3.3<L>   |  34.0<H>  |  0.97    Ca    8.5<L>      26 May 2017 05:45  Phos  4.8     05-26  Mg     1.9     05-26          RADIOLOGY & ADDITIONAL TESTS:

## 2017-05-27 LAB
ANION GAP SERPL CALC-SCNC: 13 MMOL/L — SIGNIFICANT CHANGE UP (ref 5–17)
BASOPHILS # BLD AUTO: 0 K/UL — SIGNIFICANT CHANGE UP (ref 0–0.2)
BASOPHILS NFR BLD AUTO: 0.2 % — SIGNIFICANT CHANGE UP (ref 0–2)
BUN SERPL-MCNC: 19 MG/DL — SIGNIFICANT CHANGE UP (ref 8–20)
CALCIUM SERPL-MCNC: 8.7 MG/DL — SIGNIFICANT CHANGE UP (ref 8.6–10.2)
CHLORIDE SERPL-SCNC: 93 MMOL/L — LOW (ref 98–107)
CO2 SERPL-SCNC: 37 MMOL/L — HIGH (ref 22–29)
CREAT SERPL-MCNC: 0.97 MG/DL — SIGNIFICANT CHANGE UP (ref 0.5–1.3)
EOSINOPHIL # BLD AUTO: 0.1 K/UL — SIGNIFICANT CHANGE UP (ref 0–0.5)
EOSINOPHIL NFR BLD AUTO: 1.4 % — SIGNIFICANT CHANGE UP (ref 0–5)
GAS PNL BLDA: SIGNIFICANT CHANGE UP
GLUCOSE SERPL-MCNC: 129 MG/DL — HIGH (ref 70–115)
HCT VFR BLD CALC: 30.3 % — LOW (ref 42–52)
HGB BLD-MCNC: 9.2 G/DL — LOW (ref 14–18)
LYMPHOCYTES # BLD AUTO: 1.5 K/UL — SIGNIFICANT CHANGE UP (ref 1–4.8)
LYMPHOCYTES # BLD AUTO: 16 % — LOW (ref 20–55)
MAGNESIUM SERPL-MCNC: 1.8 MG/DL — SIGNIFICANT CHANGE UP (ref 1.6–2.6)
MCHC RBC-ENTMCNC: 29 PG — SIGNIFICANT CHANGE UP (ref 27–31)
MCHC RBC-ENTMCNC: 30.4 G/DL — LOW (ref 32–36)
MCV RBC AUTO: 95.6 FL — HIGH (ref 80–94)
MONOCYTES # BLD AUTO: 0.7 K/UL — SIGNIFICANT CHANGE UP (ref 0–0.8)
MONOCYTES NFR BLD AUTO: 7.2 % — SIGNIFICANT CHANGE UP (ref 3–10)
NEUTROPHILS # BLD AUTO: 7 K/UL — SIGNIFICANT CHANGE UP (ref 1.8–8)
NEUTROPHILS NFR BLD AUTO: 74.7 % — HIGH (ref 37–73)
PHOSPHATE SERPL-MCNC: 4 MG/DL — SIGNIFICANT CHANGE UP (ref 2.4–4.7)
PLATELET # BLD AUTO: 438 K/UL — HIGH (ref 150–400)
POTASSIUM SERPL-MCNC: 3.2 MMOL/L — LOW (ref 3.5–5.3)
POTASSIUM SERPL-SCNC: 3.2 MMOL/L — LOW (ref 3.5–5.3)
RBC # BLD: 3.17 M/UL — LOW (ref 4.6–6.2)
RBC # FLD: 17 % — HIGH (ref 11–15.6)
SODIUM SERPL-SCNC: 143 MMOL/L — SIGNIFICANT CHANGE UP (ref 135–145)
WBC # BLD: 9.3 K/UL — SIGNIFICANT CHANGE UP (ref 4.8–10.8)
WBC # FLD AUTO: 9.3 K/UL — SIGNIFICANT CHANGE UP (ref 4.8–10.8)

## 2017-05-27 RX ORDER — VALPROIC ACID (AS SODIUM SALT) 250 MG/5ML
125 SOLUTION, ORAL ORAL EVERY 12 HOURS
Qty: 0 | Refills: 0 | Status: DISCONTINUED | OUTPATIENT
Start: 2017-05-27 | End: 2017-06-05

## 2017-05-27 RX ORDER — ACETAZOLAMIDE 250 MG/1
250 TABLET ORAL EVERY 6 HOURS
Qty: 0 | Refills: 0 | Status: COMPLETED | OUTPATIENT
Start: 2017-05-27 | End: 2017-05-27

## 2017-05-27 RX ORDER — POTASSIUM CHLORIDE 20 MEQ
40 PACKET (EA) ORAL ONCE
Qty: 0 | Refills: 0 | Status: COMPLETED | OUTPATIENT
Start: 2017-05-27 | End: 2017-05-27

## 2017-05-27 RX ORDER — IPRATROPIUM/ALBUTEROL SULFATE 18-103MCG
3 AEROSOL WITH ADAPTER (GRAM) INHALATION ONCE
Qty: 0 | Refills: 0 | Status: COMPLETED | OUTPATIENT
Start: 2017-05-27 | End: 2017-05-27

## 2017-05-27 RX ORDER — ACETAZOLAMIDE 250 MG/1
250 TABLET ORAL EVERY 6 HOURS
Qty: 0 | Refills: 0 | Status: COMPLETED | OUTPATIENT
Start: 2017-05-27 | End: 2017-05-28

## 2017-05-27 RX ORDER — AMANTADINE HCL 100 MG
100 CAPSULE ORAL
Qty: 0 | Refills: 0 | Status: DISCONTINUED | OUTPATIENT
Start: 2017-05-27 | End: 2017-06-12

## 2017-05-27 RX ORDER — MAGNESIUM SULFATE 500 MG/ML
2 VIAL (ML) INJECTION
Qty: 0 | Refills: 0 | Status: COMPLETED | OUTPATIENT
Start: 2017-05-27 | End: 2017-05-27

## 2017-05-27 RX ORDER — POTASSIUM CHLORIDE 20 MEQ
10 PACKET (EA) ORAL
Qty: 0 | Refills: 0 | Status: COMPLETED | OUTPATIENT
Start: 2017-05-27 | End: 2017-05-27

## 2017-05-27 RX ORDER — POTASSIUM CHLORIDE 20 MEQ
40 PACKET (EA) ORAL ONCE
Qty: 0 | Refills: 0 | Status: DISCONTINUED | OUTPATIENT
Start: 2017-05-27 | End: 2017-05-27

## 2017-05-27 RX ORDER — LANOLIN ALCOHOL/MO/W.PET/CERES
6 CREAM (GRAM) TOPICAL AT BEDTIME
Qty: 0 | Refills: 0 | Status: DISCONTINUED | OUTPATIENT
Start: 2017-05-27 | End: 2017-06-12

## 2017-05-27 RX ORDER — ALBUTEROL 90 UG/1
2.5 AEROSOL, METERED ORAL
Qty: 0 | Refills: 0 | Status: DISCONTINUED | OUTPATIENT
Start: 2017-05-27 | End: 2017-05-31

## 2017-05-27 RX ORDER — CALCIUM GLUCONATE 100 MG/ML
2 VIAL (ML) INTRAVENOUS ONCE
Qty: 0 | Refills: 0 | Status: COMPLETED | OUTPATIENT
Start: 2017-05-27 | End: 2017-05-27

## 2017-05-27 RX ADMIN — Medication 3 MILLILITER(S): at 04:44

## 2017-05-27 RX ADMIN — Medication 100 MILLIEQUIVALENT(S): at 09:01

## 2017-05-27 RX ADMIN — Medication 3 MILLILITER(S): at 01:09

## 2017-05-27 RX ADMIN — Medication 100 MILLIGRAM(S): at 13:03

## 2017-05-27 RX ADMIN — NYSTATIN CREAM 1 APPLICATION(S): 100000 CREAM TOPICAL at 09:03

## 2017-05-27 RX ADMIN — Medication 1 MILLIGRAM(S): at 13:04

## 2017-05-27 RX ADMIN — Medication 6 MILLIGRAM(S): at 22:33

## 2017-05-27 RX ADMIN — PIPERACILLIN AND TAZOBACTAM 25 GRAM(S): 4; .5 INJECTION, POWDER, LYOPHILIZED, FOR SOLUTION INTRAVENOUS at 06:02

## 2017-05-27 RX ADMIN — PIPERACILLIN AND TAZOBACTAM 25 GRAM(S): 4; .5 INJECTION, POWDER, LYOPHILIZED, FOR SOLUTION INTRAVENOUS at 13:03

## 2017-05-27 RX ADMIN — ACETAZOLAMIDE 105 MILLIGRAM(S): 250 TABLET ORAL at 18:09

## 2017-05-27 RX ADMIN — HEPARIN SODIUM 5000 UNIT(S): 5000 INJECTION INTRAVENOUS; SUBCUTANEOUS at 22:28

## 2017-05-27 RX ADMIN — Medication 0.12 MILLIGRAM(S): at 13:04

## 2017-05-27 RX ADMIN — Medication 100 MILLIEQUIVALENT(S): at 08:09

## 2017-05-27 RX ADMIN — NYSTATIN CREAM 1 APPLICATION(S): 100000 CREAM TOPICAL at 18:09

## 2017-05-27 RX ADMIN — Medication 40 MILLIGRAM(S): at 05:59

## 2017-05-27 RX ADMIN — Medication 2.5 MILLIGRAM(S): at 05:57

## 2017-05-27 RX ADMIN — Medication 50 GRAM(S): at 06:48

## 2017-05-27 RX ADMIN — Medication 3 MILLILITER(S): at 15:20

## 2017-05-27 RX ADMIN — HEPARIN SODIUM 5000 UNIT(S): 5000 INJECTION INTRAVENOUS; SUBCUTANEOUS at 06:09

## 2017-05-27 RX ADMIN — Medication 50 GRAM(S): at 08:09

## 2017-05-27 RX ADMIN — PIPERACILLIN AND TAZOBACTAM 25 GRAM(S): 4; .5 INJECTION, POWDER, LYOPHILIZED, FOR SOLUTION INTRAVENOUS at 21:39

## 2017-05-27 RX ADMIN — Medication 2.5 MILLIGRAM(S): at 12:30

## 2017-05-27 RX ADMIN — Medication 3 MILLILITER(S): at 08:35

## 2017-05-27 RX ADMIN — Medication 105 MILLIGRAM(S): at 12:30

## 2017-05-27 RX ADMIN — ACETAZOLAMIDE 105 MILLIGRAM(S): 250 TABLET ORAL at 13:03

## 2017-05-27 RX ADMIN — Medication 200 GRAM(S): at 20:36

## 2017-05-27 RX ADMIN — Medication 3 MILLILITER(S): at 20:01

## 2017-05-27 RX ADMIN — Medication 40 MILLIEQUIVALENT(S): at 06:53

## 2017-05-27 RX ADMIN — Medication 125 MILLIGRAM(S): at 18:09

## 2017-05-27 RX ADMIN — Medication 100 MILLIEQUIVALENT(S): at 06:48

## 2017-05-27 RX ADMIN — Medication 2.5 MILLIGRAM(S): at 18:09

## 2017-05-27 RX ADMIN — DEXMEDETOMIDINE HYDROCHLORIDE IN 0.9% SODIUM CHLORIDE 4.7 MICROGRAM(S)/KG/HR: 4 INJECTION INTRAVENOUS at 09:01

## 2017-05-27 RX ADMIN — HEPARIN SODIUM 5000 UNIT(S): 5000 INJECTION INTRAVENOUS; SUBCUTANEOUS at 13:03

## 2017-05-27 RX ADMIN — ACETAZOLAMIDE 105 MILLIGRAM(S): 250 TABLET ORAL at 23:06

## 2017-05-27 NOTE — PROGRESS NOTE ADULT - ATTENDING COMMENTS
Remains encephalopathic.  Intermittently agitated.  Tolerating tube feeds and remains liberated from vent.      Good response from lasix but increasing metabolic alkalosis.  Will switch diuretic to diamox.  Watch UOP and lab result.  Check ABG to ensure not respiratory acidosis.    Stop precedex.  Depakote for impulsivity and agitation.  Start neurostim.    Plan to restart heparin for A-fib stroke ppx monday.

## 2017-05-27 NOTE — PROGRESS NOTE ADULT - ASSESSMENT
51yMale presenting with: Cardiac arrest SP ERCP.  Lower GIB, Tongue laceration.        Neurological: FU BIU Rec and start coma stim.  Wean off precedex.    Pulmonary: Monitor resp function and give Lasix vs albuterol PRN    Cardiovascular: Continue Metoprolol.  Transfer to PO    Gastrointestinal: Continue Diet at goal.    Genitourinary: Maintain Rogers for urinary retention.    Heme: Continue SCD ./ SQH    ID: Zosyn until 5/28 for enterococus    Skin: Avoid pressure decubiti    Lines/ Tubes: As above    Dispo: Continue Critical care as above.

## 2017-05-27 NOTE — PROGRESS NOTE ADULT - SUBJECTIVE AND OBJECTIVE BOX
INTERVAL HPI/OVERNIGHT EVENTS/SUBJECTIVE: Mild resp distress on / off yesterday.  Improved with IV Lasix.  Able to Follow simple commands yesterday.  No acute issues.  Still not able to make needs known yet however.    ICU Vital Signs Last 24 Hrs  T(C): 37.3, Max: 37.7 (05-26 @ 08:00)  T(F): 99.1, Max: 99.8 (05-26 @ 08:00)  HR: 107 (68 - 131)  BP: 123/77 (98/68 - 128/74)  BP(mean): 94 (77 - 98)  ABP: --  ABP(mean): --  RR: 28 (20 - 90)  SpO2: 98% (86% - 100%)      I&O's Detail  I & Os for 24h ending 26 May 2017 07:00  =============================================  IN:    Solution: 250 ml    Solution: 225 ml    Pivot: 204 ml    dexmedetomidine Infusion: 125.7 ml    Total IN: 804.7 ml  ---------------------------------------------  OUT:    Indwelling Catheter - Urethral: 5500 ml    Drain: 40 ml    Total OUT: 5540 ml  ---------------------------------------------  Total NET: -4735.3 ml    I & Os for current day (as of 27 May 2017 02:42)  =============================================  IN:    Pivot: 381 ml    Solution: 300 ml    Solution: 275 ml    dexmedetomidine Infusion: 108 ml    Solution: 100 ml    Total IN: 1164 ml  ---------------------------------------------  OUT:    Indwelling Catheter - Urethral: 3015 ml    Drain: 10 ml    Total OUT: 3025 ml  ---------------------------------------------  Total NET: -1861 ml        ABG - ( 25 May 2017 19:58 )  pH: 7.53  /  pCO2: 42    /  pO2: 95    / HCO3: 34    / Base Excess: 10.6  /  SaO2: 98                  MEDICATIONS  (STANDING):  chlorhexidine 0.12% Liquid 15milliLiter(s) Swish and Spit two times a day  petrolatum Ophthalmic Ointment 1Application(s) Both EYES two times a day  piperacillin/tazobactam IVPB. 3.375Gram(s) IV Intermittent every 8 hours  dexmedetomidine Infusion 0.2MICROgram(s)/kG/Hr IV Continuous <Continuous>  heparin  Injectable 5000Unit(s) SubCutaneous every 8 hours  nystatin Powder 1Application(s) Topical two times a day  digoxin  Injectable 0.125milliGRAM(s) IV Push daily  furosemide   Injectable 40milliGRAM(s) IV Push daily  metoprolol Injectable 2.5milliGRAM(s) IV Push every 6 hours  ALBUTerol/ipratropium for Nebulization 3milliLiter(s) Nebulizer every 6 hours  thiamine IVPB 500milliGRAM(s) IV Intermittent daily  folic acid Injectable 1milliGRAM(s) IV Push daily    MEDICATIONS  (PRN):  ALBUTerol    0.083% 2.5milliGRAM(s) Nebulizer every 2 hours PRN Shortness of Breath and/or Wheezing      NUTRITION/IVF: Pivot @ 51/ IVL  ARMAS:   Yes  NGT: Yes    PHYSICAL EXAM:     Gen: NAD, Well appearing, No cyanosis, Pallor.    Eyes: PERRL ~ 3mm, EOMI,     Neurological: Not speaking. GCS 4/1/6, No focal deficit.     Neck: Supple. NT AT, FROM no pain.  No JVD. No meningeal signs    Pulmonary: NAD, CTA, = BL .      Cardiovascular: RRR, S1, S2, No Murmurs, rubs or gallops noted.    Gastrointestinal: ND, Soft, NT.    Extremities: NT, AT, no edema, erythema or palpable cord noted.  FROM, = 2+ pulses throughout.      LABS:  CBC Full  -  ( 26 May 2017 05:45 )  WBC Count : 10.9 K/uL  Hemoglobin : 9.0 g/dL  Hematocrit : 29.0 %  Platelet Count - Automated : 394 K/uL  Mean Cell Volume : 94.2 fl  Mean Cell Hemoglobin : 29.2 pg  Mean Cell Hemoglobin Concentration : 31.0 g/dL  Auto Neutrophil # : x  Auto Lymphocyte # : x  Auto Monocyte # : x  Auto Eosinophil # : x  Auto Basophil # : x  Auto Neutrophil % : 80.0 %  Auto Lymphocyte % : 8.0 %  Auto Monocyte % : 6.0 %  Auto Eosinophil % : x  Auto Basophil % : x    05-26    142  |  94<L>  |  18.0  ----------------------------<  118<H>  3.3<L>   |  34.0<H>  |  0.97    Ca    8.5<L>      26 May 2017 05:45  Phos  4.8     05-26  Mg     1.9     05-26          RECENT CULTURES:  05-23 .Abscess gallbladder fossa Enterococcus raffinosus   Few White blood cells  Numerous Gram Positive Cocci in Pairs and Chains  Few Gram Positive Rods  Few Gram Positive Cocci in Clusters   Moderate Enterococcus raffinosus  Culture in progress    05-21 .Blood Blood-Peripheral XXXX XXXX   No growth at 5 days.    05-20 .Blood Blood XXXX XXXX   No growth at 5 days.      ASSESSMENT/PLAN:  51yMale presenting with: Cardiac arrest SP ERCP.  Lower GIB, Tongue laceration.        Neurological: FU BIU Rec and start coma stim.  Wean off precedex.    Pulmonary: Monitor resp function and give Lasix vs albuterol PRN    Cardiovascular: Continue Metoprolol.  Transfer to PO    Gastrointestinal: Continue Diet at goal.    Genitourinary: Maintain Armas for urinary retention.    Heme: Continue SCD ./ SQH    ID: Zosyn until 5/28 for enterococus    Skin: Avoid pressure decubiti    Lines/ Tubes: As above    Dispo: Continue Critical care as above.            CRITICAL CARE TIME SPENT: 60 min INTERVAL HPI/OVERNIGHT EVENTS/SUBJECTIVE: Mild resp distress on / off yesterday.  Improved with IV Lasix.  Able to Follow simple commands yesterday.  No acute issues.  Still not able to make needs known yet however.    ICU Vital Signs Last 24 Hrs  T(C): 37.3, Max: 37.7 (05-26 @ 08:00)  T(F): 99.1, Max: 99.8 (05-26 @ 08:00)  HR: 107 (68 - 131)  BP: 123/77 (98/68 - 128/74)  BP(mean): 94 (77 - 98)  ABP: --  ABP(mean): --  RR: 28 (20 - 90)  SpO2: 98% (86% - 100%)      I&O's Detail  I & Os for 24h ending 26 May 2017 07:00  =============================================  IN:    Solution: 250 ml    Solution: 225 ml    Pivot: 204 ml    dexmedetomidine Infusion: 125.7 ml    Total IN: 804.7 ml  ---------------------------------------------  OUT:    Indwelling Catheter - Urethral: 5500 ml    Drain: 40 ml    Total OUT: 5540 ml  ---------------------------------------------  Total NET: -4735.3 ml    I & Os for current day (as of 27 May 2017 02:42)  =============================================  IN:    Pivot: 381 ml    Solution: 300 ml    Solution: 275 ml    dexmedetomidine Infusion: 108 ml    Solution: 100 ml    Total IN: 1164 ml  ---------------------------------------------  OUT:    Indwelling Catheter - Urethral: 3015 ml    Drain: 10 ml    Total OUT: 3025 ml  ---------------------------------------------  Total NET: -1861 ml        ABG - ( 25 May 2017 19:58 )  pH: 7.53  /  pCO2: 42    /  pO2: 95    / HCO3: 34    / Base Excess: 10.6  /  SaO2: 98                  MEDICATIONS  (STANDING):  chlorhexidine 0.12% Liquid 15milliLiter(s) Swish and Spit two times a day  petrolatum Ophthalmic Ointment 1Application(s) Both EYES two times a day  piperacillin/tazobactam IVPB. 3.375Gram(s) IV Intermittent every 8 hours  dexmedetomidine Infusion 0.2MICROgram(s)/kG/Hr IV Continuous <Continuous>  heparin  Injectable 5000Unit(s) SubCutaneous every 8 hours  nystatin Powder 1Application(s) Topical two times a day  digoxin  Injectable 0.125milliGRAM(s) IV Push daily  furosemide   Injectable 40milliGRAM(s) IV Push daily  metoprolol Injectable 2.5milliGRAM(s) IV Push every 6 hours  ALBUTerol/ipratropium for Nebulization 3milliLiter(s) Nebulizer every 6 hours  thiamine IVPB 500milliGRAM(s) IV Intermittent daily  folic acid Injectable 1milliGRAM(s) IV Push daily    MEDICATIONS  (PRN):  ALBUTerol    0.083% 2.5milliGRAM(s) Nebulizer every 2 hours PRN Shortness of Breath and/or Wheezing      NUTRITION/IVF: Pivot @ 51/ IVL  ARMAS:   Yes  NGT: Yes    PHYSICAL EXAM:     Gen: NAD, Well appearing, No cyanosis, Pallor.    Eyes: PERRL ~ 3mm, EOMI,     Neurological: Not speaking. GCS 4/1/6, No focal deficit.     Neck: Supple. NT AT, FROM no pain.  No JVD. No meningeal signs    Pulmonary: NAD, CTA, = BL .      Cardiovascular: RRR, S1, S2, No Murmurs, rubs or gallops noted.    Gastrointestinal: ND, Soft, NT.    Extremities: NT, AT, no edema, erythema or palpable cord noted.  FROM, = 2+ pulses throughout.      LABS:                        9.2    9.3   )-----------( 438      ( 27 May 2017 05:00 )             30.3     05-27    143  |  93<L>  |  19.0  ----------------------------<  129<H>  3.2<L>   |  37.0<H>  |  0.97    Ca    8.7      27 May 2017 05:00  Phos  4.0     05-27  Mg     1.8     05-27            RECENT CULTURES:  05-23 .Abscess gallbladder fossa Enterococcus raffinosus   Few White blood cells  Numerous Gram Positive Cocci in Pairs and Chains  Few Gram Positive Rods  Few Gram Positive Cocci in Clusters   Moderate Enterococcus raffinosus  Culture in progress    05-21 .Blood Blood-Peripheral XXXX XXXX   No growth at 5 days.    05-20 .Blood Blood XXXX XXXX   No growth at 5 days.      ASSESSMENT/PLAN:  51yMale presenting with: Cardiac arrest SP ERCP.  Lower GIB, Tongue laceration.        Neurological: FU BIU Rec and start coma stim.  Wean off precedex.    Pulmonary: Monitor resp function and give Lasix vs albuterol PRN    Cardiovascular: Continue Metoprolol.  Transfer to PO    Gastrointestinal: Continue Diet at goal.    Genitourinary: Maintain Armas for urinary retention.    Heme: Continue SCD ./ SQH    ID: Zosyn until 5/28 for enterococus    Skin: Avoid pressure decubiti    Lines/ Tubes: As above    Dispo: Continue Critical care as above.            CRITICAL CARE TIME SPENT: 60 min

## 2017-05-28 DIAGNOSIS — H57.02 ANISOCORIA: ICD-10-CM

## 2017-05-28 DIAGNOSIS — G93.40 ENCEPHALOPATHY, UNSPECIFIED: ICD-10-CM

## 2017-05-28 LAB
ALBUMIN SERPL ELPH-MCNC: 3 G/DL — LOW (ref 3.3–5.2)
ALP SERPL-CCNC: 116 U/L — SIGNIFICANT CHANGE UP (ref 40–120)
ALT FLD-CCNC: 60 U/L — HIGH
ANION GAP SERPL CALC-SCNC: 13 MMOL/L — SIGNIFICANT CHANGE UP (ref 5–17)
APPEARANCE UR: CLEAR — SIGNIFICANT CHANGE UP
AST SERPL-CCNC: 46 U/L — HIGH
BACTERIA # UR AUTO: ABNORMAL
BASOPHILS # BLD AUTO: 0 K/UL — SIGNIFICANT CHANGE UP (ref 0–0.2)
BASOPHILS NFR BLD AUTO: 0.1 % — SIGNIFICANT CHANGE UP (ref 0–2)
BILIRUB SERPL-MCNC: 0.8 MG/DL — SIGNIFICANT CHANGE UP (ref 0.4–2)
BILIRUB UR-MCNC: NEGATIVE — SIGNIFICANT CHANGE UP
BUN SERPL-MCNC: 23 MG/DL — HIGH (ref 8–20)
CALCIUM SERPL-MCNC: 8.9 MG/DL — SIGNIFICANT CHANGE UP (ref 8.6–10.2)
CHLORIDE SERPL-SCNC: 97 MMOL/L — LOW (ref 98–107)
CO2 SERPL-SCNC: 32 MMOL/L — HIGH (ref 22–29)
COLOR SPEC: YELLOW — SIGNIFICANT CHANGE UP
CREAT SERPL-MCNC: 0.95 MG/DL — SIGNIFICANT CHANGE UP (ref 0.5–1.3)
DIFF PNL FLD: ABNORMAL
EOSINOPHIL # BLD AUTO: 0.2 K/UL — SIGNIFICANT CHANGE UP (ref 0–0.5)
EOSINOPHIL NFR BLD AUTO: 1.7 % — SIGNIFICANT CHANGE UP (ref 0–5)
GAS PNL BLDA: SIGNIFICANT CHANGE UP
GLUCOSE SERPL-MCNC: 131 MG/DL — HIGH (ref 70–115)
GLUCOSE UR QL: NEGATIVE MG/DL — SIGNIFICANT CHANGE UP
HCT VFR BLD CALC: 30.5 % — LOW (ref 42–52)
HGB BLD-MCNC: 9.3 G/DL — LOW (ref 14–18)
KETONES UR-MCNC: NEGATIVE — SIGNIFICANT CHANGE UP
LEUKOCYTE ESTERASE UR-ACNC: NEGATIVE — SIGNIFICANT CHANGE UP
LYMPHOCYTES # BLD AUTO: 1.4 K/UL — SIGNIFICANT CHANGE UP (ref 1–4.8)
LYMPHOCYTES # BLD AUTO: 12.2 % — LOW (ref 20–55)
MAGNESIUM SERPL-MCNC: 2 MG/DL — SIGNIFICANT CHANGE UP (ref 1.6–2.6)
MCHC RBC-ENTMCNC: 29 PG — SIGNIFICANT CHANGE UP (ref 27–31)
MCHC RBC-ENTMCNC: 30.5 G/DL — LOW (ref 32–36)
MCV RBC AUTO: 95 FL — HIGH (ref 80–94)
MONOCYTES # BLD AUTO: 0.7 K/UL — SIGNIFICANT CHANGE UP (ref 0–0.8)
MONOCYTES NFR BLD AUTO: 6.5 % — SIGNIFICANT CHANGE UP (ref 3–10)
NEUTROPHILS # BLD AUTO: 9 K/UL — HIGH (ref 1.8–8)
NEUTROPHILS NFR BLD AUTO: 79.1 % — HIGH (ref 37–73)
NITRITE UR-MCNC: NEGATIVE — SIGNIFICANT CHANGE UP
PH UR: 8 — SIGNIFICANT CHANGE UP (ref 5–8)
PHOSPHATE SERPL-MCNC: 3.5 MG/DL — SIGNIFICANT CHANGE UP (ref 2.4–4.7)
PLATELET # BLD AUTO: 478 K/UL — HIGH (ref 150–400)
POTASSIUM SERPL-MCNC: 3.9 MMOL/L — SIGNIFICANT CHANGE UP (ref 3.5–5.3)
POTASSIUM SERPL-SCNC: 3.9 MMOL/L — SIGNIFICANT CHANGE UP (ref 3.5–5.3)
PROT SERPL-MCNC: 7.1 G/DL — SIGNIFICANT CHANGE UP (ref 6.6–8.7)
PROT UR-MCNC: 100 MG/DL
RBC # BLD: 3.21 M/UL — LOW (ref 4.6–6.2)
RBC # FLD: 16.7 % — HIGH (ref 11–15.6)
RBC CASTS # UR COMP ASSIST: ABNORMAL /HPF (ref 0–4)
SODIUM SERPL-SCNC: 142 MMOL/L — SIGNIFICANT CHANGE UP (ref 135–145)
SP GR SPEC: 1.01 — SIGNIFICANT CHANGE UP (ref 1.01–1.02)
UROBILINOGEN FLD QL: NEGATIVE MG/DL — SIGNIFICANT CHANGE UP
WBC # BLD: 11.4 K/UL — HIGH (ref 4.8–10.8)
WBC # FLD AUTO: 11.4 K/UL — HIGH (ref 4.8–10.8)
WBC UR QL: SIGNIFICANT CHANGE UP

## 2017-05-28 PROCEDURE — 99291 CRITICAL CARE FIRST HOUR: CPT

## 2017-05-28 PROCEDURE — 70450 CT HEAD/BRAIN W/O DYE: CPT | Mod: 26

## 2017-05-28 RX ORDER — FUROSEMIDE 40 MG
20 TABLET ORAL ONCE
Qty: 0 | Refills: 0 | Status: COMPLETED | OUTPATIENT
Start: 2017-05-28 | End: 2017-05-28

## 2017-05-28 RX ORDER — FUROSEMIDE 40 MG
40 TABLET ORAL ONCE
Qty: 0 | Refills: 0 | Status: COMPLETED | OUTPATIENT
Start: 2017-05-28 | End: 2017-05-28

## 2017-05-28 RX ORDER — ACETAMINOPHEN 500 MG
650 TABLET ORAL ONCE
Qty: 0 | Refills: 0 | Status: COMPLETED | OUTPATIENT
Start: 2017-05-28 | End: 2017-05-28

## 2017-05-28 RX ORDER — ASPIRIN/CALCIUM CARB/MAGNESIUM 324 MG
325 TABLET ORAL DAILY
Qty: 0 | Refills: 0 | Status: DISCONTINUED | OUTPATIENT
Start: 2017-05-28 | End: 2017-05-31

## 2017-05-28 RX ORDER — ACETAZOLAMIDE 250 MG/1
500 TABLET ORAL EVERY 8 HOURS
Qty: 0 | Refills: 0 | Status: COMPLETED | OUTPATIENT
Start: 2017-05-28 | End: 2017-05-29

## 2017-05-28 RX ORDER — CHLORHEXIDINE GLUCONATE 213 G/1000ML
15 SOLUTION TOPICAL
Qty: 0 | Refills: 0 | Status: DISCONTINUED | OUTPATIENT
Start: 2017-05-28 | End: 2017-05-30

## 2017-05-28 RX ORDER — POTASSIUM CHLORIDE 20 MEQ
40 PACKET (EA) ORAL EVERY 4 HOURS
Qty: 0 | Refills: 0 | Status: COMPLETED | OUTPATIENT
Start: 2017-05-28 | End: 2017-05-28

## 2017-05-28 RX ORDER — POTASSIUM CHLORIDE 20 MEQ
40 PACKET (EA) ORAL EVERY 4 HOURS
Qty: 0 | Refills: 0 | Status: DISCONTINUED | OUTPATIENT
Start: 2017-05-28 | End: 2017-05-28

## 2017-05-28 RX ORDER — METOPROLOL TARTRATE 50 MG
25 TABLET ORAL EVERY 8 HOURS
Qty: 0 | Refills: 0 | Status: DISCONTINUED | OUTPATIENT
Start: 2017-05-28 | End: 2017-05-29

## 2017-05-28 RX ADMIN — Medication 125 MILLIGRAM(S): at 06:45

## 2017-05-28 RX ADMIN — Medication 100 MILLIGRAM(S): at 12:13

## 2017-05-28 RX ADMIN — Medication 25 MILLIGRAM(S): at 22:01

## 2017-05-28 RX ADMIN — Medication 3 MILLILITER(S): at 14:52

## 2017-05-28 RX ADMIN — Medication 6 MILLIGRAM(S): at 22:01

## 2017-05-28 RX ADMIN — Medication 0.12 MILLIGRAM(S): at 12:14

## 2017-05-28 RX ADMIN — HEPARIN SODIUM 5000 UNIT(S): 5000 INJECTION INTRAVENOUS; SUBCUTANEOUS at 05:50

## 2017-05-28 RX ADMIN — Medication 100 MILLIGRAM(S): at 06:45

## 2017-05-28 RX ADMIN — ACETAZOLAMIDE 105 MILLIGRAM(S): 250 TABLET ORAL at 06:32

## 2017-05-28 RX ADMIN — Medication 40 MILLIEQUIVALENT(S): at 02:02

## 2017-05-28 RX ADMIN — HEPARIN SODIUM 5000 UNIT(S): 5000 INJECTION INTRAVENOUS; SUBCUTANEOUS at 21:58

## 2017-05-28 RX ADMIN — Medication 1 MILLIGRAM(S): at 12:54

## 2017-05-28 RX ADMIN — PIPERACILLIN AND TAZOBACTAM 25 GRAM(S): 4; .5 INJECTION, POWDER, LYOPHILIZED, FOR SOLUTION INTRAVENOUS at 15:00

## 2017-05-28 RX ADMIN — Medication 3 MILLILITER(S): at 19:32

## 2017-05-28 RX ADMIN — Medication 25 MILLIGRAM(S): at 12:14

## 2017-05-28 RX ADMIN — Medication 105 MILLIGRAM(S): at 12:53

## 2017-05-28 RX ADMIN — CHLORHEXIDINE GLUCONATE 15 MILLILITER(S): 213 SOLUTION TOPICAL at 18:28

## 2017-05-28 RX ADMIN — Medication 650 MILLIGRAM(S): at 09:30

## 2017-05-28 RX ADMIN — Medication 40 MILLIGRAM(S): at 08:24

## 2017-05-28 RX ADMIN — Medication 2.5 MILLIGRAM(S): at 00:05

## 2017-05-28 RX ADMIN — ACETAZOLAMIDE 110 MILLIGRAM(S): 250 TABLET ORAL at 21:50

## 2017-05-28 RX ADMIN — Medication 125 MILLIGRAM(S): at 18:27

## 2017-05-28 RX ADMIN — Medication 650 MILLIGRAM(S): at 23:42

## 2017-05-28 RX ADMIN — PIPERACILLIN AND TAZOBACTAM 25 GRAM(S): 4; .5 INJECTION, POWDER, LYOPHILIZED, FOR SOLUTION INTRAVENOUS at 05:05

## 2017-05-28 RX ADMIN — HEPARIN SODIUM 5000 UNIT(S): 5000 INJECTION INTRAVENOUS; SUBCUTANEOUS at 13:00

## 2017-05-28 RX ADMIN — Medication 20 MILLIGRAM(S): at 00:47

## 2017-05-28 RX ADMIN — Medication 3 MILLILITER(S): at 02:43

## 2017-05-28 RX ADMIN — Medication 40 MILLIEQUIVALENT(S): at 06:44

## 2017-05-28 RX ADMIN — NYSTATIN CREAM 1 APPLICATION(S): 100000 CREAM TOPICAL at 06:37

## 2017-05-28 RX ADMIN — Medication 3 MILLILITER(S): at 07:51

## 2017-05-28 RX ADMIN — NYSTATIN CREAM 1 APPLICATION(S): 100000 CREAM TOPICAL at 18:28

## 2017-05-28 RX ADMIN — Medication 2.5 MILLIGRAM(S): at 05:50

## 2017-05-28 RX ADMIN — ACETAZOLAMIDE 110 MILLIGRAM(S): 250 TABLET ORAL at 12:49

## 2017-05-28 NOTE — CONSULT NOTE ADULT - SUBJECTIVE AND OBJECTIVE BOX
HPI: 50yo unknown handed [place] gentleman with a complicated medical history including a significant cardiac history.  He presented for a cholecystectomy, had a resultant bile leak.  He had intraoperatively had a cardiac arrest and an emergent exploratory laparotomy.  He has had a prolonged course in the hospital from the date of admission on 05/04/217.  He continued to be intubated in the ICU and is encephalopathic.  His mental status waxes and wanes per documentation.  Pt was extubated and was found to have aniscoria and seen by Opthalmology per Trauma team and now called for neurological evaluation to assess for cva.      PAST MEDICAL & SURGICAL HISTORY:  Mitral regurgitation: severe MR  Cardiomyopathy, nonischemic  CAD (coronary artery disease)  Asthma  Atrial fibrillation  Heart disease  S/P laparoscopic cholecystectomy  History of humerus fracture: Metal pins in Left Humerus  Artificial pacemaker    MEDICATIONS  (STANDING):  piperacillin/tazobactam IVPB. 3.375Gram(s) IV Intermittent every 8 hours  heparin  Injectable 5000Unit(s) SubCutaneous every 8 hours  nystatin Powder 1Application(s) Topical two times a day  digoxin  Injectable 0.125milliGRAM(s) IV Push daily  ALBUTerol/ipratropium for Nebulization 3milliLiter(s) Nebulizer every 6 hours  folic acid Injectable 1milliGRAM(s) IV Push daily  amantadine Syrup 100milliGRAM(s) Oral <User Schedule>  valproic  acid Syrup 125milliGRAM(s) Oral every 12 hours  melatonin 6milliGRAM(s) Oral at bedtime  chlorhexidine 0.12% Liquid 15milliLiter(s) Swish and Spit two times a day  acetazolamide  IVPB 500milliGRAM(s) IV Intermittent every 8 hours  metoprolol 25milliGRAM(s) Oral every 8 hours  aspirin 325milliGRAM(s) Oral daily    MEDICATIONS  (PRN):  ALBUTerol    0.083% 2.5milliGRAM(s) Nebulizer every 2 hours PRN Shortness of Breath and/or Wheezing    Allergies    No Known Allergies    Intolerances        FAMILY HISTORY:  No pertinent family history in first degree relatives          SOCIAL HISTORY:  Denies toxic habits;     REVIEW OF SYSTEMS:    As noted in the HPI.    VITAL SIGNS:  Vital Signs Last 24 Hrs  T(C): 37.6, Max: 38.6 (05-28 @ 09:00)  T(F): 99.7, Max: 101.5 (05-28 @ 09:00)  HR: 102 (91 - 146)  BP: 125/94 (101/74 - 166/74)  BP(mean): 106 (84 - 111)  RR: 25 (20 - 44)  SpO2: 99% (90% - 100%)    PHYSICAL EXAMINATION:  General: Well-developed, well nourished, lethargic.  Eyes: Conjunctiva and sclera clear. Fundoscopic examination was deferred.  Neck: Supple.  Cardiac: +S1 & S2.  Chest: Rhonchi b/l.    Musculoskeletal: No tenderness on palpation of spine.  No Brudzinski/Kernig's sign.    Neurologic:  - Mental Status: Awake; follows minimally with R hand squeeze; tracks visually; non verbal.  Cranial Nerves II-XII:  Pupil R 4mm reactive, L pupil 7mm; no facial; + corneal; not cooperative for [facility]; + cough to suction; + dolls;   - Motor:  Moves all 4 ext spontaneously.  - Reflexes:  2+ and symmetric throughout.  Plantars withdrawal.  - Sensory:  Withdraws to noxious.  - Coordination:  Pt unable.  - Gait: Deferred.      LABS:                          9.3    11.4  )-----------( 478      ( 28 May 2017 05:16 )             30.5     28 May 2017 05:16    142    |  97     |  23.0   ----------------------------<  131    3.9     |  32.0   |  0.95     Ca    8.9        28 May 2017 05:16  Phos  3.5       28 May 2017 05:16  Mg     2.0       28 May 2017 05:16    TPro  7.1    /  Alb  3.0    /  TBili  0.8    /  DBili  x      /  AST  46     /  ALT  60     /  AlkPhos  116    28 May 2017 05:16    LIVER FUNCTIONS - ( 28 May 2017 05:16 )  Alb: 3.0 g/dL / Pro: 7.1 g/dL / ALK PHOS: 116 U/L / ALT: 60 U/L / AST: 46 U/L / GGT: x             RADIOLOGY & ADDITIONAL STUDIES:      CT head pending    IMPRESSION:  Encephalopathy with concerns for Anoxic brain injury; Anisocoria to assess for aneurysm.

## 2017-05-28 NOTE — PROGRESS NOTE ADULT - SUBJECTIVE AND OBJECTIVE BOX
INTERVAL HPI/OVERNIGHT EVENTS/SUBJECTIVE:  Pt with increased HCO3 during the day, given diamox x4 doses with good diuresis.  Over night, pt had pursed lip breathing with increased work of breathing, Lasix given.  ABG good post diuresis.      ICU Vital Signs Last 24 Hrs  T(C): 37.4, Max: 37.7 (05-27 @ 10:00)  T(F): 99.3, Max: 99.8 (05-27 @ 10:00)  HR: 123 (84 - 125)  BP: 102/76 (99/70 - 166/74)  BP(mean): 85 (81 - 107)  ABP: --  ABP(mean): --  RR: 25 (21 - 32)  SpO2: 92% (92% - 100%)      I&O's Detail  I & Os for 24h ending 27 May 2017 07:00  =============================================  IN:    Pivot: 585 ml    Solution: 400 ml    Solution: 325 ml    dexmedetomidine Infusion: 131.4 ml    Solution: 103 ml    Solution: 50 ml    Total IN: 1594.4 ml  ---------------------------------------------  OUT:    Indwelling Catheter - Urethral: 3435 ml    Drain: 60 ml    Total OUT: 3495 ml  ---------------------------------------------  Total NET: -1900.6 ml    I & Os for current day (as of 28 May 2017 05:03)  =============================================  IN:    Pivot: 1020 ml    Enteral Tube Flush: 580 ml    Solution: 250 ml    Solution: 200 ml    Solution: 150 ml    Solution: 100 ml    Solution: 100 ml    Solution: 50 ml    dexmedetomidine Infusion: 23.4 ml    Total IN: 2473.4 ml  ---------------------------------------------  OUT:    Indwelling Catheter - Urethral: 2495 ml    Total OUT: 2495 ml  ---------------------------------------------  Total NET: -21.6 ml        ABG - ( 28 May 2017 04:34 )  pH: 7.48  /  pCO2: 45    /  pO2: 96    / HCO3: 32    / Base Excess: 8.3   /  SaO2: 99                  MEDICATIONS  (STANDING):  piperacillin/tazobactam IVPB. 3.375Gram(s) IV Intermittent every 8 hours  heparin  Injectable 5000Unit(s) SubCutaneous every 8 hours  nystatin Powder 1Application(s) Topical two times a day  digoxin  Injectable 0.125milliGRAM(s) IV Push daily  metoprolol Injectable 2.5milliGRAM(s) IV Push every 6 hours  ALBUTerol/ipratropium for Nebulization 3milliLiter(s) Nebulizer every 6 hours  thiamine IVPB 500milliGRAM(s) IV Intermittent daily  folic acid Injectable 1milliGRAM(s) IV Push daily  amantadine Syrup 100milliGRAM(s) Oral <User Schedule>  valproic  acid Syrup 125milliGRAM(s) Oral every 12 hours  acetazolamide  IVPB 250milliGRAM(s) IV Intermittent every 6 hours  melatonin 6milliGRAM(s) Oral at bedtime  potassium chloride   Powder 40milliEquivalent(s) Oral every 4 hours    MEDICATIONS  (PRN):  ALBUTerol    0.083% 2.5milliGRAM(s) Nebulizer every 2 hours PRN Shortness of Breath and/or Wheezing      NUTRITION/IVF: Pivot @ 51cc/hr/IVL    CENTRAL LINE:  No    MONTOYA:   Yes    A-LINE:    No    NG/OG TUBE: Doboff    PHYSICAL EXAM:    Gen:  NAD, awake    Eyes:  EOMI's BL    Neurological:  Alert, sporadically follows commands, no speech, moves all extremities    ENMT:   MMM    Neck:  Supple, no JVD    Pulmonary:  wheezes BL bases    Cardiovascular:  Afib    Gastrointestinal:  Softly distended, wound vac in place    Genitourinary:  montoya    Extremities:  + anasarca, improving    Skin:  C/D/I, midline wound w/ healthy granulation tissue    Musculoskeletal:  moves all extremities against gravity          LABS:  CBC Full  -  ( 27 May 2017 05:00 )  WBC Count : 9.3 K/uL  Hemoglobin : 9.2 g/dL  Hematocrit : 30.3 %  Platelet Count - Automated : 438 K/uL  Mean Cell Volume : 95.6 fl  Mean Cell Hemoglobin : 29.0 pg  Mean Cell Hemoglobin Concentration : 30.4 g/dL  Auto Neutrophil # : 7.0 K/uL  Auto Lymphocyte # : 1.5 K/uL  Auto Monocyte # : 0.7 K/uL  Auto Eosinophil # : 0.1 K/uL  Auto Basophil # : 0.0 K/uL  Auto Neutrophil % : 74.7 %  Auto Lymphocyte % : 16.0 %  Auto Monocyte % : 7.2 %  Auto Eosinophil % : 1.4 %  Auto Basophil % : 0.2 %    05-27    143  |  93<L>  |  19.0  ----------------------------<  129<H>  3.2<L>   |  37.0<H>  |  0.97    Ca    8.7      27 May 2017 05:00  Phos  4.0     05-27  Mg     1.8     05-27          RECENT CULTURES:  05-23 .Abscess gallbladder fossa Enterococcus raffinosus   Few White blood cells  Numerous Gram Positive Cocci in Pairs and Chains  Few Gram Positive Rods  Few Gram Positive Cocci in Clusters   Moderate Enterococcus raffinosus  Culture in progress    05-21 .Blood Blood-Peripheral XXXX XXXX   No growth at 5 days.              CAPILLARY BLOOD GLUCOSE      RADIOLOGY & ADDITIONAL STUDIES:    ASSESSMENT/PLAN:  51yMale presenting with:  Cardiac Arrest, Acute on Chronic CHF, Septic shock-resolved, Cardiogenic shock-resolved, Lower GIB, bile leak, tongue laceration    Neuro:  Continue to keep off sedation, Neurostim recs per BIU, melatonin started    CV:  Afib, paroxysmal    Pulm:  Remains on NC, diuresed overnight    GI/Nutrition:  Pivot @ 512 goal    /Renal:  Montoya for urinary retention    ID:  None, Afebrile    Lines/Tubes:  Montoya    Endo:  No iddues    Skin:  Wound vac due to be changed 5/30    Proph:  Hep gtt    Dispo:      CRITICAL CARE TIME SPENT:

## 2017-05-28 NOTE — PROGRESS NOTE ADULT - SUBJECTIVE AND OBJECTIVE BOX
INTERVAL HPI/OVERNIGHT EVENTS/SUBJECTIVE:  Did well overnight.  Increased WOB this AM.      ICU Vital Signs Last 24 Hrs  T(C): 38.5, Max: 38.6 (05-28 @ 09:00)  T(F): 101.3, Max: 101.5 (05-28 @ 09:00)  HR: 141 (84 - 146)  BP: 129/102 (99/70 - 166/74)  BP(mean): 111 (81 - 111)  ABP: --  ABP(mean): --  RR: 32 (22 - 44)  SpO2: 99% (90% - 100%)      I&O's Detail  I & Os for 24h ending 28 May 2017 07:00  =============================================  IN:    Pivot: 1224 ml    Enteral Tube Flush: 700 ml    Solution: 300 ml    Solution: 200 ml    Solution: 150 ml    Solution: 100 ml    Solution: 100 ml    Solution: 50 ml    dexmedetomidine Infusion: 23.4 ml    Total IN: 2847.4 ml  ---------------------------------------------  OUT:    Indwelling Catheter - Urethral: 2895 ml    Total OUT: 2895 ml  ---------------------------------------------  Total NET: -47.6 ml    I & Os for current day (as of 28 May 2017 11:08)  =============================================  IN:    Pivot: 204 ml    Enteral Tube Flush: 150 ml    Solution: 50 ml    Total IN: 404 ml  ---------------------------------------------  OUT:    Indwelling Catheter - Urethral: 900 ml    Total OUT: 900 ml  ---------------------------------------------  Total NET: -496 ml        ABG - ( 28 May 2017 04:34 )  pH: 7.48  /  pCO2: 45    /  pO2: 96    / HCO3: 32    / Base Excess: 8.3   /  SaO2: 99                  MEDICATIONS  (STANDING):  piperacillin/tazobactam IVPB. 3.375Gram(s) IV Intermittent every 8 hours  heparin  Injectable 5000Unit(s) SubCutaneous every 8 hours  nystatin Powder 1Application(s) Topical two times a day  digoxin  Injectable 0.125milliGRAM(s) IV Push daily  metoprolol Injectable 2.5milliGRAM(s) IV Push every 6 hours  ALBUTerol/ipratropium for Nebulization 3milliLiter(s) Nebulizer every 6 hours  thiamine IVPB 500milliGRAM(s) IV Intermittent daily  folic acid Injectable 1milliGRAM(s) IV Push daily  amantadine Syrup 100milliGRAM(s) Oral <User Schedule>  valproic  acid Syrup 125milliGRAM(s) Oral every 12 hours  melatonin 6milliGRAM(s) Oral at bedtime  chlorhexidine 0.12% Liquid 15milliLiter(s) Swish and Spit two times a day  acetazolamide  IVPB 500milliGRAM(s) IV Intermittent every 8 hours    MEDICATIONS  (PRN):  ALBUTerol    0.083% 2.5milliGRAM(s) Nebulizer every 2 hours PRN Shortness of Breath and/or Wheezing      NUTRITION/IVF:  Tube feeds    CENTRAL LINE: N     MONTOYA:  Y     A-LINE:  N         PHYSICAL EXAM:    Gen:  Mild acute distress    Eyes: L pupil 8mm non reactive.  R pupil 3mm and briskly reactive to direct and consensual light.      Neurological:  Awake and alert.  Not following commands    ENMT: tongue lac    Pulmonary:  Tachypneic and mildly labored.      Cardiovascular:  Rapid and irregular.      Gastrointestinal: Soft NT    Genitourinary: Montoya    Extremities:  Improving edema    Skin: Diaphoretic.                LABS:  CBC Full  -  ( 28 May 2017 05:16 )  WBC Count : 11.4 K/uL  Hemoglobin : 9.3 g/dL  Hematocrit : 30.5 %  Platelet Count - Automated : 478 K/uL  Mean Cell Volume : 95.0 fl  Mean Cell Hemoglobin : 29.0 pg  Mean Cell Hemoglobin Concentration : 30.5 g/dL  Auto Neutrophil # : 9.0 K/uL  Auto Lymphocyte # : 1.4 K/uL  Auto Monocyte # : 0.7 K/uL  Auto Eosinophil # : 0.2 K/uL  Auto Basophil # : 0.0 K/uL  Auto Neutrophil % : 79.1 %  Auto Lymphocyte % : 12.2 %  Auto Monocyte % : 6.5 %  Auto Eosinophil % : 1.7 %  Auto Basophil % : 0.1 %    05-28    142  |  97<L>  |  23.0<H>  ----------------------------<  131<H>  3.9   |  32.0<H>  |  0.95    Ca    8.9      28 May 2017 05:16  Phos  3.5     05-28  Mg     2.0     05-28    TPro  7.1  /  Alb  3.0<L>  /  TBili  0.8  /  DBili  x   /  AST  46<H>  /  ALT  60<H>  /  AlkPhos  116  05-28        RECENT CULTURES:  05-23 .Abscess gallbladder fossa Enterococcus raffinosus   Few White blood cells  Numerous Gram Positive Cocci in Pairs and Chains  Few Gram Positive Rods  Few Gram Positive Cocci in Clusters   Moderate Enterococcus raffinosus  Culture in progress        LIVER FUNCTIONS - ( 28 May 2017 05:16 )  Alb: 3.0 g/dL / Pro: 7.1 g/dL / ALK PHOS: 116 U/L / ALT: 60 U/L / AST: 46 U/L / GGT: x                 ASSESSMENT/PLAN:  51yMale presenting with:    Neuro:  Doubt herniation given awake.  Possible seizure focal cva, not tPa candidate given recent GI bleed. occular problem.  Will CT head.  Check eye pressures.     CV:  Rapid a-fib.  Increase lopressor.      Pulm:  Respiratory distress.  Lasix for pulm edema.      GI/Nutrition:  Tube feeds.      /Renal:  Cont montoya for retention and strict I/Os    ID: Zosyn for enterococcus abscess in gall bladder     Proph: heparin.  Fully anticoagulate for a-fib monday if no further bleeding.      Dispo:  ICU      CRITICAL CARE TIME SPENT:  40 min

## 2017-05-29 LAB
ANION GAP SERPL CALC-SCNC: 14 MMOL/L — SIGNIFICANT CHANGE UP (ref 5–17)
APTT BLD: 146.8 SEC — CRITICAL HIGH (ref 27.5–37.4)
BUN SERPL-MCNC: 28 MG/DL — HIGH (ref 8–20)
CALCIUM SERPL-MCNC: 8.7 MG/DL — SIGNIFICANT CHANGE UP (ref 8.6–10.2)
CHLORIDE SERPL-SCNC: 97 MMOL/L — LOW (ref 98–107)
CO2 SERPL-SCNC: 28 MMOL/L — SIGNIFICANT CHANGE UP (ref 22–29)
CREAT SERPL-MCNC: 1.02 MG/DL — SIGNIFICANT CHANGE UP (ref 0.5–1.3)
CULTURE RESULTS: SIGNIFICANT CHANGE UP
GLUCOSE SERPL-MCNC: 115 MG/DL — SIGNIFICANT CHANGE UP (ref 70–115)
HCT VFR BLD CALC: 31 % — LOW (ref 42–52)
HCT VFR BLD CALC: 31.2 % — LOW (ref 42–52)
HGB BLD-MCNC: 9.4 G/DL — LOW (ref 14–18)
HGB BLD-MCNC: 9.5 G/DL — LOW (ref 14–18)
MAGNESIUM SERPL-MCNC: 2.1 MG/DL — SIGNIFICANT CHANGE UP (ref 1.6–2.6)
MCHC RBC-ENTMCNC: 28.4 PG — SIGNIFICANT CHANGE UP (ref 27–31)
MCHC RBC-ENTMCNC: 29.1 PG — SIGNIFICANT CHANGE UP (ref 27–31)
MCHC RBC-ENTMCNC: 30.1 G/DL — LOW (ref 32–36)
MCHC RBC-ENTMCNC: 30.6 G/DL — LOW (ref 32–36)
MCV RBC AUTO: 94.3 FL — HIGH (ref 80–94)
MCV RBC AUTO: 95.1 FL — HIGH (ref 80–94)
ORGANISM # SPEC MICROSCOPIC CNT: SIGNIFICANT CHANGE UP
ORGANISM # SPEC MICROSCOPIC CNT: SIGNIFICANT CHANGE UP
PHOSPHATE SERPL-MCNC: 4.4 MG/DL — SIGNIFICANT CHANGE UP (ref 2.4–4.7)
PLATELET # BLD AUTO: 529 K/UL — HIGH (ref 150–400)
PLATELET # BLD AUTO: 564 K/UL — HIGH (ref 150–400)
POTASSIUM SERPL-MCNC: 4.2 MMOL/L — SIGNIFICANT CHANGE UP (ref 3.5–5.3)
POTASSIUM SERPL-SCNC: 4.2 MMOL/L — SIGNIFICANT CHANGE UP (ref 3.5–5.3)
RBC # BLD: 3.26 M/UL — LOW (ref 4.6–6.2)
RBC # BLD: 3.31 M/UL — LOW (ref 4.6–6.2)
RBC # FLD: 17 % — HIGH (ref 11–15.6)
RBC # FLD: 17 % — HIGH (ref 11–15.6)
SODIUM SERPL-SCNC: 139 MMOL/L — SIGNIFICANT CHANGE UP (ref 135–145)
SPECIMEN SOURCE: SIGNIFICANT CHANGE UP
WBC # BLD: 10.7 K/UL — SIGNIFICANT CHANGE UP (ref 4.8–10.8)
WBC # BLD: 11.5 K/UL — HIGH (ref 4.8–10.8)
WBC # FLD AUTO: 10.7 K/UL — SIGNIFICANT CHANGE UP (ref 4.8–10.8)
WBC # FLD AUTO: 11.5 K/UL — HIGH (ref 4.8–10.8)

## 2017-05-29 PROCEDURE — 71010: CPT | Mod: 26,76

## 2017-05-29 PROCEDURE — 99291 CRITICAL CARE FIRST HOUR: CPT

## 2017-05-29 RX ORDER — FUROSEMIDE 40 MG
40 TABLET ORAL DAILY
Qty: 0 | Refills: 0 | Status: DISCONTINUED | OUTPATIENT
Start: 2017-05-29 | End: 2017-05-30

## 2017-05-29 RX ORDER — METOPROLOL TARTRATE 50 MG
5 TABLET ORAL ONCE
Qty: 0 | Refills: 0 | Status: COMPLETED | OUTPATIENT
Start: 2017-05-29 | End: 2017-05-29

## 2017-05-29 RX ORDER — HEPARIN SODIUM 5000 [USP'U]/ML
3500 INJECTION INTRAVENOUS; SUBCUTANEOUS EVERY 6 HOURS
Qty: 0 | Refills: 0 | Status: DISCONTINUED | OUTPATIENT
Start: 2017-05-29 | End: 2017-05-31

## 2017-05-29 RX ORDER — HEPARIN SODIUM 5000 [USP'U]/ML
7500 INJECTION INTRAVENOUS; SUBCUTANEOUS EVERY 6 HOURS
Qty: 0 | Refills: 0 | Status: DISCONTINUED | OUTPATIENT
Start: 2017-05-29 | End: 2017-05-31

## 2017-05-29 RX ORDER — HEPARIN SODIUM 5000 [USP'U]/ML
7500 INJECTION INTRAVENOUS; SUBCUTANEOUS ONCE
Qty: 0 | Refills: 0 | Status: COMPLETED | OUTPATIENT
Start: 2017-05-29 | End: 2017-05-29

## 2017-05-29 RX ORDER — HEPARIN SODIUM 5000 [USP'U]/ML
INJECTION INTRAVENOUS; SUBCUTANEOUS
Qty: 25000 | Refills: 0 | Status: DISCONTINUED | OUTPATIENT
Start: 2017-05-29 | End: 2017-05-30

## 2017-05-29 RX ORDER — ACETAMINOPHEN 500 MG
1000 TABLET ORAL ONCE
Qty: 0 | Refills: 0 | Status: COMPLETED | OUTPATIENT
Start: 2017-05-29 | End: 2017-05-29

## 2017-05-29 RX ORDER — PANTOPRAZOLE SODIUM 20 MG/1
40 TABLET, DELAYED RELEASE ORAL DAILY
Qty: 0 | Refills: 0 | Status: DISCONTINUED | OUTPATIENT
Start: 2017-05-29 | End: 2017-05-31

## 2017-05-29 RX ORDER — METOPROLOL TARTRATE 50 MG
50 TABLET ORAL EVERY 8 HOURS
Qty: 0 | Refills: 0 | Status: DISCONTINUED | OUTPATIENT
Start: 2017-05-29 | End: 2017-06-03

## 2017-05-29 RX ADMIN — Medication 3 MILLILITER(S): at 20:45

## 2017-05-29 RX ADMIN — Medication 50 MILLIGRAM(S): at 15:31

## 2017-05-29 RX ADMIN — PANTOPRAZOLE SODIUM 40 MILLIGRAM(S): 20 TABLET, DELAYED RELEASE ORAL at 17:56

## 2017-05-29 RX ADMIN — CHLORHEXIDINE GLUCONATE 15 MILLILITER(S): 213 SOLUTION TOPICAL at 05:56

## 2017-05-29 RX ADMIN — Medication 100 MILLIGRAM(S): at 06:02

## 2017-05-29 RX ADMIN — Medication 40 MILLIGRAM(S): at 12:43

## 2017-05-29 RX ADMIN — Medication 0.12 MILLIGRAM(S): at 12:43

## 2017-05-29 RX ADMIN — HEPARIN SODIUM 5000 UNIT(S): 5000 INJECTION INTRAVENOUS; SUBCUTANEOUS at 05:49

## 2017-05-29 RX ADMIN — CHLORHEXIDINE GLUCONATE 15 MILLILITER(S): 213 SOLUTION TOPICAL at 17:56

## 2017-05-29 RX ADMIN — Medication 125 MILLIGRAM(S): at 06:01

## 2017-05-29 RX ADMIN — ACETAZOLAMIDE 110 MILLIGRAM(S): 250 TABLET ORAL at 05:43

## 2017-05-29 RX ADMIN — Medication 100 MILLIGRAM(S): at 12:42

## 2017-05-29 RX ADMIN — NYSTATIN CREAM 1 APPLICATION(S): 100000 CREAM TOPICAL at 17:56

## 2017-05-29 RX ADMIN — Medication 25 MILLIGRAM(S): at 06:01

## 2017-05-29 RX ADMIN — Medication 6 MILLIGRAM(S): at 22:00

## 2017-05-29 RX ADMIN — Medication 3 MILLILITER(S): at 08:08

## 2017-05-29 RX ADMIN — Medication 325 MILLIGRAM(S): at 12:43

## 2017-05-29 RX ADMIN — Medication 100 MILLIGRAM(S): at 12:48

## 2017-05-29 RX ADMIN — Medication 3 MILLILITER(S): at 14:18

## 2017-05-29 RX ADMIN — NYSTATIN CREAM 1 APPLICATION(S): 100000 CREAM TOPICAL at 05:50

## 2017-05-29 RX ADMIN — Medication 5 MILLIGRAM(S): at 16:00

## 2017-05-29 RX ADMIN — Medication 3 MILLILITER(S): at 02:59

## 2017-05-29 RX ADMIN — Medication 50 MILLIGRAM(S): at 22:00

## 2017-05-29 RX ADMIN — Medication 1 MILLIGRAM(S): at 12:43

## 2017-05-29 RX ADMIN — Medication 400 MILLIGRAM(S): at 20:29

## 2017-05-29 RX ADMIN — HEPARIN SODIUM 7500 UNIT(S): 5000 INJECTION INTRAVENOUS; SUBCUTANEOUS at 10:59

## 2017-05-29 RX ADMIN — HEPARIN SODIUM 1700 UNIT(S)/HR: 5000 INJECTION INTRAVENOUS; SUBCUTANEOUS at 10:59

## 2017-05-29 NOTE — PROGRESS NOTE ADULT - SUBJECTIVE AND OBJECTIVE BOX
INTERVAL HPI/OVERNIGHT EVENTS/SUBJECTIVE:  CT normal yesterday.  Ophthalmogy eval yesterday with normal eye pressure and fundoscopic exam.      ICU Vital Signs Last 24 Hrs  T(C): 38, Max: 38.3 (05-29 @ 00:00)  T(F): 100.4, Max: 100.9 (05-29 @ 00:00)  HR: 109 (91 - 122)  BP: 104/84 (103/75 - 127/88)  BP(mean): 90 (80 - 106)  ABP: --  ABP(mean): --  RR: 26 (19 - 29)  SpO2: 100% (96% - 100%)      I&O's Detail  I & Os for 24h ending 29 May 2017 07:00  =============================================  IN:    Enteral Tube Flush: 640 ml    Pivot: 204 ml    Solution: 150 ml    Solution: 125 ml    Solution: 100 ml    Total IN: 1219 ml  ---------------------------------------------  OUT:    Indwelling Catheter - Urethral: 1905 ml    Drain: 55 ml    VAC (Vacuum Assisted Closure) System: 50 ml    Total OUT: 2010 ml  ---------------------------------------------  Total NET: -791 ml    I & Os for current day (as of 29 May 2017 10:24)  =============================================  IN:    Total IN: 0 ml  ---------------------------------------------  OUT:    Indwelling Catheter - Urethral: 175 ml    Total OUT: 175 ml  ---------------------------------------------  Total NET: -175 ml            MEDICATIONS  (STANDING):  heparin  Injectable 5000Unit(s) SubCutaneous every 8 hours  nystatin Powder 1Application(s) Topical two times a day  digoxin  Injectable 0.125milliGRAM(s) IV Push daily  ALBUTerol/ipratropium for Nebulization 3milliLiter(s) Nebulizer every 6 hours  folic acid Injectable 1milliGRAM(s) IV Push daily  thiamine 100milliGRAM(s) Oral daily  amantadine Syrup 100milliGRAM(s) Oral <User Schedule>  valproic  acid Syrup 125milliGRAM(s) Oral every 12 hours  melatonin 6milliGRAM(s) Oral at bedtime  chlorhexidine 0.12% Liquid 15milliLiter(s) Swish and Spit two times a day  metoprolol 25milliGRAM(s) Oral every 8 hours  aspirin 325milliGRAM(s) Oral daily    MEDICATIONS  (PRN):  ALBUTerol    0.083% 2.5milliGRAM(s) Nebulizer every 2 hours PRN Shortness of Breath and/or Wheezing      NUTRITION/IVF: Tube feeds    ARMAS:  Y DATE INSERTED:      PHYSICAL EXAM:    Gen:  NAD     Eyes: Left pupil 5mm briskly reactive.  Right pupil 4mm briskly reactive.      Neurological:  Increased interaction today.  Intermittently following simple commands again.      Pulmonary: Non labored    Cardiovascular: Irregular, slightly tachy     Gastrointestinal: Soft NT    Genitourinary: Armas    Extremities:  Moderate anasarca.            LABS:  CBC Full  -  ( 29 May 2017 04:45 )  WBC Count : 10.7 K/uL  Hemoglobin : 9.5 g/dL  Hematocrit : 31.0 %  Platelet Count - Automated : 529 K/uL  Mean Cell Volume : 95.1 fl  Mean Cell Hemoglobin : 29.1 pg  Mean Cell Hemoglobin Concentration : 30.6 g/dL  Auto Neutrophil # : x  Auto Lymphocyte # : x  Auto Monocyte # : x  Auto Eosinophil # : x  Auto Basophil # : x  Auto Neutrophil % : x  Auto Lymphocyte % : x  Auto Monocyte % : x  Auto Eosinophil % : x  Auto Basophil % : x    05-29    139  |  97<L>  |  28.0<H>  ----------------------------<  115  4.2   |  28.0  |  1.02    Ca    8.7      29 May 2017 04:45  Phos  4.4     05-29  Mg     2.1     05-29            RECENT CULTURES:  05-23 .Abscess gallbladder fossa Enterococcus raffinosus   Few White blood cells  Numerous Gram Positive Cocci in Pairs and Chains  Few Gram Positive Rods  Few Gram Positive Cocci in Clusters   Moderate Enterococcus raffinosus  Culture in progress            ASSESSMENT/PLAN:  51yMale presenting with:    Neuro: Encephalopathy: Likely anoxic in nature. Blown pupil resolved.  Neuro better today.  Possible TIA/thromboembolic event possible.  MRI when safe to obtain.  Consider partial complex seizures.  EEG.      CV:  RApid a-fib last night.  Mildly elevated today.  Increase B blocker.  Restart home PO lasix.  Restart heparin drip for A-fib.      Pulm: Improved resp state today.  Continue aggressive respiratory care, chest PT and NT suction.      GI/Nutrition:  Tube feeds.  Monitor for rebleeding.      /Renal: Armas for urinary retention.      ID: Abx course completed for gall bladder fossa abscess.      Proph:  DVT.  GI also indicated with anticoagulation.    Dispo:  ICU       CRITICAL CARE TIME SPENT: 39 min

## 2017-05-29 NOTE — PROGRESS NOTE ADULT - SUBJECTIVE AND OBJECTIVE BOX
Interval History: No new events.  Patient now extubated    MEDICATIONS  (STANDING):  nystatin Powder 1Application(s) Topical two times a day  digoxin  Injectable 0.125milliGRAM(s) IV Push daily  ALBUTerol/ipratropium for Nebulization 3milliLiter(s) Nebulizer every 6 hours  folic acid Injectable 1milliGRAM(s) IV Push daily  thiamine 100milliGRAM(s) Oral daily  amantadine Syrup 100milliGRAM(s) Oral <User Schedule>  valproic  acid Syrup 125milliGRAM(s) Oral every 12 hours  melatonin 6milliGRAM(s) Oral at bedtime  chlorhexidine 0.12% Liquid 15milliLiter(s) Swish and Spit two times a day  aspirin 325milliGRAM(s) Oral daily  furosemide    Tablet 40milliGRAM(s) Oral daily  metoprolol 50milliGRAM(s) Oral every 8 hours  heparin  Infusion. Unit(s)/Hr IV Continuous <Continuous>    MEDICATIONS  (PRN):  ALBUTerol    0.083% 2.5milliGRAM(s) Nebulizer every 2 hours PRN Shortness of Breath and/or Wheezing  heparin  Injectable 7500Unit(s) IV Push every 6 hours PRN For aPTT less than 40  heparin  Injectable 3500Unit(s) IV Push every 6 hours PRN For aPTT between 40 - 57      Allergies    No Known Allergies    Intolerances        PHYSICAL EXAM:  Vital Signs Last 24 Hrs  T(F): 100.4  HR: 109  BP: 104/84  RR: 26  Wt(kg): --    GENERAL: NAD, well-groomed, well-developed  HEAD:  Atraumatic, Normocephalic  Neuro:  Awake but sluggish. Looking at family members. Not verbalizing. Not following commands  CN: L pupil about 1-2 mm larger than right. Both pupils react to light.   motor: normal tone. Unable to understand confrontation testing but appears to move both arms.  sensory: withdraws to noxious stimuli  DTRs: symmetric    LABS:                        9.5    10.7  )-----------( 529      ( 29 May 2017 04:45 )             31.0     05-29    139  |  97<L>  |  28.0<H>  ----------------------------<  115  4.2   |  28.0  |  1.02    Ca    8.7      29 May 2017 04:45  Phos  4.4       Mg     2.1         TPro  7.1  /  Alb  3.0<L>  /  TBili  0.8  /  DBili  x   /  AST  46<H>  /  ALT  60<H>  /  AlkPhos  116  -      Urinalysis Basic - ( 28 May 2017 20:02 )    Color: Yellow / Appearance: Clear / S.010 / pH: x  Gluc: x / Ketone: Negative  / Bili: Negative / Urobili: Negative mg/dL   Blood: x / Protein: 100 mg/dL / Nitrite: Negative   Leuk Esterase: Negative / RBC: 25-50 /HPF / WBC 3-5   Sq Epi: x / Non Sq Epi: x / Bacteria: Occasional        RADIOLOGY & ADDITIONAL STUDIES:  non-contrast head CT 17: no focal pathology      :

## 2017-05-29 NOTE — PROGRESS NOTE ADULT - ASSESSMENT
52 yo man initially admitted with cholecystitis. He had a complicated hospital course including cardiac arrest. Found to have anisocoria.

## 2017-05-29 NOTE — PROGRESS NOTE ADULT - PROBLEM SELECTOR PLAN 1
Patient continues to have anisocoria but both pupils react to light.  Underlying cause is unclear.  Awaiting MRI to r/o infarct/mass lesion.

## 2017-05-30 LAB
AMMONIA BLD-MCNC: 32 UMOL/L — SIGNIFICANT CHANGE UP (ref 11–55)
ANION GAP SERPL CALC-SCNC: 17 MMOL/L — SIGNIFICANT CHANGE UP (ref 5–17)
ANION GAP SERPL CALC-SCNC: 19 MMOL/L — HIGH (ref 5–17)
ANISOCYTOSIS BLD QL: SIGNIFICANT CHANGE UP
APTT BLD: 53.5 SEC — HIGH (ref 27.5–37.4)
APTT BLD: 62.4 SEC — HIGH (ref 27.5–37.4)
APTT BLD: 84.1 SEC — HIGH (ref 27.5–37.4)
APTT BLD: 95.3 SEC — HIGH (ref 27.5–37.4)
BASOPHILS # BLD AUTO: 0 K/UL — SIGNIFICANT CHANGE UP (ref 0–0.2)
BASOPHILS NFR BLD AUTO: 0.3 % — SIGNIFICANT CHANGE UP (ref 0–2)
BUN SERPL-MCNC: 49 MG/DL — HIGH (ref 8–20)
BUN SERPL-MCNC: 58 MG/DL — HIGH (ref 8–20)
CALCIUM SERPL-MCNC: 8.3 MG/DL — LOW (ref 8.6–10.2)
CALCIUM SERPL-MCNC: 8.8 MG/DL — SIGNIFICANT CHANGE UP (ref 8.6–10.2)
CHLORIDE SERPL-SCNC: 100 MMOL/L — SIGNIFICANT CHANGE UP (ref 98–107)
CHLORIDE SERPL-SCNC: 101 MMOL/L — SIGNIFICANT CHANGE UP (ref 98–107)
CO2 SERPL-SCNC: 23 MMOL/L — SIGNIFICANT CHANGE UP (ref 22–29)
CO2 SERPL-SCNC: 25 MMOL/L — SIGNIFICANT CHANGE UP (ref 22–29)
CREAT SERPL-MCNC: 1.15 MG/DL — SIGNIFICANT CHANGE UP (ref 0.5–1.3)
CREAT SERPL-MCNC: 1.27 MG/DL — SIGNIFICANT CHANGE UP (ref 0.5–1.3)
DIGOXIN SERPL-MCNC: 0.6 NG/ML — LOW (ref 0.8–2)
EOSINOPHIL # BLD AUTO: 0 K/UL — SIGNIFICANT CHANGE UP (ref 0–0.5)
EOSINOPHIL NFR BLD AUTO: 0.4 % — SIGNIFICANT CHANGE UP (ref 0–5)
EOSINOPHIL NFR BLD AUTO: 1 % — SIGNIFICANT CHANGE UP (ref 0–6)
GAS PNL BLDA: SIGNIFICANT CHANGE UP
GLUCOSE SERPL-MCNC: 153 MG/DL — HIGH (ref 70–115)
GLUCOSE SERPL-MCNC: 169 MG/DL — HIGH (ref 70–115)
HCT VFR BLD CALC: 26.6 % — LOW (ref 42–52)
HCT VFR BLD CALC: 29.2 % — LOW (ref 42–52)
HCT VFR BLD CALC: 29.8 % — LOW (ref 42–52)
HGB BLD-MCNC: 8.1 G/DL — LOW (ref 14–18)
HGB BLD-MCNC: 9 G/DL — LOW (ref 14–18)
HGB BLD-MCNC: 9 G/DL — LOW (ref 14–18)
HYPOCHROMIA BLD QL: SIGNIFICANT CHANGE UP
INR BLD: 1.52 RATIO — HIGH (ref 0.88–1.16)
INR BLD: 1.53 RATIO — HIGH (ref 0.88–1.16)
LACTATE SERPL-SCNC: 2.8 MMOL/L — HIGH (ref 0.5–2)
LYMPHOCYTES # BLD AUTO: 17 % — LOW (ref 20–55)
LYMPHOCYTES # BLD AUTO: 17.4 % — LOW (ref 20–55)
LYMPHOCYTES # BLD AUTO: 2 K/UL — SIGNIFICANT CHANGE UP (ref 1–4.8)
MACROCYTES BLD QL: SIGNIFICANT CHANGE UP
MAGNESIUM SERPL-MCNC: 2.2 MG/DL — SIGNIFICANT CHANGE UP (ref 1.6–2.6)
MCHC RBC-ENTMCNC: 28.1 PG — SIGNIFICANT CHANGE UP (ref 27–31)
MCHC RBC-ENTMCNC: 28.6 PG — SIGNIFICANT CHANGE UP (ref 27–31)
MCHC RBC-ENTMCNC: 28.7 PG — SIGNIFICANT CHANGE UP (ref 27–31)
MCHC RBC-ENTMCNC: 30.2 G/DL — LOW (ref 32–36)
MCHC RBC-ENTMCNC: 30.5 G/DL — LOW (ref 32–36)
MCHC RBC-ENTMCNC: 30.8 G/DL — LOW (ref 32–36)
MCV RBC AUTO: 93 FL — SIGNIFICANT CHANGE UP (ref 80–94)
MCV RBC AUTO: 93.1 FL — SIGNIFICANT CHANGE UP (ref 80–94)
MCV RBC AUTO: 94 FL — SIGNIFICANT CHANGE UP (ref 80–94)
METAMYELOCYTES # FLD: 1 % — HIGH (ref 0–0)
MICROCYTES BLD QL: SLIGHT — SIGNIFICANT CHANGE UP
MONOCYTES # BLD AUTO: 1 K/UL — HIGH (ref 0–0.8)
MONOCYTES NFR BLD AUTO: 4 % — SIGNIFICANT CHANGE UP (ref 3–10)
MONOCYTES NFR BLD AUTO: 8.9 % — SIGNIFICANT CHANGE UP (ref 3–10)
NEUTROPHILS # BLD AUTO: 8.1 K/UL — HIGH (ref 1.8–8)
NEUTROPHILS NFR BLD AUTO: 71.8 % — SIGNIFICANT CHANGE UP (ref 37–73)
NEUTROPHILS NFR BLD AUTO: 73 % — SIGNIFICANT CHANGE UP (ref 37–73)
NEUTS BAND # BLD: 3 % — SIGNIFICANT CHANGE UP (ref 0–8)
NRBC # BLD: 7 /100 — HIGH (ref 0–0)
OB PNL STL: NEGATIVE — SIGNIFICANT CHANGE UP
OVALOCYTES BLD QL SMEAR: SLIGHT — SIGNIFICANT CHANGE UP
PHOSPHATE SERPL-MCNC: 4.3 MG/DL — SIGNIFICANT CHANGE UP (ref 2.4–4.7)
PLAT MORPH BLD: NORMAL — SIGNIFICANT CHANGE UP
PLATELET # BLD AUTO: 521 K/UL — HIGH (ref 150–400)
PLATELET # BLD AUTO: 548 K/UL — HIGH (ref 150–400)
PLATELET # BLD AUTO: 587 K/UL — HIGH (ref 150–400)
POLYCHROMASIA BLD QL SMEAR: SIGNIFICANT CHANGE UP
POTASSIUM SERPL-MCNC: 3.7 MMOL/L — SIGNIFICANT CHANGE UP (ref 3.5–5.3)
POTASSIUM SERPL-MCNC: 4 MMOL/L — SIGNIFICANT CHANGE UP (ref 3.5–5.3)
POTASSIUM SERPL-SCNC: 3.7 MMOL/L — SIGNIFICANT CHANGE UP (ref 3.5–5.3)
POTASSIUM SERPL-SCNC: 4 MMOL/L — SIGNIFICANT CHANGE UP (ref 3.5–5.3)
PROTHROM AB SERPL-ACNC: 16.9 SEC — HIGH (ref 9.8–12.7)
PROTHROM AB SERPL-ACNC: 17 SEC — HIGH (ref 9.8–12.7)
RBC # BLD: 2.83 M/UL — LOW (ref 4.6–6.2)
RBC # BLD: 3.14 M/UL — LOW (ref 4.6–6.2)
RBC # BLD: 3.2 M/UL — LOW (ref 4.6–6.2)
RBC # FLD: 17.1 % — HIGH (ref 11–15.6)
RBC # FLD: 17.2 % — HIGH (ref 11–15.6)
RBC # FLD: 17.3 % — HIGH (ref 11–15.6)
RBC BLD AUTO: ABNORMAL
SODIUM SERPL-SCNC: 142 MMOL/L — SIGNIFICANT CHANGE UP (ref 135–145)
SODIUM SERPL-SCNC: 143 MMOL/L — SIGNIFICANT CHANGE UP (ref 135–145)
TARGETS BLD QL SMEAR: SLIGHT — SIGNIFICANT CHANGE UP
VARIANT LYMPHS # BLD: 1 % — SIGNIFICANT CHANGE UP (ref 0–6)
WBC # BLD: 11.3 K/UL — HIGH (ref 4.8–10.8)
WBC # BLD: 12.2 K/UL — HIGH (ref 4.8–10.8)
WBC # BLD: 13.2 K/UL — HIGH (ref 4.8–10.8)
WBC # FLD AUTO: 11.3 K/UL — HIGH (ref 4.8–10.8)
WBC # FLD AUTO: 12.2 K/UL — HIGH (ref 4.8–10.8)
WBC # FLD AUTO: 13.2 K/UL — HIGH (ref 4.8–10.8)

## 2017-05-30 PROCEDURE — 99291 CRITICAL CARE FIRST HOUR: CPT

## 2017-05-30 PROCEDURE — 99232 SBSQ HOSP IP/OBS MODERATE 35: CPT

## 2017-05-30 RX ORDER — DIGOXIN 250 MCG
0.5 TABLET ORAL ONCE
Qty: 0 | Refills: 0 | Status: COMPLETED | OUTPATIENT
Start: 2017-05-30 | End: 2017-05-30

## 2017-05-30 RX ORDER — SODIUM CHLORIDE 9 MG/ML
1000 INJECTION, SOLUTION INTRAVENOUS
Qty: 0 | Refills: 0 | Status: DISCONTINUED | OUTPATIENT
Start: 2017-05-30 | End: 2017-05-31

## 2017-05-30 RX ORDER — ACETAMINOPHEN 500 MG
1000 TABLET ORAL ONCE
Qty: 0 | Refills: 0 | Status: COMPLETED | OUTPATIENT
Start: 2017-05-30 | End: 2017-05-30

## 2017-05-30 RX ORDER — DIGOXIN 250 MCG
0.25 TABLET ORAL DAILY
Qty: 0 | Refills: 0 | Status: DISCONTINUED | OUTPATIENT
Start: 2017-05-30 | End: 2017-06-09

## 2017-05-30 RX ORDER — VANCOMYCIN HCL 1 G
VIAL (EA) INTRAVENOUS
Qty: 0 | Refills: 0 | Status: DISCONTINUED | OUTPATIENT
Start: 2017-05-30 | End: 2017-05-31

## 2017-05-30 RX ORDER — FUROSEMIDE 40 MG
40 TABLET ORAL DAILY
Qty: 0 | Refills: 0 | Status: DISCONTINUED | OUTPATIENT
Start: 2017-05-30 | End: 2017-05-30

## 2017-05-30 RX ORDER — ALBUMIN HUMAN 25 %
250 VIAL (ML) INTRAVENOUS ONCE
Qty: 0 | Refills: 0 | Status: COMPLETED | OUTPATIENT
Start: 2017-05-30 | End: 2017-05-30

## 2017-05-30 RX ORDER — HEPARIN SODIUM 5000 [USP'U]/ML
1400 INJECTION INTRAVENOUS; SUBCUTANEOUS
Qty: 25000 | Refills: 0 | Status: DISCONTINUED | OUTPATIENT
Start: 2017-05-30 | End: 2017-05-31

## 2017-05-30 RX ORDER — PIPERACILLIN AND TAZOBACTAM 4; .5 G/20ML; G/20ML
3.38 INJECTION, POWDER, LYOPHILIZED, FOR SOLUTION INTRAVENOUS EVERY 8 HOURS
Qty: 0 | Refills: 0 | Status: DISCONTINUED | OUTPATIENT
Start: 2017-05-30 | End: 2017-06-01

## 2017-05-30 RX ORDER — SODIUM CHLORIDE 9 MG/ML
500 INJECTION, SOLUTION INTRAVENOUS ONCE
Qty: 0 | Refills: 0 | Status: COMPLETED | OUTPATIENT
Start: 2017-05-30 | End: 2017-05-30

## 2017-05-30 RX ORDER — PIPERACILLIN AND TAZOBACTAM 4; .5 G/20ML; G/20ML
3.38 INJECTION, POWDER, LYOPHILIZED, FOR SOLUTION INTRAVENOUS ONCE
Qty: 0 | Refills: 0 | Status: COMPLETED | OUTPATIENT
Start: 2017-05-30 | End: 2017-05-30

## 2017-05-30 RX ORDER — VANCOMYCIN HCL 1 G
1400 VIAL (EA) INTRAVENOUS EVERY 12 HOURS
Qty: 0 | Refills: 0 | Status: DISCONTINUED | OUTPATIENT
Start: 2017-05-31 | End: 2017-05-31

## 2017-05-30 RX ORDER — VANCOMYCIN HCL 1 G
1400 VIAL (EA) INTRAVENOUS ONCE
Qty: 0 | Refills: 0 | Status: COMPLETED | OUTPATIENT
Start: 2017-05-30 | End: 2017-05-30

## 2017-05-30 RX ADMIN — HEPARIN SODIUM 3500 UNIT(S): 5000 INJECTION INTRAVENOUS; SUBCUTANEOUS at 18:20

## 2017-05-30 RX ADMIN — Medication 40 MILLIGRAM(S): at 12:22

## 2017-05-30 RX ADMIN — HEPARIN SODIUM 1400 UNIT(S)/HR: 5000 INJECTION INTRAVENOUS; SUBCUTANEOUS at 05:23

## 2017-05-30 RX ADMIN — SODIUM CHLORIDE 1000 MILLILITER(S): 9 INJECTION, SOLUTION INTRAVENOUS at 23:15

## 2017-05-30 RX ADMIN — NYSTATIN CREAM 1 APPLICATION(S): 100000 CREAM TOPICAL at 05:25

## 2017-05-30 RX ADMIN — Medication 0.12 MILLIGRAM(S): at 12:22

## 2017-05-30 RX ADMIN — SODIUM CHLORIDE 110 MILLILITER(S): 9 INJECTION, SOLUTION INTRAVENOUS at 23:15

## 2017-05-30 RX ADMIN — Medication 50 MILLIGRAM(S): at 05:25

## 2017-05-30 RX ADMIN — PANTOPRAZOLE SODIUM 40 MILLIGRAM(S): 20 TABLET, DELAYED RELEASE ORAL at 12:23

## 2017-05-30 RX ADMIN — PIPERACILLIN AND TAZOBACTAM 200 GRAM(S): 4; .5 INJECTION, POWDER, LYOPHILIZED, FOR SOLUTION INTRAVENOUS at 18:24

## 2017-05-30 RX ADMIN — Medication 100 MILLIGRAM(S): at 12:22

## 2017-05-30 RX ADMIN — Medication 125 MILLILITER(S): at 18:33

## 2017-05-30 RX ADMIN — Medication 50 MILLIGRAM(S): at 23:16

## 2017-05-30 RX ADMIN — Medication 3 MILLILITER(S): at 20:13

## 2017-05-30 RX ADMIN — Medication 333.33 MILLIGRAM(S): at 23:14

## 2017-05-30 RX ADMIN — Medication 3 MILLILITER(S): at 03:04

## 2017-05-30 RX ADMIN — Medication 6 MILLIGRAM(S): at 23:16

## 2017-05-30 RX ADMIN — Medication 40 MILLIGRAM(S): at 05:25

## 2017-05-30 RX ADMIN — Medication 100 MILLIGRAM(S): at 05:25

## 2017-05-30 RX ADMIN — Medication 3 MILLILITER(S): at 08:40

## 2017-05-30 RX ADMIN — Medication 325 MILLIGRAM(S): at 12:28

## 2017-05-30 RX ADMIN — NYSTATIN CREAM 1 APPLICATION(S): 100000 CREAM TOPICAL at 18:31

## 2017-05-30 RX ADMIN — Medication 3 MILLILITER(S): at 15:22

## 2017-05-30 RX ADMIN — Medication 400 MILLIGRAM(S): at 21:49

## 2017-05-30 RX ADMIN — Medication 1 MILLIGRAM(S): at 18:29

## 2017-05-30 RX ADMIN — PIPERACILLIN AND TAZOBACTAM 25 GRAM(S): 4; .5 INJECTION, POWDER, LYOPHILIZED, FOR SOLUTION INTRAVENOUS at 23:16

## 2017-05-30 RX ADMIN — HEPARIN SODIUM 1600 UNIT(S)/HR: 5000 INJECTION INTRAVENOUS; SUBCUTANEOUS at 18:19

## 2017-05-30 RX ADMIN — Medication 125 MILLIGRAM(S): at 18:29

## 2017-05-30 RX ADMIN — Medication 0.5 MILLIGRAM(S): at 18:29

## 2017-05-30 RX ADMIN — Medication 125 MILLIGRAM(S): at 05:24

## 2017-05-30 RX ADMIN — Medication 50 MILLIGRAM(S): at 13:29

## 2017-05-30 NOTE — PROGRESS NOTE ADULT - SUBJECTIVE AND OBJECTIVE BOX
Patient is in bed, tracking, following commands with RN to open mouth, but inconsistently.   Unable to follow commands for movements of the extremities on commands. Moving BUE and right LE intermittently but not functionally.  Patient is moving UE purposefully, as he did remove his Dobbhoff tube yesterday.   Neurology also following, requesting MRA in addition to MRI.   Has been spiking fevers. TMAX was 39.2. WBC slightly increased.     REVIEW OF SYSTEMS  Unable to obtain.     VITALS  T(C): 37.9, Max: 39.2 (05-29 @ 20:00)  HR: 109 (102 - 129)  BP: 108/80 (101/73 - 127/81)  RR: 32 (24 - 38)  SpO2: 97% (0% - 100%)  Wt(kg): --    MEDICATIONS   nystatin Powder 1Application(s) two times a day  digoxin  Injectable 0.125milliGRAM(s) daily  ALBUTerol/ipratropium for Nebulization 3milliLiter(s) every 6 hours  folic acid Injectable 1milliGRAM(s) daily  thiamine 100milliGRAM(s) daily  ALBUTerol    0.083% 2.5milliGRAM(s) every 2 hours PRN  amantadine Syrup 100milliGRAM(s) <User Schedule>  valproic  acid Syrup 125milliGRAM(s) every 12 hours  melatonin 6milliGRAM(s) at bedtime  aspirin 325milliGRAM(s) daily  metoprolol 50milliGRAM(s) every 8 hours  heparin  Injectable 7500Unit(s) every 6 hours PRN  heparin  Injectable 3500Unit(s) every 6 hours PRN  pantoprazole  Injectable 40milliGRAM(s) daily  heparin  Infusion. 1400Unit(s)/Hr <Continuous>  furosemide    Tablet 40milliGRAM(s) daily      RECENT LABS/IMAGING  CBC Full  -  ( 30 May 2017 11:32 )  WBC Count : 12.2 K/uL  Hemoglobin : 9.0 g/dL  Hematocrit : 29.8 %  Platelet Count - Automated : 587 K/uL  Mean Cell Volume : 93.1 fl  Mean Cell Hemoglobin : 28.1 pg  Mean Cell Hemoglobin Concentration : 30.2 g/dL  Auto Neutrophil # : x  Auto Lymphocyte # : x  Auto Monocyte # : x  Auto Eosinophil # : x  Auto Basophil # : x  Auto Neutrophil % : x  Auto Lymphocyte % : x  Auto Monocyte % : x  Auto Eosinophil % : x  Auto Basophil % : x    05-30    142  |  100  |  58.0<H>  ----------------------------<  169<H>  3.7   |  25.0  |  1.15    Ca    8.8      30 May 2017 11:32  Phos  4.3     05-30  Mg     2.2     05-30      Urinalysis Basic - ( 28 May 2017 20:02 )    Color: Yellow / Appearance: Clear / S.010 / pH: x  Gluc: x / Ketone: Negative  / Bili: Negative / Urobili: Negative mg/dL   Blood: x / Protein: 100 mg/dL / Nitrite: Negative   Leuk Esterase: Negative / RBC: 25-50 /HPF / WBC 3-5   Sq Epi: x / Non Sq Epi: x / Bacteria: Occasional      PHYSICAL EXAM  Constitutional - Appears comfortable  Neck - Supple, No limited ROM -   Extremities - +pitting edema Bilaterally  Neurologic Exam -                    Cognitive - Awake, Alert, tracking, did not follow simple command      Communication - nonverbal     Motor - trace movement identified in left EF, right EF and finger flexion, and right DF     Coordination - Poor related to intermittent isolated movements  Psychiatric - flat affect, smiled once  ----------------------------------------------------------------------------------------  ASSESSMENT/PLAN  51M with cardiac arrest s/p ERCP for bile duct leak with prolonged hospitalization related to prolonged intubation and cognitive/behavioral deficits.    It is possible that patient may have multiple components to his musculoskeletal and neurological state, complicated by the course of hospital events which can include critical illness neuropathy/myopathy, anoxic brain injury, encephalopathy delirium and medications.     Arousal/Sleep wake cycle - Amantadine, Melatonin - Unclear if will assist with improvement. Identifying a possible organic cause via MRI may assist in better clarifying treatment.

## 2017-05-30 NOTE — PROGRESS NOTE ADULT - SUBJECTIVE AND OBJECTIVE BOX
INTERVAL HISTORY:  Seen at bedside with family.  More alert today.  Doing better each day per Trauma svc.    No Known Allergies      VITAL SIGNS:  Vital Signs Last 24 Hrs  T(C): 37.9, Max: 39.2 (05-29 @ 20:00)  T(F): 100.2, Max: 102.6 (05-29 @ 20:00)  HR: 110 (102 - 129)  BP: 107/68 (101/73 - 127/81)  BP(mean): 89 (83 - 96)  RR: 26 (24 - 38)  SpO2: 98% (0% - 100%)    PHYSICAL EXAMINATION:  General: Well-developed, well nourished, in no acute distress.  Eyes: Conjunctiva and sclera clear.  Neurologic:  - Mental Status: Awake; Alert; follows minimally throughout; tracks visually; non verbal.  Cranial Nerves II-XII:  Pupil R 4mm reactive, L pupil 4mm reactive; no facial; + corneal; not cooperative for [facility]; + cough to suction; + dolls;   - Motor:  Moves all 4 ext spontaneously.  - Reflexes:  2+ and symmetric throughout.  Plantars withdrawal b/l.  - Sensory:  Withdraws to noxious throughout  - Coordination:  Pt unable.  - Gait: Deferred.    MEDS:  MEDICATIONS  (STANDING):  nystatin Powder 1Application(s) Topical two times a day  digoxin  Injectable 0.125milliGRAM(s) IV Push daily  ALBUTerol/ipratropium for Nebulization 3milliLiter(s) Nebulizer every 6 hours  folic acid Injectable 1milliGRAM(s) IV Push daily  thiamine 100milliGRAM(s) Oral daily  amantadine Syrup 100milliGRAM(s) Oral <User Schedule>  valproic  acid Syrup 125milliGRAM(s) Oral every 12 hours  melatonin 6milliGRAM(s) Oral at bedtime  aspirin 325milliGRAM(s) Oral daily  metoprolol 50milliGRAM(s) Oral every 8 hours  pantoprazole  Injectable 40milliGRAM(s) IV Push daily  heparin  Infusion. 1400Unit(s)/Hr IV Continuous <Continuous>  furosemide    Tablet 40milliGRAM(s) Oral daily    MEDICATIONS  (PRN):  ALBUTerol    0.083% 2.5milliGRAM(s) Nebulizer every 2 hours PRN Shortness of Breath and/or Wheezing  heparin  Injectable 7500Unit(s) IV Push every 6 hours PRN For aPTT less than 40  heparin  Injectable 3500Unit(s) IV Push every 6 hours PRN For aPTT between 40 - 57      LABS:                          9.0    11.3  )-----------( 548      ( 30 May 2017 02:20 )             29.2     05-30    143  |  101  |  49.0<H>  ----------------------------<  153<H>  4.0   |  23.0  |  1.27    Ca    8.3<L>      30 May 2017 02:20  Phos  4.3     05-30  Mg     2.2     05-30        IMPRESSION:  Aniscoria; 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

## 2017-05-30 NOTE — PROGRESS NOTE ADULT - SUBJECTIVE AND OBJECTIVE BOX
INTERVAL HPI/OVERNIGHT EVENTS/SUBJECTIVE:  Self removed feeding tube.  Replaced.      ICU Vital Signs Last 24 Hrs  T(C): 37.9, Max: 39.2 (05-29 @ 20:00)  T(F): 100.2, Max: 102.6 (05-29 @ 20:00)  HR: 110 (102 - 129)  BP: 107/68 (101/73 - 127/81)  BP(mean): 89 (83 - 96)  ABP: --  ABP(mean): --  RR: 26 (24 - 38)  SpO2: 98% (0% - 100%)      I&O's Detail  I & Os for 24h ending 30 May 2017 07:00  =============================================  IN:    Pivot: 867 ml    heparin  Infusion.: 265 ml    Solution: 100 ml    heparin  Infusion.: 28 ml    Total IN: 1260 ml  ---------------------------------------------  OUT:    Indwelling Catheter - Urethral: 1010 ml    Drain: 25 ml    Total OUT: 1035 ml  ---------------------------------------------  Total NET: 225 ml    I & Os for current day (as of 30 May 2017 10:49)  =============================================  IN:    Pivot: 204 ml    heparin  Infusion.: 56 ml    Total IN: 260 ml  ---------------------------------------------  OUT:    Indwelling Catheter - Urethral: 250 ml    Total OUT: 250 ml  ---------------------------------------------  Total NET: 10 ml            MEDICATIONS  (STANDING):  nystatin Powder 1Application(s) Topical two times a day  digoxin  Injectable 0.125milliGRAM(s) IV Push daily  ALBUTerol/ipratropium for Nebulization 3milliLiter(s) Nebulizer every 6 hours  folic acid Injectable 1milliGRAM(s) IV Push daily  thiamine 100milliGRAM(s) Oral daily  amantadine Syrup 100milliGRAM(s) Oral <User Schedule>  valproic  acid Syrup 125milliGRAM(s) Oral every 12 hours  melatonin 6milliGRAM(s) Oral at bedtime  aspirin 325milliGRAM(s) Oral daily  furosemide    Tablet 40milliGRAM(s) Oral daily  metoprolol 50milliGRAM(s) Oral every 8 hours  pantoprazole  Injectable 40milliGRAM(s) IV Push daily  heparin  Infusion. 1400Unit(s)/Hr IV Continuous <Continuous>    MEDICATIONS  (PRN):  ALBUTerol    0.083% 2.5milliGRAM(s) Nebulizer every 2 hours PRN Shortness of Breath and/or Wheezing  heparin  Injectable 7500Unit(s) IV Push every 6 hours PRN For aPTT less than 40  heparin  Injectable 3500Unit(s) IV Push every 6 hours PRN For aPTT between 40 - 57      NUTRITION/IVF: Tube feeds    CENTRAL LINE: N     MONTOYA:  Y     A-LINE: N        PHYSICAL EXAM:    Gen:  NAD    Eyes:  PERRLA    Neurological: Intermittently following simple commands.  Interactive.      Pulmonary: Non labored this AM.      Cardiovascular: Tachycrdia irregular    Gastrointestinal: Soft    Genitourinary: Montoya    Extremities: Peripheral edema improved.      LABS:  CBC Full  -  ( 30 May 2017 02:20 )  WBC Count : 11.3 K/uL  Hemoglobin : 9.0 g/dL  Hematocrit : 29.2 %  Platelet Count - Automated : 548 K/uL  Mean Cell Volume : 93.0 fl  Mean Cell Hemoglobin : 28.7 pg  Mean Cell Hemoglobin Concentration : 30.8 g/dL  Auto Neutrophil # : 8.1 K/uL  Auto Lymphocyte # : 2.0 K/uL  Auto Monocyte # : 1.0 K/uL  Auto Eosinophil # : 0.0 K/uL  Auto Basophil # : 0.0 K/uL  Auto Neutrophil % : 71.8 %  Auto Lymphocyte % : 17.4 %  Auto Monocyte % : 8.9 %  Auto Eosinophil % : 0.4 %  Auto Basophil % : 0.3 %        143  |  101  |  49.0<H>  ----------------------------<  153<H>  4.0   |  23.0  |  1.27    Ca    8.3<L>      30 May 2017 02:20  Phos  4.3     -  Mg     2.2     -30      PT/INR - ( 30 May 2017 09:45 )   PT: 17.0 sec;   INR: 1.53 ratio         PTT - ( 30 May 2017 02:21 )  PTT:84.1 sec  Urinalysis Basic - ( 28 May 2017 20:02 )    Color: Yellow / Appearance: Clear / S.010 / pH: x  Gluc: x / Ketone: Negative  / Bili: Negative / Urobili: Negative mg/dL   Blood: x / Protein: 100 mg/dL / Nitrite: Negative   Leuk Esterase: Negative / RBC: 25-50 /HPF / WBC 3-5   Sq Epi: x / Non Sq Epi: x / Bacteria: Occasional      RECENT CULTURES:   .Abscess gallbladder fossa Enterococcus raffinosus   Few White blood cells  Numerous Gram Positive Cocci in Pairs and Chains  Few Gram Positive Rods  Few Gram Positive Cocci in Clusters   Moderate Enterococcus raffinosus        ASSESSMENT/PLAN:  51yMale presenting with:    Neuro:  Likely anoxic injury.  Supportive care.  Neurostim.  Depakote for impulsivity control.  MRI.  Will need anesthesia for exam under sedation.      HEENT: Tongue needs reconstruction with plastics.      CV:  Rapid a fib.  Continue lopressor        Pulm:  Still needing significant nursing care to keep clear of secretions.  Still at risk for reintubation,      GI/Nutrition:  Tolerating tube feeds.      /Renal:  Increased creat.  Possible over aggressive diuresis.  Will continue home diuretic only for now.  Follow closely.  With BUN rising, also consider upper GI bleed.  Will watch closely as on heparin drip.      ID:  Febrile overnight.  Mild increase in wbc count.  A febrile now.  If spikes again will culture blood and restart abx.      Lines/Tubes: Maintain montoya for retention.      Proph: Heparin drip for CVA ppx from afib.  PPI.      Dispo:  ICU       CRITICAL CARE TIME SPENT:  40 min

## 2017-05-30 NOTE — PROGRESS NOTE ADULT - PROBLEM SELECTOR PLAN 1
Appears better today.  Await for MRI/MRA brain.  Treatment per Surgery.  DVT prophylaxis.  Will follow.

## 2017-05-30 NOTE — PROGRESS NOTE ADULT - ASSESSMENT
51yMale presenting with: Cardiac arrest SP ERCP, tongue laceration, paroxysmal A-fib, lower GIB.        Neurological: Continue current sleep wake / stim meds.  FU BIU Recs    Pulmonary: Ra as tolerated.  Continue NT Suctioning prn    Cardiovascular: Continue current meds for rate control and BP control.    Gastrointestinal: Continue TF.  FU S/S    Genitourinary: Maintain Rogers for retention    Heme: Continue ASA SP cardiac arrest and hep drip until able to be transitioned to Coumadin for A-fib.    ID: Discuss culture results with attending.    Skin: Avoid pressure ulcers.    Lines/ Tubes: As above.    Dispo: Critical care as above.            CRITICAL CARE TIME SPENT: 60

## 2017-05-30 NOTE — PROGRESS NOTE ADULT - SUBJECTIVE AND OBJECTIVE BOX
INTERVAL HPI/OVERNIGHT EVENTS/SUBJECTIVE: Mental status improved.  Follows commands slightly better.  Lasix PO started.  Lopressor incresaed.  Dobhoff clogged so new one placed.  Pt still unable to make needs known    ICU Vital Signs Last 24 Hrs  T(C): 37.6, Max: 39.2 ( @ 20:00)  T(F): 99.7, Max: 102.6 ( @ 20:00)  HR: 105 (102 - 129)  BP: 106/80 (102/78 - 126/67)  BP(mean): 90 (84 - 92)  ABP: --  ABP(mean): --  RR: 26 (20 - 38)  SpO2: 100% (95% - 100%)      I&O's Detail  I & Os for 24h ending 29 May 2017 07:00  =============================================  IN:    Enteral Tube Flush: 640 ml    Pivot: 204 ml    Solution: 150 ml    Solution: 125 ml    Solution: 100 ml    Total IN: 1219 ml  ---------------------------------------------  OUT:    Indwelling Catheter - Urethral: 1905 ml    Drain: 55 ml    VAC (Vacuum Assisted Closure) System: 50 ml    Total OUT: 2010 ml  ---------------------------------------------  Total NET: -791 ml    I & Os for current day (as of 30 May 2017 03:48)  =============================================  IN:    Pivot: 510 ml    heparin  Infusion.: 251 ml    Solution: 100 ml    Total IN: 861 ml  ---------------------------------------------  OUT:    Indwelling Catheter - Urethral: 790 ml    Drain: 25 ml    Total OUT: 815 ml  ---------------------------------------------  Total NET: 46 ml        ABG - ( 28 May 2017 04:34 )  pH: 7.48  /  pCO2: 45    /  pO2: 96    / HCO3: 32    / Base Excess: 8.3   /  SaO2: 99                  MEDICATIONS  (STANDING):  nystatin Powder 1Application(s) Topical two times a day  digoxin  Injectable 0.125milliGRAM(s) IV Push daily  ALBUTerol/ipratropium for Nebulization 3milliLiter(s) Nebulizer every 6 hours  folic acid Injectable 1milliGRAM(s) IV Push daily  thiamine 100milliGRAM(s) Oral daily  amantadine Syrup 100milliGRAM(s) Oral <User Schedule>  valproic  acid Syrup 125milliGRAM(s) Oral every 12 hours  melatonin 6milliGRAM(s) Oral at bedtime  chlorhexidine 0.12% Liquid 15milliLiter(s) Swish and Spit two times a day  aspirin 325milliGRAM(s) Oral daily  furosemide    Tablet 40milliGRAM(s) Oral daily  metoprolol 50milliGRAM(s) Oral every 8 hours  pantoprazole  Injectable 40milliGRAM(s) IV Push daily  heparin  Infusion. 1400Unit(s)/Hr IV Continuous <Continuous>    MEDICATIONS  (PRN):  ALBUTerol    0.083% 2.5milliGRAM(s) Nebulizer every 2 hours PRN Shortness of Breath and/or Wheezing  heparin  Injectable 7500Unit(s) IV Push every 6 hours PRN For aPTT less than 40  heparin  Injectable 3500Unit(s) IV Push every 6 hours PRN For aPTT between 40 - 57      NUTRITION/IVF: Pivot @ 51/ IVL    ARMAS:  Yes    NG TUBE:  DATE INSERTED:      PHYSICAL EXAM:     Gen: NAD, Well appearing, No cyanosis, Pallor.    Eyes: PERRL ~ 4mm, EOMI,     Neurological:  A&Ox0, GCS 4/3/6, No focal deficit.       Neck: Supple. NT AT, FROM no pain.  No JVD. No meningeal signs    Pulmonary: NAD, CTA, = BL .      Cardiovascular: Irreg irreg, S1, S2, No Murmurs, rubs or gallops noted.    Gastrointestinal: ND, Soft, NT.    Extremities: NT, AT, no edema, erythema or palpable cord noted.  FROM, = 2+ pulses throughout.      LABS:  CBC Full  -  ( 30 May 2017 02:20 )  WBC Count : 11.3 K/uL  Hemoglobin : 9.0 g/dL  Hematocrit : 29.2 %  Platelet Count - Automated : 548 K/uL  Mean Cell Volume : 93.0 fl  Mean Cell Hemoglobin : 28.7 pg  Mean Cell Hemoglobin Concentration : 30.8 g/dL  Auto Neutrophil # : 8.1 K/uL  Auto Lymphocyte # : 2.0 K/uL  Auto Monocyte # : 1.0 K/uL  Auto Eosinophil # : 0.0 K/uL  Auto Basophil # : 0.0 K/uL  Auto Neutrophil % : 71.8 %  Auto Lymphocyte % : 17.4 %  Auto Monocyte % : 8.9 %  Auto Eosinophil % : 0.4 %  Auto Basophil % : 0.3 %    05-30    143  |  101  |  49.0<H>  ----------------------------<  153<H>  4.0   |  23.0  |  1.27    Ca    8.3<L>      30 May 2017 02:20  Phos  4.3     05-30  Mg     2.2     05-30    TPro  7.1  /  Alb  3.0<L>  /  TBili  0.8  /  DBili  x   /  AST  46<H>  /  ALT  60<H>  /  AlkPhos  116  05-28    PTT - ( 30 May 2017 02:21 )  PTT:84.1 sec  Urinalysis Basic - ( 28 May 2017 20:02 )    Color: Yellow / Appearance: Clear / S.010 / pH: x  Gluc: x / Ketone: Negative  / Bili: Negative / Urobili: Negative mg/dL   Blood: x / Protein: 100 mg/dL / Nitrite: Negative   Leuk Esterase: Negative / RBC: 25-50 /HPF / WBC 3-5   Sq Epi: x / Non Sq Epi: x / Bacteria: Occasional      RECENT CULTURES:   .Abscess gallbladder fossa Enterococcus raffinosus   Few White blood cells  Numerous Gram Positive Cocci in Pairs and Chains  Few Gram Positive Rods  Few Gram Positive Cocci in Clusters   Moderate Enterococcus raffinosus        LIVER FUNCTIONS - ( 28 May 2017 05:16 )  Alb: 3.0 g/dL / Pro: 7.1 g/dL / ALK PHOS: 116 U/L / ALT: 60 U/L / AST: 46 U/L / GGT: x               CAPILLARY BLOOD GLUCOSE      RADIOLOGY & ADDITIONAL STUDIES:    ASSESSMENT/PLAN:  51yMale presenting with: Cardiac arrest SP ERCP, tongue laceration, paroxysmal A-fib, lower GIB.        Neurological: Continue current sleep wake / stim meds.  FU BIU Recs    Pulmonary: Ra as tolerated.  Continue NT Suctioning prn    Cardiovascular: Continue current meds for rate control and BP control.    Gastrointestinal: Continue TF.  FU S/S    Genitourinary: Maintain Armas for retention    Heme: Continue ASA SP cardiac arrest and hep drip until able to be transitioned to Coumadin for A-fib.    ID: Discuss culture results with attending.    Skin: Avoid pressure ulcers.    Lines/ Tubes: As above.    Dispo: Critical care as above.            CRITICAL CARE TIME SPENT: 60

## 2017-05-31 DIAGNOSIS — Z51.5 ENCOUNTER FOR PALLIATIVE CARE: ICD-10-CM

## 2017-05-31 DIAGNOSIS — R53.2 FUNCTIONAL QUADRIPLEGIA: ICD-10-CM

## 2017-05-31 DIAGNOSIS — R50.9 FEVER, UNSPECIFIED: ICD-10-CM

## 2017-05-31 LAB
ALBUMIN SERPL ELPH-MCNC: 3.1 G/DL — LOW (ref 3.3–5.2)
ALP SERPL-CCNC: 103 U/L — SIGNIFICANT CHANGE UP (ref 40–120)
ALT FLD-CCNC: 44 U/L — HIGH
ANION GAP SERPL CALC-SCNC: 16 MMOL/L — SIGNIFICANT CHANGE UP (ref 5–17)
ANION GAP SERPL CALC-SCNC: 19 MMOL/L — HIGH (ref 5–17)
ANISOCYTOSIS BLD QL: SLIGHT — SIGNIFICANT CHANGE UP
APTT BLD: 80.9 SEC — HIGH (ref 27.5–37.4)
APTT BLD: 84.7 SEC — HIGH (ref 27.5–37.4)
AST SERPL-CCNC: 47 U/L — HIGH
BILIRUB SERPL-MCNC: 1 MG/DL — SIGNIFICANT CHANGE UP (ref 0.4–2)
BUN SERPL-MCNC: 43 MG/DL — HIGH (ref 8–20)
BUN SERPL-MCNC: 52 MG/DL — HIGH (ref 8–20)
CALCIUM SERPL-MCNC: 8 MG/DL — LOW (ref 8.6–10.2)
CALCIUM SERPL-MCNC: 8.3 MG/DL — LOW (ref 8.6–10.2)
CHLORIDE SERPL-SCNC: 100 MMOL/L — SIGNIFICANT CHANGE UP (ref 98–107)
CHLORIDE SERPL-SCNC: 102 MMOL/L — SIGNIFICANT CHANGE UP (ref 98–107)
CO2 SERPL-SCNC: 23 MMOL/L — SIGNIFICANT CHANGE UP (ref 22–29)
CO2 SERPL-SCNC: 26 MMOL/L — SIGNIFICANT CHANGE UP (ref 22–29)
CREAT SERPL-MCNC: 0.9 MG/DL — SIGNIFICANT CHANGE UP (ref 0.5–1.3)
CREAT SERPL-MCNC: 1.02 MG/DL — SIGNIFICANT CHANGE UP (ref 0.5–1.3)
ELLIPTOCYTES BLD QL SMEAR: SLIGHT — SIGNIFICANT CHANGE UP
GAS PNL BLDA: SIGNIFICANT CHANGE UP
GAS PNL BLDA: SIGNIFICANT CHANGE UP
GLUCOSE SERPL-MCNC: 139 MG/DL — HIGH (ref 70–115)
GLUCOSE SERPL-MCNC: 140 MG/DL — HIGH (ref 70–115)
HCT VFR BLD CALC: 25.2 % — LOW (ref 42–52)
HGB BLD-MCNC: 7.7 G/DL — LOW (ref 14–18)
HYPOCHROMIA BLD QL: SLIGHT — SIGNIFICANT CHANGE UP
LYMPHOCYTES # BLD AUTO: 8 % — LOW (ref 20–55)
MACROCYTES BLD QL: SLIGHT — SIGNIFICANT CHANGE UP
MAGNESIUM SERPL-MCNC: 2.3 MG/DL — SIGNIFICANT CHANGE UP (ref 1.6–2.6)
MAGNESIUM SERPL-MCNC: 2.3 MG/DL — SIGNIFICANT CHANGE UP (ref 1.6–2.6)
MCHC RBC-ENTMCNC: 28.6 PG — SIGNIFICANT CHANGE UP (ref 27–31)
MCHC RBC-ENTMCNC: 30.6 G/DL — LOW (ref 32–36)
MCV RBC AUTO: 93.7 FL — SIGNIFICANT CHANGE UP (ref 80–94)
MICROCYTES BLD QL: SLIGHT — SIGNIFICANT CHANGE UP
MONOCYTES NFR BLD AUTO: 6 % — SIGNIFICANT CHANGE UP (ref 3–10)
NEUTROPHILS NFR BLD AUTO: 85 % — HIGH (ref 37–73)
NRBC # BLD: 2 /100 — HIGH (ref 0–0)
OVALOCYTES BLD QL SMEAR: SLIGHT — SIGNIFICANT CHANGE UP
PHOSPHATE SERPL-MCNC: 2.6 MG/DL — SIGNIFICANT CHANGE UP (ref 2.4–4.7)
PHOSPHATE SERPL-MCNC: 2.8 MG/DL — SIGNIFICANT CHANGE UP (ref 2.4–4.7)
PLAT MORPH BLD: NORMAL — SIGNIFICANT CHANGE UP
PLATELET # BLD AUTO: 497 K/UL — HIGH (ref 150–400)
POIKILOCYTOSIS BLD QL AUTO: SLIGHT — SIGNIFICANT CHANGE UP
POTASSIUM SERPL-MCNC: 3.2 MMOL/L — LOW (ref 3.5–5.3)
POTASSIUM SERPL-MCNC: 3.5 MMOL/L — SIGNIFICANT CHANGE UP (ref 3.5–5.3)
POTASSIUM SERPL-SCNC: 3.2 MMOL/L — LOW (ref 3.5–5.3)
POTASSIUM SERPL-SCNC: 3.5 MMOL/L — SIGNIFICANT CHANGE UP (ref 3.5–5.3)
PROT SERPL-MCNC: 6.7 G/DL — SIGNIFICANT CHANGE UP (ref 6.6–8.7)
RBC # BLD: 2.69 M/UL — LOW (ref 4.6–6.2)
RBC # FLD: 17.6 % — HIGH (ref 11–15.6)
RBC BLD AUTO: ABNORMAL
SODIUM SERPL-SCNC: 142 MMOL/L — SIGNIFICANT CHANGE UP (ref 135–145)
SODIUM SERPL-SCNC: 144 MMOL/L — SIGNIFICANT CHANGE UP (ref 135–145)
VARIANT LYMPHS # BLD: 1 % — SIGNIFICANT CHANGE UP (ref 0–6)
WBC # BLD: 13.8 K/UL — HIGH (ref 4.8–10.8)
WBC # FLD AUTO: 13.8 K/UL — HIGH (ref 4.8–10.8)

## 2017-05-31 PROCEDURE — 74000: CPT | Mod: 26

## 2017-05-31 PROCEDURE — 74177 CT ABD & PELVIS W/CONTRAST: CPT | Mod: 26

## 2017-05-31 PROCEDURE — 71010: CPT | Mod: 26

## 2017-05-31 PROCEDURE — 99222 1ST HOSP IP/OBS MODERATE 55: CPT

## 2017-05-31 PROCEDURE — 99291 CRITICAL CARE FIRST HOUR: CPT

## 2017-05-31 PROCEDURE — 93010 ELECTROCARDIOGRAM REPORT: CPT

## 2017-05-31 RX ORDER — METOPROLOL TARTRATE 50 MG
5 TABLET ORAL ONCE
Qty: 0 | Refills: 0 | Status: COMPLETED | OUTPATIENT
Start: 2017-05-31 | End: 2017-05-31

## 2017-05-31 RX ORDER — PANTOPRAZOLE SODIUM 20 MG/1
8 TABLET, DELAYED RELEASE ORAL
Qty: 80 | Refills: 0 | Status: DISCONTINUED | OUTPATIENT
Start: 2017-05-31 | End: 2017-06-03

## 2017-05-31 RX ORDER — POTASSIUM CHLORIDE 20 MEQ
10 PACKET (EA) ORAL
Qty: 0 | Refills: 0 | Status: COMPLETED | OUTPATIENT
Start: 2017-05-31 | End: 2017-05-31

## 2017-05-31 RX ORDER — HEPARIN SODIUM 5000 [USP'U]/ML
1300 INJECTION INTRAVENOUS; SUBCUTANEOUS
Qty: 25000 | Refills: 0 | Status: DISCONTINUED | OUTPATIENT
Start: 2017-05-31 | End: 2017-05-31

## 2017-05-31 RX ORDER — TRAZODONE HCL 50 MG
50 TABLET ORAL AT BEDTIME
Qty: 0 | Refills: 0 | Status: DISCONTINUED | OUTPATIENT
Start: 2017-05-31 | End: 2017-06-12

## 2017-05-31 RX ORDER — FUROSEMIDE 40 MG
20 TABLET ORAL DAILY
Qty: 0 | Refills: 0 | Status: DISCONTINUED | OUTPATIENT
Start: 2017-05-31 | End: 2017-06-06

## 2017-05-31 RX ORDER — HEPARIN SODIUM 5000 [USP'U]/ML
1500 INJECTION INTRAVENOUS; SUBCUTANEOUS
Qty: 25000 | Refills: 0 | Status: DISCONTINUED | OUTPATIENT
Start: 2017-05-31 | End: 2017-05-31

## 2017-05-31 RX ORDER — DEXMEDETOMIDINE HYDROCHLORIDE IN 0.9% SODIUM CHLORIDE 4 UG/ML
0.2 INJECTION INTRAVENOUS
Qty: 200 | Refills: 0 | Status: DISCONTINUED | OUTPATIENT
Start: 2017-05-31 | End: 2017-06-01

## 2017-05-31 RX ORDER — VANCOMYCIN HCL 1 G
1000 VIAL (EA) INTRAVENOUS EVERY 12 HOURS
Qty: 0 | Refills: 0 | Status: DISCONTINUED | OUTPATIENT
Start: 2017-05-31 | End: 2017-06-03

## 2017-05-31 RX ORDER — FUROSEMIDE 40 MG
20 TABLET ORAL ONCE
Qty: 0 | Refills: 0 | Status: COMPLETED | OUTPATIENT
Start: 2017-05-31 | End: 2017-05-31

## 2017-05-31 RX ADMIN — Medication 50 MILLIGRAM(S): at 21:37

## 2017-05-31 RX ADMIN — Medication 3 MILLILITER(S): at 14:57

## 2017-05-31 RX ADMIN — Medication 3 MILLILITER(S): at 03:20

## 2017-05-31 RX ADMIN — Medication 333.33 MILLIGRAM(S): at 06:27

## 2017-05-31 RX ADMIN — Medication 0.25 MILLIGRAM(S): at 13:46

## 2017-05-31 RX ADMIN — PIPERACILLIN AND TAZOBACTAM 25 GRAM(S): 4; .5 INJECTION, POWDER, LYOPHILIZED, FOR SOLUTION INTRAVENOUS at 05:31

## 2017-05-31 RX ADMIN — NYSTATIN CREAM 1 APPLICATION(S): 100000 CREAM TOPICAL at 05:32

## 2017-05-31 RX ADMIN — Medication 100 MILLIEQUIVALENT(S): at 20:34

## 2017-05-31 RX ADMIN — Medication 100 MILLIEQUIVALENT(S): at 10:32

## 2017-05-31 RX ADMIN — Medication 3 MILLILITER(S): at 08:12

## 2017-05-31 RX ADMIN — Medication 100 MILLIEQUIVALENT(S): at 21:34

## 2017-05-31 RX ADMIN — Medication 100 MILLIEQUIVALENT(S): at 08:55

## 2017-05-31 RX ADMIN — Medication 3 MILLILITER(S): at 20:22

## 2017-05-31 RX ADMIN — Medication 20 MILLIGRAM(S): at 17:00

## 2017-05-31 RX ADMIN — PIPERACILLIN AND TAZOBACTAM 25 GRAM(S): 4; .5 INJECTION, POWDER, LYOPHILIZED, FOR SOLUTION INTRAVENOUS at 14:26

## 2017-05-31 RX ADMIN — Medication 250 MILLIGRAM(S): at 17:37

## 2017-05-31 RX ADMIN — SODIUM CHLORIDE 110 MILLILITER(S): 9 INJECTION, SOLUTION INTRAVENOUS at 06:27

## 2017-05-31 RX ADMIN — PIPERACILLIN AND TAZOBACTAM 25 GRAM(S): 4; .5 INJECTION, POWDER, LYOPHILIZED, FOR SOLUTION INTRAVENOUS at 21:35

## 2017-05-31 RX ADMIN — Medication 100 MILLIEQUIVALENT(S): at 12:00

## 2017-05-31 RX ADMIN — Medication 100 MILLIEQUIVALENT(S): at 21:35

## 2017-05-31 RX ADMIN — Medication 1 MILLIGRAM(S): at 13:46

## 2017-05-31 RX ADMIN — Medication 100 MILLIGRAM(S): at 05:31

## 2017-05-31 RX ADMIN — PANTOPRAZOLE SODIUM 40 MILLIGRAM(S): 20 TABLET, DELAYED RELEASE ORAL at 13:46

## 2017-05-31 RX ADMIN — Medication 125 MILLIGRAM(S): at 05:31

## 2017-05-31 RX ADMIN — Medication 50 MILLIGRAM(S): at 21:36

## 2017-05-31 RX ADMIN — Medication 6 MILLIGRAM(S): at 21:36

## 2017-05-31 RX ADMIN — HEPARIN SODIUM 1500 UNIT(S)/HR: 5000 INJECTION INTRAVENOUS; SUBCUTANEOUS at 01:36

## 2017-05-31 RX ADMIN — NYSTATIN CREAM 1 APPLICATION(S): 100000 CREAM TOPICAL at 17:26

## 2017-05-31 RX ADMIN — Medication 125 MILLIGRAM(S): at 17:37

## 2017-05-31 RX ADMIN — Medication 50 MILLIGRAM(S): at 05:32

## 2017-05-31 RX ADMIN — Medication 5 MILLIGRAM(S): at 16:30

## 2017-05-31 NOTE — PROGRESS NOTE ADULT - SUBJECTIVE AND OBJECTIVE BOX
INTERVAL HPI/OVERNIGHT EVENTS/SUBJECTIVE: Patient seen and examined this am resting comfortable in bed. Overnight patient spiked fever to 102    ICU Vital Signs Last 24 Hrs  T(C): 37.4, Max: 39.2 (05-30 @ 16:00)  T(F): 99.3, Max: 102.6 (05-30 @ 16:00)  HR: 106 (88 - 143)  BP: 118/85 (108/68 - 144/88)  BP(mean): 96 (80 - 99)  ABP: --  ABP(mean): --  RR: 28 (8 - 36)  SpO2: 97% (92% - 100%)      I&O's Detail  I & Os for 24h ending 31 May 2017 07:00  =============================================  IN:    multiple electrolytes Injection Type 1: 1100 ml    Pivot: 1020 ml    Lactated Ringers IV Bolus: 1000 ml    Solution: 750 ml    Solution: 250 ml    heparin  Infusion.: 236 ml    Solution: 200 ml    heparin  Infusion.: 105 ml    Solution: 100 ml    Solution: 100 ml    Total IN: 4861 ml  ---------------------------------------------  OUT:    Indwelling Catheter - Urethral: 2055 ml    Drain: 30 ml    Total OUT: 2085 ml  ---------------------------------------------  Total NET: 2776 ml    I & Os for current day (as of 31 May 2017 10:35)  =============================================  IN:    Total IN: 0 ml  ---------------------------------------------  OUT:    Indwelling Catheter - Urethral: 275 ml    Total OUT: 275 ml  ---------------------------------------------  Total NET: -275 ml        ABG - ( 31 May 2017 00:25 )  pH: 7.53  /  pCO2: 32    /  pO2: 81    / HCO3: 28    / Base Excess: 3.7   /  SaO2: 98                  MEDICATIONS  (STANDING):  nystatin Powder 1Application(s) Topical two times a day  ALBUTerol/ipratropium for Nebulization 3milliLiter(s) Nebulizer every 6 hours  folic acid Injectable 1milliGRAM(s) IV Push daily  thiamine 100milliGRAM(s) Oral daily  amantadine Syrup 100milliGRAM(s) Oral <User Schedule>  valproic  acid Syrup 125milliGRAM(s) Oral every 12 hours  melatonin 6milliGRAM(s) Oral at bedtime  aspirin 325milliGRAM(s) Oral daily  metoprolol 50milliGRAM(s) Oral every 8 hours  pantoprazole  Injectable 40milliGRAM(s) IV Push daily  piperacillin/tazobactam IVPB. 3.375Gram(s) IV Intermittent every 8 hours  digoxin  Injectable 0.25milliGRAM(s) IV Push daily  multiple electrolytes Injection Type 1 1000milliLiter(s) IV Continuous <Continuous>  heparin  Infusion. 1500Unit(s)/Hr IV Continuous <Continuous>  potassium chloride  10 mEq/100 mL IVPB 10milliEquivalent(s) IV Intermittent every 1 hour  vancomycin  IVPB 1000milliGRAM(s) IV Intermittent every 12 hours  furosemide    Tablet 20milliGRAM(s) Oral daily  traZODone 50milliGRAM(s) Oral at bedtime    MEDICATIONS  (PRN):  ALBUTerol    0.083% 2.5milliGRAM(s) Nebulizer every 2 hours PRN Shortness of Breath and/or Wheezing  heparin  Injectable 7500Unit(s) IV Push every 6 hours PRN For aPTT less than 40  heparin  Injectable 3500Unit(s) IV Push every 6 hours PRN For aPTT between 40 - 57      PRESSORS:    ANTIBIOTICS:                  DATE STARTED:    CENTRAL LINE:   Date Inserted:  CVP:  SCVO2:    A-LINE:     Date Inserted:   SVV:  CO/CI:     Physical Exam:    Gen:     Eyes: PERRL, EOMI     Neurological: Alert and oriented, no focal deficit    Neck: No JVD, trachea midline    Pulmonary: CTAB, decreased breath sounds at the bases b/l    Cardiovascular: S1S2    Gastrointestinal: Soft, NT/ND    Extremities: without c/c/e    Skin: Intact    Musculoskeletal: FROM without pain    LABS:  CBC Full  -  ( 31 May 2017 05:48 )  WBC Count : 13.8 K/uL  Hemoglobin : 7.7 g/dL  Hematocrit : 25.2 %  Platelet Count - Automated : 497 K/uL  Mean Cell Volume : 93.7 fl  Mean Cell Hemoglobin : 28.6 pg  Mean Cell Hemoglobin Concentration : 30.6 g/dL  Auto Neutrophil # : x  Auto Lymphocyte # : x  Auto Monocyte # : x  Auto Eosinophil # : x  Auto Basophil # : x  Auto Neutrophil % : 85.0 %  Auto Lymphocyte % : 8.0 %  Auto Monocyte % : 6.0 %  Auto Eosinophil % : x  Auto Basophil % : x    05-31    144  |  102  |  52.0<H>  ----------------------------<  140<H>  3.2<L>   |  26.0  |  1.02    Ca    8.0<L>      31 May 2017 05:48  Phos  2.8     05-31  Mg     2.3     05-31    TPro  6.7  /  Alb  3.1<L>  /  TBili  1.0  /  DBili  x   /  AST  47<H>  /  ALT  44<H>  /  AlkPhos  103  05-31    PT/INR - ( 30 May 2017 17:11 )   PT: 16.9 sec;   INR: 1.52 ratio         PTT - ( 31 May 2017 05:48 )  PTT:80.9 sec    RECENT CULTURES:      LIVER FUNCTIONS - ( 31 May 2017 05:48 )  Alb: 3.1 g/dL / Pro: 6.7 g/dL / ALK PHOS: 103 U/L / ALT: 44 U/L / AST: 47 U/L / GGT: x               CAPILLARY BLOOD GLUCOSE        ASSESSMENT/PLAN:  51y Male    Neuro:     CV:     Pulm:     GI/Nutrition:     /Renal:     ID:     Endo:     Skin: Repositioning for DTI prevention    DVT Prophylaxis: SCDs    Lines/Tubes:     Dispo: INTERVAL HPI/OVERNIGHT EVENTS/SUBJECTIVE: Patient seen and examined this am resting comfortable in bed. Overnight patient spiked fever to 102 and development of lactate. Fluid bolus given.    ICU Vital Signs Last 24 Hrs  T(C): 37.4, Max: 39.2 (05-30 @ 16:00)  T(F): 99.3, Max: 102.6 (05-30 @ 16:00)  HR: 106 (88 - 143)  BP: 118/85 (108/68 - 144/88)  BP(mean): 96 (80 - 99)  ABP: --  ABP(mean): --  RR: 28 (8 - 36)  SpO2: 97% (92% - 100%)      I&O's Detail  I & Os for 24h ending 31 May 2017 07:00  =============================================  IN:    multiple electrolytes Injection Type 1: 1100 ml    Pivot: 1020 ml    Lactated Ringers IV Bolus: 1000 ml    Solution: 750 ml    Solution: 250 ml    heparin  Infusion.: 236 ml    Solution: 200 ml    heparin  Infusion.: 105 ml    Solution: 100 ml    Solution: 100 ml    Total IN: 4861 ml  ---------------------------------------------  OUT:    Indwelling Catheter - Urethral: 2055 ml    Drain: 30 ml    Total OUT: 2085 ml  ---------------------------------------------  Total NET: 2776 ml    I & Os for current day (as of 31 May 2017 10:35)  =============================================  IN:    Total IN: 0 ml  ---------------------------------------------  OUT:    Indwelling Catheter - Urethral: 275 ml    Total OUT: 275 ml  ---------------------------------------------  Total NET: -275 ml        ABG - ( 31 May 2017 00:25 )  pH: 7.53  /  pCO2: 32    /  pO2: 81    / HCO3: 28    / Base Excess: 3.7   /  SaO2: 98                  MEDICATIONS  (STANDING):  nystatin Powder 1Application(s) Topical two times a day  ALBUTerol/ipratropium for Nebulization 3milliLiter(s) Nebulizer every 6 hours  folic acid Injectable 1milliGRAM(s) IV Push daily  thiamine 100milliGRAM(s) Oral daily  amantadine Syrup 100milliGRAM(s) Oral <User Schedule>  valproic  acid Syrup 125milliGRAM(s) Oral every 12 hours  melatonin 6milliGRAM(s) Oral at bedtime  aspirin 325milliGRAM(s) Oral daily  metoprolol 50milliGRAM(s) Oral every 8 hours  pantoprazole  Injectable 40milliGRAM(s) IV Push daily  piperacillin/tazobactam IVPB. 3.375Gram(s) IV Intermittent every 8 hours  digoxin  Injectable 0.25milliGRAM(s) IV Push daily  multiple electrolytes Injection Type 1 1000milliLiter(s) IV Continuous <Continuous>  heparin  Infusion. 1500Unit(s)/Hr IV Continuous <Continuous>  potassium chloride  10 mEq/100 mL IVPB 10milliEquivalent(s) IV Intermittent every 1 hour  vancomycin  IVPB 1000milliGRAM(s) IV Intermittent every 12 hours  furosemide    Tablet 20milliGRAM(s) Oral daily  traZODone 50milliGRAM(s) Oral at bedtime    MEDICATIONS  (PRN):  ALBUTerol    0.083% 2.5milliGRAM(s) Nebulizer every 2 hours PRN Shortness of Breath and/or Wheezing  heparin  Injectable 7500Unit(s) IV Push every 6 hours PRN For aPTT less than 40  heparin  Injectable 3500Unit(s) IV Push every 6 hours PRN For aPTT between 40 - 57      PRESSORS:    ANTIBIOTICS:   [email or web address]/vanco               DATE STARTED: 5/30    Physical Exam:    Gen: NAD    Eyes: PERRL, EOMI     Neurological: Occasionally following. tracks     Neck: No JVD, trachea midline    Pulmonary: CTAB, decreased breath sounds at the bases b/l    Cardiovascular: S1S2    Gastrointestinal: Soft, NT/ND    Extremities: without c/c/e    Skin: Intact    Musculoskeletal: FROM without pain    LABS:  CBC Full  -  ( 31 May 2017 05:48 )  WBC Count : 13.8 K/uL  Hemoglobin : 7.7 g/dL  Hematocrit : 25.2 %  Platelet Count - Automated : 497 K/uL  Mean Cell Volume : 93.7 fl  Mean Cell Hemoglobin : 28.6 pg  Mean Cell Hemoglobin Concentration : 30.6 g/dL  Auto Neutrophil # : x  Auto Lymphocyte # : x  Auto Monocyte # : x  Auto Eosinophil # : x  Auto Basophil # : x  Auto Neutrophil % : 85.0 %  Auto Lymphocyte % : 8.0 %  Auto Monocyte % : 6.0 %  Auto Eosinophil % : x  Auto Basophil % : x    05-31    144  |  102  |  52.0<H>  ----------------------------<  140<H>  3.2<L>   |  26.0  |  1.02    Ca    8.0<L>      31 May 2017 05:48  Phos  2.8     05-31  Mg     2.3     05-31    TPro  6.7  /  Alb  3.1<L>  /  TBili  1.0  /  DBili  x   /  AST  47<H>  /  ALT  44<H>  /  AlkPhos  103  05-31    PT/INR - ( 30 May 2017 17:11 )   PT: 16.9 sec;   INR: 1.52 ratio         PTT - ( 31 May 2017 05:48 )  PTT:80.9 sec    RECENT CULTURES:      LIVER FUNCTIONS - ( 31 May 2017 05:48 )  Alb: 3.1 g/dL / Pro: 6.7 g/dL / ALK PHOS: 103 U/L / ALT: 44 U/L / AST: 47 U/L / GGT: x               CAPILLARY BLOOD GLUCOSE        ASSESSMENT/PLAN:  51y Male    Neuro: start trazadone QHS    CV: Amio, EKG checked    Pulm: Pulm toilet, suction    GI/Nutrition: Pivot at goal, KUB confirmed unlikely Stent break    /Renal: Rogers, lasix restarted as well as IVF    ID: Follow up cultures     Endo: PRN    Skin: Repositioning for DTI prevention    DVT Prophylaxis: SCDs, hep gtt -> ptt 80.9    Lines/Tubes: Rogers for accurate I&O    Dispo: patient discussed with Dr Yin

## 2017-05-31 NOTE — CONSULT NOTE ADULT - PROBLEM SELECTOR RECOMMENDATION 5
Met with wife and brother. Introduced PC services. They had no questions for me.  Will monitor patient's progress. Further goals of care discussions as hospital course transpires. Continued support

## 2017-05-31 NOTE — PHYSICAL THERAPY INITIAL EVALUATION ADULT - PERTINENT HX OF CURRENT PROBLEM, REHAB EVAL
Pt is a 50 y/o male who was discharged home after lap cholecystectomy and returned to hospital with c/o abdominal pain. Pt went septic 2/2 bile duct leak which was complicated by cardiac arrest and now critical illness myopathy vs. anoxic brain injury vs. encephalopathy.

## 2017-05-31 NOTE — CONSULT NOTE ADULT - ASSESSMENT
51yr man, s/p lap cholecystecomy, found to have bile duct leak. Underwent an ERCP and had cardiac arrest.

## 2017-05-31 NOTE — PROGRESS NOTE ADULT - SUBJECTIVE AND OBJECTIVE BOX
INTERVAL HISTORY:  Seen at bedside and no new changes.    No Known Allergies      VITAL SIGNS:  Vital Signs Last 24 Hrs  T(C): 37.4, Max: 39.2 (05-30 @ 16:00)  T(F): 99.3, Max: 102.6 (05-30 @ 16:00)  HR: 121 (88 - 143)  BP: 117/83 (108/63 - 144/88)  BP(mean): 97 (80 - 99)  RR: 35 (8 - 36)  SpO2: 96% (92% - 100%)    PHYSICAL EXAMINATION:  General: Well-developed, well nourished, in no acute distress.  Eyes: Conjunctiva and sclera clear.  Neurologic:  - Mental Status: Awake; Alert; follows minimally throughout; tracks visually; non verbal.  Cranial Nerves II-XII:  Pupil R 4mm reactive, L pupil 4mm reactive; no facial; + corneal; not cooperative for [facility]; + cough to suction; + dolls;   - Motor:  Moves all 4 ext spontaneously.  - Reflexes:  2+ and symmetric throughout.  Plantars withdrawal b/l.  - Sensory:  Withdraws to noxious throughout  - Coordination:  Pt unable.  - Gait: Deferred.    MEDS:  MEDICATIONS  (STANDING):  nystatin Powder 1Application(s) Topical two times a day  ALBUTerol/ipratropium for Nebulization 3milliLiter(s) Nebulizer every 6 hours  folic acid Injectable 1milliGRAM(s) IV Push daily  thiamine 100milliGRAM(s) Oral daily  amantadine Syrup 100milliGRAM(s) Oral <User Schedule>  valproic  acid Syrup 125milliGRAM(s) Oral every 12 hours  melatonin 6milliGRAM(s) Oral at bedtime  aspirin 325milliGRAM(s) Oral daily  metoprolol 50milliGRAM(s) Oral every 8 hours  pantoprazole  Injectable 40milliGRAM(s) IV Push daily  piperacillin/tazobactam IVPB. 3.375Gram(s) IV Intermittent every 8 hours  digoxin  Injectable 0.25milliGRAM(s) IV Push daily  multiple electrolytes Injection Type 1 1000milliLiter(s) IV Continuous <Continuous>  heparin  Infusion. 1500Unit(s)/Hr IV Continuous <Continuous>  vancomycin  IVPB 1000milliGRAM(s) IV Intermittent every 12 hours  furosemide    Tablet 20milliGRAM(s) Oral daily  traZODone 50milliGRAM(s) Oral at bedtime    MEDICATIONS  (PRN):  ALBUTerol    0.083% 2.5milliGRAM(s) Nebulizer every 2 hours PRN Shortness of Breath and/or Wheezing  heparin  Injectable 7500Unit(s) IV Push every 6 hours PRN For aPTT less than 40  heparin  Injectable 3500Unit(s) IV Push every 6 hours PRN For aPTT between 40 - 57      LABS:                          7.7    13.8  )-----------( 497      ( 31 May 2017 05:48 )             25.2     05-31    144  |  102  |  52.0<H>  ----------------------------<  140<H>  3.2<L>   |  26.0  |  1.02    Ca    8.0<L>      31 May 2017 05:48  Phos  2.8     05-31  Mg     2.3     05-31    TPro  6.7  /  Alb  3.1<L>  /  TBili  1.0  /  DBili  x   /  AST  47<H>  /  ALT  44<H>  /  AlkPhos  103  05-31    LIVER FUNCTIONS - ( 31 May 2017 05:48 )  Alb: 3.1 g/dL / Pro: 6.7 g/dL / ALK PHOS: 103 U/L / ALT: 44 U/L / AST: 47 U/L / GGT: x               IMPRESSION:  Anisocoria resolved.  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

## 2017-05-31 NOTE — CHART NOTE - NSCHARTNOTEFT_GEN_A_CORE
Called to bedside for increased work of breathing, patient acutely tachypneic, and with very labored breathing, tachycardic to the 120's with blood pressure of 124/50. A Large pool of melena seen underneath patient. Heparin was stopped and he was emergently intubated. 2 units of PRBCs ordered along with a full set of labs and a protonix gtt. Dr. De was at bedside for events.    A/P: Respiratory failure likely from acute upper GI bleed  -Transfuse packed cells now, resusitate as appropriate  -FU labs, metabolic endpoints  -ABG now  -Will NGT lavage  -will call GI (dr. orr already involved in care of the patient)

## 2017-05-31 NOTE — CONSULT NOTE ADULT - SUBJECTIVE AND OBJECTIVE BOX
Palliative Medicine Initial Consultation Note    HPI:  51 yr man, hx cardiomyopathy, Afib, s/p lap cholecystectomy, found to have bile leak. ERCP done 5/9/17 found to have stenosed papilla. Patient became hypotensive during procedures requiring CPR   and intubation.  ACS performed ex-lap. Patient underwent repeat ERCP 5/10 with biliary sphincterectomy and stent placement.     PERTINENT PMH REVIEWED:  [x ] YES [ ] NO         Asthma    Atrial fibrillation    CAD (coronary artery disease)    Cardiomyopathy, nonischemic    Mitral regurgitation  severe MR.    Past Surgical History:  Artificial pacemaker    History of humerus fracture  Metal pins in Left Humerus  S/P laparoscopic cholecystectomy.      Social History              [ ] smoker [ x] nonsmoker                                    Admitted from: [x ] home [ ] SNF _________ [ ] HEATHER ________    Surrogate/HCP/Guardian: [ ] YES [ ] NO                                Phone#:    FAMILY HISTORY:  No pertinent family history in first degree relatives      Baseline ADLs (prior to admission):  Independent [ ] moderately [x ] fully   Dependent   [ ] moderately [ ]fully    MEDICATIONS  (STANDING):  nystatin Powder 1Application(s) Topical two times a day  ALBUTerol/ipratropium for Nebulization 3milliLiter(s) Nebulizer every 6 hours  folic acid Injectable 1milliGRAM(s) IV Push daily  thiamine 100milliGRAM(s) Oral daily  amantadine Syrup 100milliGRAM(s) Oral <User Schedule>  valproic  acid Syrup 125milliGRAM(s) Oral every 12 hours  melatonin 6milliGRAM(s) Oral at bedtime  aspirin 325milliGRAM(s) Oral daily  metoprolol 50milliGRAM(s) Oral every 8 hours  pantoprazole  Injectable 40milliGRAM(s) IV Push daily  piperacillin/tazobactam IVPB. 3.375Gram(s) IV Intermittent every 8 hours  digoxin  Injectable 0.25milliGRAM(s) IV Push daily  multiple electrolytes Injection Type 1 1000milliLiter(s) IV Continuous <Continuous>  heparin  Infusion. 1500Unit(s)/Hr IV Continuous <Continuous>  vancomycin  IVPB 1000milliGRAM(s) IV Intermittent every 12 hours  furosemide    Tablet 20milliGRAM(s) Oral daily  traZODone 50milliGRAM(s) Oral at bedtime    MEDICATIONS  (PRN):  heparin  Injectable 7500Unit(s) IV Push every 6 hours PRN For aPTT less than 40  heparin  Injectable 3500Unit(s) IV Push every 6 hours PRN For aPTT between 40 - 57      Allergies    No Known Allergies    Intolerances        REVIEW OF SYSTEMS See HPI       [ x] Unable to obtain due to poor mentation     Karnofsky Performance Score/Palliative Performance Status Version 2: 30        %    Vital Signs Last 24 Hrs  T(C): 37.6, Max: 39.2 (05-30 @ 16:00)  T(F): 99.7, Max: 102.6 (05-30 @ 16:00)  HR: 128 (88 - 143)  BP: 121/88 (108/63 - 144/88)  BP(mean): 97 (80 - 99)  RR: 38 (8 - 38)  SpO2: 92% (92% - 100%)    PHYSICAL EXAM:    General: [ ] alert  [ ] oriented x ____ [ ] lethargic [ ] agitated                  [ ] cachexia  [ ] nonverbal  [ ] coma    HEENT: [ ] normal  [ ] dry mouth  [ ] ET tube/trach    Lungs: [ ] comfortable [ ] tachypnea/labored breathing  [ ] excessive secretions    CV: [ ] normal  [ ] tachycardia    GI: [ ] normal  [ ] distended  [ ] tender  [ ] no BS               [ ] PEG/NG/OG tube    : [ ] normal  [ ] incontinent  [ ] oliguria/anuria  [ ] montoya    MSK: [ ] normal  [ ] weakness  [ ] edema             [ ] ambulatory  [ ] bedbound/wheelchair bound    Skin: [ ] normal  [ ] pressure ulcers- Stage_____  [ ] no rash    LABS:                        7.7    13.8  )-----------( 497      ( 31 May 2017 05:48 )             25.2     05-31    144  |  102  |  52.0<H>  ----------------------------<  140<H>  3.2<L>   |  26.0  |  1.02    Ca    8.0<L>      31 May 2017 05:48  Phos  2.8     05-31  Mg     2.3     05-31    TPro  6.7  /  Alb  3.1<L>  /  TBili  1.0  /  DBili  x   /  AST  47<H>  /  ALT  44<H>  /  AlkPhos  103  05-31    PT/INR - ( 30 May 2017 17:11 )   PT: 16.9 sec;   INR: 1.52 ratio         PTT - ( 31 May 2017 05:48 )  PTT:80.9 sec    I&O's Summary  I & Os for 24h ending 31 May 2017 07:00  =============================================  IN: 4861 ml / OUT: 2085 ml / NET: 2776 ml    I & Os for current day (as of 31 May 2017 15:47)  =============================================  IN: 880 ml / OUT: 275 ml / NET: 605 ml      RADIOLOGY & ADDITIONAL STUDIES:    ASSESSMENT and PLAN:    ADVANCE DIRECTIVES:  [ ] YES [ ] NO   DNR [ ] YES [ ] NO  Completed on:                     MOLST  [ ] YES [ ] NO   Completed on:  Living Will  [ ] YES [ ] NO   Completed on:    COUNSELING:  Advanced Care Planning Discussion - Time Spent ______Minutes.   More than 50% time spent in counseling and coordinating care. ______ Minutes.     Thank you for the opportunity to assist with the care of this patient.   [facility] Consult Service [phone]. Palliative Medicine Initial Consultation Note    HPI:  51 yr man, hx cardiomyopathy, Afib, s/p lap cholecystectomy, found to have bile leak. ERCP done 5/9/17 found to have stenosed papilla. Patient became hypotensive during procedures requiring CPR   and intubation.  ACS performed ex-lap. Patient underwent repeat ERCP 5/10 with biliary sphincterectomy and stent placement.     PERTINENT PMH REVIEWED:  [x ] YES [ ] NO         Asthma    Atrial fibrillation    CAD (coronary artery disease)    Cardiomyopathy, nonischemic    Mitral regurgitation  severe MR.    Past Surgical History:  Artificial pacemaker    History of humerus fracture  Metal pins in Left Humerus  S/P laparoscopic cholecystectomy.      Social History              [ ] smoker [ x] nonsmoker                                    Admitted from: [x ] home [ ] SNF _________ [ ] HEATHER ________    Surrogate/HCP/Guardian: [ ] YES [ ] NO                                Phone#:    FAMILY HISTORY:  No pertinent family history in first degree relatives      Baseline ADLs (prior to admission):  Independent [ ] moderately [x ] fully   Dependent   [ ] moderately [ ]fully    MEDICATIONS  (STANDING):  nystatin Powder 1Application(s) Topical two times a day  ALBUTerol/ipratropium for Nebulization 3milliLiter(s) Nebulizer every 6 hours  folic acid Injectable 1milliGRAM(s) IV Push daily  thiamine 100milliGRAM(s) Oral daily  amantadine Syrup 100milliGRAM(s) Oral <User Schedule>  valproic  acid Syrup 125milliGRAM(s) Oral every 12 hours  melatonin 6milliGRAM(s) Oral at bedtime  aspirin 325milliGRAM(s) Oral daily  metoprolol 50milliGRAM(s) Oral every 8 hours  pantoprazole  Injectable 40milliGRAM(s) IV Push daily  piperacillin/tazobactam IVPB. 3.375Gram(s) IV Intermittent every 8 hours  digoxin  Injectable 0.25milliGRAM(s) IV Push daily  multiple electrolytes Injection Type 1 1000milliLiter(s) IV Continuous <Continuous>  heparin  Infusion. 1500Unit(s)/Hr IV Continuous <Continuous>  vancomycin  IVPB 1000milliGRAM(s) IV Intermittent every 12 hours  furosemide    Tablet 20milliGRAM(s) Oral daily  traZODone 50milliGRAM(s) Oral at bedtime    MEDICATIONS  (PRN):  heparin  Injectable 7500Unit(s) IV Push every 6 hours PRN For aPTT less than 40  heparin  Injectable 3500Unit(s) IV Push every 6 hours PRN For aPTT between 40 - 57      Allergies    No Known Allergies    Intolerances        REVIEW OF SYSTEMS See HPI       [ x] Unable to obtain due to poor mentation     Karnofsky Performance Score/Palliative Performance Status Version 2: 30        %    Vital Signs Last 24 Hrs  T(C): 37.6, Max: 39.2 (05-30 @ 16:00)  T(F): 99.7, Max: 102.6 (05-30 @ 16:00)  HR: 128 (88 - 143)  BP: 121/88 (108/63 - 144/88)  BP(mean): 97 (80 - 99)  RR: 38 (8 - 38)  SpO2: 92% (92% - 100%)    PHYSICAL EXAM:    General: NAD, makes eye contact, not verbalizing    HEENT: [x ] normal  [ ] dry mouth  [ ] ET tube/trach    Lungs: [x ] comfortable [ ] tachypnea/labored breathing  [ ] excessive secretions    CV: [ x] normal  [ ] tachycardia    GI:soft, non tender    MSK: [ ] normal  [x ] weakness  [ ] edema             [ ] ambulatory  [ ] bedbound/wheelchair bound    Skin: [ ] normal  [ ] pressure ulcers- Stage_____  [ x] no rash    LABS:                        7.7    13.8  )-----------( 497      ( 31 May 2017 05:48 )             25.2     05-31    144  |  102  |  52.0<H>  ----------------------------<  140<H>  3.2<L>   |  26.0  |  1.02    Ca    8.0<L>      31 May 2017 05:48  Phos  2.8     05-31  Mg     2.3     05-31    TPro  6.7  /  Alb  3.1<L>  /  TBili  1.0  /  DBili  x   /  AST  47<H>  /  ALT  44<H>  /  AlkPhos  103  05-31    PT/INR - ( 30 May 2017 17:11 )   PT: 16.9 sec;   INR: 1.52 ratio         PTT - ( 31 May 2017 05:48 )  PTT:80.9 sec    I&O's Summary  I & Os for 24h ending 31 May 2017 07:00  =============================================  IN: 4861 ml / OUT: 2085 ml / NET: 2776 ml    I & Os for current day (as of 31 May 2017 15:47)  =============================================  IN: 880 ml / OUT: 275 ml / NET: 605 ml      Thank you for the opportunity to assist with the care of this patient.   [facility] Consult Service [phone].

## 2017-05-31 NOTE — PROGRESS NOTE ADULT - ASSESSMENT
IMPRESSION:  The patient is a 51 year old male with significant cardiovascular history including atrial fibrillation and cardiomyopathy s/p laparoscopic cholecystectomy. GI f/u for bile leak.   - first ercp 5/9/17: papilla appeared to be stenosed. During attempts at biliary cannulation, patient developed hypotension. Procedure aborted. Patient coded. Intubated. Patient resuscitated. ACS team took over, and performed exploratory laparotomy.  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   - Intubated. LFTs improving.   - follows verbal commands  - CT abdomen done today as part of w/u for fever showed possibility of cbd stent break. reviewed images with radiologist- cbd stent is patent. AXR confirmed patency of cbd stent    PLAN:  IV antibiotics  SICU monitoring  monitor cbc, serum lytes, and LFTs  will f/u  d/w surgery team

## 2017-05-31 NOTE — PROGRESS NOTE ADULT - SUBJECTIVE AND OBJECTIVE BOX
INTERVAL HPI/OVERNIGHT EVENTS:  Patient seen and examined    MEDICATIONS  (STANDING):  nystatin Powder 1Application(s) Topical two times a day  ALBUTerol/ipratropium for Nebulization 3milliLiter(s) Nebulizer every 6 hours  folic acid Injectable 1milliGRAM(s) IV Push daily  thiamine 100milliGRAM(s) Oral daily  amantadine Syrup 100milliGRAM(s) Oral <User Schedule>  valproic  acid Syrup 125milliGRAM(s) Oral every 12 hours  melatonin 6milliGRAM(s) Oral at bedtime  aspirin 325milliGRAM(s) Oral daily  metoprolol 50milliGRAM(s) Oral every 8 hours  pantoprazole  Injectable 40milliGRAM(s) IV Push daily  piperacillin/tazobactam IVPB. 3.375Gram(s) IV Intermittent every 8 hours  digoxin  Injectable 0.25milliGRAM(s) IV Push daily  multiple electrolytes Injection Type 1 1000milliLiter(s) IV Continuous <Continuous>  heparin  Infusion. 1500Unit(s)/Hr IV Continuous <Continuous>  vancomycin  IVPB 1000milliGRAM(s) IV Intermittent every 12 hours  furosemide    Tablet 20milliGRAM(s) Oral daily  traZODone 50milliGRAM(s) Oral at bedtime    MEDICATIONS  (PRN):  heparin  Injectable 7500Unit(s) IV Push every 6 hours PRN For aPTT less than 40  heparin  Injectable 3500Unit(s) IV Push every 6 hours PRN For aPTT between 40 - 57      Allergies    No Known Allergies    Intolerances        Vital Signs Last 24 Hrs  T(C): 37.6, Max: 39.2 (05-30 @ 16:00)  T(F): 99.7, Max: 102.6 (05-30 @ 16:00)  HR: 120 (88 - 143)  BP: 124/78 (108/63 - 144/88)  BP(mean): 88 (80 - 99)  RR: 37 (8 - 37)  SpO2: 96% (92% - 100%)    PHYSICAL EXAM:  General: NAD.  CVS: S1, S2  Chest: air entry bilaterally present  Abd: BS present, soft, non-tender      LABS:                        7.7    13.8  )-----------( 497      ( 31 May 2017 05:48 )             25.2     05-31    144  |  102  |  52.0<H>  ----------------------------<  140<H>  3.2<L>   |  26.0  |  1.02    Ca    8.0<L>      31 May 2017 05:48  Phos  2.8     05-31  Mg     2.3     05-31    TPro  6.7  /  Alb  3.1<L>  /  TBili  1.0  /  DBili  x   /  AST  47<H>  /  ALT  44<H>  /  AlkPhos  103  05-31    PT/INR - ( 30 May 2017 17:11 )   PT: 16.9 sec;   INR: 1.52 ratio         PTT - ( 31 May 2017 05:48 )  PTT:80.9 sec    RADIOLOGY & ADDITIONAL TESTS:

## 2017-06-01 DIAGNOSIS — J96.02 ACUTE RESPIRATORY FAILURE WITH HYPERCAPNIA: ICD-10-CM

## 2017-06-01 DIAGNOSIS — K92.2 GASTROINTESTINAL HEMORRHAGE, UNSPECIFIED: ICD-10-CM

## 2017-06-01 DIAGNOSIS — E87.2 ACIDOSIS: ICD-10-CM

## 2017-06-01 LAB
ALBUMIN SERPL ELPH-MCNC: 3.1 G/DL — LOW (ref 3.3–5.2)
ALP SERPL-CCNC: 130 U/L — HIGH (ref 40–120)
ALT FLD-CCNC: 56 U/L — HIGH
ANION GAP SERPL CALC-SCNC: 21 MMOL/L — HIGH (ref 5–17)
ANION GAP SERPL CALC-SCNC: 25 MMOL/L — HIGH (ref 5–17)
APTT BLD: 108.4 SEC — HIGH (ref 27.5–37.4)
AST SERPL-CCNC: 76 U/L — HIGH
BILIRUB SERPL-MCNC: 2 MG/DL — SIGNIFICANT CHANGE UP (ref 0.4–2)
BLD GP AB SCN SERPL QL: SIGNIFICANT CHANGE UP
BUN SERPL-MCNC: 42 MG/DL — HIGH (ref 8–20)
BUN SERPL-MCNC: 47 MG/DL — HIGH (ref 8–20)
CALCIUM SERPL-MCNC: 7.7 MG/DL — LOW (ref 8.6–10.2)
CALCIUM SERPL-MCNC: 8.1 MG/DL — LOW (ref 8.6–10.2)
CHLORIDE SERPL-SCNC: 93 MMOL/L — LOW (ref 98–107)
CHLORIDE SERPL-SCNC: 99 MMOL/L — SIGNIFICANT CHANGE UP (ref 98–107)
CO2 SERPL-SCNC: 21 MMOL/L — LOW (ref 22–29)
CO2 SERPL-SCNC: 22 MMOL/L — SIGNIFICANT CHANGE UP (ref 22–29)
CREAT SERPL-MCNC: 1.2 MG/DL — SIGNIFICANT CHANGE UP (ref 0.5–1.3)
CREAT SERPL-MCNC: 1.49 MG/DL — HIGH (ref 0.5–1.3)
GAS PNL BLDA: SIGNIFICANT CHANGE UP
GAS PNL BLDA: SIGNIFICANT CHANGE UP
GLUCOSE SERPL-MCNC: 108 MG/DL — SIGNIFICANT CHANGE UP (ref 70–115)
GLUCOSE SERPL-MCNC: 227 MG/DL — HIGH (ref 70–115)
HCT VFR BLD CALC: 30.3 % — LOW (ref 42–52)
HCT VFR BLD CALC: 31.4 % — LOW (ref 42–52)
HGB BLD-MCNC: 8.9 G/DL — LOW (ref 14–18)
HGB BLD-MCNC: 9.7 G/DL — LOW (ref 14–18)
INR BLD: 1.44 RATIO — HIGH (ref 0.88–1.16)
MAGNESIUM SERPL-MCNC: 2.2 MG/DL — SIGNIFICANT CHANGE UP (ref 1.8–2.6)
MAGNESIUM SERPL-MCNC: 2.4 MG/DL — SIGNIFICANT CHANGE UP (ref 1.6–2.6)
MCHC RBC-ENTMCNC: 29 PG — SIGNIFICANT CHANGE UP (ref 27–31)
MCHC RBC-ENTMCNC: 29 PG — SIGNIFICANT CHANGE UP (ref 27–31)
MCHC RBC-ENTMCNC: 29.4 G/DL — LOW (ref 32–36)
MCHC RBC-ENTMCNC: 30.9 G/DL — LOW (ref 32–36)
MCV RBC AUTO: 94 FL — SIGNIFICANT CHANGE UP (ref 80–94)
MCV RBC AUTO: 98.7 FL — HIGH (ref 80–94)
PHOSPHATE SERPL-MCNC: 3.9 MG/DL — SIGNIFICANT CHANGE UP (ref 2.4–4.7)
PHOSPHATE SERPL-MCNC: 4.9 MG/DL — HIGH (ref 2.4–4.7)
PLATELET # BLD AUTO: 415 K/UL — HIGH (ref 150–400)
PLATELET # BLD AUTO: 512 K/UL — HIGH (ref 150–400)
POTASSIUM SERPL-MCNC: 4.4 MMOL/L — SIGNIFICANT CHANGE UP (ref 3.5–5.3)
POTASSIUM SERPL-MCNC: 6.6 MMOL/L — CRITICAL HIGH (ref 3.5–5.3)
POTASSIUM SERPL-SCNC: 4.4 MMOL/L — SIGNIFICANT CHANGE UP (ref 3.5–5.3)
POTASSIUM SERPL-SCNC: 6.6 MMOL/L — CRITICAL HIGH (ref 3.5–5.3)
PROCALCITONIN SERPL-MCNC: 3.24 NG/ML — HIGH (ref 0–0.04)
PROT SERPL-MCNC: 6.8 G/DL — SIGNIFICANT CHANGE UP (ref 6.6–8.7)
PROTHROM AB SERPL-ACNC: 16 SEC — HIGH (ref 9.8–12.7)
RBC # BLD: 3.07 M/UL — LOW (ref 4.6–6.2)
RBC # BLD: 3.34 M/UL — LOW (ref 4.6–6.2)
RBC # FLD: 17.9 % — HIGH (ref 11–15.6)
RBC # FLD: 18.1 % — HIGH (ref 11–15.6)
SODIUM SERPL-SCNC: 139 MMOL/L — SIGNIFICANT CHANGE UP (ref 135–145)
SODIUM SERPL-SCNC: 142 MMOL/L — SIGNIFICANT CHANGE UP (ref 135–145)
TROPONIN T SERPL-MCNC: 0.11 NG/ML — HIGH (ref 0–0.06)
TROPONIN T SERPL-MCNC: 0.23 NG/ML — HIGH (ref 0–0.06)
TYPE + AB SCN PNL BLD: SIGNIFICANT CHANGE UP
VANCOMYCIN TROUGH SERPL-MCNC: 20.7 UG/ML — HIGH (ref 10–20)
WBC # BLD: 17.8 K/UL — HIGH (ref 4.8–10.8)
WBC # BLD: 22.5 K/UL — HIGH (ref 4.8–10.8)
WBC # FLD AUTO: 17.8 K/UL — HIGH (ref 4.8–10.8)
WBC # FLD AUTO: 22.5 K/UL — HIGH (ref 4.8–10.8)

## 2017-06-01 PROCEDURE — 93306 TTE W/DOPPLER COMPLETE: CPT | Mod: 26

## 2017-06-01 PROCEDURE — 71010: CPT | Mod: 26

## 2017-06-01 PROCEDURE — 99291 CRITICAL CARE FIRST HOUR: CPT

## 2017-06-01 PROCEDURE — 93970 EXTREMITY STUDY: CPT | Mod: 26

## 2017-06-01 PROCEDURE — 37191 INS ENDOVAS VENA CAVA FILTR: CPT

## 2017-06-01 PROCEDURE — 93010 ELECTROCARDIOGRAM REPORT: CPT

## 2017-06-01 RX ORDER — PROPOFOL 10 MG/ML
0.5 INJECTION, EMULSION INTRAVENOUS
Qty: 1000 | Refills: 0 | Status: DISCONTINUED | OUTPATIENT
Start: 2017-06-01 | End: 2017-06-01

## 2017-06-01 RX ORDER — FENTANYL CITRATE 50 UG/ML
100 INJECTION INTRAVENOUS ONCE
Qty: 0 | Refills: 0 | Status: DISCONTINUED | OUTPATIENT
Start: 2017-06-01 | End: 2017-06-01

## 2017-06-01 RX ORDER — HEPARIN SODIUM 5000 [USP'U]/ML
5000 INJECTION INTRAVENOUS; SUBCUTANEOUS EVERY 8 HOURS
Qty: 0 | Refills: 0 | Status: DISCONTINUED | OUTPATIENT
Start: 2017-06-01 | End: 2017-06-12

## 2017-06-01 RX ORDER — MEROPENEM 1 G/30ML
1000 INJECTION INTRAVENOUS EVERY 12 HOURS
Qty: 0 | Refills: 0 | Status: DISCONTINUED | OUTPATIENT
Start: 2017-06-01 | End: 2017-06-05

## 2017-06-01 RX ORDER — SODIUM CHLORIDE 9 MG/ML
1000 INJECTION, SOLUTION INTRAVENOUS
Qty: 0 | Refills: 0 | Status: DISCONTINUED | OUTPATIENT
Start: 2017-06-01 | End: 2017-06-01

## 2017-06-01 RX ORDER — ACETAMINOPHEN 500 MG
1000 TABLET ORAL ONCE
Qty: 0 | Refills: 0 | Status: COMPLETED | OUTPATIENT
Start: 2017-06-01 | End: 2017-06-01

## 2017-06-01 RX ORDER — MEROPENEM 1 G/30ML
1000 INJECTION INTRAVENOUS ONCE
Qty: 0 | Refills: 0 | Status: COMPLETED | OUTPATIENT
Start: 2017-06-01 | End: 2017-06-01

## 2017-06-01 RX ORDER — FENTANYL CITRATE 50 UG/ML
1 INJECTION INTRAVENOUS
Qty: 5000 | Refills: 0 | Status: DISCONTINUED | OUTPATIENT
Start: 2017-06-01 | End: 2017-06-01

## 2017-06-01 RX ORDER — CALCIUM GLUCONATE 100 MG/ML
2 VIAL (ML) INTRAVENOUS
Qty: 0 | Refills: 0 | Status: COMPLETED | OUTPATIENT
Start: 2017-06-01 | End: 2017-06-01

## 2017-06-01 RX ORDER — MEROPENEM 1 G/30ML
1000 INJECTION INTRAVENOUS EVERY 8 HOURS
Qty: 0 | Refills: 0 | Status: DISCONTINUED | OUTPATIENT
Start: 2017-06-01 | End: 2017-06-01

## 2017-06-01 RX ORDER — MEROPENEM 1 G/30ML
INJECTION INTRAVENOUS
Qty: 0 | Refills: 0 | Status: DISCONTINUED | OUTPATIENT
Start: 2017-06-01 | End: 2017-06-01

## 2017-06-01 RX ORDER — CHLORHEXIDINE GLUCONATE 213 G/1000ML
15 SOLUTION TOPICAL
Qty: 0 | Refills: 0 | Status: DISCONTINUED | OUTPATIENT
Start: 2017-06-01 | End: 2017-06-03

## 2017-06-01 RX ORDER — DEXMEDETOMIDINE HYDROCHLORIDE IN 0.9% SODIUM CHLORIDE 4 UG/ML
0.2 INJECTION INTRAVENOUS
Qty: 200 | Refills: 0 | Status: DISCONTINUED | OUTPATIENT
Start: 2017-06-01 | End: 2017-06-01

## 2017-06-01 RX ADMIN — Medication 400 MILLIGRAM(S): at 01:21

## 2017-06-01 RX ADMIN — PANTOPRAZOLE SODIUM 10 MG/HR: 20 TABLET, DELAYED RELEASE ORAL at 11:22

## 2017-06-01 RX ADMIN — Medication 100 MILLIGRAM(S): at 11:21

## 2017-06-01 RX ADMIN — Medication 200 GRAM(S): at 10:20

## 2017-06-01 RX ADMIN — Medication 50 MILLIGRAM(S): at 13:48

## 2017-06-01 RX ADMIN — Medication 100 MILLIGRAM(S): at 06:01

## 2017-06-01 RX ADMIN — Medication 3 MILLILITER(S): at 08:18

## 2017-06-01 RX ADMIN — Medication 0.25 MILLIGRAM(S): at 11:20

## 2017-06-01 RX ADMIN — FENTANYL CITRATE 4.7 MICROGRAM(S)/KG/HR: 50 INJECTION INTRAVENOUS at 04:10

## 2017-06-01 RX ADMIN — Medication 3 MILLILITER(S): at 02:52

## 2017-06-01 RX ADMIN — MEROPENEM 200 MILLIGRAM(S): 1 INJECTION INTRAVENOUS at 06:02

## 2017-06-01 RX ADMIN — MEROPENEM 200 MILLIGRAM(S): 1 INJECTION INTRAVENOUS at 01:19

## 2017-06-01 RX ADMIN — SODIUM CHLORIDE 100 MILLILITER(S): 9 INJECTION, SOLUTION INTRAVENOUS at 04:11

## 2017-06-01 RX ADMIN — PANTOPRAZOLE SODIUM 10 MG/HR: 20 TABLET, DELAYED RELEASE ORAL at 06:04

## 2017-06-01 RX ADMIN — Medication 200 GRAM(S): at 09:30

## 2017-06-01 RX ADMIN — MEROPENEM 200 MILLIGRAM(S): 1 INJECTION INTRAVENOUS at 13:48

## 2017-06-01 RX ADMIN — Medication 100 MILLIGRAM(S): at 11:20

## 2017-06-01 RX ADMIN — Medication 6 MILLIGRAM(S): at 22:22

## 2017-06-01 RX ADMIN — NYSTATIN CREAM 1 APPLICATION(S): 100000 CREAM TOPICAL at 06:01

## 2017-06-01 RX ADMIN — PANTOPRAZOLE SODIUM 10 MG/HR: 20 TABLET, DELAYED RELEASE ORAL at 18:31

## 2017-06-01 RX ADMIN — HEPARIN SODIUM 5000 UNIT(S): 5000 INJECTION INTRAVENOUS; SUBCUTANEOUS at 22:22

## 2017-06-01 RX ADMIN — Medication 3 MILLILITER(S): at 19:35

## 2017-06-01 RX ADMIN — DEXMEDETOMIDINE HYDROCHLORIDE IN 0.9% SODIUM CHLORIDE 4.7 MICROGRAM(S)/KG/HR: 4 INJECTION INTRAVENOUS at 01:19

## 2017-06-01 RX ADMIN — FENTANYL CITRATE 100 MICROGRAM(S): 50 INJECTION INTRAVENOUS at 01:19

## 2017-06-01 RX ADMIN — MEROPENEM 200 MILLIGRAM(S): 1 INJECTION INTRAVENOUS at 23:06

## 2017-06-01 RX ADMIN — Medication 50 MILLIGRAM(S): at 22:21

## 2017-06-01 RX ADMIN — FENTANYL CITRATE 100 MICROGRAM(S): 50 INJECTION INTRAVENOUS at 01:30

## 2017-06-01 RX ADMIN — DEXMEDETOMIDINE HYDROCHLORIDE IN 0.9% SODIUM CHLORIDE 4.7 MICROGRAM(S)/KG/HR: 4 INJECTION INTRAVENOUS at 06:04

## 2017-06-01 RX ADMIN — SODIUM CHLORIDE 100 MILLILITER(S): 9 INJECTION, SOLUTION INTRAVENOUS at 11:22

## 2017-06-01 RX ADMIN — DEXMEDETOMIDINE HYDROCHLORIDE IN 0.9% SODIUM CHLORIDE 4.7 MICROGRAM(S)/KG/HR: 4 INJECTION INTRAVENOUS at 04:11

## 2017-06-01 RX ADMIN — PANTOPRAZOLE SODIUM 10 MG/HR: 20 TABLET, DELAYED RELEASE ORAL at 22:24

## 2017-06-01 RX ADMIN — NYSTATIN CREAM 1 APPLICATION(S): 100000 CREAM TOPICAL at 18:14

## 2017-06-01 RX ADMIN — Medication 250 MILLIGRAM(S): at 06:02

## 2017-06-01 RX ADMIN — PANTOPRAZOLE SODIUM 10 MG/HR: 20 TABLET, DELAYED RELEASE ORAL at 01:19

## 2017-06-01 RX ADMIN — Medication 20 MILLIGRAM(S): at 06:05

## 2017-06-01 RX ADMIN — Medication 50 MILLIGRAM(S): at 22:22

## 2017-06-01 RX ADMIN — CHLORHEXIDINE GLUCONATE 15 MILLILITER(S): 213 SOLUTION TOPICAL at 18:13

## 2017-06-01 RX ADMIN — Medication 125 MILLIGRAM(S): at 06:01

## 2017-06-01 RX ADMIN — Medication 125 MILLIGRAM(S): at 18:14

## 2017-06-01 RX ADMIN — Medication 3 MILLILITER(S): at 16:36

## 2017-06-01 RX ADMIN — Medication 1 MILLIGRAM(S): at 18:31

## 2017-06-01 NOTE — BRIEF OPERATIVE NOTE - PRE-OP DX
Acute deep vein thrombosis (DVT) of right femoral vein  06/01/2017    Active  Galileo Mercado  Bile leak, postoperative  05/09/2017    Active  Shree Burciaga  Non-traumatic compartment syndrome of abdomen  05/09/2017    Active  Sue Christopher

## 2017-06-01 NOTE — PROGRESS NOTE ADULT - SUBJECTIVE AND OBJECTIVE BOX
INTERVAL HISTORY:  Seen at bedside; noted for intubation overnight and also for GI bleed.    No Known Allergies      VITAL SIGNS:  Vital Signs Last 24 Hrs  T(C): 38, Max: 39.1 (06-01 @ 04:00)  T(F): 100.4, Max: 102.4 (06-01 @ 04:00)  HR: 75 (74 - 131)  BP: 110/64 (96/68 - 156/100)  BP(mean): 79 (77 - 121)  RR: 20 (20 - 79)  SpO2: 100% (92% - 100%)    PHYSICAL EXAMINATION:  General: Intubated; disheveled  Eyes: Conjunctiva and sclera clear.  Neurologic:  - Mental Status: Intubated; non-verbal; doesn't follow.  Cranial Nerves II-XII:  Pupil R 4mm reactive, L pupil 4mm reactive; no facial; + corneal; not cooperative for VA; + cough to suction; + dolls;   - Motor:  No spontaneous activity.  - Reflexes:  1+ and symmetric throughout.  Plantars withdrawal b/l.  - Sensory:  Withdraws to noxious throughout  - Coordination:  Pt unable.  - Gait: Deferred.      MEDS:  MEDICATIONS  (STANDING):  nystatin Powder 1Application(s) Topical two times a day  ALBUTerol/ipratropium for Nebulization 3milliLiter(s) Nebulizer every 6 hours  folic acid Injectable 1milliGRAM(s) IV Push daily  thiamine 100milliGRAM(s) Oral daily  amantadine Syrup 100milliGRAM(s) Oral <User Schedule>  valproic  acid Syrup 125milliGRAM(s) Oral every 12 hours  melatonin 6milliGRAM(s) Oral at bedtime  metoprolol 50milliGRAM(s) Oral every 8 hours  digoxin  Injectable 0.25milliGRAM(s) IV Push daily  vancomycin  IVPB 1000milliGRAM(s) IV Intermittent every 12 hours  furosemide    Tablet 20milliGRAM(s) Oral daily  traZODone 50milliGRAM(s) Oral at bedtime  pantoprazole Infusion 8mG/Hr IV Continuous <Continuous>  meropenem IVPB 1000milliGRAM(s) IV Intermittent every 8 hours  meropenem IVPB  IV Intermittent   fentaNYL   Infusion 1MICROgram(s)/kG/Hr IV Continuous <Continuous>  dexmedetomidine Infusion 0.2MICROgram(s)/kG/Hr IV Continuous <Continuous>  multiple electrolytes Injection Type 1 1000milliLiter(s) IV Continuous <Continuous>  calcium gluconate IVPB 2Gram(s) IV Intermittent every 1 hour  chlorhexidine 0.12% Liquid 15milliLiter(s) Swish and Spit two times a day    MEDICATIONS  (PRN):      LABS:                          9.7    17.8  )-----------( 415      ( 01 Jun 2017 05:34 )             31.4     06-01    142  |  99  |  47.0<H>  ----------------------------<  108  4.4   |  22.0  |  1.49<H>    Ca    7.7<L>      01 Jun 2017 05:34  Phos  4.9     06-01  Mg     2.2     06-01    TPro  6.8  /  Alb  3.1<L>  /  TBili  2.0  /  DBili  x   /  AST  76<H>  /  ALT  56<H>  /  AlkPhos  130<H>  06-01    LIVER FUNCTIONS - ( 01 Jun 2017 05:34 )  Alb: 3.1 g/dL / Pro: 6.8 g/dL / ALK PHOS: 130 U/L / ALT: 56 U/L / AST: 76 U/L / GGT: x               RADIOLOGY & ADDITIONAL STUDIES:          IMPRESSION: Anisocoria; 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

## 2017-06-01 NOTE — PROGRESS NOTE ADULT - SUBJECTIVE AND OBJECTIVE BOX
INTERVAL HPI/OVERNIGHT EVENTS/SUBJECTIVE:  GI bleed, Melena.  Lactic acidosis and increased wob.  Re intubated.  Transfused.     ICU Vital Signs Last 24 Hrs  T(C): 38, Max: 39.1 (06-01 @ 04:00)  T(F): 100.4, Max: 102.4 (06-01 @ 04:00)  HR: 80 (74 - 131)  BP: 113/78 (96/68 - 156/100)  BP(mean): 93 (77 - 121)  ABP: --  ABP(mean): --  RR: 20 (20 - 79)  SpO2: 100% (92% - 100%)      I&O's Detail  I & Os for 24h ending 01 Jun 2017 07:00  =============================================  IN:    Packed Red Blood Cells: 671 ml    multiple electrolytes Injection Type 1multiple electrolytes Injection Type 1: 550 ml    Pivot: 510 ml    multiple electrolytes Injection Type 1: 400 ml    Solution: 300 ml    heparin  Infusion.: 225 ml    Solution: 100 ml    Solution: 100 ml    Solution: 100 ml    pantoprazole Infusion: 80 ml    dexmedetomidine Infusion: 37.8 ml    dexmedetomidine Infusion: 37.8 ml    fentaNYL  Infusion: 23.5 ml    Total IN: 3135.1 ml  ---------------------------------------------  OUT:    Indwelling Catheter - Urethral: 1820 ml    Nasoenteral Tube: 400 ml    Drain: 60 ml    Total OUT: 2280 ml  ---------------------------------------------  Total NET: 855.1 ml    I & Os for current day (as of 01 Jun 2017 11:43)  =============================================  IN:    multiple electrolytes Injection Type 1: 500 ml    pantoprazole Infusion: 40 ml    fentaNYL  Infusion: 14 ml    dexmedetomidine Infusion: 12.6 ml    Total IN: 566.6 ml  ---------------------------------------------  OUT:    Indwelling Catheter - Urethral: 305 ml    Total OUT: 305 ml  ---------------------------------------------  Total NET: 261.6 ml      Mode: AC/ CMV (Assist Control/ Continuous Mandatory Ventilation)  RR (machine): 20  TV (machine): 500  FiO2: 40  PEEP: 10  MAP: 16  PIP: 31    ABG - ( 01 Jun 2017 06:30 )  pH: 7.44  /  pCO2: 38    /  pO2: 214   / HCO3: 26    / Base Excess: 1.5   /  SaO2: 100                 MEDICATIONS  (STANDING):  nystatin Powder 1Application(s) Topical two times a day  ALBUTerol/ipratropium for Nebulization 3milliLiter(s) Nebulizer every 6 hours  folic acid Injectable 1milliGRAM(s) IV Push daily  thiamine 100milliGRAM(s) Oral daily  amantadine Syrup 100milliGRAM(s) Oral <User Schedule>  valproic  acid Syrup 125milliGRAM(s) Oral every 12 hours  melatonin 6milliGRAM(s) Oral at bedtime  metoprolol 50milliGRAM(s) Oral every 8 hours  digoxin  Injectable 0.25milliGRAM(s) IV Push daily  vancomycin  IVPB 1000milliGRAM(s) IV Intermittent every 12 hours  furosemide    Tablet 20milliGRAM(s) Oral daily  traZODone 50milliGRAM(s) Oral at bedtime  pantoprazole Infusion 8mG/Hr IV Continuous <Continuous>  meropenem IVPB 1000milliGRAM(s) IV Intermittent every 8 hours  meropenem IVPB  IV Intermittent   fentaNYL   Infusion 1MICROgram(s)/kG/Hr IV Continuous <Continuous>  dexmedetomidine Infusion 0.2MICROgram(s)/kG/Hr IV Continuous <Continuous>  multiple electrolytes Injection Type 1 1000milliLiter(s) IV Continuous <Continuous>  chlorhexidine 0.12% Liquid 15milliLiter(s) Swish and Spit two times a day    MEDICATIONS  (PRN):      NUTRITION/IVF: NPO    ARMAS:  Y      A-LINE:  N      NG/OG TUBE:OG DATE INSERTED: 5/31  OUTPUT/24 HRS: 400        PHYSICAL EXAM:    Gen:  NAD      Eyes:  PERRLA    Neurological: Non focal  RASS -3    Pulmonary:  Non labored on vent     Cardiovascular:  Irregular rhythm normal rate    Gastrointestinal:   Soft, ND    Genitourinary: Armas    Extremities:  Moderate edema        LABS:  CBC Full  -  ( 01 Jun 2017 05:34 )  WBC Count : 17.8 K/uL  Hemoglobin : 9.7 g/dL  Hematocrit : 31.4 %  Platelet Count - Automated : 415 K/uL  Mean Cell Volume : 94.0 fl  Mean Cell Hemoglobin : 29.0 pg  Mean Cell Hemoglobin Concentration : 30.9 g/dL  Auto Neutrophil # : x  Auto Lymphocyte # : x  Auto Monocyte # : x  Auto Eosinophil # : x  Auto Basophil # : x  Auto Neutrophil % : x  Auto Lymphocyte % : x  Auto Monocyte % : x  Auto Eosinophil % : x  Auto Basophil % : x    06-01    142  |  99  |  47.0<H>  ----------------------------<  108  4.4   |  22.0  |  1.49<H>    Ca    7.7<L>      01 Jun 2017 05:34  Phos  4.9     06-01  Mg     2.2     06-01    TPro  6.8  /  Alb  3.1<L>  /  TBili  2.0  /  DBili  x   /  AST  76<H>  /  ALT  56<H>  /  AlkPhos  130<H>  06-01    PT/INR - ( 31 May 2017 23:51 )   PT: 16.0 sec;   INR: 1.44 ratio         PTT - ( 31 May 2017 23:51 )  PTT:108.4 sec        LIVER FUNCTIONS - ( 01 Jun 2017 05:34 )  Alb: 3.1 g/dL / Pro: 6.8 g/dL / ALK PHOS: 130 U/L / ALT: 56 U/L / AST: 76 U/L / GGT: x           CARDIAC MARKERS ( 01 Jun 2017 05:34 )  x     / 0.23 ng/mL / x     / x     / x                RADIOLOGY & ADDITIONAL STUDIES:  CT from yesterday reviewed    ASSESSMENT/PLAN:  51yMale presenting with:    Neuro: Encephalopathy.  Anoxia mixed with metabolic and drug effect.  Supportive care.  Could MRI now pt reintubated but need to check if pacemaker is compatible.        HEENT:  Tongue lac.  Deferred repair with plastics.      CV:  Chronic systolic heart failure.  Mild trop increase.  Trend, Echo today.  Lactic acidosis now resolved.      Pulm: Acute resp failure.  Once more awake being to wean vent.      GI/Nutrition: Upper GI bleed.  PPI drip.  GI for possible endoscopy.      /Renal: ROCIO, secondary to blood loss anemia.  Will follow.        ID: On abx for possible sepsis. No source identified.  Check procalcitonin.      Heme: DVTs in lower extremities.  Was on heparin drip.  Cannot re anticoagulate because of bleeding risk.  Need IVC filter.      Proph: SQH    Dispo: ICU      CRITICAL CARE TIME SPENT: 45 min

## 2017-06-01 NOTE — BRIEF OPERATIVE NOTE - POST-OP DX
Bile leak, postoperative  05/09/2017  Cystic dusct leak identified on ERCP  Active  Sue Christopher  DVT (deep venous thrombosis)  06/01/2017    Active  Galileo Mercado

## 2017-06-01 NOTE — PROGRESS NOTE ADULT - SUBJECTIVE AND OBJECTIVE BOX
INTERVAL HPI/OVERNIGHT EVENTS/SUBJECTIVE:  Intubated for respiratory distress last night, lactate 11.5. Large volume melena noted, transfused 2 U PRBC. Lactate down to 6.4 overnight. WBC rising and still spiking fevers, gram negative coverage broadened to carbapenem for sepsis of unknown origin. Attempts made to contact family unsuccessful.    ICU Vital Signs Last 24 Hrs  T(C): 38.9, Max: 38.9 (06-01 @ 00:00)  T(F): 102, Max: 102 (06-01 @ 00:00)  HR: 97 (91 - 131)  BP: 135/90 (106/75 - 156/100)  BP(mean): 109 (81 - 121)  ABP: --  ABP(mean): --  RR: 46 (22 - 79)  SpO2: 100% (92% - 100%)    I&O's Detail      Mode: AC/ CMV (Assist Control/ Continuous Mandatory Ventilation)  RR (machine): 20  TV (machine): 500  FiO2: 60  PEEP: 10  MAP: 14  PIP: 24    ABG - ( 01 Jun 2017 03:05 )  pH: 7.40  /  pCO2: 34    /  pO2: 168   / HCO3: 22    / Base Excess: -3.2  /  SaO2: 100                 MEDICATIONS  (STANDING):  nystatin Powder 1Application(s) Topical two times a day  ALBUTerol/ipratropium for Nebulization 3milliLiter(s) Nebulizer every 6 hours  folic acid Injectable 1milliGRAM(s) IV Push daily  thiamine 100milliGRAM(s) Oral daily  amantadine Syrup 100milliGRAM(s) Oral <User Schedule>  valproic  acid Syrup 125milliGRAM(s) Oral every 12 hours  melatonin 6milliGRAM(s) Oral at bedtime  metoprolol 50milliGRAM(s) Oral every 8 hours  digoxin  Injectable 0.25milliGRAM(s) IV Push daily  vancomycin  IVPB 1000milliGRAM(s) IV Intermittent every 12 hours  furosemide    Tablet 20milliGRAM(s) Oral daily  traZODone 50milliGRAM(s) Oral at bedtime  pantoprazole Infusion 8mG/Hr IV Continuous <Continuous>  meropenem IVPB 1000milliGRAM(s) IV Intermittent every 8 hours  meropenem IVPB  IV Intermittent   fentaNYL   Infusion 1MICROgram(s)/kG/Hr IV Continuous <Continuous>  dexmedetomidine Infusion 0.2MICROgram(s)/kG/Hr IV Continuous <Continuous>    MEDICATIONS  (PRN):      NUTRITION/IVF: NPO    CENTRAL LINE: N    MONTOYA: Y      A-LINE: N    NG/OG TUBE: Y DATE INSERTED: 5/31  OUTPUT/24 HRS:      PHYSICAL EXAM:    Gen: intubated sedated    Eyes: Pupils 3mm reactive    Neurological: RASS -1 to -2    Pulmonary: Coarse B/l    Cardiovascular: afib with RVR    Gastrointestinal: midline wound with good granulation softly distended    Genitourinary: montoya inplace    Extremities: + LE edema L worse than right    Skin: warm, dry    LABS:    ASSESSMENT/PLAN:  51yMale presenting with Biliary leak,s/p cardiac arrest and encephalopathy now with GI bleed and Sepsis    Neuro: continue analgo-sedation with precedex and fentanyl    CV: continue current medications    Pulm: continue ventilator support for now aggressive pulmonary toilet wean FiO2 as tolerated    GI/Nutrition: will likely need EGD, will reach out to GI, continue protonix gtt for now    /Renal: lytes reviewd and replaced as appropriate, continue montoya for strict I's and O's    ID: Meropenim and Vanco for now, follow up cultures    Lines/Tubes: maintain all lines and tubes for now    Endo: BS elevated on bmp, likely stress response will monitor     Proph: Heparin gtt off, will start chemo ppx when we are sure bleeding has stopped    Dispo: will continue to require critical care      CRITICAL CARE TIME SPENT:

## 2017-06-01 NOTE — PROGRESS NOTE ADULT - ASSESSMENT
51yMale presenting with Biliary leak,s/p cardiac arrest and encephalopathy now with GI bleed and Sepsis

## 2017-06-01 NOTE — PROGRESS NOTE ADULT - SUBJECTIVE AND OBJECTIVE BOX
attempted to call next of kin with the aid of [facility]  service,  # [phone], to communicate the resent events and to obtain consent for blood transfusion as there is no consent in the chart. Family did not answer and a message was left. Will proceed with transfusion as benefits out weigh the risks

## 2017-06-01 NOTE — PROGRESS NOTE ADULT - PROBLEM SELECTOR PLAN 1
Await for MRI/MRA brain.  DVT prophylaxis.  Overall poor prognosis.  Treatment per Surgical team.  Will follow and d/w CTX service.

## 2017-06-02 LAB
ANION GAP SERPL CALC-SCNC: 17 MMOL/L — SIGNIFICANT CHANGE UP (ref 5–17)
ANISOCYTOSIS BLD QL: SLIGHT — SIGNIFICANT CHANGE UP
BASOPHILS # BLD AUTO: 0 K/UL — SIGNIFICANT CHANGE UP (ref 0–0.2)
BASOPHILS NFR BLD AUTO: 0.1 % — SIGNIFICANT CHANGE UP (ref 0–2)
BUN SERPL-MCNC: 47 MG/DL — HIGH (ref 8–20)
CALCIUM SERPL-MCNC: 8.8 MG/DL — SIGNIFICANT CHANGE UP (ref 8.6–10.2)
CHLORIDE SERPL-SCNC: 101 MMOL/L — SIGNIFICANT CHANGE UP (ref 98–107)
CO2 SERPL-SCNC: 25 MMOL/L — SIGNIFICANT CHANGE UP (ref 22–29)
CREAT SERPL-MCNC: 1.13 MG/DL — SIGNIFICANT CHANGE UP (ref 0.5–1.3)
ELLIPTOCYTES BLD QL SMEAR: SLIGHT — SIGNIFICANT CHANGE UP
EOSINOPHIL # BLD AUTO: 0 K/UL — SIGNIFICANT CHANGE UP (ref 0–0.5)
EOSINOPHIL NFR BLD AUTO: 0.1 % — SIGNIFICANT CHANGE UP (ref 0–6)
GAS PNL BLDA: SIGNIFICANT CHANGE UP
GLUCOSE SERPL-MCNC: 103 MG/DL — SIGNIFICANT CHANGE UP (ref 70–115)
HCT VFR BLD CALC: 32.2 % — LOW (ref 42–52)
HGB BLD-MCNC: 10 G/DL — LOW (ref 14–18)
HYPOCHROMIA BLD QL: SLIGHT — SIGNIFICANT CHANGE UP
LYMPHOCYTES # BLD AUTO: 1.6 K/UL — SIGNIFICANT CHANGE UP (ref 1–4.8)
LYMPHOCYTES # BLD AUTO: 7 % — LOW (ref 20–55)
MACROCYTES BLD QL: SLIGHT — SIGNIFICANT CHANGE UP
MAGNESIUM SERPL-MCNC: 2.2 MG/DL — SIGNIFICANT CHANGE UP (ref 1.6–2.6)
MCHC RBC-ENTMCNC: 29.2 PG — SIGNIFICANT CHANGE UP (ref 27–31)
MCHC RBC-ENTMCNC: 31.1 G/DL — LOW (ref 32–36)
MCV RBC AUTO: 94.2 FL — HIGH (ref 80–94)
MICROCYTES BLD QL: SLIGHT — SIGNIFICANT CHANGE UP
MONOCYTES # BLD AUTO: 1.6 K/UL — HIGH (ref 0–0.8)
MONOCYTES NFR BLD AUTO: 10 % — SIGNIFICANT CHANGE UP (ref 3–10)
MYELOCYTES NFR BLD: 2 % — HIGH (ref 0–0)
NEUTROPHILS # BLD AUTO: 12.7 K/UL — HIGH (ref 1.8–8)
NEUTROPHILS NFR BLD AUTO: 79 % — HIGH (ref 37–73)
NEUTS BAND # BLD: 2 % — SIGNIFICANT CHANGE UP (ref 0–8)
NRBC # BLD: 4 /100 — HIGH (ref 0–0)
OVALOCYTES BLD QL SMEAR: SLIGHT — SIGNIFICANT CHANGE UP
PHOSPHATE SERPL-MCNC: 3.4 MG/DL — SIGNIFICANT CHANGE UP (ref 2.4–4.7)
PLAT MORPH BLD: NORMAL — SIGNIFICANT CHANGE UP
PLATELET # BLD AUTO: 338 K/UL — SIGNIFICANT CHANGE UP (ref 150–400)
POIKILOCYTOSIS BLD QL AUTO: SLIGHT — SIGNIFICANT CHANGE UP
POLYCHROMASIA BLD QL SMEAR: SLIGHT — SIGNIFICANT CHANGE UP
POTASSIUM SERPL-MCNC: 4 MMOL/L — SIGNIFICANT CHANGE UP (ref 3.5–5.3)
POTASSIUM SERPL-SCNC: 4 MMOL/L — SIGNIFICANT CHANGE UP (ref 3.5–5.3)
RBC # BLD: 3.42 M/UL — LOW (ref 4.6–6.2)
RBC # FLD: 19.1 % — HIGH (ref 11–15.6)
RBC BLD AUTO: ABNORMAL
SCHISTOCYTES BLD QL AUTO: SLIGHT — SIGNIFICANT CHANGE UP
SODIUM SERPL-SCNC: 143 MMOL/L — SIGNIFICANT CHANGE UP (ref 135–145)
SPHEROCYTES BLD QL SMEAR: SLIGHT — SIGNIFICANT CHANGE UP
WBC # BLD: 16.2 K/UL — HIGH (ref 4.8–10.8)
WBC # FLD AUTO: 16.2 K/UL — HIGH (ref 4.8–10.8)

## 2017-06-02 PROCEDURE — 99291 CRITICAL CARE FIRST HOUR: CPT

## 2017-06-02 PROCEDURE — 70496 CT ANGIOGRAPHY HEAD: CPT | Mod: 26

## 2017-06-02 RX ORDER — CALCIUM GLUCONATE 100 MG/ML
2 VIAL (ML) INTRAVENOUS ONCE
Qty: 0 | Refills: 0 | Status: COMPLETED | OUTPATIENT
Start: 2017-06-02 | End: 2017-06-02

## 2017-06-02 RX ADMIN — CHLORHEXIDINE GLUCONATE 15 MILLILITER(S): 213 SOLUTION TOPICAL at 17:27

## 2017-06-02 RX ADMIN — Medication 125 MILLIGRAM(S): at 17:27

## 2017-06-02 RX ADMIN — Medication 200 GRAM(S): at 06:56

## 2017-06-02 RX ADMIN — Medication 1 MILLIGRAM(S): at 13:29

## 2017-06-02 RX ADMIN — PANTOPRAZOLE SODIUM 10 MG/HR: 20 TABLET, DELAYED RELEASE ORAL at 22:19

## 2017-06-02 RX ADMIN — Medication 100 MILLIGRAM(S): at 13:29

## 2017-06-02 RX ADMIN — Medication 250 MILLIGRAM(S): at 05:27

## 2017-06-02 RX ADMIN — HEPARIN SODIUM 5000 UNIT(S): 5000 INJECTION INTRAVENOUS; SUBCUTANEOUS at 22:18

## 2017-06-02 RX ADMIN — Medication 50 MILLIGRAM(S): at 13:28

## 2017-06-02 RX ADMIN — PANTOPRAZOLE SODIUM 10 MG/HR: 20 TABLET, DELAYED RELEASE ORAL at 13:30

## 2017-06-02 RX ADMIN — PANTOPRAZOLE SODIUM 10 MG/HR: 20 TABLET, DELAYED RELEASE ORAL at 17:27

## 2017-06-02 RX ADMIN — Medication 100 MILLIGRAM(S): at 13:30

## 2017-06-02 RX ADMIN — Medication 50 MILLIGRAM(S): at 22:19

## 2017-06-02 RX ADMIN — NYSTATIN CREAM 1 APPLICATION(S): 100000 CREAM TOPICAL at 05:27

## 2017-06-02 RX ADMIN — Medication 0.25 MILLIGRAM(S): at 13:29

## 2017-06-02 RX ADMIN — HEPARIN SODIUM 5000 UNIT(S): 5000 INJECTION INTRAVENOUS; SUBCUTANEOUS at 05:27

## 2017-06-02 RX ADMIN — Medication 50 MILLIGRAM(S): at 05:26

## 2017-06-02 RX ADMIN — Medication 3 MILLILITER(S): at 20:58

## 2017-06-02 RX ADMIN — NYSTATIN CREAM 1 APPLICATION(S): 100000 CREAM TOPICAL at 17:27

## 2017-06-02 RX ADMIN — Medication 3 MILLILITER(S): at 14:31

## 2017-06-02 RX ADMIN — Medication 125 MILLIGRAM(S): at 05:27

## 2017-06-02 RX ADMIN — Medication 3 MILLILITER(S): at 08:17

## 2017-06-02 RX ADMIN — Medication 6 MILLIGRAM(S): at 22:19

## 2017-06-02 RX ADMIN — HEPARIN SODIUM 5000 UNIT(S): 5000 INJECTION INTRAVENOUS; SUBCUTANEOUS at 13:28

## 2017-06-02 RX ADMIN — Medication 100 MILLIGRAM(S): at 05:26

## 2017-06-02 RX ADMIN — MEROPENEM 200 MILLIGRAM(S): 1 INJECTION INTRAVENOUS at 13:29

## 2017-06-02 RX ADMIN — CHLORHEXIDINE GLUCONATE 15 MILLILITER(S): 213 SOLUTION TOPICAL at 05:26

## 2017-06-02 RX ADMIN — Medication 3 MILLILITER(S): at 03:09

## 2017-06-02 RX ADMIN — Medication 20 MILLIGRAM(S): at 05:27

## 2017-06-02 RX ADMIN — Medication 250 MILLIGRAM(S): at 17:47

## 2017-06-02 NOTE — PROGRESS NOTE ADULT - SUBJECTIVE AND OBJECTIVE BOX
INTERVAL HPI/OVERNIGHT EVENTS/SUBJECTIVE:  No events overnight.  More awake today.  HR better control.      ICU Vital Signs Last 24 Hrs  T(C): 37.7, Max: 38.2 (06-02 @ 01:00)  T(F): 99.9, Max: 100.8 (06-02 @ 01:00)  HR: 79 (59 - 110)  BP: 119/71 (102/65 - 129/84)  BP(mean): 84 (69 - 99)  ABP: --  ABP(mean): --  RR: 32 (16 - 45)  SpO2: 100% (96% - 100%)      I&O's Detail  I & Os for 24h ending 02 Jun 2017 07:00  =============================================  IN:    multiple electrolytes Injection Type 1multiple electrolytes Injection Type 1: 400 ml    Solution: 250 ml    pantoprazole Infusion: 240 ml    Solution: 200 ml    Solution: 200 ml    Enteral Tube Flush: 100 ml    Solution: 100 ml    fentaNYL  Infusion: 14 ml    dexmedetomidine Infusion: 12.6 ml    Total IN: 1516.6 ml  ---------------------------------------------  OUT:    Indwelling Catheter - Urethral: 1670 ml    Nasoenteral Tube: 100 ml    Drain: 10 ml    Total OUT: 1780 ml  ---------------------------------------------  Total NET: -263.4 ml    I & Os for current day (as of 02 Jun 2017 09:09)  =============================================  IN:    pantoprazole Infusion: 20 ml    Total IN: 20 ml  ---------------------------------------------  OUT:    Indwelling Catheter - Urethral: 140 ml    Total OUT: 140 ml  ---------------------------------------------  Total NET: -120 ml      Mode: AC/ CMV (Assist Control/ Continuous Mandatory Ventilation)  RR (machine): 20  TV (machine): 500  FiO2: 40  PEEP: 10  MAP: 14  PIP: 21    ABG - ( 02 Jun 2017 04:06 )  pH: 7.48  /  pCO2: 37    /  pO2: 108   / HCO3: 28    / Base Excess: 3.6   /  SaO2: 99                  MEDICATIONS  (STANDING):  nystatin Powder 1Application(s) Topical two times a day  ALBUTerol/ipratropium for Nebulization 3milliLiter(s) Nebulizer every 6 hours  folic acid Injectable 1milliGRAM(s) IV Push daily  thiamine 100milliGRAM(s) Oral daily  amantadine Syrup 100milliGRAM(s) Oral <User Schedule>  valproic  acid Syrup 125milliGRAM(s) Oral every 12 hours  melatonin 6milliGRAM(s) Oral at bedtime  metoprolol 50milliGRAM(s) Oral every 8 hours  digoxin  Injectable 0.25milliGRAM(s) IV Push daily  vancomycin  IVPB 1000milliGRAM(s) IV Intermittent every 12 hours  furosemide    Tablet 20milliGRAM(s) Oral daily  traZODone 50milliGRAM(s) Oral at bedtime  pantoprazole Infusion 8mG/Hr IV Continuous <Continuous>  chlorhexidine 0.12% Liquid 15milliLiter(s) Swish and Spit two times a day  heparin  Injectable 5000Unit(s) SubCutaneous every 8 hours  meropenem IVPB 1000milliGRAM(s) IV Intermittent every 12 hours    MEDICATIONS  (PRN):      NUTRITION/IVF:  Tube feeds    CENTRAL LINE: N     MONTOYA: Y      A-LINE: N         PHYSICAL EXAM:    Gen:  NAD    Eyes: PERRLA    Neurological: Awake and alert.  Intermittently follows simple commands.    Pulmonary:  Non labored on vent    Cardiovascular:  Normal rate, irregular    Gastrointestinal: Soft NT    Genitourinary: Montoya    Extremities:  Mild edema        LABS:  CBC Full  -  ( 02 Jun 2017 05:54 )  WBC Count : 16.2 K/uL  Hemoglobin : 10.0 g/dL  Hematocrit : 32.2 %  Platelet Count - Automated : 338 K/uL  Mean Cell Volume : 94.2 fl  Mean Cell Hemoglobin : 29.2 pg  Mean Cell Hemoglobin Concentration : 31.1 g/dL  Auto Neutrophil # : x  Auto Lymphocyte # : x  Auto Monocyte # : x  Auto Eosinophil # : x  Auto Basophil # : x  Auto Neutrophil % : x  Auto Lymphocyte % : x  Auto Monocyte % : x  Auto Eosinophil % : x  Auto Basophil % : x    06-02    143  |  101  |  47.0<H>  ----------------------------<  103  4.0   |  25.0  |  1.13    Ca    8.8      02 Jun 2017 05:54  Phos  3.4     06-02  Mg     2.2     06-02    TPro  6.8  /  Alb  3.1<L>  /  TBili  2.0  /  DBili  x   /  AST  76<H>  /  ALT  56<H>  /  AlkPhos  130<H>  06-01    PT/INR - ( 31 May 2017 23:51 )   PT: 16.0 sec;   INR: 1.44 ratio         PTT - ( 31 May 2017 23:51 )  PTT:108.4 sec    RECENT CULTURES:  05-30 .Blood Blood XXXX XXXX   No growth at 48 hours    05-30 .Blood Blood XXXX XXXX   No growth at 48 hours        CARDIAC MARKERS ( 01 Jun 2017 14:32 )  x     / 0.11 ng/mL / x     / x     / x      CARDIAC MARKERS ( 01 Jun 2017 05:34 )  x     / 0.23 ng/mL / x     / x     / x            ASSESSMENT/PLAN:  51yMale presenting with:    Neuro: Encephalopathy improved. Remaining symptoms likely anoxic.  Neurology following.  Unclear if pacer is MRI safe.  Requesting CT head and CTA head in lieu of MRI.  Will obtain today.      CV: Rapid a-fib now controlled.  Continue home meds for chronic systolic heart failure. Rate control.  Anticoagulation contraindicated.      Pulm:  Acute respiratory failure.  Improving.  Plan to wean PEEP and possible spontaneous mode later today.      GI/Nutrition:  No further signs of continued GI bleed.  Hemoglobin stable now.  Awaiting GI plan for EGD.  Tube feeds after.  Cont PPI    /Renal:  ROCIO improved.  Cont montoya for retention.      ID:  Afebrile overnight.  On abx.  Unclear source but elevated procalcitonin.  Will continue abx for now.      Proph: SQ heparin    Dispo: ICU      CRITICAL CARE TIME SPENT:  38 min INTERVAL HPI/OVERNIGHT EVENTS/SUBJECTIVE:  No events overnight.  More awake today.  HR better control.      ICU Vital Signs Last 24 Hrs  T(C): 37.7, Max: 38.2 (06-02 @ 01:00)  T(F): 99.9, Max: 100.8 (06-02 @ 01:00)  HR: 79 (59 - 110)  BP: 119/71 (102/65 - 129/84)  BP(mean): 84 (69 - 99)  ABP: --  ABP(mean): --  RR: 32 (16 - 45)  SpO2: 100% (96% - 100%)      I&O's Detail  I & Os for 24h ending 02 Jun 2017 07:00  =============================================  IN:    multiple electrolytes Injection Type 1multiple electrolytes Injection Type 1: 400 ml    Solution: 250 ml    pantoprazole Infusion: 240 ml    Solution: 200 ml    Solution: 200 ml    Enteral Tube Flush: 100 ml    Solution: 100 ml    fentaNYL  Infusion: 14 ml    dexmedetomidine Infusion: 12.6 ml    Total IN: 1516.6 ml  ---------------------------------------------  OUT:    Indwelling Catheter - Urethral: 1670 ml    Nasoenteral Tube: 100 ml    Drain: 10 ml    Total OUT: 1780 ml  ---------------------------------------------  Total NET: -263.4 ml    I & Os for current day (as of 02 Jun 2017 09:09)  =============================================  IN:    pantoprazole Infusion: 20 ml    Total IN: 20 ml  ---------------------------------------------  OUT:    Indwelling Catheter - Urethral: 140 ml    Total OUT: 140 ml  ---------------------------------------------  Total NET: -120 ml      Mode: AC/ CMV (Assist Control/ Continuous Mandatory Ventilation)  RR (machine): 20  TV (machine): 500  FiO2: 40  PEEP: 10  MAP: 14  PIP: 21    ABG - ( 02 Jun 2017 04:06 )  pH: 7.48  /  pCO2: 37    /  pO2: 108   / HCO3: 28    / Base Excess: 3.6   /  SaO2: 99                  MEDICATIONS  (STANDING):  nystatin Powder 1Application(s) Topical two times a day  ALBUTerol/ipratropium for Nebulization 3milliLiter(s) Nebulizer every 6 hours  folic acid Injectable 1milliGRAM(s) IV Push daily  thiamine 100milliGRAM(s) Oral daily  amantadine Syrup 100milliGRAM(s) Oral <User Schedule>  valproic  acid Syrup 125milliGRAM(s) Oral every 12 hours  melatonin 6milliGRAM(s) Oral at bedtime  metoprolol 50milliGRAM(s) Oral every 8 hours  digoxin  Injectable 0.25milliGRAM(s) IV Push daily  vancomycin  IVPB 1000milliGRAM(s) IV Intermittent every 12 hours  furosemide    Tablet 20milliGRAM(s) Oral daily  traZODone 50milliGRAM(s) Oral at bedtime  pantoprazole Infusion 8mG/Hr IV Continuous <Continuous>  chlorhexidine 0.12% Liquid 15milliLiter(s) Swish and Spit two times a day  heparin  Injectable 5000Unit(s) SubCutaneous every 8 hours  meropenem IVPB 1000milliGRAM(s) IV Intermittent every 12 hours    MEDICATIONS  (PRN):      NUTRITION/IVF:  Tube feeds    CENTRAL LINE: N     MONTOYA: Y      A-LINE: N         PHYSICAL EXAM:    Gen:  NAD    Eyes: PERRLA    Neurological: Awake and alert.  Intermittently follows simple commands.    Pulmonary:  Non labored on vent    Cardiovascular:  Normal rate, irregular    Gastrointestinal: Soft NT    Genitourinary: Montoya    Extremities:  Mild edema        LABS:  CBC Full  -  ( 02 Jun 2017 05:54 )  WBC Count : 16.2 K/uL  Hemoglobin : 10.0 g/dL  Hematocrit : 32.2 %  Platelet Count - Automated : 338 K/uL  Mean Cell Volume : 94.2 fl  Mean Cell Hemoglobin : 29.2 pg  Mean Cell Hemoglobin Concentration : 31.1 g/dL  Auto Neutrophil # : x  Auto Lymphocyte # : x  Auto Monocyte # : x  Auto Eosinophil # : x  Auto Basophil # : x  Auto Neutrophil % : x  Auto Lymphocyte % : x  Auto Monocyte % : x  Auto Eosinophil % : x  Auto Basophil % : x    06-02    143  |  101  |  47.0<H>  ----------------------------<  103  4.0   |  25.0  |  1.13    Ca    8.8      02 Jun 2017 05:54  Phos  3.4     06-02  Mg     2.2     06-02    TPro  6.8  /  Alb  3.1<L>  /  TBili  2.0  /  DBili  x   /  AST  76<H>  /  ALT  56<H>  /  AlkPhos  130<H>  06-01    PT/INR - ( 31 May 2017 23:51 )   PT: 16.0 sec;   INR: 1.44 ratio         PTT - ( 31 May 2017 23:51 )  PTT:108.4 sec    RECENT CULTURES:  05-30 .Blood Blood XXXX XXXX   No growth at 48 hours    05-30 .Blood Blood XXXX XXXX   No growth at 48 hours        CARDIAC MARKERS ( 01 Jun 2017 14:32 )  x     / 0.11 ng/mL / x     / x     / x      CARDIAC MARKERS ( 01 Jun 2017 05:34 )  x     / 0.23 ng/mL / x     / x     / x            ASSESSMENT/PLAN:  51yMale presenting with:    Neuro: Encephalopathy improved. Remaining symptoms likely anoxic.  Neurology following.  Unclear if pacer is MRI safe.  Requesting CT head and CTA head in lieu of MRI.  Will obtain today.      CV: Rapid a-fib now controlled.  Continue home meds for chronic systolic heart failure. Rate control.  Anticoagulation contraindicated.      Pulm:  Acute respiratory failure.  Improving.  Plan to wean PEEP and possible spontaneous mode later today.      GI/Nutrition:  No further signs of continued GI bleed.  Hemoglobin stable now.  Awaiting GI for EGD today.  No medical contraindication to procedure.  Tube feeds after.  Cont PPI    /Renal:  ROCIO improved.  Cont montoya for retention.      ID:  Afebrile overnight.  On abx.  Unclear source but elevated procalcitonin.  Will continue abx for now.      Proph: SQ heparin    Dispo: ICU      CRITICAL CARE TIME SPENT:  38 min

## 2017-06-02 NOTE — PROGRESS NOTE ADULT - PROBLEM SELECTOR PLAN 1
Called core lab and likely will get test results for CSF back late next week.  DVT prophylaxis.  ABX per medicine and ID.  DVT prophylaxis.   Will follow. Await for CT Head and CTA head as pt with PPM and unable to verify.  DVT prophylaxis.  Treatment per Surgical svc.  DVT prophylaxis.   Will follow.

## 2017-06-02 NOTE — PROGRESS NOTE ADULT - SUBJECTIVE AND OBJECTIVE BOX
INTERVAL HPI/OVERNIGHT EVENTS:  Patient seen and examined    MEDICATIONS  (STANDING):  nystatin Powder 1Application(s) Topical two times a day  ALBUTerol/ipratropium for Nebulization 3milliLiter(s) Nebulizer every 6 hours  folic acid Injectable 1milliGRAM(s) IV Push daily  thiamine 100milliGRAM(s) Oral daily  amantadine Syrup 100milliGRAM(s) Oral <User Schedule>  valproic  acid Syrup 125milliGRAM(s) Oral every 12 hours  melatonin 6milliGRAM(s) Oral at bedtime  metoprolol 50milliGRAM(s) Oral every 8 hours  digoxin  Injectable 0.25milliGRAM(s) IV Push daily  vancomycin  IVPB 1000milliGRAM(s) IV Intermittent every 12 hours  furosemide    Tablet 20milliGRAM(s) Oral daily  traZODone 50milliGRAM(s) Oral at bedtime  pantoprazole Infusion 8mG/Hr IV Continuous <Continuous>  chlorhexidine 0.12% Liquid 15milliLiter(s) Swish and Spit two times a day  heparin  Injectable 5000Unit(s) SubCutaneous every 8 hours  meropenem IVPB 1000milliGRAM(s) IV Intermittent every 12 hours    MEDICATIONS  (PRN):      Allergies    No Known Allergies    Intolerances        Vital Signs Last 24 Hrs  T(C): 38.2, Max: 38.2 (06-02 @ 01:00)  T(F): 100.8, Max: 100.8 (06-02 @ 01:00)  HR: 75 (70 - 110)  BP: 136/76 (102/65 - 148/69)  BP(mean): 98 (69 - 100)  RR: 30 (18 - 66)  SpO2: 100% (96% - 100%)    PHYSICAL EXAM:  General: NAD.  CVS: S1, S2  Chest: air entry bilaterally present  Abd: BS present, soft, non-tender      LABS:                        10.0   16.2  )-----------( 338      ( 02 Jun 2017 05:54 )             32.2     06-02    143  |  101  |  47.0<H>  ----------------------------<  103  4.0   |  25.0  |  1.13    Ca    8.8      02 Jun 2017 05:54  Phos  3.4     06-02  Mg     2.2     06-02    TPro  6.8  /  Alb  3.1<L>  /  TBili  2.0  /  DBili  x   /  AST  76<H>  /  ALT  56<H>  /  AlkPhos  130<H>  06-01    PT/INR - ( 31 May 2017 23:51 )   PT: 16.0 sec;   INR: 1.44 ratio         PTT - ( 31 May 2017 23:51 )  PTT:108.4 sec    RADIOLOGY & ADDITIONAL TESTS:

## 2017-06-02 NOTE — PROGRESS NOTE ADULT - ASSESSMENT
IMPRESSION:  The patient is a 51 year old male with significant cardiovascular history including atrial fibrillation and cardiomyopathy s/p laparoscopic cholecystectomy. GI f/u for bile leak.   - first ercp 5/9/17: papilla appeared to be stenosed. During attempts at biliary cannulation, patient developed hypotension. Procedure aborted. Patient coded. Intubated. Patient resuscitated. ACS team took over, and performed exploratory laparotomy.  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   - re-Intubated.   - LFTs improving.   - follows simple verbal commands  - coffee-ground material in NGT yesterday. h/h dropped, then improved now. Hb 10. NGT material bilious.    PLAN:  IV antibiotics  SICU monitoring  monitor cbc, serum lytes, and LFTs  EGD cancelled today (no anesthesia available and GI bleed stopped)  will f/u

## 2017-06-02 NOTE — PROGRESS NOTE ADULT - SUBJECTIVE AND OBJECTIVE BOX
INTERVAL HISTORY:  Seen in chair and out of bed; loo    No Known Allergies      VITAL SIGNS:  Vital Signs Last 24 Hrs  T(C): 37.7, Max: 38.2 (06-02 @ 01:00)  T(F): 99.9, Max: 100.8 (06-02 @ 01:00)  HR: 83 (59 - 110)  BP: 122/88 (102/65 - 129/84)  BP(mean): 99 (69 - 99)  RR: 22 (16 - 45)  SpO2: 100% (96% - 100%)    PHYSICAL EXAMINATION:  General: Intubated; disheveled  Eyes: Conjunctiva and sclera clear.  Neurologic:  - Mental Status: Intubated; non-verbal; doesn't follow.  Cranial Nerves II-XII:  Pupil R 4mm reactive, L pupil 4mm reactive; no facial; + corneal; not cooperative for [facility]; + cough to suction; + dolls;   - Motor:  No spontaneous activity.  - Reflexes:  1+ and symmetric throughout.  Plantars withdrawal b/l.  - Sensory:  Withdraws to noxious throughout  - Coordination:  Pt unable.  - Gait: Deferred.    MEDS:  MEDICATIONS  (STANDING):  nystatin Powder 1Application(s) Topical two times a day  ALBUTerol/ipratropium for Nebulization 3milliLiter(s) Nebulizer every 6 hours  folic acid Injectable 1milliGRAM(s) IV Push daily  thiamine 100milliGRAM(s) Oral daily  amantadine Syrup 100milliGRAM(s) Oral <User Schedule>  valproic  acid Syrup 125milliGRAM(s) Oral every 12 hours  melatonin 6milliGRAM(s) Oral at bedtime  metoprolol 50milliGRAM(s) Oral every 8 hours  digoxin  Injectable 0.25milliGRAM(s) IV Push daily  vancomycin  IVPB 1000milliGRAM(s) IV Intermittent every 12 hours  furosemide    Tablet 20milliGRAM(s) Oral daily  traZODone 50milliGRAM(s) Oral at bedtime  pantoprazole Infusion 8mG/Hr IV Continuous <Continuous>  chlorhexidine 0.12% Liquid 15milliLiter(s) Swish and Spit two times a day  heparin  Injectable 5000Unit(s) SubCutaneous every 8 hours  meropenem IVPB 1000milliGRAM(s) IV Intermittent every 12 hours    MEDICATIONS  (PRN):      LABS:                          10.0   16.2  )-----------( 338      ( 02 Jun 2017 05:54 )             32.2     06-02    143  |  101  |  47.0<H>  ----------------------------<  103  4.0   |  25.0  |  1.13    Ca    8.8      02 Jun 2017 05:54  Phos  3.4     06-02  Mg     2.2     06-02    TPro  6.8  /  Alb  3.1<L>  /  TBili  2.0  /  DBili  x   /  AST  76<H>  /  ALT  56<H>  /  AlkPhos  130<H>  06-01    LIVER FUNCTIONS - ( 01 Jun 2017 05:34 )  Alb: 3.1 g/dL / Pro: 6.8 g/dL / ALK PHOS: 130 U/L / ALT: 56 U/L / AST: 76 U/L / GGT: x               IMPRESSION:  Encephalopathy with sepsis. INTERVAL HISTORY:  Seen in chair and out of bed; loo    No Known Allergies      VITAL SIGNS:  Vital Signs Last 24 Hrs  T(C): 37.7, Max: 38.2 (06-02 @ 01:00)  T(F): 99.9, Max: 100.8 (06-02 @ 01:00)  HR: 83 (59 - 110)  BP: 122/88 (102/65 - 129/84)  BP(mean): 99 (69 - 99)  RR: 22 (16 - 45)  SpO2: 100% (96% - 100%)    PHYSICAL EXAMINATION:  General: Intubated; disheveled  Eyes: Conjunctiva and sclera clear.  Neurologic:  - Mental Status: Intubated; non-verbal; out of bed and follows simple commands.  Cranial Nerves II-XII:  Pupil R 4mm reactive, L pupil 4mm reactive; no facial; + corneal; not cooperative for [facility]; + cough to suction; + dolls;   - Motor: + Spontaneous activity.  - Reflexes:  1+ and symmetric throughout.  Plantars withdrawal b/l.  - Sensory:  Withdraws to noxious throughout  - Coordination:  Pt unable.  - Gait: Deferred.    MEDS:  MEDICATIONS  (STANDING):  nystatin Powder 1Application(s) Topical two times a day  ALBUTerol/ipratropium for Nebulization 3milliLiter(s) Nebulizer every 6 hours  folic acid Injectable 1milliGRAM(s) IV Push daily  thiamine 100milliGRAM(s) Oral daily  amantadine Syrup 100milliGRAM(s) Oral <User Schedule>  valproic  acid Syrup 125milliGRAM(s) Oral every 12 hours  melatonin 6milliGRAM(s) Oral at bedtime  metoprolol 50milliGRAM(s) Oral every 8 hours  digoxin  Injectable 0.25milliGRAM(s) IV Push daily  vancomycin  IVPB 1000milliGRAM(s) IV Intermittent every 12 hours  furosemide    Tablet 20milliGRAM(s) Oral daily  traZODone 50milliGRAM(s) Oral at bedtime  pantoprazole Infusion 8mG/Hr IV Continuous <Continuous>  chlorhexidine 0.12% Liquid 15milliLiter(s) Swish and Spit two times a day  heparin  Injectable 5000Unit(s) SubCutaneous every 8 hours  meropenem IVPB 1000milliGRAM(s) IV Intermittent every 12 hours    MEDICATIONS  (PRN):      LABS:                          10.0   16.2  )-----------( 338      ( 02 Jun 2017 05:54 )             32.2     06-02    143  |  101  |  47.0<H>  ----------------------------<  103  4.0   |  25.0  |  1.13    Ca    8.8      02 Jun 2017 05:54  Phos  3.4     06-02  Mg     2.2     06-02    TPro  6.8  /  Alb  3.1<L>  /  TBili  2.0  /  DBili  x   /  AST  76<H>  /  ALT  56<H>  /  AlkPhos  130<H>  06-01    LIVER FUNCTIONS - ( 01 Jun 2017 05:34 )  Alb: 3.1 g/dL / Pro: 6.8 g/dL / ALK PHOS: 130 U/L / ALT: 56 U/L / AST: 76 U/L / GGT: x               IMPRESSION:  Encephalopathy with Sepsis and likely Anoxic; Anisocoria resolved.

## 2017-06-03 LAB
ALBUMIN SERPL ELPH-MCNC: 2.9 G/DL — LOW (ref 3.3–5.2)
ALP SERPL-CCNC: 129 U/L — HIGH (ref 40–120)
ALT FLD-CCNC: 39 U/L — SIGNIFICANT CHANGE UP
ANION GAP SERPL CALC-SCNC: 12 MMOL/L — SIGNIFICANT CHANGE UP (ref 5–17)
ANISOCYTOSIS BLD QL: SLIGHT — SIGNIFICANT CHANGE UP
AST SERPL-CCNC: 44 U/L — HIGH
BASOPHILS # BLD AUTO: 0 K/UL — SIGNIFICANT CHANGE UP (ref 0–0.2)
BASOPHILS NFR BLD AUTO: 0.1 % — SIGNIFICANT CHANGE UP (ref 0–2)
BILIRUB SERPL-MCNC: 1.3 MG/DL — SIGNIFICANT CHANGE UP (ref 0.4–2)
BUN SERPL-MCNC: 47 MG/DL — HIGH (ref 8–20)
CALCIUM SERPL-MCNC: 8.7 MG/DL — SIGNIFICANT CHANGE UP (ref 8.6–10.2)
CHLORIDE SERPL-SCNC: 104 MMOL/L — SIGNIFICANT CHANGE UP (ref 98–107)
CO2 SERPL-SCNC: 27 MMOL/L — SIGNIFICANT CHANGE UP (ref 22–29)
CREAT SERPL-MCNC: 1.11 MG/DL — SIGNIFICANT CHANGE UP (ref 0.5–1.3)
EOSINOPHIL # BLD AUTO: 0 K/UL — SIGNIFICANT CHANGE UP (ref 0–0.5)
EOSINOPHIL NFR BLD AUTO: 0.1 % — SIGNIFICANT CHANGE UP (ref 0–5)
GAS PNL BLDA: SIGNIFICANT CHANGE UP
GLUCOSE SERPL-MCNC: 157 MG/DL — HIGH (ref 70–115)
HCT VFR BLD CALC: 32.4 % — LOW (ref 42–52)
HGB BLD-MCNC: 9.9 G/DL — LOW (ref 14–18)
HYPOCHROMIA BLD QL: SLIGHT — SIGNIFICANT CHANGE UP
LYMPHOCYTES # BLD AUTO: 1.3 K/UL — SIGNIFICANT CHANGE UP (ref 1–4.8)
LYMPHOCYTES # BLD AUTO: 6.9 % — LOW (ref 20–55)
MACROCYTES BLD QL: SLIGHT — SIGNIFICANT CHANGE UP
MAGNESIUM SERPL-MCNC: 2.3 MG/DL — SIGNIFICANT CHANGE UP (ref 1.6–2.6)
MCHC RBC-ENTMCNC: 29.1 PG — SIGNIFICANT CHANGE UP (ref 27–31)
MCHC RBC-ENTMCNC: 30.6 G/DL — LOW (ref 32–36)
MCV RBC AUTO: 95.3 FL — HIGH (ref 80–94)
MICROCYTES BLD QL: SLIGHT — SIGNIFICANT CHANGE UP
MONOCYTES # BLD AUTO: 1.7 K/UL — HIGH (ref 0–0.8)
MONOCYTES NFR BLD AUTO: 8.8 % — SIGNIFICANT CHANGE UP (ref 3–10)
NEUTROPHILS # BLD AUTO: 16.3 K/UL — HIGH (ref 1.8–8)
NEUTROPHILS NFR BLD AUTO: 83.7 % — HIGH (ref 37–73)
PHOSPHATE SERPL-MCNC: 2.4 MG/DL — SIGNIFICANT CHANGE UP (ref 2.4–4.7)
PLAT MORPH BLD: NORMAL — SIGNIFICANT CHANGE UP
PLATELET # BLD AUTO: 324 K/UL — SIGNIFICANT CHANGE UP (ref 150–400)
POTASSIUM SERPL-MCNC: 3.8 MMOL/L — SIGNIFICANT CHANGE UP (ref 3.5–5.3)
POTASSIUM SERPL-SCNC: 3.8 MMOL/L — SIGNIFICANT CHANGE UP (ref 3.5–5.3)
PROT SERPL-MCNC: 6.8 G/DL — SIGNIFICANT CHANGE UP (ref 6.6–8.7)
RBC # BLD: 3.4 M/UL — LOW (ref 4.6–6.2)
RBC # FLD: 18.9 % — HIGH (ref 11–15.6)
RBC BLD AUTO: ABNORMAL
SCHISTOCYTES BLD QL AUTO: SLIGHT — SIGNIFICANT CHANGE UP
SODIUM SERPL-SCNC: 143 MMOL/L — SIGNIFICANT CHANGE UP (ref 135–145)
VANCOMYCIN TROUGH SERPL-MCNC: 14 UG/ML — SIGNIFICANT CHANGE UP (ref 10–20)
VANCOMYCIN TROUGH SERPL-MCNC: 23.2 UG/ML — HIGH (ref 10–20)
WBC # BLD: 19.5 K/UL — HIGH (ref 4.8–10.8)
WBC # FLD AUTO: 19.5 K/UL — HIGH (ref 4.8–10.8)

## 2017-06-03 PROCEDURE — 71010: CPT | Mod: 26

## 2017-06-03 RX ORDER — ACETAMINOPHEN 500 MG
1000 TABLET ORAL ONCE
Qty: 0 | Refills: 0 | Status: COMPLETED | OUTPATIENT
Start: 2017-06-03 | End: 2017-06-03

## 2017-06-03 RX ORDER — PANTOPRAZOLE SODIUM 20 MG/1
40 TABLET, DELAYED RELEASE ORAL
Qty: 0 | Refills: 0 | Status: DISCONTINUED | OUTPATIENT
Start: 2017-06-03 | End: 2017-06-12

## 2017-06-03 RX ORDER — IPRATROPIUM/ALBUTEROL SULFATE 18-103MCG
3 AEROSOL WITH ADAPTER (GRAM) INHALATION EVERY 6 HOURS
Qty: 0 | Refills: 0 | Status: DISCONTINUED | OUTPATIENT
Start: 2017-06-03 | End: 2017-06-10

## 2017-06-03 RX ORDER — METOPROLOL TARTRATE 50 MG
5 TABLET ORAL EVERY 6 HOURS
Qty: 0 | Refills: 0 | Status: DISCONTINUED | OUTPATIENT
Start: 2017-06-03 | End: 2017-06-07

## 2017-06-03 RX ORDER — VANCOMYCIN HCL 1 G
750 VIAL (EA) INTRAVENOUS EVERY 12 HOURS
Qty: 0 | Refills: 0 | Status: DISCONTINUED | OUTPATIENT
Start: 2017-06-03 | End: 2017-06-03

## 2017-06-03 RX ADMIN — PANTOPRAZOLE SODIUM 10 MG/HR: 20 TABLET, DELAYED RELEASE ORAL at 13:46

## 2017-06-03 RX ADMIN — Medication 125 MILLIGRAM(S): at 05:35

## 2017-06-03 RX ADMIN — Medication 100 MILLIGRAM(S): at 12:02

## 2017-06-03 RX ADMIN — Medication 50 MILLIGRAM(S): at 05:35

## 2017-06-03 RX ADMIN — CHLORHEXIDINE GLUCONATE 15 MILLILITER(S): 213 SOLUTION TOPICAL at 05:35

## 2017-06-03 RX ADMIN — Medication 1 MILLIGRAM(S): at 13:46

## 2017-06-03 RX ADMIN — HEPARIN SODIUM 5000 UNIT(S): 5000 INJECTION INTRAVENOUS; SUBCUTANEOUS at 15:02

## 2017-06-03 RX ADMIN — Medication 0.25 MILLIGRAM(S): at 12:09

## 2017-06-03 RX ADMIN — Medication 3 MILLILITER(S): at 14:43

## 2017-06-03 RX ADMIN — Medication 100 MILLIGRAM(S): at 05:35

## 2017-06-03 RX ADMIN — NYSTATIN CREAM 1 APPLICATION(S): 100000 CREAM TOPICAL at 05:35

## 2017-06-03 RX ADMIN — Medication 400 MILLIGRAM(S): at 00:23

## 2017-06-03 RX ADMIN — NYSTATIN CREAM 1 APPLICATION(S): 100000 CREAM TOPICAL at 18:52

## 2017-06-03 RX ADMIN — Medication 6 MILLIGRAM(S): at 21:34

## 2017-06-03 RX ADMIN — Medication 5 MILLIGRAM(S): at 17:49

## 2017-06-03 RX ADMIN — MEROPENEM 200 MILLIGRAM(S): 1 INJECTION INTRAVENOUS at 12:09

## 2017-06-03 RX ADMIN — Medication 20 MILLIGRAM(S): at 05:35

## 2017-06-03 RX ADMIN — Medication 3 MILLILITER(S): at 08:15

## 2017-06-03 RX ADMIN — Medication 50 MILLIGRAM(S): at 21:34

## 2017-06-03 RX ADMIN — MEROPENEM 200 MILLIGRAM(S): 1 INJECTION INTRAVENOUS at 00:48

## 2017-06-03 RX ADMIN — HEPARIN SODIUM 5000 UNIT(S): 5000 INJECTION INTRAVENOUS; SUBCUTANEOUS at 05:36

## 2017-06-03 RX ADMIN — Medication 5 MILLIGRAM(S): at 23:10

## 2017-06-03 RX ADMIN — Medication 400 MILLIGRAM(S): at 13:48

## 2017-06-03 RX ADMIN — HEPARIN SODIUM 5000 UNIT(S): 5000 INJECTION INTRAVENOUS; SUBCUTANEOUS at 21:34

## 2017-06-03 RX ADMIN — MEROPENEM 200 MILLIGRAM(S): 1 INJECTION INTRAVENOUS at 23:10

## 2017-06-03 RX ADMIN — Medication 3 MILLILITER(S): at 03:37

## 2017-06-03 NOTE — PROGRESS NOTE ADULT - SUBJECTIVE AND OBJECTIVE BOX
INTERVAL HISTORY:  Seen at bedside; stable; await to be extubated.    No Known Allergies      VITAL SIGNS:  Vital Signs Last 24 Hrs  T(C): 38.6, Max: 39.3 (06-03 @ 01:00)  T(F): 101.5, Max: 102.7 (06-03 @ 01:00)  HR: 84 (68 - 96)  BP: 113/85 (113/77 - 148/69)  BP(mean): 95 (80 - 109)  RR: 25 (21 - 85)  SpO2: 100% (86% - 100%)    PHYSICAL EXAMINATION:  General: Well-developed, well nourished, in no acute distress.  Eyes: Conjunctiva and sclera clear.  Neurologic:  - Mental Status: Intubated; non-verbal; out of bed and follows simple commands.  Cranial Nerves II-XII:  Pupil R 4mm reactive, L pupil 4mm reactive; no facial; + corneal; not cooperative for [facility]; + cough to suction; + dolls;   - Motor: + Spontaneous activity.  - Reflexes:  1+ and symmetric throughout.  Plantars withdrawal b/l.  - Sensory:  Withdraws to noxious throughout  - Coordination:  Pt unable.  - Gait: Deferred.    MEDS:  MEDICATIONS  (STANDING):  nystatin Powder 1Application(s) Topical two times a day  ALBUTerol/ipratropium for Nebulization 3milliLiter(s) Nebulizer every 6 hours  folic acid Injectable 1milliGRAM(s) IV Push daily  thiamine 100milliGRAM(s) Oral daily  amantadine Syrup 100milliGRAM(s) Oral <User Schedule>  valproic  acid Syrup 125milliGRAM(s) Oral every 12 hours  melatonin 6milliGRAM(s) Oral at bedtime  metoprolol 50milliGRAM(s) Oral every 8 hours  digoxin  Injectable 0.25milliGRAM(s) IV Push daily  furosemide    Tablet 20milliGRAM(s) Oral daily  traZODone 50milliGRAM(s) Oral at bedtime  pantoprazole Infusion 8mG/Hr IV Continuous <Continuous>  chlorhexidine 0.12% Liquid 15milliLiter(s) Swish and Spit two times a day  heparin  Injectable 5000Unit(s) SubCutaneous every 8 hours  meropenem IVPB 1000milliGRAM(s) IV Intermittent every 12 hours    MEDICATIONS  (PRN):      LABS:                          9.9    19.5  )-----------( 324      ( 03 Jun 2017 04:36 )             32.4     06-03    143  |  104  |  47.0<H>  ----------------------------<  157<H>  3.8   |  27.0  |  1.11    Ca    8.7      03 Jun 2017 04:36  Phos  2.4     06-03  Mg     2.3     06-03    TPro  6.8  /  Alb  2.9<L>  /  TBili  1.3  /  DBili  x   /  AST  44<H>  /  ALT  39  /  AlkPhos  129<H>  06-03    LIVER FUNCTIONS - ( 03 Jun 2017 04:36 )  Alb: 2.9 g/dL / Pro: 6.8 g/dL / ALK PHOS: 129 U/L / ALT: 39 U/L / AST: 44 U/L / GGT: x               RADIOLOGY & ADDITIONAL STUDIES:      CTA head 6/2/17 - No acute infarction. No evidence of global hypoxic ischemic   injury. Unremarkable CTA of the brain.    IMPRESSION:  Encephalopathy likely Anoxic/metabolic/sepsis; Anisocoria resolved.

## 2017-06-04 LAB
ANION GAP SERPL CALC-SCNC: 13 MMOL/L — SIGNIFICANT CHANGE UP (ref 5–17)
ANISOCYTOSIS BLD QL: SLIGHT — SIGNIFICANT CHANGE UP
BASE EXCESS BLDA CALC-SCNC: 5.1 MMOL/L — HIGH (ref -3–3)
BASO STIPL BLD QL SMEAR: SLIGHT — SIGNIFICANT CHANGE UP
BASOPHILS # BLD AUTO: 0 K/UL — SIGNIFICANT CHANGE UP (ref 0–0.2)
BLOOD GAS COMMENTS ARTERIAL: SIGNIFICANT CHANGE UP
BUN SERPL-MCNC: 48 MG/DL — HIGH (ref 8–20)
CALCIUM SERPL-MCNC: 8.4 MG/DL — LOW (ref 8.6–10.2)
CHLORIDE SERPL-SCNC: 107 MMOL/L — SIGNIFICANT CHANGE UP (ref 98–107)
CO2 SERPL-SCNC: 27 MMOL/L — SIGNIFICANT CHANGE UP (ref 22–29)
CREAT SERPL-MCNC: 1.05 MG/DL — SIGNIFICANT CHANGE UP (ref 0.5–1.3)
CULTURE RESULTS: SIGNIFICANT CHANGE UP
CULTURE RESULTS: SIGNIFICANT CHANGE UP
EOSINOPHIL # BLD AUTO: 0 K/UL — SIGNIFICANT CHANGE UP (ref 0–0.5)
GAS PNL BLDA: SIGNIFICANT CHANGE UP
GAS PNL BLDA: SIGNIFICANT CHANGE UP
GLUCOSE SERPL-MCNC: 148 MG/DL — HIGH (ref 70–115)
HCO3 BLDA-SCNC: 29 MMOL/L — HIGH (ref 20–26)
HCT VFR BLD CALC: 32.8 % — LOW (ref 42–52)
HGB BLD-MCNC: 9.9 G/DL — LOW (ref 14–18)
HOROWITZ INDEX BLDA+IHG-RTO: SIGNIFICANT CHANGE UP
LYMPHOCYTES # BLD AUTO: 1.5 K/UL — SIGNIFICANT CHANGE UP (ref 1–4.8)
LYMPHOCYTES # BLD AUTO: 7.3 % — LOW (ref 20–55)
MACROCYTES BLD QL: SLIGHT — SIGNIFICANT CHANGE UP
MAGNESIUM SERPL-MCNC: 2.4 MG/DL — SIGNIFICANT CHANGE UP (ref 1.6–2.6)
MCHC RBC-ENTMCNC: 29.5 PG — SIGNIFICANT CHANGE UP (ref 27–31)
MCHC RBC-ENTMCNC: 30.2 G/DL — LOW (ref 32–36)
MCV RBC AUTO: 97.6 FL — HIGH (ref 80–94)
MICROCYTES BLD QL: SLIGHT — SIGNIFICANT CHANGE UP
MONOCYTES # BLD AUTO: 1.4 K/UL — HIGH (ref 0–0.8)
MONOCYTES NFR BLD AUTO: 6.7 % — SIGNIFICANT CHANGE UP (ref 3–10)
NEUTROPHILS # BLD AUTO: 17.5 K/UL — HIGH (ref 1.8–8)
NEUTROPHILS NFR BLD AUTO: 85.4 % — HIGH (ref 37–73)
PCO2 BLDA: 36 MMHG — SIGNIFICANT CHANGE UP (ref 35–45)
PH BLDA: 7.51 — HIGH (ref 7.35–7.45)
PHOSPHATE SERPL-MCNC: 2.9 MG/DL — SIGNIFICANT CHANGE UP (ref 2.4–4.7)
PLAT MORPH BLD: ABNORMAL
PLATELET # BLD AUTO: 285 K/UL — SIGNIFICANT CHANGE UP (ref 150–400)
PLATELET CLUMP BLD QL SMEAR: SIGNIFICANT CHANGE UP
PO2 BLDA: 65 MMHG — LOW (ref 83–108)
POLYCHROMASIA BLD QL SMEAR: SLIGHT — SIGNIFICANT CHANGE UP
POTASSIUM SERPL-MCNC: 4.5 MMOL/L — SIGNIFICANT CHANGE UP (ref 3.5–5.3)
POTASSIUM SERPL-SCNC: 4.5 MMOL/L — SIGNIFICANT CHANGE UP (ref 3.5–5.3)
RBC # BLD: 3.36 M/UL — LOW (ref 4.6–6.2)
RBC # FLD: 19.4 % — HIGH (ref 11–15.6)
RBC BLD AUTO: ABNORMAL
SAO2 % BLDA: 94 % — LOW (ref 95–99)
SODIUM SERPL-SCNC: 147 MMOL/L — HIGH (ref 135–145)
SPECIMEN SOURCE: SIGNIFICANT CHANGE UP
SPECIMEN SOURCE: SIGNIFICANT CHANGE UP
VANCOMYCIN TROUGH SERPL-MCNC: 9.6 UG/ML — LOW (ref 10–20)
WBC # BLD: 20.1 K/UL — HIGH (ref 4.8–10.8)
WBC # FLD AUTO: 20.1 K/UL — HIGH (ref 4.8–10.8)

## 2017-06-04 RX ORDER — FUROSEMIDE 40 MG
20 TABLET ORAL ONCE
Qty: 0 | Refills: 0 | Status: COMPLETED | OUTPATIENT
Start: 2017-06-04 | End: 2017-06-04

## 2017-06-04 RX ORDER — ACETAMINOPHEN 500 MG
1000 TABLET ORAL ONCE
Qty: 0 | Refills: 0 | Status: COMPLETED | OUTPATIENT
Start: 2017-06-04 | End: 2017-06-04

## 2017-06-04 RX ORDER — CALCIUM GLUCONATE 100 MG/ML
2 VIAL (ML) INTRAVENOUS ONCE
Qty: 0 | Refills: 0 | Status: COMPLETED | OUTPATIENT
Start: 2017-06-04 | End: 2017-06-04

## 2017-06-04 RX ADMIN — NYSTATIN CREAM 1 APPLICATION(S): 100000 CREAM TOPICAL at 17:20

## 2017-06-04 RX ADMIN — Medication 200 GRAM(S): at 12:56

## 2017-06-04 RX ADMIN — Medication 125 MILLIGRAM(S): at 17:19

## 2017-06-04 RX ADMIN — HEPARIN SODIUM 5000 UNIT(S): 5000 INJECTION INTRAVENOUS; SUBCUTANEOUS at 15:09

## 2017-06-04 RX ADMIN — HEPARIN SODIUM 5000 UNIT(S): 5000 INJECTION INTRAVENOUS; SUBCUTANEOUS at 05:20

## 2017-06-04 RX ADMIN — Medication 100 MILLIGRAM(S): at 05:20

## 2017-06-04 RX ADMIN — PANTOPRAZOLE SODIUM 40 MILLIGRAM(S): 20 TABLET, DELAYED RELEASE ORAL at 17:20

## 2017-06-04 RX ADMIN — MEROPENEM 200 MILLIGRAM(S): 1 INJECTION INTRAVENOUS at 11:37

## 2017-06-04 RX ADMIN — Medication 1 MILLIGRAM(S): at 12:57

## 2017-06-04 RX ADMIN — Medication 0.25 MILLIGRAM(S): at 11:38

## 2017-06-04 RX ADMIN — PANTOPRAZOLE SODIUM 40 MILLIGRAM(S): 20 TABLET, DELAYED RELEASE ORAL at 05:20

## 2017-06-04 RX ADMIN — Medication 100 MILLIGRAM(S): at 11:37

## 2017-06-04 RX ADMIN — MEROPENEM 200 MILLIGRAM(S): 1 INJECTION INTRAVENOUS at 23:11

## 2017-06-04 RX ADMIN — Medication 6 MILLIGRAM(S): at 21:25

## 2017-06-04 RX ADMIN — Medication 5 MILLIGRAM(S): at 05:20

## 2017-06-04 RX ADMIN — Medication 5 MILLIGRAM(S): at 17:19

## 2017-06-04 RX ADMIN — Medication 20 MILLIGRAM(S): at 05:20

## 2017-06-04 RX ADMIN — HEPARIN SODIUM 5000 UNIT(S): 5000 INJECTION INTRAVENOUS; SUBCUTANEOUS at 21:25

## 2017-06-04 RX ADMIN — NYSTATIN CREAM 1 APPLICATION(S): 100000 CREAM TOPICAL at 05:20

## 2017-06-04 RX ADMIN — Medication 20 MILLIGRAM(S): at 07:20

## 2017-06-04 RX ADMIN — Medication 5 MILLIGRAM(S): at 23:11

## 2017-06-04 RX ADMIN — Medication 125 MILLIGRAM(S): at 05:19

## 2017-06-04 RX ADMIN — Medication 400 MILLIGRAM(S): at 06:54

## 2017-06-04 RX ADMIN — Medication 50 MILLIGRAM(S): at 21:25

## 2017-06-04 RX ADMIN — Medication 5 MILLIGRAM(S): at 11:39

## 2017-06-04 NOTE — PROGRESS NOTE ADULT - SUBJECTIVE AND OBJECTIVE BOX
INTERVAL HPI/OVERNIGHT EVENTS:  elevated temp overnight. tolerating ngt feeds.     PRESSORS: [ ] YES [x ] NO  WHICH:  DOSE:    ANTIBIOTICS:       merrem           DATE STARTED:  ANTIBIOTICS:                  DATE STARTED:  ANTIBIOTICS:                  DATE STARTED:    MEDICATIONS  (STANDING):  nystatin Powder 1Application(s) Topical two times a day  folic acid Injectable 1milliGRAM(s) IV Push daily  thiamine 100milliGRAM(s) Oral daily  amantadine Syrup 100milliGRAM(s) Oral <User Schedule>  valproic  acid Syrup 125milliGRAM(s) Oral every 12 hours  melatonin 6milliGRAM(s) Oral at bedtime  digoxin  Injectable 0.25milliGRAM(s) IV Push daily  furosemide    Tablet 20milliGRAM(s) Oral daily  traZODone 50milliGRAM(s) Oral at bedtime  heparin  Injectable 5000Unit(s) SubCutaneous every 8 hours  meropenem IVPB 1000milliGRAM(s) IV Intermittent every 12 hours  metoprolol Injectable 5milliGRAM(s) IV Push every 6 hours  pantoprazole  Injectable 40milliGRAM(s) IV Push two times a day    MEDICATIONS  (PRN):  ALBUTerol/ipratropium for Nebulization 3milliLiter(s) Nebulizer every 6 hours PRN Shortness of Breath and/or Wheezing      Drug Dosing Weight  Height (cm): 165.1 (04 May 2017 13:12)  Weight (kg): 93.9 (09 May 2017 05:12)  BMI (kg/m2): 34.4 (09 May 2017 05:12)  BSA (m2): 2.01 (09 May 2017 05:12)    CENTRAL LINE: [ ] YES [x ] NO  LOCATION:   DATE INSERTED:  REMOVE: [ ] YES [ ] NO  EXPLAIN:    ARMAS: [x ] YES [ ] NO    DATE INSERTED:  REMOVE: [ ] YES [x ] NO  EXPLAIN: i/o monitoring    A-LINE: [ ] YES [x ] NO  LOCATION:   DATE INSERTED:  REMOVE: [ ] YES [ ] NO  EXPLAIN:    PAST MEDICAL & SURGICAL HISTORY:  Mitral regurgitation: severe MR  Cardiomyopathy, nonischemic  CAD (coronary artery disease)  Asthma  Atrial fibrillation  Heart disease  S/P laparoscopic cholecystectomy  History of humerus fracture: Metal pins in Left Humerus  Artificial pacemaker      ICU Vital Signs Last 24 Hrs  T(C): 38.1, Max: 39.1 (06-04 @ 08:00)  T(F): 100.6, Max: 102.4 (06-04 @ 08:00)  HR: 95 (75 - 104)  BP: 124/91 (124/91 - 156/75)  BP(mean): 104 (87 - 114)  ABP: --  ABP(mean): --  RR: 39 (22 - 68)  SpO2: 97% (92% - 99%)      ABG - ( 04 Jun 2017 11:41 )  pH: 7.52  /  pCO2: 36    /  pO2: 96    / HCO3: 30    / Base Excess: 5.8   /  SaO2: 99                  I&O's Detail  I & Os for 24h ending 04 Jun 2017 07:00  =============================================  IN:    Pivot: 574 ml    Solution: 200 ml    pantoprazole Infusion: 120 ml    Solution: 100 ml    Total IN: 994 ml  ---------------------------------------------  OUT:    Indwelling Catheter - Urethral: 1055 ml    Nasoenteral Tube: 150 ml    Drain: 60 ml    Total OUT: 1265 ml  ---------------------------------------------  Total NET: -271 ml    I & Os for current day (as of 04 Jun 2017 18:26)  =============================================  IN:    Pivot: 459 ml    Free Water: 200 ml    Solution: 100 ml    Solution: 100 ml    Total IN: 859 ml  ---------------------------------------------  OUT:    Indwelling Catheter - Urethral: 795 ml    Total OUT: 795 ml  ---------------------------------------------  Total NET: 64 ml          Physical Exam:    Neurological:  No sensory/motor deficits    HEENT: PERRLA, EOMI, no drainage or redness    Neck: No bruits; no thyromegaly or nodules,  No JVD    Back: Normal spine flexure, No CVA tenderness, No deformity or limitation of movement    Respiratory: Breath Sounds equal & clear to auscultation,     Cardiovascular: irregular , Afib    Gastrointestinal: Soft, non-tender, normal bowel sounds, drain minimal output - bilious    Extremities: No peripheral edema, No cyanosis, clubbing     Vascular: Equal and normal pulses: 2+ peripheral pulses throughout    Musculoskeletal: No joint pain, swelling or deformity; no limitation of movement    Skin: No rashes    LABS:  CBC Full  -  ( 04 Jun 2017 05:05 )  WBC Count : 20.1 K/uL  Hemoglobin : 9.9 g/dL  Hematocrit : 32.8 %  Platelet Count - Automated : 285 K/uL  Mean Cell Volume : 97.6 fl  Mean Cell Hemoglobin : 29.5 pg  Mean Cell Hemoglobin Concentration : 30.2 g/dL  Auto Neutrophil # : 17.5 K/uL  Auto Lymphocyte # : 1.5 K/uL  Auto Monocyte # : 1.4 K/uL  Auto Eosinophil # : 0.0 K/uL  Auto Basophil # : 0.0 K/uL  Auto Neutrophil % : 85.4 %  Auto Lymphocyte % : 7.3 %  Auto Monocyte % : 6.7 %  Auto Eosinophil % : x  Auto Basophil % : x    06-04    147<H>  |  107  |  48.0<H>  ----------------------------<  148<H>  4.5   |  27.0  |  1.05    Ca    8.4<L>      04 Jun 2017 05:05  Phos  2.9     06-04  Mg     2.4     06-04    TPro  6.8  /  Alb  2.9<L>  /  TBili  1.3  /  DBili  x   /  AST  44<H>  /  ALT  39  /  AlkPhos  129<H>  06-03          Assessment and Plan:    Neuro:  Monitor for delirium.  Continue to optimize pain control. Serial Neurologic assessments.     HEENT: no issues    CV: Continue hemodynamic monitoring. no hep drip for afib secondary to recent gi bleed.     Pulm: Pulmonary toilet.  Continue incentive spirometer.  Chest PT.  deep suctioning. treating for PNA    GI/Nutrition: Bowel Regimen    /Renal: monitor UOP. Monitor BMP.  Replete Lytes as needed      HEME- DVT prophylaxis, SCDs    ID: on Merrem for ?PNA?, will repeat blood cx including fungal blood cx's today.    Lines/Tubes:     Endo:     Skin:     Dispo: possible continues sepsis, cont with abx, f/u cx's , watch respiratory statues - intubate if becomes compromised.    critical care time : 40 min

## 2017-06-05 LAB
AMMONIA BLD-MCNC: 31 UMOL/L — SIGNIFICANT CHANGE UP (ref 11–55)
ANION GAP SERPL CALC-SCNC: 13 MMOL/L — SIGNIFICANT CHANGE UP (ref 5–17)
ANISOCYTOSIS BLD QL: SLIGHT — SIGNIFICANT CHANGE UP
BUN SERPL-MCNC: 54 MG/DL — HIGH (ref 8–20)
CALCIUM SERPL-MCNC: 8.7 MG/DL — SIGNIFICANT CHANGE UP (ref 8.6–10.2)
CHLORIDE SERPL-SCNC: 109 MMOL/L — HIGH (ref 98–107)
CO2 SERPL-SCNC: 27 MMOL/L — SIGNIFICANT CHANGE UP (ref 22–29)
CREAT SERPL-MCNC: 1.09 MG/DL — SIGNIFICANT CHANGE UP (ref 0.5–1.3)
DIGOXIN SERPL-MCNC: 1.7 NG/ML — SIGNIFICANT CHANGE UP (ref 0.8–2)
EOSINOPHIL NFR BLD AUTO: 1 % — SIGNIFICANT CHANGE UP (ref 0–6)
GAS PNL BLDA: SIGNIFICANT CHANGE UP
GIANT PLATELETS BLD QL SMEAR: PRESENT — SIGNIFICANT CHANGE UP
GLUCOSE SERPL-MCNC: 135 MG/DL — HIGH (ref 70–115)
GRAM STN FLD: SIGNIFICANT CHANGE UP
HCT VFR BLD CALC: 32.8 % — LOW (ref 42–52)
HCT VFR BLD CALC: 33 % — LOW (ref 42–52)
HGB BLD-MCNC: 9.7 G/DL — LOW (ref 14–18)
HGB BLD-MCNC: 9.7 G/DL — LOW (ref 14–18)
HYPOCHROMIA BLD QL: SLIGHT — SIGNIFICANT CHANGE UP
LACTATE SERPL-SCNC: 2.7 MMOL/L — HIGH (ref 0.5–2)
LIDOCAIN IGE QN: 121 U/L — HIGH (ref 22–51)
LYMPHOCYTES # BLD AUTO: 8 % — LOW (ref 20–55)
MACROCYTES BLD QL: SLIGHT — SIGNIFICANT CHANGE UP
MAGNESIUM SERPL-MCNC: 2.5 MG/DL — SIGNIFICANT CHANGE UP (ref 1.6–2.6)
MCHC RBC-ENTMCNC: 28.4 PG — SIGNIFICANT CHANGE UP (ref 27–31)
MCHC RBC-ENTMCNC: 28.7 PG — SIGNIFICANT CHANGE UP (ref 27–31)
MCHC RBC-ENTMCNC: 29.4 G/DL — LOW (ref 32–36)
MCHC RBC-ENTMCNC: 29.6 G/DL — LOW (ref 32–36)
MCV RBC AUTO: 96.5 FL — HIGH (ref 80–94)
MCV RBC AUTO: 97 FL — HIGH (ref 80–94)
MICROCYTES BLD QL: SLIGHT — SIGNIFICANT CHANGE UP
MONOCYTES NFR BLD AUTO: 5 % — SIGNIFICANT CHANGE UP (ref 3–10)
NEUTROPHILS NFR BLD AUTO: 84 % — HIGH (ref 37–73)
NEUTS BAND # BLD: 1 % — SIGNIFICANT CHANGE UP (ref 0–8)
OB PNL STL: POSITIVE
PHOSPHATE SERPL-MCNC: 2.7 MG/DL — SIGNIFICANT CHANGE UP (ref 2.4–4.7)
PLAT MORPH BLD: ABNORMAL
PLATELET # BLD AUTO: 265 K/UL — SIGNIFICANT CHANGE UP (ref 150–400)
PLATELET # BLD AUTO: 282 K/UL — SIGNIFICANT CHANGE UP (ref 150–400)
POIKILOCYTOSIS BLD QL AUTO: SLIGHT — SIGNIFICANT CHANGE UP
POLYCHROMASIA BLD QL SMEAR: SLIGHT — SIGNIFICANT CHANGE UP
POTASSIUM SERPL-MCNC: 4.5 MMOL/L — SIGNIFICANT CHANGE UP (ref 3.5–5.3)
POTASSIUM SERPL-SCNC: 4.5 MMOL/L — SIGNIFICANT CHANGE UP (ref 3.5–5.3)
RBC # BLD: 3.38 M/UL — LOW (ref 4.6–6.2)
RBC # BLD: 3.42 M/UL — LOW (ref 4.6–6.2)
RBC # FLD: 18.7 % — HIGH (ref 11–15.6)
RBC # FLD: 18.9 % — HIGH (ref 11–15.6)
RBC BLD AUTO: ABNORMAL
SMUDGE CELLS # BLD: PRESENT — SIGNIFICANT CHANGE UP
SODIUM SERPL-SCNC: 149 MMOL/L — HIGH (ref 135–145)
SPECIMEN SOURCE: SIGNIFICANT CHANGE UP
STOMATOCYTES BLD QL SMEAR: PRESENT — SIGNIFICANT CHANGE UP
T3 SERPL-MCNC: 58 NG/DL — LOW (ref 80–200)
T4 AB SER-ACNC: 6.5 UG/DL — SIGNIFICANT CHANGE UP (ref 4.5–12)
TSH SERPL-MCNC: 3.31 UIU/ML — SIGNIFICANT CHANGE UP (ref 0.27–4.2)
VARIANT LYMPHS # BLD: 1 % — SIGNIFICANT CHANGE UP (ref 0–6)
WBC # BLD: 16.3 K/UL — HIGH (ref 4.8–10.8)
WBC # BLD: 16.6 K/UL — HIGH (ref 4.8–10.8)
WBC # FLD AUTO: 16.3 K/UL — HIGH (ref 4.8–10.8)
WBC # FLD AUTO: 16.6 K/UL — HIGH (ref 4.8–10.8)

## 2017-06-05 PROCEDURE — 99233 SBSQ HOSP IP/OBS HIGH 50: CPT

## 2017-06-05 PROCEDURE — 99291 CRITICAL CARE FIRST HOUR: CPT

## 2017-06-05 RX ORDER — FLUOXETINE HCL 10 MG
10 CAPSULE ORAL DAILY
Qty: 0 | Refills: 0 | Status: DISCONTINUED | OUTPATIENT
Start: 2017-06-05 | End: 2017-06-10

## 2017-06-05 RX ADMIN — Medication 100 MILLIGRAM(S): at 13:17

## 2017-06-05 RX ADMIN — Medication 5 MILLIGRAM(S): at 05:34

## 2017-06-05 RX ADMIN — Medication 20 MILLIGRAM(S): at 05:34

## 2017-06-05 RX ADMIN — Medication 0.25 MILLIGRAM(S): at 13:15

## 2017-06-05 RX ADMIN — Medication 100 MILLIGRAM(S): at 13:15

## 2017-06-05 RX ADMIN — PANTOPRAZOLE SODIUM 40 MILLIGRAM(S): 20 TABLET, DELAYED RELEASE ORAL at 05:34

## 2017-06-05 RX ADMIN — Medication 5 MILLIGRAM(S): at 17:11

## 2017-06-05 RX ADMIN — MEROPENEM 200 MILLIGRAM(S): 1 INJECTION INTRAVENOUS at 13:16

## 2017-06-05 RX ADMIN — PANTOPRAZOLE SODIUM 40 MILLIGRAM(S): 20 TABLET, DELAYED RELEASE ORAL at 17:12

## 2017-06-05 RX ADMIN — Medication 100 MILLIGRAM(S): at 05:34

## 2017-06-05 RX ADMIN — HEPARIN SODIUM 5000 UNIT(S): 5000 INJECTION INTRAVENOUS; SUBCUTANEOUS at 13:17

## 2017-06-05 RX ADMIN — Medication 1 MILLIGRAM(S): at 13:16

## 2017-06-05 RX ADMIN — Medication 63.75 MILLIMOLE(S): at 06:49

## 2017-06-05 RX ADMIN — NYSTATIN CREAM 1 APPLICATION(S): 100000 CREAM TOPICAL at 05:35

## 2017-06-05 RX ADMIN — Medication 125 MILLIGRAM(S): at 05:34

## 2017-06-05 RX ADMIN — Medication 5 MILLIGRAM(S): at 23:26

## 2017-06-05 RX ADMIN — Medication 10 MILLIGRAM(S): at 21:31

## 2017-06-05 RX ADMIN — HEPARIN SODIUM 5000 UNIT(S): 5000 INJECTION INTRAVENOUS; SUBCUTANEOUS at 21:31

## 2017-06-05 RX ADMIN — Medication 50 MILLIGRAM(S): at 21:31

## 2017-06-05 RX ADMIN — Medication 6 MILLIGRAM(S): at 21:31

## 2017-06-05 RX ADMIN — HEPARIN SODIUM 5000 UNIT(S): 5000 INJECTION INTRAVENOUS; SUBCUTANEOUS at 05:34

## 2017-06-05 RX ADMIN — Medication 5 MILLIGRAM(S): at 13:17

## 2017-06-05 RX ADMIN — NYSTATIN CREAM 1 APPLICATION(S): 100000 CREAM TOPICAL at 17:11

## 2017-06-05 NOTE — PROGRESS NOTE ADULT - SUBJECTIVE AND OBJECTIVE BOX
CC: bile leak with complication  INTERVAL HPI/OVERNIGHT EVENTS/SUBJECTIVE:  remains extubated, minimally followings commands and trying to communicate    ICU Vital Signs Last 24 Hrs  T(C): 38.1, Max: 38.7 (06-05 @ 00:00)  T(F): 100.6, Max: 101.7 (06-05 @ 00:00)  HR: 88 (78 - 102)  BP: 117/77 (117/77 - 179/95)  BP(mean): 95 (95 - 122)  ABP: --  ABP(mean): --  RR: 31 (24 - 45)  SpO2: 99% (88% - 99%)      I&O's Detail  I & Os for 24h ending 05 Jun 2017 07:00  =============================================  IN:    Pivot: 1224 ml    Free Water: 400 ml    Enteral Tube Flush: 200 ml    Solution: 200 ml    Solution: 100 ml    Solution: 62.5 ml    Total IN: 2186.5 ml  ---------------------------------------------  OUT:    Indwelling Catheter - Urethral: 1845 ml    Drain: 45 ml    Total OUT: 1890 ml  ---------------------------------------------  Total NET: 296.5 ml    I & Os for current day (as of 05 Jun 2017 12:44)  =============================================  IN:    Pivot: 306 ml    Solution: 250 ml    Total IN: 556 ml  ---------------------------------------------  OUT:    Indwelling Catheter - Urethral: 730 ml    Total OUT: 730 ml  ---------------------------------------------  Total NET: -174 ml        ABG - ( 05 Jun 2017 03:19 )  pH: 7.49  /  pCO2: 38    /  pO2: 94    / HCO3: 30    / Base Excess: 5.7   /  SaO2: 100                 MEDICATIONS  (STANDING):  nystatin Powder 1Application(s) Topical two times a day  folic acid Injectable 1milliGRAM(s) IV Push daily  thiamine 100milliGRAM(s) Oral daily  amantadine Syrup 100milliGRAM(s) Oral <User Schedule>  melatonin 6milliGRAM(s) Oral at bedtime  digoxin  Injectable 0.25milliGRAM(s) IV Push daily  furosemide    Tablet 20milliGRAM(s) Oral daily  traZODone 50milliGRAM(s) Oral at bedtime  heparin  Injectable 5000Unit(s) SubCutaneous every 8 hours  meropenem IVPB 1000milliGRAM(s) IV Intermittent every 12 hours  metoprolol Injectable 5milliGRAM(s) IV Push every 6 hours  pantoprazole  Injectable 40milliGRAM(s) IV Push two times a day    MEDICATIONS  (PRN):  ALBUTerol/ipratropium for Nebulization 3milliLiter(s) Nebulizer every 6 hours PRN Shortness of Breath and/or Wheezing      NUTRITION/IVF: PIVOT    CENTRAL LINE: NO    ARMAS:   Yes      MISC:  GB fossa tube    PHYSICAL EXAM:    Gen: NOD    Eyes: mildy icteric    Neurological: RASS 0, non focal, minimally follows commands     ENMT: Trach midline, neck supple    Neck:supple    Pulmonary: coarse bilaterally    Cardiovascular: Irreg rate rhyth    Gastrointestinal: soft, incision c/d/i, GB fossa drain with icteric serous fluid    Genitourinary: Armas in place    Extremities: extremity edema    Skin: intact    Musculoskeletal: no gross deformities.          LABS:  CBC Full  -  ( 05 Jun 2017 04:58 )  WBC Count : 16.6 K/uL  Hemoglobin : 9.7 g/dL  Hematocrit : 33.0 %  Platelet Count - Automated : 282 K/uL  Mean Cell Volume : 96.5 fl  Mean Cell Hemoglobin : 28.4 pg  Mean Cell Hemoglobin Concentration : 29.4 g/dL  Auto Neutrophil # : x  Auto Lymphocyte # : x  Auto Monocyte # : x  Auto Eosinophil # : x  Auto Basophil # : x  Auto Neutrophil % : 84.0 %  Auto Lymphocyte % : 8.0 %  Auto Monocyte % : 5.0 %  Auto Eosinophil % : 1.0 %  Auto Basophil % : x    06-05    149<H>  |  109<H>  |  54.0<H>  ----------------------------<  135<H>  4.5   |  27.0  |  1.09    Ca    8.7      05 Jun 2017 04:58  Phos  2.7     06-05  Mg     2.5     06-05          RECENT CULTURES:  05-30 .Blood Blood XXXX XXXX   No growth at 5 days.    05-30 .Blood Blood XXXX XXXX   No growth at 5 days.              CAPILLARY BLOOD GLUCOSE      RADIOLOGY & ADDITIONAL STUDIES:    ASSESSMENT/PLAN:  51yMale presenting with: cystic stump leak with multiple complications, remians extubated with slow progress of multifactorial encephalopayh    Neuro: d/c depakote and add bromocriptine fro his multifactorial encephalopathy, CTA evidence jacob WE no stroke/vascular abnormality.    HEENT :As above, plastics on board for tongue laceration.    CV: Cont current care for his a fibrillation    Pulm: Aggressive pulmonary toilette     GI/Nutrition: Continue Pivot, BUN elevated, patient again with melanotic stools, will discuss again with GI endoscopy evaluation. cont PPI  IR drain with icteroserous fluid, will reculture today, possible removal latter this week.    /Renal: Armas catheter, trial of void for latter this week.    ID: Persistent leukocytosis, has been on Abx for over 5 days after sourse control wit IR drain of GB fossa collection. At this point I will stof the antibiotics and reassess off antibiotics. CXR no consolidatios and fever curve improved.    Lines/Tubes:none    Endo: Glycemia at target    Skin: off load on pressure     Proph: On heparin, DVT and IVC due to DVT on heparin, H/H stable sbut guaiac positive will raise posibilty of actice GI bleed.    Dispo: ICU      CRITICAL CARE TIME SPENT: 39

## 2017-06-05 NOTE — PROGRESS NOTE ADULT - SUBJECTIVE AND OBJECTIVE BOX
Patient laying in chair. Fixating to me when called his name and tracks.   Attempts to follow commands, but is inconsistent. Clearly attempts to move and there is some distinct emotional/visceral response to what is being said to him.    Discussed with Dr. Rodriguez and ACS team.     FUNCTIONAL PROGRESS  Total    REVIEW OF SYSTEMS  demonstrated frustration in attempt to move  proximal weakness/atrophy   persistent fevers    VITALS  T(C): 38, Max: 38.7 (06-05 @ 00:00)  HR: 85 (78 - 108)  BP: 125/94 (108/77 - 179/95)  RR: 33 (24 - 45)  SpO2: 98% (88% - 99%)  Wt(kg): --    MEDICATIONS   nystatin Powder 1Application(s) two times a day  folic acid Injectable 1milliGRAM(s) daily  thiamine 100milliGRAM(s) daily  amantadine Syrup 100milliGRAM(s) <User Schedule>  melatonin 6milliGRAM(s) at bedtime  digoxin  Injectable 0.25milliGRAM(s) daily  furosemide    Tablet 20milliGRAM(s) daily  traZODone 50milliGRAM(s) at bedtime  heparin  Injectable 5000Unit(s) every 8 hours  metoprolol Injectable 5milliGRAM(s) every 6 hours  pantoprazole  Injectable 40milliGRAM(s) two times a day  ALBUTerol/ipratropium for Nebulization 3milliLiter(s) every 6 hours PRN      RECENT LABS/IMAGING  CBC Full  -  ( 05 Jun 2017 04:58 )  WBC Count : 16.6 K/uL  Hemoglobin : 9.7 g/dL  Hematocrit : 33.0 %  Platelet Count - Automated : 282 K/uL  Mean Cell Volume : 96.5 fl  Mean Cell Hemoglobin : 28.4 pg  Mean Cell Hemoglobin Concentration : 29.4 g/dL  Auto Neutrophil # : x  Auto Lymphocyte # : x  Auto Monocyte # : x  Auto Eosinophil # : x  Auto Basophil # : x  Auto Neutrophil % : 84.0 %  Auto Lymphocyte % : 8.0 %  Auto Monocyte % : 5.0 %  Auto Eosinophil % : 1.0 %  Auto Basophil % : x    06-05    149<H>  |  109<H>  |  54.0<H>  ----------------------------<  135<H>  4.5   |  27.0  |  1.09    Ca    8.7      05 Jun 2017 04:58  Phos  2.7     06-05  Mg     2.5     06-05      PHYSICAL EXAM  Constitutional - Restless, appears frustrated by his inability to move his body  Extremities - +pitting edema BLE  Neurologic Exam -                    Cognitive - Awake, Alert, tracking, follows simple commands more consistently, but fatigues very quiskly     Communication - nonverbal, attempts to verbalize     Motor - trace movement identified in bilateral EF and , right DF; atrophy of the bilateral medial hamstrings     Coordination - Poor related to intermittent isolated movements  Psychiatric - frustrated, depressed     ----------------------------------------------------------------------------------------  ASSESSMENT/PLAN  51M with cardiac arrest s/p ERCP for bile duct leak with prolonged hospitalization related to prolonged intubation and cognitive/behavioral deficits.    It is possible that patient may have multiple components to his musculoskeletal and neurological state, complicated by the course of hospital events which can include critical illness neuropathy/myopathy, anoxic brain injury, encephalopathy delirium and medications.     Arousal/Sleep wake cycle - Amantadine, Melatonin, Trazodone  - Unclear if will assist with improvement. Unable to get MRI.     Recommend OT and PT to provide ongoing daily stimulation.     Will begin implementing the ICU CAM  to monitor ongoing objective changes. Would suggest primary team to do as well, to provide additional data points throughout the day. Patient laying in chair. Fixating to me when called his name and tracks.   Attempts to follow commands, but is inconsistent. Clearly attempts to move and there is some distinct emotional/visceral response to what is being said to him.    Discussed with Dr. Rodriguez and ACS team.     FUNCTIONAL PROGRESS  Total    REVIEW OF SYSTEMS  demonstrated frustration in attempt to move  proximal weakness/atrophy   persistent fevers    VITALS  T(C): 38, Max: 38.7 (06-05 @ 00:00)  HR: 85 (78 - 108)  BP: 125/94 (108/77 - 179/95)  RR: 33 (24 - 45)  SpO2: 98% (88% - 99%)  Wt(kg): --    MEDICATIONS   nystatin Powder 1Application(s) two times a day  folic acid Injectable 1milliGRAM(s) daily  thiamine 100milliGRAM(s) daily  amantadine Syrup 100milliGRAM(s) <User Schedule>  melatonin 6milliGRAM(s) at bedtime  digoxin  Injectable 0.25milliGRAM(s) daily  furosemide    Tablet 20milliGRAM(s) daily  traZODone 50milliGRAM(s) at bedtime  heparin  Injectable 5000Unit(s) every 8 hours  metoprolol Injectable 5milliGRAM(s) every 6 hours  pantoprazole  Injectable 40milliGRAM(s) two times a day  ALBUTerol/ipratropium for Nebulization 3milliLiter(s) every 6 hours PRN      RECENT LABS/IMAGING  CBC Full  -  ( 05 Jun 2017 04:58 )  WBC Count : 16.6 K/uL  Hemoglobin : 9.7 g/dL  Hematocrit : 33.0 %  Platelet Count - Automated : 282 K/uL  Mean Cell Volume : 96.5 fl  Mean Cell Hemoglobin : 28.4 pg  Mean Cell Hemoglobin Concentration : 29.4 g/dL  Auto Neutrophil # : x  Auto Lymphocyte # : x  Auto Monocyte # : x  Auto Eosinophil # : x  Auto Basophil # : x  Auto Neutrophil % : 84.0 %  Auto Lymphocyte % : 8.0 %  Auto Monocyte % : 5.0 %  Auto Eosinophil % : 1.0 %  Auto Basophil % : x    06-05    149<H>  |  109<H>  |  54.0<H>  ----------------------------<  135<H>  4.5   |  27.0  |  1.09    Ca    8.7      05 Jun 2017 04:58  Phos  2.7     06-05  Mg     2.5     06-05      PHYSICAL EXAM  Constitutional - Restless, appears frustrated by his inability to move his body  Extremities - +pitting edema BLE  Neurologic Exam -                    Cognitive - Awake, Alert, tracking, follows simple commands more consistently, but fatigues very quiskly     Communication - nonverbal, attempts to verbalize     Motor - trace movement identified in bilateral EF and , right DF; atrophy of the bilateral medial hamstrings     Coordination - Poor related to intermittent isolated movements  Psychiatric - frustrated, depressed     ----------------------------------------------------------------------------------------  ASSESSMENT/PLAN  51M with cardiac arrest s/p ERCP for bile duct leak with prolonged hospitalization related to prolonged intubation and cognitive/behavioral deficits.    It is possible that patient may have multiple components to his musculoskeletal and neurological state, complicated by the course of hospital events which can include critical illness neuropathy/myopathy, anoxic brain injury, encephalopathy delirium and medications.     Arousal/Sleep wake cycle - Amantadine, Melatonin, Trazodone  - Unclear if will assist with improvement. Unable to get MRI.     Suggest adding Prozac 10mg daily to assist with motor recovery/neuroplasticity/mood.    Recommend OT and PT to provide ongoing daily stimulation.     Will begin implementing the ICU CAM  to monitor ongoing objective changes. Would suggest primary team to do as well, to provide additional data points throughout the day. This can also assist in differentiating delirium vs. anoxia, as one would be expected to improve.

## 2017-06-05 NOTE — PROGRESS NOTE ADULT - ASSESSMENT
IMPRESSION:  The patient is a 51 year old male with significant cardiovascular history including atrial fibrillation and cardiomyopathy s/p laparoscopic cholecystectomy. GI f/u for bile leak.   - first ercp 5/9/17: papilla appeared to be stenosed. During attempts at biliary cannulation, patient developed hypotension. Procedure aborted. Patient coded. Intubated. Patient resuscitated. ACS team took over, and performed exploratory laparotomy.  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   - LFTs improving.   - follows simple verbal commands  - noted melanotic stool on the weekend. However, h/h stable.  - extubated again    PLAN:  IV antibiotics  SICU monitoring  monitor cbc, serum lytes, and LFTs  IV PPI bid  will consider EGD if h/h dropping  d/w SICU team  will f/u

## 2017-06-05 NOTE — PROGRESS NOTE ADULT - SUBJECTIVE AND OBJECTIVE BOX
INTERVAL HPI/OVERNIGHT EVENTS:  Patient seen and examined    MEDICATIONS  (STANDING):  nystatin Powder 1Application(s) Topical two times a day  folic acid Injectable 1milliGRAM(s) IV Push daily  thiamine 100milliGRAM(s) Oral daily  amantadine Syrup 100milliGRAM(s) Oral <User Schedule>  melatonin 6milliGRAM(s) Oral at bedtime  digoxin  Injectable 0.25milliGRAM(s) IV Push daily  furosemide    Tablet 20milliGRAM(s) Oral daily  traZODone 50milliGRAM(s) Oral at bedtime  heparin  Injectable 5000Unit(s) SubCutaneous every 8 hours  metoprolol Injectable 5milliGRAM(s) IV Push every 6 hours  pantoprazole  Injectable 40milliGRAM(s) IV Push two times a day    MEDICATIONS  (PRN):  ALBUTerol/ipratropium for Nebulization 3milliLiter(s) Nebulizer every 6 hours PRN Shortness of Breath and/or Wheezing      Allergies    No Known Allergies    Intolerances        Vital Signs Last 24 Hrs  T(C): 38, Max: 38.7 (06-05 @ 00:00)  T(F): 100.4, Max: 101.7 (06-05 @ 00:00)  HR: 85 (78 - 108)  BP: 125/94 (108/77 - 179/95)  BP(mean): 103 (88 - 122)  RR: 33 (24 - 45)  SpO2: 98% (88% - 99%)    PHYSICAL EXAM:  General: NAD.  CVS: S1, S2  Chest: air entry bilaterally present  Abd: BS present, soft, non-tender      LABS:                        9.7    16.6  )-----------( 282      ( 05 Jun 2017 04:58 )             33.0     06-05    149<H>  |  109<H>  |  54.0<H>  ----------------------------<  135<H>  4.5   |  27.0  |  1.09    Ca    8.7      05 Jun 2017 04:58  Phos  2.7     06-05  Mg     2.5     06-05          RADIOLOGY & ADDITIONAL TESTS:

## 2017-06-06 LAB
ANION GAP SERPL CALC-SCNC: 14 MMOL/L — SIGNIFICANT CHANGE UP (ref 5–17)
ANISOCYTOSIS BLD QL: SLIGHT — SIGNIFICANT CHANGE UP
BUN SERPL-MCNC: 58 MG/DL — HIGH (ref 8–20)
CALCIUM SERPL-MCNC: 8.4 MG/DL — LOW (ref 8.6–10.2)
CHLORIDE SERPL-SCNC: 110 MMOL/L — HIGH (ref 98–107)
CO2 SERPL-SCNC: 28 MMOL/L — SIGNIFICANT CHANGE UP (ref 22–29)
CREAT SERPL-MCNC: 0.98 MG/DL — SIGNIFICANT CHANGE UP (ref 0.5–1.3)
CULTURE RESULTS: SIGNIFICANT CHANGE UP
GLUCOSE SERPL-MCNC: 112 MG/DL — SIGNIFICANT CHANGE UP (ref 70–115)
HCT VFR BLD CALC: 33 % — LOW (ref 42–52)
HGB BLD-MCNC: 9.7 G/DL — LOW (ref 14–18)
HYPOCHROMIA BLD QL: SLIGHT — SIGNIFICANT CHANGE UP
LYMPHOCYTES # BLD AUTO: 17 % — LOW (ref 20–55)
MACROCYTES BLD QL: SLIGHT — SIGNIFICANT CHANGE UP
MAGNESIUM SERPL-MCNC: 2.3 MG/DL — SIGNIFICANT CHANGE UP (ref 1.6–2.6)
MCHC RBC-ENTMCNC: 28.4 PG — SIGNIFICANT CHANGE UP (ref 27–31)
MCHC RBC-ENTMCNC: 29.4 G/DL — LOW (ref 32–36)
MCV RBC AUTO: 96.5 FL — HIGH (ref 80–94)
MICROCYTES BLD QL: SLIGHT — SIGNIFICANT CHANGE UP
MONOCYTES NFR BLD AUTO: 8 % — SIGNIFICANT CHANGE UP (ref 3–10)
NEUTROPHILS NFR BLD AUTO: 75 % — HIGH (ref 37–73)
NRBC # BLD: 6 /100 — HIGH (ref 0–0)
OVALOCYTES BLD QL SMEAR: SLIGHT — SIGNIFICANT CHANGE UP
PHOSPHATE SERPL-MCNC: 3.2 MG/DL — SIGNIFICANT CHANGE UP (ref 2.4–4.7)
PLAT MORPH BLD: NORMAL — SIGNIFICANT CHANGE UP
PLATELET # BLD AUTO: 270 K/UL — SIGNIFICANT CHANGE UP (ref 150–400)
POIKILOCYTOSIS BLD QL AUTO: SLIGHT — SIGNIFICANT CHANGE UP
POLYCHROMASIA BLD QL SMEAR: SLIGHT — SIGNIFICANT CHANGE UP
POTASSIUM SERPL-MCNC: 4.7 MMOL/L — SIGNIFICANT CHANGE UP (ref 3.5–5.3)
POTASSIUM SERPL-SCNC: 4.7 MMOL/L — SIGNIFICANT CHANGE UP (ref 3.5–5.3)
RBC # BLD: 3.42 M/UL — LOW (ref 4.6–6.2)
RBC # FLD: 18.7 % — HIGH (ref 11–15.6)
RBC BLD AUTO: ABNORMAL
SODIUM SERPL-SCNC: 152 MMOL/L — HIGH (ref 135–145)
SPECIMEN SOURCE: SIGNIFICANT CHANGE UP
WBC # BLD: 14 K/UL — HIGH (ref 4.8–10.8)
WBC # FLD AUTO: 14 K/UL — HIGH (ref 4.8–10.8)

## 2017-06-06 PROCEDURE — 99291 CRITICAL CARE FIRST HOUR: CPT

## 2017-06-06 PROCEDURE — 71010: CPT | Mod: 26

## 2017-06-06 RX ORDER — SODIUM CHLORIDE 9 MG/ML
1000 INJECTION, SOLUTION INTRAVENOUS
Qty: 0 | Refills: 0 | Status: DISCONTINUED | OUTPATIENT
Start: 2017-06-06 | End: 2017-06-07

## 2017-06-06 RX ORDER — FLUCONAZOLE 150 MG/1
TABLET ORAL
Qty: 0 | Refills: 0 | Status: DISCONTINUED | OUTPATIENT
Start: 2017-06-06 | End: 2017-06-07

## 2017-06-06 RX ORDER — FLUCONAZOLE 150 MG/1
200 TABLET ORAL ONCE
Qty: 0 | Refills: 0 | Status: COMPLETED | OUTPATIENT
Start: 2017-06-06 | End: 2017-06-06

## 2017-06-06 RX ORDER — FUROSEMIDE 40 MG
20 TABLET ORAL DAILY
Qty: 0 | Refills: 0 | Status: DISCONTINUED | OUTPATIENT
Start: 2017-06-06 | End: 2017-06-09

## 2017-06-06 RX ORDER — FLUCONAZOLE 150 MG/1
100 TABLET ORAL EVERY 24 HOURS
Qty: 0 | Refills: 0 | Status: DISCONTINUED | OUTPATIENT
Start: 2017-06-07 | End: 2017-06-07

## 2017-06-06 RX ORDER — DOXAZOSIN MESYLATE 4 MG
4 TABLET ORAL AT BEDTIME
Qty: 0 | Refills: 0 | Status: DISCONTINUED | OUTPATIENT
Start: 2017-06-06 | End: 2017-06-12

## 2017-06-06 RX ADMIN — SODIUM CHLORIDE 83 MILLILITER(S): 9 INJECTION, SOLUTION INTRAVENOUS at 21:36

## 2017-06-06 RX ADMIN — NYSTATIN CREAM 1 APPLICATION(S): 100000 CREAM TOPICAL at 17:36

## 2017-06-06 RX ADMIN — FLUCONAZOLE 100 MILLIGRAM(S): 150 TABLET ORAL at 14:44

## 2017-06-06 RX ADMIN — Medication 5 MILLIGRAM(S): at 05:22

## 2017-06-06 RX ADMIN — Medication 100 MILLIGRAM(S): at 05:23

## 2017-06-06 RX ADMIN — HEPARIN SODIUM 5000 UNIT(S): 5000 INJECTION INTRAVENOUS; SUBCUTANEOUS at 05:23

## 2017-06-06 RX ADMIN — Medication 100 MILLIGRAM(S): at 11:37

## 2017-06-06 RX ADMIN — PANTOPRAZOLE SODIUM 40 MILLIGRAM(S): 20 TABLET, DELAYED RELEASE ORAL at 05:22

## 2017-06-06 RX ADMIN — Medication 10 MILLIGRAM(S): at 11:36

## 2017-06-06 RX ADMIN — Medication 50 MILLIGRAM(S): at 21:36

## 2017-06-06 RX ADMIN — Medication 20 MILLIGRAM(S): at 05:23

## 2017-06-06 RX ADMIN — HEPARIN SODIUM 5000 UNIT(S): 5000 INJECTION INTRAVENOUS; SUBCUTANEOUS at 21:37

## 2017-06-06 RX ADMIN — Medication 5 MILLIGRAM(S): at 23:05

## 2017-06-06 RX ADMIN — HEPARIN SODIUM 5000 UNIT(S): 5000 INJECTION INTRAVENOUS; SUBCUTANEOUS at 14:17

## 2017-06-06 RX ADMIN — PANTOPRAZOLE SODIUM 40 MILLIGRAM(S): 20 TABLET, DELAYED RELEASE ORAL at 17:36

## 2017-06-06 RX ADMIN — Medication 6 MILLIGRAM(S): at 21:36

## 2017-06-06 RX ADMIN — Medication 20 MILLIGRAM(S): at 10:52

## 2017-06-06 RX ADMIN — Medication 0.25 MILLIGRAM(S): at 11:36

## 2017-06-06 RX ADMIN — Medication 1 MILLIGRAM(S): at 14:45

## 2017-06-06 RX ADMIN — Medication 100 MILLIGRAM(S): at 11:35

## 2017-06-06 RX ADMIN — Medication 5 MILLIGRAM(S): at 17:36

## 2017-06-06 RX ADMIN — NYSTATIN CREAM 1 APPLICATION(S): 100000 CREAM TOPICAL at 05:23

## 2017-06-06 RX ADMIN — Medication 4 MILLIGRAM(S): at 21:36

## 2017-06-06 RX ADMIN — Medication 5 MILLIGRAM(S): at 11:37

## 2017-06-06 RX ADMIN — SODIUM CHLORIDE 83 MILLILITER(S): 9 INJECTION, SOLUTION INTRAVENOUS at 10:51

## 2017-06-06 NOTE — PROGRESS NOTE ADULT - SUBJECTIVE AND OBJECTIVE BOX
CC bile leak, complicated post op  INTERVAL HPI/OVERNIGHT EVENTS/SUBJECTIVE:  no more melanotic stools. more InSync with sorroundinds    ICU Vital Signs Last 24 Hrs  T(C): 38.1, Max: 38.1 (06-05 @ 10:00)  T(F): 100.6, Max: 100.6 (06-05 @ 10:00)  HR: 106 (81 - 111)  BP: 125/93 (108/77 - 154/90)  BP(mean): 105 (88 - 112)  ABP: --  ABP(mean): --  RR: 38 (24 - 61)  SpO2: 91% (91% - 100%)      I&O's Detail    I & Os for current day (as of 06 Jun 2017 09:14)  =============================================  IN:    Pivot: 816 ml    Free Water: 800 ml    Enteral Tube Flush: 500 ml    Solution: 250 ml    Solution: 100 ml    Total IN: 2466 ml  ---------------------------------------------  OUT:    Indwelling Catheter - Urethral: 2450 ml    Drain: 15 ml    Total OUT: 2465 ml  ---------------------------------------------  Total NET: 1 ml        ABG - ( 05 Jun 2017 03:19 )  pH: 7.49  /  pCO2: 38    /  pO2: 94    / HCO3: 30    / Base Excess: 5.7   /  SaO2: 100                 MEDICATIONS  (STANDING):  nystatin Powder 1Application(s) Topical two times a day  folic acid Injectable 1milliGRAM(s) IV Push daily  thiamine 100milliGRAM(s) Oral daily  amantadine Syrup 100milliGRAM(s) Oral <User Schedule>  melatonin 6milliGRAM(s) Oral at bedtime  digoxin  Injectable 0.25milliGRAM(s) IV Push daily  furosemide    Tablet 20milliGRAM(s) Oral daily  traZODone 50milliGRAM(s) Oral at bedtime  heparin  Injectable 5000Unit(s) SubCutaneous every 8 hours  metoprolol Injectable 5milliGRAM(s) IV Push every 6 hours  pantoprazole  Injectable 40milliGRAM(s) IV Push two times a day  FLUoxetine 10milliGRAM(s) Oral daily    MEDICATIONS  (PRN):  ALBUTerol/ipratropium for Nebulization 3milliLiter(s) Nebulizer every 6 hours PRN Shortness of Breath and/or Wheezing        PHYSICAL EXAM:    Gen:    Eyes:    Neurological:    ENMT:    Neck:    Pulmonary:    Cardiovascular:    Gastrointestinal:    Genitourinary:    Back:    Extremities:    Skin:    Musculoskeletal:          LABS:  CBC Full  -  ( 06 Jun 2017 04:53 )  WBC Count : 14.0 K/uL  Hemoglobin : 9.7 g/dL  Hematocrit : 33.0 %  Platelet Count - Automated : 270 K/uL  Mean Cell Volume : 96.5 fl  Mean Cell Hemoglobin : 28.4 pg  Mean Cell Hemoglobin Concentration : 29.4 g/dL  Auto Neutrophil # : x  Auto Lymphocyte # : x  Auto Monocyte # : x  Auto Eosinophil # : x  Auto Basophil # : x  Auto Neutrophil % : 75.0 %  Auto Lymphocyte % : 17.0 %  Auto Monocyte % : 8.0 %  Auto Eosinophil % : x  Auto Basophil % : x    06-06    152<H>  |  110<H>  |  58.0<H>  ----------------------------<  112  4.7   |  28.0  |  0.98    Ca    8.4<L>      06 Jun 2017 04:53  Phos  3.2     06-06  Mg     2.3     06-06          RECENT CULTURES:  06-05 .Body Fluid Abdominal Fluid XXXX   Few White blood cells  Numerous Yeast   Numerous Candida albicans  Culture in progress    05-30 .Blood Blood XXXX XXXX   No growth at 5 days.    05-30 .Blood Blood XXXX XXXX   No growth at 5 days.              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    Neuro:    HEENT:    CV:    Pulm:    GI/Nutrition:    /Renal:    ID:    Lines/Tubes:    Endo:    Skin:    Proph:    Dispo:      CRITICAL CARE TIME SPENT: CC bile leak, complicated post op  INTERVAL HPI/OVERNIGHT EVENTS/SUBJECTIVE:  no more melanotic stools. more InSync with sorroundinds, appreciate Dr Coby macedo.    ICU Vital Signs Last 24 Hrs  T(C): 38.1, Max: 38.1 (06-05 @ 10:00)  T(F): 100.6, Max: 100.6 (06-05 @ 10:00)  HR: 106 (81 - 111)  BP: 125/93 (108/77 - 154/90)  BP(mean): 105 (88 - 112)  ABP: --  ABP(mean): --  RR: 38 (24 - 61)  SpO2: 91% (91% - 100%)      I&O's Detail    I & Os for current day (as of 06 Jun 2017 09:14)  =============================================  IN:    Pivot: 816 ml    Free Water: 800 ml    Enteral Tube Flush: 500 ml    Solution: 250 ml    Solution: 100 ml    Total IN: 2466 ml  ---------------------------------------------  OUT:    Indwelling Catheter - Urethral: 2450 ml    Drain: 15 ml    Total OUT: 2465 ml  ---------------------------------------------  Total NET: 1 ml        ABG - ( 05 Jun 2017 03:19 )  pH: 7.49  /  pCO2: 38    /  pO2: 94    / HCO3: 30    / Base Excess: 5.7   /  SaO2: 100                 MEDICATIONS  (STANDING):  nystatin Powder 1Application(s) Topical two times a day  folic acid Injectable 1milliGRAM(s) IV Push daily  thiamine 100milliGRAM(s) Oral daily  amantadine Syrup 100milliGRAM(s) Oral <User Schedule>  melatonin 6milliGRAM(s) Oral at bedtime  digoxin  Injectable 0.25milliGRAM(s) IV Push daily  furosemide    Tablet 20milliGRAM(s) Oral daily  traZODone 50milliGRAM(s) Oral at bedtime  heparin  Injectable 5000Unit(s) SubCutaneous every 8 hours  metoprolol Injectable 5milliGRAM(s) IV Push every 6 hours  pantoprazole  Injectable 40milliGRAM(s) IV Push two times a day  FLUoxetine 10milliGRAM(s) Oral daily    MEDICATIONS  (PRN):  ALBUTerol/ipratropium for Nebulization 3milliLiter(s) Nebulizer every 6 hours PRN Shortness of Breath and/or Wheezing        PHYSICAL EXAM:    Gen: Awake, follows minimal commands, more in interaction with surroundings    Eyes: anicteric sclera    Neurological: Non focal, more intercative    Neck: NO JVD    Pulmonary: Corse bilateral, BS decreased at bases    Cardiovascular: On NSR     Gastrointestinal: Soft, non distended, GB fossa drain serous    Genitourinary: clear urine    Extremities: peripheral edema    Skin: inatact    Musculoskeletal: on deformities          LABS:  CBC Full  -  ( 06 Jun 2017 04:53 )  WBC Count : 14.0 K/uL  Hemoglobin : 9.7 g/dL  Hematocrit : 33.0 %  Platelet Count - Automated : 270 K/uL  Mean Cell Volume : 96.5 fl  Mean Cell Hemoglobin : 28.4 pg  Mean Cell Hemoglobin Concentration : 29.4 g/dL  Auto Neutrophil # : x  Auto Lymphocyte # : x  Auto Monocyte # : x  Auto Eosinophil # : x  Auto Basophil # : x  Auto Neutrophil % : 75.0 %  Auto Lymphocyte % : 17.0 %  Auto Monocyte % : 8.0 %  Auto Eosinophil % : x  Auto Basophil % : x    06-06    152<H>  |  110<H>  |  58.0<H>  ----------------------------<  112  4.7   |  28.0  |  0.98    Ca    8.4<L>      06 Jun 2017 04:53  Phos  3.2     06-06  Mg     2.3     06-06          RECENT CULTURES:  06-05 .Body Fluid Abdominal Fluid XXXX   Few White blood cells  Numerous Yeast   Numerous Candida albicans  Culture in progress    05-30 .Blood Blood XXXX XXXX   No growth at 5 days.    05-30 .Blood Blood XXXX XXXX   No growth at 5 days.              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    Neuro:  Appreciate PM&R recs,: SSRI added, cont amantadine, melatonin and trazodone. CAM ICU grading.    CV: On NSR, unable to anticoagulate for paroxysmal a fib, change lopressor to PO, Hold on diuresis for now    Pulm: Aggressive pulmonary toilette IS    GI/Nutrition: TF at goal, continue free water. NPO for now for possible endoscopy to asses GI bleed, BUN remains elevated consistent with enteral blood absorption  will remove drain at GB fossa,     /Renal: , lasix for CHF, will hold additional lasix doses. start finasteride with goal to remove montoya in the future. Hypernatremic will add IV D5W to correct free water deficit.    ID: Leukocytosis improving after stopping antibiotics, Cultures with candida in urine and peritoneal (2/2 candida index) , plan to treat candida for 10 days with fluconazole    Lines/Tubes: PIV    Endo: Glycemia at target    Skin: intact    Proph: Heparin SQ    Dispo: ICU      CRITICAL CARE TIME SPENT: 40 CC bile leak, complicated post op  INTERVAL HPI/OVERNIGHT EVENTS/SUBJECTIVE:  no more melanotic stools. more InSync with sorroundinds, appreciate Dr Coby macedo.    ICU Vital Signs Last 24 Hrs  T(C): 38.1, Max: 38.1 (06-05 @ 10:00)  T(F): 100.6, Max: 100.6 (06-05 @ 10:00)  HR: 106 (81 - 111)  BP: 125/93 (108/77 - 154/90)  BP(mean): 105 (88 - 112)  ABP: --  ABP(mean): --  RR: 38 (24 - 61)  SpO2: 91% (91% - 100%)      I&O's Detail    I & Os for current day (as of 06 Jun 2017 09:14)  =============================================  IN:    Pivot: 816 ml    Free Water: 800 ml    Enteral Tube Flush: 500 ml    Solution: 250 ml    Solution: 100 ml    Total IN: 2466 ml  ---------------------------------------------  OUT:    Indwelling Catheter - Urethral: 2450 ml    Drain: 15 ml    Total OUT: 2465 ml  ---------------------------------------------  Total NET: 1 ml        ABG - ( 05 Jun 2017 03:19 )  pH: 7.49  /  pCO2: 38    /  pO2: 94    / HCO3: 30    / Base Excess: 5.7   /  SaO2: 100                 MEDICATIONS  (STANDING):  nystatin Powder 1Application(s) Topical two times a day  folic acid Injectable 1milliGRAM(s) IV Push daily  thiamine 100milliGRAM(s) Oral daily  amantadine Syrup 100milliGRAM(s) Oral <User Schedule>  melatonin 6milliGRAM(s) Oral at bedtime  digoxin  Injectable 0.25milliGRAM(s) IV Push daily  furosemide    Tablet 20milliGRAM(s) Oral daily  traZODone 50milliGRAM(s) Oral at bedtime  heparin  Injectable 5000Unit(s) SubCutaneous every 8 hours  metoprolol Injectable 5milliGRAM(s) IV Push every 6 hours  pantoprazole  Injectable 40milliGRAM(s) IV Push two times a day  FLUoxetine 10milliGRAM(s) Oral daily    MEDICATIONS  (PRN):  ALBUTerol/ipratropium for Nebulization 3milliLiter(s) Nebulizer every 6 hours PRN Shortness of Breath and/or Wheezing        PHYSICAL EXAM:    Gen: Awake, follows minimal commands, more in interaction with surroundings    Eyes: anicteric sclera    Neurological: Non focal, more intercative    Neck: NO JVD    Pulmonary: Corse bilateral, BS decreased at bases    Cardiovascular: On NSR     Gastrointestinal: Soft, non distended, GB fossa drain serous    Genitourinary: clear urine    Extremities: peripheral edema    Skin: inatact    Musculoskeletal: on deformities          LABS:  CBC Full  -  ( 06 Jun 2017 04:53 )  WBC Count : 14.0 K/uL  Hemoglobin : 9.7 g/dL  Hematocrit : 33.0 %  Platelet Count - Automated : 270 K/uL  Mean Cell Volume : 96.5 fl  Mean Cell Hemoglobin : 28.4 pg  Mean Cell Hemoglobin Concentration : 29.4 g/dL  Auto Neutrophil # : x  Auto Lymphocyte # : x  Auto Monocyte # : x  Auto Eosinophil # : x  Auto Basophil # : x  Auto Neutrophil % : 75.0 %  Auto Lymphocyte % : 17.0 %  Auto Monocyte % : 8.0 %  Auto Eosinophil % : x  Auto Basophil % : x    06-06    152<H>  |  110<H>  |  58.0<H>  ----------------------------<  112  4.7   |  28.0  |  0.98    Ca    8.4<L>      06 Jun 2017 04:53  Phos  3.2     06-06  Mg     2.3     06-06          RECENT CULTURES:  06-05 .Body Fluid Abdominal Fluid XXXX   Few White blood cells  Numerous Yeast   Numerous Candida albicans  Culture in progress    05-30 .Blood Blood XXXX XXXX   No growth at 5 days.    05-30 .Blood Blood XXXX XXXX   No growth at 5 days.              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    Neuro:  Appreciate PM&R recs,: SSRI added, cont amantadine, melatonin and trazodone. CAM ICU grading.    CV: On NSR, unable to anticoagulate for paroxysmal a fib, change lopressor to PO, Hold on diuresis for now    Pulm: Aggressive pulmonary toilette IS    GI/Nutrition: TF at goal, continue free water. NPO for now for possible endoscopy to asses GI bleed, BUN remains elevated consistent with enteral blood absorption  will remove drain at GB fossa,     /Renal: , lasix for CHF, will hold additional lasix doses. start finasteride with goal to remove montoya in the future. Hypernatremic will add IV D5W to correct free water deficit.    ID: Leukocytosis improving after stopping antibiotics, Cultures with candida in urine and peritoneal (2/2 candida index) , plan to treat candida for 10 days with fluconazole    Lines/Tubes: PIV    Endo: Glycemia at target    Skin: intact    Proph: Heparin SQ    Dispo: ICU      CRITICAL CARE TIME SPENT: 40 minutes

## 2017-06-07 LAB
ALBUMIN SERPL ELPH-MCNC: 2.9 G/DL — LOW (ref 3.3–5.2)
ALP SERPL-CCNC: 188 U/L — HIGH (ref 40–120)
ALT FLD-CCNC: 66 U/L — HIGH
ANION GAP SERPL CALC-SCNC: 10 MMOL/L — SIGNIFICANT CHANGE UP (ref 5–17)
ANION GAP SERPL CALC-SCNC: 14 MMOL/L — SIGNIFICANT CHANGE UP (ref 5–17)
ANISOCYTOSIS BLD QL: SLIGHT — SIGNIFICANT CHANGE UP
ANISOCYTOSIS BLD QL: SLIGHT — SIGNIFICANT CHANGE UP
AST SERPL-CCNC: 90 U/L — HIGH
BILIRUB SERPL-MCNC: 1.6 MG/DL — SIGNIFICANT CHANGE UP (ref 0.4–2)
BUN SERPL-MCNC: 58 MG/DL — HIGH (ref 8–20)
BUN SERPL-MCNC: 60 MG/DL — HIGH (ref 8–20)
CALCIUM SERPL-MCNC: 7.8 MG/DL — LOW (ref 8.6–10.2)
CALCIUM SERPL-MCNC: 8 MG/DL — LOW (ref 8.6–10.2)
CHLORIDE SERPL-SCNC: 110 MMOL/L — HIGH (ref 98–107)
CHLORIDE SERPL-SCNC: 110 MMOL/L — HIGH (ref 98–107)
CO2 SERPL-SCNC: 28 MMOL/L — SIGNIFICANT CHANGE UP (ref 22–29)
CO2 SERPL-SCNC: 31 MMOL/L — HIGH (ref 22–29)
CREAT SERPL-MCNC: 1.19 MG/DL — SIGNIFICANT CHANGE UP (ref 0.5–1.3)
CREAT SERPL-MCNC: 1.2 MG/DL — SIGNIFICANT CHANGE UP (ref 0.5–1.3)
DACRYOCYTES BLD QL SMEAR: SLIGHT — SIGNIFICANT CHANGE UP
ELLIPTOCYTES BLD QL SMEAR: SLIGHT — SIGNIFICANT CHANGE UP
ELLIPTOCYTES BLD QL SMEAR: SLIGHT — SIGNIFICANT CHANGE UP
EOSINOPHIL NFR BLD AUTO: 1 % — SIGNIFICANT CHANGE UP (ref 0–6)
GLUCOSE SERPL-MCNC: 138 MG/DL — HIGH (ref 70–115)
GLUCOSE SERPL-MCNC: 139 MG/DL — HIGH (ref 70–115)
HCT VFR BLD CALC: 32.6 % — LOW (ref 42–52)
HCT VFR BLD CALC: 33.9 % — LOW (ref 42–52)
HGB BLD-MCNC: 9.5 G/DL — LOW (ref 14–18)
HGB BLD-MCNC: 9.9 G/DL — LOW (ref 14–18)
HYPOCHROMIA BLD QL: SLIGHT — SIGNIFICANT CHANGE UP
HYPOCHROMIA BLD QL: SLIGHT — SIGNIFICANT CHANGE UP
LYMPHOCYTES # BLD AUTO: 10 % — LOW (ref 20–55)
LYMPHOCYTES # BLD AUTO: 5 % — LOW (ref 20–55)
MACROCYTES BLD QL: SLIGHT — SIGNIFICANT CHANGE UP
MACROCYTES BLD QL: SLIGHT — SIGNIFICANT CHANGE UP
MAGNESIUM SERPL-MCNC: 2.2 MG/DL — SIGNIFICANT CHANGE UP (ref 1.6–2.6)
MAGNESIUM SERPL-MCNC: 2.2 MG/DL — SIGNIFICANT CHANGE UP (ref 1.8–2.6)
MCHC RBC-ENTMCNC: 28.4 PG — SIGNIFICANT CHANGE UP (ref 27–31)
MCHC RBC-ENTMCNC: 28.5 PG — SIGNIFICANT CHANGE UP (ref 27–31)
MCHC RBC-ENTMCNC: 29.1 G/DL — LOW (ref 32–36)
MCHC RBC-ENTMCNC: 29.2 G/DL — LOW (ref 32–36)
MCV RBC AUTO: 97.6 FL — HIGH (ref 80–94)
MCV RBC AUTO: 97.7 FL — HIGH (ref 80–94)
MICROCYTES BLD QL: SLIGHT — SIGNIFICANT CHANGE UP
MICROCYTES BLD QL: SLIGHT — SIGNIFICANT CHANGE UP
MONOCYTES NFR BLD AUTO: 4 % — SIGNIFICANT CHANGE UP (ref 3–10)
MONOCYTES NFR BLD AUTO: 8 % — SIGNIFICANT CHANGE UP (ref 3–10)
NEUTROPHILS NFR BLD AUTO: 81 % — HIGH (ref 37–73)
NEUTROPHILS NFR BLD AUTO: 91 % — HIGH (ref 37–73)
NRBC # BLD: 1 /100 — HIGH (ref 0–0)
NRBC # BLD: 7 /100 — HIGH (ref 0–0)
OVALOCYTES BLD QL SMEAR: SLIGHT — SIGNIFICANT CHANGE UP
OVALOCYTES BLD QL SMEAR: SLIGHT — SIGNIFICANT CHANGE UP
PHOSPHATE SERPL-MCNC: 3.2 MG/DL — SIGNIFICANT CHANGE UP (ref 2.4–4.7)
PHOSPHATE SERPL-MCNC: 3.6 MG/DL — SIGNIFICANT CHANGE UP (ref 2.4–4.7)
PLAT MORPH BLD: NORMAL — SIGNIFICANT CHANGE UP
PLAT MORPH BLD: NORMAL — SIGNIFICANT CHANGE UP
PLATELET # BLD AUTO: 244 K/UL — SIGNIFICANT CHANGE UP (ref 150–400)
PLATELET # BLD AUTO: 258 K/UL — SIGNIFICANT CHANGE UP (ref 150–400)
POIKILOCYTOSIS BLD QL AUTO: SLIGHT — SIGNIFICANT CHANGE UP
POIKILOCYTOSIS BLD QL AUTO: SLIGHT — SIGNIFICANT CHANGE UP
POLYCHROMASIA BLD QL SMEAR: SLIGHT — SIGNIFICANT CHANGE UP
POLYCHROMASIA BLD QL SMEAR: SLIGHT — SIGNIFICANT CHANGE UP
POTASSIUM SERPL-MCNC: 4.1 MMOL/L — SIGNIFICANT CHANGE UP (ref 3.5–5.3)
POTASSIUM SERPL-MCNC: 4.2 MMOL/L — SIGNIFICANT CHANGE UP (ref 3.5–5.3)
POTASSIUM SERPL-SCNC: 4.1 MMOL/L — SIGNIFICANT CHANGE UP (ref 3.5–5.3)
POTASSIUM SERPL-SCNC: 4.2 MMOL/L — SIGNIFICANT CHANGE UP (ref 3.5–5.3)
PROT SERPL-MCNC: 6.7 G/DL — SIGNIFICANT CHANGE UP (ref 6.6–8.7)
RBC # BLD: 3.34 M/UL — LOW (ref 4.6–6.2)
RBC # BLD: 3.47 M/UL — LOW (ref 4.6–6.2)
RBC # FLD: 18.6 % — HIGH (ref 11–15.6)
RBC # FLD: 19.1 % — HIGH (ref 11–15.6)
RBC BLD AUTO: ABNORMAL
RBC BLD AUTO: ABNORMAL
SODIUM SERPL-SCNC: 151 MMOL/L — HIGH (ref 135–145)
SODIUM SERPL-SCNC: 152 MMOL/L — HIGH (ref 135–145)
TARGETS BLD QL SMEAR: SLIGHT — SIGNIFICANT CHANGE UP
WBC # BLD: 12.5 K/UL — HIGH (ref 4.8–10.8)
WBC # BLD: 13.2 K/UL — HIGH (ref 4.8–10.8)
WBC # FLD AUTO: 12.5 K/UL — HIGH (ref 4.8–10.8)
WBC # FLD AUTO: 13.2 K/UL — HIGH (ref 4.8–10.8)

## 2017-06-07 PROCEDURE — 99233 SBSQ HOSP IP/OBS HIGH 50: CPT | Mod: GC

## 2017-06-07 PROCEDURE — 99233 SBSQ HOSP IP/OBS HIGH 50: CPT

## 2017-06-07 RX ORDER — FLUCONAZOLE 150 MG/1
400 TABLET ORAL EVERY 24 HOURS
Qty: 0 | Refills: 0 | Status: DISCONTINUED | OUTPATIENT
Start: 2017-06-07 | End: 2017-06-11

## 2017-06-07 RX ORDER — METOPROLOL TARTRATE 50 MG
50 TABLET ORAL
Qty: 0 | Refills: 0 | Status: DISCONTINUED | OUTPATIENT
Start: 2017-06-07 | End: 2017-06-08

## 2017-06-07 RX ORDER — SODIUM CHLORIDE 9 MG/ML
1000 INJECTION, SOLUTION INTRAVENOUS
Qty: 0 | Refills: 0 | Status: DISCONTINUED | OUTPATIENT
Start: 2017-06-07 | End: 2017-06-08

## 2017-06-07 RX ORDER — SODIUM CHLORIDE 9 MG/ML
1000 INJECTION, SOLUTION INTRAVENOUS ONCE
Qty: 0 | Refills: 0 | Status: COMPLETED | OUTPATIENT
Start: 2017-06-07 | End: 2017-06-07

## 2017-06-07 RX ADMIN — Medication 50 MILLIGRAM(S): at 18:11

## 2017-06-07 RX ADMIN — Medication 20 MILLIGRAM(S): at 05:41

## 2017-06-07 RX ADMIN — Medication 100 MILLIGRAM(S): at 05:40

## 2017-06-07 RX ADMIN — Medication 50 MILLIGRAM(S): at 21:41

## 2017-06-07 RX ADMIN — Medication 6 MILLIGRAM(S): at 21:41

## 2017-06-07 RX ADMIN — PANTOPRAZOLE SODIUM 40 MILLIGRAM(S): 20 TABLET, DELAYED RELEASE ORAL at 18:10

## 2017-06-07 RX ADMIN — Medication 5 MILLIGRAM(S): at 05:41

## 2017-06-07 RX ADMIN — Medication 100 MILLIGRAM(S): at 13:03

## 2017-06-07 RX ADMIN — HEPARIN SODIUM 5000 UNIT(S): 5000 INJECTION INTRAVENOUS; SUBCUTANEOUS at 05:41

## 2017-06-07 RX ADMIN — Medication 1 MILLIGRAM(S): at 18:10

## 2017-06-07 RX ADMIN — PANTOPRAZOLE SODIUM 40 MILLIGRAM(S): 20 TABLET, DELAYED RELEASE ORAL at 05:40

## 2017-06-07 RX ADMIN — Medication 0.25 MILLIGRAM(S): at 13:03

## 2017-06-07 RX ADMIN — Medication 4 MILLIGRAM(S): at 21:41

## 2017-06-07 RX ADMIN — SODIUM CHLORIDE 1000 MILLILITER(S): 9 INJECTION, SOLUTION INTRAVENOUS at 09:57

## 2017-06-07 RX ADMIN — NYSTATIN CREAM 1 APPLICATION(S): 100000 CREAM TOPICAL at 18:10

## 2017-06-07 RX ADMIN — FLUCONAZOLE 100 MILLIGRAM(S): 150 TABLET ORAL at 13:03

## 2017-06-07 RX ADMIN — HEPARIN SODIUM 5000 UNIT(S): 5000 INJECTION INTRAVENOUS; SUBCUTANEOUS at 13:03

## 2017-06-07 RX ADMIN — Medication 10 MILLIGRAM(S): at 13:03

## 2017-06-07 RX ADMIN — NYSTATIN CREAM 1 APPLICATION(S): 100000 CREAM TOPICAL at 05:40

## 2017-06-07 RX ADMIN — HEPARIN SODIUM 5000 UNIT(S): 5000 INJECTION INTRAVENOUS; SUBCUTANEOUS at 21:41

## 2017-06-07 NOTE — PROGRESS NOTE ADULT - SUBJECTIVE AND OBJECTIVE BOX
INTERVAL HPI/OVERNIGHT EVENTS/SUBJECTIVE:    ICU Vital Signs Last 24 Hrs  T(C): 37.4, Max: 38.4 (06-06 @ 12:00)  T(F): 99.4, Max: 101.1 (06-06 @ 12:00)  HR: 91 (69 - 107)  BP: 133/85 (114/77 - 147/88)  BP(mean): 101 (90 - 113)  ABP: --  ABP(mean): --  RR: 39 (24 - 58)  SpO2: 100% (93% - 100%)      I&O's Detail    I & Os for current day (as of 07 Jun 2017 08:53)  =============================================  IN:    dextrose 5%: 1743 ml    Total IN: 1743 ml  ---------------------------------------------  OUT:    Indwelling Catheter - Urethral: 2105 ml    Drain: 10 ml    Total OUT: 2115 ml  ---------------------------------------------  Total NET: -372 ml            MEDICATIONS  (STANDING):  nystatin Powder 1Application(s) Topical two times a day  folic acid Injectable 1milliGRAM(s) IV Push daily  thiamine 100milliGRAM(s) Oral daily  amantadine Syrup 100milliGRAM(s) Oral <User Schedule>  melatonin 6milliGRAM(s) Oral at bedtime  digoxin  Injectable 0.25milliGRAM(s) IV Push daily  traZODone 50milliGRAM(s) Oral at bedtime  heparin  Injectable 5000Unit(s) SubCutaneous every 8 hours  metoprolol Injectable 5milliGRAM(s) IV Push every 6 hours  pantoprazole  Injectable 40milliGRAM(s) IV Push two times a day  FLUoxetine 10milliGRAM(s) Oral daily  furosemide    Tablet 20milliGRAM(s) Oral daily  dextrose 5% 1000milliLiter(s) IV Continuous <Continuous>  doxazosin 4milliGRAM(s) Oral at bedtime  fluconAZOLE IVPB  IV Intermittent   fluconAZOLE IVPB 100milliGRAM(s) IV Intermittent every 24 hours    MEDICATIONS  (PRN):  ALBUTerol/ipratropium for Nebulization 3milliLiter(s) Nebulizer every 6 hours PRN Shortness of Breath and/or Wheezing      NUTRITION/IVF:     CENTRAL LINE:  LOCATION:   DATE INSERTED:  CVP:  SCVO2:    ARMAS:   DATE INSERTED:    A-LINE:    LOCATION:   DATE INSERTED:   SVV:  CO/CI:     CHEST TUBE:  LOCATION:  DATE INSERTED: OUTPUT/24 HRS:  SUCTION/WATER SEAL:     NG/OG TUBE:  DATE INSERTED:  OUTPUT/24 HRS:    MISC:     PHYSICAL EXAM:    Gen:    Eyes:    Neurological:    ENMT:    Neck:    Pulmonary:    Cardiovascular:    Gastrointestinal:    Genitourinary:    Back:    Extremities:    Skin:    Musculoskeletal:          LABS:  CBC Full  -  ( 07 Jun 2017 05:35 )  WBC Count : 13.2 K/uL  Hemoglobin : 9.9 g/dL  Hematocrit : 33.9 %  Platelet Count - Automated : 258 K/uL  Mean Cell Volume : 97.7 fl  Mean Cell Hemoglobin : 28.5 pg  Mean Cell Hemoglobin Concentration : 29.2 g/dL  Auto Neutrophil # : x  Auto Lymphocyte # : x  Auto Monocyte # : x  Auto Eosinophil # : x  Auto Basophil # : x  Auto Neutrophil % : 81.0 %  Auto Lymphocyte % : 10.0 %  Auto Monocyte % : 8.0 %  Auto Eosinophil % : 1.0 %  Auto Basophil % : x    06-07    151<H>  |  110<H>  |  60.0<H>  ----------------------------<  139<H>  4.2   |  31.0<H>  |  1.20    Ca    8.0<L>      07 Jun 2017 05:35  Phos  3.6     06-07  Mg     2.2     06-07    TPro  6.7  /  Alb  2.9<L>  /  TBili  1.6  /  DBili  x   /  AST  90<H>  /  ALT  66<H>  /  AlkPhos  188<H>  06-07        RECENT CULTURES:  06-05 .Body Fluid Abdominal Fluid XXXX   Few White blood cells  Numerous Yeast   Numerous Candida albicans  Culture in progress    06-04 .Blood Blood-Peripheral XXXX XXXX   No growth at 48 hours    06-04 .Urine Catheterized XXXX XXXX   >100,000 CFU/ml Candida albicans        LIVER FUNCTIONS - ( 07 Jun 2017 05:35 )  Alb: 2.9 g/dL / Pro: 6.7 g/dL / ALK PHOS: 188 U/L / ALT: 66 U/L / AST: 90 U/L / GGT: x               CAPILLARY BLOOD GLUCOSE      RADIOLOGY & ADDITIONAL STUDIES:    ASSESSMENT/PLAN:  51yMale presenting with:    Neuro:    HEENT:    CV:    Pulm:    GI/Nutrition:    /Renal:    ID:    Lines/Tubes:    Endo:    Skin:    Proph:    Dispo:      CRITICAL CARE TIME SPENT: CC: bile leak with complicated course  INTERVAL HPI/OVERNIGHT EVENTS/SUBJECTIVE: no melena, no other issues, more awake. On D5W fro hypernatremia.    ICU Vital Signs Last 24 Hrs  T(C): 37.4, Max: 38.4 (06-06 @ 12:00)  T(F): 99.4, Max: 101.1 (06-06 @ 12:00)  HR: 91 (69 - 107)  BP: 133/85 (114/77 - 147/88)  BP(mean): 101 (90 - 113)  ABP: --  ABP(mean): --  RR: 39 (24 - 58)  SpO2: 100% (93% - 100%)      I&O's Detail    I & Os for current day (as of 07 Jun 2017 08:53)  =============================================  IN:    dextrose 5%: 1743 ml    Total IN: 1743 ml  ---------------------------------------------  OUT:    Indwelling Catheter - Urethral: 2105 ml    Drain: 10 ml    Total OUT: 2115 ml  ---------------------------------------------  Total NET: -372 ml            MEDICATIONS  (STANDING):  nystatin Powder 1Application(s) Topical two times a day  folic acid Injectable 1milliGRAM(s) IV Push daily  thiamine 100milliGRAM(s) Oral daily  amantadine Syrup 100milliGRAM(s) Oral <User Schedule>  melatonin 6milliGRAM(s) Oral at bedtime  digoxin  Injectable 0.25milliGRAM(s) IV Push daily  traZODone 50milliGRAM(s) Oral at bedtime  heparin  Injectable 5000Unit(s) SubCutaneous every 8 hours  metoprolol Injectable 5milliGRAM(s) IV Push every 6 hours  pantoprazole  Injectable 40milliGRAM(s) IV Push two times a day  FLUoxetine 10milliGRAM(s) Oral daily  furosemide    Tablet 20milliGRAM(s) Oral daily  dextrose 5% 1000milliLiter(s) IV Continuous <Continuous>  doxazosin 4milliGRAM(s) Oral at bedtime  fluconAZOLE IVPB  IV Intermittent   fluconAZOLE IVPB 100milliGRAM(s) IV Intermittent every 24 hours    MEDICATIONS  (PRN):  ALBUTerol/ipratropium for Nebulization 3milliLiter(s) Nebulizer every 6 hours PRN Shortness of Breath and/or Wheezing      NUTRITION/IVF:     CENTRAL LINE:  LOCATION:   DATE INSERTED:  CVP:  SCVO2:    ARMAS:   DATE INSERTED:    A-LINE:    LOCATION:   DATE INSERTED:   SVV:  CO/CI:     CHEST TUBE:  LOCATION:  DATE INSERTED: OUTPUT/24 HRS:  SUCTION/WATER SEAL:     NG/OG TUBE:  DATE INSERTED:  OUTPUT/24 HRS:    MISC:     PHYSICAL EXAM:    Gen:    Eyes:    Neurological:    ENMT:    Neck:    Pulmonary:    Cardiovascular:    Gastrointestinal:    Genitourinary:    Back:    Extremities:    Skin:    Musculoskeletal:          LABS:  CBC Full  -  ( 07 Jun 2017 05:35 )  WBC Count : 13.2 K/uL  Hemoglobin : 9.9 g/dL  Hematocrit : 33.9 %  Platelet Count - Automated : 258 K/uL  Mean Cell Volume : 97.7 fl  Mean Cell Hemoglobin : 28.5 pg  Mean Cell Hemoglobin Concentration : 29.2 g/dL  Auto Neutrophil # : x  Auto Lymphocyte # : x  Auto Monocyte # : x  Auto Eosinophil # : x  Auto Basophil # : x  Auto Neutrophil % : 81.0 %  Auto Lymphocyte % : 10.0 %  Auto Monocyte % : 8.0 %  Auto Eosinophil % : 1.0 %  Auto Basophil % : x    06-07    151<H>  |  110<H>  |  60.0<H>  ----------------------------<  139<H>  4.2   |  31.0<H>  |  1.20    Ca    8.0<L>      07 Jun 2017 05:35  Phos  3.6     06-07  Mg     2.2     06-07    TPro  6.7  /  Alb  2.9<L>  /  TBili  1.6  /  DBili  x   /  AST  90<H>  /  ALT  66<H>  /  AlkPhos  188<H>  06-07        RECENT CULTURES:  06-05 .Body Fluid Abdominal Fluid XXXX   Few White blood cells  Numerous Yeast   Numerous Candida albicans  Culture in progress    06-04 .Blood Blood-Peripheral XXXX XXXX   No growth at 48 hours    06-04 .Urine Catheterized XXXX XXXX   >100,000 CFU/ml Candida albicans        LIVER FUNCTIONS - ( 07 Jun 2017 05:35 )  Alb: 2.9 g/dL / Pro: 6.7 g/dL / ALK PHOS: 188 U/L / ALT: 66 U/L / AST: 90 U/L / GGT: x               CAPILLARY BLOOD GLUCOSE      RADIOLOGY & ADDITIONAL STUDIES:    ASSESSMENT/PLAN:  51yMale presenting with:    Neuro:    HEENT:    CV:    Pulm:    GI/Nutrition:    /Renal:    ID:    Lines/Tubes:    Endo:    Skin:    Proph:    Dispo:      CRITICAL CARE TIME SPENT: CC: bile leak with complicated course.    INTERVAL HPI/OVERNIGHT EVENTS/SUBJECTIVE: no melena, no other issues, more awake. On D5W fro hypernatremia. started on fluconazole for positive CCI    ICU Vital Signs Last 24 Hrs  T(C): 37.4, Max: 38.4 (06-06 @ 12:00)  T(F): 99.4, Max: 101.1 (06-06 @ 12:00)  HR: 91 (69 - 107)  BP: 133/85 (114/77 - 147/88)  BP(mean): 101 (90 - 113)  ABP: --  ABP(mean): --  RR: 39 (24 - 58)  SpO2: 100% (93% - 100%)      I&O's Detail    I & Os for current day (as of 07 Jun 2017 08:53)  =============================================  IN:    dextrose 5%: 1743 ml    Total IN: 1743 ml  ---------------------------------------------  OUT:    Indwelling Catheter - Urethral: 2105 ml    Drain: 10 ml    Total OUT: 2115 ml  ---------------------------------------------  Total NET: -372 ml            MEDICATIONS  (STANDING):  nystatin Powder 1Application(s) Topical two times a day  folic acid Injectable 1milliGRAM(s) IV Push daily  thiamine 100milliGRAM(s) Oral daily  amantadine Syrup 100milliGRAM(s) Oral <User Schedule>  melatonin 6milliGRAM(s) Oral at bedtime  digoxin  Injectable 0.25milliGRAM(s) IV Push daily  traZODone 50milliGRAM(s) Oral at bedtime  heparin  Injectable 5000Unit(s) SubCutaneous every 8 hours  metoprolol Injectable 5milliGRAM(s) IV Push every 6 hours  pantoprazole  Injectable 40milliGRAM(s) IV Push two times a day  FLUoxetine 10milliGRAM(s) Oral daily  furosemide    Tablet 20milliGRAM(s) Oral daily  dextrose 5% 1000milliLiter(s) IV Continuous <Continuous>  doxazosin 4milliGRAM(s) Oral at bedtime  fluconAZOLE IVPB  IV Intermittent   fluconAZOLE IVPB 100milliGRAM(s) IV Intermittent every 24 hours    MEDICATIONS  (PRN):  ALBUTerol/ipratropium for Nebulization 3milliLiter(s) Nebulizer every 6 hours PRN Shortness of Breath and/or Wheezing          PHYSICAL EXAM:    Gen: NAD, more interactive with surroundings    Eyes: anicteric asclera    Neurological: awake, follows simple comands,     ENMT: PELRA    Neck: NO JVD    Pulmonary: coarse bilateral with decreases at both bases    Cardiovascular: Back of afib, Irrerg rate rhythm    Gastrointestinal: soft, distended, wounds with granulating tissues, there is a 2mm sinus with minimal drainage    Genitourinary: montoya with clear urine    Back: intact    Extremities: +1 edema improving    Skin: intact    Musculoskeletal: no gross deformities.        LABS:  CBC Full  -  ( 07 Jun 2017 05:35 )  WBC Count : 13.2 K/uL  Hemoglobin : 9.9 g/dL  Hematocrit : 33.9 %  Platelet Count - Automated : 258 K/uL  Mean Cell Volume : 97.7 fl  Mean Cell Hemoglobin : 28.5 pg  Mean Cell Hemoglobin Concentration : 29.2 g/dL  Auto Neutrophil # : x  Auto Lymphocyte # : x  Auto Monocyte # : x  Auto Eosinophil # : x  Auto Basophil # : x  Auto Neutrophil % : 81.0 %  Auto Lymphocyte % : 10.0 %  Auto Monocyte % : 8.0 %  Auto Eosinophil % : 1.0 %  Auto Basophil % : x    06-07    151<H>  |  110<H>  |  60.0<H>  ----------------------------<  139<H>  4.2   |  31.0<H>  |  1.20    Ca    8.0<L>      07 Jun 2017 05:35  Phos  3.6     06-07  Mg     2.2     06-07    TPro  6.7  /  Alb  2.9<L>  /  TBili  1.6  /  DBili  x   /  AST  90<H>  /  ALT  66<H>  /  AlkPhos  188<H>  06-07        RECENT CULTURES:  06-05 .Body Fluid Abdominal Fluid XXXX   Few White blood cells  Numerous Yeast   Numerous Candida albicans  Culture in progress    06-04 .Blood Blood-Peripheral XXXX XXXX   No growth at 48 hours    06-04 .Urine Catheterized XXXX XXXX   >100,000 CFU/ml Candida albicans        LIVER FUNCTIONS - ( 07 Jun 2017 05:35 )  Alb: 2.9 g/dL / Pro: 6.7 g/dL / ALK PHOS: 188 U/L / ALT: 66 U/L / AST: 90 U/L / GGT: x               CAPILLARY BLOOD GLUCOSE      RADIOLOGY & ADDITIONAL STUDIES:    ASSESSMENT/PLAN:  51yMale presenting with multifactorial encephalopathy after complicated course after bile leak.    Neuro: Continue current regimen, appreciate Dr Tenorio input. (amantadine/melatonin/trazodone/prozac), aggresive day/night cycle reassurance. CAM ICU daily    CV: Rate controlled Afib, continue metoprolol (change to PO) and digoxine ( will check levels) (normal TSH)    Pulm: Aggressive pulmonary toilette IS    GI/Nutrition: TF at goal, for swallow eval today now is more awake. no more melana, continue PPI. BUN remains elevated, HH stable, most likely GI bleed resolved, will continue to follow closely.    /Renal: started on doxazosin will remove montoya after 3 days. appears volume contacted with hypernatremia; will continue D5W and enteral free water ( was NPO for possible EGD so; Na result result do not reflect total free water repletion).    ID: Fluconazol for positive CCI total of 10 days.    Lines/Tubes: None    Endo: Glycemia at target, TFT WNL    Skin: intact    Proph: heparin SCD    Dispo: ICU for aggressive nursing requirements at this time.      CRITICAL CARE TIME SPENT: 38 minutes/

## 2017-06-07 NOTE — PROGRESS NOTE ADULT - SUBJECTIVE AND OBJECTIVE BOX
Patient seen and examined. Conducted CRS around 4PM. Appears to be more awake and attentive then examination on 6/5.    Coma Recovery Scale - Revised    AUDITORY FUNCTION SCALE  4 - Consistent Movement to Command *  3 - Reproducible Movement to Command *  2 - Localization to Sound  1 - Auditory Startle  0 - None    VISUAL FUNCTION SCALE  5 - Object Recognition *  4 - Object Localization: Reaching *  3 - Visual Pursuit *  2 - Fixation *  1 - Visual Startle  0 - None    MOTOR FUNCTION SCALE  6 - Functional Object Use !  5 - Automatic Motor Response *  4 - Object Manipulation *  3 - Localization to Noxious Stimulation *  2 - Flexion Withdrawal  1 - Abnormal Posturing  0 - None/Flaccid    OROMOTOR/VERBAL FUNCTION SCALE  3 - Intelligible Verbalization *  2 - Vocalization/Oral Movement  1 - Oral Reflexive Movement  0 - None    COMMUNICATION SCALE  2 - Functional: Accurate !"  1 - Non-Functional: Intentional *  0 - None    AROUSAL SCALE  3 - Attention  2 - Eye Opening w/o Stimulation  1 - Eye Opening with Stimulation  0 - Unarousable    TOTAL SCORE - 13    Exam may be limited by apraxia and limited proximal upper extremity muscle strength.  Recommend removing soft mittens and attempting functional manipulation of objects (i.e. pen, cup, ball) when patient has periods of wakefulness.  Will continue to follow to complete CRS. Patient seen and examined. Conducted CRS around 4PM. Appears to be more awake and attentive then examination on 6/5.    Coma Recovery Scale - Revised    AUDITORY FUNCTION SCALE  4 - Consistent Movement to Command *  3 - Reproducible Movement to Command *  2 - Localization to Sound  1 - Auditory Startle  0 - None    VISUAL FUNCTION SCALE  5 - Object Recognition *  4 - Object Localization: Reaching *  3 - Visual Pursuit *  2 - Fixation *  1 - Visual Startle  0 - None    MOTOR FUNCTION SCALE  6 - Functional Object Use !  5 - Automatic Motor Response *  4 - Object Manipulation *  3 - Localization to Noxious Stimulation *  2 - Flexion Withdrawal  1 - Abnormal Posturing  0 - None/Flaccid    OROMOTOR/VERBAL FUNCTION SCALE  3 - Intelligible Verbalization *  2 - Vocalization/Oral Movement  1 - Oral Reflexive Movement  0 - None    COMMUNICATION SCALE  2 - Functional: Accurate !"  1 - Non-Functional: Intentional *  0 - None    AROUSAL SCALE  3 - Attention  2 - Eye Opening w/o Stimulation  1 - Eye Opening with Stimulation  0 - Unarousable    TOTAL SCORE - 13    Physical exam notable for b/L UE proximal weakness, attempting to verbal but non-verbal, tracking has improved.    Exam may be limited by apraxia and limited proximal upper extremity muscle strength.  Recommend removing soft mittens and attempting functional manipulation of objects (i.e. pen, cup, ball) when patient has periods of wakefulness.  Will continue to follow to complete CRS. Patient seen and examined. Conducted CRS around 4PM. Appears to be more awake and attentive then examination on 6/5.    Coma Recovery Scale - Revised    AUDITORY FUNCTION SCALE  4 - Consistent Movement to Command *  3 - Reproducible Movement to Command *  2 - Localization to Sound  1 - Auditory Startle  0 - None    VISUAL FUNCTION SCALE  5 - Object Recognition *  4 - Object Localization: Reaching *  3 - Visual Pursuit *  2 - Fixation *  1 - Visual Startle  0 - None    MOTOR FUNCTION SCALE  6 - Functional Object Use !  5 - Automatic Motor Response *  4 - Object Manipulation *  3 - Localization to Noxious Stimulation *  2 - Flexion Withdrawal  1 - Abnormal Posturing  0 - None/Flaccid    OROMOTOR/VERBAL FUNCTION SCALE  3 - Intelligible Verbalization *  2 - Vocalization/Oral Movement  1 - Oral Reflexive Movement  0 - None    COMMUNICATION SCALE  2 - Functional: Accurate !"  1 - Non-Functional: Intentional *  0 - None    AROUSAL SCALE  3 - Attention  2 - Eye Opening w/o Stimulation  1 - Eye Opening with Stimulation  0 - Unarousable    TOTAL SCORE - 13    Physical exam notable for b/L UE proximal weakness, attempting to verbal but non-verbal, tracking has improved, b/L LE edema improved.    Exam may be limited by apraxia and limited proximal upper extremity muscle strength.  Recommend removing soft mittens and attempting functional manipulation of objects (i.e. pen, cup, ball) when patient has periods of wakefulness.  Will continue to follow to complete CRS. Patient seen and examined. Conducted CRS around 4PM. Appears to be more awake and attentive then examination on 6/5.    VITALS  T(C): 37.2, Max: 37.7 (06-06 @ 20:00)  HR: 86 (69 - 108)  BP: 105/75 (105/75 - 141/89)  RR: 30 (24 - 45)  SpO2: 96% (94% - 100%)  Wt(kg): --    MEDICATIONS   nystatin Powder 1Application(s) two times a day  folic acid Injectable 1milliGRAM(s) daily  thiamine 100milliGRAM(s) daily  amantadine Syrup 100milliGRAM(s) <User Schedule>  melatonin 6milliGRAM(s) at bedtime  digoxin  Injectable 0.25milliGRAM(s) daily  traZODone 50milliGRAM(s) at bedtime  heparin  Injectable 5000Unit(s) every 8 hours  pantoprazole  Injectable 40milliGRAM(s) two times a day  ALBUTerol/ipratropium for Nebulization 3milliLiter(s) every 6 hours PRN  FLUoxetine 10milliGRAM(s) daily  furosemide    Tablet 20milliGRAM(s) daily  doxazosin 4milliGRAM(s) at bedtime  metoprolol 50milliGRAM(s) two times a day  dextrose 5% 1000milliLiter(s) <Continuous>  fluconAZOLE IVPB 400milliGRAM(s) every 24 hours      RECENT LABS/IMAGING  CBC Full  -  ( 07 Jun 2017 05:35 )  WBC Count : 13.2 K/uL  Hemoglobin : 9.9 g/dL  Hematocrit : 33.9 %  Platelet Count - Automated : 258 K/uL  Mean Cell Volume : 97.7 fl  Mean Cell Hemoglobin : 28.5 pg  Mean Cell Hemoglobin Concentration : 29.2 g/dL  Auto Neutrophil # : x  Auto Lymphocyte # : x  Auto Monocyte # : x  Auto Eosinophil # : x  Auto Basophil # : x  Auto Neutrophil % : 81.0 %  Auto Lymphocyte % : 10.0 %  Auto Monocyte % : 8.0 %  Auto Eosinophil % : 1.0 %  Auto Basophil % : x    06-07    151<H>  |  110<H>  |  60.0<H>  ----------------------------<  139<H>  4.2   |  31.0<H>  |  1.20    Ca    8.0<L>      07 Jun 2017 05:35  Phos  3.6     06-07  Mg     2.2     06-07    TPro  6.7  /  Alb  2.9<L>  /  TBili  1.6  /  DBili  x   /  AST  90<H>  /  ALT  66<H>  /  AlkPhos  188<H>  06-07          Coma Recovery Scale - Revised    AUDITORY FUNCTION SCALE  4 - Consistent Movement to Command *  3 - Reproducible Movement to Command *  2 - Localization to Sound  1 - Auditory Startle  0 - None    VISUAL FUNCTION SCALE  5 - Object Recognition *  4 - Object Localization: Reaching *  3 - Visual Pursuit *  2 - Fixation *  1 - Visual Startle  0 - None    MOTOR FUNCTION SCALE  6 - Functional Object Use !  5 - Automatic Motor Response *  4 - Object Manipulation *  3 - Localization to Noxious Stimulation *  2 - Flexion Withdrawal  1 - Abnormal Posturing  0 - None/Flaccid    OROMOTOR/VERBAL FUNCTION SCALE  3 - Intelligible Verbalization *  2 - Vocalization/Oral Movement  1 - Oral Reflexive Movement  0 - None    COMMUNICATION SCALE  2 - Functional: Accurate !"  1 - Non-Functional: Intentional *  0 - None    AROUSAL SCALE  3 - Attention  2 - Eye Opening w/o Stimulation  1 - Eye Opening with Stimulation  0 - Unarousable    TOTAL SCORE - 13      Physical exam notable for b/L UE proximal weakness, attempting to verbal but non-verbal, tracking has improved, b/L LE edema improved.      Exam may be limited by apraxia and limited proximal upper extremity muscle strength.  Recommend removing soft mittens and attempting functional manipulation of objects (i.e. pen, cup, ball) when patient has periods of wakefulness.  Will continue to follow to complete CRS.    ----------------------------------------------------------------------------------------  ASSESSMENT/PLAN  51M with cardiac arrest s/p ERCP for bile duct leak with prolonged hospitalization related to prolonged intubation and cognitive/behavioral deficits. It is possible that patient may have multiple components to his musculoskeletal and neurological state, complicated by the course of hospital events which can include critical illness neuropathy/myopathy, anoxic brain injury, encephalopathy delirium and medications.     Arousal/Sleep wake cycle - Amantadine, Melatonin, Trazodone  - Unclear if will assist with improvement. Unable to get MRI.     Mood/Motor recovery - Prozac 10mg daily     Recommend OT and PT to provide ongoing daily stimulation.     ALSO recommend when awake to remove mittens and have 1:1 and provide object for functional nahed and manipulation.     CRS appears to be a better measure for patient given he is unable to use his response to movement reliably. Will continue to follow.

## 2017-06-08 LAB
ANION GAP SERPL CALC-SCNC: 14 MMOL/L — SIGNIFICANT CHANGE UP (ref 5–17)
ANION GAP SERPL CALC-SCNC: 17 MMOL/L — SIGNIFICANT CHANGE UP (ref 5–17)
ANISOCYTOSIS BLD QL: SLIGHT — SIGNIFICANT CHANGE UP
BUN SERPL-MCNC: 52 MG/DL — HIGH (ref 8–20)
BUN SERPL-MCNC: 57 MG/DL — HIGH (ref 8–20)
CALCIUM SERPL-MCNC: 7.7 MG/DL — LOW (ref 8.6–10.2)
CALCIUM SERPL-MCNC: 8 MG/DL — LOW (ref 8.6–10.2)
CHLORIDE SERPL-SCNC: 106 MMOL/L — SIGNIFICANT CHANGE UP (ref 98–107)
CHLORIDE SERPL-SCNC: 109 MMOL/L — HIGH (ref 98–107)
CO2 SERPL-SCNC: 27 MMOL/L — SIGNIFICANT CHANGE UP (ref 22–29)
CO2 SERPL-SCNC: 28 MMOL/L — SIGNIFICANT CHANGE UP (ref 22–29)
CREAT SERPL-MCNC: 0.97 MG/DL — SIGNIFICANT CHANGE UP (ref 0.5–1.3)
CREAT SERPL-MCNC: 0.99 MG/DL — SIGNIFICANT CHANGE UP (ref 0.5–1.3)
DIGOXIN SERPL-MCNC: 2.1 NG/ML — HIGH (ref 0.8–2)
ELLIPTOCYTES BLD QL SMEAR: SLIGHT — SIGNIFICANT CHANGE UP
GLUCOSE SERPL-MCNC: 137 MG/DL — HIGH (ref 70–115)
GLUCOSE SERPL-MCNC: 159 MG/DL — HIGH (ref 70–115)
HCT VFR BLD CALC: 34.3 % — LOW (ref 42–52)
HGB BLD-MCNC: 9.9 G/DL — LOW (ref 14–18)
LYMPHOCYTES # BLD AUTO: 10 % — LOW (ref 20–55)
MACROCYTES BLD QL: SLIGHT — SIGNIFICANT CHANGE UP
MAGNESIUM SERPL-MCNC: 2.1 MG/DL — SIGNIFICANT CHANGE UP (ref 1.8–2.6)
MAGNESIUM SERPL-MCNC: 2.3 MG/DL — SIGNIFICANT CHANGE UP (ref 1.6–2.6)
MCHC RBC-ENTMCNC: 28.5 PG — SIGNIFICANT CHANGE UP (ref 27–31)
MCHC RBC-ENTMCNC: 28.9 G/DL — LOW (ref 32–36)
MCV RBC AUTO: 98.8 FL — HIGH (ref 80–94)
MONOCYTES NFR BLD AUTO: 7 % — SIGNIFICANT CHANGE UP (ref 3–10)
NEUTROPHILS NFR BLD AUTO: 83 % — HIGH (ref 37–73)
NRBC # BLD: 2 /100 — HIGH (ref 0–0)
OVALOCYTES BLD QL SMEAR: SLIGHT — SIGNIFICANT CHANGE UP
PHOSPHATE SERPL-MCNC: 2.3 MG/DL — LOW (ref 2.4–4.7)
PHOSPHATE SERPL-MCNC: 2.8 MG/DL — SIGNIFICANT CHANGE UP (ref 2.4–4.7)
PLAT MORPH BLD: NORMAL — SIGNIFICANT CHANGE UP
PLATELET # BLD AUTO: 240 K/UL — SIGNIFICANT CHANGE UP (ref 150–400)
POLYCHROMASIA BLD QL SMEAR: SLIGHT — SIGNIFICANT CHANGE UP
POTASSIUM SERPL-MCNC: 3.7 MMOL/L — SIGNIFICANT CHANGE UP (ref 3.5–5.3)
POTASSIUM SERPL-MCNC: 4 MMOL/L — SIGNIFICANT CHANGE UP (ref 3.5–5.3)
POTASSIUM SERPL-SCNC: 3.7 MMOL/L — SIGNIFICANT CHANGE UP (ref 3.5–5.3)
POTASSIUM SERPL-SCNC: 4 MMOL/L — SIGNIFICANT CHANGE UP (ref 3.5–5.3)
RBC # BLD: 3.47 M/UL — LOW (ref 4.6–6.2)
RBC # FLD: 19.7 % — HIGH (ref 11–15.6)
RBC BLD AUTO: ABNORMAL
SODIUM SERPL-SCNC: 150 MMOL/L — HIGH (ref 135–145)
SODIUM SERPL-SCNC: 151 MMOL/L — HIGH (ref 135–145)
WBC # BLD: 12.2 K/UL — HIGH (ref 4.8–10.8)
WBC # FLD AUTO: 12.2 K/UL — HIGH (ref 4.8–10.8)

## 2017-06-08 PROCEDURE — 76700 US EXAM ABDOM COMPLETE: CPT | Mod: 26

## 2017-06-08 PROCEDURE — 99233 SBSQ HOSP IP/OBS HIGH 50: CPT

## 2017-06-08 RX ORDER — DEXTROSE 50 % IN WATER 50 %
25 SYRINGE (ML) INTRAVENOUS ONCE
Qty: 0 | Refills: 0 | Status: DISCONTINUED | OUTPATIENT
Start: 2017-06-08 | End: 2017-06-09

## 2017-06-08 RX ORDER — DEXTROSE 50 % IN WATER 50 %
1 SYRINGE (ML) INTRAVENOUS ONCE
Qty: 0 | Refills: 0 | Status: DISCONTINUED | OUTPATIENT
Start: 2017-06-08 | End: 2017-06-09

## 2017-06-08 RX ORDER — METOPROLOL TARTRATE 50 MG
5 TABLET ORAL EVERY 6 HOURS
Qty: 0 | Refills: 0 | Status: DISCONTINUED | OUTPATIENT
Start: 2017-06-08 | End: 2017-06-12

## 2017-06-08 RX ORDER — DEXTROSE 50 % IN WATER 50 %
12.5 SYRINGE (ML) INTRAVENOUS ONCE
Qty: 0 | Refills: 0 | Status: DISCONTINUED | OUTPATIENT
Start: 2017-06-08 | End: 2017-06-09

## 2017-06-08 RX ORDER — INSULIN LISPRO 100/ML
VIAL (ML) SUBCUTANEOUS EVERY 6 HOURS
Qty: 0 | Refills: 0 | Status: DISCONTINUED | OUTPATIENT
Start: 2017-06-08 | End: 2017-06-09

## 2017-06-08 RX ORDER — SODIUM CHLORIDE 9 MG/ML
1000 INJECTION, SOLUTION INTRAVENOUS
Qty: 0 | Refills: 0 | Status: DISCONTINUED | OUTPATIENT
Start: 2017-06-08 | End: 2017-06-09

## 2017-06-08 RX ORDER — POTASSIUM PHOSPHATE, MONOBASIC POTASSIUM PHOSPHATE, DIBASIC 236; 224 MG/ML; MG/ML
15 INJECTION, SOLUTION INTRAVENOUS ONCE
Qty: 0 | Refills: 0 | Status: COMPLETED | OUTPATIENT
Start: 2017-06-08 | End: 2017-06-08

## 2017-06-08 RX ORDER — GLUCAGON INJECTION, SOLUTION 0.5 MG/.1ML
1 INJECTION, SOLUTION SUBCUTANEOUS ONCE
Qty: 0 | Refills: 0 | Status: DISCONTINUED | OUTPATIENT
Start: 2017-06-08 | End: 2017-06-09

## 2017-06-08 RX ORDER — COLLAGENASE CLOSTRIDIUM HIST. 250 UNIT/G
1 OINTMENT (GRAM) TOPICAL DAILY
Qty: 0 | Refills: 0 | Status: DISCONTINUED | OUTPATIENT
Start: 2017-06-08 | End: 2017-06-12

## 2017-06-08 RX ORDER — ACETAMINOPHEN 500 MG
1000 TABLET ORAL ONCE
Qty: 0 | Refills: 0 | Status: COMPLETED | OUTPATIENT
Start: 2017-06-08 | End: 2017-06-08

## 2017-06-08 RX ADMIN — HEPARIN SODIUM 5000 UNIT(S): 5000 INJECTION INTRAVENOUS; SUBCUTANEOUS at 15:39

## 2017-06-08 RX ADMIN — POTASSIUM PHOSPHATE, MONOBASIC POTASSIUM PHOSPHATE, DIBASIC 62.5 MILLIMOLE(S): 236; 224 INJECTION, SOLUTION INTRAVENOUS at 06:28

## 2017-06-08 RX ADMIN — PANTOPRAZOLE SODIUM 40 MILLIGRAM(S): 20 TABLET, DELAYED RELEASE ORAL at 17:38

## 2017-06-08 RX ADMIN — PANTOPRAZOLE SODIUM 40 MILLIGRAM(S): 20 TABLET, DELAYED RELEASE ORAL at 05:40

## 2017-06-08 RX ADMIN — Medication 20 MILLIGRAM(S): at 05:37

## 2017-06-08 RX ADMIN — Medication 1 APPLICATION(S): at 11:14

## 2017-06-08 RX ADMIN — Medication 1 MILLIGRAM(S): at 12:28

## 2017-06-08 RX ADMIN — Medication 10 MILLIGRAM(S): at 11:21

## 2017-06-08 RX ADMIN — HEPARIN SODIUM 5000 UNIT(S): 5000 INJECTION INTRAVENOUS; SUBCUTANEOUS at 21:47

## 2017-06-08 RX ADMIN — Medication 1000 MILLIGRAM(S): at 20:35

## 2017-06-08 RX ADMIN — Medication 50 MILLIGRAM(S): at 05:40

## 2017-06-08 RX ADMIN — Medication 0.25 MILLIGRAM(S): at 11:20

## 2017-06-08 RX ADMIN — Medication 100 MILLIGRAM(S): at 05:37

## 2017-06-08 RX ADMIN — Medication 5 MILLIGRAM(S): at 19:53

## 2017-06-08 RX ADMIN — FLUCONAZOLE 100 MILLIGRAM(S): 150 TABLET ORAL at 15:39

## 2017-06-08 RX ADMIN — Medication 100 MILLIGRAM(S): at 11:21

## 2017-06-08 RX ADMIN — Medication 400 MILLIGRAM(S): at 20:22

## 2017-06-08 RX ADMIN — NYSTATIN CREAM 1 APPLICATION(S): 100000 CREAM TOPICAL at 05:25

## 2017-06-08 RX ADMIN — HEPARIN SODIUM 5000 UNIT(S): 5000 INJECTION INTRAVENOUS; SUBCUTANEOUS at 05:37

## 2017-06-08 RX ADMIN — NYSTATIN CREAM 1 APPLICATION(S): 100000 CREAM TOPICAL at 17:38

## 2017-06-08 NOTE — SWALLOW BEDSIDE ASSESSMENT ADULT - SWALLOW EVAL: DIAGNOSIS
Severe oral dysphagia with absent oral manipluation skills resulting in removal of presented bolus from oral cavity, therefore, pharyngeal stage unable to be assessed.

## 2017-06-08 NOTE — SWALLOW BEDSIDE ASSESSMENT ADULT - ORAL PHASE
Stasis in anterior sulcus/no atempt to manipulate bolus in spoite of encouragement from family & mutlimodality cueing ; bolus removed from oral cavity by ST with teeth clenching noted

## 2017-06-08 NOTE — PROGRESS NOTE ADULT - SUBJECTIVE AND OBJECTIVE BOX
CC complicated course after bile leak.    INTERVAL HPI/OVERNIGHT EVENTS/SUBJECTIVE: more alert, no melena on BM during rounds.    ICU Vital Signs Last 24 Hrs  T(C): 36.3, Max: 37.2 (06-07 @ 16:00)  T(F): 97.4, Max: 98.9 (06-07 @ 16:00)  HR: 91 (78 - 108)  BP: 125/87 (101/74 - 146/89)  BP(mean): 97 (76 - 114)  ABP: --  ABP(mean): --  RR: 36 (2 - 56)  SpO2: 93% (93% - 100%)      I&O's Detail  I & Os for 24h ending 08 Jun 2017 07:00  =============================================  IN:    dextrose 5%: 1660 ml    Free Water: 1200 ml    Pivot: 1155 ml    Lactated Ringers IV Bolus: 1000 ml    Solution: 200 ml    dextrose 5%: 166 ml    dextrose 5%.: 166 ml    Total IN: 5547 ml  ---------------------------------------------  OUT:    Indwelling Catheter - Urethral: 1855 ml    Total OUT: 1855 ml  ---------------------------------------------  Total NET: 3692 ml    I & Os for current day (as of 08 Jun 2017 09:18)  =============================================  IN:    dextrose 5%.: 249 ml    Pivot: 165 ml    Total IN: 414 ml  ---------------------------------------------  OUT:    Indwelling Catheter - Urethral: 225 ml    Total OUT: 225 ml  ---------------------------------------------  Total NET: 189 ml            MEDICATIONS  (STANDING):  nystatin Powder 1Application(s) Topical two times a day  folic acid Injectable 1milliGRAM(s) IV Push daily  thiamine 100milliGRAM(s) Oral daily  amantadine Syrup 100milliGRAM(s) Oral <User Schedule>  melatonin 6milliGRAM(s) Oral at bedtime  digoxin  Injectable 0.25milliGRAM(s) IV Push daily  traZODone 50milliGRAM(s) Oral at bedtime  heparin  Injectable 5000Unit(s) SubCutaneous every 8 hours  pantoprazole  Injectable 40milliGRAM(s) IV Push two times a day  FLUoxetine 10milliGRAM(s) Oral daily  furosemide    Tablet 20milliGRAM(s) Oral daily  doxazosin 4milliGRAM(s) Oral at bedtime  metoprolol 50milliGRAM(s) Oral two times a day  fluconAZOLE IVPB 400milliGRAM(s) IV Intermittent every 24 hours  dextrose 5%. 1000milliLiter(s) IV Continuous <Continuous>    MEDICATIONS  (PRN):  ALBUTerol/ipratropium for Nebulization 3milliLiter(s) Nebulizer every 6 hours PRN Shortness of Breath and/or Wheezing        PHYSICAL EXAM:    Gen: awake, follows simple commnads    Eyes: an icteric sclerae    Neurological: awake follows simple commands more interactive    ENMT: NGT in place    Neck: no JVD    Pulmonary: CTA B    Cardiovascular: Irreg rate rhythm    Gastrointestinal: soft, non tender, incision c/d/i.    Genitourinary: clear urine in montoya    Back: no tender    Extremities: improved edema.    Skin: intact    Musculoskeletal: no deformities, edema improves          LABS:  CBC Full  -  ( 08 Jun 2017 04:32 )  WBC Count : 12.2 K/uL  Hemoglobin : 9.9 g/dL  Hematocrit : 34.3 %  Platelet Count - Automated : 240 K/uL  Mean Cell Volume : 98.8 fl  Mean Cell Hemoglobin : 28.5 pg  Mean Cell Hemoglobin Concentration : 28.9 g/dL  Auto Neutrophil # : x  Auto Lymphocyte # : x  Auto Monocyte # : x  Auto Eosinophil # : x  Auto Basophil # : x  Auto Neutrophil % : 83.0 %  Auto Lymphocyte % : 10.0 %  Auto Monocyte % : 7.0 %  Auto Eosinophil % : x  Auto Basophil % : x    06-08    151<H>  |  109<H>  |  57.0<H>  ----------------------------<  137<H>  4.0   |  28.0  |  0.99    Ca    8.0<L>      08 Jun 2017 04:32  Phos  2.3     06-08  Mg     2.3     06-08    TPro  6.7  /  Alb  2.9<L>  /  TBili  1.6  /  DBili  x   /  AST  90<H>  /  ALT  66<H>  /  AlkPhos  188<H>  06-07        RECENT CULTURES:  06-05 .Body Fluid Abdominal Fluid XXXX   Few White blood cells  Numerous Yeast   Numerous Candida albicans  Culture in progress    06-04 .Blood Blood-Peripheral XXXX XXXX   No growth at 48 hours    06-04 .Urine Catheterized XXXX XXXX   >100,000 CFU/ml Candida albicans        LIVER FUNCTIONS - ( 07 Jun 2017 05:35 )  Alb: 2.9 g/dL / Pro: 6.7 g/dL / ALK PHOS: 188 U/L / ALT: 66 U/L / AST: 90 U/L / GGT: x               CAPILLARY BLOOD GLUCOSE      RADIOLOGY & ADDITIONAL STUDIES:    ASSESSMENT/PLAN:  51yMale presenting with complicated curse after bile leak after lap cholecystectomy, slowly improving multifactorial encephalopathy.     Neuro: cont current care, appreciate PM&R input will remove mittens under supervision.    CV: Rate control after transition to PO metoprolol , will check digoxin level.    Pulm: aggressive pulmonary toiletter    GI/Nutrition: TF at goal, no more melena, if unable to pass swallow will discuss more permanent feeding route,     /Renal: day 3 of doxazosin will remove montoya    ID: On fluconazol for positive candida index, total of 10 days of treatment leukocytosis improving.    Lines/Tubes: none    Endo: glycemia at atrget    Skin: midline wounds will start collagenase    Proph: heparin dvt chemoprophylaxis    Dispo: ICU monitor      CRITICAL CARE TIME SPENT: 36 minutes. CC complicated course after bile leak.    INTERVAL HPI/OVERNIGHT EVENTS/SUBJECTIVE: more alert, no melena on BM during rounds.    ICU Vital Signs Last 24 Hrs  T(C): 36.3, Max: 37.2 (06-07 @ 16:00)  T(F): 97.4, Max: 98.9 (06-07 @ 16:00)  HR: 91 (78 - 108)  BP: 125/87 (101/74 - 146/89)  BP(mean): 97 (76 - 114)  ABP: --  ABP(mean): --  RR: 36 (2 - 56)  SpO2: 93% (93% - 100%)      I&O's Detail  I & Os for 24h ending 08 Jun 2017 07:00  =============================================  IN:    dextrose 5%: 1660 ml    Free Water: 1200 ml    Pivot: 1155 ml    Lactated Ringers IV Bolus: 1000 ml    Solution: 200 ml    dextrose 5%: 166 ml    dextrose 5%.: 166 ml    Total IN: 5547 ml  ---------------------------------------------  OUT:    Indwelling Catheter - Urethral: 1855 ml    Total OUT: 1855 ml  ---------------------------------------------  Total NET: 3692 ml    I & Os for current day (as of 08 Jun 2017 09:18)  =============================================  IN:    dextrose 5%.: 249 ml    Pivot: 165 ml    Total IN: 414 ml  ---------------------------------------------  OUT:    Indwelling Catheter - Urethral: 225 ml    Total OUT: 225 ml  ---------------------------------------------  Total NET: 189 ml            MEDICATIONS  (STANDING):  nystatin Powder 1Application(s) Topical two times a day  folic acid Injectable 1milliGRAM(s) IV Push daily  thiamine 100milliGRAM(s) Oral daily  amantadine Syrup 100milliGRAM(s) Oral <User Schedule>  melatonin 6milliGRAM(s) Oral at bedtime  digoxin  Injectable 0.25milliGRAM(s) IV Push daily  traZODone 50milliGRAM(s) Oral at bedtime  heparin  Injectable 5000Unit(s) SubCutaneous every 8 hours  pantoprazole  Injectable 40milliGRAM(s) IV Push two times a day  FLUoxetine 10milliGRAM(s) Oral daily  furosemide    Tablet 20milliGRAM(s) Oral daily  doxazosin 4milliGRAM(s) Oral at bedtime  metoprolol 50milliGRAM(s) Oral two times a day  fluconAZOLE IVPB 400milliGRAM(s) IV Intermittent every 24 hours  dextrose 5%. 1000milliLiter(s) IV Continuous <Continuous>    MEDICATIONS  (PRN):  ALBUTerol/ipratropium for Nebulization 3milliLiter(s) Nebulizer every 6 hours PRN Shortness of Breath and/or Wheezing        PHYSICAL EXAM:    Gen: awake, follows simple commnads    Eyes: an icteric sclerae    Neurological: awake follows simple commands more interactive    ENMT: NGT in place    Neck: no JVD    Pulmonary: CTA B    Cardiovascular: Irreg rate rhythm    Gastrointestinal: soft, non tender, incision c/d/i.    Genitourinary: clear urine in montoya    Back: no tender    Extremities: improved edema.    Skin: intact    Musculoskeletal: no deformities, edema improves          LABS:  CBC Full  -  ( 08 Jun 2017 04:32 )  WBC Count : 12.2 K/uL  Hemoglobin : 9.9 g/dL  Hematocrit : 34.3 %  Platelet Count - Automated : 240 K/uL  Mean Cell Volume : 98.8 fl  Mean Cell Hemoglobin : 28.5 pg  Mean Cell Hemoglobin Concentration : 28.9 g/dL  Auto Neutrophil # : x  Auto Lymphocyte # : x  Auto Monocyte # : x  Auto Eosinophil # : x  Auto Basophil # : x  Auto Neutrophil % : 83.0 %  Auto Lymphocyte % : 10.0 %  Auto Monocyte % : 7.0 %  Auto Eosinophil % : x  Auto Basophil % : x    06-08    151<H>  |  109<H>  |  57.0<H>  ----------------------------<  137<H>  4.0   |  28.0  |  0.99    Ca    8.0<L>      08 Jun 2017 04:32  Phos  2.3     06-08  Mg     2.3     06-08    TPro  6.7  /  Alb  2.9<L>  /  TBili  1.6  /  DBili  x   /  AST  90<H>  /  ALT  66<H>  /  AlkPhos  188<H>  06-07        RECENT CULTURES:  06-05 .Body Fluid Abdominal Fluid XXXX   Few White blood cells  Numerous Yeast   Numerous Candida albicans  Culture in progress    06-04 .Blood Blood-Peripheral XXXX XXXX   No growth at 48 hours    06-04 .Urine Catheterized XXXX XXXX   >100,000 CFU/ml Candida albicans        LIVER FUNCTIONS - ( 07 Jun 2017 05:35 )  Alb: 2.9 g/dL / Pro: 6.7 g/dL / ALK PHOS: 188 U/L / ALT: 66 U/L / AST: 90 U/L / GGT: x               CAPILLARY BLOOD GLUCOSE      RADIOLOGY & ADDITIONAL STUDIES:    ASSESSMENT/PLAN:  51yMale presenting with complicated curse after bile leak after lap cholecystectomy, slowly improving multifactorial encephalopathy.     Neuro: cont current care, appreciate PM&R input will remove mittens under supervision.    CV: Rate control after transition to PO metoprolol , will check digoxin level.    Pulm: aggressive pulmonary toiletter    GI/Nutrition: TF at goal, no more melena, if unable to pass swallow will discuss more permanent feeding route,     /Renal: day 3 of doxazosin will remove montoya. increased IV free water and repeat BMP in the PM.    ID: On fluconazol for positive candida index, total of 10 days of treatment leukocytosis improving.    Lines/Tubes: none    Endo: glycemia at atrget    Skin: midline wounds will start collagenase    Proph: heparin dvt chemoprophylaxis    Dispo: ICU monitor      CRITICAL CARE TIME SPENT: 36 minutes.

## 2017-06-08 NOTE — PHYSICAL THERAPY INITIAL EVALUATION ADULT - LEVEL OF INDEPENDENCE: SIT/STAND, REHAB EVAL
2/2 to strength deficits, decreased static sitting posture/balance, increases respiratory rate/unable to perform
unable to perform/2/2 b/l LE strength deficits, poost static sitting posture. HR to 160 at EOB and therefore returned pt to supine with -135.

## 2017-06-08 NOTE — SWALLOW BEDSIDE ASSESSMENT ADULT - ASR SWALLOW ASPIRATION MONITOR
position upright (89Y)/gurgly voice/oral hygiene/fever/throat clearing/upper respiratory infection/change of breathing pattern/cough/pneumonia

## 2017-06-08 NOTE — PHYSICAL THERAPY INITIAL EVALUATION ADULT - PERTINENT HX OF CURRENT PROBLEM, REHAB EVAL
Pt is a 50 y/o male who was discharged home after lap cholecystectomy and returned to hospital with c/o abdominal pain. Pt went septic 2/2 bile duct leak which was complicated by cardiac arrest and now critical illness myopathy vs. anoxic brain injury vs. encephalopathy. Pt. re-intubated 6/1/17 due to GIB, now extubated

## 2017-06-08 NOTE — SWALLOW BEDSIDE ASSESSMENT ADULT - SLP GENERAL OBSERVATIONS
Pt received & seen seated upright in chair, +O2 via nasal canula, +NGT, +non-verbal, +poor command following, +family present, + present

## 2017-06-09 LAB
ANION GAP SERPL CALC-SCNC: 13 MMOL/L — SIGNIFICANT CHANGE UP (ref 5–17)
ANISOCYTOSIS BLD QL: SLIGHT — SIGNIFICANT CHANGE UP
BUN SERPL-MCNC: 48 MG/DL — HIGH (ref 8–20)
CALCIUM SERPL-MCNC: 8.2 MG/DL — LOW (ref 8.6–10.2)
CHLORIDE SERPL-SCNC: 108 MMOL/L — HIGH (ref 98–107)
CO2 SERPL-SCNC: 30 MMOL/L — HIGH (ref 22–29)
CREAT SERPL-MCNC: 0.85 MG/DL — SIGNIFICANT CHANGE UP (ref 0.5–1.3)
CULTURE RESULTS: SIGNIFICANT CHANGE UP
CULTURE RESULTS: SIGNIFICANT CHANGE UP
GLUCOSE SERPL-MCNC: 122 MG/DL — HIGH (ref 70–115)
HCT VFR BLD CALC: 31.7 % — LOW (ref 42–52)
HGB BLD-MCNC: 9.4 G/DL — LOW (ref 14–18)
HYPOCHROMIA BLD QL: SLIGHT — SIGNIFICANT CHANGE UP
LYMPHOCYTES # BLD AUTO: 10 % — LOW (ref 20–55)
MACROCYTES BLD QL: SLIGHT — SIGNIFICANT CHANGE UP
MAGNESIUM SERPL-MCNC: 1.9 MG/DL — SIGNIFICANT CHANGE UP (ref 1.6–2.6)
MCHC RBC-ENTMCNC: 28.7 PG — SIGNIFICANT CHANGE UP (ref 27–31)
MCHC RBC-ENTMCNC: 29.7 G/DL — LOW (ref 32–36)
MCV RBC AUTO: 96.9 FL — HIGH (ref 80–94)
MICROCYTES BLD QL: SLIGHT — SIGNIFICANT CHANGE UP
MONOCYTES NFR BLD AUTO: 5 % — SIGNIFICANT CHANGE UP (ref 3–10)
NEUTROPHILS NFR BLD AUTO: 85 % — HIGH (ref 37–73)
NRBC # BLD: 4 /100 — HIGH (ref 0–0)
OVALOCYTES BLD QL SMEAR: SLIGHT — SIGNIFICANT CHANGE UP
PHOSPHATE SERPL-MCNC: 3.1 MG/DL — SIGNIFICANT CHANGE UP (ref 2.4–4.7)
PLAT MORPH BLD: NORMAL — SIGNIFICANT CHANGE UP
PLATELET # BLD AUTO: 228 K/UL — SIGNIFICANT CHANGE UP (ref 150–400)
POIKILOCYTOSIS BLD QL AUTO: SLIGHT — SIGNIFICANT CHANGE UP
POLYCHROMASIA BLD QL SMEAR: SLIGHT — SIGNIFICANT CHANGE UP
POTASSIUM SERPL-MCNC: 3.8 MMOL/L — SIGNIFICANT CHANGE UP (ref 3.5–5.3)
POTASSIUM SERPL-SCNC: 3.8 MMOL/L — SIGNIFICANT CHANGE UP (ref 3.5–5.3)
RBC # BLD: 3.27 M/UL — LOW (ref 4.6–6.2)
RBC # FLD: 19.2 % — HIGH (ref 11–15.6)
RBC BLD AUTO: ABNORMAL
SODIUM SERPL-SCNC: 151 MMOL/L — HIGH (ref 135–145)
SPECIMEN SOURCE: SIGNIFICANT CHANGE UP
SPECIMEN SOURCE: SIGNIFICANT CHANGE UP
WBC # BLD: 13.6 K/UL — HIGH (ref 4.8–10.8)
WBC # FLD AUTO: 13.6 K/UL — HIGH (ref 4.8–10.8)

## 2017-06-09 PROCEDURE — 99291 CRITICAL CARE FIRST HOUR: CPT

## 2017-06-09 PROCEDURE — 99232 SBSQ HOSP IP/OBS MODERATE 35: CPT

## 2017-06-09 PROCEDURE — 99233 SBSQ HOSP IP/OBS HIGH 50: CPT

## 2017-06-09 PROCEDURE — 71010: CPT | Mod: 26,76

## 2017-06-09 PROCEDURE — 71010: CPT | Mod: 26,77

## 2017-06-09 RX ORDER — FUROSEMIDE 40 MG
20 TABLET ORAL
Qty: 0 | Refills: 0 | Status: DISCONTINUED | OUTPATIENT
Start: 2017-06-09 | End: 2017-06-12

## 2017-06-09 RX ORDER — FUROSEMIDE 40 MG
20 TABLET ORAL ONCE
Qty: 0 | Refills: 0 | Status: COMPLETED | OUTPATIENT
Start: 2017-06-09 | End: 2017-06-09

## 2017-06-09 RX ORDER — DIGOXIN 250 MCG
0.12 TABLET ORAL DAILY
Qty: 0 | Refills: 0 | Status: DISCONTINUED | OUTPATIENT
Start: 2017-06-09 | End: 2017-06-12

## 2017-06-09 RX ORDER — SODIUM CHLORIDE 9 MG/ML
1000 INJECTION, SOLUTION INTRAVENOUS
Qty: 0 | Refills: 0 | Status: DISCONTINUED | OUTPATIENT
Start: 2017-06-09 | End: 2017-06-11

## 2017-06-09 RX ORDER — FENTANYL CITRATE 50 UG/ML
50 INJECTION INTRAVENOUS ONCE
Qty: 0 | Refills: 0 | Status: DISCONTINUED | OUTPATIENT
Start: 2017-06-09 | End: 2017-06-09

## 2017-06-09 RX ADMIN — FENTANYL CITRATE 50 MICROGRAM(S): 50 INJECTION INTRAVENOUS at 00:49

## 2017-06-09 RX ADMIN — Medication 5 MILLIGRAM(S): at 11:36

## 2017-06-09 RX ADMIN — PANTOPRAZOLE SODIUM 40 MILLIGRAM(S): 20 TABLET, DELAYED RELEASE ORAL at 05:39

## 2017-06-09 RX ADMIN — Medication 3 MILLILITER(S): at 00:21

## 2017-06-09 RX ADMIN — NYSTATIN CREAM 1 APPLICATION(S): 100000 CREAM TOPICAL at 17:36

## 2017-06-09 RX ADMIN — HEPARIN SODIUM 5000 UNIT(S): 5000 INJECTION INTRAVENOUS; SUBCUTANEOUS at 22:11

## 2017-06-09 RX ADMIN — FLUCONAZOLE 100 MILLIGRAM(S): 150 TABLET ORAL at 14:03

## 2017-06-09 RX ADMIN — HEPARIN SODIUM 5000 UNIT(S): 5000 INJECTION INTRAVENOUS; SUBCUTANEOUS at 14:03

## 2017-06-09 RX ADMIN — SODIUM CHLORIDE 75 MILLILITER(S): 9 INJECTION, SOLUTION INTRAVENOUS at 02:00

## 2017-06-09 RX ADMIN — Medication 1 APPLICATION(S): at 11:37

## 2017-06-09 RX ADMIN — NYSTATIN CREAM 1 APPLICATION(S): 100000 CREAM TOPICAL at 05:39

## 2017-06-09 RX ADMIN — Medication 100 MILLIGRAM(S): at 14:04

## 2017-06-09 RX ADMIN — Medication 10 MILLIGRAM(S): at 14:03

## 2017-06-09 RX ADMIN — Medication 5 MILLIGRAM(S): at 17:36

## 2017-06-09 RX ADMIN — Medication 5 MILLIGRAM(S): at 00:34

## 2017-06-09 RX ADMIN — Medication 1 MILLIGRAM(S): at 11:36

## 2017-06-09 RX ADMIN — Medication 100 MILLIGRAM(S): at 14:03

## 2017-06-09 RX ADMIN — Medication 20 MILLIGRAM(S): at 17:36

## 2017-06-09 RX ADMIN — Medication 50 MILLIGRAM(S): at 22:11

## 2017-06-09 RX ADMIN — Medication 0.12 MILLIGRAM(S): at 11:36

## 2017-06-09 RX ADMIN — FENTANYL CITRATE 50 MICROGRAM(S): 50 INJECTION INTRAVENOUS at 01:05

## 2017-06-09 RX ADMIN — Medication 5 MILLIGRAM(S): at 05:39

## 2017-06-09 RX ADMIN — Medication 20 MILLIGRAM(S): at 01:39

## 2017-06-09 RX ADMIN — HEPARIN SODIUM 5000 UNIT(S): 5000 INJECTION INTRAVENOUS; SUBCUTANEOUS at 05:40

## 2017-06-09 RX ADMIN — Medication 6 MILLIGRAM(S): at 22:11

## 2017-06-09 RX ADMIN — PANTOPRAZOLE SODIUM 40 MILLIGRAM(S): 20 TABLET, DELAYED RELEASE ORAL at 17:36

## 2017-06-09 NOTE — PROGRESS NOTE ADULT - SUBJECTIVE AND OBJECTIVE BOX
Patient appears more alert. Had mittens off and pulled out Dobbhoff.  Making facial expressions with questions and attempts to follow commands.   But is inconsistent and complicated by the motor deficits.       REVIEW OF SYSTEMS  Neurological - +loss of strength  Musculoskeletal - +poor coordination    VITALS  T(C): 37.2, Max: 38.1 (06-08 @ 20:01)  HR: 101 (76 - 119)  BP: 109/83 (104/83 - 167/95)  RR: 30 (20 - 62)  SpO2: 99% (90% - 100%)  Wt(kg): --    MEDICATIONS   nystatin Powder 1Application(s) two times a day  folic acid Injectable 1milliGRAM(s) daily  thiamine 100milliGRAM(s) daily  amantadine Syrup 100milliGRAM(s) <User Schedule>  melatonin 6milliGRAM(s) at bedtime  traZODone 50milliGRAM(s) at bedtime  heparin  Injectable 5000Unit(s) every 8 hours  pantoprazole  Injectable 40milliGRAM(s) two times a day  ALBUTerol/ipratropium for Nebulization 3milliLiter(s) every 6 hours PRN  FLUoxetine 10milliGRAM(s) daily  doxazosin 4milliGRAM(s) at bedtime  fluconAZOLE IVPB 400milliGRAM(s) every 24 hours  collagenase Ointment 1Application(s) daily  insulin lispro (HumaLOG) corrective regimen sliding scale  every 6 hours  dextrose 5%. 1000milliLiter(s) <Continuous>  dextrose Gel 1Dose(s) once PRN  dextrose 50% Injectable 12.5Gram(s) once  dextrose 50% Injectable 25Gram(s) once  metoprolol Injectable 5milliGRAM(s) every 6 hours  multiple electrolytes Injection Type 1 1000milliLiter(s) <Continuous>  furosemide    Tablet 20milliGRAM(s) two times a day  digoxin  Injectable 0.125milliGRAM(s) daily      RECENT LABS/IMAGING  CBC Full  -  ( 09 Jun 2017 05:22 )  WBC Count : 13.6 K/uL  Hemoglobin : 9.4 g/dL  Hematocrit : 31.7 %  Platelet Count - Automated : 228 K/uL  Mean Cell Volume : 96.9 fl  Mean Cell Hemoglobin : 28.7 pg  Mean Cell Hemoglobin Concentration : 29.7 g/dL  Auto Neutrophil # : x  Auto Lymphocyte # : x  Auto Monocyte # : x  Auto Eosinophil # : x  Auto Basophil # : x  Auto Neutrophil % : 85.0 %  Auto Lymphocyte % : 10.0 %  Auto Monocyte % : 5.0 %  Auto Eosinophil % : x  Auto Basophil % : x    06-09    151<H>  |  108<H>  |  48.0<H>  ----------------------------<  122<H>  3.8   |  30.0<H>  |  0.85    Ca    8.2<L>      09 Jun 2017 05:22  Phos  3.1     06-09  Mg     1.9     06-09      Coma Recovery Scale - Revised    AUDITORY FUNCTION SCALE  3 - Reproducible Movement to Command *    VISUAL FUNCTION SCALE  4 - Object Localization: Reaching *    MOTOR FUNCTION SCALE  4 - Object Manipulation *    OROMOTOR/VERBAL FUNCTION SCALE  0 - None    COMMUNICATION SCALE  1 - Non-Functional: Intentional *    AROUSAL SCALE  2 - Eye Opening w/o Stimulation    TOTAL SCORE 14    Exam may be limited by ?apraxia and proximal shoulder and hip weakness.     ----------------------------------------------------------------------------------------  ASSESSMENT/PLAN  51M with cardiac arrest s/p ERCP for bile duct leak with prolonged hospitalization related to prolonged intubation and cognitive/behavioral deficits. It is possible that patient may have multiple components to his musculoskeletal and neurological state, complicated by the course of hospital events which can include critical illness neuropathy/myopathy, anoxic brain injury, and/or encephalopathy delirium.    Arousal/Sleep wake cycle - Amantadine, Melatonin, Trazodone    *** Recommend when awake to remove mittens and have 1:1 and provide object for functional use and manipulation.     Mood/Motor recovery - Prozac 10mg daily     Rehab - At least 3x/week of OT and PT to provide ongoing daily stimulation.

## 2017-06-09 NOTE — CHART NOTE - NSCHARTNOTEFT_GEN_A_CORE
Patient pulled NGT once unsecured mittens placed.  Pt is unable to swallow due to mental status and unable to receive nutrition and PO meds without NGT.  Attempt to place dobhoff.  Pt uncoorperative.  Total of 3 attempts made.  On 3rd attempt unable to successfully pass due to resistance.  DC'd procedure    Pt then developed acute resp distress.  Rales noted to clavicle line.  Decreased breath sound to Right chest.  Lasix total of 40 mg given.  duoneb treatment administered.  Chest XR obtained.      Chest XR with acute pneumothorax to Right lung.      Pt work of breathing improved with lasix.  Urgent chest tube needed due to iatrogenic pneumo.    Right sided pigtail chest tube placed to right chest without complications.  Clear breath sounds noted bilateral s/p Lasix, duoneb, and chest tube.  Pt currently in no apparent distress. 02 sat 99% on 2 L NC

## 2017-06-09 NOTE — PROGRESS NOTE ADULT - SUBJECTIVE AND OBJECTIVE BOX
INTERVAL HPI/OVERNIGHT EVENTS/SUBJECTIVE:    ICU Vital Signs Last 24 Hrs  T(C): 37.2, Max: 38.1 (06-08 @ 20:01)  T(F): 99, Max: 100.6 (06-08 @ 20:01)  HR: 88 (76 - 119)  BP: 125/87 (104/83 - 167/95)  BP(mean): 102 (89 - 123)  ABP: --  ABP(mean): --  RR: 26 (20 - 62)  SpO2: 97% (90% - 100%)      I&O's Detail    I & Os for current day (as of 09 Jun 2017 09:31)  =============================================  IN:    dextrose 5%.: 1999 ml    Pivot: 495 ml    multiple electrolytes Injection Type 1: 450 ml    Free Water: 300 ml    Total IN: 3244 ml  ---------------------------------------------  OUT:    Indwelling Catheter - Urethral: 2175 ml    Incontinent per Condom Catheter: 240 ml    Chest Tube: 75 ml    Total OUT: 2490 ml  ---------------------------------------------  Total NET: 754 ml            MEDICATIONS  (STANDING):  nystatin Powder 1Application(s) Topical two times a day  folic acid Injectable 1milliGRAM(s) IV Push daily  thiamine 100milliGRAM(s) Oral daily  amantadine Syrup 100milliGRAM(s) Oral <User Schedule>  melatonin 6milliGRAM(s) Oral at bedtime  digoxin  Injectable 0.25milliGRAM(s) IV Push daily  traZODone 50milliGRAM(s) Oral at bedtime  heparin  Injectable 5000Unit(s) SubCutaneous every 8 hours  pantoprazole  Injectable 40milliGRAM(s) IV Push two times a day  FLUoxetine 10milliGRAM(s) Oral daily  furosemide    Tablet 20milliGRAM(s) Oral daily  doxazosin 4milliGRAM(s) Oral at bedtime  fluconAZOLE IVPB 400milliGRAM(s) IV Intermittent every 24 hours  collagenase Ointment 1Application(s) Topical daily  insulin lispro (HumaLOG) corrective regimen sliding scale  SubCutaneous every 6 hours  dextrose 5%. 1000milliLiter(s) IV Continuous <Continuous>  dextrose 50% Injectable 12.5Gram(s) IV Push once  dextrose 50% Injectable 25Gram(s) IV Push once  dextrose 50% Injectable 25Gram(s) IV Push once  metoprolol Injectable 5milliGRAM(s) IV Push every 6 hours  multiple electrolytes Injection Type 1 1000milliLiter(s) IV Continuous <Continuous>    MEDICATIONS  (PRN):  ALBUTerol/ipratropium for Nebulization 3milliLiter(s) Nebulizer every 6 hours PRN Shortness of Breath and/or Wheezing  dextrose Gel 1Dose(s) Oral once PRN Blood Glucose LESS THAN 70 milliGRAM(s)/deciliter  glucagon  Injectable 1milliGRAM(s) IntraMuscular once PRN Glucose LESS THAN 70 milligrams/deciliter      NUTRITION/IVF:     CENTRAL LINE:  LOCATION:   DATE INSERTED:  CVP:  SCVO2:    ARMAS:   DATE INSERTED:    A-LINE:    LOCATION:   DATE INSERTED:   SVV:  CO/CI:     CHEST TUBE:  LOCATION:  DATE INSERTED: OUTPUT/24 HRS:  SUCTION/WATER SEAL:     NG/OG TUBE:  DATE INSERTED:  OUTPUT/24 HRS:    MISC:     PHYSICAL EXAM:    Gen:    Eyes:    Neurological:    ENMT:    Neck:    Pulmonary:    Cardiovascular:    Gastrointestinal:    Genitourinary:    Back:    Extremities:    Skin:    Musculoskeletal:          LABS:  CBC Full  -  ( 09 Jun 2017 05:22 )  WBC Count : 13.6 K/uL  Hemoglobin : 9.4 g/dL  Hematocrit : 31.7 %  Platelet Count - Automated : 228 K/uL  Mean Cell Volume : 96.9 fl  Mean Cell Hemoglobin : 28.7 pg  Mean Cell Hemoglobin Concentration : 29.7 g/dL  Auto Neutrophil # : x  Auto Lymphocyte # : x  Auto Monocyte # : x  Auto Eosinophil # : x  Auto Basophil # : x  Auto Neutrophil % : 85.0 %  Auto Lymphocyte % : 10.0 %  Auto Monocyte % : 5.0 %  Auto Eosinophil % : x  Auto Basophil % : x    06-09    151<H>  |  108<H>  |  48.0<H>  ----------------------------<  122<H>  3.8   |  30.0<H>  |  0.85    Ca    8.2<L>      09 Jun 2017 05:22  Phos  3.1     06-09  Mg     1.9     06-09          RECENT CULTURES:  06-05 .Body Fluid Abdominal Fluid XXXX   Few White blood cells  Numerous Yeast   Numerous Candida albicans  Culture in progress    06-04 .Blood Blood-Peripheral XXXX XXXX   No growth at 48 hours    06-04 .Urine Catheterized XXXX XXXX   >100,000 CFU/ml Candida albicans              CAPILLARY BLOOD GLUCOSE  114 (09 Jun 2017 05:00)  129 (09 Jun 2017 00:27)  159 (08 Jun 2017 16:01)  142 (08 Jun 2017 12:00)      RADIOLOGY & ADDITIONAL STUDIES:    ASSESSMENT/PLAN:  51yMale presenting with:    Neuro:    HEENT:    CV:    Pulm:    GI/Nutrition:    /Renal:    ID:    Lines/Tubes:    Endo:    Skin:    Proph:    Dispo:      CRITICAL CARE TIME SPENT: CC biliary leak witrh complex post op course.    INTERVAL HPI/OVERNIGHT EVENTS/SUBJECTIVE:  Iatrogenic pneumothorax during NGT placement, chest tube placed, lung reexpanded    ICU Vital Signs Last 24 Hrs  T(C): 37.2, Max: 38.1 (06-08 @ 20:01)  T(F): 99, Max: 100.6 (06-08 @ 20:01)  HR: 88 (76 - 119)  BP: 125/87 (104/83 - 167/95)  BP(mean): 102 (89 - 123)  ABP: --  ABP(mean): --  RR: 26 (20 - 62)  SpO2: 97% (90% - 100%)      I&O's Detail    I & Os for current day (as of 09 Jun 2017 09:31)  =============================================  IN:    dextrose 5%.: 1999 ml    Pivot: 495 ml    multiple electrolytes Injection Type 1: 450 ml    Free Water: 300 ml    Total IN: 3244 ml  ---------------------------------------------  OUT:    Indwelling Catheter - Urethral: 2175 ml    Incontinent per Condom Catheter: 240 ml    Chest Tube: 75 ml    Total OUT: 2490 ml  ---------------------------------------------  Total NET: 754 ml            MEDICATIONS  (STANDING):  nystatin Powder 1Application(s) Topical two times a day  folic acid Injectable 1milliGRAM(s) IV Push daily  thiamine 100milliGRAM(s) Oral daily  amantadine Syrup 100milliGRAM(s) Oral <User Schedule>  melatonin 6milliGRAM(s) Oral at bedtime  digoxin  Injectable 0.25milliGRAM(s) IV Push daily  traZODone 50milliGRAM(s) Oral at bedtime  heparin  Injectable 5000Unit(s) SubCutaneous every 8 hours  pantoprazole  Injectable 40milliGRAM(s) IV Push two times a day  FLUoxetine 10milliGRAM(s) Oral daily  furosemide    Tablet 20milliGRAM(s) Oral daily  doxazosin 4milliGRAM(s) Oral at bedtime  fluconAZOLE IVPB 400milliGRAM(s) IV Intermittent every 24 hours  collagenase Ointment 1Application(s) Topical daily  insulin lispro (HumaLOG) corrective regimen sliding scale  SubCutaneous every 6 hours  dextrose 5%. 1000milliLiter(s) IV Continuous <Continuous>  dextrose 50% Injectable 12.5Gram(s) IV Push once  dextrose 50% Injectable 25Gram(s) IV Push once  dextrose 50% Injectable 25Gram(s) IV Push once  metoprolol Injectable 5milliGRAM(s) IV Push every 6 hours  multiple electrolytes Injection Type 1 1000milliLiter(s) IV Continuous <Continuous>    MEDICATIONS  (PRN):  ALBUTerol/ipratropium for Nebulization 3milliLiter(s) Nebulizer every 6 hours PRN Shortness of Breath and/or Wheezing  dextrose Gel 1Dose(s) Oral once PRN Blood Glucose LESS THAN 70 milliGRAM(s)/deciliter  glucagon  Injectable 1milliGRAM(s) IntraMuscular once PRN Glucose LESS THAN 70 milligrams/deciliter      NUTRITION/IVF:     CENTRAL LINE:  LOCATION:   DATE INSERTED:  CVP:  SCVO2:    ARMAS:   DATE INSERTED:    A-LINE:    LOCATION:   DATE INSERTED:   SVV:  CO/CI:     CHEST TUBE:  LOCATION:  DATE INSERTED: OUTPUT/24 HRS:  SUCTION/WATER SEAL:     NG/OG TUBE:  DATE INSERTED:  OUTPUT/24 HRS:    MISC:     PHYSICAL EXAM:    Gen:    Eyes:    Neurological:    ENMT:    Neck:    Pulmonary:    Cardiovascular:    Gastrointestinal:    Genitourinary:    Back:    Extremities:    Skin:    Musculoskeletal:          LABS:  CBC Full  -  ( 09 Jun 2017 05:22 )  WBC Count : 13.6 K/uL  Hemoglobin : 9.4 g/dL  Hematocrit : 31.7 %  Platelet Count - Automated : 228 K/uL  Mean Cell Volume : 96.9 fl  Mean Cell Hemoglobin : 28.7 pg  Mean Cell Hemoglobin Concentration : 29.7 g/dL  Auto Neutrophil # : x  Auto Lymphocyte # : x  Auto Monocyte # : x  Auto Eosinophil # : x  Auto Basophil # : x  Auto Neutrophil % : 85.0 %  Auto Lymphocyte % : 10.0 %  Auto Monocyte % : 5.0 %  Auto Eosinophil % : x  Auto Basophil % : x    06-09    151<H>  |  108<H>  |  48.0<H>  ----------------------------<  122<H>  3.8   |  30.0<H>  |  0.85    Ca    8.2<L>      09 Jun 2017 05:22  Phos  3.1     06-09  Mg     1.9     06-09          RECENT CULTURES:  06-05 .Body Fluid Abdominal Fluid XXXX   Few White blood cells  Numerous Yeast   Numerous Candida albicans  Culture in progress    06-04 .Blood Blood-Peripheral XXXX XXXX   No growth at 48 hours    06-04 .Urine Catheterized XXXX XXXX   >100,000 CFU/ml Candida albicans              CAPILLARY BLOOD GLUCOSE  114 (09 Jun 2017 05:00)  129 (09 Jun 2017 00:27)  159 (08 Jun 2017 16:01)  142 (08 Jun 2017 12:00)      RADIOLOGY & ADDITIONAL STUDIES:    ASSESSMENT/PLAN:  51yMale presenting with:    Neuro:    HEENT:    CV:    Pulm:    GI/Nutrition:    /Renal:    ID:    Lines/Tubes:    Endo:    Skin:    Proph:    Dispo:      CRITICAL CARE TIME SPENT: CC biliary leak witrh complex post op course.    INTERVAL HPI/OVERNIGHT EVENTS/SUBJECTIVE:  Iatrogenic pneumothorax during NGT placement, chest tube placed, lung reexpanded    ICU Vital Signs Last 24 Hrs  T(C): 37.2, Max: 38.1 (06-08 @ 20:01)  T(F): 99, Max: 100.6 (06-08 @ 20:01)  HR: 88 (76 - 119)  BP: 125/87 (104/83 - 167/95)  BP(mean): 102 (89 - 123)  ABP: --  ABP(mean): --  RR: 26 (20 - 62)  SpO2: 97% (90% - 100%)      I&O's Detail    I & Os for current day (as of 09 Jun 2017 09:31)  =============================================  IN:    dextrose 5%.: 1999 ml    Pivot: 495 ml    multiple electrolytes Injection Type 1: 450 ml    Free Water: 300 ml    Total IN: 3244 ml  ---------------------------------------------  OUT:    Indwelling Catheter - Urethral: 2175 ml    Incontinent per Condom Catheter: 240 ml    Chest Tube: 75 ml    Total OUT: 2490 ml  ---------------------------------------------  Total NET: 754 ml            MEDICATIONS  (STANDING):  nystatin Powder 1Application(s) Topical two times a day  folic acid Injectable 1milliGRAM(s) IV Push daily  thiamine 100milliGRAM(s) Oral daily  amantadine Syrup 100milliGRAM(s) Oral <User Schedule>  melatonin 6milliGRAM(s) Oral at bedtime  digoxin  Injectable 0.25milliGRAM(s) IV Push daily  traZODone 50milliGRAM(s) Oral at bedtime  heparin  Injectable 5000Unit(s) SubCutaneous every 8 hours  pantoprazole  Injectable 40milliGRAM(s) IV Push two times a day  FLUoxetine 10milliGRAM(s) Oral daily  furosemide    Tablet 20milliGRAM(s) Oral daily  doxazosin 4milliGRAM(s) Oral at bedtime  fluconAZOLE IVPB 400milliGRAM(s) IV Intermittent every 24 hours  collagenase Ointment 1Application(s) Topical daily  insulin lispro (HumaLOG) corrective regimen sliding scale  SubCutaneous every 6 hours  dextrose 5%. 1000milliLiter(s) IV Continuous <Continuous>  dextrose 50% Injectable 12.5Gram(s) IV Push once  dextrose 50% Injectable 25Gram(s) IV Push once  dextrose 50% Injectable 25Gram(s) IV Push once  metoprolol Injectable 5milliGRAM(s) IV Push every 6 hours  multiple electrolytes Injection Type 1 1000milliLiter(s) IV Continuous <Continuous>    MEDICATIONS  (PRN):  ALBUTerol/ipratropium for Nebulization 3milliLiter(s) Nebulizer every 6 hours PRN Shortness of Breath and/or Wheezing  dextrose Gel 1Dose(s) Oral once PRN Blood Glucose LESS THAN 70 milliGRAM(s)/deciliter  glucagon  Injectable 1milliGRAM(s) IntraMuscular once PRN Glucose LESS THAN 70 milligrams/deciliter          PHYSICAL EXAM:    Gen: NAD    Eyes: anicteric scleare    Neurological: Awake, following simple comandsn    Neck: No JVD    Pulmonary: no dyspnea Chest tube no air leak, coarse bilateral.    Cardiovascular: Irreg rhythms     Gastrointestinal: obese, soft, non tender, wounds open with minimal granulating tissue,    Genitourinary: clear urine on montoya    Extremities: no edema    Skin: as macho    Musculoskeletal: no gross deformities          LABS:  CBC Full  -  ( 09 Jun 2017 05:22 )  WBC Count : 13.6 K/uL  Hemoglobin : 9.4 g/dL  Hematocrit : 31.7 %  Platelet Count - Automated : 228 K/uL  Mean Cell Volume : 96.9 fl  Mean Cell Hemoglobin : 28.7 pg  Mean Cell Hemoglobin Concentration : 29.7 g/dL  Auto Neutrophil # : x  Auto Lymphocyte # : x  Auto Monocyte # : x  Auto Eosinophil # : x  Auto Basophil # : x  Auto Neutrophil % : 85.0 %  Auto Lymphocyte % : 10.0 %  Auto Monocyte % : 5.0 %  Auto Eosinophil % : x  Auto Basophil % : x    06-09    151<H>  |  108<H>  |  48.0<H>  ----------------------------<  122<H>  3.8   |  30.0<H>  |  0.85    Ca    8.2<L>      09 Jun 2017 05:22  Phos  3.1     06-09  Mg     1.9     06-09          RECENT CULTURES:  06-05 .Body Fluid Abdominal Fluid XXXX   Few White blood cells  Numerous Yeast   Numerous Candida albicans  Culture in progress    06-04 .Blood Blood-Peripheral XXXX XXXX   No growth at 48 hours    06-04 .Urine Catheterized XXXX XXXX   >100,000 CFU/ml Candida albicans              CAPILLARY BLOOD GLUCOSE  114 (09 Jun 2017 05:00)  129 (09 Jun 2017 00:27)  159 (08 Jun 2017 16:01)  142 (08 Jun 2017 12:00)      RADIOLOGY & ADDITIONAL STUDIES:    ASSESSMENT/PLAN:  51yMale presenting with Complicated course after bile leak after cholecystectomy    Neuro: Appreciate PM&R recs, continuue current regimen, for his multifactorial encephelopathy slowly improving    CV: HD normal rate control on his a fib, perfusion adequate lower dig dose based on level. will resume BIB lasix back to his home dose.    Pulm: PTX after Doboff trials, improved respiration after diuresis and reexpansion of lung plan to remove chest tube tomorrow, no air leak on rounds    GI/Nutrition: replace NGT resume feeds, booked for OR PEG 6/12    /Renal: remains with moderate free water deficit, continue D5W and resume enteral free water when NGT replaced.  Montoya removed yesterday , montoya replaced form minimal PVR. plan to remove montoya catheter and bladder scan Q4 hrs and straight cath if greater than 300mL    ID: Fluc for 10 days fo [facility]. afebrile with stable WBC    Endo: glycemia at target    Skin: collagenase to midline abd wounds.    Proph: heparin     Dispo: ICU close observation      CRITICAL CARE TIME SPENT: 38 minutes.

## 2017-06-10 LAB
ALBUMIN SERPL ELPH-MCNC: 2.3 G/DL — LOW (ref 3.3–5.2)
ALP SERPL-CCNC: 197 U/L — HIGH (ref 40–120)
ALT FLD-CCNC: 98 U/L — HIGH
ANION GAP SERPL CALC-SCNC: 19 MMOL/L — HIGH (ref 5–17)
ANION GAP SERPL CALC-SCNC: 22 MMOL/L — HIGH (ref 5–17)
ANION GAP SERPL CALC-SCNC: 50 MMOL/L — HIGH (ref 5–17)
ANISOCYTOSIS BLD QL: SLIGHT — SIGNIFICANT CHANGE UP
ANISOCYTOSIS BLD QL: SLIGHT — SIGNIFICANT CHANGE UP
AST SERPL-CCNC: 194 U/L — HIGH
BASOPHILS # BLD AUTO: 0 K/UL — SIGNIFICANT CHANGE UP (ref 0–0.2)
BILIRUB SERPL-MCNC: 1 MG/DL — SIGNIFICANT CHANGE UP (ref 0.4–2)
BLD GP AB SCN SERPL QL: SIGNIFICANT CHANGE UP
BUN SERPL-MCNC: 53 MG/DL — HIGH (ref 8–20)
BUN SERPL-MCNC: 54 MG/DL — HIGH (ref 8–20)
BUN SERPL-MCNC: 59 MG/DL — HIGH (ref 8–20)
CALCIUM SERPL-MCNC: 6.9 MG/DL — LOW (ref 8.6–10.2)
CALCIUM SERPL-MCNC: 8 MG/DL — LOW (ref 8.6–10.2)
CALCIUM SERPL-MCNC: 8.2 MG/DL — LOW (ref 8.6–10.2)
CHLORIDE SERPL-SCNC: 107 MMOL/L — SIGNIFICANT CHANGE UP (ref 98–107)
CHLORIDE SERPL-SCNC: 110 MMOL/L — HIGH (ref 98–107)
CHLORIDE SERPL-SCNC: 95 MMOL/L — LOW (ref 98–107)
CO2 SERPL-SCNC: 23 MMOL/L — SIGNIFICANT CHANGE UP (ref 22–29)
CO2 SERPL-SCNC: 26 MMOL/L — SIGNIFICANT CHANGE UP (ref 22–29)
CO2 SERPL-SCNC: 27 MMOL/L — SIGNIFICANT CHANGE UP (ref 22–29)
CREAT SERPL-MCNC: 1.01 MG/DL — SIGNIFICANT CHANGE UP (ref 0.5–1.3)
CREAT SERPL-MCNC: 1.14 MG/DL — SIGNIFICANT CHANGE UP (ref 0.5–1.3)
CREAT SERPL-MCNC: 1.14 MG/DL — SIGNIFICANT CHANGE UP (ref 0.5–1.3)
CULTURE RESULTS: SIGNIFICANT CHANGE UP
ELLIPTOCYTES BLD QL SMEAR: SLIGHT — SIGNIFICANT CHANGE UP
EOSINOPHIL # BLD AUTO: 0 K/UL — SIGNIFICANT CHANGE UP (ref 0–0.5)
GAS PNL BLDA: SIGNIFICANT CHANGE UP
GLUCOSE SERPL-MCNC: 132 MG/DL — HIGH (ref 70–115)
GLUCOSE SERPL-MCNC: 136 MG/DL — HIGH (ref 70–115)
GLUCOSE SERPL-MCNC: 137 MG/DL — HIGH (ref 70–115)
HCT VFR BLD CALC: 25.3 % — LOW (ref 42–52)
HCT VFR BLD CALC: 32.2 % — LOW (ref 42–52)
HCT VFR BLD CALC: 32.7 % — LOW (ref 42–52)
HGB BLD-MCNC: 7.3 G/DL — LOW (ref 14–18)
HGB BLD-MCNC: 9.4 G/DL — LOW (ref 14–18)
HGB BLD-MCNC: 9.4 G/DL — LOW (ref 14–18)
HYPOCHROMIA BLD QL: SLIGHT — SIGNIFICANT CHANGE UP
HYPOCHROMIA BLD QL: SLIGHT — SIGNIFICANT CHANGE UP
LYMPHOCYTES # BLD AUTO: 1.5 K/UL — SIGNIFICANT CHANGE UP (ref 1–4.8)
LYMPHOCYTES # BLD AUTO: 13 % — LOW (ref 20–55)
LYMPHOCYTES # BLD AUTO: 9 % — LOW (ref 20–55)
MACROCYTES BLD QL: SLIGHT — SIGNIFICANT CHANGE UP
MACROCYTES BLD QL: SLIGHT — SIGNIFICANT CHANGE UP
MAGNESIUM SERPL-MCNC: 2.2 MG/DL — SIGNIFICANT CHANGE UP (ref 1.6–2.6)
MAGNESIUM SERPL-MCNC: 2.2 MG/DL — SIGNIFICANT CHANGE UP (ref 1.6–2.6)
MCHC RBC-ENTMCNC: 28 PG — SIGNIFICANT CHANGE UP (ref 27–31)
MCHC RBC-ENTMCNC: 28.1 PG — SIGNIFICANT CHANGE UP (ref 27–31)
MCHC RBC-ENTMCNC: 28.3 PG — SIGNIFICANT CHANGE UP (ref 27–31)
MCHC RBC-ENTMCNC: 28.7 G/DL — LOW (ref 32–36)
MCHC RBC-ENTMCNC: 28.9 G/DL — LOW (ref 32–36)
MCHC RBC-ENTMCNC: 29.2 G/DL — LOW (ref 32–36)
MCV RBC AUTO: 96.4 FL — HIGH (ref 80–94)
MCV RBC AUTO: 97.3 FL — HIGH (ref 80–94)
MCV RBC AUTO: 98.1 FL — HIGH (ref 80–94)
METAMYELOCYTES # FLD: 1 % — HIGH (ref 0–0)
MICROCYTES BLD QL: SLIGHT — SIGNIFICANT CHANGE UP
MONOCYTES # BLD AUTO: 0.8 K/UL — SIGNIFICANT CHANGE UP (ref 0–0.8)
MONOCYTES NFR BLD AUTO: 5 % — SIGNIFICANT CHANGE UP (ref 3–10)
MONOCYTES NFR BLD AUTO: 6 % — SIGNIFICANT CHANGE UP (ref 3–10)
MYELOCYTES NFR BLD: 1 % — HIGH (ref 0–0)
MYELOCYTES NFR BLD: 2 % — HIGH (ref 0–0)
NEUTROPHILS # BLD AUTO: 11.8 K/UL — HIGH (ref 1.8–8)
NEUTROPHILS NFR BLD AUTO: 78 % — HIGH (ref 37–73)
NEUTROPHILS NFR BLD AUTO: 83 % — HIGH (ref 37–73)
NRBC # BLD: 4 /100 — HIGH (ref 0–0)
NRBC # BLD: 6 /100 — HIGH (ref 0–0)
OVALOCYTES BLD QL SMEAR: SLIGHT — SIGNIFICANT CHANGE UP
PHOSPHATE SERPL-MCNC: 3.1 MG/DL — SIGNIFICANT CHANGE UP (ref 2.4–4.7)
PHOSPHATE SERPL-MCNC: 3.2 MG/DL — SIGNIFICANT CHANGE UP (ref 2.4–4.7)
PLAT MORPH BLD: NORMAL — SIGNIFICANT CHANGE UP
PLAT MORPH BLD: NORMAL — SIGNIFICANT CHANGE UP
PLATELET # BLD AUTO: 188 K/UL — SIGNIFICANT CHANGE UP (ref 150–400)
PLATELET # BLD AUTO: 215 K/UL — SIGNIFICANT CHANGE UP (ref 150–400)
PLATELET # BLD AUTO: 247 K/UL — SIGNIFICANT CHANGE UP (ref 150–400)
POIKILOCYTOSIS BLD QL AUTO: SLIGHT — SIGNIFICANT CHANGE UP
POIKILOCYTOSIS BLD QL AUTO: SLIGHT — SIGNIFICANT CHANGE UP
POLYCHROMASIA BLD QL SMEAR: SLIGHT — SIGNIFICANT CHANGE UP
POLYCHROMASIA BLD QL SMEAR: SLIGHT — SIGNIFICANT CHANGE UP
POTASSIUM SERPL-MCNC: 3.9 MMOL/L — SIGNIFICANT CHANGE UP (ref 3.5–5.3)
POTASSIUM SERPL-MCNC: 4.2 MMOL/L — SIGNIFICANT CHANGE UP (ref 3.5–5.3)
POTASSIUM SERPL-MCNC: 4.2 MMOL/L — SIGNIFICANT CHANGE UP (ref 3.5–5.3)
POTASSIUM SERPL-SCNC: 3.9 MMOL/L — SIGNIFICANT CHANGE UP (ref 3.5–5.3)
POTASSIUM SERPL-SCNC: 4.2 MMOL/L — SIGNIFICANT CHANGE UP (ref 3.5–5.3)
POTASSIUM SERPL-SCNC: 4.2 MMOL/L — SIGNIFICANT CHANGE UP (ref 3.5–5.3)
PROCALCITONIN SERPL-MCNC: 0.64 NG/ML — HIGH (ref 0–0.04)
PROT SERPL-MCNC: 5.6 G/DL — LOW (ref 6.6–8.7)
RBC # BLD: 2.58 M/UL — LOW (ref 4.6–6.2)
RBC # BLD: 3.34 M/UL — LOW (ref 4.6–6.2)
RBC # BLD: 3.36 M/UL — LOW (ref 4.6–6.2)
RBC # FLD: 19.2 % — HIGH (ref 11–15.6)
RBC # FLD: 19.3 % — HIGH (ref 11–15.6)
RBC # FLD: 19.8 % — HIGH (ref 11–15.6)
RBC BLD AUTO: ABNORMAL
RBC BLD AUTO: ABNORMAL
SCHISTOCYTES BLD QL AUTO: SLIGHT — SIGNIFICANT CHANGE UP
SODIUM SERPL-SCNC: 152 MMOL/L — HIGH (ref 135–145)
SODIUM SERPL-SCNC: 156 MMOL/L — HIGH (ref 135–145)
SODIUM SERPL-SCNC: 171 MMOL/L — CRITICAL HIGH (ref 135–145)
SPECIMEN SOURCE: SIGNIFICANT CHANGE UP
STOMATOCYTES BLD QL SMEAR: PRESENT — SIGNIFICANT CHANGE UP
TARGETS BLD QL SMEAR: SLIGHT — SIGNIFICANT CHANGE UP
VARIANT LYMPHS # BLD: 1 % — SIGNIFICANT CHANGE UP (ref 0–6)
VARIANT LYMPHS # BLD: 1 % — SIGNIFICANT CHANGE UP (ref 0–6)
WBC # BLD: 12.1 K/UL — HIGH (ref 4.8–10.8)
WBC # BLD: 13.7 K/UL — HIGH (ref 4.8–10.8)
WBC # BLD: 14.9 K/UL — HIGH (ref 4.8–10.8)
WBC # FLD AUTO: 12.1 K/UL — HIGH (ref 4.8–10.8)
WBC # FLD AUTO: 13.7 K/UL — HIGH (ref 4.8–10.8)
WBC # FLD AUTO: 14.9 K/UL — HIGH (ref 4.8–10.8)

## 2017-06-10 PROCEDURE — 99291 CRITICAL CARE FIRST HOUR: CPT | Mod: 25

## 2017-06-10 PROCEDURE — 36556 INSERT NON-TUNNEL CV CATH: CPT

## 2017-06-10 PROCEDURE — 71010: CPT | Mod: 26

## 2017-06-10 RX ORDER — VANCOMYCIN HCL 1 G
1250 VIAL (EA) INTRAVENOUS ONCE
Qty: 0 | Refills: 0 | Status: COMPLETED | OUTPATIENT
Start: 2017-06-10 | End: 2017-06-10

## 2017-06-10 RX ORDER — FLUOXETINE HCL 10 MG
10 CAPSULE ORAL DAILY
Qty: 0 | Refills: 0 | Status: DISCONTINUED | OUTPATIENT
Start: 2017-06-10 | End: 2017-06-12

## 2017-06-10 RX ORDER — IPRATROPIUM/ALBUTEROL SULFATE 18-103MCG
3 AEROSOL WITH ADAPTER (GRAM) INHALATION EVERY 6 HOURS
Qty: 0 | Refills: 0 | Status: DISCONTINUED | OUTPATIENT
Start: 2017-06-10 | End: 2017-06-12

## 2017-06-10 RX ORDER — SODIUM CHLORIDE 9 MG/ML
500 INJECTION, SOLUTION INTRAVENOUS ONCE
Qty: 0 | Refills: 0 | Status: COMPLETED | OUTPATIENT
Start: 2017-06-10 | End: 2017-06-10

## 2017-06-10 RX ORDER — CEFEPIME 1 G/1
2000 INJECTION, POWDER, FOR SOLUTION INTRAMUSCULAR; INTRAVENOUS ONCE
Qty: 0 | Refills: 0 | Status: COMPLETED | OUTPATIENT
Start: 2017-06-10 | End: 2017-06-10

## 2017-06-10 RX ORDER — VANCOMYCIN HCL 1 G
VIAL (EA) INTRAVENOUS
Qty: 0 | Refills: 0 | Status: DISCONTINUED | OUTPATIENT
Start: 2017-06-10 | End: 2017-06-11

## 2017-06-10 RX ORDER — VANCOMYCIN HCL 1 G
1250 VIAL (EA) INTRAVENOUS EVERY 12 HOURS
Qty: 0 | Refills: 0 | Status: DISCONTINUED | OUTPATIENT
Start: 2017-06-10 | End: 2017-06-11

## 2017-06-10 RX ORDER — CEFEPIME 1 G/1
INJECTION, POWDER, FOR SOLUTION INTRAMUSCULAR; INTRAVENOUS
Qty: 0 | Refills: 0 | Status: DISCONTINUED | OUTPATIENT
Start: 2017-06-10 | End: 2017-06-12

## 2017-06-10 RX ORDER — ACETAMINOPHEN 500 MG
650 TABLET ORAL EVERY 6 HOURS
Qty: 0 | Refills: 0 | Status: DISCONTINUED | OUTPATIENT
Start: 2017-06-10 | End: 2017-06-12

## 2017-06-10 RX ORDER — CEFEPIME 1 G/1
2000 INJECTION, POWDER, FOR SOLUTION INTRAMUSCULAR; INTRAVENOUS EVERY 8 HOURS
Qty: 0 | Refills: 0 | Status: DISCONTINUED | OUTPATIENT
Start: 2017-06-10 | End: 2017-06-12

## 2017-06-10 RX ORDER — SODIUM CHLORIDE 9 MG/ML
3 INJECTION INTRAMUSCULAR; INTRAVENOUS; SUBCUTANEOUS EVERY 6 HOURS
Qty: 0 | Refills: 0 | Status: COMPLETED | OUTPATIENT
Start: 2017-06-10 | End: 2017-06-12

## 2017-06-10 RX ADMIN — Medication 166.67 MILLIGRAM(S): at 12:38

## 2017-06-10 RX ADMIN — SODIUM CHLORIDE 3 MILLILITER(S): 9 INJECTION INTRAMUSCULAR; INTRAVENOUS; SUBCUTANEOUS at 21:07

## 2017-06-10 RX ADMIN — PANTOPRAZOLE SODIUM 40 MILLIGRAM(S): 20 TABLET, DELAYED RELEASE ORAL at 18:14

## 2017-06-10 RX ADMIN — Medication 3 MILLILITER(S): at 10:44

## 2017-06-10 RX ADMIN — NYSTATIN CREAM 1 APPLICATION(S): 100000 CREAM TOPICAL at 06:08

## 2017-06-10 RX ADMIN — Medication 0.12 MILLIGRAM(S): at 11:42

## 2017-06-10 RX ADMIN — Medication 650 MILLIGRAM(S): at 21:43

## 2017-06-10 RX ADMIN — Medication 10 MILLIGRAM(S): at 11:43

## 2017-06-10 RX ADMIN — CEFEPIME 100 MILLIGRAM(S): 1 INJECTION, POWDER, FOR SOLUTION INTRAMUSCULAR; INTRAVENOUS at 11:42

## 2017-06-10 RX ADMIN — SODIUM CHLORIDE 75 MILLILITER(S): 9 INJECTION, SOLUTION INTRAVENOUS at 11:43

## 2017-06-10 RX ADMIN — Medication 20 MILLIGRAM(S): at 18:14

## 2017-06-10 RX ADMIN — Medication 1 MILLIGRAM(S): at 12:01

## 2017-06-10 RX ADMIN — Medication 5 MILLIGRAM(S): at 18:13

## 2017-06-10 RX ADMIN — SODIUM CHLORIDE 75 MILLILITER(S): 9 INJECTION, SOLUTION INTRAVENOUS at 06:09

## 2017-06-10 RX ADMIN — Medication 6 MILLIGRAM(S): at 21:44

## 2017-06-10 RX ADMIN — PANTOPRAZOLE SODIUM 40 MILLIGRAM(S): 20 TABLET, DELAYED RELEASE ORAL at 06:08

## 2017-06-10 RX ADMIN — SODIUM CHLORIDE 3 MILLILITER(S): 9 INJECTION INTRAMUSCULAR; INTRAVENOUS; SUBCUTANEOUS at 10:42

## 2017-06-10 RX ADMIN — Medication 5 MILLIGRAM(S): at 00:12

## 2017-06-10 RX ADMIN — SODIUM CHLORIDE 3 MILLILITER(S): 9 INJECTION INTRAMUSCULAR; INTRAVENOUS; SUBCUTANEOUS at 17:14

## 2017-06-10 RX ADMIN — Medication 20 MILLIGRAM(S): at 06:09

## 2017-06-10 RX ADMIN — Medication 5 MILLIGRAM(S): at 11:41

## 2017-06-10 RX ADMIN — Medication 100 MILLIGRAM(S): at 06:08

## 2017-06-10 RX ADMIN — Medication 50 MILLIGRAM(S): at 21:47

## 2017-06-10 RX ADMIN — CEFEPIME 100 MILLIGRAM(S): 1 INJECTION, POWDER, FOR SOLUTION INTRAMUSCULAR; INTRAVENOUS at 21:48

## 2017-06-10 RX ADMIN — Medication 3 MILLILITER(S): at 17:14

## 2017-06-10 RX ADMIN — HEPARIN SODIUM 5000 UNIT(S): 5000 INJECTION INTRAVENOUS; SUBCUTANEOUS at 17:25

## 2017-06-10 RX ADMIN — Medication 3 MILLILITER(S): at 21:07

## 2017-06-10 RX ADMIN — NYSTATIN CREAM 1 APPLICATION(S): 100000 CREAM TOPICAL at 18:13

## 2017-06-10 RX ADMIN — Medication 100 MILLIGRAM(S): at 11:42

## 2017-06-10 RX ADMIN — HEPARIN SODIUM 5000 UNIT(S): 5000 INJECTION INTRAVENOUS; SUBCUTANEOUS at 21:43

## 2017-06-10 RX ADMIN — Medication 5 MILLIGRAM(S): at 06:08

## 2017-06-10 RX ADMIN — FLUCONAZOLE 100 MILLIGRAM(S): 150 TABLET ORAL at 17:24

## 2017-06-10 RX ADMIN — Medication 1 APPLICATION(S): at 11:42

## 2017-06-10 RX ADMIN — Medication 4 MILLIGRAM(S): at 21:47

## 2017-06-10 RX ADMIN — SODIUM CHLORIDE 1000 MILLILITER(S): 9 INJECTION, SOLUTION INTRAVENOUS at 10:24

## 2017-06-10 RX ADMIN — HEPARIN SODIUM 5000 UNIT(S): 5000 INJECTION INTRAVENOUS; SUBCUTANEOUS at 06:08

## 2017-06-10 NOTE — PROGRESS NOTE ADULT - SUBJECTIVE AND OBJECTIVE BOX
Patient is a 51y old  Male who presents with a chief complaint of post op abdominal pain (05 May 2017 00:11)  and coding during an ERCP.  He is scheduled for an ESOPHAGOGASTRODUODENOSCOPY,  FLEXIBEL TRANSORAL WITH DIRECTED PLACEMENT OF A PERCUTANEOUS GASTROSTOMY  TUBE.     PAST MEDICAL HISTORY:  Mitral regurgitation  Cardiomyopathy, nonischemic  CAD (coronary artery disease)  Asthma  Atrial fibrillation  Heart disease      PAST SURGICAL HISTORY:  S/P laparoscopic cholecystectomy  History of humerus fracture  Artificial pacemaker      MEDICATIONS  (STANDING):  nystatin Powder 1Application(s) Topical two times a day  folic acid Injectable 1milliGRAM(s) IV Push daily  thiamine 100milliGRAM(s) Oral daily  amantadine Syrup 100milliGRAM(s) Oral <User Schedule>  melatonin 6milliGRAM(s) Oral at bedtime  traZODone 50milliGRAM(s) Oral at bedtime  heparin  Injectable 5000Unit(s) SubCutaneous every 8 hours  pantoprazole  Injectable 40milliGRAM(s) IV Push two times a day  FLUoxetine 10milliGRAM(s) Oral daily  doxazosin 4milliGRAM(s) Oral at bedtime  fluconAZOLE IVPB 400milliGRAM(s) IV Intermittent every 24 hours  collagenase Ointment 1Application(s) Topical daily  metoprolol Injectable 5milliGRAM(s) IV Push every 6 hours  multiple electrolytes Injection Type 1 1000milliLiter(s) IV Continuous <Continuous>  furosemide    Tablet 20milliGRAM(s) Oral two times a day  digoxin  Injectable 0.125milliGRAM(s) IV Push daily    MEDICATIONS  (PRN):  ALBUTerol/ipratropium for Nebulization 3milliLiter(s) Nebulizer every 6 hours PRN Shortness of Breath and/or Wheezing      Allergies:  No Known Allergies      SOCIAL HISTORY:  Unknown                          9.4    13.6  )-----------( 228      ( 2017 05:22 )             31.7       PT/INR - ( 31 May 2017 23:51 )   PT: 16.0 sec;   INR: 1.44 ratio         PTT - ( 31 May 2017 23:51 )  PTT:108.4 sec        151<H>  |  108<H>  |  48.0<H>  ----------------------------<  122<H>  3.8   |  30.0<H>  |  0.85    Ca    8.2<L>      2017 05:22  Phos  3.1     -  Mg     1.9         [id number]   Atrial fibrillation with rapid ventricular response  Nonspecific ST and T wave abnormality  Abnormal ECG       TT ECHO  ECHO TRANS THORACIC  -   DATE:  2017   Summary:   1. Dilated cardiomyopathy with severe biventricular systolic failure.        Left ventricular ejection fraction, by visual estimation, is <20%.   2. Severe functional mitral regurgitation.   3. Moderate tricuspid regurgitation.   4. Estimated pulmonary artery systolic pressure is 45.0 mmHg assuming a        right atrial pressure of 3 mmHg, which is consistent with mild pulmonary        hypertension.   5. No pericardial effusion.    ASA # =  4    Mallampati # =Unable to assess - patient is uncooperative.

## 2017-06-10 NOTE — PROGRESS NOTE ADULT - SUBJECTIVE AND OBJECTIVE BOX
INTERVAL HPI/OVERNIGHT EVENTS: Free water restarted after replaced Dobhoff during day.    PRESSORS: [ ] YES [ x] NO  WHICH:  DOSE:    ANTIBIOTICS:        Fluconazole          DATE STARTED:6/6  ANTIBIOTICS:                  DATE STARTED:  ANTIBIOTICS:                  DATE STARTED:    MEDICATIONS  (STANDING):  nystatin Powder 1Application(s) Topical two times a day  folic acid Injectable 1milliGRAM(s) IV Push daily  thiamine 100milliGRAM(s) Oral daily  amantadine Syrup 100milliGRAM(s) Oral <User Schedule>  melatonin 6milliGRAM(s) Oral at bedtime  traZODone 50milliGRAM(s) Oral at bedtime  heparin  Injectable 5000Unit(s) SubCutaneous every 8 hours  pantoprazole  Injectable 40milliGRAM(s) IV Push two times a day  FLUoxetine 10milliGRAM(s) Oral daily  doxazosin 4milliGRAM(s) Oral at bedtime  fluconAZOLE IVPB 400milliGRAM(s) IV Intermittent every 24 hours  collagenase Ointment 1Application(s) Topical daily  metoprolol Injectable 5milliGRAM(s) IV Push every 6 hours  multiple electrolytes Injection Type 1 1000milliLiter(s) IV Continuous <Continuous>  furosemide    Tablet 20milliGRAM(s) Oral two times a day  digoxin  Injectable 0.125milliGRAM(s) IV Push daily    MEDICATIONS  (PRN):  ALBUTerol/ipratropium for Nebulization 3milliLiter(s) Nebulizer every 6 hours PRN Shortness of Breath and/or Wheezing      Drug Dosing Weight  Height (cm): 165.1 (04 May 2017 13:12)  Weight (kg): 87 (06 Jun 2017 04:54)  BMI (kg/m2): 31.9 (06 Jun 2017 04:54)  BSA (m2): 1.94 (06 Jun 2017 04:54)    CENTRAL LINE: [ ] YES [x] NO  LOCATION:   DATE INSERTED:  REMOVE: [ ] YES [ ] NO  EXPLAIN:    ARMAS: [ ] YES [x ] NO    DATE INSERTED:  REMOVE: [ ] YES [ ] NO  EXPLAIN:    A-LINE: [ ] YES [x ] NO  LOCATION:   DATE INSERTED:  REMOVE: [ ] YES [ ] NO  EXPLAIN:    PAST MEDICAL & SURGICAL HISTORY:  Mitral regurgitation: severe MR  Cardiomyopathy, nonischemic  CAD (coronary artery disease)  Asthma  Atrial fibrillation  Heart disease  S/P laparoscopic cholecystectomy  History of humerus fracture: Metal pins in Left Humerus  Artificial pacemaker      ICU Vital Signs Last 24 Hrs  T(C): 37.9, Max: 38.1 (06-09 @ 11:00)  T(F): 100.2, Max: 100.6 (06-09 @ 11:00)  HR: 89 (77 - 112)  BP: 122/78 (104/83 - 136/84)  BP(mean): 96 (87 - 113)  ABP: --  ABP(mean): --  RR: 42 (23 - 44)  SpO2: 89% (89% - 100%)          I&O's Detail  I & Os for 24h ending 09 Jun 2017 07:00  =============================================  IN:    dextrose 5%.: 1999 ml    Pivot: 495 ml    multiple electrolytes Injection Type 1: 450 ml    Free Water: 300 ml    Total IN: 3244 ml  ---------------------------------------------  OUT:    Indwelling Catheter - Urethral: 2175 ml    Incontinent per Condom Catheter: 240 ml    Chest Tube: 75 ml    Total OUT: 2490 ml  ---------------------------------------------  Total NET: 754 ml    I & Os for current day (as of 10 Jackson 2017 01:52)  =============================================  IN:    multiple electrolytes Injection Type 1: 1275 ml    Pivot: 495 ml    Total IN: 1770 ml  ---------------------------------------------  OUT:    Voided: 600 ml    Chest Tube: 135 ml    Total OUT: 735 ml  ---------------------------------------------  Total NET: 1035 ml          Physical Exam:    Neurological:  No sensory/motor deficits    HEENT: PERRLA, EOMI, no drainage or redness    Neck: No bruits; no thyromegaly or nodules,  No JVD    Back: Normal spine flexure, No CVA tenderness, No deformity or limitation of movement    Respiratory: Breath Sounds equal & clear to auscultation, no accessory muscle use    Cardiovascular: Regular rate & rhythm, normal S1, S2; no murmurs, gallops or rubs    Gastrointestinal: Soft, non-tender, normal bowel sounds    Extremities: No peripheral edema, No cyanosis, clubbing     Vascular: Equal and normal pulses: 2+ peripheral pulses throughout    Musculoskeletal: No joint pain, swelling or deformity; no limitation of movement    Skin: No rashes    LABS:  CBC Full  -  ( 09 Jun 2017 05:22 )  WBC Count : 13.6 K/uL  Hemoglobin : 9.4 g/dL  Hematocrit : 31.7 %  Platelet Count - Automated : 228 K/uL  Mean Cell Volume : 96.9 fl  Mean Cell Hemoglobin : 28.7 pg  Mean Cell Hemoglobin Concentration : 29.7 g/dL  Auto Neutrophil # : x  Auto Lymphocyte # : x  Auto Monocyte # : x  Auto Eosinophil # : x  Auto Basophil # : x  Auto Neutrophil % : 85.0 %  Auto Lymphocyte % : 10.0 %  Auto Monocyte % : 5.0 %  Auto Eosinophil % : x  Auto Basophil % : x    06-09    151<H>  |  108<H>  |  48.0<H>  ----------------------------<  122<H>  3.8   |  30.0<H>  |  0.85    Ca    8.2<L>      09 Jun 2017 05:22  Phos  3.1     06-09  Mg     1.9     06-09            Assessment and Plan:    Neuro: GCS 13. Serial Neurologic assessments    HEENT: no issues    CV: Continue hemodynamic monitoring    Pulm: Pulmonary toilet.  Continue incentive spirometer.  Chest PT.  Encourage OOB to chair    GI/Nutrition: Bowel Regimen    /Renal: monitor UOP. Monitor BMP.  Replete Lytes as needed      HEME- DVT prophylaxis, SCDs    ID: Fluconazole    Lines/Tubes: Dobhoff, Right pigtail CT    Endo: no issues    Skin: no issues    Dispo: INTERVAL HPI/OVERNIGHT EVENTS: Free water restarted after replaced Dobhoff during day.    PRESSORS: [ ] YES [ x] NO  WHICH:  DOSE:    ANTIBIOTICS:        Fluconazole          DATE STARTED:6/6  ANTIBIOTICS:                  DATE STARTED:  ANTIBIOTICS:                  DATE STARTED:    MEDICATIONS  (STANDING):  nystatin Powder 1Application(s) Topical two times a day  folic acid Injectable 1milliGRAM(s) IV Push daily  thiamine 100milliGRAM(s) Oral daily  amantadine Syrup 100milliGRAM(s) Oral <User Schedule>  melatonin 6milliGRAM(s) Oral at bedtime  traZODone 50milliGRAM(s) Oral at bedtime  heparin  Injectable 5000Unit(s) SubCutaneous every 8 hours  pantoprazole  Injectable 40milliGRAM(s) IV Push two times a day  FLUoxetine 10milliGRAM(s) Oral daily  doxazosin 4milliGRAM(s) Oral at bedtime  fluconAZOLE IVPB 400milliGRAM(s) IV Intermittent every 24 hours  collagenase Ointment 1Application(s) Topical daily  metoprolol Injectable 5milliGRAM(s) IV Push every 6 hours  multiple electrolytes Injection Type 1 1000milliLiter(s) IV Continuous <Continuous>  furosemide    Tablet 20milliGRAM(s) Oral two times a day  digoxin  Injectable 0.125milliGRAM(s) IV Push daily    MEDICATIONS  (PRN):  ALBUTerol/ipratropium for Nebulization 3milliLiter(s) Nebulizer every 6 hours PRN Shortness of Breath and/or Wheezing      Drug Dosing Weight  Height (cm): 165.1 (04 May 2017 13:12)  Weight (kg): 87 (06 Jun 2017 04:54)  BMI (kg/m2): 31.9 (06 Jun 2017 04:54)  BSA (m2): 1.94 (06 Jun 2017 04:54)    CENTRAL LINE: [ ] YES [x] NO  LOCATION:   DATE INSERTED:  REMOVE: [ ] YES [ ] NO  EXPLAIN:    ARMAS: [ ] YES [x ] NO    DATE INSERTED:  REMOVE: [ ] YES [ ] NO  EXPLAIN:    A-LINE: [ ] YES [x ] NO  LOCATION:   DATE INSERTED:  REMOVE: [ ] YES [ ] NO  EXPLAIN:    PAST MEDICAL & SURGICAL HISTORY:  Mitral regurgitation: severe MR  Cardiomyopathy, nonischemic  CAD (coronary artery disease)  Asthma  Atrial fibrillation  Heart disease  S/P laparoscopic cholecystectomy  History of humerus fracture: Metal pins in Left Humerus  Artificial pacemaker      ICU Vital Signs Last 24 Hrs  T(C): 37.9, Max: 38.1 (06-09 @ 11:00)  T(F): 100.2, Max: 100.6 (06-09 @ 11:00)  HR: 89 (77 - 112)  BP: 122/78 (104/83 - 136/84)  BP(mean): 96 (87 - 113)  ABP: --  ABP(mean): --  RR: 42 (23 - 44)  SpO2: 89% (89% - 100%)          I&O's Detail  I & Os for 24h ending 09 Jun 2017 07:00  =============================================  IN:    dextrose 5%.: 1999 ml    Pivot: 495 ml    multiple electrolytes Injection Type 1: 450 ml    Free Water: 300 ml    Total IN: 3244 ml  ---------------------------------------------  OUT:    Indwelling Catheter - Urethral: 2175 ml    Incontinent per Condom Catheter: 240 ml    Chest Tube: 75 ml    Total OUT: 2490 ml  ---------------------------------------------  Total NET: 754 ml    I & Os for current day (as of 10 Jackson 2017 01:52)  =============================================  IN:    multiple electrolytes Injection Type 1: 1275 ml    Pivot: 495 ml    Total IN: 1770 ml  ---------------------------------------------  OUT:    Voided: 600 ml    Chest Tube: 135 ml    Total OUT: 735 ml  ---------------------------------------------  Total NET: 1035 ml          Physical Exam:    Neurological:  No sensory/motor deficits    HEENT: PERRLA, EOMI, no drainage or redness    Neck: No bruits; no thyromegaly or nodules,  No JVD    Back: Normal spine flexure, No CVA tenderness, No deformity or limitation of movement    Respiratory: Breath Sounds equal & clear to auscultation, no accessory muscle use    Cardiovascular: Regular rate & rhythm, normal S1, S2; no murmurs, gallops or rubs    Gastrointestinal: Soft, non-tender, normal bowel sounds    Extremities: No peripheral edema, No cyanosis, clubbing     Vascular: Equal and normal pulses: 2+ peripheral pulses throughout    Musculoskeletal: No joint pain, swelling or deformity; no limitation of movement    Skin: No rashes    LABS:                        9.4    13.7  )-----------( 247      ( 10 Jackson 2017 03:56 )             32.7   06-10    156<H>  |  110<H>  |  53.0<H>  ----------------------------<  136<H>  4.2   |  27.0  |  1.14    Ca    8.2<L>      10 Jackson 2017 03:56  Phos  3.1     06-10  Mg     2.2     06-10            Assessment and Plan:    Neuro: GCS 13. Serial Neurologic assessments    HEENT: no issues    CV: Continue hemodynamic monitoring    Pulm: Pulmonary toilet.  Continue incentive spirometer.  Chest PT.  Encourage OOB to chair    GI/Nutrition: Bowel Regimen    /Renal: Hypernatremia noted, volume expansion and enteral free water. Monitor UOP. Monitor BMP.  Replete Lytes as needed      HEME- DVT prophylaxis, SCDs    ID: Fluconazole, add bread spectrum ABX    Lines/Tubes: Dobhoff, Right pigtail CT    Endo: no issues    Skin: no issues    Dispo: still requires critical care

## 2017-06-10 NOTE — PROGRESS NOTE ADULT - SUBJECTIVE AND OBJECTIVE BOX
INTERVAL HPI/OVERNIGHT EVENTS/SUBJECTIVE:  No major clinical events overnight.  Pt remains non communicative and unable toprovide hx.    ICU Vital Signs Last 24 Hrs  T(C): 38, Max: 38.1 (06-09 @ 11:00)  T(F): 100.4, Max: 100.6 (06-09 @ 11:00)  HR: 93 (68 - 112)  BP: 127/90 (109/83 - 147/90)  BP(mean): 102 (87 - 113)  ABP: --  ABP(mean): --  RR: 36 (27 - 44)  SpO2: 91% (89% - 99%)      I&O's Detail    I & Os for current day (as of 10 Jackson 2017 08:38)  =============================================  IN:    multiple electrolytes Injection Type 1: 1800 ml    Pivot: 880 ml    Free Water: 300 ml    Total IN: 2980 ml  ---------------------------------------------  OUT:    Voided: 650 ml    Chest Tube: 145 ml    Total OUT: 795 ml  ---------------------------------------------  Total NET: 2185 ml            MEDICATIONS  (STANDING):  nystatin Powder 1Application(s) Topical two times a day  folic acid Injectable 1milliGRAM(s) IV Push daily  thiamine 100milliGRAM(s) Oral daily  amantadine Syrup 100milliGRAM(s) Oral <User Schedule>  melatonin 6milliGRAM(s) Oral at bedtime  traZODone 50milliGRAM(s) Oral at bedtime  heparin  Injectable 5000Unit(s) SubCutaneous every 8 hours  pantoprazole  Injectable 40milliGRAM(s) IV Push two times a day  FLUoxetine 10milliGRAM(s) Oral daily  doxazosin 4milliGRAM(s) Oral at bedtime  fluconAZOLE IVPB 400milliGRAM(s) IV Intermittent every 24 hours  collagenase Ointment 1Application(s) Topical daily  metoprolol Injectable 5milliGRAM(s) IV Push every 6 hours  multiple electrolytes Injection Type 1 1000milliLiter(s) IV Continuous <Continuous>  furosemide    Tablet 20milliGRAM(s) Oral two times a day  digoxin  Injectable 0.125milliGRAM(s) IV Push daily    MEDICATIONS  (PRN):  ALBUTerol/ipratropium for Nebulization 3milliLiter(s) Nebulizer every 6 hours PRN Shortness of Breath and/or Wheezing      NUTRITION/IVF: Tube feeds    CENTRAL LINE:  N    ARMAS: N     A-LINE: N       CHEST TUBE: Y LOCATION:R    SUCTION/WATER SEAL: suction 20 cm H2O        PHYSICAL EXAM:    Gen: Mild distress    Eyes: PERRLA    Neurological: Awake and alert.  Still intermittently follows commands.    ENMT:  Dry MM    Neck: Trachea midline    Pulmonary: Equal BL BS, mild crackles diffusely Moderate accessory muscle use, acute distress    Cardiovascular: Irregular    Gastrointestinal: Soft ND/NT    Genitourinary: Condom catheter    Extremities: Minimal pitting edema        LABS:  CBC Full  -  ( 10 Jackson 2017 03:56 )  WBC Count : 13.7 K/uL  Hemoglobin : 9.4 g/dL  Hematocrit : 32.7 %  Platelet Count - Automated : 247 K/uL  Mean Cell Volume : 97.3 fl  Mean Cell Hemoglobin : 28.0 pg  Mean Cell Hemoglobin Concentration : 28.7 g/dL  Auto Neutrophil # : x  Auto Lymphocyte # : x  Auto Monocyte # : x  Auto Eosinophil # : x  Auto Basophil # : x  Auto Neutrophil % : 78.0 %  Auto Lymphocyte % : 13.0 %  Auto Monocyte % : 5.0 %  Auto Eosinophil % : x  Auto Basophil % : x    06-10    156<H>  |  110<H>  |  53.0<H>  ----------------------------<  136<H>  4.2   |  27.0  |  1.14    Ca    8.2<L>      10 Jackson 2017 03:56  Phos  3.1     06-10  Mg     2.2     06-10          RECENT CULTURES:  06-05 .Body Fluid Abdominal Fluid XXXX   Few White blood cells  Numerous Yeast   Numerous Candida albicans  Culture in progress    06-04 .Blood Blood-Peripheral XXXX XXXX   No growth at 5 days.    06-04 .Urine Catheterized XXXX XXXX   >100,000 CFU/ml Candida albicans        ASSESSMENT/PLAN:  51yMale presenting with:    Neuro: Encephalopathy - Multifactorial - critical illness polyneuropathy, anoxic injury, toxic metabolic.  Supportive care. Neuro stim.  Avoid delriogenic stimuli.    CV: Stable chronic systolic CHF    Pulm: Mild hypoxia and resp distress.  NT suctioning with thick secretions.  Will nebulize saline to loosen.  Aggressive chest PT, pulm toilet.  CXR.  ABG.    GI/Nutrition:  Tube feeds.  Plan for surgical feeding tube Monday.      /Renal: Worsening hypernatremia.  Increased BUN and creat-ROCIO.  Suspect hypovolemic at this time.  Continue free water.  IV repletement as well.      ID: Candida in urine and intraabdominal collection  Continue diflucan.  WBC elevation not resolved yet.  Continue to monitor closely for now.  No other apparent infectious source at this time.      Proph:  DVT: contraindicated for full anticoagulation from GI bleed.  IVC filter in place.  PPX sqh.  PPI for GI bleed.      Dispo: ICU      CRITICAL CARE TIME SPENT: 45 min

## 2017-06-11 DIAGNOSIS — E87.0 HYPEROSMOLALITY AND HYPERNATREMIA: ICD-10-CM

## 2017-06-11 LAB
AMMONIA BLD-MCNC: 26 UMOL/L — SIGNIFICANT CHANGE UP (ref 11–55)
ANION GAP SERPL CALC-SCNC: 15 MMOL/L — SIGNIFICANT CHANGE UP (ref 5–17)
ANION GAP SERPL CALC-SCNC: 16 MMOL/L — SIGNIFICANT CHANGE UP (ref 5–17)
ANISOCYTOSIS BLD QL: SLIGHT — SIGNIFICANT CHANGE UP
BASE EXCESS BLDV CALC-SCNC: 6.9 MMOL/L — HIGH (ref -3–3)
BASOPHILS NFR BLD AUTO: 1 % — SIGNIFICANT CHANGE UP (ref 0–2)
BLOOD GAS COMMENTS, VENOUS: SIGNIFICANT CHANGE UP
BUN SERPL-MCNC: 59 MG/DL — HIGH (ref 8–20)
BUN SERPL-MCNC: 59 MG/DL — HIGH (ref 8–20)
CA-I SERPL-SCNC: 0.93 MMOL/L — LOW (ref 1.12–1.3)
CALCIUM SERPL-MCNC: 7.8 MG/DL — LOW (ref 8.6–10.2)
CALCIUM SERPL-MCNC: 7.8 MG/DL — LOW (ref 8.6–10.2)
CHLORIDE BLDV-SCNC: 112 MMOL/L — HIGH (ref 98–106)
CHLORIDE SERPL-SCNC: 107 MMOL/L — SIGNIFICANT CHANGE UP (ref 98–107)
CHLORIDE SERPL-SCNC: 108 MMOL/L — HIGH (ref 98–107)
CO2 SERPL-SCNC: 28 MMOL/L — SIGNIFICANT CHANGE UP (ref 22–29)
CO2 SERPL-SCNC: 30 MMOL/L — HIGH (ref 22–29)
CREAT SERPL-MCNC: 1.01 MG/DL — SIGNIFICANT CHANGE UP (ref 0.5–1.3)
CREAT SERPL-MCNC: 1.14 MG/DL — SIGNIFICANT CHANGE UP (ref 0.5–1.3)
GAS PNL BLDA: SIGNIFICANT CHANGE UP
GAS PNL BLDV: 146 MMOL/L — SIGNIFICANT CHANGE UP (ref 136–146)
GAS PNL BLDV: SIGNIFICANT CHANGE UP
GAS PNL BLDV: SIGNIFICANT CHANGE UP
GLUCOSE BLDV-MCNC: 153 MG/DL — HIGH (ref 70–99)
GLUCOSE SERPL-MCNC: 146 MG/DL — HIGH (ref 70–115)
GLUCOSE SERPL-MCNC: 160 MG/DL — HIGH (ref 70–115)
HCO3 BLDV-SCNC: 30 MMOL/L — HIGH (ref 20–26)
HCT VFR BLD CALC: 30.7 % — LOW (ref 42–52)
HCT VFR BLDA CALC: 19 — LOW (ref 39–50)
HGB BLD CALC-MCNC: 6.2 G/DL — CRITICAL LOW (ref 13–17)
HGB BLD-MCNC: 9.1 G/DL — LOW (ref 14–18)
HOROWITZ INDEX BLDV+IHG-RTO: 36 — SIGNIFICANT CHANGE UP
HYPOCHROMIA BLD QL: SLIGHT — SIGNIFICANT CHANGE UP
LACTATE BLDV-MCNC: 3.4 MMOL/L — HIGH (ref 0.5–2)
LYMPHOCYTES # BLD AUTO: 10 % — LOW (ref 20–55)
MACROCYTES BLD QL: SLIGHT — SIGNIFICANT CHANGE UP
MAGNESIUM SERPL-MCNC: 2.1 MG/DL — SIGNIFICANT CHANGE UP (ref 1.6–2.6)
MCHC RBC-ENTMCNC: 28.6 PG — SIGNIFICANT CHANGE UP (ref 27–31)
MCHC RBC-ENTMCNC: 29.6 G/DL — LOW (ref 32–36)
MCV RBC AUTO: 96.5 FL — HIGH (ref 80–94)
MICROCYTES BLD QL: SLIGHT — SIGNIFICANT CHANGE UP
MONOCYTES NFR BLD AUTO: 5 % — SIGNIFICANT CHANGE UP (ref 3–10)
MYELOCYTES NFR BLD: 2 % — HIGH (ref 0–0)
NEUTROPHILS NFR BLD AUTO: 78 % — HIGH (ref 37–73)
NRBC # BLD: 9 /100 — HIGH (ref 0–0)
OTHER CELLS CSF MANUAL: 3 ML/DL — LOW (ref 18–22)
OVALOCYTES BLD QL SMEAR: SLIGHT — SIGNIFICANT CHANGE UP
PCO2 BLDV: 49 MMHG — SIGNIFICANT CHANGE UP (ref 35–50)
PH BLDV: 7.42 — SIGNIFICANT CHANGE UP (ref 7.35–7.45)
PHOSPHATE SERPL-MCNC: 2.4 MG/DL — SIGNIFICANT CHANGE UP (ref 2.4–4.7)
PLAT MORPH BLD: NORMAL — SIGNIFICANT CHANGE UP
PLATELET # BLD AUTO: 199 K/UL — SIGNIFICANT CHANGE UP (ref 150–400)
PO2 BLDV: 28 MMHG — SIGNIFICANT CHANGE UP (ref 25–45)
POIKILOCYTOSIS BLD QL AUTO: SLIGHT — SIGNIFICANT CHANGE UP
POTASSIUM BLDV-SCNC: 3.6 MMOL/L — SIGNIFICANT CHANGE UP (ref 3.4–4.5)
POTASSIUM SERPL-MCNC: 3.4 MMOL/L — LOW (ref 3.5–5.3)
POTASSIUM SERPL-MCNC: 3.7 MMOL/L — SIGNIFICANT CHANGE UP (ref 3.5–5.3)
POTASSIUM SERPL-SCNC: 3.4 MMOL/L — LOW (ref 3.5–5.3)
POTASSIUM SERPL-SCNC: 3.7 MMOL/L — SIGNIFICANT CHANGE UP (ref 3.5–5.3)
RBC # BLD: 3.18 M/UL — LOW (ref 4.6–6.2)
RBC # FLD: 19.9 % — HIGH (ref 11–15.6)
RBC BLD AUTO: ABNORMAL
SAO2 % BLDV: 40 % — LOW (ref 67–88)
SODIUM SERPL-SCNC: 152 MMOL/L — HIGH (ref 135–145)
SODIUM SERPL-SCNC: 152 MMOL/L — HIGH (ref 135–145)
TROPONIN T SERPL-MCNC: 0.16 NG/ML — HIGH (ref 0–0.06)
VANCOMYCIN TROUGH SERPL-MCNC: 23 UG/ML — HIGH (ref 10–20)
VARIANT LYMPHS # BLD: 4 % — SIGNIFICANT CHANGE UP (ref 0–6)
WBC # BLD: 14 K/UL — HIGH (ref 4.8–10.8)
WBC # FLD AUTO: 14 K/UL — HIGH (ref 4.8–10.8)

## 2017-06-11 PROCEDURE — 99291 CRITICAL CARE FIRST HOUR: CPT

## 2017-06-11 PROCEDURE — 71010: CPT | Mod: 26

## 2017-06-11 RX ORDER — POTASSIUM CHLORIDE 20 MEQ
10 PACKET (EA) ORAL ONCE
Qty: 0 | Refills: 0 | Status: COMPLETED | OUTPATIENT
Start: 2017-06-11 | End: 2017-06-11

## 2017-06-11 RX ORDER — CALCIUM GLUCONATE 100 MG/ML
2 VIAL (ML) INTRAVENOUS ONCE
Qty: 0 | Refills: 0 | Status: COMPLETED | OUTPATIENT
Start: 2017-06-11 | End: 2017-06-11

## 2017-06-11 RX ORDER — POTASSIUM CHLORIDE 20 MEQ
20 PACKET (EA) ORAL
Qty: 0 | Refills: 0 | Status: COMPLETED | OUTPATIENT
Start: 2017-06-11 | End: 2017-06-11

## 2017-06-11 RX ORDER — FLUCONAZOLE 150 MG/1
200 TABLET ORAL EVERY 24 HOURS
Qty: 0 | Refills: 0 | Status: DISCONTINUED | OUTPATIENT
Start: 2017-06-11 | End: 2017-06-12

## 2017-06-11 RX ORDER — VANCOMYCIN HCL 1 G
1000 VIAL (EA) INTRAVENOUS EVERY 12 HOURS
Qty: 0 | Refills: 0 | Status: DISCONTINUED | OUTPATIENT
Start: 2017-06-11 | End: 2017-06-12

## 2017-06-11 RX ADMIN — Medication 5 MILLIGRAM(S): at 06:08

## 2017-06-11 RX ADMIN — Medication 20 MILLIGRAM(S): at 18:06

## 2017-06-11 RX ADMIN — SODIUM CHLORIDE 3 MILLILITER(S): 9 INJECTION INTRAMUSCULAR; INTRAVENOUS; SUBCUTANEOUS at 09:13

## 2017-06-11 RX ADMIN — NYSTATIN CREAM 1 APPLICATION(S): 100000 CREAM TOPICAL at 06:09

## 2017-06-11 RX ADMIN — Medication 10 MILLIGRAM(S): at 12:31

## 2017-06-11 RX ADMIN — Medication 5 MILLIGRAM(S): at 00:00

## 2017-06-11 RX ADMIN — FLUCONAZOLE 100 MILLIGRAM(S): 150 TABLET ORAL at 14:31

## 2017-06-11 RX ADMIN — Medication 400 GRAM(S): at 06:29

## 2017-06-11 RX ADMIN — Medication 5 MILLIGRAM(S): at 12:31

## 2017-06-11 RX ADMIN — Medication 100 MILLIGRAM(S): at 12:39

## 2017-06-11 RX ADMIN — CEFEPIME 100 MILLIGRAM(S): 1 INJECTION, POWDER, FOR SOLUTION INTRAMUSCULAR; INTRAVENOUS at 14:31

## 2017-06-11 RX ADMIN — SODIUM CHLORIDE 3 MILLILITER(S): 9 INJECTION INTRAMUSCULAR; INTRAVENOUS; SUBCUTANEOUS at 20:16

## 2017-06-11 RX ADMIN — Medication 1 MILLIGRAM(S): at 14:10

## 2017-06-11 RX ADMIN — Medication 100 MILLIEQUIVALENT(S): at 06:29

## 2017-06-11 RX ADMIN — Medication 100 MILLIEQUIVALENT(S): at 15:31

## 2017-06-11 RX ADMIN — Medication 0.12 MILLIGRAM(S): at 12:32

## 2017-06-11 RX ADMIN — NYSTATIN CREAM 1 APPLICATION(S): 100000 CREAM TOPICAL at 18:06

## 2017-06-11 RX ADMIN — PANTOPRAZOLE SODIUM 40 MILLIGRAM(S): 20 TABLET, DELAYED RELEASE ORAL at 06:10

## 2017-06-11 RX ADMIN — Medication 6 MILLIGRAM(S): at 22:24

## 2017-06-11 RX ADMIN — SODIUM CHLORIDE 100 MILLILITER(S): 9 INJECTION, SOLUTION INTRAVENOUS at 00:00

## 2017-06-11 RX ADMIN — HEPARIN SODIUM 5000 UNIT(S): 5000 INJECTION INTRAVENOUS; SUBCUTANEOUS at 06:09

## 2017-06-11 RX ADMIN — Medication 5 MILLIGRAM(S): at 18:05

## 2017-06-11 RX ADMIN — Medication 100 MILLIEQUIVALENT(S): at 16:39

## 2017-06-11 RX ADMIN — Medication 650 MILLIGRAM(S): at 06:11

## 2017-06-11 RX ADMIN — SODIUM CHLORIDE 3 MILLILITER(S): 9 INJECTION INTRAMUSCULAR; INTRAVENOUS; SUBCUTANEOUS at 15:07

## 2017-06-11 RX ADMIN — HEPARIN SODIUM 5000 UNIT(S): 5000 INJECTION INTRAVENOUS; SUBCUTANEOUS at 14:31

## 2017-06-11 RX ADMIN — HEPARIN SODIUM 5000 UNIT(S): 5000 INJECTION INTRAVENOUS; SUBCUTANEOUS at 22:25

## 2017-06-11 RX ADMIN — Medication 166.67 MILLIGRAM(S): at 00:28

## 2017-06-11 RX ADMIN — Medication 166.67 MILLIGRAM(S): at 12:31

## 2017-06-11 RX ADMIN — SODIUM CHLORIDE 3 MILLILITER(S): 9 INJECTION INTRAMUSCULAR; INTRAVENOUS; SUBCUTANEOUS at 03:13

## 2017-06-11 RX ADMIN — Medication 3 MILLILITER(S): at 09:13

## 2017-06-11 RX ADMIN — Medication 3 MILLILITER(S): at 03:13

## 2017-06-11 RX ADMIN — Medication 3 MILLILITER(S): at 20:16

## 2017-06-11 RX ADMIN — CEFEPIME 100 MILLIGRAM(S): 1 INJECTION, POWDER, FOR SOLUTION INTRAMUSCULAR; INTRAVENOUS at 06:08

## 2017-06-11 RX ADMIN — Medication 3 MILLILITER(S): at 15:07

## 2017-06-11 RX ADMIN — Medication 50 MILLIGRAM(S): at 22:25

## 2017-06-11 RX ADMIN — Medication 1 APPLICATION(S): at 12:31

## 2017-06-11 RX ADMIN — Medication 4 MILLIGRAM(S): at 22:24

## 2017-06-11 RX ADMIN — CEFEPIME 100 MILLIGRAM(S): 1 INJECTION, POWDER, FOR SOLUTION INTRAMUSCULAR; INTRAVENOUS at 22:26

## 2017-06-11 RX ADMIN — Medication 100 MILLIGRAM(S): at 06:08

## 2017-06-11 RX ADMIN — PANTOPRAZOLE SODIUM 40 MILLIGRAM(S): 20 TABLET, DELAYED RELEASE ORAL at 18:05

## 2017-06-11 RX ADMIN — Medication 100 MILLIEQUIVALENT(S): at 14:30

## 2017-06-11 RX ADMIN — Medication 20 MILLIGRAM(S): at 06:11

## 2017-06-11 NOTE — PROGRESS NOTE ADULT - SUBJECTIVE AND OBJECTIVE BOX
INTERVAL HPI/OVERNIGHT EVENTS/SUBJECTIVE:  Hemoglobin decrease.  Transfused with appropriate response.  No melena.  Broad spectrum abx started for presumed severe sepsis.  HCAP.        ICU Vital Signs Last 24 Hrs  T(C): 37.8, Max: 39 (06-10 @ 19:30)  T(F): 100, Max: 102.2 (06-10 @ 19:30)  HR: 88 (72 - 117)  BP: 120/78 (98/59 - 158/91)  BP(mean): 95 (72 - 113)  ABP: --  ABP(mean): --  RR: 29 (24 - 45)  SpO2: 100% (92% - 100%)      I&O's Detail  I & Os for 24h ending 11 Jun 2017 07:00  =============================================  IN:    multiple electrolytes Injection Type 1: 2100 ml    Pivot: 1320 ml    Free Water: 1200 ml    Enteral Tube Flush: 360 ml    Packed Red Blood Cells: 250 ml    Solution: 250 ml    Solution: 100 ml    Solution: 100 ml    Solution: 100 ml    Solution: 100 ml    Total IN: 5880 ml  ---------------------------------------------  OUT:    Incontinent per Condom Catheter: 2500 ml    Voided: 900 ml    Chest Tube: 160 ml    Total OUT: 3560 ml  ---------------------------------------------  Total NET: 2320 ml    I & Os for current day (as of 11 Jun 2017 11:12)  =============================================  IN:    multiple electrolytes Injection Type 1: 300 ml    Pivot: 165 ml    Total IN: 465 ml  ---------------------------------------------  OUT:    Incontinent per Condom Catheter: 1 ml    Total OUT: 1 ml  ---------------------------------------------  Total NET: 464 ml        ABG - ( 11 Jun 2017 04:12 )  pH: 7.51  /  pCO2: 38    /  pO2: 80    / HCO3: 31    / Base Excess: 6.9   /  SaO2: 97                  MEDICATIONS  (STANDING):  nystatin Powder 1Application(s) Topical two times a day  folic acid Injectable 1milliGRAM(s) IV Push daily  thiamine 100milliGRAM(s) Oral daily  amantadine Syrup 100milliGRAM(s) Oral <User Schedule>  melatonin 6milliGRAM(s) Oral at bedtime  traZODone 50milliGRAM(s) Oral at bedtime  heparin  Injectable 5000Unit(s) SubCutaneous every 8 hours  pantoprazole  Injectable 40milliGRAM(s) IV Push two times a day  doxazosin 4milliGRAM(s) Oral at bedtime  fluconAZOLE IVPB 400milliGRAM(s) IV Intermittent every 24 hours  collagenase Ointment 1Application(s) Topical daily  metoprolol Injectable 5milliGRAM(s) IV Push every 6 hours  multiple electrolytes Injection Type 1 1000milliLiter(s) IV Continuous <Continuous>  furosemide    Tablet 20milliGRAM(s) Oral two times a day  digoxin  Injectable 0.125milliGRAM(s) IV Push daily  FLUoxetine Solution 10milliGRAM(s) Oral daily  sodium chloride 3%  Inhalation 3milliLiter(s) Inhalation every 6 hours  ALBUTerol/ipratropium for Nebulization 3milliLiter(s) Nebulizer every 6 hours  cefepime  IVPB  IV Intermittent   cefepime  IVPB 2000milliGRAM(s) IV Intermittent every 8 hours  vancomycin  IVPB  IV Intermittent   vancomycin  IVPB 1250milliGRAM(s) IV Intermittent every 12 hours    MEDICATIONS  (PRN):  acetaminophen    Suspension 650milliGRAM(s) Oral every 6 hours PRN For Temp greater than 38.5 C (101.3 F)      NUTRITION/IVF: Normosol 100CC/hr    CENTRAL LINE: Y LOCATION: [facility]  DATE INSERTED: 6/10     ARMAS: N      A-LINE:  N      CHEST TUBE: Y LOCATION: WS         PHYSICAL EXAM:    Gen: NAD    Eyes: PERRLA    Neurological: Intermittently following simple commands.  Diffusely weak.      Pulmonary: Minimally labored today.  No air leak from CT.      Cardiovascular:  Irregular.      Gastrointestinal: Soft NT/ND.    Genitourinary: Condom cath    Extremities:  Mild pitting edema          LABS:  CBC Full  -  ( 11 Jun 2017 04:58 )  WBC Count : 14.0 K/uL  Hemoglobin : 9.1 g/dL  Hematocrit : 30.7 %  Platelet Count - Automated : 199 K/uL  Mean Cell Volume : 96.5 fl  Mean Cell Hemoglobin : 28.6 pg  Mean Cell Hemoglobin Concentration : 29.6 g/dL  Auto Neutrophil # : x  Auto Lymphocyte # : x  Auto Monocyte # : x  Auto Eosinophil # : x  Auto Basophil # : x  Auto Neutrophil % : x  Auto Lymphocyte % : x  Auto Monocyte % : x  Auto Eosinophil % : x  Auto Basophil % : x    06-11    152<H>  |  107  |  59.0<H>  ----------------------------<  146<H>  3.4<L>   |  30.0<H>  |  1.01    Ca    7.8<L>      11 Jun 2017 04:58  Phos  2.4     06-11  Mg     2.1     06-11    TPro  5.6<L>  /  Alb  2.3<L>  /  TBili  1.0  /  DBili  x   /  AST  194<H>  /  ALT  98<H>  /  AlkPhos  197<H>  06-10        RECENT CULTURES:  06-05 .Body Fluid Abdominal Fluid XXXX   Few White blood cells  Numerous Yeast   Numerous Candida albicans        LIVER FUNCTIONS - ( 10 Jackson 2017 15:50 )  Alb: 2.3 g/dL / Pro: 5.6 g/dL / ALK PHOS: 197 U/L / ALT: 98 U/L / AST: 194 U/L / GGT: x           CARDIAC MARKERS ( 11 Jun 2017 00:06 )  x     / 0.16 ng/mL / x     / x     / x          CAPILLARY BLOOD GLUCOSE  137 (10 Jackson 2017 23:00)  115 (10 Jackson 2017 16:00)      RADIOLOGY & ADDITIONAL STUDIES:    ASSESSMENT/PLAN:  51yMale presenting with:    Neuro: Improving.  Continue current meds.  Rehab following      CV: Mild top leak.  Likely related to poor perfusion yesterday with occult shock.  Will trend.  Lactate/lactic acidosis improving.    Pulm: Distress improved.  Suspect LLL infiltrate.  On abx.  Cultures pending.      GI/Nutrition: Tube feeds.  Transaminases increasing.  Possible related to fluconazole.  Will decrease dose and monitor.       /Renal: Good UOP.  Stable function.  Still hypernatremic.  Likely euvolemic now.  Will stop IV fluids and continue free water.      ID:  HCAP and fungal urine and gall bladder fossa infections.  Cont antimicrobials.        Proph: PPI, sqh    Dispo: ICU      CRITICAL CARE TIME SPENT: 40 min

## 2017-06-11 NOTE — PROGRESS NOTE ADULT - SUBJECTIVE AND OBJECTIVE BOX
INTERVAL HPI/OVERNIGHT EVENTS/SUBJECTIVE:   LA 7 during the day.  Given 1U PRBC's for drifting Hgb, new central line placed.  LA slowly coming down overnight, Hgb responded to PRBC's.  Continues to void spontaneously.      ICU Vital Signs Last 24 Hrs  T(C): 38.4, Max: 39.7 (06-10 @ 09:00)  T(F): 101.1, Max: 103.5 (06-10 @ 09:00)  HR: 84 (78 - 117)  BP: 104/70 (98/59 - 158/91)  BP(mean): 83 (72 - 113)  ABP: --  ABP(mean): --  RR: 35 (27 - 48)  SpO2: 97% (91% - 100%)      I&O's Detail  I & Os for 24h ending 10 Jackson 2017 07:00  =============================================  IN:    multiple electrolytes Injection Type 1: 1800 ml    Pivot: 880 ml    Free Water: 300 ml    Total IN: 2980 ml  ---------------------------------------------  OUT:    Voided: 650 ml    Chest Tube: 145 ml    Total OUT: 795 ml  ---------------------------------------------  Total NET: 2185 ml    I & Os for current day (as of 11 Jun 2017 05:09)  =============================================  IN:    multiple electrolytes Injection Type 1: 1700 ml    Pivot: 1155 ml    Free Water: 900 ml    Packed Red Blood Cells: 250 ml    Solution: 250 ml    Enteral Tube Flush: 240 ml    Solution: 100 ml    Solution: 50 ml    Total IN: 4645 ml  ---------------------------------------------  OUT:    Voided: 900 ml    Chest Tube: 70 ml    Total OUT: 970 ml  ---------------------------------------------  Total NET: 3675 ml        ABG - ( 11 Jun 2017 04:12 )  pH: 7.51  /  pCO2: 38    /  pO2: 80    / HCO3: 31    / Base Excess: 6.9   /  SaO2: 97                  MEDICATIONS  (STANDING):  nystatin Powder 1Application(s) Topical two times a day  folic acid Injectable 1milliGRAM(s) IV Push daily  thiamine 100milliGRAM(s) Oral daily  amantadine Syrup 100milliGRAM(s) Oral <User Schedule>  melatonin 6milliGRAM(s) Oral at bedtime  traZODone 50milliGRAM(s) Oral at bedtime  heparin  Injectable 5000Unit(s) SubCutaneous every 8 hours  pantoprazole  Injectable 40milliGRAM(s) IV Push two times a day  doxazosin 4milliGRAM(s) Oral at bedtime  fluconAZOLE IVPB 400milliGRAM(s) IV Intermittent every 24 hours  collagenase Ointment 1Application(s) Topical daily  metoprolol Injectable 5milliGRAM(s) IV Push every 6 hours  multiple electrolytes Injection Type 1 1000milliLiter(s) IV Continuous <Continuous>  furosemide    Tablet 20milliGRAM(s) Oral two times a day  digoxin  Injectable 0.125milliGRAM(s) IV Push daily  FLUoxetine Solution 10milliGRAM(s) Oral daily  sodium chloride 3%  Inhalation 3milliLiter(s) Inhalation every 6 hours  ALBUTerol/ipratropium for Nebulization 3milliLiter(s) Nebulizer every 6 hours  cefepime  IVPB  IV Intermittent   cefepime  IVPB 2000milliGRAM(s) IV Intermittent every 8 hours  vancomycin  IVPB  IV Intermittent   vancomycin  IVPB 1250milliGRAM(s) IV Intermittent every 12 hours  potassium chloride  10 mEq/100 mL IVPB 10milliEquivalent(s) IV Intermittent once  calcium gluconate IVPB 2Gram(s) IV Intermittent once    MEDICATIONS  (PRN):  acetaminophen    Suspension 650milliGRAM(s) Oral every 6 hours PRN For Temp greater than 38.5 C (101.3 F)      NUTRITION/IVF: Pivot @ 51cc/hr + 300 cc'a Free H2O    CENTRAL LINE:  Rt SC TLC    ARMAS: No    A-LINE:    No    CHEST TUBE:  Rt pigtail-waterseal      PHYSICAL EXAM:    Gen:  Sitting upright, NAD    Eyes:  PERRL BL    Neurological:  GCS  13 (4,3,6)    ENMT:  MMM    Neck:  Supple, no JVD    Pulmonary:  CTAB    Cardiovascular:  Afib    Gastrointestinal:  Softly distended, no rebound or guarding    Genitourinary:  [place] cath    Extremities:  +Anasarca    Skin:  Warm, clammy    Musculoskeletal:  Actively moves BL UE> LE          LABS:  CBC Full  -  ( 10 Jackson 2017 22:38 )  WBC Count : 14.9 K/uL  Hemoglobin : 9.4 g/dL  Hematocrit : 32.2 %  Platelet Count - Automated : 215 K/uL  Mean Cell Volume : 96.4 fl  Mean Cell Hemoglobin : 28.1 pg  Mean Cell Hemoglobin Concentration : 29.2 g/dL  Auto Neutrophil # : 11.8 K/uL  Auto Lymphocyte # : 1.5 K/uL  Auto Monocyte # : 0.8 K/uL  Auto Eosinophil # : 0.0 K/uL  Auto Basophil # : 0.0 K/uL  Auto Neutrophil % : 83.0 %  Auto Lymphocyte % : 9.0 %  Auto Monocyte % : 6.0 %  Auto Eosinophil % : x  Auto Basophil % : x    06-11    152<H>  |  108<H>  |  59.0<H>  ----------------------------<  160<H>  3.7   |  28.0  |  1.14    Ca    7.8<L>      11 Jun 2017 00:06  Phos  3.2     06-10  Mg     2.2     06-10    TPro  5.6<L>  /  Alb  2.3<L>  /  TBili  1.0  /  DBili  x   /  AST  194<H>  /  ALT  98<H>  /  AlkPhos  197<H>  06-10        RECENT CULTURES:  06-05 .Body Fluid Abdominal Fluid XXXX   Few White blood cells  Numerous Yeast   Numerous Candida albicans    06-04 .Blood Blood-Peripheral XXXX XXXX   No growth at 5 days.    06-04 .Urine Catheterized XXXX XXXX   >100,000 CFU/ml Candida albicans        LIVER FUNCTIONS - ( 10 Jackson 2017 15:50 )  Alb: 2.3 g/dL / Pro: 5.6 g/dL / ALK PHOS: 197 U/L / ALT: 98 U/L / AST: 194 U/L / GGT: x           CARDIAC MARKERS ( 11 Jun 2017 00:06 )  x     / 0.16 ng/mL / x     / x     / x          CAPILLARY BLOOD GLUCOSE  137 (10 Jackson 2017 23:00)  115 (10 Jackson 2017 16:00)      RADIOLOGY & ADDITIONAL STUDIES:    ASSESSMENT/PLAN:  51yMale presenting with:  Cardiac arrest s/p ex lap abd washout, lower GIB, DVT, Iatrogenic Ptx, hypernatremia, SIRS    Neuro:  Continue with current regimen    CV:  On Lopressor, Dig    Pulm:  Aggressive pulm toilet, OOBTC, nebulizers. May need diuresis today    GI/Nutrition:  Pivot @ goal + Free H2O- may need to be increased    /Renal:  Conitnue to monitor UOP and endpoints.  Na remains high    ID:  Fluconazole until 6/16, Cefepime/Vanco started 6/10    Lines/Tubes:  Rt SC TLC    Endo:  No issues    Skin:  Daily midline wound changes    Proph:  SQH, IVC filter in place    Dispo:  Monitor endpoints, LA starting to clear in setting of increaedLFT's, overall looks ok.  No BM.        CRITICAL CARE TIME SPENT:

## 2017-06-12 ENCOUNTER — TRANSCRIPTION ENCOUNTER (OUTPATIENT)
Age: 52
End: 2017-06-12

## 2017-06-12 LAB
ALBUMIN SERPL ELPH-MCNC: 2.5 G/DL — LOW (ref 3.3–5.2)
ALP SERPL-CCNC: 270 U/L — HIGH (ref 40–120)
ALT FLD-CCNC: 107 U/L — HIGH
ANION GAP SERPL CALC-SCNC: 13 MMOL/L — SIGNIFICANT CHANGE UP (ref 5–17)
ANION GAP SERPL CALC-SCNC: 14 MMOL/L — SIGNIFICANT CHANGE UP (ref 5–17)
ANION GAP SERPL CALC-SCNC: 15 MMOL/L — SIGNIFICANT CHANGE UP (ref 5–17)
ANISOCYTOSIS BLD QL: SLIGHT — SIGNIFICANT CHANGE UP
ANISOCYTOSIS BLD QL: SLIGHT — SIGNIFICANT CHANGE UP
AST SERPL-CCNC: 149 U/L — HIGH
BILIRUB DIRECT SERPL-MCNC: 0.7 MG/DL — HIGH (ref 0–0.3)
BILIRUB INDIRECT FLD-MCNC: 0.7 MG/DL — SIGNIFICANT CHANGE UP (ref 0.2–1)
BILIRUB SERPL-MCNC: 1.4 MG/DL — SIGNIFICANT CHANGE UP (ref 0.4–2)
BLD GP AB SCN SERPL QL: SIGNIFICANT CHANGE UP
BUN SERPL-MCNC: 54 MG/DL — HIGH (ref 8–20)
BUN SERPL-MCNC: 55 MG/DL — HIGH (ref 8–20)
BUN SERPL-MCNC: 57 MG/DL — HIGH (ref 8–20)
CALCIUM SERPL-MCNC: 7.7 MG/DL — LOW (ref 8.6–10.2)
CALCIUM SERPL-MCNC: 7.7 MG/DL — LOW (ref 8.6–10.2)
CALCIUM SERPL-MCNC: 7.9 MG/DL — LOW (ref 8.6–10.2)
CHLORIDE SERPL-SCNC: 103 MMOL/L — SIGNIFICANT CHANGE UP (ref 98–107)
CHLORIDE SERPL-SCNC: 104 MMOL/L — SIGNIFICANT CHANGE UP (ref 98–107)
CHLORIDE SERPL-SCNC: 105 MMOL/L — SIGNIFICANT CHANGE UP (ref 98–107)
CK MB CFR SERPL CALC: 4.7 NG/ML — SIGNIFICANT CHANGE UP (ref 0–6.7)
CK SERPL-CCNC: 765 U/L — HIGH (ref 30–200)
CO2 SERPL-SCNC: 28 MMOL/L — SIGNIFICANT CHANGE UP (ref 22–29)
CO2 SERPL-SCNC: 30 MMOL/L — HIGH (ref 22–29)
CO2 SERPL-SCNC: 30 MMOL/L — HIGH (ref 22–29)
CREAT SERPL-MCNC: 0.89 MG/DL — SIGNIFICANT CHANGE UP (ref 0.5–1.3)
CREAT SERPL-MCNC: 0.91 MG/DL — SIGNIFICANT CHANGE UP (ref 0.5–1.3)
CREAT SERPL-MCNC: 0.96 MG/DL — SIGNIFICANT CHANGE UP (ref 0.5–1.3)
EOSINOPHIL # BLD AUTO: 0.1 K/UL — SIGNIFICANT CHANGE UP (ref 0–0.5)
EOSINOPHIL NFR BLD AUTO: 1 % — SIGNIFICANT CHANGE UP (ref 0–6)
GAS PNL BLDA: SIGNIFICANT CHANGE UP
GLUCOSE SERPL-MCNC: 144 MG/DL — HIGH (ref 70–115)
GLUCOSE SERPL-MCNC: 147 MG/DL — HIGH (ref 70–115)
GLUCOSE SERPL-MCNC: 162 MG/DL — HIGH (ref 70–115)
GRAM STN FLD: SIGNIFICANT CHANGE UP
HCT VFR BLD CALC: 30.9 % — LOW (ref 42–52)
HCT VFR BLD CALC: 31.4 % — LOW (ref 42–52)
HCT VFR BLD CALC: 33.3 % — LOW (ref 42–52)
HGB BLD-MCNC: 9.1 G/DL — LOW (ref 14–18)
HGB BLD-MCNC: 9.2 G/DL — LOW (ref 14–18)
HGB BLD-MCNC: 9.4 G/DL — LOW (ref 14–18)
HYPOCHROMIA BLD QL: SLIGHT — SIGNIFICANT CHANGE UP
HYPOCHROMIA BLD QL: SLIGHT — SIGNIFICANT CHANGE UP
INR BLD: 1.54 RATIO — HIGH (ref 0.88–1.16)
LACTATE SERPL-SCNC: 3.8 MMOL/L — HIGH (ref 0.5–2)
LYMPHOCYTES # BLD AUTO: 1.1 K/UL — SIGNIFICANT CHANGE UP (ref 1–4.8)
LYMPHOCYTES # BLD AUTO: 10 % — LOW (ref 20–55)
LYMPHOCYTES # BLD AUTO: 5 % — LOW (ref 20–55)
MACROCYTES BLD QL: SLIGHT — SIGNIFICANT CHANGE UP
MAGNESIUM SERPL-MCNC: 1.8 MG/DL — SIGNIFICANT CHANGE UP (ref 1.6–2.6)
MAGNESIUM SERPL-MCNC: 2.7 MG/DL — HIGH (ref 1.8–2.6)
MAGNESIUM SERPL-MCNC: 3 MG/DL — HIGH (ref 1.6–2.6)
MCHC RBC-ENTMCNC: 27.4 PG — SIGNIFICANT CHANGE UP (ref 27–31)
MCHC RBC-ENTMCNC: 28 PG — SIGNIFICANT CHANGE UP (ref 27–31)
MCHC RBC-ENTMCNC: 28.1 PG — SIGNIFICANT CHANGE UP (ref 27–31)
MCHC RBC-ENTMCNC: 28.2 G/DL — LOW (ref 32–36)
MCHC RBC-ENTMCNC: 29.3 G/DL — LOW (ref 32–36)
MCHC RBC-ENTMCNC: 29.4 G/DL — LOW (ref 32–36)
MCV RBC AUTO: 95.4 FL — HIGH (ref 80–94)
MCV RBC AUTO: 95.4 FL — HIGH (ref 80–94)
MCV RBC AUTO: 97.1 FL — HIGH (ref 80–94)
MICROCYTES BLD QL: SLIGHT — SIGNIFICANT CHANGE UP
MONOCYTES # BLD AUTO: 0.7 K/UL — SIGNIFICANT CHANGE UP (ref 0–0.8)
MONOCYTES NFR BLD AUTO: 2 % — LOW (ref 3–10)
MONOCYTES NFR BLD AUTO: 5 % — SIGNIFICANT CHANGE UP (ref 3–10)
MYELOCYTES NFR BLD: 1 % — HIGH (ref 0–0)
NEUTROPHILS # BLD AUTO: 12.4 K/UL — HIGH (ref 1.8–8)
NEUTROPHILS NFR BLD AUTO: 85 % — HIGH (ref 37–73)
NEUTROPHILS NFR BLD AUTO: 85 % — HIGH (ref 37–73)
NEUTS BAND # BLD: 6 % — SIGNIFICANT CHANGE UP (ref 0–8)
NRBC # BLD: 1 /100 — HIGH (ref 0–0)
NRBC # BLD: 4 /100 — HIGH (ref 0–0)
OVALOCYTES BLD QL SMEAR: SLIGHT — SIGNIFICANT CHANGE UP
OVALOCYTES BLD QL SMEAR: SLIGHT — SIGNIFICANT CHANGE UP
PHOSPHATE SERPL-MCNC: 2.1 MG/DL — LOW (ref 2.4–4.7)
PHOSPHATE SERPL-MCNC: 3.7 MG/DL — SIGNIFICANT CHANGE UP (ref 2.4–4.7)
PLAT MORPH BLD: NORMAL — SIGNIFICANT CHANGE UP
PLAT MORPH BLD: NORMAL — SIGNIFICANT CHANGE UP
PLATELET # BLD AUTO: 175 K/UL — SIGNIFICANT CHANGE UP (ref 150–400)
PLATELET # BLD AUTO: 181 K/UL — SIGNIFICANT CHANGE UP (ref 150–400)
PLATELET # BLD AUTO: 181 K/UL — SIGNIFICANT CHANGE UP (ref 150–400)
POIKILOCYTOSIS BLD QL AUTO: SLIGHT — SIGNIFICANT CHANGE UP
POIKILOCYTOSIS BLD QL AUTO: SLIGHT — SIGNIFICANT CHANGE UP
POLYCHROMASIA BLD QL SMEAR: SLIGHT — SIGNIFICANT CHANGE UP
POLYCHROMASIA BLD QL SMEAR: SLIGHT — SIGNIFICANT CHANGE UP
POTASSIUM SERPL-MCNC: 3.8 MMOL/L — SIGNIFICANT CHANGE UP (ref 3.5–5.3)
POTASSIUM SERPL-MCNC: 4.2 MMOL/L — SIGNIFICANT CHANGE UP (ref 3.5–5.3)
POTASSIUM SERPL-MCNC: 4.8 MMOL/L — SIGNIFICANT CHANGE UP (ref 3.5–5.3)
POTASSIUM SERPL-SCNC: 3.8 MMOL/L — SIGNIFICANT CHANGE UP (ref 3.5–5.3)
POTASSIUM SERPL-SCNC: 4.2 MMOL/L — SIGNIFICANT CHANGE UP (ref 3.5–5.3)
POTASSIUM SERPL-SCNC: 4.8 MMOL/L — SIGNIFICANT CHANGE UP (ref 3.5–5.3)
PROT SERPL-MCNC: 6.5 G/DL — LOW (ref 6.6–8.7)
PROTHROM AB SERPL-ACNC: 17.1 SEC — HIGH (ref 9.8–12.7)
RBC # BLD: 3.24 M/UL — LOW (ref 4.6–6.2)
RBC # BLD: 3.29 M/UL — LOW (ref 4.6–6.2)
RBC # BLD: 3.43 M/UL — LOW (ref 4.6–6.2)
RBC # FLD: 19.3 % — HIGH (ref 11–15.6)
RBC # FLD: 19.6 % — HIGH (ref 11–15.6)
RBC # FLD: 20.1 % — HIGH (ref 11–15.6)
RBC BLD AUTO: ABNORMAL
RBC BLD AUTO: ABNORMAL
SODIUM SERPL-SCNC: 146 MMOL/L — HIGH (ref 135–145)
SODIUM SERPL-SCNC: 148 MMOL/L — HIGH (ref 135–145)
SODIUM SERPL-SCNC: 148 MMOL/L — HIGH (ref 135–145)
SPECIMEN SOURCE: SIGNIFICANT CHANGE UP
TROPONIN T SERPL-MCNC: 0.14 NG/ML — HIGH (ref 0–0.06)
TROPONIN T SERPL-MCNC: 0.15 NG/ML — HIGH (ref 0–0.06)
WBC # BLD: 14.4 K/UL — HIGH (ref 4.8–10.8)
WBC # BLD: 14.7 K/UL — HIGH (ref 4.8–10.8)
WBC # BLD: 15 K/UL — HIGH (ref 4.8–10.8)
WBC # FLD AUTO: 14.4 K/UL — HIGH (ref 4.8–10.8)
WBC # FLD AUTO: 14.7 K/UL — HIGH (ref 4.8–10.8)
WBC # FLD AUTO: 15 K/UL — HIGH (ref 4.8–10.8)

## 2017-06-12 PROCEDURE — 71010: CPT | Mod: 26

## 2017-06-12 PROCEDURE — 43246 EGD PLACE GASTROSTOMY TUBE: CPT | Mod: 79

## 2017-06-12 RX ORDER — FUROSEMIDE 40 MG
20 TABLET ORAL
Qty: 0 | Refills: 0 | Status: DISCONTINUED | OUTPATIENT
Start: 2017-06-12 | End: 2017-06-26

## 2017-06-12 RX ORDER — AMANTADINE HCL 100 MG
100 CAPSULE ORAL
Qty: 0 | Refills: 0 | Status: DISCONTINUED | OUTPATIENT
Start: 2017-06-12 | End: 2017-08-02

## 2017-06-12 RX ORDER — DIGOXIN 250 MCG
0.25 TABLET ORAL DAILY
Qty: 0 | Refills: 0 | Status: DISCONTINUED | OUTPATIENT
Start: 2017-06-12 | End: 2017-06-12

## 2017-06-12 RX ORDER — TRAZODONE HCL 50 MG
50 TABLET ORAL AT BEDTIME
Qty: 0 | Refills: 0 | Status: DISCONTINUED | OUTPATIENT
Start: 2017-06-12 | End: 2017-06-12

## 2017-06-12 RX ORDER — DIGOXIN 250 MCG
0.12 TABLET ORAL DAILY
Qty: 0 | Refills: 0 | Status: DISCONTINUED | OUTPATIENT
Start: 2017-06-12 | End: 2017-08-02

## 2017-06-12 RX ORDER — FLUOXETINE HCL 10 MG
10 CAPSULE ORAL DAILY
Qty: 0 | Refills: 0 | Status: DISCONTINUED | OUTPATIENT
Start: 2017-06-12 | End: 2017-08-02

## 2017-06-12 RX ORDER — HEPARIN SODIUM 5000 [USP'U]/ML
5000 INJECTION INTRAVENOUS; SUBCUTANEOUS EVERY 8 HOURS
Qty: 0 | Refills: 0 | Status: DISCONTINUED | OUTPATIENT
Start: 2017-06-12 | End: 2017-06-23

## 2017-06-12 RX ORDER — METOPROLOL TARTRATE 50 MG
25 TABLET ORAL
Qty: 0 | Refills: 0 | Status: DISCONTINUED | OUTPATIENT
Start: 2017-06-12 | End: 2017-06-17

## 2017-06-12 RX ORDER — VANCOMYCIN HCL 1 G
1000 VIAL (EA) INTRAVENOUS EVERY 12 HOURS
Qty: 0 | Refills: 0 | Status: DISCONTINUED | OUTPATIENT
Start: 2017-06-12 | End: 2017-06-14

## 2017-06-12 RX ORDER — IPRATROPIUM/ALBUTEROL SULFATE 18-103MCG
3 AEROSOL WITH ADAPTER (GRAM) INHALATION EVERY 6 HOURS
Qty: 0 | Refills: 0 | Status: DISCONTINUED | OUTPATIENT
Start: 2017-06-12 | End: 2017-06-15

## 2017-06-12 RX ORDER — POTASSIUM PHOSPHATE, MONOBASIC POTASSIUM PHOSPHATE, DIBASIC 236; 224 MG/ML; MG/ML
15 INJECTION, SOLUTION INTRAVENOUS EVERY 6 HOURS
Qty: 0 | Refills: 0 | Status: COMPLETED | OUTPATIENT
Start: 2017-06-12 | End: 2017-06-12

## 2017-06-12 RX ORDER — LANOLIN ALCOHOL/MO/W.PET/CERES
6 CREAM (GRAM) TOPICAL AT BEDTIME
Qty: 0 | Refills: 0 | Status: DISCONTINUED | OUTPATIENT
Start: 2017-06-12 | End: 2017-06-14

## 2017-06-12 RX ORDER — FOLIC ACID 0.8 MG
1 TABLET ORAL DAILY
Qty: 0 | Refills: 0 | Status: DISCONTINUED | OUTPATIENT
Start: 2017-06-12 | End: 2017-06-26

## 2017-06-12 RX ORDER — FLUCONAZOLE 150 MG/1
200 TABLET ORAL EVERY 24 HOURS
Qty: 0 | Refills: 0 | Status: COMPLETED | OUTPATIENT
Start: 2017-06-12 | End: 2017-06-16

## 2017-06-12 RX ORDER — DOXAZOSIN MESYLATE 4 MG
4 TABLET ORAL AT BEDTIME
Qty: 0 | Refills: 0 | Status: DISCONTINUED | OUTPATIENT
Start: 2017-06-12 | End: 2017-07-18

## 2017-06-12 RX ORDER — THIAMINE MONONITRATE (VIT B1) 100 MG
100 TABLET ORAL DAILY
Qty: 0 | Refills: 0 | Status: DISCONTINUED | OUTPATIENT
Start: 2017-06-12 | End: 2017-06-26

## 2017-06-12 RX ORDER — CEFEPIME 1 G/1
2000 INJECTION, POWDER, FOR SOLUTION INTRAMUSCULAR; INTRAVENOUS EVERY 8 HOURS
Qty: 0 | Refills: 0 | Status: COMPLETED | OUTPATIENT
Start: 2017-06-12 | End: 2017-06-17

## 2017-06-12 RX ORDER — METOPROLOL TARTRATE 50 MG
5 TABLET ORAL EVERY 6 HOURS
Qty: 0 | Refills: 0 | Status: DISCONTINUED | OUTPATIENT
Start: 2017-06-12 | End: 2017-06-12

## 2017-06-12 RX ORDER — DIGOXIN 250 MCG
0.12 TABLET ORAL DAILY
Qty: 0 | Refills: 0 | Status: DISCONTINUED | OUTPATIENT
Start: 2017-06-12 | End: 2017-06-12

## 2017-06-12 RX ORDER — FOLIC ACID 0.8 MG
1 TABLET ORAL DAILY
Qty: 0 | Refills: 0 | Status: DISCONTINUED | OUTPATIENT
Start: 2017-06-12 | End: 2017-06-12

## 2017-06-12 RX ORDER — PANTOPRAZOLE SODIUM 20 MG/1
40 TABLET, DELAYED RELEASE ORAL
Qty: 0 | Refills: 0 | Status: DISCONTINUED | OUTPATIENT
Start: 2017-06-12 | End: 2017-06-19

## 2017-06-12 RX ORDER — NYSTATIN CREAM 100000 [USP'U]/G
1 CREAM TOPICAL
Qty: 0 | Refills: 0 | Status: DISCONTINUED | OUTPATIENT
Start: 2017-06-12 | End: 2017-06-14

## 2017-06-12 RX ORDER — TRAZODONE HCL 50 MG
50 TABLET ORAL AT BEDTIME
Qty: 0 | Refills: 0 | Status: DISCONTINUED | OUTPATIENT
Start: 2017-06-12 | End: 2017-08-02

## 2017-06-12 RX ORDER — MAGNESIUM SULFATE 500 MG/ML
2 VIAL (ML) INJECTION
Qty: 0 | Refills: 0 | Status: COMPLETED | OUTPATIENT
Start: 2017-06-12 | End: 2017-06-12

## 2017-06-12 RX ORDER — COLLAGENASE CLOSTRIDIUM HIST. 250 UNIT/G
1 OINTMENT (GRAM) TOPICAL DAILY
Qty: 0 | Refills: 0 | Status: DISCONTINUED | OUTPATIENT
Start: 2017-06-12 | End: 2017-06-22

## 2017-06-12 RX ADMIN — Medication 5 MILLIGRAM(S): at 01:07

## 2017-06-12 RX ADMIN — NYSTATIN CREAM 1 APPLICATION(S): 100000 CREAM TOPICAL at 18:12

## 2017-06-12 RX ADMIN — Medication 100 MILLIGRAM(S): at 13:41

## 2017-06-12 RX ADMIN — Medication 3 MILLILITER(S): at 14:45

## 2017-06-12 RX ADMIN — Medication 100 MILLIGRAM(S): at 13:42

## 2017-06-12 RX ADMIN — HEPARIN SODIUM 5000 UNIT(S): 5000 INJECTION INTRAVENOUS; SUBCUTANEOUS at 13:46

## 2017-06-12 RX ADMIN — Medication 50 GRAM(S): at 02:18

## 2017-06-12 RX ADMIN — POTASSIUM PHOSPHATE, MONOBASIC POTASSIUM PHOSPHATE, DIBASIC 62.5 MILLIMOLE(S): 236; 224 INJECTION, SOLUTION INTRAVENOUS at 09:45

## 2017-06-12 RX ADMIN — HEPARIN SODIUM 5000 UNIT(S): 5000 INJECTION INTRAVENOUS; SUBCUTANEOUS at 22:15

## 2017-06-12 RX ADMIN — SODIUM CHLORIDE 3 MILLILITER(S): 9 INJECTION INTRAMUSCULAR; INTRAVENOUS; SUBCUTANEOUS at 03:38

## 2017-06-12 RX ADMIN — Medication 0.25 MILLIGRAM(S): at 13:49

## 2017-06-12 RX ADMIN — CEFEPIME 100 MILLIGRAM(S): 1 INJECTION, POWDER, FOR SOLUTION INTRAMUSCULAR; INTRAVENOUS at 22:17

## 2017-06-12 RX ADMIN — FLUCONAZOLE 100 MILLIGRAM(S): 150 TABLET ORAL at 14:20

## 2017-06-12 RX ADMIN — Medication 3 MILLILITER(S): at 03:38

## 2017-06-12 RX ADMIN — CEFEPIME 100 MILLIGRAM(S): 1 INJECTION, POWDER, FOR SOLUTION INTRAMUSCULAR; INTRAVENOUS at 14:19

## 2017-06-12 RX ADMIN — Medication 1 MILLIGRAM(S): at 13:43

## 2017-06-12 RX ADMIN — Medication 100 MILLIGRAM(S): at 05:42

## 2017-06-12 RX ADMIN — Medication 4 MILLIGRAM(S): at 22:15

## 2017-06-12 RX ADMIN — Medication 250 MILLIGRAM(S): at 12:38

## 2017-06-12 RX ADMIN — Medication 3 MILLILITER(S): at 09:47

## 2017-06-12 RX ADMIN — Medication 5 MILLIGRAM(S): at 05:42

## 2017-06-12 RX ADMIN — CEFEPIME 100 MILLIGRAM(S): 1 INJECTION, POWDER, FOR SOLUTION INTRAMUSCULAR; INTRAVENOUS at 05:41

## 2017-06-12 RX ADMIN — Medication 0.12 MILLIGRAM(S): at 22:17

## 2017-06-12 RX ADMIN — Medication 20 MILLIGRAM(S): at 05:43

## 2017-06-12 RX ADMIN — Medication 10 MILLIGRAM(S): at 13:43

## 2017-06-12 RX ADMIN — Medication 25 MILLIGRAM(S): at 18:12

## 2017-06-12 RX ADMIN — Medication 50 GRAM(S): at 03:47

## 2017-06-12 RX ADMIN — HEPARIN SODIUM 5000 UNIT(S): 5000 INJECTION INTRAVENOUS; SUBCUTANEOUS at 05:42

## 2017-06-12 RX ADMIN — PANTOPRAZOLE SODIUM 40 MILLIGRAM(S): 20 TABLET, DELAYED RELEASE ORAL at 18:12

## 2017-06-12 RX ADMIN — Medication 20 MILLIGRAM(S): at 18:12

## 2017-06-12 RX ADMIN — Medication 1 APPLICATION(S): at 13:42

## 2017-06-12 RX ADMIN — Medication 3 MILLILITER(S): at 20:20

## 2017-06-12 RX ADMIN — POTASSIUM PHOSPHATE, MONOBASIC POTASSIUM PHOSPHATE, DIBASIC 62.5 MILLIMOLE(S): 236; 224 INJECTION, SOLUTION INTRAVENOUS at 02:35

## 2017-06-12 RX ADMIN — Medication 50 MILLIGRAM(S): at 22:14

## 2017-06-12 RX ADMIN — PANTOPRAZOLE SODIUM 40 MILLIGRAM(S): 20 TABLET, DELAYED RELEASE ORAL at 05:43

## 2017-06-12 RX ADMIN — NYSTATIN CREAM 1 APPLICATION(S): 100000 CREAM TOPICAL at 05:41

## 2017-06-12 RX ADMIN — Medication 6 MILLIGRAM(S): at 22:14

## 2017-06-12 NOTE — PROGRESS NOTE ADULT - SUBJECTIVE AND OBJECTIVE BOX
INTERVAL HPI/OVERNIGHT EVENTS:  no events overnight. planning for EDG/PEG  in OR today.    PRESSORS: [ ] YES [x ] NO  WHICH:  DOSE:    ANTIBIOTICS:       Fluconazole           DATE STARTED: 6/6  ANTIBIOTICS:      Cefepime            DATE STARTED:    6/10  ANTIBIOTICS:         Vanco         DATE STARTED:   6/10    MEDICATIONS  (STANDING):  nystatin Powder 1Application(s) Topical two times a day  thiamine 100milliGRAM(s) Oral daily  amantadine Syrup 100milliGRAM(s) Oral <User Schedule>  heparin  Injectable 5000Unit(s) SubCutaneous every 8 hours  pantoprazole  Injectable 40milliGRAM(s) IV Push two times a day  doxazosin 4milliGRAM(s) Oral at bedtime  collagenase Ointment 1Application(s) Topical daily  furosemide    Tablet 20milliGRAM(s) Oral two times a day  FLUoxetine Solution 10milliGRAM(s) Oral daily  ALBUTerol/ipratropium for Nebulization 3milliLiter(s) Nebulizer every 6 hours  cefepime  IVPB 2000milliGRAM(s) IV Intermittent every 8 hours  fluconAZOLE IVPB 200milliGRAM(s) IV Intermittent every 24 hours  vancomycin  IVPB 1000milliGRAM(s) IV Intermittent every 12 hours  folic acid 1milliGRAM(s) Enteral Tube daily  digoxin     Tablet 0.25milliGRAM(s) Oral daily  metoprolol 25milliGRAM(s) Enteral Tube two times a day  traZODone 50milliGRAM(s) Oral at bedtime    MEDICATIONS  (PRN):      Drug Dosing Weight  Height (cm): 165.1 (12 Jun 2017 06:38)  Weight (kg): 81.5 (12 Jun 2017 06:38)  BMI (kg/m2): 29.9 (12 Jun 2017 06:38)  BSA (m2): 1.89 (12 Jun 2017 06:38)    CENTRAL LINE: [ ] YES [x ] NO  LOCATION:   DATE INSERTED:  6/10  REMOVE: [ ] YES [x ] NO  EXPLAIN: cvp    ARMAS: [x] YES [ ] NO    DATE INSERTED:  REMOVE: [ ] YES [ ] NO  EXPLAIN: i/o monitoring    A-LINE: [ ] YES [x ] NO  LOCATION:   DATE INSERTED:  REMOVE: [ ] YES [ ] NO  EXPLAIN:    PAST MEDICAL & SURGICAL HISTORY:  Mitral regurgitation: severe MR  Cardiomyopathy, nonischemic  CAD (coronary artery disease)  Asthma  Atrial fibrillation  Heart disease  S/P laparoscopic cholecystectomy  History of humerus fracture: Metal pins in Left Humerus  Artificial pacemaker      ICU Vital Signs Last 24 Hrs  T(C): 37.1, Max: 38.3 (06-11 @ 16:00)  T(F): 98.7, Max: 101 (06-11 @ 16:00)  HR: 71 (65 - 97)  BP: 109/79 (98/77 - 127/84)  BP(mean): 91 (79 - 104)  ABP: --  ABP(mean): --  RR: 22 (15 - 40)  SpO2: 100% (89% - 100%)      ABG - ( 12 Jun 2017 11:13 )  pH: 7.41  /  pCO2: 47    /  pO2: 77    / HCO3: 29    / Base Excess: 4.6   /  SaO2: 98                  I&O's Detail  I & Os for 24h ending 12 Jun 2017 07:00  =============================================  IN:    Free Water: 1200 ml    Pivot: 960 ml    multiple electrolytes Injection Type 1multiple electrolytes Injection Type 1: 400 ml    Enteral Tube Flush: 360 ml    Solution: 300 ml    Solution: 260 ml    Solution: 250 ml    Solution: 100 ml    Solution: 100 ml    Total IN: 3930 ml  ---------------------------------------------  OUT:    Incontinent per Condom Catheter: 2050 ml    Chest Tube: 110 ml    Total OUT: 2160 ml  ---------------------------------------------  Total NET: 1770 ml    I & Os for current day (as of 12 Jun 2017 14:14)  =============================================  IN:    Solution: 250 ml    Solution: 250 ml    Total IN: 500 ml  ---------------------------------------------  OUT:    Total OUT: 0 ml  ---------------------------------------------  Total NET: 500 ml      Mode: CPAP with PS  FiO2: 60  PEEP: 8  PS: 10      Physical Exam:    Neurological:  sedated after returning from the OR    HEENT: PERRLA, EOMI, no drainage or redness    Neck: No bruits; no thyromegaly or nodules,  No JVD    Back: Normal spine flexure, No CVA tenderness, No deformity or limitation of movement    Respiratory: Breath Sounds equal & clear to auscultation, no accessory muscle use    Cardiovascular: Regular rate & rhythm, normal S1, S2; no murmurs, gallops or rubs    Gastrointestinal: Soft, non-tender, normal bowel sounds. PEG site is clean/dry. midline wound healing well.    Extremities: No peripheral edema, No cyanosis, clubbing     Vascular: Equal and normal pulses: 2+ peripheral pulses throughout    Musculoskeletal: No joint pain, swelling or deformity; no limitation of movement    Skin: No rashes    LABS:  CBC Full  -  ( 12 Jun 2017 06:41 )  WBC Count : 14.4 K/uL  Hemoglobin : 9.1 g/dL  Hematocrit : 30.9 %  Platelet Count - Automated : 175 K/uL  Mean Cell Volume : 95.4 fl  Mean Cell Hemoglobin : 28.1 pg  Mean Cell Hemoglobin Concentration : 29.4 g/dL  Auto Neutrophil # : 12.4 K/uL  Auto Lymphocyte # : 1.1 K/uL  Auto Monocyte # : 0.7 K/uL  Auto Eosinophil # : 0.1 K/uL  Auto Basophil # : x  Auto Neutrophil % : 85.0 %  Auto Lymphocyte % : 5.0 %  Auto Monocyte % : 2.0 %  Auto Eosinophil % : 1.0 %  Auto Basophil % : x    06-12    148<H>  |  104  |  57.0<H>  ----------------------------<  147<H>  4.2   |  30.0<H>  |  0.91    Ca    7.7<L>      12 Jun 2017 06:41  Phos  3.7     06-12  Mg     3.0     06-12    TPro  6.5<L>  /  Alb  2.5<L>  /  TBili  1.4  /  DBili  0.7<H>  /  AST  149<H>  /  ALT  107<H>  /  AlkPhos  270<H>  06-12    PT/INR - ( 12 Jun 2017 06:41 )   PT: 17.1 sec;   INR: 1.54 ratio               Assessment and Plan:    Neuro: pain control, awaiting operative sedation to wear off.      HEENT: no issues    CV: Continue hemodynamic monitoring, CVP's    Pulm: Pulmonary toilet.  wean to extubate when more arrousable    GI/Nutrition: Bowel Regimen, s/p PEG - hold feeds for 4 hours then may resume feeds/ meds.    /Renal: monitor UOP. Monitor BMP.  Replete Lytes as needed  . hypernatermia resolving with FW .    HEME- DVT prophylaxis, SCDs    ID: awaiting blood fungal cx's on 6/5 results, body fluid 6/5 +yeast = fluconazole until 6/16       Cefepime and Vanco until 6/17 for presumed PNA    Lines/Tubes: cont with current lines     Endo: none    Skin: none     Dispo:  sicu    CRITICAL CARE TIME : 40 MIN.

## 2017-06-12 NOTE — BRIEF OPERATIVE NOTE - OPERATION/FINDINGS
Easily appreciated transillumination, uncomplicated procedure Easily appreciated transillumination and digitoperssure, , uncomplicated procedure

## 2017-06-13 LAB
ANION GAP SERPL CALC-SCNC: 14 MMOL/L — SIGNIFICANT CHANGE UP (ref 5–17)
ANION GAP SERPL CALC-SCNC: 14 MMOL/L — SIGNIFICANT CHANGE UP (ref 5–17)
BUN SERPL-MCNC: 57 MG/DL — HIGH (ref 8–20)
BUN SERPL-MCNC: 60 MG/DL — HIGH (ref 8–20)
CALCIUM SERPL-MCNC: 7.8 MG/DL — LOW (ref 8.6–10.2)
CALCIUM SERPL-MCNC: 8 MG/DL — LOW (ref 8.6–10.2)
CHLORIDE SERPL-SCNC: 100 MMOL/L — SIGNIFICANT CHANGE UP (ref 98–107)
CHLORIDE SERPL-SCNC: 99 MMOL/L — SIGNIFICANT CHANGE UP (ref 98–107)
CO2 SERPL-SCNC: 28 MMOL/L — SIGNIFICANT CHANGE UP (ref 22–29)
CO2 SERPL-SCNC: 29 MMOL/L — SIGNIFICANT CHANGE UP (ref 22–29)
CREAT SERPL-MCNC: 0.89 MG/DL — SIGNIFICANT CHANGE UP (ref 0.5–1.3)
CREAT SERPL-MCNC: 1.03 MG/DL — SIGNIFICANT CHANGE UP (ref 0.5–1.3)
GLUCOSE SERPL-MCNC: 150 MG/DL — HIGH (ref 70–115)
GLUCOSE SERPL-MCNC: 170 MG/DL — HIGH (ref 70–115)
HCT VFR BLD CALC: 32 % — LOW (ref 42–52)
HGB BLD-MCNC: 9.3 G/DL — LOW (ref 14–18)
LACTATE SERPL-SCNC: 3.8 MMOL/L — HIGH (ref 0.5–2)
MAGNESIUM SERPL-MCNC: 2.4 MG/DL — SIGNIFICANT CHANGE UP (ref 1.6–2.6)
MCHC RBC-ENTMCNC: 27.7 PG — SIGNIFICANT CHANGE UP (ref 27–31)
MCHC RBC-ENTMCNC: 29.1 G/DL — LOW (ref 32–36)
MCV RBC AUTO: 95.2 FL — HIGH (ref 80–94)
PHOSPHATE SERPL-MCNC: 3.2 MG/DL — SIGNIFICANT CHANGE UP (ref 2.4–4.7)
PLATELET # BLD AUTO: 185 K/UL — SIGNIFICANT CHANGE UP (ref 150–400)
POTASSIUM SERPL-MCNC: 4.4 MMOL/L — SIGNIFICANT CHANGE UP (ref 3.5–5.3)
POTASSIUM SERPL-MCNC: 4.5 MMOL/L — SIGNIFICANT CHANGE UP (ref 3.5–5.3)
POTASSIUM SERPL-SCNC: 4.4 MMOL/L — SIGNIFICANT CHANGE UP (ref 3.5–5.3)
POTASSIUM SERPL-SCNC: 4.5 MMOL/L — SIGNIFICANT CHANGE UP (ref 3.5–5.3)
RBC # BLD: 3.36 M/UL — LOW (ref 4.6–6.2)
RBC # FLD: 19.1 % — HIGH (ref 11–15.6)
SODIUM SERPL-SCNC: 141 MMOL/L — SIGNIFICANT CHANGE UP (ref 135–145)
SODIUM SERPL-SCNC: 143 MMOL/L — SIGNIFICANT CHANGE UP (ref 135–145)
TROPONIN T SERPL-MCNC: 0.08 NG/ML — HIGH (ref 0–0.06)
WBC # BLD: 13.6 K/UL — HIGH (ref 4.8–10.8)
WBC # FLD AUTO: 13.6 K/UL — HIGH (ref 4.8–10.8)

## 2017-06-13 PROCEDURE — 99232 SBSQ HOSP IP/OBS MODERATE 35: CPT | Mod: GC

## 2017-06-13 RX ADMIN — Medication 25 MILLIGRAM(S): at 17:19

## 2017-06-13 RX ADMIN — Medication 6 MILLIGRAM(S): at 21:13

## 2017-06-13 RX ADMIN — Medication 1 MILLIGRAM(S): at 11:36

## 2017-06-13 RX ADMIN — Medication 25 MILLIGRAM(S): at 05:17

## 2017-06-13 RX ADMIN — Medication 100 MILLIGRAM(S): at 11:36

## 2017-06-13 RX ADMIN — Medication 20 MILLIGRAM(S): at 05:12

## 2017-06-13 RX ADMIN — CEFEPIME 100 MILLIGRAM(S): 1 INJECTION, POWDER, FOR SOLUTION INTRAMUSCULAR; INTRAVENOUS at 13:42

## 2017-06-13 RX ADMIN — Medication 3 MILLILITER(S): at 08:36

## 2017-06-13 RX ADMIN — Medication 20 MILLIGRAM(S): at 17:19

## 2017-06-13 RX ADMIN — NYSTATIN CREAM 1 APPLICATION(S): 100000 CREAM TOPICAL at 05:11

## 2017-06-13 RX ADMIN — Medication 3 MILLILITER(S): at 15:44

## 2017-06-13 RX ADMIN — HEPARIN SODIUM 5000 UNIT(S): 5000 INJECTION INTRAVENOUS; SUBCUTANEOUS at 05:10

## 2017-06-13 RX ADMIN — HEPARIN SODIUM 5000 UNIT(S): 5000 INJECTION INTRAVENOUS; SUBCUTANEOUS at 13:43

## 2017-06-13 RX ADMIN — FLUCONAZOLE 100 MILLIGRAM(S): 150 TABLET ORAL at 13:42

## 2017-06-13 RX ADMIN — PANTOPRAZOLE SODIUM 40 MILLIGRAM(S): 20 TABLET, DELAYED RELEASE ORAL at 17:20

## 2017-06-13 RX ADMIN — HEPARIN SODIUM 5000 UNIT(S): 5000 INJECTION INTRAVENOUS; SUBCUTANEOUS at 21:13

## 2017-06-13 RX ADMIN — NYSTATIN CREAM 1 APPLICATION(S): 100000 CREAM TOPICAL at 17:19

## 2017-06-13 RX ADMIN — Medication 3 MILLILITER(S): at 20:17

## 2017-06-13 RX ADMIN — Medication 50 MILLIGRAM(S): at 21:13

## 2017-06-13 RX ADMIN — Medication 1 APPLICATION(S): at 11:36

## 2017-06-13 RX ADMIN — Medication 250 MILLIGRAM(S): at 00:15

## 2017-06-13 RX ADMIN — Medication 0.12 MILLIGRAM(S): at 05:17

## 2017-06-13 RX ADMIN — Medication 4 MILLIGRAM(S): at 21:13

## 2017-06-13 RX ADMIN — Medication 3 MILLILITER(S): at 03:36

## 2017-06-13 RX ADMIN — Medication 10 MILLIGRAM(S): at 11:38

## 2017-06-13 RX ADMIN — Medication 100 MILLIGRAM(S): at 05:10

## 2017-06-13 RX ADMIN — PANTOPRAZOLE SODIUM 40 MILLIGRAM(S): 20 TABLET, DELAYED RELEASE ORAL at 05:13

## 2017-06-13 RX ADMIN — Medication 250 MILLIGRAM(S): at 11:36

## 2017-06-13 RX ADMIN — CEFEPIME 100 MILLIGRAM(S): 1 INJECTION, POWDER, FOR SOLUTION INTRAMUSCULAR; INTRAVENOUS at 21:11

## 2017-06-13 RX ADMIN — CEFEPIME 100 MILLIGRAM(S): 1 INJECTION, POWDER, FOR SOLUTION INTRAMUSCULAR; INTRAVENOUS at 05:10

## 2017-06-13 NOTE — PROGRESS NOTE ADULT - SUBJECTIVE AND OBJECTIVE BOX
Patient appears more alert. Following more commands and more consistently with more extremities.   S/P PEG placement.       REVIEW OF SYSTEMS  Neurological - +loss of strength  Musculoskeletal - +poor coordination    VITALS  T(C): 36.4  T(F): 97.5, Max: 98.3 (06-13 @ 20:00)  HR: 85 (74 - 91)  BP: 114/74 (102/- - 126/87)  RR:  (21 - 31)  SpO2:  (95% - 100%)  Wt(kg): --    MEDICATIONS   nystatin Powder 1Application(s) two times a day  folic acid Injectable 1milliGRAM(s) daily  thiamine 100milliGRAM(s) daily  amantadine Syrup 100milliGRAM(s) <User Schedule>  melatonin 6milliGRAM(s) at bedtime  traZODone 50milliGRAM(s) at bedtime  heparin  Injectable 5000Unit(s) every 8 hours  pantoprazole  Injectable 40milliGRAM(s) two times a day  ALBUTerol/ipratropium for Nebulization 3milliLiter(s) every 6 hours PRN  FLUoxetine 10milliGRAM(s) daily  doxazosin 4milliGRAM(s) at bedtime  fluconAZOLE IVPB 400milliGRAM(s) every 24 hours  collagenase Ointment 1Application(s) daily  insulin lispro (HumaLOG) corrective regimen sliding scale  every 6 hours  dextrose 5%. 1000milliLiter(s) <Continuous>  dextrose Gel 1Dose(s) once PRN  dextrose 50% Injectable 12.5Gram(s) once  dextrose 50% Injectable 25Gram(s) once  metoprolol Injectable 5milliGRAM(s) every 6 hours  multiple electrolytes Injection Type 1 1000milliLiter(s) <Continuous>  furosemide    Tablet 20milliGRAM(s) two times a day  digoxin  Injectable 0.125milliGRAM(s) daily      RECENT LABS/IMAGING  CBC Full  -  ( 09 Jun 2017 05:22 )  WBC Count : 13.6 K/uL  Hemoglobin : 9.4 g/dL  Hematocrit : 31.7 %  Platelet Count - Automated : 228 K/uL  Mean Cell Volume : 96.9 fl  Mean Cell Hemoglobin : 28.7 pg  Mean Cell Hemoglobin Concentration : 29.7 g/dL  Auto Neutrophil # : x  Auto Lymphocyte # : x  Auto Monocyte # : x  Auto Eosinophil # : x  Auto Basophil # : x  Auto Neutrophil % : 85.0 %  Auto Lymphocyte % : 10.0 %  Auto Monocyte % : 5.0 %  Auto Eosinophil % : x  Auto Basophil % : x    06-09    151<H>  |  108<H>  |  48.0<H>  ----------------------------<  122<H>  3.8   |  30.0<H>  |  0.85    Ca    8.2<L>      09 Jun 2017 05:22  Phos  3.1     06-09  Mg     1.9     06-09      Coma Recovery Scale - Revised  Coma Recovery Scale - Revised    AUDITORY FUNCTION SCALE  3 - Reproducible Movement to Command *    VISUAL FUNCTION SCALE  4 - Object Localization: Reaching *    MOTOR FUNCTION SCALE  4 - Object Manipulation *    OROMOTOR/VERBAL FUNCTION SCALE  0 - None    COMMUNICATION SCALE  0 - None    AROUSAL SCALE  2 - Eye Opening w/o Stimulation    TOTAL SCORE 13    Exam may be limited by ?apraxia and proximal shoulder and hip weakness.     ----------------------------------------------------------------------------------------  ASSESSMENT/PLAN  51M with cardiac arrest s/p ERCP for bile duct leak with prolonged hospitalization related to prolonged intubation and cognitive/behavioral deficits.    Appears more related to encephalopathy based neurological process.     Arousal/Sleep wake cycle - Amantadine, Melatonin, Trazodone    *** Recommend when awake to remove mittens and have 1:1 and provide object for functional use and manipulation.     Mood/Motor recovery - Prozac 10mg daily     Rehab - At least 3x/week of OT and PT to provide ongoing daily stimulation. Patient appears more alert. Following more commands and more consistently with more extremities.   S/P PEG placement.       REVIEW OF SYSTEMS  Neurological - +loss of strength  Musculoskeletal - +poor coordination    VITALS  T(C): 36.4  T(F): 97.5, Max: 98.3 (06-13 @ 20:00)  HR: 85 (74 - 91)  BP: 114/74 (102/- - 126/87)  RR:  (21 - 31)  SpO2:  (95% - 100%)  Wt(kg): --    MEDICATIONS   nystatin Powder 1Application(s) two times a day  folic acid Injectable 1milliGRAM(s) daily  thiamine 100milliGRAM(s) daily  amantadine Syrup 100milliGRAM(s) <User Schedule>  melatonin 6milliGRAM(s) at bedtime  traZODone 50milliGRAM(s) at bedtime  heparin  Injectable 5000Unit(s) every 8 hours  pantoprazole  Injectable 40milliGRAM(s) two times a day  ALBUTerol/ipratropium for Nebulization 3milliLiter(s) every 6 hours PRN  FLUoxetine 10milliGRAM(s) daily  doxazosin 4milliGRAM(s) at bedtime  fluconAZOLE IVPB 400milliGRAM(s) every 24 hours  collagenase Ointment 1Application(s) daily  insulin lispro (HumaLOG) corrective regimen sliding scale  every 6 hours  dextrose 5%. 1000milliLiter(s) <Continuous>  dextrose Gel 1Dose(s) once PRN  dextrose 50% Injectable 12.5Gram(s) once  dextrose 50% Injectable 25Gram(s) once  metoprolol Injectable 5milliGRAM(s) every 6 hours  multiple electrolytes Injection Type 1 1000milliLiter(s) <Continuous>  furosemide    Tablet 20milliGRAM(s) two times a day  digoxin  Injectable 0.125milliGRAM(s) daily      RECENT LABS/IMAGING  CBC Full  -  ( 09 Jun 2017 05:22 )  WBC Count : 13.6 K/uL  Hemoglobin : 9.4 g/dL  Hematocrit : 31.7 %  Platelet Count - Automated : 228 K/uL  Mean Cell Volume : 96.9 fl  Mean Cell Hemoglobin : 28.7 pg  Mean Cell Hemoglobin Concentration : 29.7 g/dL  Auto Neutrophil # : x  Auto Lymphocyte # : x  Auto Monocyte # : x  Auto Eosinophil # : x  Auto Basophil # : x  Auto Neutrophil % : 85.0 %  Auto Lymphocyte % : 10.0 %  Auto Monocyte % : 5.0 %  Auto Eosinophil % : x  Auto Basophil % : x    06-09    151<H>  |  108<H>  |  48.0<H>  ----------------------------<  122<H>  3.8   |  30.0<H>  |  0.85    Ca    8.2<L>      09 Jun 2017 05:22  Phos  3.1     06-09  Mg     1.9     06-09      Coma Recovery Scale - Revised  Coma Recovery Scale - Revised    AUDITORY FUNCTION SCALE  3 - Reproducible Movement to Command *    VISUAL FUNCTION SCALE  4 - Object Localization: Reaching *    MOTOR FUNCTION SCALE  4 - Object Manipulation *    OROMOTOR/VERBAL FUNCTION SCALE  0 - None    COMMUNICATION SCALE  0 - None    AROUSAL SCALE  2 - Eye Opening w/o Stimulation    TOTAL SCORE 13    Exam may be limited by ?apraxia and proximal shoulder and hip weakness.     ----------------------------------------------------------------------------------------  ASSESSMENT/PLAN  51M with cardiac arrest s/p ERCP for bile duct leak with prolonged hospitalization related to prolonged intubation and cognitive/behavioral deficits.    Appears more related to encephalopathy based neurological process.     Arousal/Sleep wake cycle - Amantadine, Melatonin, Trazodone    *** Recommend when awake to remove mittens and have 1:1 and provide object for functional use and manipulation.     Mood/Motor recovery - Prozac 10mg daily     Rehab - PLEASE reorder PT/OT for 3x/week program for ongoing daily stimulation.

## 2017-06-14 LAB
ANION GAP SERPL CALC-SCNC: 13 MMOL/L — SIGNIFICANT CHANGE UP (ref 5–17)
BUN SERPL-MCNC: 61 MG/DL — HIGH (ref 8–20)
CALCIUM SERPL-MCNC: 8 MG/DL — LOW (ref 8.6–10.2)
CHLORIDE SERPL-SCNC: 100 MMOL/L — SIGNIFICANT CHANGE UP (ref 98–107)
CO2 SERPL-SCNC: 29 MMOL/L — SIGNIFICANT CHANGE UP (ref 22–29)
CREAT SERPL-MCNC: 0.92 MG/DL — SIGNIFICANT CHANGE UP (ref 0.5–1.3)
CULTURE RESULTS: SIGNIFICANT CHANGE UP
GAS PNL BLDA: SIGNIFICANT CHANGE UP
GLUCOSE SERPL-MCNC: 145 MG/DL — HIGH (ref 70–115)
HCT VFR BLD CALC: 32.1 % — LOW (ref 42–52)
HGB BLD-MCNC: 9.3 G/DL — LOW (ref 14–18)
MAGNESIUM SERPL-MCNC: 2.3 MG/DL — SIGNIFICANT CHANGE UP (ref 1.6–2.6)
MCHC RBC-ENTMCNC: 27.8 PG — SIGNIFICANT CHANGE UP (ref 27–31)
MCHC RBC-ENTMCNC: 29 G/DL — LOW (ref 32–36)
MCV RBC AUTO: 95.8 FL — HIGH (ref 80–94)
PHOSPHATE SERPL-MCNC: 3.2 MG/DL — SIGNIFICANT CHANGE UP (ref 2.4–4.7)
PLATELET # BLD AUTO: 178 K/UL — SIGNIFICANT CHANGE UP (ref 150–400)
POTASSIUM SERPL-MCNC: 4.3 MMOL/L — SIGNIFICANT CHANGE UP (ref 3.5–5.3)
POTASSIUM SERPL-SCNC: 4.3 MMOL/L — SIGNIFICANT CHANGE UP (ref 3.5–5.3)
RBC # BLD: 3.35 M/UL — LOW (ref 4.6–6.2)
RBC # FLD: 19.7 % — HIGH (ref 11–15.6)
SODIUM SERPL-SCNC: 142 MMOL/L — SIGNIFICANT CHANGE UP (ref 135–145)
SPECIMEN SOURCE: SIGNIFICANT CHANGE UP
VANCOMYCIN TROUGH SERPL-MCNC: 25 UG/ML — HIGH (ref 10–20)
WBC # BLD: 17.7 K/UL — HIGH (ref 4.8–10.8)
WBC # FLD AUTO: 17.7 K/UL — HIGH (ref 4.8–10.8)

## 2017-06-14 RX ORDER — LANOLIN ALCOHOL/MO/W.PET/CERES
9 CREAM (GRAM) TOPICAL AT BEDTIME
Qty: 0 | Refills: 0 | Status: DISCONTINUED | OUTPATIENT
Start: 2017-06-14 | End: 2017-08-02

## 2017-06-14 RX ORDER — VANCOMYCIN HCL 1 G
750 VIAL (EA) INTRAVENOUS EVERY 12 HOURS
Qty: 0 | Refills: 0 | Status: COMPLETED | OUTPATIENT
Start: 2017-06-14 | End: 2017-06-17

## 2017-06-14 RX ADMIN — HEPARIN SODIUM 5000 UNIT(S): 5000 INJECTION INTRAVENOUS; SUBCUTANEOUS at 22:24

## 2017-06-14 RX ADMIN — CEFEPIME 100 MILLIGRAM(S): 1 INJECTION, POWDER, FOR SOLUTION INTRAMUSCULAR; INTRAVENOUS at 13:41

## 2017-06-14 RX ADMIN — HEPARIN SODIUM 5000 UNIT(S): 5000 INJECTION INTRAVENOUS; SUBCUTANEOUS at 13:41

## 2017-06-14 RX ADMIN — Medication 100 MILLIGRAM(S): at 13:43

## 2017-06-14 RX ADMIN — Medication 10 MILLIGRAM(S): at 13:41

## 2017-06-14 RX ADMIN — HEPARIN SODIUM 5000 UNIT(S): 5000 INJECTION INTRAVENOUS; SUBCUTANEOUS at 05:26

## 2017-06-14 RX ADMIN — Medication 3 MILLILITER(S): at 02:58

## 2017-06-14 RX ADMIN — Medication 3 MILLILITER(S): at 14:03

## 2017-06-14 RX ADMIN — Medication 50 MILLIGRAM(S): at 22:24

## 2017-06-14 RX ADMIN — Medication 3 MILLILITER(S): at 08:08

## 2017-06-14 RX ADMIN — CEFEPIME 100 MILLIGRAM(S): 1 INJECTION, POWDER, FOR SOLUTION INTRAMUSCULAR; INTRAVENOUS at 05:24

## 2017-06-14 RX ADMIN — Medication 100 MILLIGRAM(S): at 05:24

## 2017-06-14 RX ADMIN — NYSTATIN CREAM 1 APPLICATION(S): 100000 CREAM TOPICAL at 05:26

## 2017-06-14 RX ADMIN — Medication 1 MILLIGRAM(S): at 13:41

## 2017-06-14 RX ADMIN — Medication 3 MILLILITER(S): at 20:11

## 2017-06-14 RX ADMIN — Medication 20 MILLIGRAM(S): at 18:17

## 2017-06-14 RX ADMIN — Medication 0.12 MILLIGRAM(S): at 05:25

## 2017-06-14 RX ADMIN — Medication 20 MILLIGRAM(S): at 05:25

## 2017-06-14 RX ADMIN — Medication 1 APPLICATION(S): at 13:41

## 2017-06-14 RX ADMIN — FLUCONAZOLE 100 MILLIGRAM(S): 150 TABLET ORAL at 13:41

## 2017-06-14 RX ADMIN — Medication 25 MILLIGRAM(S): at 18:18

## 2017-06-14 RX ADMIN — Medication 4 MILLIGRAM(S): at 22:24

## 2017-06-14 RX ADMIN — Medication 9 MILLIGRAM(S): at 22:24

## 2017-06-14 RX ADMIN — CEFEPIME 100 MILLIGRAM(S): 1 INJECTION, POWDER, FOR SOLUTION INTRAMUSCULAR; INTRAVENOUS at 22:24

## 2017-06-14 RX ADMIN — Medication 25 MILLIGRAM(S): at 05:24

## 2017-06-14 RX ADMIN — PANTOPRAZOLE SODIUM 40 MILLIGRAM(S): 20 TABLET, DELAYED RELEASE ORAL at 05:26

## 2017-06-14 RX ADMIN — Medication 100 MILLIGRAM(S): at 12:23

## 2017-06-14 RX ADMIN — PANTOPRAZOLE SODIUM 40 MILLIGRAM(S): 20 TABLET, DELAYED RELEASE ORAL at 18:17

## 2017-06-14 NOTE — PROGRESS NOTE ADULT - SUBJECTIVE AND OBJECTIVE BOX
INTERVAL HPI/OVERNIGHT EVENTS:    PRESSORS: [ ] YES [x ] NO  WHICH:  DOSE:    ANTIBIOTICS:        cefepime          DATE STARTED: stop 6/18  ANTIBIOTICS:         vanco              DATE STARTED: stop 6/18  ANTIBIOTICS:       fluconazole        DATE STARTED: stop 6/17    MEDICATIONS  (STANDING):  thiamine 100milliGRAM(s) Oral daily  amantadine Syrup 100milliGRAM(s) Oral <User Schedule>  heparin  Injectable 5000Unit(s) SubCutaneous every 8 hours  pantoprazole  Injectable 40milliGRAM(s) IV Push two times a day  doxazosin 4milliGRAM(s) Oral at bedtime  collagenase Ointment 1Application(s) Topical daily  furosemide    Tablet 20milliGRAM(s) Oral two times a day  FLUoxetine Solution 10milliGRAM(s) Oral daily  ALBUTerol/ipratropium for Nebulization 3milliLiter(s) Nebulizer every 6 hours  cefepime  IVPB 2000milliGRAM(s) IV Intermittent every 8 hours  fluconAZOLE IVPB 200milliGRAM(s) IV Intermittent every 24 hours  folic acid 1milliGRAM(s) Enteral Tube daily  metoprolol 25milliGRAM(s) Enteral Tube two times a day  traZODone 50milliGRAM(s) Oral at bedtime  digoxin     Tablet 0.125milliGRAM(s) Oral daily  melatonin 6milliGRAM(s) Oral at bedtime  vancomycin  IVPB 750milliGRAM(s) IV Intermittent every 12 hours  melatonin 9milliGRAM(s) Oral at bedtime    MEDICATIONS  (PRN):      Drug Dosing Weight  Height (cm): 165.1 (12 Jun 2017 06:38)  Weight (kg): 81.5 (12 Jun 2017 06:38)  BMI (kg/m2): 29.9 (12 Jun 2017 06:38)  BSA (m2): 1.89 (12 Jun 2017 06:38)    CENTRAL LINE: [x ] YES [ ] NO  LOCATION:   DATE INSERTED:  REMOVE: [ ] YES [x ] NO  EXPLAIN: poor access.     ARMAS: [ ] YES [x ] NO    DATE INSERTED:  REMOVE: [ ] YES [ ] NO  EXPLAIN:    A-LINE: [ ] YES [ x] NO  LOCATION:   DATE INSERTED:  REMOVE: [ ] YES [ ] NO  EXPLAIN:    PAST MEDICAL & SURGICAL HISTORY:  Mitral regurgitation: severe MR  Cardiomyopathy, nonischemic  CAD (coronary artery disease)  Asthma  Atrial fibrillation  Heart disease  S/P laparoscopic cholecystectomy  History of humerus fracture: Metal pins in Left Humerus  Artificial pacemaker      ICU Vital Signs Last 24 Hrs  T(C): 36.6, Max: 36.8 (06-13 @ 16:00)  T(F): 97.8, Max: 98.3 (06-13 @ 20:00)  HR: 75 (74 - 97)  BP: 115/82 (102/- - 126/87)  BP(mean): 94 (83 - 101)  ABP: --  ABP(mean): --  RR: 32 (21 - 32)  SpO2: 95% (95% - 100%)      ABG - ( 14 Jun 2017 04:04 )  pH: 7.47  /  pCO2: 38    /  pO2: 97    / HCO3: 28    / Base Excess: 4.0   /  SaO2: 99                  I&O's Detail  I & Os for 24h ending 14 Jun 2017 07:00  =============================================  IN:    Pivot: 1265 ml    Free Water: 750 ml    Solution: 250 ml    Enteral Tube Flush: 160 ml    Solution: 150 ml    Solution: 100 ml    Total IN: 2675 ml  ---------------------------------------------  OUT:    Incontinent per Condom Catheter: 950 ml    Total OUT: 950 ml  ---------------------------------------------  Total NET: 1725 ml    I & Os for current day (as of 14 Jun 2017 15:45)  =============================================  IN:    Pivot: 330 ml    Total IN: 330 ml  ---------------------------------------------  OUT:    Incontinent per Condom Catheter: 525 ml    Total OUT: 525 ml  ---------------------------------------------  Total NET: -195 ml          Physical Exam:    Neurological:  No sensory/motor deficits. intermittent following command ( same as previous exams)    HEENT: PERRLA, EOMI, no drainage or redness    Neck: No bruits; no thyromegaly or nodules,  No JVD    Back: Normal spine flexure, No CVA tenderness, No deformity or limitation of movement    Respiratory: Breath Sounds equal & clear to auscultation, no accessory muscle use    Cardiovascular: Regular rate & rhythm, normal S1, S2; no murmurs, gallops or rubs    Gastrointestinal: Soft, non-tender, normal bowel sounds , peg is c/d/i.    Extremities: No peripheral edema, No cyanosis, clubbing     Vascular: Equal and normal pulses: 2+ peripheral pulses throughout    Musculoskeletal: No joint pain, swelling or deformity; no limitation of movement    Skin: No rashes    LABS:  CBC Full  -  ( 14 Jun 2017 04:21 )  WBC Count : 17.7 K/uL  Hemoglobin : 9.3 g/dL  Hematocrit : 32.1 %  Platelet Count - Automated : 178 K/uL  Mean Cell Volume : 95.8 fl  Mean Cell Hemoglobin : 27.8 pg  Mean Cell Hemoglobin Concentration : 29.0 g/dL  Auto Neutrophil # : x  Auto Lymphocyte # : x  Auto Monocyte # : x  Auto Eosinophil # : x  Auto Basophil # : x  Auto Neutrophil % : x  Auto Lymphocyte % : x  Auto Monocyte % : x  Auto Eosinophil % : x  Auto Basophil % : x    06-14    142  |  100  |  61.0<H>  ----------------------------<  145<H>  4.3   |  29.0  |  0.92    Ca    8.0<L>      14 Jun 2017 04:21  Phos  3.2     06-14  Mg     2.3     06-14            Assessment and Plan:    Neuro: GCS 15. Monitor for delirium.  Continue to optimize pain control. Serial Neurologic assessments    HEENT: no issues    CV: Continue hemodynamic monitoring. poor EF <20%, most likely cause of LA    Pulm: Pulmonary toilet.  Continue incentive spirometer.  Chest PT.  Encourage OOB to chair and ambulation     GI/Nutrition: Bowel Regimen    /Renal: monitor UOP. Monitor BMP.  Replete Lytes as needed      HEME- DVT prophylaxis, SCDs    ID: course to completion for pna, bile drain with yeast course to completion.    Lines/Tubes: tlc on day 4    Endo: x    Skin: x    Dispo:  sicu      CRITICAL CARE TIME 40 MIN.

## 2017-06-15 LAB
ANION GAP SERPL CALC-SCNC: 17 MMOL/L — SIGNIFICANT CHANGE UP (ref 5–17)
ANISOCYTOSIS BLD QL: SLIGHT — SIGNIFICANT CHANGE UP
BUN SERPL-MCNC: 62 MG/DL — HIGH (ref 8–20)
CALCIUM SERPL-MCNC: 8.2 MG/DL — LOW (ref 8.6–10.2)
CHLORIDE SERPL-SCNC: 101 MMOL/L — SIGNIFICANT CHANGE UP (ref 98–107)
CO2 SERPL-SCNC: 28 MMOL/L — SIGNIFICANT CHANGE UP (ref 22–29)
CREAT SERPL-MCNC: 0.81 MG/DL — SIGNIFICANT CHANGE UP (ref 0.5–1.3)
CULTURE RESULTS: SIGNIFICANT CHANGE UP
CULTURE RESULTS: SIGNIFICANT CHANGE UP
GAS PNL BLDA: SIGNIFICANT CHANGE UP
GLUCOSE SERPL-MCNC: 141 MG/DL — HIGH (ref 70–115)
HCT VFR BLD CALC: 31.4 % — LOW (ref 42–52)
HGB BLD-MCNC: 9.1 G/DL — LOW (ref 14–18)
HYPOCHROMIA BLD QL: SIGNIFICANT CHANGE UP
LYMPHOCYTES # BLD AUTO: 9 % — LOW (ref 20–55)
MACROCYTES BLD QL: SLIGHT — SIGNIFICANT CHANGE UP
MAGNESIUM SERPL-MCNC: 2.1 MG/DL — SIGNIFICANT CHANGE UP (ref 1.6–2.6)
MCHC RBC-ENTMCNC: 27.3 PG — SIGNIFICANT CHANGE UP (ref 27–31)
MCHC RBC-ENTMCNC: 29 G/DL — LOW (ref 32–36)
MCV RBC AUTO: 94.3 FL — HIGH (ref 80–94)
MICROCYTES BLD QL: SLIGHT — SIGNIFICANT CHANGE UP
MONOCYTES NFR BLD AUTO: 4 % — SIGNIFICANT CHANGE UP (ref 3–10)
NEUTROPHILS NFR BLD AUTO: 87 % — HIGH (ref 37–73)
NRBC # BLD: 6 /100 — HIGH (ref 0–0)
OVALOCYTES BLD QL SMEAR: SLIGHT — SIGNIFICANT CHANGE UP
PHOSPHATE SERPL-MCNC: 2.9 MG/DL — SIGNIFICANT CHANGE UP (ref 2.4–4.7)
PLAT MORPH BLD: NORMAL — SIGNIFICANT CHANGE UP
PLATELET # BLD AUTO: 174 K/UL — SIGNIFICANT CHANGE UP (ref 150–400)
POIKILOCYTOSIS BLD QL AUTO: SLIGHT — SIGNIFICANT CHANGE UP
POLYCHROMASIA BLD QL SMEAR: SLIGHT — SIGNIFICANT CHANGE UP
POTASSIUM SERPL-MCNC: 4.2 MMOL/L — SIGNIFICANT CHANGE UP (ref 3.5–5.3)
POTASSIUM SERPL-SCNC: 4.2 MMOL/L — SIGNIFICANT CHANGE UP (ref 3.5–5.3)
RBC # BLD: 3.33 M/UL — LOW (ref 4.6–6.2)
RBC # FLD: 19.9 % — HIGH (ref 11–15.6)
RBC BLD AUTO: ABNORMAL
SMUDGE CELLS # BLD: PRESENT — SIGNIFICANT CHANGE UP
SODIUM SERPL-SCNC: 146 MMOL/L — HIGH (ref 135–145)
SPECIMEN SOURCE: SIGNIFICANT CHANGE UP
SPECIMEN SOURCE: SIGNIFICANT CHANGE UP
VANCOMYCIN TROUGH SERPL-MCNC: 11 UG/ML — SIGNIFICANT CHANGE UP (ref 10–20)
VANCOMYCIN TROUGH SERPL-MCNC: 11.1 UG/ML — SIGNIFICANT CHANGE UP (ref 10–20)
WBC # BLD: 16.6 K/UL — HIGH (ref 4.8–10.8)
WBC # FLD AUTO: 16.6 K/UL — HIGH (ref 4.8–10.8)

## 2017-06-15 PROCEDURE — 93010 ELECTROCARDIOGRAM REPORT: CPT

## 2017-06-15 RX ORDER — ALBUTEROL 90 UG/1
2.5 AEROSOL, METERED ORAL EVERY 6 HOURS
Qty: 0 | Refills: 0 | Status: DISCONTINUED | OUTPATIENT
Start: 2017-06-15 | End: 2017-07-08

## 2017-06-15 RX ORDER — ACETYLCYSTEINE 200 MG/ML
1 VIAL (ML) MISCELLANEOUS EVERY 6 HOURS
Qty: 0 | Refills: 0 | Status: COMPLETED | OUTPATIENT
Start: 2017-06-15 | End: 2017-06-17

## 2017-06-15 RX ADMIN — Medication 3 MILLILITER(S): at 03:51

## 2017-06-15 RX ADMIN — Medication 150 MILLIGRAM(S): at 03:23

## 2017-06-15 RX ADMIN — Medication 25 MILLIGRAM(S): at 17:28

## 2017-06-15 RX ADMIN — PANTOPRAZOLE SODIUM 40 MILLIGRAM(S): 20 TABLET, DELAYED RELEASE ORAL at 06:13

## 2017-06-15 RX ADMIN — CEFEPIME 100 MILLIGRAM(S): 1 INJECTION, POWDER, FOR SOLUTION INTRAMUSCULAR; INTRAVENOUS at 06:14

## 2017-06-15 RX ADMIN — Medication 4 MILLIGRAM(S): at 21:58

## 2017-06-15 RX ADMIN — Medication 100 MILLIGRAM(S): at 11:37

## 2017-06-15 RX ADMIN — CEFEPIME 100 MILLIGRAM(S): 1 INJECTION, POWDER, FOR SOLUTION INTRAMUSCULAR; INTRAVENOUS at 13:00

## 2017-06-15 RX ADMIN — Medication 0.12 MILLIGRAM(S): at 06:13

## 2017-06-15 RX ADMIN — Medication 20 MILLIGRAM(S): at 17:27

## 2017-06-15 RX ADMIN — Medication 1 MILLIGRAM(S): at 11:37

## 2017-06-15 RX ADMIN — Medication 9 MILLIGRAM(S): at 22:55

## 2017-06-15 RX ADMIN — Medication 20 MILLIGRAM(S): at 06:13

## 2017-06-15 RX ADMIN — HEPARIN SODIUM 5000 UNIT(S): 5000 INJECTION INTRAVENOUS; SUBCUTANEOUS at 06:14

## 2017-06-15 RX ADMIN — ALBUTEROL 2.5 MILLIGRAM(S): 90 AEROSOL, METERED ORAL at 23:09

## 2017-06-15 RX ADMIN — Medication 1 APPLICATION(S): at 11:36

## 2017-06-15 RX ADMIN — Medication 3 MILLILITER(S): at 20:04

## 2017-06-15 RX ADMIN — PANTOPRAZOLE SODIUM 40 MILLIGRAM(S): 20 TABLET, DELAYED RELEASE ORAL at 17:29

## 2017-06-15 RX ADMIN — Medication 50 MILLIGRAM(S): at 21:58

## 2017-06-15 RX ADMIN — Medication 25 MILLIGRAM(S): at 06:13

## 2017-06-15 RX ADMIN — CEFEPIME 100 MILLIGRAM(S): 1 INJECTION, POWDER, FOR SOLUTION INTRAMUSCULAR; INTRAVENOUS at 21:58

## 2017-06-15 RX ADMIN — Medication 100 MILLIGRAM(S): at 11:36

## 2017-06-15 RX ADMIN — Medication 3 MILLILITER(S): at 08:00

## 2017-06-15 RX ADMIN — FLUCONAZOLE 100 MILLIGRAM(S): 150 TABLET ORAL at 13:00

## 2017-06-15 RX ADMIN — Medication 100 MILLIGRAM(S): at 06:14

## 2017-06-15 RX ADMIN — HEPARIN SODIUM 5000 UNIT(S): 5000 INJECTION INTRAVENOUS; SUBCUTANEOUS at 13:00

## 2017-06-15 RX ADMIN — HEPARIN SODIUM 5000 UNIT(S): 5000 INJECTION INTRAVENOUS; SUBCUTANEOUS at 21:58

## 2017-06-15 RX ADMIN — Medication 1 MILLILITER(S): at 23:09

## 2017-06-15 RX ADMIN — Medication 3 MILLILITER(S): at 15:35

## 2017-06-15 RX ADMIN — Medication 10 MILLIGRAM(S): at 11:37

## 2017-06-15 RX ADMIN — Medication 150 MILLIGRAM(S): at 11:38

## 2017-06-15 NOTE — PROGRESS NOTE ADULT - SUBJECTIVE AND OBJECTIVE BOX
INTERVAL HPI/OVERNIGHT EVENTS:  no issues overnight      PRESSORS: [ ] YES [x ] NO  WHICH:  DOSE:    ANTIBIOTICS:        vanco          DATE STARTED:  ANTIBIOTICS:       cefepime           DATE STARTED:  ANTIBIOTICS:      fluconozole            DATE STARTED:    MEDICATIONS  (STANDING):  thiamine 100milliGRAM(s) Oral daily  amantadine Syrup 100milliGRAM(s) Oral <User Schedule>  heparin  Injectable 5000Unit(s) SubCutaneous every 8 hours  pantoprazole  Injectable 40milliGRAM(s) IV Push two times a day  doxazosin 4milliGRAM(s) Oral at bedtime  collagenase Ointment 1Application(s) Topical daily  furosemide    Tablet 20milliGRAM(s) Oral two times a day  FLUoxetine Solution 10milliGRAM(s) Oral daily  ALBUTerol/ipratropium for Nebulization 3milliLiter(s) Nebulizer every 6 hours  cefepime  IVPB 2000milliGRAM(s) IV Intermittent every 8 hours  fluconAZOLE IVPB 200milliGRAM(s) IV Intermittent every 24 hours  folic acid 1milliGRAM(s) Enteral Tube daily  metoprolol 25milliGRAM(s) Enteral Tube two times a day  traZODone 50milliGRAM(s) Oral at bedtime  digoxin     Tablet 0.125milliGRAM(s) Oral daily  vancomycin  IVPB 750milliGRAM(s) IV Intermittent every 12 hours  melatonin 9milliGRAM(s) Oral at bedtime    MEDICATIONS  (PRN):      Drug Dosing Weight  Height (cm): 165.1 (12 Jun 2017 06:38)  Weight (kg): 81.5 (12 Jun 2017 06:38)  BMI (kg/m2): 29.9 (12 Jun 2017 06:38)  BSA (m2): 1.89 (12 Jun 2017 06:38)    CENTRAL LINE: [ ]x YES [ ] NO  LOCATION:   DATE INSERTED:  REMOVE: [ ] YES [ ] xNO  EXPLAIN:    ARMAS: [ ] YES [x ] NO    DATE INSERTED:  REMOVE: [ ] YES [ ] NO  EXPLAIN:    A-LINE: [ ] YES [x ] NO  LOCATION:   DATE INSERTED:  REMOVE: [ ] YES [ ] NO  EXPLAIN:    PAST MEDICAL & SURGICAL HISTORY:  Mitral regurgitation: severe MR  Cardiomyopathy, nonischemic  CAD (coronary artery disease)  Asthma  Atrial fibrillation  Heart disease  S/P laparoscopic cholecystectomy  History of humerus fracture: Metal pins in Left Humerus  Artificial pacemaker      ICU Vital Signs Last 24 Hrs  T(C): 37.2, Max: 37.7 (06-14 @ 22:00)  T(F): 98.9, Max: 99.9 (06-14 @ 22:00)  HR: 122 (76 - 123)  BP: 107/80 (100/68 - 142/74)  BP(mean): 88 (79 - 103)  ABP: --  ABP(mean): --  RR: 26 (17 - 48)  SpO2: 100% (91% - 100%)      ABG - ( 14 Jun 2017 04:04 )  pH: 7.47  /  pCO2: 38    /  pO2: 97    / HCO3: 28    / Base Excess: 4.0   /  SaO2: 99                  I&O's Detail  I & Os for 24h ending 15 Jackson 2017 07:00  =============================================  IN:    Pivot: 1320 ml    Free Water: 1000 ml    Solution: 200 ml    Enteral Tube Flush: 120 ml    Solution: 100 ml    Total IN: 2740 ml  ---------------------------------------------  OUT:    Voided: 1850 ml    Incontinent per Condom Catheter: 525 ml    Total OUT: 2375 ml  ---------------------------------------------  Total NET: 365 ml    I & Os for current day (as of 15 Jackson 2017 19:40)  =============================================  IN:    Pivot: 550 ml    Free Water: 500 ml    Solution: 100 ml    Solution: 100 ml    Total IN: 1250 ml  ---------------------------------------------  OUT:    Voided: 2000 ml    Total OUT: 2000 ml  ---------------------------------------------  Total NET: -750 ml          Physical Exam:    Neurological:  No sensory/motor deficits    HEENT: PERRLA, EOMI, no drainage or redness    Neck: No bruits; no thyromegaly or nodules,  No JVD    Back: Normal spine flexure, No CVA tenderness, No deformity or limitation of movement    Respiratory: Breath Sounds equal & clear to auscultation, no accessory muscle use    Cardiovascular: Regular rate & rhythm, normal S1, S2; no murmurs, gallops or rubs    Gastrointestinal: Soft, non-tender, normal bowel sounds    Extremities: No peripheral edema, No cyanosis, clubbing     Vascular: Equal and normal pulses: 2+ peripheral pulses throughout    Musculoskeletal: No joint pain, swelling or deformity; no limitation of movement    Skin: No rashes    LABS:  CBC Full  -  ( 15 Jackson 2017 02:43 )  WBC Count : 16.6 K/uL  Hemoglobin : 9.1 g/dL  Hematocrit : 31.4 %  Platelet Count - Automated : 174 K/uL  Mean Cell Volume : 94.3 fl  Mean Cell Hemoglobin : 27.3 pg  Mean Cell Hemoglobin Concentration : 29.0 g/dL  Auto Neutrophil # : x  Auto Lymphocyte # : x  Auto Monocyte # : x  Auto Eosinophil # : x  Auto Basophil # : x  Auto Neutrophil % : 87.0 %  Auto Lymphocyte % : 9.0 %  Auto Monocyte % : 4.0 %  Auto Eosinophil % : x  Auto Basophil % : x    06-15    146<H>  |  101  |  62.0<H>  ----------------------------<  141<H>  4.2   |  28.0  |  0.81    Ca    8.2<L>      15 Jackson 2017 02:43  Phos  2.9     06-15  Mg     2.1     06-15            Assessment and Plan:    Neuro: GCS 14 improving . Monitor for delirium.  Continue to optimize pain control. Serial Neurologic assessments    HEENT: no issues    CV: Continue hemodynamic monitoring    Pulm: Pulmonary toilet.  Continue incentive spirometer.  Chest PT.  OOB to chair and ambulation     GI/Nutrition: Bowel Regimen, goal peg feeds    /Renal: monitor UOP. Monitor BMP.  Replete Lytes as needed  . increase FW for hypernatremia .    HEME- DVT prophylaxis, SCDs    ID: cont for course for pna and bile cx growing yeast    Lines/Tubes: x    Endo: x    Skin: x    Dispo: sicu      CRITICAL CARE TIME 40 MIN.

## 2017-06-16 LAB
ANION GAP SERPL CALC-SCNC: 13 MMOL/L — SIGNIFICANT CHANGE UP (ref 5–17)
ANISOCYTOSIS BLD QL: SLIGHT — SIGNIFICANT CHANGE UP
BUN SERPL-MCNC: 54 MG/DL — HIGH (ref 8–20)
CALCIUM SERPL-MCNC: 8 MG/DL — LOW (ref 8.6–10.2)
CHLORIDE SERPL-SCNC: 102 MMOL/L — SIGNIFICANT CHANGE UP (ref 98–107)
CO2 SERPL-SCNC: 29 MMOL/L — SIGNIFICANT CHANGE UP (ref 22–29)
CREAT SERPL-MCNC: 0.7 MG/DL — SIGNIFICANT CHANGE UP (ref 0.5–1.3)
GLUCOSE SERPL-MCNC: 155 MG/DL — HIGH (ref 70–115)
HCT VFR BLD CALC: 31.3 % — LOW (ref 42–52)
HGB BLD-MCNC: 9 G/DL — LOW (ref 14–18)
HYPOCHROMIA BLD QL: SLIGHT — SIGNIFICANT CHANGE UP
LYMPHOCYTES # BLD AUTO: 9 % — LOW (ref 20–55)
MAGNESIUM SERPL-MCNC: 1.9 MG/DL — SIGNIFICANT CHANGE UP (ref 1.6–2.6)
MCHC RBC-ENTMCNC: 27.4 PG — SIGNIFICANT CHANGE UP (ref 27–31)
MCHC RBC-ENTMCNC: 28.8 G/DL — LOW (ref 32–36)
MCV RBC AUTO: 95.4 FL — HIGH (ref 80–94)
MONOCYTES NFR BLD AUTO: 6 % — SIGNIFICANT CHANGE UP (ref 3–10)
MYELOCYTES NFR BLD: 2 % — HIGH (ref 0–0)
NEUTROPHILS NFR BLD AUTO: 83 % — HIGH (ref 37–73)
NRBC # BLD: 1 /100 — HIGH (ref 0–0)
OVALOCYTES BLD QL SMEAR: SLIGHT — SIGNIFICANT CHANGE UP
PHOSPHATE SERPL-MCNC: 2.6 MG/DL — SIGNIFICANT CHANGE UP (ref 2.4–4.7)
PLAT MORPH BLD: NORMAL — SIGNIFICANT CHANGE UP
PLATELET # BLD AUTO: 188 K/UL — SIGNIFICANT CHANGE UP (ref 150–400)
POIKILOCYTOSIS BLD QL AUTO: SLIGHT — SIGNIFICANT CHANGE UP
POTASSIUM SERPL-MCNC: 3.8 MMOL/L — SIGNIFICANT CHANGE UP (ref 3.5–5.3)
POTASSIUM SERPL-SCNC: 3.8 MMOL/L — SIGNIFICANT CHANGE UP (ref 3.5–5.3)
RBC # BLD: 3.28 M/UL — LOW (ref 4.6–6.2)
RBC # FLD: 19.7 % — HIGH (ref 11–15.6)
RBC BLD AUTO: ABNORMAL
SODIUM SERPL-SCNC: 144 MMOL/L — SIGNIFICANT CHANGE UP (ref 135–145)
VANCOMYCIN TROUGH SERPL-MCNC: 18.6 UG/ML — SIGNIFICANT CHANGE UP (ref 10–20)
WBC # BLD: 14.7 K/UL — HIGH (ref 4.8–10.8)
WBC # FLD AUTO: 14.7 K/UL — HIGH (ref 4.8–10.8)

## 2017-06-16 PROCEDURE — 99232 SBSQ HOSP IP/OBS MODERATE 35: CPT

## 2017-06-16 RX ORDER — METOPROLOL TARTRATE 50 MG
5 TABLET ORAL ONCE
Qty: 0 | Refills: 0 | Status: COMPLETED | OUTPATIENT
Start: 2017-06-16 | End: 2017-06-16

## 2017-06-16 RX ORDER — SENNA PLUS 8.6 MG/1
15 TABLET ORAL
Qty: 0 | Refills: 0 | Status: DISCONTINUED | OUTPATIENT
Start: 2017-06-16 | End: 2017-06-19

## 2017-06-16 RX ORDER — SENNA PLUS 8.6 MG/1
20 TABLET ORAL AT BEDTIME
Qty: 0 | Refills: 0 | Status: DISCONTINUED | OUTPATIENT
Start: 2017-06-16 | End: 2017-06-16

## 2017-06-16 RX ORDER — MAGNESIUM SULFATE 500 MG/ML
2 VIAL (ML) INJECTION ONCE
Qty: 0 | Refills: 0 | Status: COMPLETED | OUTPATIENT
Start: 2017-06-16 | End: 2017-06-16

## 2017-06-16 RX ORDER — SENNA PLUS 8.6 MG/1
30 TABLET ORAL AT BEDTIME
Qty: 0 | Refills: 0 | Status: DISCONTINUED | OUTPATIENT
Start: 2017-06-16 | End: 2017-06-16

## 2017-06-16 RX ORDER — DOCUSATE SODIUM 100 MG
100 CAPSULE ORAL
Qty: 0 | Refills: 0 | Status: DISCONTINUED | OUTPATIENT
Start: 2017-06-16 | End: 2017-06-16

## 2017-06-16 RX ORDER — POTASSIUM CHLORIDE 20 MEQ
20 PACKET (EA) ORAL ONCE
Qty: 0 | Refills: 0 | Status: COMPLETED | OUTPATIENT
Start: 2017-06-16 | End: 2017-06-16

## 2017-06-16 RX ADMIN — Medication 150 MILLIGRAM(S): at 00:44

## 2017-06-16 RX ADMIN — SENNA PLUS 15 MILLILITER(S): 8.6 TABLET ORAL at 22:42

## 2017-06-16 RX ADMIN — CEFEPIME 100 MILLIGRAM(S): 1 INJECTION, POWDER, FOR SOLUTION INTRAMUSCULAR; INTRAVENOUS at 22:41

## 2017-06-16 RX ADMIN — Medication 5 MILLIGRAM(S): at 22:41

## 2017-06-16 RX ADMIN — ALBUTEROL 2.5 MILLIGRAM(S): 90 AEROSOL, METERED ORAL at 03:31

## 2017-06-16 RX ADMIN — ALBUTEROL 2.5 MILLIGRAM(S): 90 AEROSOL, METERED ORAL at 15:59

## 2017-06-16 RX ADMIN — Medication 50 GRAM(S): at 06:06

## 2017-06-16 RX ADMIN — CEFEPIME 100 MILLIGRAM(S): 1 INJECTION, POWDER, FOR SOLUTION INTRAMUSCULAR; INTRAVENOUS at 15:03

## 2017-06-16 RX ADMIN — HEPARIN SODIUM 5000 UNIT(S): 5000 INJECTION INTRAVENOUS; SUBCUTANEOUS at 05:40

## 2017-06-16 RX ADMIN — Medication 100 MILLIGRAM(S): at 12:26

## 2017-06-16 RX ADMIN — Medication 10 MILLIGRAM(S): at 12:47

## 2017-06-16 RX ADMIN — CEFEPIME 100 MILLIGRAM(S): 1 INJECTION, POWDER, FOR SOLUTION INTRAMUSCULAR; INTRAVENOUS at 05:40

## 2017-06-16 RX ADMIN — HEPARIN SODIUM 5000 UNIT(S): 5000 INJECTION INTRAVENOUS; SUBCUTANEOUS at 15:03

## 2017-06-16 RX ADMIN — Medication 150 MILLIGRAM(S): at 12:26

## 2017-06-16 RX ADMIN — Medication 1 MILLILITER(S): at 03:29

## 2017-06-16 RX ADMIN — Medication 20 MILLIGRAM(S): at 17:01

## 2017-06-16 RX ADMIN — Medication 1 MILLILITER(S): at 08:21

## 2017-06-16 RX ADMIN — Medication 5 MILLIGRAM(S): at 16:11

## 2017-06-16 RX ADMIN — Medication 9 MILLIGRAM(S): at 22:42

## 2017-06-16 RX ADMIN — Medication 25 MILLIGRAM(S): at 05:40

## 2017-06-16 RX ADMIN — Medication 1 APPLICATION(S): at 12:47

## 2017-06-16 RX ADMIN — Medication 25 MILLIGRAM(S): at 17:01

## 2017-06-16 RX ADMIN — PANTOPRAZOLE SODIUM 40 MILLIGRAM(S): 20 TABLET, DELAYED RELEASE ORAL at 05:40

## 2017-06-16 RX ADMIN — Medication 1 MILLILITER(S): at 15:59

## 2017-06-16 RX ADMIN — FLUCONAZOLE 100 MILLIGRAM(S): 150 TABLET ORAL at 15:03

## 2017-06-16 RX ADMIN — PANTOPRAZOLE SODIUM 40 MILLIGRAM(S): 20 TABLET, DELAYED RELEASE ORAL at 17:01

## 2017-06-16 RX ADMIN — Medication 20 MILLIEQUIVALENT(S): at 06:06

## 2017-06-16 RX ADMIN — ALBUTEROL 2.5 MILLIGRAM(S): 90 AEROSOL, METERED ORAL at 08:21

## 2017-06-16 RX ADMIN — HEPARIN SODIUM 5000 UNIT(S): 5000 INJECTION INTRAVENOUS; SUBCUTANEOUS at 22:41

## 2017-06-16 RX ADMIN — Medication 1 MILLILITER(S): at 20:27

## 2017-06-16 RX ADMIN — Medication 10 MILLIGRAM(S): at 16:11

## 2017-06-16 RX ADMIN — Medication 100 MILLIGRAM(S): at 05:41

## 2017-06-16 RX ADMIN — Medication 100 MILLIGRAM(S): at 12:27

## 2017-06-16 RX ADMIN — Medication 20 MILLIGRAM(S): at 05:40

## 2017-06-16 RX ADMIN — Medication 5 MILLIGRAM(S): at 00:51

## 2017-06-16 RX ADMIN — Medication 0.12 MILLIGRAM(S): at 05:40

## 2017-06-16 RX ADMIN — Medication 50 MILLIGRAM(S): at 22:41

## 2017-06-16 RX ADMIN — Medication 5 MILLIGRAM(S): at 10:25

## 2017-06-16 RX ADMIN — Medication 4 MILLIGRAM(S): at 22:41

## 2017-06-16 RX ADMIN — Medication 1 MILLIGRAM(S): at 12:27

## 2017-06-16 RX ADMIN — ALBUTEROL 2.5 MILLIGRAM(S): 90 AEROSOL, METERED ORAL at 20:27

## 2017-06-16 NOTE — PHYSICAL THERAPY INITIAL EVALUATION ADULT - PASSIVE RANGE OF MOTION EXAMINATION, REHAB EVAL
bilateral upper extremity Passive ROM was WFL (within functional limits)/bilateral lower extremity Passive ROM was WFL (within functional limits)

## 2017-06-16 NOTE — PHYSICAL THERAPY INITIAL EVALUATION ADULT - PLANNED THERAPY INTERVENTIONS, PT EVAL
balance training/postural re-education/gait training/transfer training/bed mobility training/ROM/strengthening
postural re-education/gait training/bed mobility training/strengthening/transfer training/ROM/balance training
balance training/gait training/postural re-education/transfer training/ROM/bed mobility training/strengthening

## 2017-06-16 NOTE — PHYSICAL THERAPY INITIAL EVALUATION ADULT - IMPAIRMENTS CONTRIBUTING IMPAIRED BED MOBILITY, REHAB EVAL
impaired balance/impaired postural control/decreased strength/decreased ROM
impaired motor control/impaired postural control/decreased strength

## 2017-06-16 NOTE — PHYSICAL THERAPY INITIAL EVALUATION ADULT - MANUAL MUSCLE TESTING RESULTS, REHAB EVAL
not tested due to/see OT eval for UEs, right LE 2/5 throughout with exception to hip flexion 1/5, left LE 0/5
see OT eval for UEs, right LE 2/5 throughout with exception to hip flexion 1/5, left LE 0/5/not tested due to
not tested due to/inability to follow commands.

## 2017-06-16 NOTE — PROGRESS NOTE ADULT - SUBJECTIVE AND OBJECTIVE BOX
INTERVAL HPI/OVERNIGHT EVENTS:  no events overnight.      PRESSORS: [ ] YES [x ] NO  WHICH:  DOSE:    ANTIBIOTICS:             fluconazole     DATE STARTED:  ANTIBIOTICS:       vanco           DATE STARTED:  ANTIBIOTICS:               cefepime   DATE STARTED:    MEDICATIONS  (STANDING):  thiamine 100milliGRAM(s) Oral daily  amantadine Syrup 100milliGRAM(s) Oral <User Schedule>  heparin  Injectable 5000Unit(s) SubCutaneous every 8 hours  pantoprazole  Injectable 40milliGRAM(s) IV Push two times a day  doxazosin 4milliGRAM(s) Oral at bedtime  collagenase Ointment 1Application(s) Topical daily  furosemide    Tablet 20milliGRAM(s) Oral two times a day  FLUoxetine Solution 10milliGRAM(s) Oral daily  cefepime  IVPB 2000milliGRAM(s) IV Intermittent every 8 hours  folic acid 1milliGRAM(s) Enteral Tube daily  metoprolol 25milliGRAM(s) Enteral Tube two times a day  traZODone 50milliGRAM(s) Oral at bedtime  digoxin     Tablet 0.125milliGRAM(s) Oral daily  vancomycin  IVPB 750milliGRAM(s) IV Intermittent every 12 hours  melatonin 9milliGRAM(s) Oral at bedtime  acetylcysteine 20% Inhalation 1milliLiter(s) Inhalation every 6 hours  ALBUTerol    0.083% 2.5milliGRAM(s) Nebulizer every 6 hours  senna Syrup 20milliLiter(s) Oral at bedtime    MEDICATIONS  (PRN):      Drug Dosing Weight  Height (cm): 165.1 (12 Jun 2017 06:38)  Weight (kg): 81.5 (12 Jun 2017 06:38)  BMI (kg/m2): 29.9 (12 Jun 2017 06:38)  BSA (m2): 1.89 (12 Jun 2017 06:38)    CENTRAL LINE: [ ] YES [ x] NO  LOCATION:   DATE INSERTED:  REMOVE: [ ] YES [ ] NO  EXPLAIN:    ARMAS: [ ] YES [ ] NOx    DATE INSERTED:  REMOVE: [ ] YES [ ] NO  EXPLAIN:    A-LINE: [ ] YES [ ] NOx  LOCATION:   DATE INSERTED:  REMOVE: [ ] YES [ ] NO  EXPLAIN:    PAST MEDICAL & SURGICAL HISTORY:  Mitral regurgitation: severe MR  Cardiomyopathy, nonischemic  CAD (coronary artery disease)  Asthma  Atrial fibrillation  Heart disease  S/P laparoscopic cholecystectomy  History of humerus fracture: Metal pins in Left Humerus  Artificial pacemaker      ICU Vital Signs Last 24 Hrs  T(C): 36.6, Max: 38.1 (06-16 @ 00:00)  T(F): 97.8, Max: 100.5 (06-16 @ 00:00)  HR: 99 (92 - 135)  BP: 112/86 (105/75 - 141/91)  BP(mean): 94 (86 - 106)  ABP: --  ABP(mean): --  RR: 45 (14 - 45)  SpO2: 100% (96% - 100%)      ABG - ( 15 Jackson 2017 22:29 )  pH: 7.51  /  pCO2: 39    /  pO2: 59    / HCO3: 31    / Base Excess: 7.4   /  SaO2: 92                  I&O's Detail  I & Os for 24h ending 16 Jun 2017 07:00  =============================================  IN:    Pivot: 1155 ml    Free Water: 500 ml    Solution: 200 ml    Solution: 150 ml    Solution: 100 ml    Solution: 50 ml    Total IN: 2155 ml  ---------------------------------------------  OUT:    Voided: 3700 ml    Total OUT: 3700 ml  ---------------------------------------------  Total NET: -1545 ml    I & Os for current day (as of 16 Jun 2017 16:34)  =============================================  IN:    Pivot: 495 ml    Free Water: 250 ml    Solution: 150 ml    Solution: 100 ml    Solution: 100 ml    Total IN: 1095 ml  ---------------------------------------------  OUT:    Voided: 800 ml    Total OUT: 800 ml  ---------------------------------------------  Total NET: 295 ml          Physical Exam:    Neurological:  No sensory/motor deficits    HEENT: PERRLA, EOMI, no drainage or redness    Neck: No bruits; no thyromegaly or nodules,  No JVD    Back: Normal spine flexure, No CVA tenderness, No deformity or limitation of movement    Respiratory: Breath Sounds equal & clear to auscultation, no accessory muscle use    Cardiovascular: Regular rate & rhythm, normal S1, S2; no murmurs, gallops or rubs    Gastrointestinal: Soft, non-tender, normal bowel sounds    Extremities: No peripheral edema, No cyanosis, clubbing     Vascular: Equal and normal pulses: 2+ peripheral pulses throughout    Musculoskeletal: No joint pain, swelling or deformity; no limitation of movement    Skin: No rashes    LABS:  CBC Full  -  ( 16 Jun 2017 05:57 )  WBC Count : 14.7 K/uL  Hemoglobin : 9.0 g/dL  Hematocrit : 31.3 %  Platelet Count - Automated : 188 K/uL  Mean Cell Volume : 95.4 fl  Mean Cell Hemoglobin : 27.4 pg  Mean Cell Hemoglobin Concentration : 28.8 g/dL  Auto Neutrophil # : x  Auto Lymphocyte # : x  Auto Monocyte # : x  Auto Eosinophil # : x  Auto Basophil # : x  Auto Neutrophil % : 83.0 %  Auto Lymphocyte % : 9.0 %  Auto Monocyte % : 6.0 %  Auto Eosinophil % : x  Auto Basophil % : x    06-16    144  |  102  |  54.0<H>  ----------------------------<  155<H>  3.8   |  29.0  |  0.70    Ca    8.0<L>      16 Jun 2017 04:29  Phos  2.6     06-16  Mg     1.9     06-16            Assessment and Plan:    Neuro: GCS 14. Monitor for delirium.  Continue to optimize pain control. Serial Neurologic assessments.     HEENT: no issues    CV: Continue hemodynamic monitoring, consult cardio for possible end stage LV failure    Pulm: Pulmonary toilet.  Continue incentive spirometer.  Chest PT.  Encourage OOB to chair and ambulation     GI/Nutrition: Bowel Regimen    /Renal: monitor UOP. Monitor BMP.  Replete Lytes as needed      HEME- DVT prophylaxis, SCDs    ID: cont course for pna, bile cx    Lines/Tubes: removed tlc today.    Endo: x      Skin: x    Dispo: sicu

## 2017-06-16 NOTE — PROGRESS NOTE ADULT - SUBJECTIVE AND OBJECTIVE BOX
No overnight events.  Patient in bed with 1:1 at bedside.   Vitals - tachy.  Less interactive today, as prior.   Will perform CRS again.     FUNCTIONAL PROGRESS  Total    REVIEW OF SYSTEMS  Nonverbal      VITALS  T(C): 36.5, Max: 38.1 (06-16 @ 00:00)  HR: 132 (83 - 135)  BP: 113/78 (105/75 - 128/73)  RR: 32 (14 - 40)  SpO2: 99% (96% - 100%)  Wt(kg): --    MEDICATIONS   thiamine 100milliGRAM(s) daily  amantadine Syrup 100milliGRAM(s) <User Schedule>  heparin  Injectable 5000Unit(s) every 8 hours  pantoprazole  Injectable 40milliGRAM(s) two times a day  doxazosin 4milliGRAM(s) at bedtime  collagenase Ointment 1Application(s) daily  furosemide    Tablet 20milliGRAM(s) two times a day  FLUoxetine Solution 10milliGRAM(s) daily  cefepime  IVPB 2000milliGRAM(s) every 8 hours  fluconAZOLE IVPB 200milliGRAM(s) every 24 hours  folic acid 1milliGRAM(s) daily  metoprolol 25milliGRAM(s) two times a day  traZODone 50milliGRAM(s) at bedtime  digoxin     Tablet 0.125milliGRAM(s) daily  vancomycin  IVPB 750milliGRAM(s) every 12 hours  melatonin 9milliGRAM(s) at bedtime  acetylcysteine 20% Inhalation 1milliLiter(s) every 6 hours  ALBUTerol    0.083% 2.5milliGRAM(s) every 6 hours  metoprolol Injectable 5milliGRAM(s) once      RECENT LABS/IMAGING  CBC Full  -  ( 16 Jun 2017 05:57 )  WBC Count : 14.7 K/uL  Hemoglobin : 9.0 g/dL  Hematocrit : 31.3 %  Platelet Count - Automated : 188 K/uL  Mean Cell Volume : 95.4 fl  Mean Cell Hemoglobin : 27.4 pg  Mean Cell Hemoglobin Concentration : 28.8 g/dL  Auto Neutrophil # : x  Auto Lymphocyte # : x  Auto Monocyte # : x  Auto Eosinophil # : x  Auto Basophil # : x  Auto Neutrophil % : 83.0 %  Auto Lymphocyte % : 9.0 %  Auto Monocyte % : 6.0 %  Auto Eosinophil % : x  Auto Basophil % : x    06-16    144  |  102  |  54.0<H>  ----------------------------<  155<H>  3.8   |  29.0  |  0.70    Ca    8.0<L>      16 Jun 2017 04:29  Phos  2.6     06-16  Mg     1.9     06-16      Coma Recovery Scale - Revised    AUDITORY FUNCTION SCALE  2 - Localization to Sound  VISUAL FUNCTION SCALE  3 - Localization to Noxious Stimulation  MOTOR FUNCTION SCALE  4 - Object Manipulation *  OROMOTOR/VERBAL FUNCTION SCALE  0 - None  COMMUNICATION SCALE  0 - None  AROUSAL SCALE  2 - Eye Opening w/o Stimulation    TOTAL SCORE - 11      ----------------------------------------------------------------------------------------  ASSESSMENT/PLAN  51M with cardiac arrest s/p ERCP for bile duct leak with prolonged hospitalization related to prolonged intubation and cognitive/behavioral deficits.    Appears more related to encephalopathy based neurological process.     Arousal/Sleep wake cycle - Amantadine, Melatonin, Trazodone    *** Recommend when awake to remove mittens and have 1:1 and provide object for functional use and manipulation.     Mood/Motor recovery - Prozac 10mg daily     Rehab - PT/OT for 3x/week program for ongoing daily stimulation.

## 2017-06-16 NOTE — PHYSICAL THERAPY INITIAL EVALUATION ADULT - LEVEL OF INDEPENDENCE: SIT/SUPINE, REHAB EVAL
dependent (less than 25% patients effort)
maximum assist (25% patients effort)
Pt. OOB in chair via jennifer/unable to perform

## 2017-06-16 NOTE — PHYSICAL THERAPY INITIAL EVALUATION ADULT - BALANCE DISTURBANCE, IDENTIFIED IMPAIRMENT CONTRIBUTE, REHAB EVAL
decreased ROM/impaired postural control/decreased strength
decreased strength/impaired postural control

## 2017-06-16 NOTE — PHYSICAL THERAPY INITIAL EVALUATION ADULT - MUSCLE TONE ASSESSMENT, REHAB EVAL
moderately decreased tone/left-side extremities/right-side extremities/mildly decreased tone
left-side extremities/moderately decreased tone/mildly decreased tone/right-side extremities

## 2017-06-16 NOTE — PHYSICAL THERAPY INITIAL EVALUATION ADULT - FUNCTIONAL LIMITATIONS, PT EVAL
community/leisure/self-care/home management
home management/self-care/community/leisure
self-care/community/leisure/home management

## 2017-06-16 NOTE — PHYSICAL THERAPY INITIAL EVALUATION ADULT - BED MOBILITY LIMITATIONS, REHAB EVAL
impaired ability to control trunk for mobility/decreased ability to use arms for pushing/pulling/decreased ability to use legs for bridging/pushing
decreased ability to use arms for pushing/pulling/decreased ability to use legs for bridging/pushing/impaired ability to control trunk for mobility

## 2017-06-16 NOTE — PHYSICAL THERAPY INITIAL EVALUATION ADULT - REHAB POTENTIAL, PT EVAL
good, to achieve stated therapy goals
fair, will monitor progress closely
good, to achieve stated therapy goals

## 2017-06-16 NOTE — PHYSICAL THERAPY INITIAL EVALUATION ADULT - CRITERIA FOR SKILLED THERAPEUTIC INTERVENTIONS
anticipated discharge recommendation/rehab potential/predicted duration of therapy intervention/anticipated equipment needs at discharge/impairments found/risk reduction/prevention/functional limitations in following categories/therapy frequency
impairments found/therapy frequency
impairments found

## 2017-06-16 NOTE — PHYSICAL THERAPY INITIAL EVALUATION ADULT - ADDITIONAL COMMENTS
Unable to determine PLOF- pt with expressive aphasia and unable to report. Per care coordination note shortly after hospital admission; pt lives in a house with his family, he is unemployed and does not have any stairs.
Unable to determine PLOF- pt with expressive aphasia and unable to report. Per care coordination note shortly after hospital admission; pt lives in a house with his family, he is unemployed and does not have any stairs.
unable to determine PLOF, pt with expressive aphasia. Per care coordination note shortly after hospital admission; pt lives in a house with his family, he is unemployed and does not have any stairs.

## 2017-06-16 NOTE — PHYSICAL THERAPY INITIAL EVALUATION ADULT - PERTINENT HX OF CURRENT PROBLEM, REHAB EVAL
Pt is a 52 y/o male who was discharged home after lap cholecystectomy and returned to hospital with c/o abdominal pain. Pt went septic 2/2 bile duct leak which was complicated by cardiac arrest and now critical illness myopathy vs. anoxic brain injury vs. encephalopathy. Pt. re-intubated 6/1/17 due to GIB and then re-intubated again 6/12/17 after PEG tube placement with difficulty extubating.

## 2017-06-16 NOTE — PHYSICAL THERAPY INITIAL EVALUATION ADULT - IMPAIRMENTS FOUND, PT EVAL
muscle strength/gait, locomotion, and balance/ROM/aerobic capacity/endurance
gait, locomotion, and balance/aerobic capacity/endurance/muscle strength/ROM
aerobic capacity/endurance/ROM/gait, locomotion, and balance/muscle strength

## 2017-06-16 NOTE — PHYSICAL THERAPY INITIAL EVALUATION ADULT - ACTIVE RANGE OF MOTION EXAMINATION, REHAB EVAL
see OT for UEs, LEs limited by weakness
see OT for UEs, LEs limited by weakness
spontaneous movement b/l UE to mid range. 50% of the time pt able to squeeze hand, flex elbow, and bend knees in supine.

## 2017-06-17 LAB
ANION GAP SERPL CALC-SCNC: 16 MMOL/L — SIGNIFICANT CHANGE UP (ref 5–17)
BASE EXCESS BLDA CALC-SCNC: 5.5 MMOL/L — HIGH (ref -3–3)
BLOOD GAS COMMENTS ARTERIAL: SIGNIFICANT CHANGE UP
BUN SERPL-MCNC: 61 MG/DL — HIGH (ref 8–20)
CALCIUM SERPL-MCNC: 8.1 MG/DL — LOW (ref 8.6–10.2)
CHLORIDE SERPL-SCNC: 102 MMOL/L — SIGNIFICANT CHANGE UP (ref 98–107)
CO2 SERPL-SCNC: 25 MMOL/L — SIGNIFICANT CHANGE UP (ref 22–29)
CREAT SERPL-MCNC: 0.86 MG/DL — SIGNIFICANT CHANGE UP (ref 0.5–1.3)
GAS PNL BLDA: SIGNIFICANT CHANGE UP
GLUCOSE SERPL-MCNC: 150 MG/DL — HIGH (ref 70–115)
HCO3 BLDA-SCNC: 29 MMOL/L — HIGH (ref 20–26)
HCT VFR BLD CALC: 32.3 % — LOW (ref 42–52)
HGB BLD-MCNC: 9.3 G/DL — LOW (ref 14–18)
HOROWITZ INDEX BLDA+IHG-RTO: 3 — SIGNIFICANT CHANGE UP
MAGNESIUM SERPL-MCNC: 2.2 MG/DL — SIGNIFICANT CHANGE UP (ref 1.6–2.6)
MCHC RBC-ENTMCNC: 27.1 PG — SIGNIFICANT CHANGE UP (ref 27–31)
MCHC RBC-ENTMCNC: 28.8 G/DL — LOW (ref 32–36)
MCV RBC AUTO: 94.2 FL — HIGH (ref 80–94)
PCO2 BLDA: 37 MMHG — SIGNIFICANT CHANGE UP (ref 35–45)
PH BLDA: 7.5 — HIGH (ref 7.35–7.45)
PHOSPHATE SERPL-MCNC: 2.7 MG/DL — SIGNIFICANT CHANGE UP (ref 2.4–4.7)
PLATELET # BLD AUTO: 204 K/UL — SIGNIFICANT CHANGE UP (ref 150–400)
PO2 BLDA: 60 MMHG — LOW (ref 83–108)
POTASSIUM SERPL-MCNC: 4.6 MMOL/L — SIGNIFICANT CHANGE UP (ref 3.5–5.3)
POTASSIUM SERPL-SCNC: 4.6 MMOL/L — SIGNIFICANT CHANGE UP (ref 3.5–5.3)
RBC # BLD: 3.43 M/UL — LOW (ref 4.6–6.2)
RBC # FLD: 19.7 % — HIGH (ref 11–15.6)
SAO2 % BLDA: 92 % — LOW (ref 95–99)
SODIUM SERPL-SCNC: 143 MMOL/L — SIGNIFICANT CHANGE UP (ref 135–145)
WBC # BLD: 16.3 K/UL — HIGH (ref 4.8–10.8)
WBC # FLD AUTO: 16.3 K/UL — HIGH (ref 4.8–10.8)

## 2017-06-17 RX ORDER — ACETAMINOPHEN 500 MG
650 TABLET ORAL EVERY 6 HOURS
Qty: 0 | Refills: 0 | Status: DISCONTINUED | OUTPATIENT
Start: 2017-06-17 | End: 2017-08-02

## 2017-06-17 RX ORDER — METOPROLOL TARTRATE 50 MG
50 TABLET ORAL
Qty: 0 | Refills: 0 | Status: DISCONTINUED | OUTPATIENT
Start: 2017-06-17 | End: 2017-07-14

## 2017-06-17 RX ADMIN — HEPARIN SODIUM 5000 UNIT(S): 5000 INJECTION INTRAVENOUS; SUBCUTANEOUS at 14:08

## 2017-06-17 RX ADMIN — Medication 650 MILLIGRAM(S): at 17:30

## 2017-06-17 RX ADMIN — Medication 1 APPLICATION(S): at 11:45

## 2017-06-17 RX ADMIN — SENNA PLUS 15 MILLILITER(S): 8.6 TABLET ORAL at 06:31

## 2017-06-17 RX ADMIN — ALBUTEROL 2.5 MILLIGRAM(S): 90 AEROSOL, METERED ORAL at 15:27

## 2017-06-17 RX ADMIN — ALBUTEROL 2.5 MILLIGRAM(S): 90 AEROSOL, METERED ORAL at 03:12

## 2017-06-17 RX ADMIN — Medication 20 MILLIGRAM(S): at 06:31

## 2017-06-17 RX ADMIN — Medication 50 MILLIGRAM(S): at 17:28

## 2017-06-17 RX ADMIN — SENNA PLUS 15 MILLILITER(S): 8.6 TABLET ORAL at 21:17

## 2017-06-17 RX ADMIN — Medication 1 MILLILITER(S): at 08:19

## 2017-06-17 RX ADMIN — CEFEPIME 100 MILLIGRAM(S): 1 INJECTION, POWDER, FOR SOLUTION INTRAMUSCULAR; INTRAVENOUS at 21:17

## 2017-06-17 RX ADMIN — Medication 1 MILLILITER(S): at 15:27

## 2017-06-17 RX ADMIN — Medication 1 MILLILITER(S): at 03:12

## 2017-06-17 RX ADMIN — PANTOPRAZOLE SODIUM 40 MILLIGRAM(S): 20 TABLET, DELAYED RELEASE ORAL at 17:28

## 2017-06-17 RX ADMIN — Medication 20 MILLIGRAM(S): at 17:29

## 2017-06-17 RX ADMIN — Medication 100 MILLIGRAM(S): at 11:44

## 2017-06-17 RX ADMIN — PANTOPRAZOLE SODIUM 40 MILLIGRAM(S): 20 TABLET, DELAYED RELEASE ORAL at 06:31

## 2017-06-17 RX ADMIN — CEFEPIME 100 MILLIGRAM(S): 1 INJECTION, POWDER, FOR SOLUTION INTRAMUSCULAR; INTRAVENOUS at 06:30

## 2017-06-17 RX ADMIN — Medication 1 MILLILITER(S): at 20:08

## 2017-06-17 RX ADMIN — Medication 100 MILLIGRAM(S): at 06:30

## 2017-06-17 RX ADMIN — Medication 150 MILLIGRAM(S): at 11:44

## 2017-06-17 RX ADMIN — Medication 0.12 MILLIGRAM(S): at 06:30

## 2017-06-17 RX ADMIN — ALBUTEROL 2.5 MILLIGRAM(S): 90 AEROSOL, METERED ORAL at 08:19

## 2017-06-17 RX ADMIN — ALBUTEROL 2.5 MILLIGRAM(S): 90 AEROSOL, METERED ORAL at 20:08

## 2017-06-17 RX ADMIN — Medication 4 MILLIGRAM(S): at 21:18

## 2017-06-17 RX ADMIN — Medication 25 MILLIGRAM(S): at 06:31

## 2017-06-17 RX ADMIN — HEPARIN SODIUM 5000 UNIT(S): 5000 INJECTION INTRAVENOUS; SUBCUTANEOUS at 21:18

## 2017-06-17 RX ADMIN — Medication 9 MILLIGRAM(S): at 21:18

## 2017-06-17 RX ADMIN — Medication 50 MILLIGRAM(S): at 21:18

## 2017-06-17 RX ADMIN — HEPARIN SODIUM 5000 UNIT(S): 5000 INJECTION INTRAVENOUS; SUBCUTANEOUS at 06:31

## 2017-06-17 RX ADMIN — CEFEPIME 100 MILLIGRAM(S): 1 INJECTION, POWDER, FOR SOLUTION INTRAMUSCULAR; INTRAVENOUS at 14:08

## 2017-06-17 RX ADMIN — Medication 150 MILLIGRAM(S): at 03:16

## 2017-06-17 RX ADMIN — Medication 1 MILLIGRAM(S): at 11:44

## 2017-06-17 RX ADMIN — Medication 10 MILLIGRAM(S): at 11:46

## 2017-06-17 NOTE — PROGRESS NOTE ADULT - SUBJECTIVE AND OBJECTIVE BOX
INTERVAL HPI/OVERNIGHT EVENTS:  Patient continues to show some mild improvement in neuro exam. Tachycardic on a couple occasions. Metoprolol dose increased.     PRESSORS: [ ] YES [x ] NO  WHICH:  DOSE:    ANTIBIOTICS:           DATE STARTED:  ANTIBIOTICS:       vanco           DATE STARTED:  ANTIBIOTICS:               cefepime   DATE STARTED:    MEDICATIONS  (STANDING):  thiamine 100milliGRAM(s) Oral daily  amantadine Syrup 100milliGRAM(s) Oral <User Schedule>  heparin  Injectable 5000Unit(s) SubCutaneous every 8 hours  pantoprazole  Injectable 40milliGRAM(s) IV Push two times a day  doxazosin 4milliGRAM(s) Oral at bedtime  collagenase Ointment 1Application(s) Topical daily  furosemide    Tablet 20milliGRAM(s) Oral two times a day  FLUoxetine Solution 10milliGRAM(s) Oral daily  cefepime  IVPB 2000milliGRAM(s) IV Intermittent every 8 hours  folic acid 1milliGRAM(s) Enteral Tube daily  traZODone 50milliGRAM(s) Oral at bedtime  digoxin     Tablet 0.125milliGRAM(s) Oral daily  melatonin 9milliGRAM(s) Oral at bedtime  acetylcysteine 20% Inhalation 1milliLiter(s) Inhalation every 6 hours  ALBUTerol    0.083% 2.5milliGRAM(s) Nebulizer every 6 hours  senna Syrup 15milliLiter(s) Oral two times a day  metoprolol 50milliGRAM(s) Oral two times a day    MEDICATIONS  (PRN):      Drug Dosing Weight  Height (cm): 165.1 (12 Jun 2017 06:38)  Weight (kg): 81.5 (12 Jun 2017 06:38)  BMI (kg/m2): 29.9 (12 Jun 2017 06:38)  BSA (m2): 1.89 (12 Jun 2017 06:38)    CENTRAL LINE: [ ] YES [ x] NO  LOCATION:   DATE INSERTED:  REMOVE: [ ] YES [ ] NO  EXPLAIN:    ARMAS: [ ] YES [x ] NO   DATE INSERTED:  REMOVE: [ ] YES [ ] NO  EXPLAIN:    A-LINE: [ ] YES [ x ] NO  LOCATION:   DATE INSERTED:  REMOVE: [ ] YES [ ] NO  EXPLAIN:    PAST MEDICAL & SURGICAL HISTORY:  Mitral regurgitation: severe MR  Cardiomyopathy, nonischemic  CAD (coronary artery disease)  Asthma  Atrial fibrillation  Heart disease  S/P laparoscopic cholecystectomy  History of humerus fracture: Metal pins in Left Humerus  Artificial pacemaker      ICU Vital Signs Last 24 Hrs  T(C): 36.5, Max: 37.8 (06-17 @ 00:00)  T(F): 97.7, Max: 100.1 (06-17 @ 00:00)  HR: 96 (75 - 135)  BP: 111/82 (108/89 - 141/91)  BP(mean): 93 (89 - 106)  ABP: --  ABP(mean): --  RR: 43 (28 - 46)  SpO2: 96% (96% - 100%)        ABG - ( 15 Jackson 2017 22:29 )  pH: 7.51  /  pCO2: 39    /  pO2: 59    / HCO3: 31    / Base Excess: 7.4   /  SaO2: 92        I&O's Summary  I & Os for 24h ending 17 Jun 2017 07:00  =============================================  IN: 2610 ml / OUT: 1920 ml / NET: 690 ml    I & Os for current day (as of 17 Jun 2017 12:15)  =============================================  IN: 425 ml / OUT: 400 ml / NET: 25 ml      Physical Exam:    Neurological:  No sensory/motor deficits    HEENT: PERRLA, EOMI, no drainage or redness    Neck: No bruits; no thyromegaly or nodules,  No JVD    Back: Normal spine flexure, No CVA tenderness, No deformity or limitation of movement    Respiratory: Unlabored breath     Cardiovascular: S1S2, irregular, occasional tachycardia    Gastrointestinal: midline dressing c/d/i, soft nontender    Extremities: mild edema    Skin: No rashes    CBC Full  -  ( 17 Jun 2017 04:23 )  WBC Count : 16.3 K/uL  Hemoglobin : 9.3 g/dL  Hematocrit : 32.3 %  Platelet Count - Automated : 204 K/uL  Mean Cell Volume : 94.2 fl  Mean Cell Hemoglobin : 27.1 pg  Mean Cell Hemoglobin Concentration : 28.8 g/dL  Auto Neutrophil # : x  Auto Lymphocyte # : x  Auto Monocyte # : x  Auto Eosinophil # : x  Auto Basophil # : x  Auto Neutrophil % : x  Auto Lymphocyte % : x  Auto Monocyte % : x  Auto Eosinophil % : x  Auto Basophil % : x    06-17    143  |  102  |  61.0<H>  ----------------------------<  150<H>  4.6   |  25.0  |  0.86    Ca    8.1<L>      17 Jun 2017 04:23  Phos  2.7     06-17  Mg     2.2     06-17      Assessment and Plan:    Neuro: GCS 14. Monitor for delirium.  Continue to optimize pain control. Serial Neurologic assessments.     HEENT: no issues    CV: Continue hemodynamic monitoring, consult cardio for possible end stage LV failure    Pulm: Pulmonary toilet.  Continue incentive spirometer.  Chest PT.  Encourage OOB to chair and ambulation     GI/Nutrition: Bowel Regimen added. Continue enteral feeds via PEG as tolerated    /Renal: monitor UOP. Monitor BMP.  Replete Lytes as needed      HEME- DVT prophylaxis, SCDs    ID: cont course for pna, bile cx, Abx d/c in AM    Lines/Tubes: PIV  Endo: x      Skin: x    Dispo: sicu

## 2017-06-17 NOTE — PROGRESS NOTE ADULT - ATTENDING COMMENTS
Patient seen and examined on ICU rounds.  Digoxin level, TFT for increased ectopies and tachyarrhythmias.  TF at goal.

## 2017-06-18 LAB
ANION GAP SERPL CALC-SCNC: 17 MMOL/L — SIGNIFICANT CHANGE UP (ref 5–17)
ANION GAP SERPL CALC-SCNC: 20 MMOL/L — HIGH (ref 5–17)
ANISOCYTOSIS BLD QL: SLIGHT — SIGNIFICANT CHANGE UP
BASOPHILS # BLD AUTO: 0 K/UL — SIGNIFICANT CHANGE UP (ref 0–0.2)
BASOPHILS NFR BLD AUTO: 0 % — SIGNIFICANT CHANGE UP (ref 0–2)
BUN SERPL-MCNC: 72 MG/DL — HIGH (ref 8–20)
BUN SERPL-MCNC: 73 MG/DL — HIGH (ref 8–20)
CALCIUM SERPL-MCNC: 8.2 MG/DL — LOW (ref 8.6–10.2)
CALCIUM SERPL-MCNC: 8.3 MG/DL — LOW (ref 8.6–10.2)
CHLORIDE SERPL-SCNC: 101 MMOL/L — SIGNIFICANT CHANGE UP (ref 98–107)
CHLORIDE SERPL-SCNC: 103 MMOL/L — SIGNIFICANT CHANGE UP (ref 98–107)
CO2 SERPL-SCNC: 24 MMOL/L — SIGNIFICANT CHANGE UP (ref 22–29)
CO2 SERPL-SCNC: 26 MMOL/L — SIGNIFICANT CHANGE UP (ref 22–29)
CREAT SERPL-MCNC: 0.9 MG/DL — SIGNIFICANT CHANGE UP (ref 0.5–1.3)
CREAT SERPL-MCNC: 1.06 MG/DL — SIGNIFICANT CHANGE UP (ref 0.5–1.3)
DIGOXIN SERPL-MCNC: 1.8 NG/ML — SIGNIFICANT CHANGE UP (ref 0.8–2)
ELLIPTOCYTES BLD QL SMEAR: SLIGHT — SIGNIFICANT CHANGE UP
EOSINOPHIL # BLD AUTO: 0 K/UL — SIGNIFICANT CHANGE UP (ref 0–0.5)
EOSINOPHIL NFR BLD AUTO: 0 % — SIGNIFICANT CHANGE UP (ref 0–6)
GAS PNL BLDA: SIGNIFICANT CHANGE UP
GLUCOSE SERPL-MCNC: 137 MG/DL — HIGH (ref 70–115)
GLUCOSE SERPL-MCNC: 147 MG/DL — HIGH (ref 70–115)
HCT VFR BLD CALC: 32 % — LOW (ref 42–52)
HCT VFR BLD CALC: 32.3 % — LOW (ref 42–52)
HGB BLD-MCNC: 9.1 G/DL — LOW (ref 14–18)
HGB BLD-MCNC: 9.3 G/DL — LOW (ref 14–18)
HYPOCHROMIA BLD QL: SLIGHT — SIGNIFICANT CHANGE UP
LACTATE SERPL-SCNC: 4.5 MMOL/L — CRITICAL HIGH (ref 0.5–2)
LYMPHOCYTES # BLD AUTO: 2.2 K/UL — SIGNIFICANT CHANGE UP (ref 1–4.8)
LYMPHOCYTES # BLD AUTO: 7 % — LOW (ref 20–55)
MACROCYTES BLD QL: SLIGHT — SIGNIFICANT CHANGE UP
MAGNESIUM SERPL-MCNC: 2.3 MG/DL — SIGNIFICANT CHANGE UP (ref 1.8–2.6)
MCHC RBC-ENTMCNC: 26.8 PG — LOW (ref 27–31)
MCHC RBC-ENTMCNC: 27 PG — SIGNIFICANT CHANGE UP (ref 27–31)
MCHC RBC-ENTMCNC: 28.4 G/DL — LOW (ref 32–36)
MCHC RBC-ENTMCNC: 28.8 G/DL — LOW (ref 32–36)
MCV RBC AUTO: 93.9 FL — SIGNIFICANT CHANGE UP (ref 80–94)
MCV RBC AUTO: 94.4 FL — HIGH (ref 80–94)
MICROCYTES BLD QL: SLIGHT — SIGNIFICANT CHANGE UP
MONOCYTES # BLD AUTO: 1.2 K/UL — HIGH (ref 0–0.8)
MONOCYTES NFR BLD AUTO: 5 % — SIGNIFICANT CHANGE UP (ref 3–10)
NEUTROPHILS # BLD AUTO: 14.6 K/UL — HIGH (ref 1.8–8)
NEUTROPHILS NFR BLD AUTO: 87 % — HIGH (ref 37–73)
NEUTS BAND # BLD: 1 % — SIGNIFICANT CHANGE UP (ref 0–8)
NRBC # BLD: 14 /100 — HIGH (ref 0–0)
OVALOCYTES BLD QL SMEAR: SLIGHT — SIGNIFICANT CHANGE UP
PHOSPHATE SERPL-MCNC: 3.2 MG/DL — SIGNIFICANT CHANGE UP (ref 2.4–4.7)
PLAT MORPH BLD: NORMAL — SIGNIFICANT CHANGE UP
PLATELET # BLD AUTO: 178 K/UL — SIGNIFICANT CHANGE UP (ref 150–400)
PLATELET # BLD AUTO: 208 K/UL — SIGNIFICANT CHANGE UP (ref 150–400)
POIKILOCYTOSIS BLD QL AUTO: SLIGHT — SIGNIFICANT CHANGE UP
POLYCHROMASIA BLD QL SMEAR: SLIGHT — SIGNIFICANT CHANGE UP
POTASSIUM SERPL-MCNC: 4.7 MMOL/L — SIGNIFICANT CHANGE UP (ref 3.5–5.3)
POTASSIUM SERPL-MCNC: 5 MMOL/L — SIGNIFICANT CHANGE UP (ref 3.5–5.3)
POTASSIUM SERPL-SCNC: 4.7 MMOL/L — SIGNIFICANT CHANGE UP (ref 3.5–5.3)
POTASSIUM SERPL-SCNC: 5 MMOL/L — SIGNIFICANT CHANGE UP (ref 3.5–5.3)
RBC # BLD: 3.39 M/UL — LOW (ref 4.6–6.2)
RBC # BLD: 3.44 M/UL — LOW (ref 4.6–6.2)
RBC # FLD: 19.5 % — HIGH (ref 11–15.6)
RBC # FLD: 19.8 % — HIGH (ref 11–15.6)
RBC BLD AUTO: ABNORMAL
SMUDGE CELLS # BLD: PRESENT — SIGNIFICANT CHANGE UP
SODIUM SERPL-SCNC: 145 MMOL/L — SIGNIFICANT CHANGE UP (ref 135–145)
SODIUM SERPL-SCNC: 146 MMOL/L — HIGH (ref 135–145)
T3 SERPL-MCNC: 48 NG/DL — LOW (ref 80–200)
T4 AB SER-ACNC: 5.2 UG/DL — SIGNIFICANT CHANGE UP (ref 4.5–12)
TSH SERPL-MCNC: 4.75 UIU/ML — HIGH (ref 0.27–4.2)
WBC # BLD: 18.3 K/UL — HIGH (ref 4.8–10.8)
WBC # BLD: 18.4 K/UL — HIGH (ref 4.8–10.8)
WBC # FLD AUTO: 18.3 K/UL — HIGH (ref 4.8–10.8)
WBC # FLD AUTO: 18.4 K/UL — HIGH (ref 4.8–10.8)

## 2017-06-18 PROCEDURE — 74177 CT ABD & PELVIS W/CONTRAST: CPT | Mod: 26

## 2017-06-18 PROCEDURE — 71260 CT THORAX DX C+: CPT | Mod: 26

## 2017-06-18 RX ORDER — SODIUM CHLORIDE 9 MG/ML
500 INJECTION, SOLUTION INTRAVENOUS ONCE
Qty: 0 | Refills: 0 | Status: COMPLETED | OUTPATIENT
Start: 2017-06-18 | End: 2017-06-18

## 2017-06-18 RX ADMIN — PANTOPRAZOLE SODIUM 40 MILLIGRAM(S): 20 TABLET, DELAYED RELEASE ORAL at 05:19

## 2017-06-18 RX ADMIN — Medication 50 MILLIGRAM(S): at 05:19

## 2017-06-18 RX ADMIN — Medication 0.12 MILLIGRAM(S): at 05:19

## 2017-06-18 RX ADMIN — Medication 9 MILLIGRAM(S): at 21:48

## 2017-06-18 RX ADMIN — SODIUM CHLORIDE 1000 MILLILITER(S): 9 INJECTION, SOLUTION INTRAVENOUS at 07:00

## 2017-06-18 RX ADMIN — Medication 4 MILLIGRAM(S): at 21:48

## 2017-06-18 RX ADMIN — Medication 20 MILLIGRAM(S): at 05:19

## 2017-06-18 RX ADMIN — HEPARIN SODIUM 5000 UNIT(S): 5000 INJECTION INTRAVENOUS; SUBCUTANEOUS at 05:18

## 2017-06-18 RX ADMIN — Medication 650 MILLIGRAM(S): at 13:21

## 2017-06-18 RX ADMIN — SENNA PLUS 15 MILLILITER(S): 8.6 TABLET ORAL at 18:52

## 2017-06-18 RX ADMIN — Medication 100 MILLIGRAM(S): at 05:19

## 2017-06-18 RX ADMIN — Medication 50 MILLIGRAM(S): at 21:48

## 2017-06-18 RX ADMIN — SENNA PLUS 15 MILLILITER(S): 8.6 TABLET ORAL at 05:19

## 2017-06-18 RX ADMIN — Medication 100 MILLIGRAM(S): at 13:03

## 2017-06-18 RX ADMIN — Medication 20 MILLIGRAM(S): at 18:52

## 2017-06-18 RX ADMIN — Medication 1 APPLICATION(S): at 15:05

## 2017-06-18 RX ADMIN — HEPARIN SODIUM 5000 UNIT(S): 5000 INJECTION INTRAVENOUS; SUBCUTANEOUS at 21:48

## 2017-06-18 RX ADMIN — HEPARIN SODIUM 5000 UNIT(S): 5000 INJECTION INTRAVENOUS; SUBCUTANEOUS at 13:21

## 2017-06-18 RX ADMIN — Medication 1 MILLIGRAM(S): at 13:03

## 2017-06-18 RX ADMIN — ALBUTEROL 2.5 MILLIGRAM(S): 90 AEROSOL, METERED ORAL at 03:39

## 2017-06-18 RX ADMIN — ALBUTEROL 2.5 MILLIGRAM(S): 90 AEROSOL, METERED ORAL at 20:55

## 2017-06-18 RX ADMIN — Medication 10 MILLIGRAM(S): at 13:00

## 2017-06-18 RX ADMIN — PANTOPRAZOLE SODIUM 40 MILLIGRAM(S): 20 TABLET, DELAYED RELEASE ORAL at 18:52

## 2017-06-18 RX ADMIN — ALBUTEROL 2.5 MILLIGRAM(S): 90 AEROSOL, METERED ORAL at 15:34

## 2017-06-18 RX ADMIN — Medication 50 MILLIGRAM(S): at 18:52

## 2017-06-18 RX ADMIN — ALBUTEROL 2.5 MILLIGRAM(S): 90 AEROSOL, METERED ORAL at 08:17

## 2017-06-18 NOTE — PROGRESS NOTE ADULT - SUBJECTIVE AND OBJECTIVE BOX
INTERVAL HPI/OVERNIGHT EVENTS/SUBJECTIVE:  Lopressor increased for tachycardia during the day.  OOB Temp 101.  Afebrile overnight, no acute events overnight.    ICU Vital Signs Last 24 Hrs  T(C): 37.4, Max: 38.6 (06-17 @ 17:00)  T(F): 99.4, Max: 101.4 (06-17 @ 17:00)  HR: 116 (73 - 126)  BP: 132/85 (95/64 - 142/88)  BP(mean): 103 (73 - 103)  ABP: --  ABP(mean): --  RR: 36 (20 - 43)  SpO2: 99% (94% - 100%)      I&O's Detail  I & Os for 24h ending 17 Jun 2017 07:00  =============================================  IN:    Pivot: 1210 ml    Free Water: 750 ml    Solution: 300 ml    Solution: 150 ml    Enteral Tube Flush: 100 ml    Solution: 100 ml    Total IN: 2610 ml  ---------------------------------------------  OUT:    Incontinent per Condom Catheter: 1120 ml    Voided: 800 ml    Total OUT: 1920 ml  ---------------------------------------------  Total NET: 690 ml    I & Os for current day (as of 18 Jun 2017 05:32)  =============================================  IN:    Free Water: 750 ml    Pivot: 605 ml    Solution: 150 ml    Solution: 100 ml    Total IN: 1605 ml  ---------------------------------------------  OUT:    Voided: 1300 ml    Incontinent per Condom Catheter: 450 ml    Total OUT: 1750 ml  ---------------------------------------------  Total NET: -145 ml        ABG - ( 17 Jun 2017 04:24 )  pH: 7.50  /  pCO2: 37    /  pO2: 60    / HCO3: 29    / Base Excess: 5.5   /  SaO2: 92                  MEDICATIONS  (STANDING):  thiamine 100milliGRAM(s) Oral daily  amantadine Syrup 100milliGRAM(s) Oral <User Schedule>  heparin  Injectable 5000Unit(s) SubCutaneous every 8 hours  pantoprazole  Injectable 40milliGRAM(s) IV Push two times a day  doxazosin 4milliGRAM(s) Oral at bedtime  collagenase Ointment 1Application(s) Topical daily  furosemide    Tablet 20milliGRAM(s) Oral two times a day  FLUoxetine Solution 10milliGRAM(s) Oral daily  folic acid 1milliGRAM(s) Enteral Tube daily  traZODone 50milliGRAM(s) Oral at bedtime  digoxin     Tablet 0.125milliGRAM(s) Oral daily  melatonin 9milliGRAM(s) Oral at bedtime  ALBUTerol    0.083% 2.5milliGRAM(s) Nebulizer every 6 hours  senna Syrup 15milliLiter(s) Oral two times a day  metoprolol 50milliGRAM(s) Oral two times a day    MEDICATIONS  (PRN):  acetaminophen    Suspension 650milliGRAM(s) Oral every 6 hours PRN For Temp greater than 38.5 C (101.3 F)      NUTRITION/IVF: Pivot @ 55cc/hr + Free H2O 250cc Q6/IVL    CENTRAL LINE:  No    ARMAS:   No    A-LINE:  No      PHYSICAL EXAM:    Gen: NAD, more awake and alert to surroundings    Eyes:  EOMI's BL    Neurological: Mostly 15, follows simple commands    ENMT:  MMM    Neck:  No JVD, Supple    Pulmonary:  Cough strenght increasing, CTAB, still with thick secretions when suctioned    Cardiovascular:  Paroxysmal afib    Gastrointestinal:  Softly distended, +BS, no guarding    Genitourinary:  Voids    Extremities: +1 pitting edema BL feet    Skin:  MIdline wound w/ good granulation tissue    Musculoskeletal:  AFROM x4          LABS:            RECENT CULTURES:  06-12 .Sputum Sputum XXXX   Few White blood cells  Numerous Gram Positive Cocci in Clusters  Moderate Gram positive cocci in pairs  Few Gram Negative Rods  Rare Yeast   Numerous Routine respiratory devin present              CAPILLARY BLOOD GLUCOSE      RADIOLOGY & ADDITIONAL STUDIES:    ASSESSMENT/PLAN:  51yMale presenting with:  Septic shock, Cardiac arrest, Acute on chronic CHF, lower GI bleed, Paroxysmal Afib    Neuro:  Continue to avoid deleriogenic meds, keep 1:1 for safety    CV:  Lopressor increased.  Caridology consult placed for Monday    Pulm:  Continue pulm toilet, OOBTC daily    GI/Nutrition:  Tolerating TF/  Conitnue Pivot, on bowel regimen.    /Renal:  Voids    ID:  Afebrile overnight.  Abx no off.  Increased Leukocytosis yesterday, will continue to monitor fever curve and new signs of infection.    Lines/Tubes:  PEG    Endo:  NO issues    Skin:  Daily midline wound dressing changes    Proph:  SQH    Dispo:  Continue critical care monitoring.  Leuokocytosis yesterday.        CRITICAL CARE TIME SPENT:

## 2017-06-19 LAB
ALBUMIN SERPL ELPH-MCNC: 2.7 G/DL — LOW (ref 3.3–5.2)
ALP SERPL-CCNC: 332 U/L — HIGH (ref 40–120)
ALT FLD-CCNC: 107 U/L — HIGH
ANION GAP SERPL CALC-SCNC: 15 MMOL/L — SIGNIFICANT CHANGE UP (ref 5–17)
ANION GAP SERPL CALC-SCNC: 22 MMOL/L — HIGH (ref 5–17)
ANISOCYTOSIS BLD QL: SIGNIFICANT CHANGE UP
AST SERPL-CCNC: 133 U/L — HIGH
BILIRUB SERPL-MCNC: 1.5 MG/DL — SIGNIFICANT CHANGE UP (ref 0.4–2)
BUN SERPL-MCNC: 75 MG/DL — HIGH (ref 8–20)
BUN SERPL-MCNC: 84 MG/DL — HIGH (ref 8–20)
CALCIUM SERPL-MCNC: 8.4 MG/DL — LOW (ref 8.6–10.2)
CALCIUM SERPL-MCNC: 8.6 MG/DL — SIGNIFICANT CHANGE UP (ref 8.6–10.2)
CHLORIDE SERPL-SCNC: 103 MMOL/L — SIGNIFICANT CHANGE UP (ref 98–107)
CHLORIDE SERPL-SCNC: 104 MMOL/L — SIGNIFICANT CHANGE UP (ref 98–107)
CO2 SERPL-SCNC: 24 MMOL/L — SIGNIFICANT CHANGE UP (ref 22–29)
CO2 SERPL-SCNC: 27 MMOL/L — SIGNIFICANT CHANGE UP (ref 22–29)
CREAT SERPL-MCNC: 1.03 MG/DL — SIGNIFICANT CHANGE UP (ref 0.5–1.3)
CREAT SERPL-MCNC: 1.1 MG/DL — SIGNIFICANT CHANGE UP (ref 0.5–1.3)
GAS PNL BLDA: SIGNIFICANT CHANGE UP
GGT SERPL-CCNC: 405 U/L — HIGH (ref 8–61)
GLUCOSE SERPL-MCNC: 141 MG/DL — HIGH (ref 70–115)
GLUCOSE SERPL-MCNC: 144 MG/DL — HIGH (ref 70–115)
HCT VFR BLD CALC: 33.4 % — LOW (ref 42–52)
HCT VFR BLD CALC: 33.6 % — LOW (ref 42–52)
HGB BLD-MCNC: 9.4 G/DL — LOW (ref 14–18)
HGB BLD-MCNC: 9.5 G/DL — LOW (ref 14–18)
HYPOCHROMIA BLD QL: SLIGHT — SIGNIFICANT CHANGE UP
LYMPHOCYTES # BLD AUTO: 8 % — LOW (ref 20–55)
MACROCYTES BLD QL: SLIGHT — SIGNIFICANT CHANGE UP
MAGNESIUM SERPL-MCNC: 2.4 MG/DL — SIGNIFICANT CHANGE UP (ref 1.6–2.6)
MAGNESIUM SERPL-MCNC: 2.5 MG/DL — SIGNIFICANT CHANGE UP (ref 1.6–2.6)
MCHC RBC-ENTMCNC: 26.8 PG — LOW (ref 27–31)
MCHC RBC-ENTMCNC: 26.9 PG — LOW (ref 27–31)
MCHC RBC-ENTMCNC: 28 G/DL — LOW (ref 32–36)
MCHC RBC-ENTMCNC: 28.4 G/DL — LOW (ref 32–36)
MCV RBC AUTO: 94.4 FL — HIGH (ref 80–94)
MCV RBC AUTO: 96 FL — HIGH (ref 80–94)
MICROCYTES BLD QL: SLIGHT — SIGNIFICANT CHANGE UP
MONOCYTES NFR BLD AUTO: 7 % — SIGNIFICANT CHANGE UP (ref 3–10)
MYELOCYTES NFR BLD: 1 % — HIGH (ref 0–0)
NEUTROPHILS NFR BLD AUTO: 84 % — HIGH (ref 37–73)
NRBC # BLD: 2 /100 — HIGH (ref 0–0)
NT-PROBNP SERPL-SCNC: HIGH PG/ML (ref 0–300)
OVALOCYTES BLD QL SMEAR: SLIGHT — SIGNIFICANT CHANGE UP
PHOSPHATE SERPL-MCNC: 3.1 MG/DL — SIGNIFICANT CHANGE UP (ref 2.4–4.7)
PHOSPHATE SERPL-MCNC: 3.4 MG/DL — SIGNIFICANT CHANGE UP (ref 2.4–4.7)
PLAT MORPH BLD: NORMAL — SIGNIFICANT CHANGE UP
PLATELET # BLD AUTO: 196 K/UL — SIGNIFICANT CHANGE UP (ref 150–400)
PLATELET # BLD AUTO: 226 K/UL — SIGNIFICANT CHANGE UP (ref 150–400)
POIKILOCYTOSIS BLD QL AUTO: SLIGHT — SIGNIFICANT CHANGE UP
POTASSIUM SERPL-MCNC: 4.3 MMOL/L — SIGNIFICANT CHANGE UP (ref 3.5–5.3)
POTASSIUM SERPL-MCNC: 4.3 MMOL/L — SIGNIFICANT CHANGE UP (ref 3.5–5.3)
POTASSIUM SERPL-SCNC: 4.3 MMOL/L — SIGNIFICANT CHANGE UP (ref 3.5–5.3)
POTASSIUM SERPL-SCNC: 4.3 MMOL/L — SIGNIFICANT CHANGE UP (ref 3.5–5.3)
PROCALCITONIN SERPL-MCNC: 1.38 NG/ML — HIGH (ref 0–0.04)
PROT SERPL-MCNC: 6.7 G/DL — SIGNIFICANT CHANGE UP (ref 6.6–8.7)
RBC # BLD: 3.5 M/UL — LOW (ref 4.6–6.2)
RBC # BLD: 3.54 M/UL — LOW (ref 4.6–6.2)
RBC # FLD: 19.5 % — HIGH (ref 11–15.6)
RBC # FLD: 19.5 % — HIGH (ref 11–15.6)
RBC BLD AUTO: ABNORMAL
SODIUM SERPL-SCNC: 146 MMOL/L — HIGH (ref 135–145)
SODIUM SERPL-SCNC: 149 MMOL/L — HIGH (ref 135–145)
TROPONIN T SERPL-MCNC: 0.22 NG/ML — HIGH (ref 0–0.06)
WBC # BLD: 18 K/UL — HIGH (ref 4.8–10.8)
WBC # BLD: 19.4 K/UL — HIGH (ref 4.8–10.8)
WBC # FLD AUTO: 18 K/UL — HIGH (ref 4.8–10.8)
WBC # FLD AUTO: 19.4 K/UL — HIGH (ref 4.8–10.8)

## 2017-06-19 PROCEDURE — 99291 CRITICAL CARE FIRST HOUR: CPT

## 2017-06-19 PROCEDURE — 71010: CPT | Mod: 26,77

## 2017-06-19 PROCEDURE — 99232 SBSQ HOSP IP/OBS MODERATE 35: CPT

## 2017-06-19 PROCEDURE — 71010: CPT | Mod: 26

## 2017-06-19 RX ORDER — FENTANYL CITRATE 50 UG/ML
100 INJECTION INTRAVENOUS ONCE
Qty: 0 | Refills: 0 | Status: DISCONTINUED | OUTPATIENT
Start: 2017-06-19 | End: 2017-06-19

## 2017-06-19 RX ORDER — ERTAPENEM SODIUM 1 G/1
1000 INJECTION, POWDER, LYOPHILIZED, FOR SOLUTION INTRAMUSCULAR; INTRAVENOUS ONCE
Qty: 0 | Refills: 0 | Status: COMPLETED | OUTPATIENT
Start: 2017-06-19 | End: 2017-06-19

## 2017-06-19 RX ORDER — ERTAPENEM SODIUM 1 G/1
1000 INJECTION, POWDER, LYOPHILIZED, FOR SOLUTION INTRAMUSCULAR; INTRAVENOUS EVERY 24 HOURS
Qty: 0 | Refills: 0 | Status: DISCONTINUED | OUTPATIENT
Start: 2017-06-20 | End: 2017-06-26

## 2017-06-19 RX ORDER — FLUCONAZOLE 150 MG/1
200 TABLET ORAL ONCE
Qty: 0 | Refills: 0 | Status: COMPLETED | OUTPATIENT
Start: 2017-06-19 | End: 2017-06-19

## 2017-06-19 RX ORDER — ERTAPENEM SODIUM 1 G/1
INJECTION, POWDER, LYOPHILIZED, FOR SOLUTION INTRAMUSCULAR; INTRAVENOUS
Qty: 0 | Refills: 0 | Status: DISCONTINUED | OUTPATIENT
Start: 2017-06-19 | End: 2017-06-26

## 2017-06-19 RX ORDER — ALBUMIN HUMAN 25 %
250 VIAL (ML) INTRAVENOUS ONCE
Qty: 0 | Refills: 0 | Status: COMPLETED | OUTPATIENT
Start: 2017-06-19 | End: 2017-06-19

## 2017-06-19 RX ORDER — SODIUM CHLORIDE 9 MG/ML
1000 INJECTION, SOLUTION INTRAVENOUS
Qty: 0 | Refills: 0 | Status: DISCONTINUED | OUTPATIENT
Start: 2017-06-19 | End: 2017-06-20

## 2017-06-19 RX ORDER — VANCOMYCIN HCL 1 G
1000 VIAL (EA) INTRAVENOUS EVERY 12 HOURS
Qty: 0 | Refills: 0 | Status: DISCONTINUED | OUTPATIENT
Start: 2017-06-19 | End: 2017-06-20

## 2017-06-19 RX ORDER — DEXMEDETOMIDINE HYDROCHLORIDE IN 0.9% SODIUM CHLORIDE 4 UG/ML
0.2 INJECTION INTRAVENOUS
Qty: 200 | Refills: 0 | Status: DISCONTINUED | OUTPATIENT
Start: 2017-06-19 | End: 2017-06-21

## 2017-06-19 RX ORDER — METOPROLOL TARTRATE 50 MG
5 TABLET ORAL ONCE
Qty: 0 | Refills: 0 | Status: COMPLETED | OUTPATIENT
Start: 2017-06-19 | End: 2017-06-19

## 2017-06-19 RX ORDER — KETAMINE HYDROCHLORIDE 100 MG/ML
200 INJECTION INTRAMUSCULAR; INTRAVENOUS ONCE
Qty: 0 | Refills: 0 | Status: DISCONTINUED | OUTPATIENT
Start: 2017-06-19 | End: 2017-06-19

## 2017-06-19 RX ORDER — PANTOPRAZOLE SODIUM 20 MG/1
40 TABLET, DELAYED RELEASE ORAL
Qty: 0 | Refills: 0 | Status: DISCONTINUED | OUTPATIENT
Start: 2017-06-19 | End: 2017-08-02

## 2017-06-19 RX ORDER — CALCIUM GLUCONATE 100 MG/ML
2 VIAL (ML) INTRAVENOUS
Qty: 2 | Refills: 0 | Status: COMPLETED | OUTPATIENT
Start: 2017-06-19 | End: 2017-06-19

## 2017-06-19 RX ORDER — SENNA PLUS 8.6 MG/1
10 TABLET ORAL
Qty: 0 | Refills: 0 | Status: DISCONTINUED | OUTPATIENT
Start: 2017-06-19 | End: 2017-06-24

## 2017-06-19 RX ORDER — SUCCINYLCHOLINE CHLORIDE 100 MG/5ML
100 SYRINGE (ML) INTRAVENOUS ONCE
Qty: 0 | Refills: 0 | Status: COMPLETED | OUTPATIENT
Start: 2017-06-19 | End: 2017-06-19

## 2017-06-19 RX ORDER — FLUCONAZOLE 150 MG/1
200 TABLET ORAL EVERY 24 HOURS
Qty: 0 | Refills: 0 | Status: DISCONTINUED | OUTPATIENT
Start: 2017-06-20 | End: 2017-06-21

## 2017-06-19 RX ORDER — FLUCONAZOLE 150 MG/1
TABLET ORAL
Qty: 0 | Refills: 0 | Status: DISCONTINUED | OUTPATIENT
Start: 2017-06-19 | End: 2017-06-21

## 2017-06-19 RX ORDER — ALBUMIN HUMAN 25 %
100 VIAL (ML) INTRAVENOUS ONCE
Qty: 0 | Refills: 0 | Status: DISCONTINUED | OUTPATIENT
Start: 2017-06-19 | End: 2017-06-19

## 2017-06-19 RX ADMIN — HEPARIN SODIUM 5000 UNIT(S): 5000 INJECTION INTRAVENOUS; SUBCUTANEOUS at 23:18

## 2017-06-19 RX ADMIN — Medication 10 MILLIGRAM(S): at 11:05

## 2017-06-19 RX ADMIN — Medication 200 GRAM(S): at 20:35

## 2017-06-19 RX ADMIN — Medication 50 MILLIGRAM(S): at 18:05

## 2017-06-19 RX ADMIN — ERTAPENEM SODIUM 120 MILLIGRAM(S): 1 INJECTION, POWDER, LYOPHILIZED, FOR SOLUTION INTRAMUSCULAR; INTRAVENOUS at 21:00

## 2017-06-19 RX ADMIN — Medication 20 MILLIGRAM(S): at 18:05

## 2017-06-19 RX ADMIN — ALBUTEROL 2.5 MILLIGRAM(S): 90 AEROSOL, METERED ORAL at 03:20

## 2017-06-19 RX ADMIN — Medication 200 GRAM(S): at 22:00

## 2017-06-19 RX ADMIN — Medication 5 MILLIGRAM(S): at 15:00

## 2017-06-19 RX ADMIN — DEXMEDETOMIDINE HYDROCHLORIDE IN 0.9% SODIUM CHLORIDE 6.11 MICROGRAM(S)/KG/HR: 4 INJECTION INTRAVENOUS at 19:28

## 2017-06-19 RX ADMIN — HEPARIN SODIUM 5000 UNIT(S): 5000 INJECTION INTRAVENOUS; SUBCUTANEOUS at 05:11

## 2017-06-19 RX ADMIN — ALBUTEROL 2.5 MILLIGRAM(S): 90 AEROSOL, METERED ORAL at 15:29

## 2017-06-19 RX ADMIN — SENNA PLUS 10 MILLILITER(S): 8.6 TABLET ORAL at 18:05

## 2017-06-19 RX ADMIN — Medication 4 MILLIGRAM(S): at 23:19

## 2017-06-19 RX ADMIN — Medication 100 MILLIGRAM(S): at 11:06

## 2017-06-19 RX ADMIN — Medication 20 MILLIGRAM(S): at 05:11

## 2017-06-19 RX ADMIN — Medication 100 MILLIGRAM(S): at 11:05

## 2017-06-19 RX ADMIN — Medication 1 MILLIGRAM(S): at 11:05

## 2017-06-19 RX ADMIN — FLUCONAZOLE 100 MILLIGRAM(S): 150 TABLET ORAL at 21:15

## 2017-06-19 RX ADMIN — SENNA PLUS 15 MILLILITER(S): 8.6 TABLET ORAL at 05:09

## 2017-06-19 RX ADMIN — ALBUTEROL 2.5 MILLIGRAM(S): 90 AEROSOL, METERED ORAL at 20:35

## 2017-06-19 RX ADMIN — Medication 0.12 MILLIGRAM(S): at 05:10

## 2017-06-19 RX ADMIN — Medication 1 APPLICATION(S): at 11:05

## 2017-06-19 RX ADMIN — PANTOPRAZOLE SODIUM 40 MILLIGRAM(S): 20 TABLET, DELAYED RELEASE ORAL at 18:05

## 2017-06-19 RX ADMIN — Medication 5 MILLIGRAM(S): at 16:30

## 2017-06-19 RX ADMIN — HEPARIN SODIUM 5000 UNIT(S): 5000 INJECTION INTRAVENOUS; SUBCUTANEOUS at 13:56

## 2017-06-19 RX ADMIN — Medication 125 MILLILITER(S): at 11:09

## 2017-06-19 RX ADMIN — SODIUM CHLORIDE 100 MILLILITER(S): 9 INJECTION, SOLUTION INTRAVENOUS at 19:20

## 2017-06-19 RX ADMIN — PANTOPRAZOLE SODIUM 40 MILLIGRAM(S): 20 TABLET, DELAYED RELEASE ORAL at 05:18

## 2017-06-19 RX ADMIN — Medication 100 MILLIGRAM(S): at 05:10

## 2017-06-19 RX ADMIN — ALBUTEROL 2.5 MILLIGRAM(S): 90 AEROSOL, METERED ORAL at 08:00

## 2017-06-19 NOTE — OCCUPATIONAL THERAPY INITIAL EVALUATION ADULT - LEVEL OF INDEPENDENCE: DRESS LOWER BODY, OT EVAL
dependent (less than 25% patients effort)

## 2017-06-19 NOTE — OCCUPATIONAL THERAPY INITIAL EVALUATION ADULT - LEVEL OF INDEPENDENCE: SUPINE/SIT, REHAB EVAL
dependent (less than 25% patients effort)/via jennifer lift
pt received in chair
maximum assist (25% patients effort)

## 2017-06-19 NOTE — OCCUPATIONAL THERAPY INITIAL EVALUATION ADULT - MUSCLE TONE ASSESSMENT, REHAB EVAL
severely decreased tone/bilateral upper extremities/hypotonic
severely decreased tone/bilateral upper extremities
bilateral upper extremities/hypotonic

## 2017-06-19 NOTE — OCCUPATIONAL THERAPY INITIAL EVALUATION ADULT - DIAGNOSIS, OT EVAL
Exploratory laparotomy; bile duct leak; cholangiopancreatography; pt intubated and extubated during hospital stay

## 2017-06-19 NOTE — PROGRESS NOTE ADULT - SUBJECTIVE AND OBJECTIVE BOX
INTERVAL HPI/OVERNIGHT EVENTS/SUBJECTIVE:  Pt seen and examined.  Lactate elevated, unclear cause.  Decreasing.    ICU Vital Signs Last 24 Hrs  T(C): 36.2, Max: 38.8 (06-18 @ 12:00)  T(F): 97.2, Max: 101.8 (06-18 @ 12:00)  HR: 84 (71 - 98)  BP: 103/74 (93/61 - 120/72)  BP(mean): 85 (72 - 93)  ABP: --  ABP(mean): --  RR: 32 (12 - 34)  SpO2: 97% (97% - 100%)      I&O's Detail    I & Os for current day (as of 19 Jun 2017 09:30)  =============================================  IN:    Pivot: 880 ml    Free Water: 750 ml    Enteral Tube Flush: 200 ml    Total IN: 1830 ml  ---------------------------------------------  OUT:    Incontinent per Condom Catheter: 1750 ml    Total OUT: 1750 ml  ---------------------------------------------  Total NET: 80 ml        ABG - ( 19 Jun 2017 04:18 )  pH: 7.44  /  pCO2: 44    /  pO2: 109   / HCO3: 29    / Base Excess: 4.9   /  SaO2: 100                 MEDICATIONS  (STANDING):  thiamine 100milliGRAM(s) Oral daily  amantadine Syrup 100milliGRAM(s) Oral <User Schedule>  heparin  Injectable 5000Unit(s) SubCutaneous every 8 hours  doxazosin 4milliGRAM(s) Oral at bedtime  collagenase Ointment 1Application(s) Topical daily  furosemide    Tablet 20milliGRAM(s) Oral two times a day  FLUoxetine Solution 10milliGRAM(s) Oral daily  folic acid 1milliGRAM(s) Enteral Tube daily  traZODone 50milliGRAM(s) Oral at bedtime  digoxin     Tablet 0.125milliGRAM(s) Oral daily  melatonin 9milliGRAM(s) Oral at bedtime  ALBUTerol    0.083% 2.5milliGRAM(s) Nebulizer every 6 hours  senna Syrup 15milliLiter(s) Oral two times a day  metoprolol 50milliGRAM(s) Oral two times a day  pantoprazole   Suspension 40milliGRAM(s) Oral two times a day before meals    MEDICATIONS  (PRN):  acetaminophen    Suspension 650milliGRAM(s) Oral every 6 hours PRN For Temp greater than 38.5 C (101.3 F)      NUTRITION/IVF: Tube feeds    CENTRAL LINE: N     ARMAS: N      A-LINE: N             PHYSICAL EXAM:    Gen: NAD    Eyes:  PERRLA    Neurological: Awake and alert, intermittently follows commands. Diffuse eweakness    Pulmonary:  Mild tachypnea and accessory muscle use, mild coarse BS throughout.     Cardiovascular:  Irregular    Gastrointestinal: Soft, ND, tender right side    Genitourinary: Condom cath    Extremities:  Mild pitting edema all four extremities              LABS:  CBC Full  -  ( 19 Jun 2017 04:39 )  WBC Count : 18.0 K/uL  Hemoglobin : 9.5 g/dL  Hematocrit : 33.4 %  Platelet Count - Automated : 196 K/uL  Mean Cell Volume : 94.4 fl  Mean Cell Hemoglobin : 26.8 pg  Mean Cell Hemoglobin Concentration : 28.4 g/dL  Auto Neutrophil # : x  Auto Lymphocyte # : x  Auto Monocyte # : x  Auto Eosinophil # : x  Auto Basophil # : x  Auto Neutrophil % : 84.0 %  Auto Lymphocyte % : 8.0 %  Auto Monocyte % : 7.0 %  Auto Eosinophil % : x  Auto Basophil % : x    06-19    146<H>  |  104  |  75.0<H>  ----------------------------<  141<H>  4.3   |  27.0  |  1.03    Ca    8.4<L>      19 Jun 2017 04:39  Phos  3.4     06-19  Mg     2.4     06-19    TPro  6.7  /  Alb  2.7<L>  /  TBili  1.5  /  DBili  x   /  AST  133<H>  /  ALT  107<H>  /  AlkPhos  332<H>  06-19        RECENT CULTURES:      LIVER FUNCTIONS - ( 19 Jun 2017 04:39 )  Alb: 2.7 g/dL / Pro: 6.7 g/dL / ALK PHOS: 332 U/L / ALT: 107 U/L / AST: 133 U/L / GGT: x               CAPILLARY BLOOD GLUCOSE      RADIOLOGY & ADDITIONAL STUDIES:    ASSESSMENT/PLAN:  51yMale presenting with:    Neuro:  Polyneuropathy of critical illness, anoxic brain injury delirium all contributory to encephalopathy.  Con't neuro stim and sleep wake cycle maintenance      HEENT: Tongue lac.  Repair if medically improves.      CV:  Chronic systolic heart failure.  Cards consult for further advise on management, assess for candidacy for LVAD/transplant.   Arrhythmias mostly a-fib controlled on current regimen.      Pulm:  HCAP, has finished course of ABX. No hypoxia.  Continue aggressive pulm toilet to help with secretions clearance.      GI/Nutrition:  Small intraabdominal RLQ fluid collection on CT.  Non enhancing, but seems tender in area. Unclear etiology and significance.  Consider diagnostic tap given elevated WBC count an lactate.  GI bleed, no active bleeding or ulcers at time of PEG placement.  Continue PPI.      /Renal:  Adequate UOP but BUN rising, possible volume depletion or early sepsis given increased WBC count.  Will give albumin bolus given mild hypernatremia as well.      ID:  Increased WBC and lactate.  Just finished abx for HCAP and candidiasis.  Will check procalcitonin level.  Observe off Abx for now.  Follow lactate, endpoints, clinical condition.      Proph: DVT and GI    Dispo: ICU      CRITICAL CARE TIME SPENT:  50 min INTERVAL HPI/OVERNIGHT EVENTS/SUBJECTIVE:  Pt seen and examined.  Lactate elevated, unclear cause.  Decreasing.    ICU Vital Signs Last 24 Hrs  T(C): 36.2, Max: 38.8 (06-18 @ 12:00)  T(F): 97.2, Max: 101.8 (06-18 @ 12:00)  HR: 84 (71 - 98)  BP: 103/74 (93/61 - 120/72)  BP(mean): 85 (72 - 93)  ABP: --  ABP(mean): --  RR: 32 (12 - 34)  SpO2: 97% (97% - 100%)      I&O's Detail    I & Os for current day (as of 19 Jun 2017 09:30)  =============================================  IN:    Pivot: 880 ml    Free Water: 750 ml    Enteral Tube Flush: 200 ml    Total IN: 1830 ml  ---------------------------------------------  OUT:    Incontinent per Condom Catheter: 1750 ml    Total OUT: 1750 ml  ---------------------------------------------  Total NET: 80 ml        ABG - ( 19 Jun 2017 04:18 )  pH: 7.44  /  pCO2: 44    /  pO2: 109   / HCO3: 29    / Base Excess: 4.9   /  SaO2: 100                 MEDICATIONS  (STANDING):  thiamine 100milliGRAM(s) Oral daily  amantadine Syrup 100milliGRAM(s) Oral <User Schedule>  heparin  Injectable 5000Unit(s) SubCutaneous every 8 hours  doxazosin 4milliGRAM(s) Oral at bedtime  collagenase Ointment 1Application(s) Topical daily  furosemide    Tablet 20milliGRAM(s) Oral two times a day  FLUoxetine Solution 10milliGRAM(s) Oral daily  folic acid 1milliGRAM(s) Enteral Tube daily  traZODone 50milliGRAM(s) Oral at bedtime  digoxin     Tablet 0.125milliGRAM(s) Oral daily  melatonin 9milliGRAM(s) Oral at bedtime  ALBUTerol    0.083% 2.5milliGRAM(s) Nebulizer every 6 hours  senna Syrup 15milliLiter(s) Oral two times a day  metoprolol 50milliGRAM(s) Oral two times a day  pantoprazole   Suspension 40milliGRAM(s) Oral two times a day before meals    MEDICATIONS  (PRN):  acetaminophen    Suspension 650milliGRAM(s) Oral every 6 hours PRN For Temp greater than 38.5 C (101.3 F)      NUTRITION/IVF: Tube feeds    CENTRAL LINE: N     ARMAS: N      A-LINE: N             PHYSICAL EXAM:    Gen: NAD, ill appearing    Eyes:  PERRLA    Neurological: Awake and alert, intermittently follows commands. Diffuse eweakness    Pulmonary:  Mild tachypnea and accessory muscle use, mild coarse BS throughout.     Cardiovascular:  Irregular    Gastrointestinal: Soft, ND, tender right side    Genitourinary: Condom cath    Extremities:  Mild pitting edema all four extremities              LABS:  CBC Full  -  ( 19 Jun 2017 04:39 )  WBC Count : 18.0 K/uL  Hemoglobin : 9.5 g/dL  Hematocrit : 33.4 %  Platelet Count - Automated : 196 K/uL  Mean Cell Volume : 94.4 fl  Mean Cell Hemoglobin : 26.8 pg  Mean Cell Hemoglobin Concentration : 28.4 g/dL  Auto Neutrophil # : x  Auto Lymphocyte # : x  Auto Monocyte # : x  Auto Eosinophil # : x  Auto Basophil # : x  Auto Neutrophil % : 84.0 %  Auto Lymphocyte % : 8.0 %  Auto Monocyte % : 7.0 %  Auto Eosinophil % : x  Auto Basophil % : x    06-19    146<H>  |  104  |  75.0<H>  ----------------------------<  141<H>  4.3   |  27.0  |  1.03    Ca    8.4<L>      19 Jun 2017 04:39  Phos  3.4     06-19  Mg     2.4     06-19    TPro  6.7  /  Alb  2.7<L>  /  TBili  1.5  /  DBili  x   /  AST  133<H>  /  ALT  107<H>  /  AlkPhos  332<H>  06-19        RECENT CULTURES:      LIVER FUNCTIONS - ( 19 Jun 2017 04:39 )  Alb: 2.7 g/dL / Pro: 6.7 g/dL / ALK PHOS: 332 U/L / ALT: 107 U/L / AST: 133 U/L / GGT: x               CAPILLARY BLOOD GLUCOSE      RADIOLOGY & ADDITIONAL STUDIES:    ASSESSMENT/PLAN:  51yMale presenting with:    Neuro:  Polyneuropathy of critical illness, anoxic brain injury delirium all contributory to encephalopathy.  Con't neuro stim and sleep wake cycle maintenance      HEENT: Tongue lac.  Repair if medically improves.      CV:  Chronic systolic heart failure.  Cards consult for further advise on management, assess for candidacy for LVAD/transplant.   Arrhythmias mostly a-fib controlled on current regimen.      Pulm:  HCAP, has finished course of ABX. No hypoxia.  Continue aggressive pulm toilet to help with secretions clearance.      GI/Nutrition:  Small intraabdominal RLQ fluid collection on CT.  Non enhancing, but seems tender in area. Unclear etiology and significance.  Consider diagnostic tap given elevated WBC count an lactate.  GI bleed, no active bleeding or ulcers at time of PEG placement.  Continue PPI.      /Renal:  Adequate UOP but BUN rising, possible volume depletion or early sepsis given increased WBC count.  Will give albumin bolus given mild hypernatremia as well.      ID:  Increased WBC and lactate.  Just finished abx for HCAP and candidiasis.  Will check procalcitonin level.  Observe off Abx for now.  Follow lactate, endpoints, clinical condition.      Proph: DVT and GI    Dispo: ICU      CRITICAL CARE TIME SPENT:  50 min

## 2017-06-19 NOTE — PROGRESS NOTE ADULT - SUBJECTIVE AND OBJECTIVE BOX
Patient OOB after PT and OT.   demonstrates some command following, but inconsistent. No verbalizations.   Significant fatigue and lethargy intermittent that precludes good cognitive exam.     Had another fever on 6/18. WBC continues to be elevated. Lactate decreasing.   FUNCTIONAL PROGRESS  Total A    REVIEW OF SYSTEMS  Constitutional - No fever,  +fatigue  Neurological - +loss of strength    VITALS  T(C): 36.2, Max: 38.8 (06-18 @ 12:00)  HR: 84 (71 - 98)  BP: 103/74 (93/61 - 120/72)  RR: 32 (12 - 34)  SpO2: 97% (97% - 100%)  Wt(kg): --    MEDICATIONS   thiamine 100milliGRAM(s) daily  amantadine Syrup 100milliGRAM(s) <User Schedule>  heparin  Injectable 5000Unit(s) every 8 hours  doxazosin 4milliGRAM(s) at bedtime  collagenase Ointment 1Application(s) daily  furosemide    Tablet 20milliGRAM(s) two times a day  FLUoxetine Solution 10milliGRAM(s) daily  folic acid 1milliGRAM(s) daily  traZODone 50milliGRAM(s) at bedtime  digoxin     Tablet 0.125milliGRAM(s) daily  melatonin 9milliGRAM(s) at bedtime  ALBUTerol    0.083% 2.5milliGRAM(s) every 6 hours  metoprolol 50milliGRAM(s) two times a day  acetaminophen    Suspension 650milliGRAM(s) every 6 hours PRN  pantoprazole   Suspension 40milliGRAM(s) two times a day before meals  albumin human  5% IVPB 250milliLiter(s) once  senna Syrup 10milliLiter(s) two times a day      RECENT LABS/IMAGING  CBC Full  -  ( 19 Jun 2017 04:39 )  WBC Count : 18.0 K/uL  Hemoglobin : 9.5 g/dL  Hematocrit : 33.4 %  Platelet Count - Automated : 196 K/uL  Mean Cell Volume : 94.4 fl  Mean Cell Hemoglobin : 26.8 pg  Mean Cell Hemoglobin Concentration : 28.4 g/dL  Auto Neutrophil # : x  Auto Lymphocyte # : x  Auto Monocyte # : x  Auto Eosinophil # : x  Auto Basophil # : x  Auto Neutrophil % : 84.0 %  Auto Lymphocyte % : 8.0 %  Auto Monocyte % : 7.0 %  Auto Eosinophil % : x  Auto Basophil % : x    06-19    146<H>  |  104  |  75.0<H>  ----------------------------<  141<H>  4.3   |  27.0  |  1.03    Ca    8.4<L>      19 Jun 2017 04:39  Phos  3.4     06-19  Mg     2.4     06-19    TPro  6.7  /  Alb  2.7<L>  /  TBili  1.5  /  DBili  x   /  AST  133<H>  /  ALT  107<H>  /  AlkPhos  332<H>  06-19        ----------------------------------------------------------------------------------------  PHYSICAL EXAM  Constitutional - NAD, Fatigued  Extremities - BLE and right LE edema - pitting up to thighs  Neurologic Exam -                    Cognitive - Awake, Alert, AAO to self, place, date, year, situation     Communication - Fluent, No dysarthria     Cranial Nerves - CN 2-12 intact     Motor - Difficult to assess, variable, 2/5 in EF/EE/ and DF/PF     Coordination - Significantly impaired due to motor weakness  Psychiatric - Affect Flat        CRS-R  Coma Recovery Scale - Revised    AUDITORY FUNCTION SCALE  3 - Reproducible Movement to Command *    VISUAL FUNCTION SCALE  4 - Object Localization: Reaching *    MOTOR FUNCTION SCALE  4 - Object Manipulation *    OROMOTOR/VERBAL FUNCTION SCALE  0 - None    COMMUNICATION SCALE  0 - None    AROUSAL SCALE  2 - Eye Opening w/o Stimulation    TOTAL SCORE 13      ----------------------------------------------------------------------------------------  ASSESSMENT/PLAN  51M with cardiac arrest s/p ERCP for bile duct leak with prolonged hospitalization related to prolonged intubation and cognitive/behavioral deficits.    Appears more related to encephalopathy based neurological process.     Arousal/Sleep wake cycle - Amantadine 100mg, Melatonin 9mg, Trazodone   50mg  *** Recommend when awake to remove mittens and have 1:1 and provide object for functional use and manipulation.     Mood/Motor recovery - Prozac 10mg daily     Rehab - PT/OT for 3x/week program for ongoing daily stimulation. Patient OOB after PT and OT.   demonstrates some command following, but inconsistent. No verbalizations.   Significant fatigue and lethargy intermittent that precludes good cognitive exam.     Had another fever on 6/18. WBC continues to be elevated. Lactate decreasing.   FUNCTIONAL PROGRESS  Total A    REVIEW OF SYSTEMS  Constitutional - No fever,  +fatigue  Neurological - +loss of strength    VITALS  T(C): 36.2, Max: 38.8 (06-18 @ 12:00)  HR: 84 (71 - 98)  BP: 103/74 (93/61 - 120/72)  RR: 32 (12 - 34)  SpO2: 97% (97% - 100%)  Wt(kg): --    MEDICATIONS   thiamine 100milliGRAM(s) daily  amantadine Syrup 100milliGRAM(s) <User Schedule>  heparin  Injectable 5000Unit(s) every 8 hours  doxazosin 4milliGRAM(s) at bedtime  collagenase Ointment 1Application(s) daily  furosemide    Tablet 20milliGRAM(s) two times a day  FLUoxetine Solution 10milliGRAM(s) daily  folic acid 1milliGRAM(s) daily  traZODone 50milliGRAM(s) at bedtime  digoxin     Tablet 0.125milliGRAM(s) daily  melatonin 9milliGRAM(s) at bedtime  ALBUTerol    0.083% 2.5milliGRAM(s) every 6 hours  metoprolol 50milliGRAM(s) two times a day  acetaminophen    Suspension 650milliGRAM(s) every 6 hours PRN  pantoprazole   Suspension 40milliGRAM(s) two times a day before meals  albumin human  5% IVPB 250milliLiter(s) once  senna Syrup 10milliLiter(s) two times a day      RECENT LABS/IMAGING  CBC Full  -  ( 19 Jun 2017 04:39 )  WBC Count : 18.0 K/uL  Hemoglobin : 9.5 g/dL  Hematocrit : 33.4 %  Platelet Count - Automated : 196 K/uL  Mean Cell Volume : 94.4 fl  Mean Cell Hemoglobin : 26.8 pg  Mean Cell Hemoglobin Concentration : 28.4 g/dL  Auto Neutrophil # : x  Auto Lymphocyte # : x  Auto Monocyte # : x  Auto Eosinophil # : x  Auto Basophil # : x  Auto Neutrophil % : 84.0 %  Auto Lymphocyte % : 8.0 %  Auto Monocyte % : 7.0 %  Auto Eosinophil % : x  Auto Basophil % : x    06-19    146<H>  |  104  |  75.0<H>  ----------------------------<  141<H>  4.3   |  27.0  |  1.03    Ca    8.4<L>      19 Jun 2017 04:39  Phos  3.4     06-19  Mg     2.4     06-19    TPro  6.7  /  Alb  2.7<L>  /  TBili  1.5  /  DBili  x   /  AST  133<H>  /  ALT  107<H>  /  AlkPhos  332<H>  06-19        ----------------------------------------------------------------------------------------  PHYSICAL EXAM  Constitutional - NAD, Fatigued  Extremities - BLE and right LE edema - pitting up to thighs  Neurologic Exam -                       Motor - Difficult to assess, variable, 2/5 in EF/EE/ and DF/PF     Coordination - Significantly impaired due to motor weakness  Psychiatric - Affect Flat        CRS-R  Coma Recovery Scale - Revised    AUDITORY FUNCTION SCALE  3 - Reproducible Movement to Command *    VISUAL FUNCTION SCALE  4 - Object Localization: Reaching *    MOTOR FUNCTION SCALE  4 - Object Manipulation *    OROMOTOR/VERBAL FUNCTION SCALE  0 - None    COMMUNICATION SCALE  0 - None    AROUSAL SCALE  2 - Eye Opening w/o Stimulation    TOTAL SCORE 13      ----------------------------------------------------------------------------------------  ASSESSMENT/PLAN  51M with cardiac arrest s/p ERCP for bile duct leak with prolonged hospitalization related to prolonged intubation and cognitive/behavioral deficits.    Appears more related to encephalopathy based neurological process.     Arousal/Sleep wake cycle - Amantadine 100mg, Melatonin 9mg, Trazodone   50mg  *** Recommend when awake to remove mittens and have 1:1 and provide object for functional use and manipulation.     Mood/Motor recovery - Prozac 10mg daily     Rehab - PT/OT for 3x/week program for ongoing daily stimulation.

## 2017-06-19 NOTE — OCCUPATIONAL THERAPY INITIAL EVALUATION ADULT - LEVEL OF INDEPENDENCE: BED TO CHAIR, REHAB EVAL
total assist via jennifer lift
via jennifer lift/dependent (less than 25% patients effort)
to be assessed

## 2017-06-19 NOTE — OCCUPATIONAL THERAPY INITIAL EVALUATION ADULT - LEVEL OF INDEPENDENCE: EATING, OT EVAL
dependent (less than 25% patients effort)
secondary to PEG  tube/dependent (less than 25% patients effort)
dependent (less than 25% patients effort)

## 2017-06-19 NOTE — OCCUPATIONAL THERAPY INITIAL EVALUATION ADULT - RANGE OF MOTION EXAMINATION
Left UE noted shoulder shrug and <1/4 digit flexion; right UE <1/4 shoulder flexion, 1/4 elbow flexion, 1/2-3/4 wrist and digits
bilat shoulder shrug noted, inconsistent right 1/4 elbow flexion, right digit flexion at least 1/2 range; Left UE shoulder shrug, then pt lethargic to perform other movements
bilat UEs passively WFLs; active ROM Left UE 1/4 shoulder, 1/2 elbow flexion, digit flexion; Right UE is 1/2 digit flexion, otherwise no active movement noted

## 2017-06-19 NOTE — OCCUPATIONAL THERAPY INITIAL EVALUATION ADULT - REHAB POTENTIAL, OT EVAL
fair, will monitor progress closely

## 2017-06-19 NOTE — OCCUPATIONAL THERAPY INITIAL EVALUATION ADULT - NS ASR FOLLOW COMMAND OT EVAL
able to follow single-step instructions/~10% of the time
pt attempts, but is very weak and has difficulty moving his extremities to command/25% of the time/able to follow single-step instructions
25% of the time/able to follow single-step instructions

## 2017-06-19 NOTE — PROGRESS NOTE ADULT - SUBJECTIVE AND OBJECTIVE BOX
ICU Progress Note    HPI:    S:    Pt seen and examined  I came into evening shift, Pt appeared acutely ill  + diaphoretic, dyspnec, marginally acceptable BP  TMax 102.5, this evening  Mixed resp failure on ABG, LA 8, PCT 1.8    Pt intubated first pass by pedro without difficulty  CVC, darnell placed, placed on flotrac, labs ordered    Pt family at bedside, discussed with them, aware of his critical condition    PRESSORS: [ ] YES [x ] NO  WHICH:  DOSE:    Allergies    No Known Allergies    Intolerances        MEDICATIONS  (STANDING):  thiamine 100milliGRAM(s) Oral daily  amantadine Syrup 100milliGRAM(s) Oral <User Schedule>  heparin  Injectable 5000Unit(s) SubCutaneous every 8 hours  doxazosin 4milliGRAM(s) Oral at bedtime  collagenase Ointment 1Application(s) Topical daily  furosemide    Tablet 20milliGRAM(s) Oral two times a day  FLUoxetine Solution 10milliGRAM(s) Oral daily  folic acid 1milliGRAM(s) Enteral Tube daily  traZODone 50milliGRAM(s) Oral at bedtime  digoxin     Tablet 0.125milliGRAM(s) Oral daily  melatonin 9milliGRAM(s) Oral at bedtime  ALBUTerol    0.083% 2.5milliGRAM(s) Nebulizer every 6 hours  metoprolol 50milliGRAM(s) Oral two times a day  pantoprazole   Suspension 40milliGRAM(s) Oral two times a day before meals  senna Syrup 10milliLiter(s) Oral two times a day  multiple electrolytes Injection Type 1 1000milliLiter(s) IV Continuous <Continuous>  dexmedetomidine Infusion 0.3MICROgram(s)/kG/Hr IV Continuous <Continuous>  ertapenem  IVPB  IV Intermittent   vancomycin  IVPB 1000milliGRAM(s) IV Intermittent every 12 hours  fluconAZOLE IVPB  IV Intermittent     MEDICATIONS  (PRN):  acetaminophen    Suspension 650milliGRAM(s) Oral every 6 hours PRN For Temp greater than 38.5 C (101.3 F)      Drug Dosing Weight  Height (cm): 165.1 (12 Jun 2017 06:38)  Weight (kg): 81.5 (12 Jun 2017 06:38)  BMI (kg/m2): 29.9 (12 Jun 2017 06:38)  BSA (m2): 1.89 (12 Jun 2017 06:38)    CENTRAL LINE: [ x] YES [ ] NO  LOCATION: [facility]   DATE INSERTED: 6/19  REMOVE: [ ] YES [ ] NO  EXPLAIN:    MONTOYA: [x ] YES [ ] NO    DATE INSERTED:  REMOVE: [ ] YES [ ] NO  EXPLAIN:    A-LINE: [x ] YES [ ] NO  LOCATION:   DATE INSERTED:  REMOVE: [ ] YES [ ] NO  EXPLAIN:    PAST MEDICAL & SURGICAL HISTORY:  Mitral regurgitation: severe MR  Cardiomyopathy, nonischemic  CAD (coronary artery disease)  Asthma  Atrial fibrillation  Heart disease  S/P laparoscopic cholecystectomy  History of humerus fracture: Metal pins in Left Humerus  Artificial pacemaker      FAMILY HISTORY:  No pertinent family history in first degree relatives      ROS: See HPI; otherwise, all systems reviewed and negative.    O:    ICU Vital Signs Last 24 Hrs  T(C): 37.4, Max: 37.4 (06-19 @ 19:00)  T(F): 99.4, Max: 99.4 (06-19 @ 19:00)  HR: 75 (72 - 149)  BP: 96/65 (93/61 - 144/81)  BP(mean): 77 (61 - 106)  ABP: --  ABP(mean): --  RR: 33 (12 - 44)  SpO2: 99% (93% - 100%)      ABG - ( 19 Jun 2017 18:18 )  pH: 7.53  /  pCO2: 26    /  pO2: 72    / HCO3: 25    / Base Excess: 0.3   /  SaO2: 97                  I&O's Detail  I & Os for 24h ending 19 Jun 2017 07:00  =============================================  IN:    Pivot: 935 ml    Free Water: 750 ml    Enteral Tube Flush: 200 ml    Total IN: 1885 ml  ---------------------------------------------  OUT:    Incontinent per Condom Catheter: 1750 ml    Total OUT: 1750 ml  ---------------------------------------------  Total NET: 135 ml    I & Os for current day (as of 19 Jun 2017 23:44)  =============================================  IN:    Free Water: 750 ml    Pivot: 660 ml    Solution: 250 ml    Enteral Tube Flush: 100 ml    Total IN: 1760 ml  ---------------------------------------------  OUT:    Incontinent per Condom Catheter: 800 ml    Total OUT: 800 ml  ---------------------------------------------  Total NET: 960 ml      Mode: AC/ CMV (Assist Control/ Continuous Mandatory Ventilation)  RR (machine): 14  TV (machine): 500  FiO2: 50  PEEP: 8  MAP: 15  PIP: 27      PE:    Constitutional: Chronically ill appearing M lying in bed, in NAD.  Neck: + JVD, trachea midline. + [facility] TLC noted.  Respiratory: CTA B/L good BS B/L no W/R/R.  Cardiovascular: S1S2+ RRR no M/R/G.  Gastrointestinal: Soft, NTND.  Extremities: No peripheral edema, No cyanosis, clubbing.  Neurological: Intubated, sedated.  Skin: No rashes, warm, moist.    LABS:    CBC Full  -  ( 19 Jun 2017 18:14 )  WBC Count : 19.4 K/uL  Hemoglobin : 9.4 g/dL  Hematocrit : 33.6 %  Platelet Count - Automated : 226 K/uL  Mean Cell Volume : 96.0 fl  Mean Cell Hemoglobin : 26.9 pg  Mean Cell Hemoglobin Concentration : 28.0 g/dL  Auto Neutrophil # : x  Auto Lymphocyte # : x  Auto Monocyte # : x  Auto Eosinophil # : x  Auto Basophil # : x  Auto Neutrophil % : x  Auto Lymphocyte % : x  Auto Monocyte % : x  Auto Eosinophil % : x  Auto Basophil % : x    06-19    149<H>  |  103  |  84.0<H>  ----------------------------<  144<H>  4.3   |  24.0  |  1.10    Ca    8.6      19 Jun 2017 18:14  Phos  3.1     06-19  Mg     2.5     06-19    TPro  6.7  /  Alb  2.7<L>  /  TBili  1.5  /  DBili  x   /  AST  133<H>  /  ALT  107<H>  /  AlkPhos  332<H>  06-19        CAPILLARY BLOOD GLUCOSE    CARDIAC MARKERS ( 19 Jun 2017 18:14 )  x     / 0.22 ng/mL / x     / x     / x          LIVER FUNCTIONS - ( 19 Jun 2017 04:39 )  Alb: 2.7 g/dL / Pro: 6.7 g/dL / ALK PHOS: 332 U/L / ALT: 107 U/L / AST: 133 U/L / GGT: 405 U/L         A:    51yMale  HD # ~45    Pt had cholecystectomy on 5/2  He was discharged  He came back to hospital on 5/4 with abd pain  Found to have bile leak  Had ERCP  Coded on table  His case has been complicated by infections, resp failure    Pt here at this time for:    1. Acute resp failure, mixed  2. Systolic HF, EF < 20%, suspect acute on chronic exacerbation  3. Lactic acidosis  4. Fever  5. PAF  6. Lower GIB  7. R DVT  8. L PTX    Pt now acutely ill and decompensating.    This Pt has malignant HF, will defer options to cardiology.    I think most likely cause now in acute worsening of HF with pro-BNP > 50k. However, may also be becoming septic again, as has gotten progressively worse since being off of broad spectrum abx 48 hours ago.      P:    Neuro: Analgosedation.    CV: HD monitoring via darnell, camron trac. Check ABG, ScvO2. Dobutamine after baseline, check troponins, echo for AM, Cards consult.    Pulm: MV. When as able, f/u ABG, nebs.    GI/Nutrition: Cont tube feeds.    /Renal: Monitor UOP. Monitor BMP.  Replete Lytes as needed.    HEME- DVT prophylaxis, SCDs, f/u CBC.    ID:  Abx. f/u Cx's.    Lines/Tubes: ETT, montoya, CVC, darnell.     Endo: No issues.    Skin: Skin care.    Dispo: Cont critical care.

## 2017-06-19 NOTE — OCCUPATIONAL THERAPY INITIAL EVALUATION ADULT - PLANNED THERAPY INTERVENTIONS, OT EVAL
balance training/ROM/strengthening/transfer training/ADL retraining/cognitive, visual perceptual/bed mobility training/motor coordination training/neuromuscular re-education
neuromuscular re-education/transfer training/ROM/bed mobility training/cognitive, visual perceptual/motor coordination training/ADL retraining
fine motor coordination training/ADL retraining/ROM/bed mobility training/cognitive, visual perceptual/transfer training/motor coordination training/neuromuscular re-education/IADL retraining/balance training/strengthening

## 2017-06-20 LAB
ALBUMIN SERPL ELPH-MCNC: 2.9 G/DL — LOW (ref 3.3–5.2)
ALP SERPL-CCNC: 338 U/L — HIGH (ref 40–120)
ALT FLD-CCNC: 104 U/L — HIGH
AMYLASE P1 CFR SERPL: 50 U/L — SIGNIFICANT CHANGE UP (ref 36–128)
ANION GAP SERPL CALC-SCNC: 15 MMOL/L — SIGNIFICANT CHANGE UP (ref 5–17)
ANION GAP SERPL CALC-SCNC: 16 MMOL/L — SIGNIFICANT CHANGE UP (ref 5–17)
ANISOCYTOSIS BLD QL: SLIGHT — SIGNIFICANT CHANGE UP
AST SERPL-CCNC: 133 U/L — HIGH
BASE EXCESS BLDV CALC-SCNC: -2.1 MMOL/L — SIGNIFICANT CHANGE UP (ref -3–3)
BASE EXCESS BLDV CALC-SCNC: 5.2 MMOL/L — HIGH (ref -3–3)
BASE EXCESS BLDV CALC-SCNC: 7.1 MMOL/L — HIGH (ref -3–3)
BASE EXCESS BLDV CALC-SCNC: 7.5 MMOL/L — HIGH (ref -3–3)
BILIRUB DIRECT SERPL-MCNC: 0.6 MG/DL — HIGH (ref 0–0.3)
BILIRUB INDIRECT FLD-MCNC: 0.5 MG/DL — SIGNIFICANT CHANGE UP (ref 0.2–1)
BILIRUB SERPL-MCNC: 1.1 MG/DL — SIGNIFICANT CHANGE UP (ref 0.4–2)
BUN SERPL-MCNC: 75 MG/DL — HIGH (ref 8–20)
BUN SERPL-MCNC: 79 MG/DL — HIGH (ref 8–20)
CA-I SERPL-SCNC: 1.16 MMOL/L — SIGNIFICANT CHANGE UP (ref 1.12–1.3)
CALCIUM SERPL-MCNC: 8.1 MG/DL — LOW (ref 8.6–10.2)
CALCIUM SERPL-MCNC: 9 MG/DL — SIGNIFICANT CHANGE UP (ref 8.6–10.2)
CHLORIDE BLDV-SCNC: 113 MMOL/L — HIGH (ref 98–106)
CHLORIDE SERPL-SCNC: 109 MMOL/L — HIGH (ref 98–107)
CHLORIDE SERPL-SCNC: 111 MMOL/L — HIGH (ref 98–107)
CO2 SERPL-SCNC: 26 MMOL/L — SIGNIFICANT CHANGE UP (ref 22–29)
CO2 SERPL-SCNC: 29 MMOL/L — SIGNIFICANT CHANGE UP (ref 22–29)
CREAT SERPL-MCNC: 0.86 MG/DL — SIGNIFICANT CHANGE UP (ref 0.5–1.3)
CREAT SERPL-MCNC: 0.96 MG/DL — SIGNIFICANT CHANGE UP (ref 0.5–1.3)
ELLIPTOCYTES BLD QL SMEAR: SLIGHT — SIGNIFICANT CHANGE UP
EOSINOPHIL NFR BLD AUTO: 1 % — SIGNIFICANT CHANGE UP (ref 0–6)
GAS PNL BLDA: SIGNIFICANT CHANGE UP
GAS PNL BLDA: SIGNIFICANT CHANGE UP
GAS PNL BLDV: 150 MMOL/L — HIGH (ref 136–146)
GAS PNL BLDV: SIGNIFICANT CHANGE UP
GGT SERPL-CCNC: 382 U/L — HIGH (ref 8–61)
GLUCOSE BLDV-MCNC: 122 MG/DL — HIGH (ref 70–99)
GLUCOSE SERPL-MCNC: 135 MG/DL — HIGH (ref 70–115)
GLUCOSE SERPL-MCNC: 150 MG/DL — HIGH (ref 70–115)
GRAM STN FLD: SIGNIFICANT CHANGE UP
HCO3 BLDV-SCNC: 22 MMOL/L — SIGNIFICANT CHANGE UP (ref 20–26)
HCO3 BLDV-SCNC: 28 MMOL/L — HIGH (ref 20–26)
HCO3 BLDV-SCNC: 30 MMOL/L — HIGH (ref 20–26)
HCO3 BLDV-SCNC: 30 MMOL/L — HIGH (ref 20–26)
HCT VFR BLD CALC: 31.1 % — LOW (ref 42–52)
HCT VFR BLDA CALC: 29 — LOW (ref 39–50)
HGB BLD CALC-MCNC: 9.3 G/DL — LOW (ref 13–17)
HGB BLD-MCNC: 8.7 G/DL — LOW (ref 14–18)
HYPOCHROMIA BLD QL: SLIGHT — SIGNIFICANT CHANGE UP
LACTATE BLDV-MCNC: 3 MMOL/L — HIGH (ref 0.5–2)
LIDOCAIN IGE QN: 51 U/L — SIGNIFICANT CHANGE UP (ref 22–51)
LYMPHOCYTES # BLD AUTO: 11 % — LOW (ref 20–55)
MACROCYTES BLD QL: SLIGHT — SIGNIFICANT CHANGE UP
MAGNESIUM SERPL-MCNC: 2.3 MG/DL — SIGNIFICANT CHANGE UP (ref 1.6–2.6)
MCHC RBC-ENTMCNC: 26.4 PG — LOW (ref 27–31)
MCHC RBC-ENTMCNC: 28 G/DL — LOW (ref 32–36)
MCV RBC AUTO: 94.2 FL — HIGH (ref 80–94)
MICROCYTES BLD QL: SLIGHT — SIGNIFICANT CHANGE UP
MONOCYTES NFR BLD AUTO: 5 % — SIGNIFICANT CHANGE UP (ref 3–10)
NEUTROPHILS NFR BLD AUTO: 83 % — HIGH (ref 37–73)
NRBC # BLD: 3 /100 — HIGH (ref 0–0)
NT-PROBNP SERPL-SCNC: HIGH PG/ML (ref 0–300)
OTHER CELLS CSF MANUAL: 5 ML/DL — LOW (ref 18–22)
OVALOCYTES BLD QL SMEAR: SLIGHT — SIGNIFICANT CHANGE UP
PCO2 BLDV: 34 MMHG — LOW (ref 35–50)
PCO2 BLDV: 50 MMHG — SIGNIFICANT CHANGE UP (ref 35–50)
PCO2 BLDV: 52 MMHG — HIGH (ref 35–50)
PCO2 BLDV: 56 MMHG — HIGH (ref 35–50)
PH BLDV: 7.39 — SIGNIFICANT CHANGE UP (ref 7.35–7.45)
PH BLDV: 7.4 — SIGNIFICANT CHANGE UP (ref 7.35–7.45)
PH BLDV: 7.41 — SIGNIFICANT CHANGE UP (ref 7.35–7.45)
PH BLDV: 7.42 — SIGNIFICANT CHANGE UP (ref 7.35–7.45)
PHOSPHATE SERPL-MCNC: 2.7 MG/DL — SIGNIFICANT CHANGE UP (ref 2.4–4.7)
PLAT MORPH BLD: NORMAL — SIGNIFICANT CHANGE UP
PLATELET # BLD AUTO: 190 K/UL — SIGNIFICANT CHANGE UP (ref 150–400)
PO2 BLDV: 25 MMHG — SIGNIFICANT CHANGE UP (ref 25–45)
PO2 BLDV: 28 MMHG — SIGNIFICANT CHANGE UP (ref 25–45)
PO2 BLDV: 30 MMHG — SIGNIFICANT CHANGE UP (ref 25–45)
PO2 BLDV: 33 MMHG — SIGNIFICANT CHANGE UP (ref 25–45)
POIKILOCYTOSIS BLD QL AUTO: SLIGHT — SIGNIFICANT CHANGE UP
POTASSIUM BLDV-SCNC: 4.1 MMOL/L — SIGNIFICANT CHANGE UP (ref 3.4–4.5)
POTASSIUM SERPL-MCNC: 3.6 MMOL/L — SIGNIFICANT CHANGE UP (ref 3.5–5.3)
POTASSIUM SERPL-MCNC: 3.6 MMOL/L — SIGNIFICANT CHANGE UP (ref 3.5–5.3)
POTASSIUM SERPL-SCNC: 3.6 MMOL/L — SIGNIFICANT CHANGE UP (ref 3.5–5.3)
POTASSIUM SERPL-SCNC: 3.6 MMOL/L — SIGNIFICANT CHANGE UP (ref 3.5–5.3)
PROCALCITONIN SERPL-MCNC: 1.04 NG/ML — HIGH (ref 0–0.04)
PROT SERPL-MCNC: 6.5 G/DL — LOW (ref 6.6–8.7)
RBC # BLD: 3.3 M/UL — LOW (ref 4.6–6.2)
RBC # FLD: 19.3 % — HIGH (ref 11–15.6)
RBC BLD AUTO: ABNORMAL
SAO2 % BLDV: 37 % — LOW (ref 67–88)
SAO2 % BLDV: 41 % — LOW (ref 67–88)
SAO2 % BLDV: 46 % — LOW (ref 67–88)
SAO2 % BLDV: 59 % — LOW (ref 67–88)
SODIUM SERPL-SCNC: 153 MMOL/L — HIGH (ref 135–145)
SODIUM SERPL-SCNC: 153 MMOL/L — HIGH (ref 135–145)
SPECIMEN SOURCE: SIGNIFICANT CHANGE UP
TROPONIN T SERPL-MCNC: 0.22 NG/ML — HIGH (ref 0–0.06)
WBC # BLD: 13.5 K/UL — HIGH (ref 4.8–10.8)
WBC # FLD AUTO: 13.5 K/UL — HIGH (ref 4.8–10.8)

## 2017-06-20 PROCEDURE — 71010: CPT | Mod: 26,77

## 2017-06-20 PROCEDURE — 93306 TTE W/DOPPLER COMPLETE: CPT | Mod: 26

## 2017-06-20 PROCEDURE — 99223 1ST HOSP IP/OBS HIGH 75: CPT

## 2017-06-20 PROCEDURE — 71010: CPT | Mod: 26,76

## 2017-06-20 RX ORDER — MIDODRINE HYDROCHLORIDE 2.5 MG/1
2.5 TABLET ORAL THREE TIMES A DAY
Qty: 0 | Refills: 0 | Status: DISCONTINUED | OUTPATIENT
Start: 2017-06-20 | End: 2017-06-22

## 2017-06-20 RX ORDER — SODIUM CHLORIDE 9 MG/ML
1000 INJECTION, SOLUTION INTRAVENOUS
Qty: 0 | Refills: 0 | Status: DISCONTINUED | OUTPATIENT
Start: 2017-06-20 | End: 2017-06-22

## 2017-06-20 RX ORDER — ALBUMIN HUMAN 25 %
250 VIAL (ML) INTRAVENOUS ONCE
Qty: 0 | Refills: 0 | Status: COMPLETED | OUTPATIENT
Start: 2017-06-20 | End: 2017-06-20

## 2017-06-20 RX ORDER — DOBUTAMINE HCL 250MG/20ML
1.5 VIAL (ML) INTRAVENOUS
Qty: 500 | Refills: 0 | Status: DISCONTINUED | OUTPATIENT
Start: 2017-06-20 | End: 2017-06-21

## 2017-06-20 RX ORDER — VANCOMYCIN HCL 1 G
750 VIAL (EA) INTRAVENOUS EVERY 12 HOURS
Qty: 0 | Refills: 0 | Status: DISCONTINUED | OUTPATIENT
Start: 2017-06-20 | End: 2017-06-26

## 2017-06-20 RX ORDER — POTASSIUM CHLORIDE 20 MEQ
40 PACKET (EA) ORAL ONCE
Qty: 0 | Refills: 0 | Status: COMPLETED | OUTPATIENT
Start: 2017-06-20 | End: 2017-06-20

## 2017-06-20 RX ORDER — POTASSIUM CHLORIDE 20 MEQ
20 PACKET (EA) ORAL
Qty: 0 | Refills: 0 | Status: COMPLETED | OUTPATIENT
Start: 2017-06-20 | End: 2017-06-20

## 2017-06-20 RX ORDER — DOBUTAMINE HCL 250MG/20ML
10 VIAL (ML) INTRAVENOUS
Qty: 500 | Refills: 0 | Status: DISCONTINUED | OUTPATIENT
Start: 2017-06-20 | End: 2017-06-20

## 2017-06-20 RX ADMIN — Medication 18.34 MICROGRAM(S)/KG/MIN: at 19:45

## 2017-06-20 RX ADMIN — SENNA PLUS 10 MILLILITER(S): 8.6 TABLET ORAL at 17:05

## 2017-06-20 RX ADMIN — PANTOPRAZOLE SODIUM 40 MILLIGRAM(S): 20 TABLET, DELAYED RELEASE ORAL at 16:46

## 2017-06-20 RX ADMIN — Medication 100 MILLIEQUIVALENT(S): at 05:35

## 2017-06-20 RX ADMIN — Medication 0.12 MILLIGRAM(S): at 06:19

## 2017-06-20 RX ADMIN — Medication 250 MILLIGRAM(S): at 06:18

## 2017-06-20 RX ADMIN — Medication 50 MILLIGRAM(S): at 21:54

## 2017-06-20 RX ADMIN — Medication 20 MILLIGRAM(S): at 17:05

## 2017-06-20 RX ADMIN — Medication 40 MILLIEQUIVALENT(S): at 18:44

## 2017-06-20 RX ADMIN — Medication 100 MILLIEQUIVALENT(S): at 07:57

## 2017-06-20 RX ADMIN — ALBUTEROL 2.5 MILLIGRAM(S): 90 AEROSOL, METERED ORAL at 08:18

## 2017-06-20 RX ADMIN — ERTAPENEM SODIUM 120 MILLIGRAM(S): 1 INJECTION, POWDER, LYOPHILIZED, FOR SOLUTION INTRAMUSCULAR; INTRAVENOUS at 21:56

## 2017-06-20 RX ADMIN — HEPARIN SODIUM 5000 UNIT(S): 5000 INJECTION INTRAVENOUS; SUBCUTANEOUS at 06:19

## 2017-06-20 RX ADMIN — DEXMEDETOMIDINE HYDROCHLORIDE IN 0.9% SODIUM CHLORIDE 4.08 MICROGRAM(S)/KG/HR: 4 INJECTION INTRAVENOUS at 18:39

## 2017-06-20 RX ADMIN — HEPARIN SODIUM 5000 UNIT(S): 5000 INJECTION INTRAVENOUS; SUBCUTANEOUS at 13:36

## 2017-06-20 RX ADMIN — Medication 50 MILLIGRAM(S): at 06:19

## 2017-06-20 RX ADMIN — Medication 100 MILLIGRAM(S): at 11:03

## 2017-06-20 RX ADMIN — MIDODRINE HYDROCHLORIDE 2.5 MILLIGRAM(S): 2.5 TABLET ORAL at 21:54

## 2017-06-20 RX ADMIN — Medication 125 MILLILITER(S): at 13:22

## 2017-06-20 RX ADMIN — Medication 1 MILLIGRAM(S): at 11:02

## 2017-06-20 RX ADMIN — Medication 12.22 MICROGRAM(S)/KG/MIN: at 00:06

## 2017-06-20 RX ADMIN — Medication 50 MILLIGRAM(S): at 17:05

## 2017-06-20 RX ADMIN — Medication 10 MILLIGRAM(S): at 11:02

## 2017-06-20 RX ADMIN — Medication 4 MILLIGRAM(S): at 21:54

## 2017-06-20 RX ADMIN — SENNA PLUS 10 MILLILITER(S): 8.6 TABLET ORAL at 06:19

## 2017-06-20 RX ADMIN — ALBUTEROL 2.5 MILLIGRAM(S): 90 AEROSOL, METERED ORAL at 19:28

## 2017-06-20 RX ADMIN — Medication 20 MILLIGRAM(S): at 06:19

## 2017-06-20 RX ADMIN — Medication 100 MILLIGRAM(S): at 11:02

## 2017-06-20 RX ADMIN — FLUCONAZOLE 100 MILLIGRAM(S): 150 TABLET ORAL at 21:57

## 2017-06-20 RX ADMIN — Medication 9 MILLIGRAM(S): at 21:55

## 2017-06-20 RX ADMIN — ALBUTEROL 2.5 MILLIGRAM(S): 90 AEROSOL, METERED ORAL at 14:58

## 2017-06-20 RX ADMIN — HEPARIN SODIUM 5000 UNIT(S): 5000 INJECTION INTRAVENOUS; SUBCUTANEOUS at 21:55

## 2017-06-20 RX ADMIN — Medication 150 MILLIGRAM(S): at 18:41

## 2017-06-20 RX ADMIN — PANTOPRAZOLE SODIUM 40 MILLIGRAM(S): 20 TABLET, DELAYED RELEASE ORAL at 06:20

## 2017-06-20 RX ADMIN — Medication 1 APPLICATION(S): at 11:03

## 2017-06-20 RX ADMIN — ALBUTEROL 2.5 MILLIGRAM(S): 90 AEROSOL, METERED ORAL at 03:19

## 2017-06-20 RX ADMIN — Medication 100 MILLIGRAM(S): at 06:19

## 2017-06-20 NOTE — CHART NOTE - NSCHARTNOTEFT_GEN_A_CORE
ABG improving by way of oxygenation and ventilation    LA down from ~6.5 to 4.5    He appears more comfortable    TMax 102.5, will not give APAP 2/2 transaminitis, not give NSAID's 2/2 worsening renal status; will use external cooling mechanisms    ScVO2 40%; this points to a more cardiogenic cause of his recent acute change in status    Will start dobutamine, trend via flotrac, mixed venous in AM

## 2017-06-20 NOTE — PROGRESS NOTE ADULT - SUBJECTIVE AND OBJECTIVE BOX
Full consult dictated.  Pt with HF, reduced LVEF documented since 2016. Cardiac cath negative for CAD.   prolonged hospital course,   Treated for PNA. treated for bacteremia and fungemia.   septic last night with worsening lethargy, re-intubated.  LVEF of about 20%.     Spoke with SICU-PA. Recommended the followin. Titrate dobutamine to off, since pt is on betablockers.  2. Continue with antibiotics.  3. Start full dose anticoagulation if no contraindications.  4. Diuretic therapy with lasix  5. start low dose midodrine. 2.5 mg tid  6. pt is not a candidate for LVAD or transplant.   7. He may benefit from sinus rhythm and atrial kick. Plan HANNAH/cardioversion.

## 2017-06-20 NOTE — PROGRESS NOTE ADULT - SUBJECTIVE AND OBJECTIVE BOX
INTERVAL HPI/OVERNIGHT EVENTS/SUBJECTIVE:  O/n events noted.  Patient intubated 2/2 respiratory distress, increased work of breathing and change in mental status.  Dobutamine started w/ minimal effect.  Appears infected as responded to empiric abx but source unclear.  Repeat cultures sent and all lines changed.      ICU Vital Signs Last 24 Hrs  T(C): 37.8, Max: 37.8 (06-20 @ 06:00)  T(F): 100.1, Max: 100.1 (06-20 @ 06:00)  HR: 71 (62 - 149)  BP: 123/74 (96/65 - 144/81)  BP(mean): 93 (61 - 106)  ABP: 130/71 (108/63 - 130/71)  ABP(mean): 87 (75 - 88)  RR: 12 (12 - 44)  SpO2: 100% (93% - 100%)      I&O's Detail    I & Os for current day (as of 20 Jun 2017 09:21)  =============================================  IN:    Pivot: 1100 ml    Free Water: 1000 ml    multiple electrolytes Injection Type 1: 800 ml    Solution: 250 ml    DOBUTamine Infusion: 135.6 ml    Enteral Tube Flush: 100 ml    dexmedetomidine Infusion: 98.6 ml    Total IN: 3484.2 ml  ---------------------------------------------  OUT:    Indwelling Catheter - Urethral: 850 ml    Incontinent per Condom Catheter: 800 ml    Total OUT: 1650 ml  ---------------------------------------------  Total NET: 1834.2 ml      Mode: AC/ CMV (Assist Control/ Continuous Mandatory Ventilation)  RR (machine): 14  TV (machine): 450  FiO2: 30  PEEP: 8  MAP: 13  PIP: 27    ABG - ( 20 Jun 2017 09:13 )  pH: 7.47  /  pCO2: 43    /  pO2: 103   / HCO3: 30    / Base Excess: 6.5   /  SaO2: 99                  MEDICATIONS  (STANDING):  thiamine 100milliGRAM(s) Oral daily  amantadine Syrup 100milliGRAM(s) Oral <User Schedule>  heparin  Injectable 5000Unit(s) SubCutaneous every 8 hours  doxazosin 4milliGRAM(s) Oral at bedtime  collagenase Ointment 1Application(s) Topical daily  furosemide    Tablet 20milliGRAM(s) Oral two times a day  FLUoxetine Solution 10milliGRAM(s) Oral daily  folic acid 1milliGRAM(s) Enteral Tube daily  traZODone 50milliGRAM(s) Oral at bedtime  digoxin     Tablet 0.125milliGRAM(s) Oral daily  melatonin 9milliGRAM(s) Oral at bedtime  ALBUTerol    0.083% 2.5milliGRAM(s) Nebulizer every 6 hours  metoprolol 50milliGRAM(s) Oral two times a day  pantoprazole   Suspension 40milliGRAM(s) Oral two times a day before meals  senna Syrup 10milliLiter(s) Oral two times a day  multiple electrolytes Injection Type 1 1000milliLiter(s) IV Continuous <Continuous>  dexmedetomidine Infusion 0.3MICROgram(s)/kG/Hr IV Continuous <Continuous>  ertapenem  IVPB  IV Intermittent   fluconAZOLE IVPB  IV Intermittent   ertapenem  IVPB 1000milliGRAM(s) IV Intermittent every 24 hours  fluconAZOLE IVPB 200milliGRAM(s) IV Intermittent every 24 hours  DOBUTamine Infusion 10MICROgram(s)/kG/Min IV Continuous <Continuous>  vancomycin  IVPB 750milliGRAM(s) IV Intermittent every 12 hours    MEDICATIONS  (PRN):  acetaminophen    Suspension 650milliGRAM(s) Oral every 6 hours PRN For Temp greater than 38.5 C (101.3 F)      NUTRITION/IVF:     CENTRAL LINE:  LOCATION:  [facility] DATE INSERTED: 6/19/17 CVP:  SCVO2: 59    MONTOYA:   DATE INSERTED: 6/19/17    A-LINE:    LOCATION: Right axilla  DATE INSERTED: 6/19/17  SVV:  CO/CI:     NG/OG TUBE:  DATE INSERTED: 6/19/17  OUTPUT/24 HRS:    MISC:     PHYSICAL EXAM:    Gen:  Intubated, sedated on Precedex.  No distress    Eyes: closed    Neurological: RASS -2    ENMT:    Neck:    Pulmonary:  no crackles, no rhonchi    Cardiovascular:    Gastrointestinal:  Abdomen soft, NT, ND, incision clean w/ good granulation bed    Genitourinary: scrotal and penile edema with montoya in place    Back:    Extremities: Moves all extremities    Skin:  No DTIs or decubs    Musculoskeletal:          LABS:  CBC Full  -  ( 20 Jun 2017 04:50 )  WBC Count : 13.5 K/uL  Hemoglobin : 8.7 g/dL  Hematocrit : 31.1 %  Platelet Count - Automated : 190 K/uL  Mean Cell Volume : 94.2 fl  Mean Cell Hemoglobin : 26.4 pg  Mean Cell Hemoglobin Concentration : 28.0 g/dL  Auto Neutrophil # : x  Auto Lymphocyte # : x  Auto Monocyte # : x  Auto Eosinophil # : x  Auto Basophil # : x  Auto Neutrophil % : 83.0 %  Auto Lymphocyte % : 11.0 %  Auto Monocyte % : 5.0 %  Auto Eosinophil % : 1.0 %  Auto Basophil % : x    06-20    153<H>  |  109<H>  |  79.0<H>  ----------------------------<  150<H>  3.6   |  29.0  |  0.96    Ca    9.0      20 Jun 2017 04:50  Phos  2.7     06-20  Mg     2.3     06-20    TPro  6.5<L>  /  Alb  2.9<L>  /  TBili  1.1  /  DBili  0.6<H>  /  AST  133<H>  /  ALT  104<H>  /  AlkPhos  338<H>  06-20        RECENT CULTURES:      LIVER FUNCTIONS - ( 20 Jun 2017 04:50 )  Alb: 2.9 g/dL / Pro: 6.5 g/dL / ALK PHOS: 338 U/L / ALT: 104 U/L / AST: 133 U/L / GGT: 382 U/L       CARDIAC MARKERS ( 20 Jun 2017 04:50 )  x     / 0.22 ng/mL / x     / x     / x      CARDIAC MARKERS ( 19 Jun 2017 18:14 )  x     / 0.22 ng/mL / x     / x     / x          CAPILLARY BLOOD GLUCOSE      RADIOLOGY & ADDITIONAL STUDIES:    ASSESSMENT/PLAN:  51yMale presenting with:    Neuro:  TAregt RASS of -1 to 0    CV:  F/u Echo.  f/u cards.   Continue Dobutamine and repeat Sv02    Pulm:  continue mechanical ventilation and attempt to decrease PEEP to 5.  If more awake, may try to get to PS    GI/Nutrition: continue TF.  Remove NGT.  Hypernatremic, on free water - recalculate Na deficit and increase free water accordingly    /Renal:  Contiue montoya.  Monitor I/0 - is 2 L positive but would not diurese at this time as dependent on preload.    ID:  Continue empiric abx.  ?consult ID    Lines/Tubes:  All new lines.  Maintain lines while septic with source unclear.    Endo: No issues    Skin: q2 hours turning to avoid development of DTIs    Proph: SCDs adn heparin    Dispo: SICU as intubated and septic      CRITICAL CARE TIME SPENT: 45 mintues

## 2017-06-21 ENCOUNTER — APPOINTMENT (OUTPATIENT)
Dept: CARDIOLOGY | Facility: CLINIC | Age: 52
End: 2017-06-21

## 2017-06-21 DIAGNOSIS — R74.8 ABNORMAL LEVELS OF OTHER SERUM ENZYMES: ICD-10-CM

## 2017-06-21 LAB
ANION GAP SERPL CALC-SCNC: 12 MMOL/L — SIGNIFICANT CHANGE UP (ref 5–17)
BASE EXCESS BLDV CALC-SCNC: 7.4 MMOL/L — HIGH (ref -3–3)
BASE EXCESS BLDV CALC-SCNC: 8 MMOL/L — HIGH (ref -3–3)
BLOOD GAS COMMENTS, VENOUS: SIGNIFICANT CHANGE UP
BLOOD GAS COMMENTS, VENOUS: SIGNIFICANT CHANGE UP
BUN SERPL-MCNC: 65 MG/DL — HIGH (ref 8–20)
C DIFF BY PCR RESULT: SIGNIFICANT CHANGE UP
C DIFF TOX GENS STL QL NAA+PROBE: SIGNIFICANT CHANGE UP
CA-I SERPL-SCNC: 1.15 MMOL/L — SIGNIFICANT CHANGE UP (ref 1.12–1.3)
CALCIUM SERPL-MCNC: 7.7 MG/DL — LOW (ref 8.6–10.2)
CHLORIDE BLDV-SCNC: 106 MMOL/L — SIGNIFICANT CHANGE UP (ref 98–106)
CHLORIDE SERPL-SCNC: 103 MMOL/L — SIGNIFICANT CHANGE UP (ref 98–107)
CO2 SERPL-SCNC: 30 MMOL/L — HIGH (ref 22–29)
CREAT SERPL-MCNC: 0.83 MG/DL — SIGNIFICANT CHANGE UP (ref 0.5–1.3)
GAS PNL BLDA: SIGNIFICANT CHANGE UP
GAS PNL BLDV: 146 MMOL/L — SIGNIFICANT CHANGE UP (ref 136–146)
GAS PNL BLDV: SIGNIFICANT CHANGE UP
GLUCOSE BLDV-MCNC: 135 MG/DL — HIGH (ref 70–99)
GLUCOSE SERPL-MCNC: 131 MG/DL — HIGH (ref 70–115)
HCO3 BLDV-SCNC: 30 MMOL/L — HIGH (ref 20–26)
HCO3 BLDV-SCNC: 31 MMOL/L — HIGH (ref 20–26)
HCT VFR BLD CALC: 31.3 % — LOW (ref 42–52)
HCT VFR BLDA CALC: 28 — LOW (ref 39–50)
HGB BLD CALC-MCNC: 8.8 G/DL — LOW (ref 13–17)
HGB BLD-MCNC: 8.5 G/DL — LOW (ref 14–18)
HOROWITZ INDEX BLDV+IHG-RTO: SIGNIFICANT CHANGE UP
HOROWITZ INDEX BLDV+IHG-RTO: SIGNIFICANT CHANGE UP
LACTATE BLDV-MCNC: 2.4 MMOL/L — HIGH (ref 0.5–2)
MAGNESIUM SERPL-MCNC: 2 MG/DL — SIGNIFICANT CHANGE UP (ref 1.6–2.6)
MCHC RBC-ENTMCNC: 26.4 PG — LOW (ref 27–31)
MCHC RBC-ENTMCNC: 27.2 G/DL — LOW (ref 32–36)
MCV RBC AUTO: 97.2 FL — HIGH (ref 80–94)
OTHER CELLS CSF MANUAL: 5 ML/DL — LOW (ref 18–22)
PCO2 BLDV: 50 MMHG — SIGNIFICANT CHANGE UP (ref 35–50)
PCO2 BLDV: 50 MMHG — SIGNIFICANT CHANGE UP (ref 35–50)
PH BLDV: 7.42 — SIGNIFICANT CHANGE UP (ref 7.35–7.45)
PH BLDV: 7.43 — SIGNIFICANT CHANGE UP (ref 7.35–7.45)
PHOSPHATE SERPL-MCNC: 1.7 MG/DL — LOW (ref 2.4–4.7)
PLATELET # BLD AUTO: 153 K/UL — SIGNIFICANT CHANGE UP (ref 150–400)
PO2 BLDV: 27 MMHG — SIGNIFICANT CHANGE UP (ref 25–45)
PO2 BLDV: 28 MMHG — SIGNIFICANT CHANGE UP (ref 25–45)
POTASSIUM BLDV-SCNC: 3.9 MMOL/L — SIGNIFICANT CHANGE UP (ref 3.4–4.5)
POTASSIUM SERPL-MCNC: 3.9 MMOL/L — SIGNIFICANT CHANGE UP (ref 3.5–5.3)
POTASSIUM SERPL-SCNC: 3.9 MMOL/L — SIGNIFICANT CHANGE UP (ref 3.5–5.3)
RBC # BLD: 3.22 M/UL — LOW (ref 4.6–6.2)
RBC # FLD: 19.3 % — HIGH (ref 11–15.6)
SAO2 % BLDV: 43 % — LOW (ref 67–88)
SAO2 % BLDV: 45 % — LOW (ref 67–88)
SODIUM SERPL-SCNC: 145 MMOL/L — SIGNIFICANT CHANGE UP (ref 135–145)
WBC # BLD: 13.8 K/UL — HIGH (ref 4.8–10.8)
WBC # FLD AUTO: 13.8 K/UL — HIGH (ref 4.8–10.8)

## 2017-06-21 PROCEDURE — 99232 SBSQ HOSP IP/OBS MODERATE 35: CPT

## 2017-06-21 PROCEDURE — 99223 1ST HOSP IP/OBS HIGH 75: CPT

## 2017-06-21 PROCEDURE — 74000: CPT | Mod: 26

## 2017-06-21 RX ORDER — DEXMEDETOMIDINE HYDROCHLORIDE IN 0.9% SODIUM CHLORIDE 4 UG/ML
0.2 INJECTION INTRAVENOUS
Qty: 200 | Refills: 0 | Status: DISCONTINUED | OUTPATIENT
Start: 2017-06-21 | End: 2017-06-22

## 2017-06-21 RX ADMIN — Medication 10 MILLIGRAM(S): at 13:13

## 2017-06-21 RX ADMIN — Medication 50 MILLIGRAM(S): at 21:25

## 2017-06-21 RX ADMIN — Medication 18.34 MICROGRAM(S)/KG/MIN: at 03:00

## 2017-06-21 RX ADMIN — HEPARIN SODIUM 5000 UNIT(S): 5000 INJECTION INTRAVENOUS; SUBCUTANEOUS at 06:17

## 2017-06-21 RX ADMIN — Medication 1 MILLIGRAM(S): at 13:12

## 2017-06-21 RX ADMIN — Medication 20 MILLIGRAM(S): at 18:23

## 2017-06-21 RX ADMIN — HEPARIN SODIUM 5000 UNIT(S): 5000 INJECTION INTRAVENOUS; SUBCUTANEOUS at 13:12

## 2017-06-21 RX ADMIN — Medication 50 MILLIGRAM(S): at 06:17

## 2017-06-21 RX ADMIN — Medication 9 MILLIGRAM(S): at 21:21

## 2017-06-21 RX ADMIN — SENNA PLUS 10 MILLILITER(S): 8.6 TABLET ORAL at 06:18

## 2017-06-21 RX ADMIN — Medication 150 MILLIGRAM(S): at 06:19

## 2017-06-21 RX ADMIN — ERTAPENEM SODIUM 120 MILLIGRAM(S): 1 INJECTION, POWDER, LYOPHILIZED, FOR SOLUTION INTRAMUSCULAR; INTRAVENOUS at 20:46

## 2017-06-21 RX ADMIN — PANTOPRAZOLE SODIUM 40 MILLIGRAM(S): 20 TABLET, DELAYED RELEASE ORAL at 06:18

## 2017-06-21 RX ADMIN — ALBUTEROL 2.5 MILLIGRAM(S): 90 AEROSOL, METERED ORAL at 08:01

## 2017-06-21 RX ADMIN — SODIUM CHLORIDE 75 MILLILITER(S): 9 INJECTION, SOLUTION INTRAVENOUS at 05:00

## 2017-06-21 RX ADMIN — ALBUTEROL 2.5 MILLIGRAM(S): 90 AEROSOL, METERED ORAL at 20:12

## 2017-06-21 RX ADMIN — Medication 100 MILLIGRAM(S): at 06:18

## 2017-06-21 RX ADMIN — Medication 100 MILLIGRAM(S): at 13:12

## 2017-06-21 RX ADMIN — DEXMEDETOMIDINE HYDROCHLORIDE IN 0.9% SODIUM CHLORIDE 4.08 MICROGRAM(S)/KG/HR: 4 INJECTION INTRAVENOUS at 02:00

## 2017-06-21 RX ADMIN — Medication 20 MILLIGRAM(S): at 06:18

## 2017-06-21 RX ADMIN — Medication 650 MILLIGRAM(S): at 09:56

## 2017-06-21 RX ADMIN — ALBUTEROL 2.5 MILLIGRAM(S): 90 AEROSOL, METERED ORAL at 03:10

## 2017-06-21 RX ADMIN — PANTOPRAZOLE SODIUM 40 MILLIGRAM(S): 20 TABLET, DELAYED RELEASE ORAL at 18:23

## 2017-06-21 RX ADMIN — Medication 150 MILLIGRAM(S): at 18:22

## 2017-06-21 RX ADMIN — SODIUM CHLORIDE 75 MILLILITER(S): 9 INJECTION, SOLUTION INTRAVENOUS at 18:23

## 2017-06-21 RX ADMIN — MIDODRINE HYDROCHLORIDE 2.5 MILLIGRAM(S): 2.5 TABLET ORAL at 21:21

## 2017-06-21 RX ADMIN — Medication 0.12 MILLIGRAM(S): at 06:18

## 2017-06-21 RX ADMIN — HEPARIN SODIUM 5000 UNIT(S): 5000 INJECTION INTRAVENOUS; SUBCUTANEOUS at 21:21

## 2017-06-21 RX ADMIN — ALBUTEROL 2.5 MILLIGRAM(S): 90 AEROSOL, METERED ORAL at 15:19

## 2017-06-21 RX ADMIN — MIDODRINE HYDROCHLORIDE 2.5 MILLIGRAM(S): 2.5 TABLET ORAL at 06:18

## 2017-06-21 RX ADMIN — DEXMEDETOMIDINE HYDROCHLORIDE IN 0.9% SODIUM CHLORIDE 4.08 MICROGRAM(S)/KG/HR: 4 INJECTION INTRAVENOUS at 18:00

## 2017-06-21 RX ADMIN — Medication 1 APPLICATION(S): at 13:12

## 2017-06-21 RX ADMIN — MIDODRINE HYDROCHLORIDE 2.5 MILLIGRAM(S): 2.5 TABLET ORAL at 13:12

## 2017-06-21 RX ADMIN — Medication 4 MILLIGRAM(S): at 21:21

## 2017-06-21 NOTE — PROGRESS NOTE ADULT - ASSESSMENT
1. NICM, HFrEF. on metoprolol  2. Due to low BP, unable to add ACEI yet  3. Volume overloaded. CVP of 20. Gentle diuretic, lasix 20 mg iv daily  4. ABX as per SICU team  5. Right leg DVT with IVC filter placement  6. After speaking with SICU team, pt had GIB about 3 months ago, not a candidate for AC. Unable to perform cardioversion unless he is on AC.

## 2017-06-21 NOTE — CONSULT NOTE ADULT - ASSESSMENT
52 y/o M with a complicated Hospital course, Cholecystectomy, bile leak, exp lap and ERCP, cardiac arrest, Intra abdominal infection, mutiple intubations and vent, DCT, IVC filter, continues to have fevers and has been on antibiotics intermittently since admission.

## 2017-06-21 NOTE — PROGRESS NOTE ADULT - SUBJECTIVE AND OBJECTIVE BOX
CHIEF COMPLAINT:Patient is a 51y old  Male who is seen with HFrEF, sepsis.  On spontanous breathing trial.   Episodes of NSVT. Dobutamin dose decreasing.     Allergies:   No Known Allergies  	  MEDICATIONS:  doxazosin 4milliGRAM(s) Oral at bedtime  furosemide    Tablet 20milliGRAM(s) Oral two times a day  digoxin     Tablet 0.125milliGRAM(s) Oral daily  metoprolol 50milliGRAM(s) Oral two times a day  DOBUTamine Infusion 3MICROgram(s)/kG/Min IV Continuous <Continuous>  midodrine 2.5milliGRAM(s) Oral three times a day  ertapenem  IVPB  IV Intermittent   fluconAZOLE IVPB  IV Intermittent   ertapenem  IVPB 1000milliGRAM(s) IV Intermittent every 24 hours  fluconAZOLE IVPB 200milliGRAM(s) IV Intermittent every 24 hours  vancomycin  IVPB 750milliGRAM(s) IV Intermittent every 12 hours  ALBUTerol    0.083% 2.5milliGRAM(s) Nebulizer every 6 hours  amantadine Syrup 100milliGRAM(s) Oral <User Schedule>  FLUoxetine Solution 10milliGRAM(s) Oral daily  traZODone 50milliGRAM(s) Oral at bedtime  melatonin 9milliGRAM(s) Oral at bedtime  acetaminophen    Suspension 650milliGRAM(s) Oral every 6 hours PRN  dexmedetomidine Infusion 0.2MICROgram(s)/kG/Hr IV Continuous <Continuous>  pantoprazole   Suspension 40milliGRAM(s) Oral two times a day before meals  senna Syrup 10milliLiter(s) Oral two times a day  thiamine 100milliGRAM(s) Oral daily  heparin  Injectable 5000Unit(s) SubCutaneous every 8 hours  collagenase Ointment 1Application(s) Topical daily  folic acid 1milliGRAM(s) Enteral Tube daily  multiple electrolytes Injection Type 1 1000milliLiter(s) IV Continuous <Continuous>    PHYSICAL EXAM:  T(C): 38.8, Max: 38.8 (06-21 @ 09:00)  HR: 84 (66 - 90)  BP: 104/68 (104/68 - 104/68)  RR: 24 (22 - 41)  SpO2: 99% (98% - 100%)  I&O's Summary  I & Os for 24h ending 21 Jun 2017 07:00  =============================================  IN: 6880 ml / OUT: 1980 ml / NET: 4900 ml    I & Os for current day (as of 21 Jun 2017 11:59)  =============================================  IN: 776.6 ml / OUT: 280 ml / NET: 496.6 ml  Appearance: appears comfortable.   HEENT:   NC/AT  Eye: Pink Conjunctiva  Lungs: Decrease air entry at base.   CVS: IRRR, Normal S1 and S2, left leg edema more than right   Pulses: Normal distal pulses.                        8.5    13.8  )-----------( 153      ( 21 Jun 2017 06:23 )             31.3     06-21    145  |  103  |  65.0<H>  ----------------------------<  131<H>  3.9   |  30.0<H>  |  0.83    Ca    7.7<L>      21 Jun 2017 06:23  Phos  1.7     06-21  Mg     2.0     06-21    TPro  6.5<L>  /  Alb  2.9<L>  /  TBili  1.1  /  DBili  0.6<H>  /  AST  133<H>  /  ALT  104<H>  /  AlkPhos  338<H>  06-20  TSH:     ASSESSMENT/PLAN:

## 2017-06-21 NOTE — CONSULT NOTE ADULT - PROBLEM SELECTOR RECOMMENDATION 2
LFT's have been trending up, Alk phos from 197 (6/10) to  , 332 (6/20) and given he has a CBD STENT this should be investigated as a possible source of fever  GI follow up needed

## 2017-06-21 NOTE — CONSULT NOTE ADULT - PROBLEM SELECTOR RECOMMENDATION 4
s/p Exp Lap  Multiple days of antibiotics  D/C Fluconazole  Continue Vancomycin and Ertapenem and will take off antibiotics once culture are reported negative.

## 2017-06-21 NOTE — CONSULT NOTE ADULT - SUBJECTIVE AND OBJECTIVE BOX
[facility]  INFECTIOUS DISEASES AND INTERNAL MEDICINE OF [place]  =======================================================  Trent Rocha MD  Diplomates American Board of Internal Medicine and Infectious Diseases  =======================================================    Patient intubated and sedated, History obtained from the chart        MRN-[id number]  ERNIE HEREDIA     CC: Patient is a 51y old  Male who presents with a chief complaint of post op abdominal pain (05 May 2017 00:11)    53 y/o  male with a complicated hospital course, initially came in on 5/4/17 with abdominal pain. Patient had a cholecystectomy 5/2/17. He was c/o lower abdominal pain since surgery, also complained of constipation, urinary retention. Symptoms associated with abdominal distension. He did not have any fever on admission. Patient had Rogers placed to relieve urinary retention, was started on IV Fluids, and started on laxatives for constipation. He continued to have abdominal pain and had a CT scan A/P on 5/7 reporting bile leak. Patient was started on IV Zosyn on 5/8. Patient was taken to the OR for ERCP on 5/9 which was complicated with cardiac arrest and abdominal compartment syndrome, he was resuscitated with return of circulation and went for emergent Exp Lap and abdominal washout. Post op patient was in the SICU on vasopressors. Antibiotics changed to Meropenem, and Fluconazole. Patient was taken back to the OR for ERCP 5/10 with finding of bile leak near cystic duct, sphincterotomy and placement of STENT and closure of laparotomy. Diflucan discontinue 5/11/17 (4 days). Meropenem was stopped 5/14 (6 Days). On 5/15 patient has was agitated and bite his tongue, had Fever, tachycardia, and increased Vent requirements, so he was started on Cefepime and Vancomycin for a possible VAP. CXR had reported congestion. Vancomycin and Cefepime stopped on 5/19 (5 Days). Patient again spiked fever on 5/21 and was started on vancomycin and Zosyn. All central lines removed. A CT scan done was noted to have fluid collection in the gall bladder fossa and so patient underwent IR guided drainage on 5/23 with culture growing Enterococcus raffinousus. Patient was continued on Vancomycin and Zosyn till 5/28 (8 Days). Patient again spiked fever on 5/31 and started on Vancomycin and Meropenem. A duplex U/S done on 6/1 reported a DVT in the femoral vein so patient had IVC filter placed. Antibiotics stopped on 6/5 (6 Days). Patient started on Diflucan on 6/7 after abdominal culture grew out Candida Albicans. Patient developed a Pneumothorax due to NGT placement on 6/9 and rt sided chest tube placement. Patient was started on Cefepime and Vancomycin again on 6/10 due to increasing lactic acidosis and possible HAP. Patient had PEG placement on 6/12. Diflucan stopped on 6/16 (10 Days) and stopped Vancomycin and Cefepime on 6/18 (8 Days). Patient spiked fever on 6/19 and was started on Vancomycin, Ertapenem and Fluconazole. A CT C/A/P done reporting basilar infiltrates and CBD Stent. ID Input requested.      Past Medical & Surgical Hx:  Mitral regurgitation: severe MR  Cardiomyopathy, nonischemic  CAD (coronary artery disease)  Asthma  Atrial fibrillation  Heart disease  S/P laparoscopic cholecystectomy  History of humerus fracture: Metal pins in Left Humerus  Artificial pacemaker      Social Hx:  Unable to obtain, Patient intubated, sedated      FAMILY HISTORY:  No pertinent family history in first degree relatives      Allergies  No Known Allergies      Antibiotics:  ertapenem  IVPB 1000milliGRAM(s) IV Intermittent every 24 hours  fluconAZOLE IVPB 200milliGRAM(s) IV Intermittent every 24 hours  vancomycin  IVPB 750milliGRAM(s) IV Intermittent every 12 hours       REVIEW OF SYSTEMS:  Unable to obtain, patient is sedated on vent      Physical Exam:  Vital Signs Last 24 Hrs  T(C): 37.8, Max: 38.8 (06-21 @ 09:00)  T(F): 100.1, Max: 101.8 (06-21 @ 09:00)  HR: 101 (66 - 101)  BP: 104/68 (104/68 - 104/68)  BP(mean): 80 (80 - 80)  RR: 10 (10 - 46)  SpO2: 99% (98% - 100%)      GEN: Sedated  HEENT: normocephalic and atraumatic.   +ETT  NECK: Supple.   LUNGS: Course BS B/L  HEART: Regular rate and rhythm   ABDOMEN: Soft, nontender, and nondistended. Bandage over surgical site, packing in abdominal wound. Decreased bowel sounds.    : + Rogers  EXTREMITIES: + edema.  MSK: No joint swelling  NEUROLOGIC: Sedated  PSYCHIATRIC: Sedated  SKIN: No rash      Labs:  06-21    145  |  103  |  65.0<H>  ----------------------------<  131<H>  3.9   |  30.0<H>  |  0.83    Ca    7.7<L>      21 Jun 2017 06:23  Phos  1.7     06-21  Mg     2.0     06-21    TPro  6.5<L>  /  Alb  2.9<L>  /  TBili  1.1  /  DBili  0.6<H>  /  AST  133<H>  /  ALT  104<H>  /  AlkPhos  338<H>  06-20                          8.5    13.8  )-----------( 153      ( 21 Jun 2017 06:23 )             31.3       LIVER FUNCTIONS - ( 20 Jun 2017 04:50 )  Alb: 2.9 g/dL / Pro: 6.5 g/dL / ALK PHOS: 338 U/L / ALT: 104 U/L / AST: 133 U/L / GGT: 382 U/L       CARDIAC MARKERS ( 20 Jun 2017 04:50 )  x     / 0.22 ng/mL / x     / x     / x      CARDIAC MARKERS ( 19 Jun 2017 18:14 )  x     / 0.22 ng/mL / x     / x     / x          ABG - ( 21 Jun 2017 04:23 )  pH: 7.50  /  pCO2: 38    /  pO2: 128   / HCO3: 30    / Base Excess: 6.3   /  SaO2: 100         RECENT CULTURES:  06-20 @ 01:00 .Sputum Sputum     Few WBC's  No organisms seen      06-12 @ 05:51 .Sputum Sputum     Numerous Routine respiratory devin present  Few White blood cells  Numerous Gram Positive Cocci in Clusters  Moderate Gram positive cocci in pairs  Few Gram Negative Rods  Rare Yeast      06-10 @ 12:25 .Blood Blood     No growth at 5 days.      06-10 @ 10:29 .Blood Blood     No growth at 5 days.        EXAM:  CT ABDOMEN AND PELVIS IC                        EXAM:  CT CHEST IC                        PROCEDURE DATE:  06/18/2017    INTERPRETATION:  HISTORY:  Sepsis. Abdominal pain. .  Date/Time of exam: 6/18/2017 6:10 PM  TECHNIQUE:  Sections were obtained from the lung apices to the symphysis   pubis following intravenous contrast.   The patient was given 95 cc of omnipaque 300.  COMPARISON EXAMINATION:     5/22/2017.  FINDINGS:    No evidence of mediastinal or hilar lymphadenopathy. Cardiomegaly is   noted. No evidence of a pleural effusion. No evidence of a pericardial   effusion. No evidence of axillary adenopathy.  Patchy infiltrates noted in the lingula and right lower lobe. Bibasilar   atelectasis.  The liver is unremarkable. Status post cholecystectomy. The spleen is not   enlarged and demonstrates no focal abnormality. The pancreatic contour is   unremarkable without evidence of mass, inflammation or ductal dilatation.  Common bile duct stent is noted.  The adrenal glands demonstrate normal size and contour. The kidneys   reveal a normal enhanced morphology.  An IVC filter is noted.  No evidence of retroperitoneal or pelvic lymphadenopathy. The prostate,   seminal vesicles, and bladder demonstrate no abnormality.  Ascites is demonstrated in the upper abdomen and pelvis.  No significant osseous abnormality.  IMPRESSION:   Small patchy parenchymal infiltrates in the right lower lobe and lingula   as well as bibasilar atelectasis.  Ascites.  Common bile duct stent noted        EXAM:  CHEST SINGLE VIEW FRONTAL                        PROCEDURE DATE:  06/20/2017    INTERPRETATION:  History: Nasogastric tube placement. Intubated  Technique:  AP portable  Comparisons:  Chest x-ray dated 6/20/2017  Findings: ET tube is in good position at the level the aortic arch. Right   jugular CVP line in the SVC. New nasogastric tube in stomach. Moderate   bilateral pulmonic congestion is observed. Pacemaker is present in the   left axilla. No change in pacemaker wire position in right ventricle.      The pulmonary vasculature and aorta are normal for age. Heart size is   unremarkable. The thorax is normal for age.  Impression:   Pulmonary vascular congestion. Pacemaker.

## 2017-06-22 LAB
ALBUMIN SERPL ELPH-MCNC: 2.5 G/DL — LOW (ref 3.3–5.2)
ALP SERPL-CCNC: 321 U/L — HIGH (ref 40–120)
ALT FLD-CCNC: 117 U/L — HIGH
ANION GAP SERPL CALC-SCNC: 16 MMOL/L — SIGNIFICANT CHANGE UP (ref 5–17)
ANISOCYTOSIS BLD QL: SLIGHT — SIGNIFICANT CHANGE UP
APTT BLD: 25.4 SEC — LOW (ref 27.5–37.4)
APTT BLD: 94 SEC — HIGH (ref 27.5–37.4)
AST SERPL-CCNC: 128 U/L — HIGH
BASE EXCESS BLDV CALC-SCNC: -0.3 MMOL/L — SIGNIFICANT CHANGE UP (ref -3–3)
BASE EXCESS BLDV CALC-SCNC: 6.1 MMOL/L — HIGH (ref -3–3)
BASE EXCESS BLDV CALC-SCNC: 6.4 MMOL/L — HIGH (ref -3–3)
BILIRUB DIRECT SERPL-MCNC: 0.5 MG/DL — HIGH (ref 0–0.3)
BILIRUB INDIRECT FLD-MCNC: 0.5 MG/DL — SIGNIFICANT CHANGE UP (ref 0.2–1)
BILIRUB SERPL-MCNC: 1 MG/DL — SIGNIFICANT CHANGE UP (ref 0.4–2)
BUN SERPL-MCNC: 69 MG/DL — HIGH (ref 8–20)
CALCIUM SERPL-MCNC: 7.3 MG/DL — LOW (ref 8.6–10.2)
CHLORIDE SERPL-SCNC: 101 MMOL/L — SIGNIFICANT CHANGE UP (ref 98–107)
CO2 SERPL-SCNC: 27 MMOL/L — SIGNIFICANT CHANGE UP (ref 22–29)
CREAT SERPL-MCNC: 0.82 MG/DL — SIGNIFICANT CHANGE UP (ref 0.5–1.3)
CULTURE RESULTS: SIGNIFICANT CHANGE UP
DIGOXIN SERPL-MCNC: 1.8 NG/ML — SIGNIFICANT CHANGE UP (ref 0.8–2)
GAS PNL BLDA: SIGNIFICANT CHANGE UP
GAS PNL BLDV: SIGNIFICANT CHANGE UP
GLUCOSE SERPL-MCNC: 129 MG/DL — HIGH (ref 70–115)
HCO3 BLDV-SCNC: 24 MMOL/L — SIGNIFICANT CHANGE UP (ref 20–26)
HCO3 BLDV-SCNC: 29 MMOL/L — HIGH (ref 20–26)
HCO3 BLDV-SCNC: 30 MMOL/L — HIGH (ref 20–26)
HCT VFR BLD CALC: 29.9 % — LOW (ref 42–52)
HCT VFR BLD CALC: 30.5 % — LOW (ref 42–52)
HGB BLD-MCNC: 8.5 G/DL — LOW (ref 14–18)
HGB BLD-MCNC: 8.6 G/DL — LOW (ref 14–18)
HOROWITZ INDEX BLDV+IHG-RTO: 0.3 — SIGNIFICANT CHANGE UP
HYPOCHROMIA BLD QL: SLIGHT — SIGNIFICANT CHANGE UP
LYMPHOCYTES # BLD AUTO: 6 % — LOW (ref 20–55)
MACROCYTES BLD QL: SLIGHT — SIGNIFICANT CHANGE UP
MAGNESIUM SERPL-MCNC: 2.1 MG/DL — SIGNIFICANT CHANGE UP (ref 1.6–2.6)
MCHC RBC-ENTMCNC: 26.2 PG — LOW (ref 27–31)
MCHC RBC-ENTMCNC: 26.5 PG — LOW (ref 27–31)
MCHC RBC-ENTMCNC: 28.2 G/DL — LOW (ref 32–36)
MCHC RBC-ENTMCNC: 28.4 G/DL — LOW (ref 32–36)
MCV RBC AUTO: 92 FL — SIGNIFICANT CHANGE UP (ref 80–94)
MCV RBC AUTO: 93.8 FL — SIGNIFICANT CHANGE UP (ref 80–94)
MICROCYTES BLD QL: SLIGHT — SIGNIFICANT CHANGE UP
MONOCYTES NFR BLD AUTO: 2 % — LOW (ref 3–10)
MYELOCYTES NFR BLD: 1 % — HIGH (ref 0–0)
NEUTROPHILS NFR BLD AUTO: 88 % — HIGH (ref 37–73)
NRBC # BLD: 5 /100 — HIGH (ref 0–0)
OVALOCYTES BLD QL SMEAR: SLIGHT — SIGNIFICANT CHANGE UP
PCO2 BLDV: 39 MMHG — SIGNIFICANT CHANGE UP (ref 35–50)
PCO2 BLDV: 45 MMHG — SIGNIFICANT CHANGE UP (ref 35–50)
PCO2 BLDV: 47 MMHG — SIGNIFICANT CHANGE UP (ref 35–50)
PH BLDV: 7.4 — SIGNIFICANT CHANGE UP (ref 7.35–7.45)
PH BLDV: 7.43 — SIGNIFICANT CHANGE UP (ref 7.35–7.45)
PH BLDV: 7.44 — SIGNIFICANT CHANGE UP (ref 7.35–7.45)
PHOSPHATE SERPL-MCNC: 2.4 MG/DL — SIGNIFICANT CHANGE UP (ref 2.4–4.7)
PLAT MORPH BLD: NORMAL — SIGNIFICANT CHANGE UP
PLATELET # BLD AUTO: 143 K/UL — LOW (ref 150–400)
PLATELET # BLD AUTO: 146 K/UL — LOW (ref 150–400)
PO2 BLDV: 24 MMHG — LOW (ref 25–45)
PO2 BLDV: 27 MMHG — SIGNIFICANT CHANGE UP (ref 25–45)
PO2 BLDV: 30 MMHG — SIGNIFICANT CHANGE UP (ref 25–45)
POIKILOCYTOSIS BLD QL AUTO: SLIGHT — SIGNIFICANT CHANGE UP
POTASSIUM SERPL-MCNC: 4.1 MMOL/L — SIGNIFICANT CHANGE UP (ref 3.5–5.3)
POTASSIUM SERPL-SCNC: 4.1 MMOL/L — SIGNIFICANT CHANGE UP (ref 3.5–5.3)
PROT SERPL-MCNC: 6 G/DL — LOW (ref 6.6–8.7)
RBC # BLD: 3.25 M/UL — LOW (ref 4.6–6.2)
RBC # BLD: 3.25 M/UL — LOW (ref 4.6–6.2)
RBC # FLD: 19 % — HIGH (ref 11–15.6)
RBC # FLD: 19.2 % — HIGH (ref 11–15.6)
RBC BLD AUTO: ABNORMAL
SAO2 % BLDV: 36 % — LOW (ref 67–88)
SAO2 % BLDV: 42 % — LOW (ref 67–88)
SAO2 % BLDV: 53 % — LOW (ref 67–88)
SODIUM SERPL-SCNC: 144 MMOL/L — SIGNIFICANT CHANGE UP (ref 135–145)
VANCOMYCIN TROUGH SERPL-MCNC: 22 UG/ML — HIGH (ref 10–20)
VARIANT LYMPHS # BLD: 3 % — SIGNIFICANT CHANGE UP (ref 0–6)
WBC # BLD: 14.3 K/UL — HIGH (ref 4.8–10.8)
WBC # BLD: 15.7 K/UL — HIGH (ref 4.8–10.8)
WBC # FLD AUTO: 14.3 K/UL — HIGH (ref 4.8–10.8)
WBC # FLD AUTO: 15.7 K/UL — HIGH (ref 4.8–10.8)

## 2017-06-22 PROCEDURE — 99232 SBSQ HOSP IP/OBS MODERATE 35: CPT

## 2017-06-22 PROCEDURE — 71010: CPT | Mod: 26

## 2017-06-22 PROCEDURE — 99233 SBSQ HOSP IP/OBS HIGH 50: CPT

## 2017-06-22 RX ORDER — MIDODRINE HYDROCHLORIDE 2.5 MG/1
2.5 TABLET ORAL THREE TIMES A DAY
Qty: 0 | Refills: 0 | Status: DISCONTINUED | OUTPATIENT
Start: 2017-06-22 | End: 2017-06-23

## 2017-06-22 RX ORDER — HEPARIN SODIUM 5000 [USP'U]/ML
INJECTION INTRAVENOUS; SUBCUTANEOUS
Qty: 25000 | Refills: 0 | Status: DISCONTINUED | OUTPATIENT
Start: 2017-06-22 | End: 2017-06-27

## 2017-06-22 RX ORDER — MILRINONE LACTATE 1 MG/ML
0.2 INJECTION, SOLUTION INTRAVENOUS
Qty: 20 | Refills: 0 | Status: DISCONTINUED | OUTPATIENT
Start: 2017-06-22 | End: 2017-06-28

## 2017-06-22 RX ORDER — MIDODRINE HYDROCHLORIDE 2.5 MG/1
5 TABLET ORAL THREE TIMES A DAY
Qty: 0 | Refills: 0 | Status: DISCONTINUED | OUTPATIENT
Start: 2017-06-22 | End: 2017-06-22

## 2017-06-22 RX ADMIN — Medication 0.12 MILLIGRAM(S): at 05:28

## 2017-06-22 RX ADMIN — MILRINONE LACTATE 2.44 MICROGRAM(S)/KG/MIN: 1 INJECTION, SOLUTION INTRAVENOUS at 11:07

## 2017-06-22 RX ADMIN — Medication 20 MILLIGRAM(S): at 17:46

## 2017-06-22 RX ADMIN — Medication 4 MILLIGRAM(S): at 21:56

## 2017-06-22 RX ADMIN — MIDODRINE HYDROCHLORIDE 2.5 MILLIGRAM(S): 2.5 TABLET ORAL at 21:56

## 2017-06-22 RX ADMIN — Medication 1 APPLICATION(S): at 11:57

## 2017-06-22 RX ADMIN — Medication 50 MILLIGRAM(S): at 05:28

## 2017-06-22 RX ADMIN — ALBUTEROL 2.5 MILLIGRAM(S): 90 AEROSOL, METERED ORAL at 19:45

## 2017-06-22 RX ADMIN — MIDODRINE HYDROCHLORIDE 2.5 MILLIGRAM(S): 2.5 TABLET ORAL at 14:57

## 2017-06-22 RX ADMIN — Medication 100 MILLIGRAM(S): at 11:57

## 2017-06-22 RX ADMIN — HEPARIN SODIUM 1500 UNIT(S)/HR: 5000 INJECTION INTRAVENOUS; SUBCUTANEOUS at 19:45

## 2017-06-22 RX ADMIN — Medication 9 MILLIGRAM(S): at 21:56

## 2017-06-22 RX ADMIN — HEPARIN SODIUM 5000 UNIT(S): 5000 INJECTION INTRAVENOUS; SUBCUTANEOUS at 14:57

## 2017-06-22 RX ADMIN — PANTOPRAZOLE SODIUM 40 MILLIGRAM(S): 20 TABLET, DELAYED RELEASE ORAL at 17:46

## 2017-06-22 RX ADMIN — MILRINONE LACTATE 4.89 MICROGRAM(S)/KG/MIN: 1 INJECTION, SOLUTION INTRAVENOUS at 17:47

## 2017-06-22 RX ADMIN — Medication 100 MILLIGRAM(S): at 05:28

## 2017-06-22 RX ADMIN — Medication 50 MILLIGRAM(S): at 21:56

## 2017-06-22 RX ADMIN — PANTOPRAZOLE SODIUM 40 MILLIGRAM(S): 20 TABLET, DELAYED RELEASE ORAL at 05:28

## 2017-06-22 RX ADMIN — Medication 1 MILLIGRAM(S): at 11:57

## 2017-06-22 RX ADMIN — HEPARIN SODIUM 1500 UNIT(S)/HR: 5000 INJECTION INTRAVENOUS; SUBCUTANEOUS at 12:31

## 2017-06-22 RX ADMIN — ALBUTEROL 2.5 MILLIGRAM(S): 90 AEROSOL, METERED ORAL at 15:24

## 2017-06-22 RX ADMIN — ERTAPENEM SODIUM 120 MILLIGRAM(S): 1 INJECTION, POWDER, LYOPHILIZED, FOR SOLUTION INTRAMUSCULAR; INTRAVENOUS at 19:51

## 2017-06-22 RX ADMIN — Medication 20 MILLIGRAM(S): at 05:28

## 2017-06-22 RX ADMIN — HEPARIN SODIUM 5000 UNIT(S): 5000 INJECTION INTRAVENOUS; SUBCUTANEOUS at 05:28

## 2017-06-22 RX ADMIN — ALBUTEROL 2.5 MILLIGRAM(S): 90 AEROSOL, METERED ORAL at 03:38

## 2017-06-22 RX ADMIN — Medication 10 MILLIGRAM(S): at 11:57

## 2017-06-22 RX ADMIN — ALBUTEROL 2.5 MILLIGRAM(S): 90 AEROSOL, METERED ORAL at 08:02

## 2017-06-22 RX ADMIN — MIDODRINE HYDROCHLORIDE 2.5 MILLIGRAM(S): 2.5 TABLET ORAL at 05:28

## 2017-06-22 RX ADMIN — Medication 150 MILLIGRAM(S): at 17:46

## 2017-06-22 NOTE — PROGRESS NOTE ADULT - SUBJECTIVE AND OBJECTIVE BOX
[facility]  INFECTIOUS DISEASES AND INTERNAL MEDICINE [facility]  =======================================================  Trent Rocha MD  Diplomates American Board of Internal Medicine and Infectious Diseases  =======================================================    AYN ERNIE [id number]  chief complaint: fevers    patient seen and examined in follow up.  Chart and labs reviewed.   still with low grade fevers    =======================================================  Allergies:  No Known Allergies      =======================================================  Medications:  thiamine 100milliGRAM(s) Oral daily  amantadine Syrup 100milliGRAM(s) Oral <User Schedule>  heparin  Injectable 5000Unit(s) SubCutaneous every 8 hours  doxazosin 4milliGRAM(s) Oral at bedtime  furosemide    Tablet 20milliGRAM(s) Oral two times a day  FLUoxetine Solution 10milliGRAM(s) Oral daily  folic acid 1milliGRAM(s) Enteral Tube daily  traZODone 50milliGRAM(s) Oral at bedtime  digoxin     Tablet 0.125milliGRAM(s) Oral daily  melatonin 9milliGRAM(s) Oral at bedtime  ALBUTerol    0.083% 2.5milliGRAM(s) Nebulizer every 6 hours  metoprolol 50milliGRAM(s) Oral two times a day  acetaminophen    Suspension 650milliGRAM(s) Oral every 6 hours PRN  pantoprazole   Suspension 40milliGRAM(s) Oral two times a day before meals  senna Syrup 10milliLiter(s) Oral two times a day  ertapenem  IVPB  IV Intermittent   ertapenem  IVPB 1000milliGRAM(s) IV Intermittent every 24 hours  vancomycin  IVPB 750milliGRAM(s) IV Intermittent every 12 hours  midodrine 2.5milliGRAM(s) Oral three times a day  milrinone Infusion 0.1MICROgram(s)/kG/Min IV Continuous <Continuous>  heparin  Infusion. Unit(s)/Hr IV Continuous <Continuous>       =======================================================     REVIEW OF SYSTEMS:      UNABLE TO OBTAIN.     =======================================================     Physical Exam:  ICU Vital Signs Last 24 Hrs  T(C): 37.3, Max: 37.8 (06-21 @ 16:00)  T(F): 99.1, Max: 100.1 (06-21 @ 16:00)  HR: 83 (62 - 98)  BP: 88/61 (83/52 - 98/61)  BP(mean): 70 (63 - 75)  ABP: 111/69 (88/60 - 111/69)  ABP(mean): 82 (67 - 82)  RR: 36 (0 - 105)  SpO2: 99% (98% - 100%)    GEN: NAD, pleasant  HEENT: normocephalic and atraumatic. EOMI. LUCA.    NECK: Supple. No carotid bruits.  No lymphadenopathy or thyromegaly.  LUNGS: Clear to auscultation.  HEART: Regular rate and rhythm without murmur.  ABDOMEN: Soft, nontender, and nondistended.  Positive bowel sounds.    : No CVA tenderness  EXTREMITIES: Without any cyanosis, clubbing, rash, lesions or edema.  MSK: no joint swelling  NEUROLOGIC: Cranial nerves II through XII are grossly intact.  PSYCHIATRIC: Appropriate affect .  SKIN: No ulceration or induration present.    =======================================================    Labs:  06-22    144  |  101  |  69.0<H>  ----------------------------<  129<H>  4.1   |  27.0  |  0.82    Ca    7.3<L>      22 Jun 2017 05:59  Phos  2.4     06-22  Mg     2.1     06-22    TPro  6.0<L>  /  Alb  2.5<L>  /  TBili  1.0  /  DBili  0.5<H>  /  AST  128<H>  /  ALT  117<H>  /  AlkPhos  321<H>  06-22                          8.6    14.3  )-----------( 146      ( 22 Jun 2017 05:59 )             30.5       PTT - ( 22 Jun 2017 11:24 )  PTT:25.4 sec    LIVER FUNCTIONS - ( 22 Jun 2017 05:59 )  Alb: 2.5 g/dL / Pro: 6.0 g/dL / ALK PHOS: 321 U/L / ALT: 117 U/L / AST: 128 U/L / GGT: x               CAPILLARY BLOOD GLUCOSE    ABG - ( 22 Jun 2017 10:23 )  pH: 7.48  /  pCO2: 38    /  pO2: 104   / HCO3: 29    / Base Excess: 4.7   /  SaO2: 99        RECENT CULTURES:  06-20 @ 01:34 .Blood Blood     No growth at 48 hours    06-20 @ 01:00 .Sputum Sputum     Rare Routine respiratory devin present    Few WBC's  No organisms seen    06-19 @ 18:14 .Blood Blood     No growth at 48 hours    06-12 @ 05:51 .Sputum Sputum     Numerous Routine respiratory devin present  Few White blood cells  Numerous Gram Positive Cocci in Clusters  Moderate Gram positive cocci in pairs  Few Gram Negative Rods  Rare Yeast    06-10 @ 12:25 .Blood Blood     No growth at 5 days.    06-10 @ 10:29 .Blood Blood     No growth at 5 days. [facility]  INFECTIOUS DISEASES AND [facility]  =======================================================  Trent Rocha MD  Diplomates American Board of Internal Medicine and Infectious Diseases  =======================================================    ERNIE HEREDIA [id number]  chief complaint: fevers    patient seen and examined in follow up.  Chart and labs reviewed.   still with low grade fevers    patient remains intubated on pressors  =======================================================  Allergies:  No Known Allergies      =======================================================  Medications:  thiamine 100milliGRAM(s) Oral daily  amantadine Syrup 100milliGRAM(s) Oral <User Schedule>  heparin  Injectable 5000Unit(s) SubCutaneous every 8 hours  doxazosin 4milliGRAM(s) Oral at bedtime  furosemide    Tablet 20milliGRAM(s) Oral two times a day  FLUoxetine Solution 10milliGRAM(s) Oral daily  folic acid 1milliGRAM(s) Enteral Tube daily  traZODone 50milliGRAM(s) Oral at bedtime  digoxin     Tablet 0.125milliGRAM(s) Oral daily  melatonin 9milliGRAM(s) Oral at bedtime  ALBUTerol    0.083% 2.5milliGRAM(s) Nebulizer every 6 hours  metoprolol 50milliGRAM(s) Oral two times a day  acetaminophen    Suspension 650milliGRAM(s) Oral every 6 hours PRN  pantoprazole   Suspension 40milliGRAM(s) Oral two times a day before meals  senna Syrup 10milliLiter(s) Oral two times a day  ertapenem  IVPB  IV Intermittent   ertapenem  IVPB 1000milliGRAM(s) IV Intermittent every 24 hours  vancomycin  IVPB 750milliGRAM(s) IV Intermittent every 12 hours  midodrine 2.5milliGRAM(s) Oral three times a day  milrinone Infusion 0.1MICROgram(s)/kG/Min IV Continuous <Continuous>  heparin  Infusion. Unit(s)/Hr IV Continuous <Continuous>       =======================================================     REVIEW OF SYSTEMS:      UNABLE TO OBTAIN.     =======================================================     Physical Exam:  ICU Vital Signs Last 24 Hrs  T(C): 37.3, Max: 37.8 (06-21 @ 16:00)  T(F): 99.1, Max: 100.1 (06-21 @ 16:00)  HR: 83 (62 - 98)  BP: 88/61 (83/52 - 98/61)  BP(mean): 70 (63 - 75)  ABP: 111/69 (88/60 - 111/69)  ABP(mean): 82 (67 - 82)  RR: 36 (0 - 105)  SpO2: 99% (98% - 100%)    GEN: NAD, intubated, opens eyes to stimulation  HEENT: normocephalic and atraumatic. EOMI. LUCA.  + ET TUBE    NECK: Supple. No carotid bruits.  No lymphadenopathy or thyromegaly.  LUNGS: coarse breath sounds bilaterally  HEART: Regular rate and rhythm without murmur. + BILATERAL EDEMA  ABDOMEN: DISTENDED, Positive bowel sounds; midline incision with island dressing in place  : + ARMAS with trace sediment.   EXTREMITIES: Without any cyanosis, clubbing, rash, lesions  MSK: no joint swelling  NEUROLOGIC: opens eyes; cannot fully assess  PSYCHIATRIC: cannot fully assess  SKIN: No ulceration or induration present.    =======================================================    Labs:  06-22    144  |  101  |  69.0<H>  ----------------------------<  129<H>  4.1   |  27.0  |  0.82    Ca    7.3<L>      22 Jun 2017 05:59  Phos  2.4     06-22  Mg     2.1     06-22    TPro  6.0<L>  /  Alb  2.5<L>  /  TBili  1.0  /  DBili  0.5<H>  /  AST  128<H>  /  ALT  117<H>  /  AlkPhos  321<H>  06-22                          8.6    14.3  )-----------( 146      ( 22 Jun 2017 05:59 )             30.5       PTT - ( 22 Jun 2017 11:24 )  PTT:25.4 sec    LIVER FUNCTIONS - ( 22 Jun 2017 05:59 )  Alb: 2.5 g/dL / Pro: 6.0 g/dL / ALK PHOS: 321 U/L / ALT: 117 U/L / AST: 128 U/L / GGT: x               CAPILLARY BLOOD GLUCOSE    ABG - ( 22 Jun 2017 10:23 )  pH: 7.48  /  pCO2: 38    /  pO2: 104   / HCO3: 29    / Base Excess: 4.7   /  SaO2: 99        RECENT CULTURES:  06-20 @ 01:34 .Blood Blood     No growth at 48 hours    06-20 @ 01:00 .Sputum Sputum     Rare Routine respiratory devin present    Few WBC's  No organisms seen    06-19 @ 18:14 .Blood Blood     No growth at 48 hours    06-12 @ 05:51 .Sputum Sputum     Numerous Routine respiratory devin present  Few White blood cells  Numerous Gram Positive Cocci in Clusters  Moderate Gram positive cocci in pairs  Few Gram Negative Rods  Rare Yeast    06-10 @ 12:25 .Blood Blood     No growth at 5 days.    06-10 @ 10:29 .Blood Blood     No growth at 5 days.

## 2017-06-22 NOTE — PROGRESS NOTE ADULT - PROBLEM SELECTOR PLAN 3
resolved CBD stent in place on CT scan  - check US of the liver to r/o non-functioning CBD stent  - check GGT in view of ALK P elevation

## 2017-06-22 NOTE — PROGRESS NOTE ADULT - SUBJECTIVE AND OBJECTIVE BOX
INTERVAL HPI/OVERNIGHT EVENTS:      PRESSORS: [ ] YES [ x] NO  WHICH:  DOSE:    ANTIBIOTICS:                  DATE STARTED:  ANTIBIOTICS:                  DATE STARTED:  ANTIBIOTICS:                  DATE STARTED:    MEDICATIONS  (STANDING):  thiamine 100milliGRAM(s) Oral daily  amantadine Syrup 100milliGRAM(s) Oral <User Schedule>  heparin  Injectable 5000Unit(s) SubCutaneous every 8 hours  doxazosin 4milliGRAM(s) Oral at bedtime  furosemide    Tablet 20milliGRAM(s) Oral two times a day  FLUoxetine Solution 10milliGRAM(s) Oral daily  folic acid 1milliGRAM(s) Enteral Tube daily  traZODone 50milliGRAM(s) Oral at bedtime  digoxin     Tablet 0.125milliGRAM(s) Oral daily  melatonin 9milliGRAM(s) Oral at bedtime  ALBUTerol    0.083% 2.5milliGRAM(s) Nebulizer every 6 hours  metoprolol 50milliGRAM(s) Oral two times a day  pantoprazole   Suspension 40milliGRAM(s) Oral two times a day before meals  senna Syrup 10milliLiter(s) Oral two times a day  ertapenem  IVPB  IV Intermittent   ertapenem  IVPB 1000milliGRAM(s) IV Intermittent every 24 hours  vancomycin  IVPB 750milliGRAM(s) IV Intermittent every 12 hours  midodrine 2.5milliGRAM(s) Oral three times a day  milrinone Infusion 0.1MICROgram(s)/kG/Min IV Continuous <Continuous>  heparin  Infusion. Unit(s)/Hr IV Continuous <Continuous>    MEDICATIONS  (PRN):  acetaminophen    Suspension 650milliGRAM(s) Oral every 6 hours PRN For Temp greater than 38.5 C (101.3 F)      Drug Dosing Weight  Height (cm): 165.1 (12 Jun 2017 06:38)  Weight (kg): 81.5 (12 Jun 2017 06:38)  BMI (kg/m2): 29.9 (12 Jun 2017 06:38)  BSA (m2): 1.89 (12 Jun 2017 06:38)    CENTRAL LINE: [ ] YES [ ] NO  LOCATION:   DATE INSERTED:  REMOVE: [ ] YES [ ] NO  EXPLAIN:    ARMAS: [ ] YES [ ] NO    DATE INSERTED:  REMOVE: [ ] YES [ ] NO  EXPLAIN:    A-LINE: [ ] YES [ ] NO  LOCATION:   DATE INSERTED:  REMOVE: [ ] YES [ ] NO  EXPLAIN:    PAST MEDICAL & SURGICAL HISTORY:  Mitral regurgitation: severe MR  Cardiomyopathy, nonischemic  CAD (coronary artery disease)  Asthma  Atrial fibrillation  Heart disease  S/P laparoscopic cholecystectomy  History of humerus fracture: Metal pins in Left Humerus  Artificial pacemaker      ICU Vital Signs Last 24 Hrs  T(C): 37.3, Max: 37.8 (06-21 @ 14:00)  T(F): 99.1, Max: 100.1 (06-21 @ 14:00)  HR: 70 (62 - 101)  BP: 88/61 (83/52 - 98/61)  BP(mean): 70 (63 - 75)  ABP: 95/58 (88/60 - 123/76)  ABP(mean): 70 (67 - 90)  RR: 24 (0 - 105)  SpO2: 100% (99% - 100%)      ABG - ( 22 Jun 2017 10:23 )  pH: 7.48  /  pCO2: 38    /  pO2: 104   / HCO3: 29    / Base Excess: 4.7   /  SaO2: 99            I&O's Detail  I & Os for 24h ending 22 Jun 2017 07:00  =============================================  IN:    Free Water: 2100 ml    multiple electrolytes Injection Type 1multiple electrolytes Injection Type 1: 1800 ml    Pivot: 1320 ml    dexmedetomidine Infusion: 138 ml    Solution: 50 ml    DOBUTamine Infusion: 24.4 ml    DOBUTamine Infusion: 24 ml    dexmedetomidine Infusion: 12.2 ml    Total IN: 5468.6 ml  ---------------------------------------------  OUT:    Indwelling Catheter - Urethral: 1545 ml    Total OUT: 1545 ml  ---------------------------------------------  Total NET: 3923.6 ml    I & Os for current day (as of 22 Jun 2017 12:41)  =============================================  IN:    Total IN: 0 ml  ---------------------------------------------  OUT:    Indwelling Catheter - Urethral: 100 ml    Total OUT: 100 ml  ---------------------------------------------  Total NET: -100 ml      Mode: CPAP with PS  FiO2: 30  PEEP: 8  PS: 10  MAP: 11        LABS:  CBC Full  -  ( 22 Jun 2017 05:59 )  WBC Count : 14.3 K/uL  Hemoglobin : 8.6 g/dL  Hematocrit : 30.5 %  Platelet Count - Automated : 146 K/uL  Mean Cell Volume : 93.8 fl  Mean Cell Hemoglobin : 26.5 pg  Mean Cell Hemoglobin Concentration : 28.2 g/dL  Auto Neutrophil # : x  Auto Lymphocyte # : x  Auto Monocyte # : x  Auto Eosinophil # : x  Auto Basophil # : x  Auto Neutrophil % : 88.0 %  Auto Lymphocyte % : 6.0 %  Auto Monocyte % : 2.0 %  Auto Eosinophil % : x  Auto Basophil % : x    06-22    144  |  101  |  69.0<H>  ----------------------------<  129<H>  4.1   |  27.0  |  0.82    Ca    7.3<L>      22 Jun 2017 05:59  Phos  2.4     06-22  Mg     2.1     06-22    TPro  6.0<L>  /  Alb  2.5<L>  /  TBili  1.0  /  DBili  0.5<H>  /  AST  128<H>  /  ALT  117<H>  /  AlkPhos  321<H>  06-22    PTT - ( 22 Jun 2017 11:24 )  PTT:25.4 sec      Assessment and Plan:    Neuro: GCS 15. Monitor for delirium.  Continue to optimize pain control. Serial Neurologic assessments    HEENT: no issues    CV: Continue hemodynamic monitoring, The patient has significant heart failure.  He has had minimal response to B-agonist inotropy.  Given the chronic nature of his heart disease, I suspect he has down-regulation of his receptors.  As such, we will try milrinone.  I also agree that he should be cardioverted and will require anticogulation before and after.    Pulm: Mechanical ventilation.  Adequate SaO2.  Will change mode to PSV    GI/Nutrition: TEN    /Renal: monitor UOP. Monitor BMP.  Replete Lytes as needed      HEME- DVT prophylaxis, SCDs    ID: The patient is on presumptive antibiotics.  I do not think he needs gram-positive covergae and would strongly suggest stopping the vancomycin    :

## 2017-06-22 NOTE — PROGRESS NOTE ADULT - PROBLEM SELECTOR PLAN 1
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

## 2017-06-22 NOTE — PROGRESS NOTE ADULT - SUBJECTIVE AND OBJECTIVE BOX
CHIEF COMPLAINT:Patient is a 51y old  Male who is seen with cardiogenic shock.   Pt was seen early this am.   Off dobutamine with pvcs and nsvt.   NICM.   He failed spontaneous breathing trial       Allergies:  No Known Allergies  	  MEDICATIONS:  doxazosin 4milliGRAM(s) Oral at bedtime  furosemide    Tablet 20milliGRAM(s) Oral two times a day  digoxin     Tablet 0.125milliGRAM(s) Oral daily  metoprolol 50milliGRAM(s) Oral two times a day  midodrine 2.5milliGRAM(s) Oral three times a day  milrinone Infusion 0.2MICROgram(s)/kG/Min IV Continuous <Continuous>  ertapenem  IVPB  IV Intermittent   ertapenem  IVPB 1000milliGRAM(s) IV Intermittent every 24 hours  vancomycin  IVPB 750milliGRAM(s) IV Intermittent every 12 hours  ALBUTerol    0.083% 2.5milliGRAM(s) Nebulizer every 6 hours  amantadine Syrup 100milliGRAM(s) Oral <User Schedule>  FLUoxetine Solution 10milliGRAM(s) Oral daily  traZODone 50milliGRAM(s) Oral at bedtime  melatonin 9milliGRAM(s) Oral at bedtime  acetaminophen    Suspension 650milliGRAM(s) Oral every 6 hours PRN  pantoprazole   Suspension 40milliGRAM(s) Oral two times a day before meals  senna Syrup 10milliLiter(s) Oral two times a day  thiamine 100milliGRAM(s) Oral daily  heparin  Injectable 5000Unit(s) SubCutaneous every 8 hours  folic acid 1milliGRAM(s) Enteral Tube daily  heparin  Infusion. Unit(s)/Hr IV Continuous <Continuous>    PHYSICAL EXAM:  T(C): 37.3, Max: 37.3 (06-22 @ 04:00)  HR: 96 (42 - 98)  BP: 90/68 (83/52 - 134/74)  RR: 30 (0 - 36)  SpO2: 99% (10% - 100%)  Wt(kg): --    I&O's Summary  I & Os for 24h ending 22 Jun 2017 07:00  =============================================  IN: 5468.6 ml / OUT: 1545 ml / NET: 3923.6 ml    I & Os for current day (as of 22 Jun 2017 23:15)  =============================================  IN: 2543.7 ml / OUT: 770 ml / NET: 1773.7 ml    Appearance: intubated, comfortable  HEENT:   NC/AT  Eye: Pink Conjunctiva  Lungs:bibasilar crckles   CVS:IRRR, anasarca  Pulses: Normal distal pulses.    LABS:	 	    CARDIAC MARKERS:                            8.5    15.7  )-----------( 143      ( 22 Jun 2017 19:01 )             29.9     06-22    144  |  101  |  69.0<H>  ----------------------------<  129<H>  4.1   |  27.0  |  0.82    Ca    7.3<L>      22 Jun 2017 05:59  Phos  2.4     06-22  Mg     2.1     06-22    TPro  6.0<L>  /  Alb  2.5<L>  /  TBili  1.0  /  DBili  0.5<H>  /  AST  128<H>  /  ALT  117<H>  /  AlkPhos  321<H>  06-22    TSH:     ASSESSMENT/PLAN:

## 2017-06-22 NOTE — PROGRESS NOTE ADULT - ASSESSMENT
Spoke with JANNETTE Eaton this AM  HFrEF, cardiogenic shock, Would increase midodrine.   Can try milrinone, but long term inotropic therapy is most likely not possible due to family dynamics and may need to go to NH. Not LVAD candidate.   Cont with metoprolol  Change IVF to 1/2 ns. He is volume overloaded.   Diuretic therapy, get CVP to 15 mmHg.  S/P GFF with DVT.

## 2017-06-22 NOTE — PROGRESS NOTE ADULT - ASSESSMENT
50 y/o M with a complicated Hospital course, Cholecystectomy, bile leak, exp lap and ERCP, cardiac arrest, Intra abdominal infection, mutiple intubations and vent, DVT, IVC filter, continues to have fevers and has been on antibiotics intermittently since admission.   CTAP without acute findings.

## 2017-06-23 DIAGNOSIS — R57.0 CARDIOGENIC SHOCK: ICD-10-CM

## 2017-06-23 LAB
ANION GAP SERPL CALC-SCNC: 17 MMOL/L — SIGNIFICANT CHANGE UP (ref 5–17)
ANISOCYTOSIS BLD QL: SLIGHT — SIGNIFICANT CHANGE UP
APTT BLD: 101.8 SEC — HIGH (ref 27.5–37.4)
APTT BLD: 75.6 SEC — HIGH (ref 27.5–37.4)
APTT BLD: 79.2 SEC — HIGH (ref 27.5–37.4)
BASE EXCESS BLDV CALC-SCNC: 6.7 MMOL/L — HIGH (ref -3–3)
BUN SERPL-MCNC: 64 MG/DL — HIGH (ref 8–20)
CA-I SERPL-SCNC: 0.93 MMOL/L — LOW (ref 1.12–1.3)
CALCIUM SERPL-MCNC: 7.8 MG/DL — LOW (ref 8.6–10.2)
CHLORIDE BLDV-SCNC: 100 MMOL/L — SIGNIFICANT CHANGE UP (ref 98–106)
CHLORIDE SERPL-SCNC: 98 MMOL/L — SIGNIFICANT CHANGE UP (ref 98–107)
CO2 SERPL-SCNC: 28 MMOL/L — SIGNIFICANT CHANGE UP (ref 22–29)
CREAT SERPL-MCNC: 0.73 MG/DL — SIGNIFICANT CHANGE UP (ref 0.5–1.3)
DACRYOCYTES BLD QL SMEAR: SLIGHT — SIGNIFICANT CHANGE UP
EOSINOPHIL NFR BLD AUTO: 1 % — SIGNIFICANT CHANGE UP (ref 0–6)
GAS PNL BLDA: SIGNIFICANT CHANGE UP
GAS PNL BLDV: 139 MMOL/L — SIGNIFICANT CHANGE UP (ref 136–146)
GAS PNL BLDV: SIGNIFICANT CHANGE UP
GAS PNL BLDV: SIGNIFICANT CHANGE UP
GGT SERPL-CCNC: 362 U/L — HIGH (ref 8–61)
GLUCOSE BLDV-MCNC: 140 MG/DL — HIGH (ref 70–99)
GLUCOSE SERPL-MCNC: 139 MG/DL — HIGH (ref 70–115)
HCO3 BLDV-SCNC: 30 MMOL/L — HIGH (ref 20–26)
HCT VFR BLD CALC: 30.1 % — LOW (ref 42–52)
HCT VFR BLDA CALC: 28 — LOW (ref 39–50)
HGB BLD CALC-MCNC: 9 G/DL — LOW (ref 13–17)
HGB BLD-MCNC: 8.7 G/DL — LOW (ref 14–18)
HYPOCHROMIA BLD QL: SLIGHT — SIGNIFICANT CHANGE UP
LACTATE BLDV-MCNC: 2.6 MMOL/L — HIGH (ref 0.5–2)
LYMPHOCYTES # BLD AUTO: 8 % — LOW (ref 20–55)
MACROCYTES BLD QL: SLIGHT — SIGNIFICANT CHANGE UP
MAGNESIUM SERPL-MCNC: 1.9 MG/DL — SIGNIFICANT CHANGE UP (ref 1.6–2.6)
MCHC RBC-ENTMCNC: 26.3 PG — LOW (ref 27–31)
MCHC RBC-ENTMCNC: 28.9 G/DL — LOW (ref 32–36)
MCV RBC AUTO: 90.9 FL — SIGNIFICANT CHANGE UP (ref 80–94)
MICROCYTES BLD QL: SLIGHT — SIGNIFICANT CHANGE UP
MONOCYTES NFR BLD AUTO: 1 % — LOW (ref 3–10)
NEUTROPHILS NFR BLD AUTO: 90 % — HIGH (ref 37–73)
NRBC # BLD: 3 /100 — HIGH (ref 0–0)
OTHER CELLS CSF MANUAL: 6 ML/DL — LOW (ref 18–22)
OVALOCYTES BLD QL SMEAR: SLIGHT — SIGNIFICANT CHANGE UP
PCO2 BLDV: 46 MMHG — SIGNIFICANT CHANGE UP (ref 35–50)
PH BLDV: 7.45 — SIGNIFICANT CHANGE UP (ref 7.35–7.45)
PHOSPHATE SERPL-MCNC: 2.4 MG/DL — SIGNIFICANT CHANGE UP (ref 2.4–4.7)
PLAT MORPH BLD: NORMAL — SIGNIFICANT CHANGE UP
PLATELET # BLD AUTO: 146 K/UL — LOW (ref 150–400)
PO2 BLDV: 29 MMHG — SIGNIFICANT CHANGE UP (ref 25–45)
POIKILOCYTOSIS BLD QL AUTO: SLIGHT — SIGNIFICANT CHANGE UP
POTASSIUM BLDV-SCNC: 3.5 MMOL/L — SIGNIFICANT CHANGE UP (ref 3.4–4.5)
POTASSIUM SERPL-MCNC: 3.6 MMOL/L — SIGNIFICANT CHANGE UP (ref 3.5–5.3)
POTASSIUM SERPL-SCNC: 3.6 MMOL/L — SIGNIFICANT CHANGE UP (ref 3.5–5.3)
RBC # BLD: 3.31 M/UL — LOW (ref 4.6–6.2)
RBC # FLD: 19 % — HIGH (ref 11–15.6)
RBC BLD AUTO: ABNORMAL
SAO2 % BLDV: 49 % — LOW (ref 67–88)
SODIUM SERPL-SCNC: 143 MMOL/L — SIGNIFICANT CHANGE UP (ref 135–145)
WBC # BLD: 17.6 K/UL — HIGH (ref 4.8–10.8)
WBC # FLD AUTO: 17.6 K/UL — HIGH (ref 4.8–10.8)

## 2017-06-23 PROCEDURE — 99233 SBSQ HOSP IP/OBS HIGH 50: CPT

## 2017-06-23 RX ORDER — POTASSIUM CHLORIDE 20 MEQ
10 PACKET (EA) ORAL ONCE
Qty: 0 | Refills: 0 | Status: COMPLETED | OUTPATIENT
Start: 2017-06-23 | End: 2017-06-23

## 2017-06-23 RX ADMIN — Medication 100 MILLIGRAM(S): at 12:34

## 2017-06-23 RX ADMIN — Medication 0.12 MILLIGRAM(S): at 05:26

## 2017-06-23 RX ADMIN — MILRINONE LACTATE 4.89 MICROGRAM(S)/KG/MIN: 1 INJECTION, SOLUTION INTRAVENOUS at 17:44

## 2017-06-23 RX ADMIN — ALBUTEROL 2.5 MILLIGRAM(S): 90 AEROSOL, METERED ORAL at 03:23

## 2017-06-23 RX ADMIN — MILRINONE LACTATE 4.89 MICROGRAM(S)/KG/MIN: 1 INJECTION, SOLUTION INTRAVENOUS at 10:14

## 2017-06-23 RX ADMIN — Medication 20 MILLIGRAM(S): at 17:44

## 2017-06-23 RX ADMIN — PANTOPRAZOLE SODIUM 40 MILLIGRAM(S): 20 TABLET, DELAYED RELEASE ORAL at 05:26

## 2017-06-23 RX ADMIN — Medication 50 MILLIGRAM(S): at 05:26

## 2017-06-23 RX ADMIN — Medication 50 MILLIGRAM(S): at 17:44

## 2017-06-23 RX ADMIN — HEPARIN SODIUM 1300 UNIT(S)/HR: 5000 INJECTION INTRAVENOUS; SUBCUTANEOUS at 17:45

## 2017-06-23 RX ADMIN — MIDODRINE HYDROCHLORIDE 2.5 MILLIGRAM(S): 2.5 TABLET ORAL at 05:26

## 2017-06-23 RX ADMIN — Medication 9 MILLIGRAM(S): at 21:49

## 2017-06-23 RX ADMIN — Medication 20 MILLIGRAM(S): at 05:26

## 2017-06-23 RX ADMIN — Medication 4 MILLIGRAM(S): at 21:49

## 2017-06-23 RX ADMIN — Medication 10 MILLIGRAM(S): at 12:34

## 2017-06-23 RX ADMIN — Medication 150 MILLIGRAM(S): at 19:09

## 2017-06-23 RX ADMIN — ALBUTEROL 2.5 MILLIGRAM(S): 90 AEROSOL, METERED ORAL at 08:08

## 2017-06-23 RX ADMIN — Medication 100 MILLIGRAM(S): at 05:25

## 2017-06-23 RX ADMIN — Medication 650 MILLIGRAM(S): at 06:35

## 2017-06-23 RX ADMIN — PANTOPRAZOLE SODIUM 40 MILLIGRAM(S): 20 TABLET, DELAYED RELEASE ORAL at 17:44

## 2017-06-23 RX ADMIN — HEPARIN SODIUM 1300 UNIT(S)/HR: 5000 INJECTION INTRAVENOUS; SUBCUTANEOUS at 02:36

## 2017-06-23 RX ADMIN — Medication 150 MILLIGRAM(S): at 05:27

## 2017-06-23 RX ADMIN — Medication 50 MILLIGRAM(S): at 21:49

## 2017-06-23 RX ADMIN — ALBUTEROL 2.5 MILLIGRAM(S): 90 AEROSOL, METERED ORAL at 14:23

## 2017-06-23 RX ADMIN — ALBUTEROL 2.5 MILLIGRAM(S): 90 AEROSOL, METERED ORAL at 20:05

## 2017-06-23 RX ADMIN — HEPARIN SODIUM 1300 UNIT(S)/HR: 5000 INJECTION INTRAVENOUS; SUBCUTANEOUS at 10:13

## 2017-06-23 RX ADMIN — ERTAPENEM SODIUM 120 MILLIGRAM(S): 1 INJECTION, POWDER, LYOPHILIZED, FOR SOLUTION INTRAMUSCULAR; INTRAVENOUS at 20:24

## 2017-06-23 RX ADMIN — Medication 1 MILLIGRAM(S): at 12:34

## 2017-06-23 RX ADMIN — Medication 100 MILLIEQUIVALENT(S): at 10:14

## 2017-06-23 NOTE — PROGRESS NOTE ADULT - SUBJECTIVE AND OBJECTIVE BOX
INTERVAL HPI/OVERNIGHT EVENTS:  The patient was started on milrinone.  No significant change in CI  Otherwise no new problems  PRESSORS: [ ] YES [ ] NO  WHICH:  DOSE:    ANTIBIOTICS:                  DATE STARTED:  ANTIBIOTICS:                  DATE STARTED:  ANTIBIOTICS:                  DATE STARTED:    MEDICATIONS  (STANDING):  thiamine 100milliGRAM(s) Oral daily  amantadine Syrup 100milliGRAM(s) Oral <User Schedule>  doxazosin 4milliGRAM(s) Oral at bedtime  furosemide    Tablet 20milliGRAM(s) Oral two times a day  FLUoxetine Solution 10milliGRAM(s) Oral daily  folic acid 1milliGRAM(s) Enteral Tube daily  traZODone 50milliGRAM(s) Oral at bedtime  digoxin     Tablet 0.125milliGRAM(s) Oral daily  melatonin 9milliGRAM(s) Oral at bedtime  ALBUTerol    0.083% 2.5milliGRAM(s) Nebulizer every 6 hours  metoprolol 50milliGRAM(s) Oral two times a day  pantoprazole   Suspension 40milliGRAM(s) Oral two times a day before meals  senna Syrup 10milliLiter(s) Oral two times a day  ertapenem  IVPB  IV Intermittent   ertapenem  IVPB 1000milliGRAM(s) IV Intermittent every 24 hours  vancomycin  IVPB 750milliGRAM(s) IV Intermittent every 12 hours  milrinone Infusion 0.2MICROgram(s)/kG/Min IV Continuous <Continuous>  heparin  Infusion. Unit(s)/Hr IV Continuous <Continuous>    MEDICATIONS  (PRN):  acetaminophen    Suspension 650milliGRAM(s) Oral every 6 hours PRN For Temp greater than 38.5 C (101.3 F)      Drug Dosing Weight  Height (cm): 165.1 (12 Jun 2017 06:38)  Weight (kg): 81.5 (12 Jun 2017 06:38)  BMI (kg/m2): 29.9 (12 Jun 2017 06:38)  BSA (m2): 1.89 (12 Jun 2017 06:38)    CENTRAL LINE: [ ] YES [ ] NO  LOCATION:   DATE INSERTED:  REMOVE: [ ] YES [ ] NO  EXPLAIN:    ARMAS: [ ] YES [ ] NO    DATE INSERTED:  REMOVE: [ ] YES [ ] NO  EXPLAIN:    A-LINE: [ ] YES [ ] NO  LOCATION:   DATE INSERTED:  REMOVE: [ ] YES [ ] NO  EXPLAIN:    PAST MEDICAL & SURGICAL HISTORY:  Mitral regurgitation: severe MR  Cardiomyopathy, nonischemic  CAD (coronary artery disease)  Asthma  Atrial fibrillation  Heart disease  S/P laparoscopic cholecystectomy  History of humerus fracture: Metal pins in Left Humerus  Artificial pacemaker      ICU Vital Signs Last 24 Hrs  T(C): 37.3, Max: 38.3 (06-23 @ 05:00)  T(F): 99.1, Max: 101 (06-23 @ 05:00)  HR: 88 (42 - 111)  BP: 108/70 (90/68 - 137/74)  BP(mean): 87 (75 - 109)  ABP: 107/69 (90/55 - 141/83)  ABP(mean): 82 (67 - 99)  RR: 25 (17 - 36)  SpO2: 100% (10% - 100%)      ABG - ( 23 Jun 2017 04:24 )  pH: 7.49  /  pCO2: 38    /  pO2: 92    / HCO3: 30    / Base Excess: 5.9   /  SaO2: 98                  I&O's Detail  I & Os for 24h ending 23 Jun 2017 07:00  =============================================  IN:    Free Water: 2100 ml    Pivot: 1265 ml    heparin  Infusion.: 262 ml    Solution: 150 ml    Solution: 50 ml    milrinone  Infusion: 46.5 ml    milrinone  Infusion: 14.4 ml    Total IN: 3887.9 ml  ---------------------------------------------  OUT:    Indwelling Catheter - Urethral: 1305 ml    Total OUT: 1305 ml  ---------------------------------------------  Total NET: 2582.9 ml    I & Os for current day (as of 23 Jun 2017 15:17)  =============================================  IN:    Free Water: 700 ml    Pivot: 385 ml    Enteral Tube Flush: 100 ml    Solution: 100 ml    heparin  Infusion.: 91 ml    milrinone  Infusion: 24.5 ml    Total IN: 1400.5 ml  ---------------------------------------------  OUT:    Indwelling Catheter - Urethral: 340 ml    Total OUT: 340 ml  ---------------------------------------------  Total NET: 1060.5 ml      Mode: Spontaneous/ CPAP (Continuous Positive Airway Pressure)  FiO2: 30  PEEP: 8  PS: 10  MAP: 11        LABS:  CBC Full  -  ( 23 Jun 2017 05:35 )  WBC Count : 17.6 K/uL  Hemoglobin : 8.7 g/dL  Hematocrit : 30.1 %  Platelet Count - Automated : 146 K/uL  Mean Cell Volume : 90.9 fl  Mean Cell Hemoglobin : 26.3 pg  Mean Cell Hemoglobin Concentration : 28.9 g/dL  Auto Neutrophil # : x  Auto Lymphocyte # : x  Auto Monocyte # : x  Auto Eosinophil # : x  Auto Basophil # : x  Auto Neutrophil % : 90.0 %  Auto Lymphocyte % : 8.0 %  Auto Monocyte % : 1.0 %  Auto Eosinophil % : 1.0 %  Auto Basophil % : x    06-23    143  |  98  |  64.0<H>  ----------------------------<  139<H>  3.6   |  28.0  |  0.73    Ca    7.8<L>      23 Jun 2017 05:35  Phos  2.4     06-23  Mg     1.9     06-23    TPro  6.0<L>  /  Alb  2.5<L>  /  TBili  1.0  /  DBili  0.5<H>  /  AST  128<H>  /  ALT  117<H>  /  AlkPhos  321<H>  06-22    PTT - ( 23 Jun 2017 08:46 )  PTT:79.2 sec      Assessment and Plan:    Neuro: GCS 15. Monitor for delirium.  Continue to optimize pain control. Serial Neurologic assessments    HEENT: no issues    CV: Continue hemodynamic monitoring.  Will increase the dose of milrinone--since the lactate and SvO2 have improved.  Will stop the alpha agents.    Pulm: Remains on PSV with adequate gas exchange    GI/Nutrition: Bowel Regimen    /Renal: monitor UOP. Monitor BMP.  Replete Lytes as needed      HEME- DVT prophylaxis, SCDs    ID: On empiric antibiotics.  No new cultures.

## 2017-06-23 NOTE — PROGRESS NOTE ADULT - SUBJECTIVE AND OBJECTIVE BOX
INTERVAL HPI/OVERNIGHT EVENTS:    PRESENT SYMPTOMS: SOURCE:  Patient/Family/Team    PAIN SCALE:  0 = none  1 = mild   2 = moderate  3 = severe    Pain:     Dyspnea:  [ ] YES [ ] NO - vented  Anxiety:  [ ] YES [ ] NO  Fatigue: [ x] YES [ ] NO  Nausea: [ ] YES [ ] NO  Loss of Appetite: [ ] YES [ ] NO  NPO on TF  Other symptoms: __________    MEDICATIONS  (STANDING):  thiamine 100milliGRAM(s) Oral daily  amantadine Syrup 100milliGRAM(s) Oral <User Schedule>  doxazosin 4milliGRAM(s) Oral at bedtime  furosemide    Tablet 20milliGRAM(s) Oral two times a day  FLUoxetine Solution 10milliGRAM(s) Oral daily  folic acid 1milliGRAM(s) Enteral Tube daily  traZODone 50milliGRAM(s) Oral at bedtime  digoxin     Tablet 0.125milliGRAM(s) Oral daily  melatonin 9milliGRAM(s) Oral at bedtime  ALBUTerol    0.083% 2.5milliGRAM(s) Nebulizer every 6 hours  metoprolol 50milliGRAM(s) Oral two times a day  pantoprazole   Suspension 40milliGRAM(s) Oral two times a day before meals  senna Syrup 10milliLiter(s) Oral two times a day  ertapenem  IVPB  IV Intermittent   ertapenem  IVPB 1000milliGRAM(s) IV Intermittent every 24 hours  vancomycin  IVPB 750milliGRAM(s) IV Intermittent every 12 hours  milrinone Infusion 0.2MICROgram(s)/kG/Min IV Continuous <Continuous>  heparin  Infusion. Unit(s)/Hr IV Continuous <Continuous>    MEDICATIONS  (PRN):  acetaminophen    Suspension 650milliGRAM(s) Oral every 6 hours PRN For Temp greater than 38.5 C (101.3 F)      Allergies    No Known Allergies    Intolerances      Karnofsky Performance Score/Palliative Performance Status Version 2:  10       %    Vital Signs Last 24 Hrs  T(C): 37.3, Max: 38.3 (06-23 @ 05:00)  T(F): 99.1, Max: 101 (06-23 @ 05:00)  HR: 93 (42 - 111)  BP: 108/70 (90/68 - 137/74)  BP(mean): 87 (75 - 109)  RR: 26 (17 - 36)  SpO2: 100% (10% - 100%)    PHYSICAL EXAM:    General: WD man, intubated NAD    HEENT: [ ] normal  [ ] dry mouth  [x ] ET tube/trach    Lungs: [x ] comfortable- vented     CV: [x ] normal  [ ] tachycardia    GI: [ ] normal  [ ] distended  [ ] tender  [ ] no BS               [x ] PEG/NG/OG tube    : [ ] normal  [ ] incontinent  [ ] oliguria/anuria  [ x] montoya    MSK: [ ] normal  [x ] weakness  [ ] edema             [ ] ambulatory  [ x] bedbound/wheelchair bound    Skin: [ ] normal  [ ] pressure ulcers- Stage_____  [ x] no rash    LABS:                        8.7    17.6  )-----------( 146      ( 23 Jun 2017 05:35 )             30.1     06-23    143  |  98  |  64.0<H>  ----------------------------<  139<H>  3.6   |  28.0  |  0.73    Ca    7.8<L>      23 Jun 2017 05:35  Phos  2.4     06-23  Mg     1.9     06-23    TPro  6.0<L>  /  Alb  2.5<L>  /  TBili  1.0  /  DBili  0.5<H>  /  AST  128<H>  /  ALT  117<H>  /  AlkPhos  321<H>  06-22    PTT - ( 23 Jun 2017 08:46 )  PTT:79.2 sec    I&O's Summary  I & Os for 24h ending 23 Jun 2017 07:00  =============================================  IN: 3887.9 ml / OUT: 1305 ml / NET: 2582.9 ml    I & Os for current day (as of 23 Jun 2017 15:15)  =============================================  IN: 1400.5 ml / OUT: 340 ml / NET: 1060.5 ml      RADIOLOGY & ADDITIONAL STUDIES:      Thank you for the opportunity to assist with the care of this patient.   [facility] [phone].

## 2017-06-23 NOTE — PROGRESS NOTE ADULT - PROBLEM SELECTOR PLAN 1
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

## 2017-06-23 NOTE — PROGRESS NOTE ADULT - ASSESSMENT
51yr man, NICM, s/p lap cholecystecomy, found to have bile duct leak. Underwent an ERCP and had cardiac arrest multiple intubations on vent, DVT, IVC filter, continues to have fevers on abx

## 2017-06-23 NOTE — PROGRESS NOTE ADULT - PROBLEM SELECTOR PLAN 3
CBD stent in place on CT scan  - check US of the liver to r/o non-functioning CBD stent  - check GGT in view of ALK P elevation CBD stent in place on CT scan  - Consider check US of the liver to r/o non-functioning CBD stent  - GGT elevated

## 2017-06-23 NOTE — PROGRESS NOTE ADULT - SUBJECTIVE AND OBJECTIVE BOX
[facility]  INFECTIOUS DISEASES AND INTERNAL MEDICINE OF [place]  =======================================================  Trent Rocha MD  Diplomates American Board of Internal Medicine and Infectious Diseases  =======================================================    MICHAELSERNIE [id number]  chief complaint: fevers    patient seen and examined in follow up.  Chart and labs reviewed.     GGT elevated    =======================================================  Allergies:  No Known Allergies      =======================================================  MEDICATIONS  (STANDING):  thiamine 100milliGRAM(s) Oral daily  amantadine Syrup 100milliGRAM(s) Oral <User Schedule>  doxazosin 4milliGRAM(s) Oral at bedtime  furosemide    Tablet 20milliGRAM(s) Oral two times a day  FLUoxetine Solution 10milliGRAM(s) Oral daily  folic acid 1milliGRAM(s) Enteral Tube daily  traZODone 50milliGRAM(s) Oral at bedtime  digoxin     Tablet 0.125milliGRAM(s) Oral daily  melatonin 9milliGRAM(s) Oral at bedtime  ALBUTerol    0.083% 2.5milliGRAM(s) Nebulizer every 6 hours  metoprolol 50milliGRAM(s) Oral two times a day  pantoprazole   Suspension 40milliGRAM(s) Oral two times a day before meals  senna Syrup 10milliLiter(s) Oral two times a day  ertapenem  IVPB  IV Intermittent   ertapenem  IVPB 1000milliGRAM(s) IV Intermittent every 24 hours  vancomycin  IVPB 750milliGRAM(s) IV Intermittent every 12 hours  milrinone Infusion 0.2MICROgram(s)/kG/Min IV Continuous <Continuous>  heparin  Infusion. Unit(s)/Hr IV Continuous <Continuous>       =======================================================     REVIEW OF SYSTEMS:      UNABLE TO OBTAIN.     =======================================================     Physical Exam:  ICU Vital Signs Last 24 Hrs  T(C): 37.6, Max: 38.3 (06-23 @ 05:00)  T(F): 99.7, Max: 101 (06-23 @ 05:00)  HR: 80 (42 - 111)  BP: 117/84 (90/68 - 137/74)  BP(mean): 97 (75 - 109)  ABP: 124/77 (88/64 - 141/83)  ABP(mean): 91 (67 - 99)  RR: 29 (17 - 36)  SpO2: 100% (10% - 100%)      GEN: NAD, intubated, opens eyes to stimulation  HEENT: normocephalic and atraumatic. EOMI. LUCA.  + ET TUBE    NECK: Supple. No carotid bruits.  No lymphadenopathy or thyromegaly.  LUNGS: coarse breath sounds bilaterally  HEART: Regular rate and rhythm without murmur. + BILATERAL EDEMA  ABDOMEN: DISTENDED, Positive bowel sounds; midline incision with island dressing in place  : + ARMAS with trace sediment.   EXTREMITIES: Without any cyanosis, clubbing, rash, lesions  MSK: no joint swelling  NEUROLOGIC: opens eyes; cannot fully assess  PSYCHIATRIC: cannot fully assess  SKIN: No ulceration or induration present.      =======================================================    Labs:  06-23    143  |  98  |  64.0<H>  ----------------------------<  139<H>  3.6   |  28.0  |  0.73    Ca    7.8<L>      23 Jun 2017 05:35  Phos  2.4     06-23  Mg     1.9     06-23    TPro  6.0<L>  /  Alb  2.5<L>  /  TBili  1.0  /  DBili  0.5<H>  /  AST  128<H>  /  ALT  117<H>  /  AlkPhos  321<H>  06-22                          8.7    17.6  )-----------( 146      ( 23 Jun 2017 05:35 )             30.1       PTT - ( 23 Jun 2017 08:46 )  PTT:79.2 sec    LIVER FUNCTIONS - ( 23 Jun 2017 05:35 )  Alb: x     / Pro: x     / ALK PHOS: x     / ALT: x     / AST: x     / GGT: 362 U/L           CAPILLARY BLOOD GLUCOSE    ABG - ( 23 Jun 2017 04:24 )  pH: 7.49  /  pCO2: 38    /  pO2: 92    / HCO3: 30    / Base Excess: 5.9   /  SaO2: 98            RECENT CULTURES:  06-20 @ 01:34 .Blood Blood     No growth at 48 hours        06-20 @ 01:00 .Sputum Sputum     Rare Routine respiratory devin present    Few WBC's  No organisms seen      06-19 @ 18:14 .Blood Blood     No growth at 48 hours        06-12 @ 05:51 .Sputum Sputum     Numerous Routine respiratory devin present    Few White blood cells  Numerous Gram Positive Cocci in Clusters  Moderate Gram positive cocci in pairs  Few Gram Negative Rods  Rare Yeast      06-10 @ 12:25 .Blood Blood     No growth at 5 days.        06-10 @ 10:29 .Blood Blood     No growth at 5 days. [facility]  INFECTIOUS DISEASES AND INTERNAL MEDICINE OF [place]  =======================================================  Trent Rocha MD  Diplomates American Board of Internal Medicine and Infectious Diseases  =======================================================    YAN ERNIE [id number]  chief complaint: fevers    patient seen and examined in follow up.  Chart and labs reviewed.     GGT elevated  remains intubated  intermittent fever overnight.     =======================================================  Allergies:  No Known Allergies      =======================================================  MEDICATIONS  (STANDING):  thiamine 100milliGRAM(s) Oral daily  amantadine Syrup 100milliGRAM(s) Oral <User Schedule>  doxazosin 4milliGRAM(s) Oral at bedtime  furosemide    Tablet 20milliGRAM(s) Oral two times a day  FLUoxetine Solution 10milliGRAM(s) Oral daily  folic acid 1milliGRAM(s) Enteral Tube daily  traZODone 50milliGRAM(s) Oral at bedtime  digoxin     Tablet 0.125milliGRAM(s) Oral daily  melatonin 9milliGRAM(s) Oral at bedtime  ALBUTerol    0.083% 2.5milliGRAM(s) Nebulizer every 6 hours  metoprolol 50milliGRAM(s) Oral two times a day  pantoprazole   Suspension 40milliGRAM(s) Oral two times a day before meals  senna Syrup 10milliLiter(s) Oral two times a day  ertapenem  IVPB  IV Intermittent   ertapenem  IVPB 1000milliGRAM(s) IV Intermittent every 24 hours  vancomycin  IVPB 750milliGRAM(s) IV Intermittent every 12 hours  milrinone Infusion 0.2MICROgram(s)/kG/Min IV Continuous <Continuous>  heparin  Infusion. Unit(s)/Hr IV Continuous <Continuous>       =======================================================     REVIEW OF SYSTEMS:      UNABLE TO OBTAIN.     =======================================================     Physical Exam:  ICU Vital Signs Last 24 Hrs  T(C): 37.6, Max: 38.3 (06-23 @ 05:00)  T(F): 99.7, Max: 101 (06-23 @ 05:00)  HR: 80 (42 - 111)  BP: 117/84 (90/68 - 137/74)  BP(mean): 97 (75 - 109)  ABP: 124/77 (88/64 - 141/83)  ABP(mean): 91 (67 - 99)  RR: 29 (17 - 36)  SpO2: 100% (10% - 100%)      GEN: NAD, intubated, opens eyes to stimulation  HEENT: normocephalic and atraumatic. EOMI. LUCA.  + ET TUBE    NECK: Supple. No carotid bruits.  No lymphadenopathy or thyromegaly.  LUNGS: coarse breath sounds bilaterally  HEART: Regular rate and rhythm without murmur. + BILATERAL EDEMA  ABDOMEN: DISTENDED, Positive bowel sounds; midline incision with island dressing in place  : + ARMAS with trace sediment.   EXTREMITIES: Without any cyanosis, clubbing, rash, lesions  MSK: no joint swelling  NEUROLOGIC: opens eyes; cannot fully assess  PSYCHIATRIC: cannot fully assess  SKIN: No ulceration or induration present.      =======================================================    Labs:  06-23    143  |  98  |  64.0<H>  ----------------------------<  139<H>  3.6   |  28.0  |  0.73    Ca    7.8<L>      23 Jun 2017 05:35  Phos  2.4     06-23  Mg     1.9     06-23    TPro  6.0<L>  /  Alb  2.5<L>  /  TBili  1.0  /  DBili  0.5<H>  /  AST  128<H>  /  ALT  117<H>  /  AlkPhos  321<H>  06-22                          8.7    17.6  )-----------( 146      ( 23 Jun 2017 05:35 )             30.1       PTT - ( 23 Jun 2017 08:46 )  PTT:79.2 sec    LIVER FUNCTIONS - ( 23 Jun 2017 05:35 )  Alb: x     / Pro: x     / ALK PHOS: x     / ALT: x     / AST: x     / GGT: 362 U/L           CAPILLARY BLOOD GLUCOSE    ABG - ( 23 Jun 2017 04:24 )  pH: 7.49  /  pCO2: 38    /  pO2: 92    / HCO3: 30    / Base Excess: 5.9   /  SaO2: 98            RECENT CULTURES:  06-20 @ 01:34 .Blood Blood     No growth at 48 hours        06-20 @ 01:00 .Sputum Sputum     Rare Routine respiratory devin present    Few WBC's  No organisms seen      06-19 @ 18:14 .Blood Blood     No growth at 48 hours        06-12 @ 05:51 .Sputum Sputum     Numerous Routine respiratory devin present    Few White blood cells  Numerous Gram Positive Cocci in Clusters  Moderate Gram positive cocci in pairs  Few Gram Negative Rods  Rare Yeast      06-10 @ 12:25 .Blood Blood     No growth at 5 days.        06-10 @ 10:29 .Blood Blood     No growth at 5 days.

## 2017-06-23 NOTE — PROGRESS NOTE ADULT - ASSESSMENT
52 y/o M with a complicated Hospital course, Cholecystectomy, bile leak, exp lap and ERCP, cardiac arrest, Intra abdominal infection, mutiple intubations and vent, DVT, IVC filter, continues to have fevers and has been on antibiotics intermittently since admission.   CTAP without acute findings.

## 2017-06-23 NOTE — PROGRESS NOTE ADULT - PROBLEM SELECTOR PLAN 5
Met with family, 2 brothers ( Darius,  Renetta), sister Imelda with  [employer]. Initial question regarding his heart discussed and answered. Explained patient's preexisting poor cardiac status. Reviewed current condition and various issues- fevers, multiple intubations, difficulty weaning from vent, cardiac issues. I informed them of his poor condition and the possibility of trach if unable to wean from vent ( and eventual LTC facility). They told me they are a faithful family and leave things to God. I told them okay to hope for the best but also to prepare for the worst.  He has adult children in [facility] who are trying to come to [place] to see him. Of note , patient has  wife in [facility] with whom he is still legally . Patti who is present is his girlfriend.

## 2017-06-24 LAB
ANION GAP SERPL CALC-SCNC: 15 MMOL/L — SIGNIFICANT CHANGE UP (ref 5–17)
APTT BLD: 58.6 SEC — HIGH (ref 27.5–37.4)
BASOPHILS # BLD AUTO: 0 K/UL — SIGNIFICANT CHANGE UP (ref 0–0.2)
BASOPHILS NFR BLD AUTO: 0.1 % — SIGNIFICANT CHANGE UP (ref 0–2)
BUN SERPL-MCNC: 58 MG/DL — HIGH (ref 8–20)
CALCIUM SERPL-MCNC: 7.5 MG/DL — LOW (ref 8.6–10.2)
CHLORIDE SERPL-SCNC: 97 MMOL/L — LOW (ref 98–107)
CO2 SERPL-SCNC: 28 MMOL/L — SIGNIFICANT CHANGE UP (ref 22–29)
CREAT SERPL-MCNC: 0.69 MG/DL — SIGNIFICANT CHANGE UP (ref 0.5–1.3)
CULTURE RESULTS: SIGNIFICANT CHANGE UP
EOSINOPHIL # BLD AUTO: 0.1 K/UL — SIGNIFICANT CHANGE UP (ref 0–0.5)
EOSINOPHIL NFR BLD AUTO: 0.8 % — SIGNIFICANT CHANGE UP (ref 0–5)
GAS PNL BLDA: SIGNIFICANT CHANGE UP
GLUCOSE SERPL-MCNC: 128 MG/DL — HIGH (ref 70–115)
HCT VFR BLD CALC: 28.4 % — LOW (ref 42–52)
HGB BLD-MCNC: 8.4 G/DL — LOW (ref 14–18)
LYMPHOCYTES # BLD AUTO: 1.6 K/UL — SIGNIFICANT CHANGE UP (ref 1–4.8)
LYMPHOCYTES # BLD AUTO: 11.1 % — LOW (ref 20–55)
MAGNESIUM SERPL-MCNC: 1.9 MG/DL — SIGNIFICANT CHANGE UP (ref 1.6–2.6)
MCHC RBC-ENTMCNC: 26.8 PG — LOW (ref 27–31)
MCHC RBC-ENTMCNC: 29.6 G/DL — LOW (ref 32–36)
MCV RBC AUTO: 90.4 FL — SIGNIFICANT CHANGE UP (ref 80–94)
MONOCYTES # BLD AUTO: 0.6 K/UL — SIGNIFICANT CHANGE UP (ref 0–0.8)
MONOCYTES NFR BLD AUTO: 4.3 % — SIGNIFICANT CHANGE UP (ref 3–10)
NEUTROPHILS # BLD AUTO: 11.9 K/UL — HIGH (ref 1.8–8)
NEUTROPHILS NFR BLD AUTO: 83.1 % — HIGH (ref 37–73)
PHOSPHATE SERPL-MCNC: 2.4 MG/DL — SIGNIFICANT CHANGE UP (ref 2.4–4.7)
PLATELET # BLD AUTO: 159 K/UL — SIGNIFICANT CHANGE UP (ref 150–400)
POTASSIUM SERPL-MCNC: 3.4 MMOL/L — LOW (ref 3.5–5.3)
POTASSIUM SERPL-SCNC: 3.4 MMOL/L — LOW (ref 3.5–5.3)
RBC # BLD: 3.14 M/UL — LOW (ref 4.6–6.2)
RBC # FLD: 19 % — HIGH (ref 11–15.6)
SODIUM SERPL-SCNC: 140 MMOL/L — SIGNIFICANT CHANGE UP (ref 135–145)
SPECIMEN SOURCE: SIGNIFICANT CHANGE UP
VANCOMYCIN TROUGH SERPL-MCNC: 20 UG/ML — SIGNIFICANT CHANGE UP (ref 10–20)
WBC # BLD: 15.5 K/UL — HIGH (ref 4.8–10.8)
WBC # FLD AUTO: 15.5 K/UL — HIGH (ref 4.8–10.8)

## 2017-06-24 RX ORDER — POTASSIUM PHOSPHATE, MONOBASIC POTASSIUM PHOSPHATE, DIBASIC 236; 224 MG/ML; MG/ML
15 INJECTION, SOLUTION INTRAVENOUS ONCE
Qty: 0 | Refills: 0 | Status: COMPLETED | OUTPATIENT
Start: 2017-06-24 | End: 2017-06-24

## 2017-06-24 RX ORDER — SENNA PLUS 8.6 MG/1
10 TABLET ORAL AT BEDTIME
Qty: 0 | Refills: 0 | Status: DISCONTINUED | OUTPATIENT
Start: 2017-06-24 | End: 2017-08-02

## 2017-06-24 RX ORDER — MAGNESIUM SULFATE 500 MG/ML
2 VIAL (ML) INJECTION ONCE
Qty: 0 | Refills: 0 | Status: COMPLETED | OUTPATIENT
Start: 2017-06-24 | End: 2017-06-24

## 2017-06-24 RX ORDER — POTASSIUM CHLORIDE 20 MEQ
20 PACKET (EA) ORAL
Qty: 0 | Refills: 0 | Status: COMPLETED | OUTPATIENT
Start: 2017-06-24 | End: 2017-06-24

## 2017-06-24 RX ADMIN — Medication 20 MILLIGRAM(S): at 17:51

## 2017-06-24 RX ADMIN — MILRINONE LACTATE 4.89 MICROGRAM(S)/KG/MIN: 1 INJECTION, SOLUTION INTRAVENOUS at 10:09

## 2017-06-24 RX ADMIN — ERTAPENEM SODIUM 120 MILLIGRAM(S): 1 INJECTION, POWDER, LYOPHILIZED, FOR SOLUTION INTRAMUSCULAR; INTRAVENOUS at 20:20

## 2017-06-24 RX ADMIN — HEPARIN SODIUM 1300 UNIT(S)/HR: 5000 INJECTION INTRAVENOUS; SUBCUTANEOUS at 06:47

## 2017-06-24 RX ADMIN — Medication 50 MILLIGRAM(S): at 21:49

## 2017-06-24 RX ADMIN — Medication 4 MILLIGRAM(S): at 21:49

## 2017-06-24 RX ADMIN — Medication 50 GRAM(S): at 10:09

## 2017-06-24 RX ADMIN — Medication 9 MILLIGRAM(S): at 21:49

## 2017-06-24 RX ADMIN — ALBUTEROL 2.5 MILLIGRAM(S): 90 AEROSOL, METERED ORAL at 03:46

## 2017-06-24 RX ADMIN — Medication 0.12 MILLIGRAM(S): at 05:25

## 2017-06-24 RX ADMIN — Medication 1 MILLIGRAM(S): at 13:24

## 2017-06-24 RX ADMIN — Medication 100 MILLIGRAM(S): at 13:24

## 2017-06-24 RX ADMIN — Medication 20 MILLIGRAM(S): at 05:25

## 2017-06-24 RX ADMIN — Medication 150 MILLIGRAM(S): at 06:45

## 2017-06-24 RX ADMIN — Medication 100 MILLIGRAM(S): at 05:25

## 2017-06-24 RX ADMIN — Medication 50 MILLIEQUIVALENT(S): at 11:04

## 2017-06-24 RX ADMIN — Medication 50 MILLIGRAM(S): at 17:51

## 2017-06-24 RX ADMIN — PANTOPRAZOLE SODIUM 40 MILLIGRAM(S): 20 TABLET, DELAYED RELEASE ORAL at 17:51

## 2017-06-24 RX ADMIN — POTASSIUM PHOSPHATE, MONOBASIC POTASSIUM PHOSPHATE, DIBASIC 62.5 MILLIMOLE(S): 236; 224 INJECTION, SOLUTION INTRAVENOUS at 11:03

## 2017-06-24 RX ADMIN — Medication 150 MILLIGRAM(S): at 17:51

## 2017-06-24 RX ADMIN — Medication 10 MILLIGRAM(S): at 13:24

## 2017-06-24 RX ADMIN — PANTOPRAZOLE SODIUM 40 MILLIGRAM(S): 20 TABLET, DELAYED RELEASE ORAL at 05:25

## 2017-06-24 RX ADMIN — Medication 50 MILLIGRAM(S): at 05:25

## 2017-06-24 RX ADMIN — SENNA PLUS 10 MILLILITER(S): 8.6 TABLET ORAL at 21:50

## 2017-06-24 RX ADMIN — ALBUTEROL 2.5 MILLIGRAM(S): 90 AEROSOL, METERED ORAL at 14:46

## 2017-06-24 RX ADMIN — Medication 100 MILLIGRAM(S): at 13:25

## 2017-06-24 RX ADMIN — Medication 50 MILLIEQUIVALENT(S): at 13:24

## 2017-06-24 RX ADMIN — ALBUTEROL 2.5 MILLIGRAM(S): 90 AEROSOL, METERED ORAL at 20:06

## 2017-06-24 RX ADMIN — ALBUTEROL 2.5 MILLIGRAM(S): 90 AEROSOL, METERED ORAL at 08:16

## 2017-06-24 NOTE — PROGRESS NOTE ADULT - ASSESSMENT
51yMale presenting with: Bile leak , Chronic CHF leading to cardiac arrest, worse heart failure and cardiogenic shock, DVT and recurrent fevers. Once again intubated and difficult to wean.    Neurological: Avoid sedatives.  Continue sleep/ wake/ stim meds.  FU BIU Recs.    Pulmonary: Continue to wean ventilator as tolerated.  If very difficult to liberate then will consider trach once again.    Cardiovascular: Continue Continue Milirinone at this time. Appreciate Cardiology recs.  Hopeful for electrical cardioversion by cards when fully anticoagulated.      Gastrointestinal: Continue tube feeds and bowel meds as ordered. Continue BID Protonix while on AC given recent GI Bleed.    Genitourinary: FU void.  Keep Rogers out if not warranted.    Heme: Monitor H/H and PTT while on hep drip.  Full AC in order to cardiovert.    ID: Appreciate ID recs.  Continue abx as ordered.  FU Vanco trough prior to next dose.  Repeat cultures if continues to spike.    Skin: Monitor for breakdown.    Lines/ Tubes: Attempt PIV to remove TLC.    Dispo:Critical care as above. FU Further discussions with family and Palliative care.            CRITICAL CARE TIME SPENT:60 Min 51yMale presenting with: Bile leak , Chronic CHF leading to cardiac arrest, worse heart failure and cardiogenic shock, DVT and recurrent fevers. Once again intubated and difficult to wean.    Neurological: Avoid sedatives.  Continue sleep/ wake/ stim meds.  FU BIU Recs.    Pulmonary: Continue to wean ventilator as tolerated.  If very difficult to liberate then will consider trach once again.    Cardiovascular: Continue Continue Milirinone at this time. Appreciate Cardiology recs.  Hopeful for electrical cardioversion by cards when fully anticoagulated.      Gastrointestinal: Continue tube feeds and bowel meds as ordered. Continue BID Protonix while on AC given recent GI Bleed.    Genitourinary: FU void.  Keep Rogers out if not warranted.    Heme: Monitor H/H and PTT while on hep drip.  Full AC in order to cardiovert.    ID: Appreciate ID recs.  Discuss with SICU Attending about plan for evaluating stent as source. Continue abx as ordered.  FU Vanco trough prior to next dose.  Repeat cultures if continues to spike.    Skin: Monitor for breakdown.    Lines/ Tubes: Attempt PIV to remove TLC.    Dispo:Critical care as above. FU Further discussions with family and Palliative care.            CRITICAL CARE TIME SPENT:60 Min

## 2017-06-24 NOTE — PROGRESS NOTE ADULT - PROBLEM SELECTOR PROBLEM 7
Congestive heart failure
Fever, unspecified fever cause
Peritonitis
Atrial fibrillation
Constipation

## 2017-06-24 NOTE — PROGRESS NOTE ADULT - PROBLEM SELECTOR PROBLEM 6
Cardiac arrest
Fever, unspecified fever cause
Hypernatremia
Congestive heart failure
Functional quadriplegia
Peritonitis

## 2017-06-24 NOTE — PROGRESS NOTE ADULT - SUBJECTIVE AND OBJECTIVE BOX
INTERVAL HPI/OVERNIGHT EVENTS/SUBJECTIVE: Pt had done well on PS throughout day of 10/8.  Had another BM yesterday.  Appears slightly more awake again.  Still unable to make needs known    ICU Vital Signs Last 24 Hrs  T(C): 37.7, Max: 38.3 (06-23 @ 05:00)  T(F): 99.9, Max: 101 (06-23 @ 05:00)  HR: 86 (74 - 111)  BP: 90/60 (82/57 - 137/74)  BP(mean): 70 (64 - 97)  ABP: 111/67 (90/67 - 141/83)  ABP(mean): 82 (74 - 99)  RR: 25 (21 - 36)  SpO2: 99% (88% - 100%)      I&O's Detail  I & Os for 24h ending 23 Jun 2017 07:00  =============================================  IN:    Free Water: 2100 ml    Pivot: 1265 ml    heparin  Infusion.: 262 ml    Solution: 150 ml    Solution: 50 ml    milrinone  Infusion: 46.5 ml    milrinone  Infusion: 14.4 ml    Total IN: 3887.9 ml  ---------------------------------------------  OUT:    Indwelling Catheter - Urethral: 1305 ml    Total OUT: 1305 ml  ---------------------------------------------  Total NET: 2582.9 ml    I & Os for current day (as of 24 Jun 2017 01:26)  =============================================  IN:    heparin  Infusion.: 1534 ml    Free Water: 1400 ml    Pivot: 990 ml    Solution: 150 ml    Enteral Tube Flush: 100 ml    Solution: 100 ml    milrinone  Infusion: 78.4 ml    Solution: 50 ml    Total IN: 4402.4 ml  ---------------------------------------------  OUT:    Indwelling Catheter - Urethral: 1200 ml    Total OUT: 1200 ml  ---------------------------------------------  Total NET: 3202.4 ml      Mode: CPAP with PS  FiO2: 30  PEEP: 8  PS: 10  MAP: 12    ABG - ( 23 Jun 2017 04:24 )  pH: 7.49  /  pCO2: 38    /  pO2: 92    / HCO3: 30    / Base Excess: 5.9   /  SaO2: 98                  MEDICATIONS  (STANDING):  thiamine 100milliGRAM(s) Oral daily  amantadine Syrup 100milliGRAM(s) Oral <User Schedule>  doxazosin 4milliGRAM(s) Oral at bedtime  furosemide    Tablet 20milliGRAM(s) Oral two times a day  FLUoxetine Solution 10milliGRAM(s) Oral daily  folic acid 1milliGRAM(s) Enteral Tube daily  traZODone 50milliGRAM(s) Oral at bedtime  digoxin     Tablet 0.125milliGRAM(s) Oral daily  melatonin 9milliGRAM(s) Oral at bedtime  ALBUTerol    0.083% 2.5milliGRAM(s) Nebulizer every 6 hours  metoprolol 50milliGRAM(s) Oral two times a day  pantoprazole   Suspension 40milliGRAM(s) Oral two times a day before meals  senna Syrup 10milliLiter(s) Oral two times a day  ertapenem  IVPB  IV Intermittent   ertapenem  IVPB 1000milliGRAM(s) IV Intermittent every 24 hours  vancomycin  IVPB 750milliGRAM(s) IV Intermittent every 12 hours  milrinone Infusion 0.2MICROgram(s)/kG/Min IV Continuous <Continuous>  heparin  Infusion. Unit(s)/Hr IV Continuous <Continuous>    MEDICATIONS  (PRN):  acetaminophen    Suspension 650milliGRAM(s) Oral every 6 hours PRN For Temp greater than 38.5 C (101.3 F)      NUTRITION/IVF: Pivot @55     CENTRAL LINE:  LOCATION:   DATE INSERTED: R IJ from 6/19 CVP 20    ARMAS:   No    A-LINE:    LOCATION:   DATE INSERTED:  R Andrez Hagen from 6/19     MISC: PEG     PHYSICAL EXAM:     Gen: NAD, Well appearing, No cyanosis, Pallor.  On Ventilator    Eyes: PERRL ~ 3mm, EOMI,     Neurological: GCS 4/1T/5, No focal deficit.     Neck: Supple. NT AT, FROM no pain.  No JVD. No meningeal signs    Pulmonary: NAD, CTA, = BL .  Minimal secretions.    Cardiovascular: Irregular , S1, S2, No murmurs noted.    Gastrointestinal: ND, Soft, NT. PEG site CDI.    Extremities: NT, AT, Mild-Moderate 2+ pitting edema.  No erythema or palpable cord noted.  FROM, = 2+ pulses throughout.      LABS:  CBC Full  -  ( 23 Jun 2017 05:35 )  WBC Count : 17.6 K/uL  Hemoglobin : 8.7 g/dL  Hematocrit : 30.1 %  Platelet Count - Automated : 146 K/uL  Mean Cell Volume : 90.9 fl  Mean Cell Hemoglobin : 26.3 pg  Mean Cell Hemoglobin Concentration : 28.9 g/dL  Auto Neutrophil # : x  Auto Lymphocyte # : x  Auto Monocyte # : x  Auto Eosinophil # : x  Auto Basophil # : x  Auto Neutrophil % : 90.0 %  Auto Lymphocyte % : 8.0 %  Auto Monocyte % : 1.0 %  Auto Eosinophil % : 1.0 %  Auto Basophil % : x    06-23    143  |  98  |  64.0<H>  ----------------------------<  139<H>  3.6   |  28.0  |  0.73    Ca    7.8<L>      23 Jun 2017 05:35  Phos  2.4     06-23  Mg     1.9     06-23    TPro  6.0<L>  /  Alb  2.5<L>  /  TBili  1.0  /  DBili  0.5<H>  /  AST  128<H>  /  ALT  117<H>  /  AlkPhos  321<H>  06-22    PTT - ( 23 Jun 2017 16:31 )  PTT:75.6 sec    RECENT CULTURES:  06-20 .Blood Blood XXXX XXXX   No growth at 48 hours    06-20 .Sputum Sputum XXXX   Few WBC's  No organisms seen   Rare Routine respiratory devin present    06-19 .Blood Blood XXXX XXXX   No growth at 48 hours        LIVER FUNCTIONS - ( 23 Jun 2017 05:35 )  Alb: x     / Pro: x     / ALK PHOS: x     / ALT: x     / AST: x     / GGT: 362     ASSESSMENT/PLAN:  51yMale presenting with: Bile leak , Chronic CHF leading to cardiac arrest, worse heart failure and cardiogenic shock, DVT and recurrent fevers. Once again intubated and difficult to wean.    Neurological: Avoid sedatives.  Continue sleep/ wake/ stim meds.  FU BIU Recs.    Pulmonary: Continue to wean ventilator as tolerated.  If very difficult to liberate then will consider trach once again.    Cardiovascular: Continue Continue Milirinone at this time. Appreciate Cardiology recs.  Hopeful for electrical cardioversion by cards when fully anticoagulated.      Gastrointestinal: Continue tube feeds and bowel meds as ordered. Continue BID Protonix while on AC given recent GI Bleed.    Genitourinary: FU void.  Keep Armas out if not warranted.    Heme: Monitor H/H and PTT while on hep drip.  Full AC in order to cardiovert.    ID: Appreciate ID recs.  Continue abx as ordered.  FU Vanco trough prior to next dose.  Repeat cultures if continues to spike.    Skin: Monitor for breakdown.    Lines/ Tubes: Attempt PIV to remove TLC.    Dispo:Critical care as above. FU Further discussions with family and Palliative care.            CRITICAL CARE TIME SPENT:60 Min INTERVAL HPI/OVERNIGHT EVENTS/SUBJECTIVE: Pt had done well on PS throughout day of 10/8.  Had another BM yesterday.  Appears slightly more awake again.  Still unable to make needs known    ICU Vital Signs Last 24 Hrs  T(C): 37.7, Max: 38.3 (06-23 @ 05:00)  T(F): 99.9, Max: 101 (06-23 @ 05:00)  HR: 86 (74 - 111)  BP: 90/60 (82/57 - 137/74)  BP(mean): 70 (64 - 97)  ABP: 111/67 (90/67 - 141/83)  ABP(mean): 82 (74 - 99)  RR: 25 (21 - 36)  SpO2: 99% (88% - 100%)      I&O's Detail  I & Os for 24h ending 23 Jun 2017 07:00  =============================================  IN:    Free Water: 2100 ml    Pivot: 1265 ml    heparin  Infusion.: 262 ml    Solution: 150 ml    Solution: 50 ml    milrinone  Infusion: 46.5 ml    milrinone  Infusion: 14.4 ml    Total IN: 3887.9 ml  ---------------------------------------------  OUT:    Indwelling Catheter - Urethral: 1305 ml    Total OUT: 1305 ml  ---------------------------------------------  Total NET: 2582.9 ml    I & Os for current day (as of 24 Jun 2017 01:26)  =============================================  IN:    heparin  Infusion.: 1534 ml    Free Water: 1400 ml    Pivot: 990 ml    Solution: 150 ml    Enteral Tube Flush: 100 ml    Solution: 100 ml    milrinone  Infusion: 78.4 ml    Solution: 50 ml    Total IN: 4402.4 ml  ---------------------------------------------  OUT:    Indwelling Catheter - Urethral: 1200 ml    Total OUT: 1200 ml  ---------------------------------------------  Total NET: 3202.4 ml      Mode: CPAP with PS  FiO2: 30  PEEP: 8  PS: 10  MAP: 12    ABG - ( 23 Jun 2017 04:24 )  pH: 7.49  /  pCO2: 38    /  pO2: 92    / HCO3: 30    / Base Excess: 5.9   /  SaO2: 98                  MEDICATIONS  (STANDING):  thiamine 100milliGRAM(s) Oral daily  amantadine Syrup 100milliGRAM(s) Oral <User Schedule>  doxazosin 4milliGRAM(s) Oral at bedtime  furosemide    Tablet 20milliGRAM(s) Oral two times a day  FLUoxetine Solution 10milliGRAM(s) Oral daily  folic acid 1milliGRAM(s) Enteral Tube daily  traZODone 50milliGRAM(s) Oral at bedtime  digoxin     Tablet 0.125milliGRAM(s) Oral daily  melatonin 9milliGRAM(s) Oral at bedtime  ALBUTerol    0.083% 2.5milliGRAM(s) Nebulizer every 6 hours  metoprolol 50milliGRAM(s) Oral two times a day  pantoprazole   Suspension 40milliGRAM(s) Oral two times a day before meals  senna Syrup 10milliLiter(s) Oral two times a day  ertapenem  IVPB  IV Intermittent   ertapenem  IVPB 1000milliGRAM(s) IV Intermittent every 24 hours  vancomycin  IVPB 750milliGRAM(s) IV Intermittent every 12 hours  milrinone Infusion 0.2MICROgram(s)/kG/Min IV Continuous <Continuous>  heparin  Infusion. Unit(s)/Hr IV Continuous <Continuous>    MEDICATIONS  (PRN):  acetaminophen    Suspension 650milliGRAM(s) Oral every 6 hours PRN For Temp greater than 38.5 C (101.3 F)      NUTRITION/IVF: Pivot @55     CENTRAL LINE:  LOCATION:   DATE INSERTED: R IJ from 6/19 CVP 20    ARMAS:   No    A-LINE:    LOCATION:   DATE INSERTED:  R Andrez Hagen from 6/19     MISC: PEG     PHYSICAL EXAM:     Gen: NAD, Well appearing, No cyanosis, Pallor.  On Ventilator    Eyes: PERRL ~ 3mm, EOMI,     Neurological: GCS 4/1T/5, No focal deficit.     Neck: Supple. NT AT, FROM no pain.  No JVD. No meningeal signs    Pulmonary: NAD, CTA, = BL .  Minimal secretions.    Cardiovascular: Irregular , S1, S2, No murmurs noted.    Gastrointestinal: ND, Soft, NT. PEG site CDI.    Extremities: NT, AT, Mild-Moderate 2+ pitting edema.  No erythema or palpable cord noted.  FROM, = 2+ pulses throughout.      LABS:  CBC Full  -  ( 23 Jun 2017 05:35 )  WBC Count : 17.6 K/uL  Hemoglobin : 8.7 g/dL  Hematocrit : 30.1 %  Platelet Count - Automated : 146 K/uL  Mean Cell Volume : 90.9 fl  Mean Cell Hemoglobin : 26.3 pg  Mean Cell Hemoglobin Concentration : 28.9 g/dL  Auto Neutrophil # : x  Auto Lymphocyte # : x  Auto Monocyte # : x  Auto Eosinophil # : x  Auto Basophil # : x  Auto Neutrophil % : 90.0 %  Auto Lymphocyte % : 8.0 %  Auto Monocyte % : 1.0 %  Auto Eosinophil % : 1.0 %  Auto Basophil % : x    06-23    143  |  98  |  64.0<H>  ----------------------------<  139<H>  3.6   |  28.0  |  0.73    Ca    7.8<L>      23 Jun 2017 05:35  Phos  2.4     06-23  Mg     1.9     06-23    TPro  6.0<L>  /  Alb  2.5<L>  /  TBili  1.0  /  DBili  0.5<H>  /  AST  128<H>  /  ALT  117<H>  /  AlkPhos  321<H>  06-22    PTT - ( 23 Jun 2017 16:31 )  PTT:75.6 sec    RECENT CULTURES:  06-20 .Blood Blood XXXX XXXX   No growth at 48 hours    06-20 .Sputum Sputum XXXX   Few WBC's  No organisms seen   Rare Routine respiratory devin present    06-19 .Blood Blood XXXX XXXX   No growth at 48 hours        LIVER FUNCTIONS - ( 23 Jun 2017 05:35 )  Alb: x     / Pro: x     / ALK PHOS: x     / ALT: x     / AST: x     / GGT: 362     ASSESSMENT/PLAN:  51yMale presenting with: Bile leak , Chronic CHF leading to cardiac arrest, worse heart failure and cardiogenic shock, DVT and recurrent fevers. Once again intubated and difficult to wean.    Neurological: Avoid sedatives.  Continue sleep/ wake/ stim meds.  FU BIU Recs.    Pulmonary: Continue to wean ventilator as tolerated.  If very difficult to liberate then will consider trach once again.    Cardiovascular: Continue Continue Milirinone at this time. Appreciate Cardiology recs.  Hopeful for electrical cardioversion by cards when fully anticoagulated.      Gastrointestinal: Continue tube feeds and bowel meds as ordered. Continue BID Protonix while on AC given recent GI Bleed.    Genitourinary: FU void.  Keep Armas out if not warranted.    Heme: Monitor H/H and PTT while on hep drip.  Full AC in order to cardiovert.    ID: Appreciate ID recs.  Discuss with SICU Attending about plan for evaluating stent as source. Continue abx as ordered.  FU Vanco trough prior to next dose.  Repeat cultures if continues to spike.     Skin: Monitor for breakdown.    Lines/ Tubes: Attempt PIV to remove TLC.    Dispo:Critical care as above. FU Further discussions with family and Palliative care.            CRITICAL CARE TIME SPENT:60 Min

## 2017-06-25 LAB
ANION GAP SERPL CALC-SCNC: 14 MMOL/L — SIGNIFICANT CHANGE UP (ref 5–17)
APTT BLD: 61.4 SEC — HIGH (ref 27.5–37.4)
BASE EXCESS BLDA CALC-SCNC: -1.7 MMOL/L — SIGNIFICANT CHANGE UP (ref -3–3)
BLOOD GAS COMMENTS ARTERIAL: SIGNIFICANT CHANGE UP
BUN SERPL-MCNC: 50 MG/DL — HIGH (ref 8–20)
C DIFF BY PCR RESULT: SIGNIFICANT CHANGE UP
C DIFF TOX GENS STL QL NAA+PROBE: SIGNIFICANT CHANGE UP
CALCIUM SERPL-MCNC: 7.5 MG/DL — LOW (ref 8.6–10.2)
CHLORIDE SERPL-SCNC: 96 MMOL/L — LOW (ref 98–107)
CO2 SERPL-SCNC: 28 MMOL/L — SIGNIFICANT CHANGE UP (ref 22–29)
CREAT SERPL-MCNC: 0.65 MG/DL — SIGNIFICANT CHANGE UP (ref 0.5–1.3)
CULTURE RESULTS: SIGNIFICANT CHANGE UP
GAS PNL BLDA: SIGNIFICANT CHANGE UP
GLUCOSE SERPL-MCNC: 115 MG/DL — SIGNIFICANT CHANGE UP (ref 70–115)
HCO3 BLDA-SCNC: 23 MMOL/L — SIGNIFICANT CHANGE UP (ref 20–26)
HCT VFR BLD CALC: 28.5 % — LOW (ref 42–52)
HGB BLD-MCNC: 8.1 G/DL — LOW (ref 14–18)
HOROWITZ INDEX BLDA+IHG-RTO: 30 — SIGNIFICANT CHANGE UP
MAGNESIUM SERPL-MCNC: 2 MG/DL — SIGNIFICANT CHANGE UP (ref 1.6–2.6)
MCHC RBC-ENTMCNC: 26.1 PG — LOW (ref 27–31)
MCHC RBC-ENTMCNC: 28.4 G/DL — LOW (ref 32–36)
MCV RBC AUTO: 91.9 FL — SIGNIFICANT CHANGE UP (ref 80–94)
PCO2 BLDA: 32 MMHG — LOW (ref 35–45)
PH BLDA: 7.45 — SIGNIFICANT CHANGE UP (ref 7.35–7.45)
PHOSPHATE SERPL-MCNC: 2.9 MG/DL — SIGNIFICANT CHANGE UP (ref 2.4–4.7)
PLATELET # BLD AUTO: 177 K/UL — SIGNIFICANT CHANGE UP (ref 150–400)
PO2 BLDA: 130 MMHG — HIGH (ref 83–108)
POTASSIUM SERPL-MCNC: 3.9 MMOL/L — SIGNIFICANT CHANGE UP (ref 3.5–5.3)
POTASSIUM SERPL-SCNC: 3.9 MMOL/L — SIGNIFICANT CHANGE UP (ref 3.5–5.3)
RBC # BLD: 3.1 M/UL — LOW (ref 4.6–6.2)
RBC # FLD: 18.9 % — HIGH (ref 11–15.6)
SAO2 % BLDA: 100 % — HIGH (ref 95–99)
SODIUM SERPL-SCNC: 138 MMOL/L — SIGNIFICANT CHANGE UP (ref 135–145)
SPECIMEN SOURCE: SIGNIFICANT CHANGE UP
VANCOMYCIN TROUGH SERPL-MCNC: 23 UG/ML — HIGH (ref 10–20)
WBC # BLD: 12.7 K/UL — HIGH (ref 4.8–10.8)
WBC # FLD AUTO: 12.7 K/UL — HIGH (ref 4.8–10.8)

## 2017-06-25 PROCEDURE — 71010: CPT | Mod: 26

## 2017-06-25 RX ORDER — MIDAZOLAM HYDROCHLORIDE 1 MG/ML
2 INJECTION, SOLUTION INTRAMUSCULAR; INTRAVENOUS ONCE
Qty: 0 | Refills: 0 | Status: DISCONTINUED | OUTPATIENT
Start: 2017-06-25 | End: 2017-06-25

## 2017-06-25 RX ORDER — FENTANYL CITRATE 50 UG/ML
100 INJECTION INTRAVENOUS ONCE
Qty: 0 | Refills: 0 | Status: DISCONTINUED | OUTPATIENT
Start: 2017-06-25 | End: 2017-06-25

## 2017-06-25 RX ORDER — POTASSIUM CHLORIDE 20 MEQ
40 PACKET (EA) ORAL ONCE
Qty: 0 | Refills: 0 | Status: COMPLETED | OUTPATIENT
Start: 2017-06-25 | End: 2017-06-25

## 2017-06-25 RX ADMIN — Medication 100 MILLIGRAM(S): at 12:28

## 2017-06-25 RX ADMIN — Medication 20 MILLIGRAM(S): at 17:34

## 2017-06-25 RX ADMIN — ALBUTEROL 2.5 MILLIGRAM(S): 90 AEROSOL, METERED ORAL at 08:06

## 2017-06-25 RX ADMIN — Medication 1 MILLIGRAM(S): at 12:28

## 2017-06-25 RX ADMIN — PANTOPRAZOLE SODIUM 40 MILLIGRAM(S): 20 TABLET, DELAYED RELEASE ORAL at 17:35

## 2017-06-25 RX ADMIN — Medication 150 MILLIGRAM(S): at 05:29

## 2017-06-25 RX ADMIN — Medication 9 MILLIGRAM(S): at 21:47

## 2017-06-25 RX ADMIN — MILRINONE LACTATE 4.89 MICROGRAM(S)/KG/MIN: 1 INJECTION, SOLUTION INTRAVENOUS at 02:20

## 2017-06-25 RX ADMIN — Medication 50 MILLIGRAM(S): at 17:34

## 2017-06-25 RX ADMIN — FENTANYL CITRATE 100 MICROGRAM(S): 50 INJECTION INTRAVENOUS at 01:27

## 2017-06-25 RX ADMIN — SENNA PLUS 10 MILLILITER(S): 8.6 TABLET ORAL at 21:48

## 2017-06-25 RX ADMIN — ALBUTEROL 2.5 MILLIGRAM(S): 90 AEROSOL, METERED ORAL at 20:02

## 2017-06-25 RX ADMIN — Medication 4 MILLIGRAM(S): at 21:47

## 2017-06-25 RX ADMIN — FENTANYL CITRATE 100 MICROGRAM(S): 50 INJECTION INTRAVENOUS at 01:45

## 2017-06-25 RX ADMIN — ALBUTEROL 2.5 MILLIGRAM(S): 90 AEROSOL, METERED ORAL at 03:22

## 2017-06-25 RX ADMIN — Medication 100 MILLIGRAM(S): at 05:29

## 2017-06-25 RX ADMIN — ERTAPENEM SODIUM 120 MILLIGRAM(S): 1 INJECTION, POWDER, LYOPHILIZED, FOR SOLUTION INTRAMUSCULAR; INTRAVENOUS at 19:55

## 2017-06-25 RX ADMIN — PANTOPRAZOLE SODIUM 40 MILLIGRAM(S): 20 TABLET, DELAYED RELEASE ORAL at 05:29

## 2017-06-25 RX ADMIN — Medication 50 MILLIGRAM(S): at 21:47

## 2017-06-25 RX ADMIN — Medication 40 MILLIEQUIVALENT(S): at 08:08

## 2017-06-25 RX ADMIN — Medication 0.12 MILLIGRAM(S): at 05:29

## 2017-06-25 RX ADMIN — Medication 20 MILLIGRAM(S): at 05:29

## 2017-06-25 RX ADMIN — ALBUTEROL 2.5 MILLIGRAM(S): 90 AEROSOL, METERED ORAL at 14:40

## 2017-06-25 RX ADMIN — Medication 100 MILLIGRAM(S): at 12:29

## 2017-06-25 RX ADMIN — MIDAZOLAM HYDROCHLORIDE 2 MILLIGRAM(S): 1 INJECTION, SOLUTION INTRAMUSCULAR; INTRAVENOUS at 01:27

## 2017-06-25 RX ADMIN — Medication 10 MILLIGRAM(S): at 12:28

## 2017-06-25 NOTE — AIRWAY PLACEMENT NOTE ADULT - AIRWAY COMMENTS:
Patient's ET tube balloon ruptured. Used tube exchanger to replace tube. Exchange was successful. No complications. Uma BHATIA at the bedside to during procedure.
Pt intubated in a successful first attempt.

## 2017-06-25 NOTE — PROGRESS NOTE ADULT - SUBJECTIVE AND OBJECTIVE BOX
INTERVAL HPI/OVERNIGHT EVENTS/SUBJECTIVE:      ICU Vital Signs Last 24 Hrs  T(C): 36.7, Max: 37.9 (06-24 @ 12:00)  T(F): 98, Max: 100.2 (06-24 @ 12:00)  HR: 76 (73 - 107)  BP: 104/67 (93/66 - 135/78)  BP(mean): 77 (63 - 103)  ABP: 98/61 (96/57 - 125/66)  ABP(mean): 72 (70 - 85)  RR: 19 (12 - 46)  SpO2: 100% (94% - 100%)      I&O's Detail  I & Os for 24h ending 24 Jun 2017 07:00  =============================================  IN:    Free Water: 2100 ml    heparin  Infusion.: 1612 ml    Pivot: 1320 ml    Solution: 400 ml    milrinone  Infusion: 107.8 ml    Enteral Tube Flush: 100 ml    Solution: 100 ml    Solution: 50 ml    Total IN: 5789.8 ml  ---------------------------------------------  OUT:    Indwelling Catheter - Urethral: 1650 ml    Total OUT: 1650 ml  ---------------------------------------------  Total NET: 4139.8 ml    I & Os for current day (as of 25 Jun 2017 05:43)  =============================================  IN:    Free Water: 1400 ml    Pivot: 1210 ml    heparin  Infusion.: 286 ml    Solution: 252 ml    Solution: 200 ml    Solution: 150 ml    milrinone  Infusion: 107.8 ml    Solution: 50 ml    Solution: 50 ml    Total IN: 3705.8 ml  ---------------------------------------------  OUT:    Indwelling Catheter - Urethral: 1415 ml    Total OUT: 1415 ml  ---------------------------------------------  Total NET: 2290.8 ml      Mode: AC/ CMV (Assist Control/ Continuous Mandatory Ventilation)  RR (machine): 14  TV (machine): 450  FiO2: 30  PEEP: 8  MAP: 13  PIP: 27    ABG - ( 25 Jun 2017 04:02 )  pH: 7.45  /  pCO2: 32    /  pO2: 130   / HCO3: 23    / Base Excess: -1.7  /  SaO2: 100                 MEDICATIONS  (STANDING):  thiamine 100milliGRAM(s) Oral daily  amantadine Syrup 100milliGRAM(s) Oral <User Schedule>  doxazosin 4milliGRAM(s) Oral at bedtime  furosemide    Tablet 20milliGRAM(s) Oral two times a day  FLUoxetine Solution 10milliGRAM(s) Oral daily  folic acid 1milliGRAM(s) Enteral Tube daily  traZODone 50milliGRAM(s) Oral at bedtime  digoxin     Tablet 0.125milliGRAM(s) Oral daily  melatonin 9milliGRAM(s) Oral at bedtime  ALBUTerol    0.083% 2.5milliGRAM(s) Nebulizer every 6 hours  metoprolol 50milliGRAM(s) Oral two times a day  pantoprazole   Suspension 40milliGRAM(s) Oral two times a day before meals  ertapenem  IVPB  IV Intermittent   ertapenem  IVPB 1000milliGRAM(s) IV Intermittent every 24 hours  vancomycin  IVPB 750milliGRAM(s) IV Intermittent every 12 hours  milrinone Infusion 0.2MICROgram(s)/kG/Min IV Continuous <Continuous>  heparin  Infusion. Unit(s)/Hr IV Continuous <Continuous>  senna Syrup 10milliLiter(s) Oral at bedtime    MEDICATIONS  (PRN):  acetaminophen    Suspension 650milliGRAM(s) Oral every 6 hours PRN For Temp greater than 38.5 C (101.3 F)      NUTRITION/IVF:     CENTRAL LINE:  LOCATION:   DATE INSERTED:  CVP:  SCVO2:    ARMAS:   DATE INSERTED:    A-LINE:    LOCATION:   DATE INSERTED:   SVV:  CO/CI:     CHEST TUBE:  LOCATION:  DATE INSERTED: OUTPUT/24 HRS:  SUCTION/WATER SEAL:     NG/OG TUBE:  DATE INSERTED:  OUTPUT/24 HRS:    MISC:     PHYSICAL EXAM:    Gen:    Eyes:    Neurological:    ENMT:    Neck:    Pulmonary:    Cardiovascular:    Gastrointestinal:    Genitourinary:    Back:    Extremities:    Skin:    Musculoskeletal:          LABS:  CBC Full  -  ( 25 Jun 2017 04:52 )  WBC Count : 12.7 K/uL  Hemoglobin : 8.1 g/dL  Hematocrit : 28.5 %  Platelet Count - Automated : 177 K/uL  Mean Cell Volume : 91.9 fl  Mean Cell Hemoglobin : 26.1 pg  Mean Cell Hemoglobin Concentration : 28.4 g/dL  Auto Neutrophil # : x  Auto Lymphocyte # : x  Auto Monocyte # : x  Auto Eosinophil # : x  Auto Basophil # : x  Auto Neutrophil % : x  Auto Lymphocyte % : x  Auto Monocyte % : x  Auto Eosinophil % : x  Auto Basophil % : x    06-25    138  |  96<L>  |  50.0<H>  ----------------------------<  115  3.9   |  28.0  |  0.65    Ca    7.5<L>      25 Jun 2017 04:52  Phos  2.9     06-25  Mg     2.0     06-25      PTT - ( 25 Jun 2017 04:52 )  PTT:61.4 sec    RECENT CULTURES:  06-22 .Blood Blood XXXX XXXX   No growth at 48 hours    06-20 .Blood Blood XXXX XXXX   No growth at 5 days.    06-20 .Sputum Sputum XXXX   Few WBC's  No organisms seen   Rare Routine respiratory devin present    06-19 .Blood Blood XXXX XXXX   No growth at 5 days.              CAPILLARY BLOOD GLUCOSE      RADIOLOGY & ADDITIONAL STUDIES:    ASSESSMENT/PLAN:  51yMale presenting with:    Neuro:    CV:    Pulm:    GI/Nutrition:    /Renal:    ID:    Lines/Tubes:    Endo:    Skin:    Proph:    Dispo:      CRITICAL CARE TIME SPENT: INTERVAL HPI/OVERNIGHT EVENTS/SUBJECTIVE:  Pt remains on Milrinone.  ET tube noted to have cuff leak overnight, pt not receiving TV on vent.  ET tube changed over a bougie without difficulty.  Confirmation w/ capnography and CXR.  Pt tolerated well.      ICU Vital Signs Last 24 Hrs  T(C): 36.7, Max: 37.9 (06-24 @ 12:00)  T(F): 98, Max: 100.2 (06-24 @ 12:00)  HR: 76 (73 - 107)  BP: 104/67 (93/66 - 135/78)  BP(mean): 77 (63 - 103)  ABP: 98/61 (96/57 - 125/66)  ABP(mean): 72 (70 - 85)  RR: 19 (12 - 46)  SpO2: 100% (94% - 100%)      I&O's Detail  I & Os for 24h ending 24 Jun 2017 07:00  =============================================  IN:    Free Water: 2100 ml    heparin  Infusion.: 1612 ml    Pivot: 1320 ml    Solution: 400 ml    milrinone  Infusion: 107.8 ml    Enteral Tube Flush: 100 ml    Solution: 100 ml    Solution: 50 ml    Total IN: 5789.8 ml  ---------------------------------------------  OUT:    Indwelling Catheter - Urethral: 1650 ml    Total OUT: 1650 ml  ---------------------------------------------  Total NET: 4139.8 ml    I & Os for current day (as of 25 Jun 2017 05:43)  =============================================  IN:    Free Water: 1400 ml    Pivot: 1210 ml    heparin  Infusion.: 286 ml    Solution: 252 ml    Solution: 200 ml    Solution: 150 ml    milrinone  Infusion: 107.8 ml    Solution: 50 ml    Solution: 50 ml    Total IN: 3705.8 ml  ---------------------------------------------  OUT:    Indwelling Catheter - Urethral: 1415 ml    Total OUT: 1415 ml  ---------------------------------------------  Total NET: 2290.8 ml      Mode: AC/ CMV (Assist Control/ Continuous Mandatory Ventilation)  RR (machine): 14  TV (machine): 450  FiO2: 30  PEEP: 8  MAP: 13  PIP: 27    ABG - ( 25 Jun 2017 04:02 )  pH: 7.45  /  pCO2: 32    /  pO2: 130   / HCO3: 23    / Base Excess: -1.7  /  SaO2: 100                 MEDICATIONS  (STANDING):  thiamine 100milliGRAM(s) Oral daily  amantadine Syrup 100milliGRAM(s) Oral <User Schedule>  doxazosin 4milliGRAM(s) Oral at bedtime  furosemide    Tablet 20milliGRAM(s) Oral two times a day  FLUoxetine Solution 10milliGRAM(s) Oral daily  folic acid 1milliGRAM(s) Enteral Tube daily  traZODone 50milliGRAM(s) Oral at bedtime  digoxin     Tablet 0.125milliGRAM(s) Oral daily  melatonin 9milliGRAM(s) Oral at bedtime  ALBUTerol    0.083% 2.5milliGRAM(s) Nebulizer every 6 hours  metoprolol 50milliGRAM(s) Oral two times a day  pantoprazole   Suspension 40milliGRAM(s) Oral two times a day before meals  ertapenem  IVPB  IV Intermittent   ertapenem  IVPB 1000milliGRAM(s) IV Intermittent every 24 hours  vancomycin  IVPB 750milliGRAM(s) IV Intermittent every 12 hours  milrinone Infusion 0.2MICROgram(s)/kG/Min IV Continuous <Continuous>  heparin  Infusion. Unit(s)/Hr IV Continuous <Continuous>  senna Syrup 10milliLiter(s) Oral at bedtime    MEDICATIONS  (PRN):  acetaminophen    Suspension 650milliGRAM(s) Oral every 6 hours PRN For Temp greater than 38.5 C (101.3 F)      NUTRITION/IVF: Pibot @55 cc/hr + Free H20 250cc's Q8    CENTRAL LINE:  LOCATION:   Rt IJ TLC (6/10)    MONTOYA:   Yes    A-LINE:   Rt ax darnell (6/19)    NG/OG TUBE:  PEG      PHYSICAL EXAM:    Gen:  Lethargic, arouses to voice    Eyes:  EOMI's BL    Neurological:  GCS 9T    Neck:  No JVD    Pulmonary:  Coarse BS BL through bases    Cardiovascular:  Afib, rate controlled    Gastrointestinal:  Softly distended, dull to percussion    Genitourinary:  Montoya    Extremities:  pitting edema RLE    Skin:  Moist, pink, intact    Musculoskeletal:  AFROM x4 extremities          LABS:  CBC Full  -  ( 25 Jun 2017 04:52 )  WBC Count : 12.7 K/uL  Hemoglobin : 8.1 g/dL  Hematocrit : 28.5 %  Platelet Count - Automated : 177 K/uL  Mean Cell Volume : 91.9 fl  Mean Cell Hemoglobin : 26.1 pg  Mean Cell Hemoglobin Concentration : 28.4 g/dL  Auto Neutrophil # : x  Auto Lymphocyte # : x  Auto Monocyte # : x  Auto Eosinophil # : x  Auto Basophil # : x  Auto Neutrophil % : x  Auto Lymphocyte % : x  Auto Monocyte % : x  Auto Eosinophil % : x  Auto Basophil % : x    06-25    138  |  96<L>  |  50.0<H>  ----------------------------<  115  3.9   |  28.0  |  0.65    Ca    7.5<L>      25 Jun 2017 04:52  Phos  2.9     06-25  Mg     2.0     06-25      PTT - ( 25 Jun 2017 04:52 )  PTT:61.4 sec    RECENT CULTURES:  06-22 .Blood Blood XXXX XXXX   No growth at 48 hours    06-20 .Blood Blood XXXX XXXX   No growth at 5 days.    06-20 .Sputum Sputum XXXX   Few WBC's  No organisms seen   Rare Routine respiratory devin present    06-19 .Blood Blood XXXX XXXX   No growth at 5 days.              CAPILLARY BLOOD GLUCOSE      RADIOLOGY & ADDITIONAL STUDIES:    ASSESSMENT/PLAN:  51yMale presenting with:  Cardiac arrest s/p exlap for bile leak, cardiogenic shock, paroxysmal afib, lower GIB, hypoxic resp failure, fever unknown origin    Neuro:  Pt sedated for ET tube exchange, keep off of sedating agents otherwise.      CV:  Keep Milrinone, SVO2 40's.      Pulm:  On AC for now given sedation, place back to PS    GI/Nutrition:  Continue tube feeds at goal.  Pt tolerating.  +Flatus    /Renal:  Montoya    ID:  Invanz, Vanco, recs per ID    Lines/Tubes: RT IJ TLC, Rt ax darnell (6/19), montoya    Endo:  No issues    Skin:  Intact.  Daily midline dressing changes wet to dry    Proph:  Hep gtt- theraputic    Dispo:  Continue current ICU care      CRITICAL CARE TIME SPENT:

## 2017-06-26 LAB
ANION GAP SERPL CALC-SCNC: 10 MMOL/L — SIGNIFICANT CHANGE UP (ref 5–17)
APTT BLD: 51 SEC — HIGH (ref 27.5–37.4)
APTT BLD: 53.9 SEC — HIGH (ref 27.5–37.4)
APTT BLD: 74.6 SEC — HIGH (ref 27.5–37.4)
BASE EXCESS BLDA CALC-SCNC: 6.1 MMOL/L — HIGH (ref -3–3)
BASE EXCESS BLDV CALC-SCNC: -4.4 MMOL/L — LOW (ref -3–3)
BLOOD GAS COMMENTS ARTERIAL: SIGNIFICANT CHANGE UP
BUN SERPL-MCNC: 45 MG/DL — HIGH (ref 8–20)
CALCIUM SERPL-MCNC: 8 MG/DL — LOW (ref 8.6–10.2)
CHLORIDE SERPL-SCNC: 98 MMOL/L — SIGNIFICANT CHANGE UP (ref 98–107)
CO2 SERPL-SCNC: 31 MMOL/L — HIGH (ref 22–29)
CREAT SERPL-MCNC: 0.68 MG/DL — SIGNIFICANT CHANGE UP (ref 0.5–1.3)
CULTURE RESULTS: SIGNIFICANT CHANGE UP
DIGOXIN SERPL-MCNC: 1.4 NG/ML — SIGNIFICANT CHANGE UP (ref 0.8–2)
GAS PNL BLDA: SIGNIFICANT CHANGE UP
GAS PNL BLDV: SIGNIFICANT CHANGE UP
GLUCOSE SERPL-MCNC: 127 MG/DL — HIGH (ref 70–115)
HCO3 BLDA-SCNC: 30 MMOL/L — HIGH (ref 20–26)
HCO3 BLDV-SCNC: 20 MMOL/L — SIGNIFICANT CHANGE UP (ref 20–26)
HCT VFR BLD CALC: 29.3 % — LOW (ref 42–52)
HGB BLD-MCNC: 8.4 G/DL — LOW (ref 14–18)
HOROWITZ INDEX BLDA+IHG-RTO: 0.3 — SIGNIFICANT CHANGE UP
INR BLD: 1.26 RATIO — HIGH (ref 0.88–1.16)
MAGNESIUM SERPL-MCNC: 1.9 MG/DL — SIGNIFICANT CHANGE UP (ref 1.6–2.6)
MCHC RBC-ENTMCNC: 26.2 PG — LOW (ref 27–31)
MCHC RBC-ENTMCNC: 28.7 G/DL — LOW (ref 32–36)
MCV RBC AUTO: 91.3 FL — SIGNIFICANT CHANGE UP (ref 80–94)
PCO2 BLDA: 44 MMHG — SIGNIFICANT CHANGE UP (ref 35–45)
PCO2 BLDV: 33 MMHG — LOW (ref 35–50)
PH BLDA: 7.45 — SIGNIFICANT CHANGE UP (ref 7.35–7.45)
PH BLDV: 7.4 — SIGNIFICANT CHANGE UP (ref 7.35–7.45)
PHOSPHATE SERPL-MCNC: 3 MG/DL — SIGNIFICANT CHANGE UP (ref 2.4–4.7)
PLATELET # BLD AUTO: 186 K/UL — SIGNIFICANT CHANGE UP (ref 150–400)
PO2 BLDA: 58 MMHG — LOW (ref 83–108)
PO2 BLDV: 31 MMHG — SIGNIFICANT CHANGE UP (ref 25–45)
POTASSIUM SERPL-MCNC: 4.2 MMOL/L — SIGNIFICANT CHANGE UP (ref 3.5–5.3)
POTASSIUM SERPL-SCNC: 4.2 MMOL/L — SIGNIFICANT CHANGE UP (ref 3.5–5.3)
PROTHROM AB SERPL-ACNC: 13.9 SEC — HIGH (ref 9.8–12.7)
RBC # BLD: 3.21 M/UL — LOW (ref 4.6–6.2)
RBC # FLD: 18.6 % — HIGH (ref 11–15.6)
SAO2 % BLDA: 90 % — LOW (ref 95–99)
SAO2 % BLDV: 55 % — LOW (ref 67–88)
SODIUM SERPL-SCNC: 139 MMOL/L — SIGNIFICANT CHANGE UP (ref 135–145)
SPECIMEN SOURCE: SIGNIFICANT CHANGE UP
VANCOMYCIN TROUGH SERPL-MCNC: 16 UG/ML — SIGNIFICANT CHANGE UP (ref 10–20)
WBC # BLD: 12.3 K/UL — HIGH (ref 4.8–10.8)
WBC # FLD AUTO: 12.3 K/UL — HIGH (ref 4.8–10.8)

## 2017-06-26 PROCEDURE — 99233 SBSQ HOSP IP/OBS HIGH 50: CPT

## 2017-06-26 RX ORDER — MAGNESIUM SULFATE 500 MG/ML
2 VIAL (ML) INJECTION ONCE
Qty: 0 | Refills: 0 | Status: COMPLETED | OUTPATIENT
Start: 2017-06-26 | End: 2017-06-26

## 2017-06-26 RX ORDER — WARFARIN SODIUM 2.5 MG/1
5 TABLET ORAL ONCE
Qty: 0 | Refills: 0 | Status: COMPLETED | OUTPATIENT
Start: 2017-06-26 | End: 2017-06-26

## 2017-06-26 RX ORDER — FUROSEMIDE 40 MG
20 TABLET ORAL
Qty: 0 | Refills: 0 | Status: DISCONTINUED | OUTPATIENT
Start: 2017-06-26 | End: 2017-06-28

## 2017-06-26 RX ORDER — AMIODARONE HYDROCHLORIDE 400 MG/1
400 TABLET ORAL EVERY 8 HOURS
Qty: 0 | Refills: 0 | Status: COMPLETED | OUTPATIENT
Start: 2017-06-26 | End: 2017-06-30

## 2017-06-26 RX ORDER — AMIODARONE HYDROCHLORIDE 400 MG/1
200 TABLET ORAL DAILY
Qty: 0 | Refills: 0 | Status: DISCONTINUED | OUTPATIENT
Start: 2017-07-08 | End: 2017-08-02

## 2017-06-26 RX ORDER — AMIODARONE HYDROCHLORIDE 400 MG/1
400 TABLET ORAL DAILY
Qty: 0 | Refills: 0 | Status: COMPLETED | OUTPATIENT
Start: 2017-07-04 | End: 2017-07-07

## 2017-06-26 RX ORDER — LISINOPRIL 2.5 MG/1
2.5 TABLET ORAL DAILY
Qty: 0 | Refills: 0 | Status: DISCONTINUED | OUTPATIENT
Start: 2017-06-26 | End: 2017-08-02

## 2017-06-26 RX ORDER — AMIODARONE HYDROCHLORIDE 400 MG/1
400 TABLET ORAL EVERY 12 HOURS
Qty: 0 | Refills: 0 | Status: COMPLETED | OUTPATIENT
Start: 2017-06-30 | End: 2017-07-03

## 2017-06-26 RX ADMIN — Medication 50 MILLIGRAM(S): at 05:30

## 2017-06-26 RX ADMIN — Medication 100 MILLIGRAM(S): at 11:20

## 2017-06-26 RX ADMIN — ALBUTEROL 2.5 MILLIGRAM(S): 90 AEROSOL, METERED ORAL at 14:57

## 2017-06-26 RX ADMIN — Medication 0.12 MILLIGRAM(S): at 05:30

## 2017-06-26 RX ADMIN — ALBUTEROL 2.5 MILLIGRAM(S): 90 AEROSOL, METERED ORAL at 20:06

## 2017-06-26 RX ADMIN — Medication 4 MILLIGRAM(S): at 21:32

## 2017-06-26 RX ADMIN — Medication 100 MILLIGRAM(S): at 05:30

## 2017-06-26 RX ADMIN — PANTOPRAZOLE SODIUM 40 MILLIGRAM(S): 20 TABLET, DELAYED RELEASE ORAL at 17:51

## 2017-06-26 RX ADMIN — SENNA PLUS 10 MILLILITER(S): 8.6 TABLET ORAL at 21:31

## 2017-06-26 RX ADMIN — MILRINONE LACTATE 4.89 MICROGRAM(S)/KG/MIN: 1 INJECTION, SOLUTION INTRAVENOUS at 05:31

## 2017-06-26 RX ADMIN — Medication 9 MILLIGRAM(S): at 21:32

## 2017-06-26 RX ADMIN — Medication 50 GRAM(S): at 08:57

## 2017-06-26 RX ADMIN — Medication 150 MILLIGRAM(S): at 06:37

## 2017-06-26 RX ADMIN — AMIODARONE HYDROCHLORIDE 400 MILLIGRAM(S): 400 TABLET ORAL at 21:32

## 2017-06-26 RX ADMIN — ALBUTEROL 2.5 MILLIGRAM(S): 90 AEROSOL, METERED ORAL at 03:07

## 2017-06-26 RX ADMIN — Medication 50 MILLIGRAM(S): at 16:19

## 2017-06-26 RX ADMIN — Medication 10 MILLIGRAM(S): at 11:20

## 2017-06-26 RX ADMIN — AMIODARONE HYDROCHLORIDE 400 MILLIGRAM(S): 400 TABLET ORAL at 11:21

## 2017-06-26 RX ADMIN — Medication 50 MILLIGRAM(S): at 21:32

## 2017-06-26 RX ADMIN — WARFARIN SODIUM 5 MILLIGRAM(S): 2.5 TABLET ORAL at 21:32

## 2017-06-26 RX ADMIN — Medication 1 MILLIGRAM(S): at 11:20

## 2017-06-26 RX ADMIN — Medication 20 MILLIGRAM(S): at 17:52

## 2017-06-26 RX ADMIN — PANTOPRAZOLE SODIUM 40 MILLIGRAM(S): 20 TABLET, DELAYED RELEASE ORAL at 05:30

## 2017-06-26 RX ADMIN — ALBUTEROL 2.5 MILLIGRAM(S): 90 AEROSOL, METERED ORAL at 08:33

## 2017-06-26 RX ADMIN — Medication 20 MILLIGRAM(S): at 05:30

## 2017-06-26 NOTE — PROGRESS NOTE ADULT - SUBJECTIVE AND OBJECTIVE BOX
CHIEF COMPLAINT: Patient is a 51y old  Male who presents with HFrEF.   Fevers on and off. On Heparin drip. Remains in afib with multiple episodes of NSVT.   On primacor drip.  More awake. on CPAP trial     Allergies:  No Known Allergies      MEDICATIONS:  doxazosin 4milliGRAM(s) Oral at bedtime  furosemide    Tablet 20milliGRAM(s) Oral two times a day  digoxin     Tablet 0.125milliGRAM(s) Oral daily  metoprolol 50milliGRAM(s) Oral two times a day  milrinone Infusion 0.2MICROgram(s)/kG/Min IV Continuous <Continuous>  ertapenem  IVPB  IV Intermittent   ertapenem  IVPB 1000milliGRAM(s) IV Intermittent every 24 hours  vancomycin  IVPB 750milliGRAM(s) IV Intermittent every 12 hours  ALBUTerol    0.083% 2.5milliGRAM(s) Nebulizer every 6 hours  amantadine Syrup 100milliGRAM(s) Oral <User Schedule>  FLUoxetine Solution 10milliGRAM(s) Oral daily  traZODone 50milliGRAM(s) Oral at bedtime  melatonin 9milliGRAM(s) Oral at bedtime  acetaminophen    Suspension 650milliGRAM(s) Oral every 6 hours PRN  pantoprazole   Suspension 40milliGRAM(s) Oral two times a day before meals  senna Syrup 10milliLiter(s) Oral at bedtime  thiamine 100milliGRAM(s) Oral daily  folic acid 1milliGRAM(s) Enteral Tube daily  heparin  Infusion. Unit(s)/Hr IV Continuous <Continuous>        PHYSICAL EXAM:  T(C): 38, Max: 38.1 (06-25 @ 21:00)  HR: 90 (77 - 103)  BP: 112/67 (98/66 - 119/74)  RR: 26 (5 - 32)  SpO2: 100% (78% - 100%)  Wt(kg): --    I&O's Summary    I & Os for current day (as of 26 Jun 2017 07:27)  =============================================  IN: 2849.6 ml / OUT: 1545 ml / NET: 1304.6 ml    Appearance: Mittens for protection, comfortable.   HEENT:   NC/AT  Eye: Pink Conjunctiva  Lungs: Decrease air entry bilateral bases   CVS: RRR, Normal S1 and S2, edema left > right  Pulses: Normal distal pulses.      TELEMETRY: afib, nsvt. 	    	  RADIOLOGY:   CXR 06/25/17:    Endotracheal tube is seen with distal tip just above the level of the   artie. Right internal jugular central venous catheter seen again seen   with tip at the superior vena cava/right atrial junction. Left chest wall   implanted single lead cardiac device again seen in place. Multiple EKG   clips and wires overlie the chest.    The heart is enlarged, may be exaggerated by AP technique and suboptimal   inspiration. Mild pulmonary vascular congestion. Question mild left   retrocardiac opacity again seen; atelectasis and/or infiltrate.    Impression:    Endotracheal tube tip just above the level of the artie. Recommend   retraction.    Otherwise no significant interval changes as described.      LABS:	 	    CARDIAC MARKERS:                            8.4    12.3  )-----------( 186      ( 26 Jun 2017 04:50 )             29.3     06-26    139  |  98  |  45.0<H>  ----------------------------<  127<H>  4.2   |  31.0<H>  |  0.68    Ca    8.0<L>      26 Jun 2017 04:50  Phos  3.0     06-26  Mg     1.9     06-26      Culture - Blood in AM (06.22.17 @ 14:54)    Specimen Source: .Blood Blood    Culture Results:   No growth at 48 hours    ASSESSMENT/PLAN:

## 2017-06-26 NOTE — PROGRESS NOTE ADULT - SUBJECTIVE AND OBJECTIVE BOX
[facility]  INFECTIOUS DISEAS[facility]IC[facility] [place] [facility]  =======================================================  Trent Fernandes MD [facility] Monroe[facility]ENDA Rocha MD  Diplomates American Board of Internal Medicine and Infectious Diseases  =======================================================    ERNIE HEREDIA [id number]  chief complaint: fevers  patient seen and examined in follow up.  Chart and labs reviewed.   Over weekend, had ET tube replaced for rupture of balloon  Antibiotics stopped by primary team.     =======================================================  Allergies:  fevers  No Known Allergies      =======================================================  Medications:  thiamine 100milliGRAM(s) Oral daily  amantadine Syrup 100milliGRAM(s) Oral <User Schedule>  doxazosin 4milliGRAM(s) Oral at bedtime  furosemide    Tablet 20milliGRAM(s) Oral two times a day  FLUoxetine Solution 10milliGRAM(s) Oral daily  folic acid 1milliGRAM(s) Enteral Tube daily  traZODone 50milliGRAM(s) Oral at bedtime  digoxin     Tablet 0.125milliGRAM(s) Oral daily  melatonin 9milliGRAM(s) Oral at bedtime  ALBUTerol    0.083% 2.5milliGRAM(s) Nebulizer every 6 hours  metoprolol 50milliGRAM(s) Oral two times a day  acetaminophen    Suspension 650milliGRAM(s) Oral every 6 hours PRN  pantoprazole   Suspension 40milliGRAM(s) Oral two times a day before meals  milrinone Infusion 0.2MICROgram(s)/kG/Min IV Continuous <Continuous>  heparin  Infusion. Unit(s)/Hr IV Continuous <Continuous>  senna Syrup 10milliLiter(s) Oral at bedtime  lisinopril 2.5milliGRAM(s) Oral daily  amiodarone    Tablet 400milliGRAM(s) Oral every 8 hours       =======================================================     REVIEW OF SYSTEMS:  unable to obtain  =======================================================     Physical Exam:  ICU Vital Signs Last 24 Hrs  T(C): 36.7, Max: 38.1 (06-25 @ 21:00)  T(F): 98, Max: 100.6 (06-25 @ 21:00)  HR: 81 (77 - 103)  BP: 112/67 (98/66 - 119/74)  BP(mean): 85 (78 - 90)  ABP: 102/59 (98/62 - 120/75)  ABP(mean): 75 (74 - 89)  RR: 29 (19 - 29)  SpO2: 100% (78% - 100%)    GEN: NAD, intubated, opens eyes to stimulation  HEENT: normocephalic and atraumatic. EOMI. LUCA.  + ET TUBE    NECK: Supple. No carotid bruits.  No lymphadenopathy or thyromegaly.  LUNGS: coarse breath sounds bilaterally  HEART: Regular rate and rhythm without murmur. + TRACE BILATERAL EDEMA  ABDOMEN: less distended, Positive bowel sounds; midline incision with island dressing in place  : + ARMAS with trace sediment.   EXTREMITIES: Without any cyanosis, clubbing, rash, lesions  MSK: no joint swelling  NEUROLOGIC: opens eyes; cannot fully assess  PSYCHIATRIC: cannot fully assess  SKIN: No ulceration or induration present.    =======================================================    Labs:  06-26    139  |  98  |  45.0<H>  ----------------------------<  127<H>  4.2   |  31.0<H>  |  0.68    Ca    8.0<L>      26 Jun 2017 04:50  Phos  3.0     06-26  Mg     1.9     06-26                            8.4    12.3  )-----------( 186      ( 26 Jun 2017 04:50 )             29.3       PTT - ( 26 Jun 2017 04:50 )  PTT:53.9 sec          CAPILLARY BLOOD GLUCOSE    ABG - ( 26 Jun 2017 04:14 )  pH: 7.45  /  pCO2: 44    /  pO2: 58    / HCO3: 30    / Base Excess: 6.1   /  SaO2: 90                 RECENT CULTURES:  06-22 @ 14:54 .Blood Blood     No growth at 48 hours        06-20 @ 01:34 .Blood Blood     No growth at 5 days.        06-20 @ 01:00 .Sputum Sputum     Rare Routine respiratory devin present    Few WBC's  No organisms seen      06-19 @ 18:14 .Blood Blood     No growth at 5 days.

## 2017-06-26 NOTE — PROGRESS NOTE ADULT - SUBJECTIVE AND OBJECTIVE BOX
INTERVAL HPI/OVERNIGHT EVENTS:  Events noted.  No significant new problems  PRESSORS: [ ] YES [ ] NO  WHICH:  DOSE:    ANTIBIOTICS:                  DATE STARTED:  ANTIBIOTICS:                  DATE STARTED:  ANTIBIOTICS:                  DATE STARTED:    MEDICATIONS  (STANDING):  amantadine Syrup 100milliGRAM(s) Oral <User Schedule>  doxazosin 4milliGRAM(s) Oral at bedtime  FLUoxetine Solution 10milliGRAM(s) Oral daily  traZODone 50milliGRAM(s) Oral at bedtime  digoxin     Tablet 0.125milliGRAM(s) Oral daily  melatonin 9milliGRAM(s) Oral at bedtime  ALBUTerol    0.083% 2.5milliGRAM(s) Nebulizer every 6 hours  metoprolol 50milliGRAM(s) Oral two times a day  pantoprazole   Suspension 40milliGRAM(s) Oral two times a day before meals  milrinone Infusion 0.2MICROgram(s)/kG/Min IV Continuous <Continuous>  heparin  Infusion. Unit(s)/Hr IV Continuous <Continuous>  senna Syrup 10milliLiter(s) Oral at bedtime  lisinopril 2.5milliGRAM(s) Oral daily  amiodarone    Tablet 400milliGRAM(s) Oral every 8 hours  warfarin 5milliGRAM(s) Oral once  furosemide   Injectable 20milliGRAM(s) IV Push two times a day    MEDICATIONS  (PRN):  acetaminophen    Suspension 650milliGRAM(s) Oral every 6 hours PRN For Temp greater than 38.5 C (101.3 F)      Drug Dosing Weight  Height (cm): 165.1 (12 Jun 2017 06:38)  Weight (kg): 81.5 (12 Jun 2017 06:38)  BMI (kg/m2): 29.9 (12 Jun 2017 06:38)  BSA (m2): 1.89 (12 Jun 2017 06:38)    CENTRAL LINE: [ ] YES [ ] NO  LOCATION:   DATE INSERTED:  REMOVE: [ ] YES [ ] NO  EXPLAIN:    ARMAS: [ ] YES [ ] NO    DATE INSERTED:  REMOVE: [ ] YES [ ] NO  EXPLAIN:    A-LINE: [ ] YES [ ] NO  LOCATION:   DATE INSERTED:  REMOVE: [ ] YES [ ] NO  EXPLAIN:    PAST MEDICAL & SURGICAL HISTORY:  Mitral regurgitation: severe MR  Cardiomyopathy, nonischemic  CAD (coronary artery disease)  Asthma  Atrial fibrillation  Heart disease  S/P laparoscopic cholecystectomy  History of humerus fracture: Metal pins in Left Humerus  Artificial pacemaker      ICU Vital Signs Last 24 Hrs  T(C): 36.7, Max: 38.1 (06-25 @ 21:00)  T(F): 98, Max: 100.6 (06-25 @ 21:00)  HR: 90 (77 - 103)  BP: 112/67 (98/66 - 119/74)  BP(mean): 85 (78 - 90)  ABP: 106/65 (98/62 - 120/75)  ABP(mean): 78 (74 - 89)  RR: 32 (19 - 32)  SpO2: 100% (78% - 100%)      ABG - ( 26 Jun 2017 04:14 )  pH: 7.45  /  pCO2: 44    /  pO2: 58    / HCO3: 30    / Base Excess: 6.1   /  SaO2: 90                  I&O's Detail  I & Os for 24h ending 26 Jun 2017 07:00  =============================================  IN:    Pivot: 1320 ml    Free Water: 1050 ml    heparin  Infusion.: 312 ml    milrinone  Infusion: 117.6 ml    Solution: 50 ml    Total IN: 2849.6 ml  ---------------------------------------------  OUT:    Indwelling Catheter - Urethral: 1545 ml    Total OUT: 1545 ml  ---------------------------------------------  Total NET: 1304.6 ml    I & Os for current day (as of 26 Jun 2017 11:44)  =============================================  IN:    Pivot: 220 ml    heparin  Infusion.: 56 ml    milrinone  Infusion: 19.6 ml    Total IN: 295.6 ml  ---------------------------------------------  OUT:    Indwelling Catheter - Urethral: 185 ml    Total OUT: 185 ml  ---------------------------------------------  Total NET: 110.6 ml      Mode: CPAP with PS  FiO2: 40  PEEP: 8  PS: 10  MAP: 11        LABS:  CBC Full  -  ( 26 Jun 2017 04:50 )  WBC Count : 12.3 K/uL  Hemoglobin : 8.4 g/dL  Hematocrit : 29.3 %  Platelet Count - Automated : 186 K/uL  Mean Cell Volume : 91.3 fl  Mean Cell Hemoglobin : 26.2 pg  Mean Cell Hemoglobin Concentration : 28.7 g/dL  Auto Neutrophil # : x  Auto Lymphocyte # : x  Auto Monocyte # : x  Auto Eosinophil # : x  Auto Basophil # : x  Auto Neutrophil % : x  Auto Lymphocyte % : x  Auto Monocyte % : x  Auto Eosinophil % : x  Auto Basophil % : x    06-26    139  |  98  |  45.0<H>  ----------------------------<  127<H>  4.2   |  31.0<H>  |  0.68    Ca    8.0<L>      26 Jun 2017 04:50  Phos  3.0     06-26  Mg     1.9     06-26      PTT - ( 26 Jun 2017 04:50 )  PTT:53.9 sec      Assessment and Plan:    Neuro: GCS 15. Monitor for delirium.  Will try to limit physical restraints    HEENT: no issues    CV: Continue hemodynamic monitoring--Remains on milrinone, SvO2=55%, lactate is cleared.  Awaiting cardioversion    Pulm: Pulmonary toilet.  On pressure support ventilation with adequate gas exchange, will wean PSV.  Will make a decision on tracheostomy once cardioversion is complete and we better understand his cardiac status    GI/Nutrition: Bowel Regimen    /Renal: monitor UOP. Monitor BMP.  Replete Lytes as needed      HEME- DVT prophylaxis, SCDs    ID: Completed 8 days of empiric antibiotics.  No new positive cultures.  Will stop antibiotics

## 2017-06-26 NOTE — PROGRESS NOTE ADULT - ASSESSMENT
1. Intermittent fever. Abx as per ID. Plan for HANNAH possibly tomorrow  2. I will attempt to call pt's NOK  3. Cont with metoprolol. Add low dose lisinopril, 2.5 mg daily, hold for SBP <95.  4. Check cortisol level.  5. On heparin drip, HANNAH, cardioversion in am if can get NOK to consent Please keep npo post midnight  6. NSVT, give 2 gram of mg. Start Amiodarone 400 mg NGT q8h x 4 days, 400 mg bid for 4 days, 400 mg daily for 4 days and then 200 mg daily 1. Intermittent fever. Abx as per ID. Plan for HANNAH possibly tomorrow  2. I will attempt to call pt's NOK  3. Cont with metoprolol. Add low dose lisinopril, 2.5 mg daily, hold for SBP <95.  4. Check cortisol level.  5. On heparin drip, HANNAH, cardioversion in am if can get NOK to consent Please keep npo post midnight  6. Change to IV lasix 20 mg bid, he is volume overloaded.   6. NSVT, give 2 gram of mg. Start Amiodarone 400 mg NGT q8h x 4 days, 400 mg bid for 4 days, 400 mg daily for 4 days and then 200 mg daily

## 2017-06-27 LAB
ANION GAP SERPL CALC-SCNC: 8 MMOL/L — SIGNIFICANT CHANGE UP (ref 5–17)
ANISOCYTOSIS BLD QL: SIGNIFICANT CHANGE UP
APTT BLD: 79.5 SEC — HIGH (ref 27.5–37.4)
BASE EXCESS BLDV CALC-SCNC: 5.8 MMOL/L — HIGH (ref -3–3)
BASE EXCESS BLDV CALC-SCNC: 7.1 MMOL/L — HIGH (ref -3–3)
BLOOD GAS COMMENTS, VENOUS: SIGNIFICANT CHANGE UP
BUN SERPL-MCNC: 51 MG/DL — HIGH (ref 8–20)
CA-I SERPL-SCNC: 1.13 MMOL/L — SIGNIFICANT CHANGE UP (ref 1.12–1.3)
CA-I SERPL-SCNC: 1.13 MMOL/L — SIGNIFICANT CHANGE UP (ref 1.12–1.3)
CALCIUM SERPL-MCNC: 8.1 MG/DL — LOW (ref 8.6–10.2)
CHLORIDE BLDV-SCNC: 98 MMOL/L — SIGNIFICANT CHANGE UP (ref 98–106)
CHLORIDE BLDV-SCNC: 98 MMOL/L — SIGNIFICANT CHANGE UP (ref 98–106)
CHLORIDE SERPL-SCNC: 96 MMOL/L — LOW (ref 98–107)
CO2 SERPL-SCNC: 31 MMOL/L — HIGH (ref 22–29)
CREAT SERPL-MCNC: 0.67 MG/DL — SIGNIFICANT CHANGE UP (ref 0.5–1.3)
EOSINOPHIL NFR BLD AUTO: 1 % — SIGNIFICANT CHANGE UP (ref 0–6)
GAS PNL BLDV: 136 MMOL/L — SIGNIFICANT CHANGE UP (ref 136–146)
GAS PNL BLDV: 136 MMOL/L — SIGNIFICANT CHANGE UP (ref 136–146)
GAS PNL BLDV: SIGNIFICANT CHANGE UP
GLUCOSE BLDV-MCNC: 121 MG/DL — HIGH (ref 70–99)
GLUCOSE BLDV-MCNC: 137 MG/DL — HIGH (ref 70–99)
GLUCOSE SERPL-MCNC: 131 MG/DL — HIGH (ref 70–115)
HCO3 BLDV-SCNC: 29 MMOL/L — HIGH (ref 20–26)
HCO3 BLDV-SCNC: 30 MMOL/L — HIGH (ref 20–26)
HCT VFR BLD CALC: 29.7 % — LOW (ref 42–52)
HCT VFR BLDA CALC: 26 — LOW (ref 39–50)
HCT VFR BLDA CALC: 27 — LOW (ref 39–50)
HGB BLD CALC-MCNC: 8.4 G/DL — LOW (ref 13–17)
HGB BLD CALC-MCNC: 8.7 G/DL — LOW (ref 13–17)
HGB BLD-MCNC: 8.4 G/DL — LOW (ref 14–18)
HOROWITZ INDEX BLDV+IHG-RTO: 40 — SIGNIFICANT CHANGE UP
HYPOCHROMIA BLD QL: SIGNIFICANT CHANGE UP
LACTATE BLDV-MCNC: 1.8 MMOL/L — SIGNIFICANT CHANGE UP (ref 0.5–2)
LACTATE BLDV-MCNC: 2.1 MMOL/L — HIGH (ref 0.5–2)
LG PLATELETS BLD QL AUTO: SLIGHT — SIGNIFICANT CHANGE UP
LYMPHOCYTES # BLD AUTO: 11 % — LOW (ref 20–55)
MACROCYTES BLD QL: SLIGHT — SIGNIFICANT CHANGE UP
MAGNESIUM SERPL-MCNC: 2 MG/DL — SIGNIFICANT CHANGE UP (ref 1.6–2.6)
MCHC RBC-ENTMCNC: 25.5 PG — LOW (ref 27–31)
MCHC RBC-ENTMCNC: 28.3 G/DL — LOW (ref 32–36)
MCV RBC AUTO: 90.3 FL — SIGNIFICANT CHANGE UP (ref 80–94)
METAMYELOCYTES # FLD: 1 % — HIGH (ref 0–0)
MICROCYTES BLD QL: SLIGHT — SIGNIFICANT CHANGE UP
MONOCYTES NFR BLD AUTO: 6 % — SIGNIFICANT CHANGE UP (ref 3–10)
MYELOCYTES NFR BLD: 1 % — HIGH (ref 0–0)
NEUTROPHILS NFR BLD AUTO: 80 % — HIGH (ref 37–73)
NRBC # BLD: 6 /100 — HIGH (ref 0–0)
OTHER CELLS CSF MANUAL: 5 ML/DL — LOW (ref 18–22)
OTHER CELLS CSF MANUAL: 6 ML/DL — LOW (ref 18–22)
OVALOCYTES BLD QL SMEAR: SLIGHT — SIGNIFICANT CHANGE UP
PCO2 BLDV: 52 MMHG — HIGH (ref 35–50)
PCO2 BLDV: 53 MMHG — HIGH (ref 35–50)
PH BLDV: 7.38 — SIGNIFICANT CHANGE UP (ref 7.35–7.45)
PH BLDV: 7.41 — SIGNIFICANT CHANGE UP (ref 7.35–7.45)
PHOSPHATE SERPL-MCNC: 3.7 MG/DL — SIGNIFICANT CHANGE UP (ref 2.4–4.7)
PLAT MORPH BLD: NORMAL — SIGNIFICANT CHANGE UP
PLATELET # BLD AUTO: 213 K/UL — SIGNIFICANT CHANGE UP (ref 150–400)
PLATELET COUNT - ESTIMATE: ADEQUATE — SIGNIFICANT CHANGE UP
PO2 BLDV: 28 MMHG — SIGNIFICANT CHANGE UP (ref 25–45)
PO2 BLDV: 31 MMHG — SIGNIFICANT CHANGE UP (ref 25–45)
POIKILOCYTOSIS BLD QL AUTO: SLIGHT — SIGNIFICANT CHANGE UP
POLYCHROMASIA BLD QL SMEAR: SLIGHT — SIGNIFICANT CHANGE UP
POTASSIUM BLDV-SCNC: 3.9 MMOL/L — SIGNIFICANT CHANGE UP (ref 3.4–4.5)
POTASSIUM BLDV-SCNC: 4.3 MMOL/L — SIGNIFICANT CHANGE UP (ref 3.4–4.5)
POTASSIUM SERPL-MCNC: 4.4 MMOL/L — SIGNIFICANT CHANGE UP (ref 3.5–5.3)
POTASSIUM SERPL-SCNC: 4.4 MMOL/L — SIGNIFICANT CHANGE UP (ref 3.5–5.3)
RBC # BLD: 3.29 M/UL — LOW (ref 4.6–6.2)
RBC # FLD: 18.8 % — HIGH (ref 11–15.6)
RBC BLD AUTO: ABNORMAL
SAO2 % BLDV: 44 % — LOW (ref 67–88)
SAO2 % BLDV: 53 % — LOW (ref 67–88)
SODIUM SERPL-SCNC: 135 MMOL/L — SIGNIFICANT CHANGE UP (ref 135–145)
WBC # BLD: 12.7 K/UL — HIGH (ref 4.8–10.8)
WBC # FLD AUTO: 12.7 K/UL — HIGH (ref 4.8–10.8)

## 2017-06-27 PROCEDURE — 99233 SBSQ HOSP IP/OBS HIGH 50: CPT

## 2017-06-27 RX ORDER — FOLIC ACID 0.8 MG
1 TABLET ORAL DAILY
Qty: 0 | Refills: 0 | Status: DISCONTINUED | OUTPATIENT
Start: 2017-06-27 | End: 2017-07-01

## 2017-06-27 RX ORDER — HEPARIN SODIUM 5000 [USP'U]/ML
1600 INJECTION INTRAVENOUS; SUBCUTANEOUS
Qty: 25000 | Refills: 0 | Status: DISCONTINUED | OUTPATIENT
Start: 2017-06-27 | End: 2017-07-05

## 2017-06-27 RX ORDER — THIAMINE MONONITRATE (VIT B1) 100 MG
100 TABLET ORAL DAILY
Qty: 0 | Refills: 0 | Status: DISCONTINUED | OUTPATIENT
Start: 2017-06-27 | End: 2017-07-01

## 2017-06-27 RX ADMIN — Medication 0.12 MILLIGRAM(S): at 05:41

## 2017-06-27 RX ADMIN — Medication 100 MILLIGRAM(S): at 11:38

## 2017-06-27 RX ADMIN — PANTOPRAZOLE SODIUM 40 MILLIGRAM(S): 20 TABLET, DELAYED RELEASE ORAL at 17:12

## 2017-06-27 RX ADMIN — AMIODARONE HYDROCHLORIDE 400 MILLIGRAM(S): 400 TABLET ORAL at 14:23

## 2017-06-27 RX ADMIN — MILRINONE LACTATE 4.89 MICROGRAM(S)/KG/MIN: 1 INJECTION, SOLUTION INTRAVENOUS at 05:40

## 2017-06-27 RX ADMIN — ALBUTEROL 2.5 MILLIGRAM(S): 90 AEROSOL, METERED ORAL at 14:37

## 2017-06-27 RX ADMIN — Medication 1 MILLIGRAM(S): at 17:12

## 2017-06-27 RX ADMIN — Medication 20 MILLIGRAM(S): at 05:41

## 2017-06-27 RX ADMIN — Medication 10 MILLIGRAM(S): at 11:37

## 2017-06-27 RX ADMIN — ALBUTEROL 2.5 MILLIGRAM(S): 90 AEROSOL, METERED ORAL at 21:33

## 2017-06-27 RX ADMIN — AMIODARONE HYDROCHLORIDE 400 MILLIGRAM(S): 400 TABLET ORAL at 05:39

## 2017-06-27 RX ADMIN — ALBUTEROL 2.5 MILLIGRAM(S): 90 AEROSOL, METERED ORAL at 08:05

## 2017-06-27 RX ADMIN — Medication 50 MILLIGRAM(S): at 17:14

## 2017-06-27 RX ADMIN — PANTOPRAZOLE SODIUM 40 MILLIGRAM(S): 20 TABLET, DELAYED RELEASE ORAL at 05:39

## 2017-06-27 RX ADMIN — Medication 50 MILLIGRAM(S): at 05:39

## 2017-06-27 RX ADMIN — Medication 100 MILLIGRAM(S): at 05:39

## 2017-06-27 RX ADMIN — LISINOPRIL 2.5 MILLIGRAM(S): 2.5 TABLET ORAL at 05:39

## 2017-06-27 RX ADMIN — Medication 100 MILLIGRAM(S): at 17:14

## 2017-06-27 RX ADMIN — ALBUTEROL 2.5 MILLIGRAM(S): 90 AEROSOL, METERED ORAL at 02:03

## 2017-06-27 RX ADMIN — Medication 20 MILLIGRAM(S): at 17:12

## 2017-06-27 NOTE — PROGRESS NOTE ADULT - ASSESSMENT
1. HFrEF, On BB and ACEI  2. Cont with diuretics.  3. Would try to dc primacor in the next 24-48 hours  4. We will try again for HANNAH / cardioversion if can get family to consent   5. On heparin drip, therapeutic range.   6. Please obtain EKG in AM.

## 2017-06-27 NOTE — PROGRESS NOTE ADULT - SUBJECTIVE AND OBJECTIVE BOX
[facility]  INFECTIOUS DISEASES AND INTERNAL MEDICINE OF [facility]  =======================================================  Trent Rocha MD  Diplomates American Board of Internal Medicine and Infectious Diseases  =======================================================    YAN ERNIE [id number]  chief complaint: fevers    patient seen and examined in follow up.  Chart and labs reviewed.     Cultures reviewed    =======================================================  Allergies:  fevers  No Known Allergies      =======================================================  MEDICATIONS  (STANDING):  amantadine Syrup 100 milliGRAM(s) Oral <User Schedule>  doxazosin 4 milliGRAM(s) Oral at bedtime  FLUoxetine Solution 10 milliGRAM(s) Oral daily  traZODone 50 milliGRAM(s) Oral at bedtime  digoxin     Tablet 0.125 milliGRAM(s) Oral daily  melatonin 9 milliGRAM(s) Oral at bedtime  ALBUTerol    0.083% 2.5 milliGRAM(s) Nebulizer every 6 hours  metoprolol 50 milliGRAM(s) Oral two times a day  pantoprazole   Suspension 40 milliGRAM(s) Oral two times a day before meals  milrinone Infusion 0.2 MICROgram(s)/kG/Min (4.89 mL/Hr) IV Continuous <Continuous>  heparin  Infusion.  Unit(s)/Hr (15 mL/Hr) IV Continuous <Continuous>  senna Syrup 10 milliLiter(s) Oral at bedtime  lisinopril 2.5 milliGRAM(s) Oral daily  amiodarone    Tablet 400 milliGRAM(s) Oral every 8 hours  furosemide   Injectable 20 milliGRAM(s) IV Push two times a day    =======================================================  REVIEW OF SYSTEMS:  unable to obtain  =======================================================     Physical Exam:  ICU Vital Signs Last 24 Hrs  T(C): 36.4 (27 Jun 2017 08:01), Max: 36.8 (26 Jun 2017 20:00)  T(F): 97.6 (27 Jun 2017 08:01), Max: 98.3 (26 Jun 2017 20:00)  HR: 72 (27 Jun 2017 07:57) (72 - 101)  ABP: 98/70 (27 Jun 2017 07:00) (94/70 - 118/76)  ABP(mean): 81 (27 Jun 2017 07:00) (75 - 92)  RR: 26 (27 Jun 2017 07:00) (23 - 37)  SpO2: 99% (27 Jun 2017 08:07) (97% - 100%)    GEN: NAD, intubated, opens eyes to stimulation  HEENT: normocephalic and atraumatic. EOMI. LUCA.  + ET TUBE    NECK: Supple. No carotid bruits.  No lymphadenopathy or thyromegaly.  LUNGS: coarse breath sounds bilaterally  HEART: Regular rate and rhythm without murmur. + TRACE BILATERAL EDEMA  ABDOMEN: less distended, Positive bowel sounds; midline incision with island dressing in place  : + ARMAS with trace sediment.   EXTREMITIES: Without any cyanosis, clubbing, rash, lesions  MSK: no joint swelling  NEUROLOGIC: opens eyes; cannot fully assess  PSYCHIATRIC: cannot fully assess  SKIN: No ulceration or induration present.      =======================================================    Labs:  06-27    135  |  96<L>  |  51.0<H>  ----------------------------<  131<H>  4.4   |  31.0<H>  |  0.67    Ca    8.1<L>      27 Jun 2017 05:29  Phos  3.7     06-27  Mg     2.0     06-27                            8.4    12.7  )-----------( 213      ( 27 Jun 2017 05:29 )             29.7       PT/INR - ( 26 Jun 2017 13:22 )   PT: 13.9 sec;   INR: 1.26 ratio         PTT - ( 27 Jun 2017 05:29 )  PTT:79.5 sec          CAPILLARY BLOOD GLUCOSE        ABG - ( 26 Jun 2017 04:14 )  pH: 7.45  /  pCO2: 44    /  pO2: 58    / HCO3: 30    / Base Excess: 6.1   /  SaO2: 90              RECENT CULTURES:  06-22 @ 14:54 .Blood Blood     No growth at 48 hours        06-20 @ 01:34 .Blood Blood     No growth at 5 days.        06-20 @ 01:00 .Sputum Sputum     Rare Routine respiratory devin present    Few WBC's  No organisms seen      06-19 @ 18:14 .Blood Blood     No growth at 5 days.

## 2017-06-27 NOTE — PROGRESS NOTE ADULT - SUBJECTIVE AND OBJECTIVE BOX
CHIEF COMPLAINT:Patient is a 51y old  Male who is seen with afib, HFrEF, need for primacor and respiratory failure.   Pt is on CPAP tolerating it well.   No acute change, diuresing.   Mixed venous unchanged. Attempted to get consent from wife, not able to reach her to perform HANNAH/ cardioversion.   Less VT on amiodarone.     INTERVAL HISTORY:  Allergies  	  MEDICATIONS:  doxazosin 4 milliGRAM(s) Oral at bedtime  digoxin     Tablet 0.125 milliGRAM(s) Oral daily  metoprolol 50 milliGRAM(s) Oral two times a day  milrinone Infusion 0.2 MICROgram(s)/kG/Min IV Continuous <Continuous>  lisinopril 2.5 milliGRAM(s) Oral daily  amiodarone    Tablet 400 milliGRAM(s) Oral every 8 hours  furosemide   Injectable 20 milliGRAM(s) IV Push two times a day  ALBUTerol    0.083% 2.5 milliGRAM(s) Nebulizer every 6 hours  amantadine Syrup 100 milliGRAM(s) Oral <User Schedule>  FLUoxetine Solution 10 milliGRAM(s) Oral daily  traZODone 50 milliGRAM(s) Oral at bedtime  melatonin 9 milliGRAM(s) Oral at bedtime  acetaminophen    Suspension 650 milliGRAM(s) Oral every 6 hours PRN  pantoprazole   Suspension 40 milliGRAM(s) Oral two times a day before meals  senna Syrup 10 milliLiter(s) Oral at bedtime  heparin  Infusion.  Unit(s)/Hr IV Continuous <Continuous>    PHYSICAL EXAM:  T(C): 36.4 (06-27-17 @ 08:01), Max: 36.8 (06-26-17 @ 20:00)  HR: 63 (06-27-17 @ 12:03) (63 - 101)  BP: --  RR: 25 (06-27-17 @ 12:00) (20 - 37)  SpO2: 99% (06-27-17 @ 12:03) (97% - 100%)  Wt(kg): --    I&O's Summary    26 Jun 2017 07:01  -  27 Jun 2017 07:00  --------------------------------------------------------  IN: 2224.6 mL / OUT: 1330 mL / NET: 894.6 mL    27 Jun 2017 07:01  -  27 Jun 2017 12:08  --------------------------------------------------------  IN: 62.7 mL / OUT: 350 mL / NET: -287.3 mL  Appearance: Comfortable  HEENT:   NC/AT  Eye: Pink Conjunctiva  Lungs: decrease air entry left base.   CVS: IRRR  Pulses: Normal distal pulses.    LABS:	 	                       8.4    12.7  )-----------( 213      ( 27 Jun 2017 05:29 )             29.7     06-27    135  |  96<L>  |  51.0<H>  ----------------------------<  131<H>  4.4   |  31.0<H>  |  0.67    Ca    8.1<L>      27 Jun 2017 05:29  Phos  3.7     06-27  Mg     2.0     06-27    ASSESSMENT/PLAN:

## 2017-06-27 NOTE — PROGRESS NOTE ADULT - PROBLEM SELECTOR PLAN 1
- BLOOD and sputum cx from 6/20/17 remained negative  - WBC remain in same range - 12K   - WILL OBSERVE off antibotics

## 2017-06-27 NOTE — PROGRESS NOTE ADULT - SUBJECTIVE AND OBJECTIVE BOX
INTERVAL HPI/OVERNIGHT EVENTS:  no issues overnight. afebrile tolerating feeds, tolerating PS ventilation.    PRESSORS: [ ] YES [x ] NO  WHICH:  DOSE:    ANTIBIOTICS:        invanz          DATE STARTED:6/19  ANTIBIOTICS:    vanco              DATE STARTED:6/19  ANTIBIOTICS:                  DATE STARTED:    MEDICATIONS  (STANDING):  amantadine Syrup 100 milliGRAM(s) Oral <User Schedule>  doxazosin 4 milliGRAM(s) Oral at bedtime  FLUoxetine Solution 10 milliGRAM(s) Oral daily  traZODone 50 milliGRAM(s) Oral at bedtime  digoxin     Tablet 0.125 milliGRAM(s) Oral daily  melatonin 9 milliGRAM(s) Oral at bedtime  ALBUTerol    0.083% 2.5 milliGRAM(s) Nebulizer every 6 hours  metoprolol 50 milliGRAM(s) Oral two times a day  pantoprazole   Suspension 40 milliGRAM(s) Oral two times a day before meals  milrinone Infusion 0.2 MICROgram(s)/kG/Min (4.89 mL/Hr) IV Continuous <Continuous>  heparin  Infusion.  Unit(s)/Hr (15 mL/Hr) IV Continuous <Continuous>  senna Syrup 10 milliLiter(s) Oral at bedtime  lisinopril 2.5 milliGRAM(s) Oral daily  amiodarone    Tablet 400 milliGRAM(s) Oral every 8 hours  furosemide   Injectable 20 milliGRAM(s) IV Push two times a day    MEDICATIONS  (PRN):  acetaminophen    Suspension 650 milliGRAM(s) Oral every 6 hours PRN For Temp greater than 38.5 C (101.3 F)      Drug Dosing Weight  Height (cm): 165.1 (12 Jun 2017 06:38)  Weight (kg): 81.5 (12 Jun 2017 06:38)  BMI (kg/m2): 29.9 (12 Jun 2017 06:38)  BSA (m2): 1.89 (12 Jun 2017 06:38)    CENTRAL LINE: [x ] YES [ ] NO  LOCATION:   DATE INSERTED:  REMOVE: [ ] YES [x ] NO  EXPLAIN: cvp monitoring.    ARMAS: [x ] YES [ ] NO    DATE INSERTED:  REMOVE: [ ] YES [x ] NO  EXPLAIN: failed void trials     A-LINE: [x ] YES [ ] NO  LOCATION:   DATE INSERTED:  REMOVE: [ ] YES x[ ] NO  EXPLAIN: hemodynamic monitoring.    PAST MEDICAL & SURGICAL HISTORY:  Mitral regurgitation: severe MR  Cardiomyopathy, nonischemic  CAD (coronary artery disease)  Asthma  Atrial fibrillation  S/P laparoscopic cholecystectomy  History of humerus fracture: Metal pins in Left Humerus  Artificial pacemaker      ICU Vital Signs Last 24 Hrs  T(C): 36.4 (27 Jun 2017 08:01), Max: 36.8 (26 Jun 2017 20:00)  T(F): 97.6 (27 Jun 2017 08:01), Max: 98.3 (26 Jun 2017 20:00)  HR: 67 (27 Jun 2017 09:00) (67 - 101)  BP: --  BP(mean): --  ABP: 94/61 (27 Jun 2017 09:00) (94/61 - 118/76)  ABP(mean): 73 (27 Jun 2017 09:00) (73 - 92)  RR: 20 (27 Jun 2017 09:00) (20 - 37)  SpO2: 99% (27 Jun 2017 09:00) (97% - 100%)      ABG - ( 26 Jun 2017 04:14 )  pH: 7.45  /  pCO2: 44    /  pO2: 58    / HCO3: 30    / Base Excess: 6.1   /  SaO2: 90                  I&O's Detail    26 Jun 2017 07:01  -  27 Jun 2017 07:00  --------------------------------------------------------  IN:    Free Water: 750 mL    heparin  Infusion.: 372 mL    milrinone  Infusion: 117.6 mL    Pivot: 935 mL    Solution: 50 mL  Total IN: 2224.6 mL    OUT:    Indwelling Catheter - Urethral: 1330 mL  Total OUT: 1330 mL    Total NET: 894.6 mL      27 Jun 2017 07:01  -  27 Jun 2017 11:37  --------------------------------------------------------  IN:    heparin  Infusion.: 48 mL    milrinone  Infusion: 14.7 mL  Total IN: 62.7 mL    OUT:    Indwelling Catheter - Urethral: 350 mL  Total OUT: 350 mL    Total NET: -287.3 mL          Mode: CPAP with PS  FiO2: 30  PEEP: 8  PS: 10  MAP: 11      Physical Exam:    Neurological:  No sensory/motor deficits, gcs 11T    HEENT: PERRLA, EOMI, no drainage or redness    Neck: No bruits; no thyromegaly or nodules, + JVD    Back: Normal spine flexure, No CVA tenderness, No deformity or limitation of movement    Respiratory: Breath Sounds equal & clear to auscultation,    Cardiovascular: irregular, rate in 80's    Gastrointestinal: Soft, non-tender, normal bowel sounds, peg site cdi    Extremities: No peripheral edema, No cyanosis, clubbing     Vascular: Equal and normal pulses: 2+ peripheral pulses throughout    Musculoskeletal: No joint pain, swelling or deformity; no limitation of movement    Skin: No rashes    LABS:  CBC Full  -  ( 27 Jun 2017 05:29 )  WBC Count : 12.7 K/uL  Hemoglobin : 8.4 g/dL  Hematocrit : 29.7 %  Platelet Count - Automated : 213 K/uL  Mean Cell Volume : 90.3 fl  Mean Cell Hemoglobin : 25.5 pg  Mean Cell Hemoglobin Concentration : 28.3 g/dL  Auto Neutrophil # : x  Auto Lymphocyte # : x  Auto Monocyte # : x  Auto Eosinophil # : x  Auto Basophil # : x  Auto Neutrophil % : 80.0 %  Auto Lymphocyte % : 11.0 %  Auto Monocyte % : 6.0 %  Auto Eosinophil % : 1.0 %  Auto Basophil % : x    06-27    135  |  96<L>  |  51.0<H>  ----------------------------<  131<H>  4.4   |  31.0<H>  |  0.67    Ca    8.1<L>      27 Jun 2017 05:29  Phos  3.7     06-27  Mg     2.0     06-27      PT/INR - ( 26 Jun 2017 13:22 )   PT: 13.9 sec;   INR: 1.26 ratio         PTT - ( 27 Jun 2017 05:29 )  PTT:79.5 sec      Assessment and Plan:    Neuro: Monitor for delirium.  Continue to optimize pain control. Serial Neurologic assessments    HEENT: no issues    CV: Continue hemodynamic monitoring, f/u cardiology for HANNAH +/- cardioversion, on Hep drip, started on coumadin last night.    Pulm: Pulmonary toilet.  intubated tolerating PS, most likely will need trach considering multiple intubations .     GI/Nutrition: Bowel Regimen, goal feeds    /Renal: monitor UOP. Monitor BMP.  Replete Lytes as needed      HEME- DVT prophylaxis, SCDs    ID: consult following, will stop abx today, f/u clinically.    Lines/Tubes: keep all lines for now while working on cardioversion    Endo: no issues    Skin: daily care    Dispo: sicu care    CRITICAL CARE TIME: 35 min.

## 2017-06-28 LAB
ANION GAP SERPL CALC-SCNC: 11 MMOL/L — SIGNIFICANT CHANGE UP (ref 5–17)
ANISOCYTOSIS BLD QL: SLIGHT — SIGNIFICANT CHANGE UP
APTT BLD: 66.2 SEC — HIGH (ref 27.5–37.4)
BASE EXCESS BLDV CALC-SCNC: 8.4 MMOL/L — HIGH (ref -3–3)
BASOPHILS # BLD AUTO: 0 K/UL — SIGNIFICANT CHANGE UP (ref 0–0.2)
BASOPHILS NFR BLD AUTO: 0 % — SIGNIFICANT CHANGE UP (ref 0–2)
BLD GP AB SCN SERPL QL: SIGNIFICANT CHANGE UP
BUN SERPL-MCNC: 49 MG/DL — HIGH (ref 8–20)
CA-I SERPL-SCNC: 1.12 MMOL/L — SIGNIFICANT CHANGE UP (ref 1.12–1.3)
CALCIUM SERPL-MCNC: 7.9 MG/DL — LOW (ref 8.6–10.2)
CHLORIDE BLDV-SCNC: 99 MMOL/L — SIGNIFICANT CHANGE UP (ref 98–106)
CHLORIDE SERPL-SCNC: 95 MMOL/L — LOW (ref 98–107)
CO2 SERPL-SCNC: 30 MMOL/L — HIGH (ref 22–29)
CREAT SERPL-MCNC: 0.62 MG/DL — SIGNIFICANT CHANGE UP (ref 0.5–1.3)
CULTURE RESULTS: SIGNIFICANT CHANGE UP
CULTURE RESULTS: SIGNIFICANT CHANGE UP
ELLIPTOCYTES BLD QL SMEAR: SLIGHT — SIGNIFICANT CHANGE UP
EOSINOPHIL # BLD AUTO: 0.1 K/UL — SIGNIFICANT CHANGE UP (ref 0–0.5)
EOSINOPHIL NFR BLD AUTO: 2 % — SIGNIFICANT CHANGE UP (ref 0–6)
GAS PNL BLDV: 136 MMOL/L — SIGNIFICANT CHANGE UP (ref 136–146)
GAS PNL BLDV: SIGNIFICANT CHANGE UP
GAS PNL BLDV: SIGNIFICANT CHANGE UP
GLUCOSE BLDV-MCNC: 106 MG/DL — HIGH (ref 70–99)
GLUCOSE SERPL-MCNC: 107 MG/DL — SIGNIFICANT CHANGE UP (ref 70–115)
HCO3 BLDV-SCNC: 32 MMOL/L — HIGH (ref 20–26)
HCT VFR BLD CALC: 30 % — LOW (ref 42–52)
HCT VFR BLDA CALC: 26 — LOW (ref 39–50)
HGB BLD CALC-MCNC: 8.3 G/DL — LOW (ref 13–17)
HGB BLD-MCNC: 8.3 G/DL — LOW (ref 14–18)
HYPOCHROMIA BLD QL: SLIGHT — SIGNIFICANT CHANGE UP
INR BLD: 1.46 RATIO — HIGH (ref 0.88–1.16)
LACTATE BLDV-MCNC: 1.3 MMOL/L — SIGNIFICANT CHANGE UP (ref 0.5–2)
LYMPHOCYTES # BLD AUTO: 1.9 K/UL — SIGNIFICANT CHANGE UP (ref 1–4.8)
LYMPHOCYTES # BLD AUTO: 10 % — LOW (ref 20–55)
MAGNESIUM SERPL-MCNC: 1.8 MG/DL — SIGNIFICANT CHANGE UP (ref 1.6–2.6)
MCHC RBC-ENTMCNC: 25.2 PG — LOW (ref 27–31)
MCHC RBC-ENTMCNC: 27.7 G/DL — LOW (ref 32–36)
MCV RBC AUTO: 91.2 FL — SIGNIFICANT CHANGE UP (ref 80–94)
METAMYELOCYTES # FLD: 1 % — HIGH (ref 0–0)
MONOCYTES # BLD AUTO: 0.8 K/UL — SIGNIFICANT CHANGE UP (ref 0–0.8)
MONOCYTES NFR BLD AUTO: 8 % — SIGNIFICANT CHANGE UP (ref 3–10)
MYELOCYTES NFR BLD: 1 % — HIGH (ref 0–0)
NEUTROPHILS # BLD AUTO: 9.1 K/UL — HIGH (ref 1.8–8)
NEUTROPHILS NFR BLD AUTO: 76 % — HIGH (ref 37–73)
NEUTS BAND # BLD: 2 % — SIGNIFICANT CHANGE UP (ref 0–8)
NRBC # BLD: 3 /100 — HIGH (ref 0–0)
OTHER CELLS CSF MANUAL: 7 ML/DL — LOW (ref 18–22)
OVALOCYTES BLD QL SMEAR: SLIGHT — SIGNIFICANT CHANGE UP
PCO2 BLDV: 54 MMHG — HIGH (ref 35–50)
PH BLDV: 7.41 — SIGNIFICANT CHANGE UP (ref 7.35–7.45)
PHOSPHATE SERPL-MCNC: 3.5 MG/DL — SIGNIFICANT CHANGE UP (ref 2.4–4.7)
PLAT MORPH BLD: NORMAL — SIGNIFICANT CHANGE UP
PLATELET # BLD AUTO: 221 K/UL — SIGNIFICANT CHANGE UP (ref 150–400)
PO2 BLDV: 35 MMHG — SIGNIFICANT CHANGE UP (ref 25–45)
POIKILOCYTOSIS BLD QL AUTO: SLIGHT — SIGNIFICANT CHANGE UP
POLYCHROMASIA BLD QL SMEAR: SLIGHT — SIGNIFICANT CHANGE UP
POTASSIUM BLDV-SCNC: 3.9 MMOL/L — SIGNIFICANT CHANGE UP (ref 3.4–4.5)
POTASSIUM SERPL-MCNC: 4.3 MMOL/L — SIGNIFICANT CHANGE UP (ref 3.5–5.3)
POTASSIUM SERPL-SCNC: 4.3 MMOL/L — SIGNIFICANT CHANGE UP (ref 3.5–5.3)
PROTHROM AB SERPL-ACNC: 16.2 SEC — HIGH (ref 9.8–12.7)
RBC # BLD: 3.29 M/UL — LOW (ref 4.6–6.2)
RBC # FLD: 18.5 % — HIGH (ref 11–15.6)
RBC BLD AUTO: ABNORMAL
SAO2 % BLDV: 60 % — LOW (ref 67–88)
SCHISTOCYTES BLD QL AUTO: SLIGHT — SIGNIFICANT CHANGE UP
SODIUM SERPL-SCNC: 136 MMOL/L — SIGNIFICANT CHANGE UP (ref 135–145)
SPECIMEN SOURCE: SIGNIFICANT CHANGE UP
SPECIMEN SOURCE: SIGNIFICANT CHANGE UP
TARGETS BLD QL SMEAR: SLIGHT — SIGNIFICANT CHANGE UP
TYPE + AB SCN PNL BLD: SIGNIFICANT CHANGE UP
WBC # BLD: 12 K/UL — HIGH (ref 4.8–10.8)
WBC # FLD AUTO: 12 K/UL — HIGH (ref 4.8–10.8)

## 2017-06-28 PROCEDURE — 93010 ELECTROCARDIOGRAM REPORT: CPT

## 2017-06-28 PROCEDURE — 92960 CARDIOVERSION ELECTRIC EXT: CPT

## 2017-06-28 PROCEDURE — 93306 TTE W/DOPPLER COMPLETE: CPT | Mod: 26

## 2017-06-28 PROCEDURE — 99232 SBSQ HOSP IP/OBS MODERATE 35: CPT | Mod: 25

## 2017-06-28 PROCEDURE — 99291 CRITICAL CARE FIRST HOUR: CPT

## 2017-06-28 RX ORDER — FENTANYL CITRATE 50 UG/ML
50 INJECTION INTRAVENOUS ONCE
Qty: 0 | Refills: 0 | Status: DISCONTINUED | OUTPATIENT
Start: 2017-06-28 | End: 2017-06-28

## 2017-06-28 RX ORDER — MIDAZOLAM HYDROCHLORIDE 1 MG/ML
1 INJECTION, SOLUTION INTRAMUSCULAR; INTRAVENOUS ONCE
Qty: 0 | Refills: 0 | Status: DISCONTINUED | OUTPATIENT
Start: 2017-06-28 | End: 2017-06-28

## 2017-06-28 RX ORDER — MAGNESIUM SULFATE 500 MG/ML
2 VIAL (ML) INJECTION ONCE
Qty: 0 | Refills: 0 | Status: COMPLETED | OUTPATIENT
Start: 2017-06-28 | End: 2017-06-28

## 2017-06-28 RX ORDER — MILRINONE LACTATE 1 MG/ML
0.19 INJECTION, SOLUTION INTRAVENOUS
Qty: 20 | Refills: 0 | Status: DISCONTINUED | OUTPATIENT
Start: 2017-06-28 | End: 2017-07-02

## 2017-06-28 RX ORDER — FENTANYL CITRATE 50 UG/ML
100 INJECTION INTRAVENOUS
Qty: 0 | Refills: 0 | Status: DISCONTINUED | OUTPATIENT
Start: 2017-06-28 | End: 2017-06-28

## 2017-06-28 RX ORDER — ACETAMINOPHEN 500 MG
650 TABLET ORAL ONCE
Qty: 0 | Refills: 0 | Status: COMPLETED | OUTPATIENT
Start: 2017-06-28 | End: 2017-06-28

## 2017-06-28 RX ORDER — FUROSEMIDE 40 MG
40 TABLET ORAL EVERY 12 HOURS
Qty: 0 | Refills: 0 | Status: DISCONTINUED | OUTPATIENT
Start: 2017-06-28 | End: 2017-06-30

## 2017-06-28 RX ORDER — MAGNESIUM SULFATE 500 MG/ML
2 VIAL (ML) INJECTION ONCE
Qty: 0 | Refills: 0 | Status: DISCONTINUED | OUTPATIENT
Start: 2017-06-28 | End: 2017-06-28

## 2017-06-28 RX ADMIN — Medication 50 MILLIGRAM(S): at 06:06

## 2017-06-28 RX ADMIN — Medication 9 MILLIGRAM(S): at 21:26

## 2017-06-28 RX ADMIN — Medication 9 MILLIGRAM(S): at 00:01

## 2017-06-28 RX ADMIN — ALBUTEROL 2.5 MILLIGRAM(S): 90 AEROSOL, METERED ORAL at 03:48

## 2017-06-28 RX ADMIN — PANTOPRAZOLE SODIUM 40 MILLIGRAM(S): 20 TABLET, DELAYED RELEASE ORAL at 17:17

## 2017-06-28 RX ADMIN — Medication 100 MILLIGRAM(S): at 11:29

## 2017-06-28 RX ADMIN — Medication 20 MILLIGRAM(S): at 06:06

## 2017-06-28 RX ADMIN — MIDAZOLAM HYDROCHLORIDE 1 MILLIGRAM(S): 1 INJECTION, SOLUTION INTRAMUSCULAR; INTRAVENOUS at 13:20

## 2017-06-28 RX ADMIN — Medication 50 GRAM(S): at 06:25

## 2017-06-28 RX ADMIN — ALBUTEROL 2.5 MILLIGRAM(S): 90 AEROSOL, METERED ORAL at 08:34

## 2017-06-28 RX ADMIN — LISINOPRIL 2.5 MILLIGRAM(S): 2.5 TABLET ORAL at 06:07

## 2017-06-28 RX ADMIN — Medication 650 MILLIGRAM(S): at 21:26

## 2017-06-28 RX ADMIN — AMIODARONE HYDROCHLORIDE 400 MILLIGRAM(S): 400 TABLET ORAL at 13:56

## 2017-06-28 RX ADMIN — Medication 4 MILLIGRAM(S): at 21:26

## 2017-06-28 RX ADMIN — SENNA PLUS 10 MILLILITER(S): 8.6 TABLET ORAL at 21:25

## 2017-06-28 RX ADMIN — Medication 50 MILLIGRAM(S): at 21:26

## 2017-06-28 RX ADMIN — Medication 0.12 MILLIGRAM(S): at 06:05

## 2017-06-28 RX ADMIN — ALBUTEROL 2.5 MILLIGRAM(S): 90 AEROSOL, METERED ORAL at 15:22

## 2017-06-28 RX ADMIN — MILRINONE LACTATE 4.89 MICROGRAM(S)/KG/MIN: 1 INJECTION, SOLUTION INTRAVENOUS at 06:25

## 2017-06-28 RX ADMIN — AMIODARONE HYDROCHLORIDE 400 MILLIGRAM(S): 400 TABLET ORAL at 21:26

## 2017-06-28 RX ADMIN — Medication 4 MILLIGRAM(S): at 01:56

## 2017-06-28 RX ADMIN — Medication 10 MILLIGRAM(S): at 11:29

## 2017-06-28 RX ADMIN — ALBUTEROL 2.5 MILLIGRAM(S): 90 AEROSOL, METERED ORAL at 20:17

## 2017-06-28 RX ADMIN — AMIODARONE HYDROCHLORIDE 400 MILLIGRAM(S): 400 TABLET ORAL at 00:00

## 2017-06-28 RX ADMIN — Medication 100 MILLIGRAM(S): at 06:05

## 2017-06-28 RX ADMIN — Medication 650 MILLIGRAM(S): at 22:00

## 2017-06-28 RX ADMIN — PANTOPRAZOLE SODIUM 40 MILLIGRAM(S): 20 TABLET, DELAYED RELEASE ORAL at 06:07

## 2017-06-28 RX ADMIN — MILRINONE LACTATE 9.17 MICROGRAM(S)/KG/MIN: 1 INJECTION, SOLUTION INTRAVENOUS at 17:24

## 2017-06-28 RX ADMIN — Medication 40 MILLIGRAM(S): at 17:17

## 2017-06-28 RX ADMIN — FENTANYL CITRATE 50 MICROGRAM(S): 50 INJECTION INTRAVENOUS at 15:20

## 2017-06-28 RX ADMIN — FENTANYL CITRATE 50 MICROGRAM(S): 50 INJECTION INTRAVENOUS at 15:35

## 2017-06-28 RX ADMIN — AMIODARONE HYDROCHLORIDE 400 MILLIGRAM(S): 400 TABLET ORAL at 06:05

## 2017-06-28 RX ADMIN — Medication 50 MILLIGRAM(S): at 01:57

## 2017-06-28 RX ADMIN — Medication 1 MILLIGRAM(S): at 11:29

## 2017-06-28 NOTE — PROGRESS NOTE ADULT - SUBJECTIVE AND OBJECTIVE BOX
CHIEF COMPLAINT: Patient is a 51y old  Male who is seen with afib, HFrEF, MR and TR.  On primacor, more alert today.   Diuresing minimally.   On amiodarone    Allergies  No Known Allergies    MEDICATIONS:  doxazosin 4 milliGRAM(s) Oral at bedtime  digoxin     Tablet 0.125 milliGRAM(s) Oral daily  metoprolol 50 milliGRAM(s) Oral two times a day  milrinone Infusion 0.2 MICROgram(s)/kG/Min IV Continuous <Continuous>  lisinopril 2.5 milliGRAM(s) Oral daily  amiodarone    Tablet 400 milliGRAM(s) Oral every 8 hours  furosemide   Injectable 20 milliGRAM(s) IV Push two times a day  ALBUTerol    0.083% 2.5 milliGRAM(s) Nebulizer every 6 hours  amantadine Syrup 100 milliGRAM(s) Oral <User Schedule>  FLUoxetine Solution 10 milliGRAM(s) Oral daily  traZODone 50 milliGRAM(s) Oral at bedtime  melatonin 9 milliGRAM(s) Oral at bedtime  acetaminophen    Suspension 650 milliGRAM(s) Oral every 6 hours PRN  midazolam Injectable 1 milliGRAM(s) IV Push once  fentaNYL    Injectable 100 MICROGram(s) IV Push every 30 minutes  pantoprazole   Suspension 40 milliGRAM(s) Oral two times a day before meals  senna Syrup 10 milliLiter(s) Oral at bedtime  thiamine 100 milliGRAM(s) Enteral Tube daily  folic acid 1 milliGRAM(s) Enteral Tube daily  heparin  Infusion 1600 Unit(s)/Hr IV Continuous <Continuous>      PHYSICAL EXAM:  T(C): 36.2 (06-28-17 @ 07:00), Max: 36.7 (06-28-17 @ 00:00)  HR: 82 (06-28-17 @ 13:00) (63 - 88)  BP: 102/59 (06-28-17 @ 10:00) (88/61 - 105/71)  RR: 19 (06-28-17 @ 13:00) (19 - 107)  SpO2: 100% (06-28-17 @ 13:00) (90% - 100%)  Wt(kg): --    I&O's Summary    27 Jun 2017 07:01  -  28 Jun 2017 07:00  --------------------------------------------------------  IN: 2261.6 mL / OUT: 1860 mL / NET: 401.6 mL    28 Jun 2017 07:01  -  28 Jun 2017 13:30  --------------------------------------------------------  IN: 185.4 mL / OUT: 675 mL / NET: -489.6 mL    HEENT:   NC/AT  Eye: Pink Conjunctiva  Lungs: ronchi at bases  CVS: IRRR, variable s1 and s2. edema b/l ext, anasarca.   Pulses: Normal distal pulses.  Neuro: A&O x3    TELEMETRY: afib, NSVT.                          8.3    12.0  )-----------( 221      ( 28 Jun 2017 05:08 )             30.0     06-28    136  |  95<L>  |  49.0<H>  ----------------------------<  107  4.3   |  30.0<H>  |  0.62    Ca    7.9<L>      28 Jun 2017 05:08  Phos  3.5     06-28  Mg     1.8     06-28    ASSESSMENT/PLAN:

## 2017-06-28 NOTE — PROGRESS NOTE ADULT - SUBJECTIVE AND OBJECTIVE BOX
INTERVAL HPI/OVERNIGHT EVENTS/SUBJECTIVE:  No events overnight.    ICU Vital Signs Last 24 Hrs  T(C): 36.2 (28 Jun 2017 07:00), Max: 36.7 (28 Jun 2017 00:00)  T(F): 97.1 (28 Jun 2017 07:00), Max: 98 (28 Jun 2017 00:00)  HR: 77 (28 Jun 2017 12:00) (63 - 88)  BP: 102/59 (28 Jun 2017 10:00) (88/61 - 105/71)  BP(mean): 72 (28 Jun 2017 10:00) (70 - 79)  ABP: 109/69 (28 Jun 2017 12:00) (95/59 - 110/69)  ABP(mean): 84 (28 Jun 2017 12:00) (72 - 84)  RR: 107 (28 Jun 2017 12:00) (19 - 107)  SpO2: 98% (28 Jun 2017 12:00) (90% - 100%)      I&O's Detail    27 Jun 2017 07:01  -  28 Jun 2017 07:00  --------------------------------------------------------  IN:    Enteral Tube Flush: 100 mL    Free Water: 1000 mL    heparin  Infusion.: 128 mL    heparin Infusion: 256 mL    milrinone  Infusion: 117.6 mL    Pivot: 660 mL  Total IN: 2261.6 mL    OUT:    Indwelling Catheter - Urethral: 1860 mL  Total OUT: 1860 mL    Total NET: 401.6 mL      28 Jun 2017 07:01  -  28 Jun 2017 12:07  --------------------------------------------------------  IN:    Enteral Tube Flush: 60 mL    heparin Infusion: 96 mL    milrinone  Infusion: 29.4 mL  Total IN: 185.4 mL    OUT:    Indwelling Catheter - Urethral: 675 mL  Total OUT: 675 mL    Total NET: -489.6 mL          Mode: CPAP with PS  FiO2: 30  PEEP: 8  PS: 10  MAP: 11  PIP: 18        MEDICATIONS  (STANDING):  amantadine Syrup 100 milliGRAM(s) Oral <User Schedule>  doxazosin 4 milliGRAM(s) Oral at bedtime  FLUoxetine Solution 10 milliGRAM(s) Oral daily  traZODone 50 milliGRAM(s) Oral at bedtime  digoxin     Tablet 0.125 milliGRAM(s) Oral daily  melatonin 9 milliGRAM(s) Oral at bedtime  ALBUTerol    0.083% 2.5 milliGRAM(s) Nebulizer every 6 hours  metoprolol 50 milliGRAM(s) Oral two times a day  pantoprazole   Suspension 40 milliGRAM(s) Oral two times a day before meals  milrinone Infusion 0.2 MICROgram(s)/kG/Min (4.89 mL/Hr) IV Continuous <Continuous>  senna Syrup 10 milliLiter(s) Oral at bedtime  lisinopril 2.5 milliGRAM(s) Oral daily  amiodarone    Tablet 400 milliGRAM(s) Oral every 8 hours  furosemide   Injectable 20 milliGRAM(s) IV Push two times a day  thiamine 100 milliGRAM(s) Enteral Tube daily  folic acid 1 milliGRAM(s) Enteral Tube daily  heparin  Infusion 1600 Unit(s)/Hr (16 mL/Hr) IV Continuous <Continuous>    MEDICATIONS  (PRN):  acetaminophen    Suspension 650 milliGRAM(s) Oral every 6 hours PRN For Temp greater than 38.5 C (101.3 F)      NUTRITION/IVF: Tube feeds    CENTRAL LINE:  LOCATION:  Y    ARMAS: Y      A-LINE: Y            PHYSICAL EXAM:    Gen: NAD    Eyes: PERRLA    Neurological: Awake and alert    ENMT: oral ET tube in place    Neck: Trachea midline    Pulmonary: Non labored on vent, PS/CPAP    Cardiovascular: Irregular    Gastrointestinal: Soft ND/NT    Genitourinary: Armas      LABS:  CBC Full  -  ( 28 Jun 2017 05:08 )  WBC Count : 12.0 K/uL  Hemoglobin : 8.3 g/dL  Hematocrit : 30.0 %  Platelet Count - Automated : 221 K/uL  Mean Cell Volume : 91.2 fl  Mean Cell Hemoglobin : 25.2 pg  Mean Cell Hemoglobin Concentration : 27.7 g/dL  Auto Neutrophil # : 9.1 K/uL  Auto Lymphocyte # : 1.9 K/uL  Auto Monocyte # : 0.8 K/uL  Auto Eosinophil # : 0.1 K/uL  Auto Basophil # : 0.0 K/uL  Auto Neutrophil % : 76.0 %  Auto Lymphocyte % : 10.0 %  Auto Monocyte % : 8.0 %  Auto Eosinophil % : 2.0 %  Auto Basophil % : 0.0 %    06-28    136  |  95<L>  |  49.0<H>  ----------------------------<  107  4.3   |  30.0<H>  |  0.62    Ca    7.9<L>      28 Jun 2017 05:08  Phos  3.5     06-28  Mg     1.8     06-28      PT/INR - ( 28 Jun 2017 05:08 )   PT: 16.2 sec;   INR: 1.46 ratio         PTT - ( 28 Jun 2017 05:08 )  PTT:66.2 sec      ASSESSMENT/PLAN:  51yMale presenting with:    Neuro: Encephalopathy.  Stable.      CV:  Acute on chronic systolic heart failure with cardiogenic shock.  Lactate cleared.  Plan for cardioversion from cards today.  Will wean milrinone and follow lactate as marker of perfusion once converted.      Pulm: Acute resp failure.  Will attempt vent wean once cardioversion complete.  If unable will need trach.      GI/Nutrition: Tube feeds.  Currently on hold for cardioversion.      /Renal: Mild uremia.  Likely at or near baseline renal function.      ID:  Off abx.  No growth in cultures.  Cont obs off treatment.      Heme: Anemia.  No active blood loss.  Given chronic cardiac disease, may benefit from increased O2 carrying capacity.  Will attempt to keep HGB greater than 9.  1 U PRBC today.  DVT - Currently on anticoagulation.  IVC filter previously placed following GI blled and discontinuation of anticoagulation.  Will need to discuss benefits of continued anticoagulation with cardiology if cardioversion successful.  With significant ectopy over entire stay, concerned pt will be high risk for paroxysmal athymias.  If pt will remain anticoagulated will consider early retrieval of IVC filter.        Proph: GI    Dispo: ICU      CRITICAL CARE TIME SPENT: 45 min

## 2017-06-28 NOTE — PROGRESS NOTE ADULT - ASSESSMENT
HANNAH prerformed. No FABIANA clot, LVEF less than 20%, severe mitral and tricuspid valve regurgitation.  He was cardioverted to NSR using 200 joules of direct synchronized current and is in NSR.  Spoke with [facility].  He has nonischemic cardiomyopathy with severe mitral valve regurgitaiton and LV dilation.   He needs diuretic therapy.  Increase primocor to 0.375 mcg/kg/min. Increase lasix to 40 mg IV q12h  replace K and mg.  Check EKG for QTC now.  Pt has poor prognosis since not a candidate for transplant or LVAD  AC with heparin bridge and warfarin to inr 2-3. once inr >2, dc heparin

## 2017-06-29 LAB
ANION GAP SERPL CALC-SCNC: 12 MMOL/L — SIGNIFICANT CHANGE UP (ref 5–17)
ANISOCYTOSIS BLD QL: SLIGHT — SIGNIFICANT CHANGE UP
APTT BLD: 91.4 SEC — HIGH (ref 27.5–37.4)
BASOPHILS # BLD AUTO: 0 K/UL — SIGNIFICANT CHANGE UP (ref 0–0.2)
BASOPHILS NFR BLD AUTO: 0.1 % — SIGNIFICANT CHANGE UP (ref 0–2)
BUN SERPL-MCNC: 43 MG/DL — HIGH (ref 8–20)
CALCIUM SERPL-MCNC: 8 MG/DL — LOW (ref 8.6–10.2)
CHLORIDE SERPL-SCNC: 96 MMOL/L — LOW (ref 98–107)
CO2 SERPL-SCNC: 30 MMOL/L — HIGH (ref 22–29)
CREAT SERPL-MCNC: 0.7 MG/DL — SIGNIFICANT CHANGE UP (ref 0.5–1.3)
EOSINOPHIL # BLD AUTO: 0 K/UL — SIGNIFICANT CHANGE UP (ref 0–0.5)
EOSINOPHIL NFR BLD AUTO: 0.3 % — SIGNIFICANT CHANGE UP (ref 0–5)
GLUCOSE SERPL-MCNC: 118 MG/DL — HIGH (ref 70–115)
HCT VFR BLD CALC: 30.9 % — LOW (ref 42–52)
HCT VFR BLD CALC: 32.7 % — LOW (ref 42–52)
HGB BLD-MCNC: 8.8 G/DL — LOW (ref 14–18)
HGB BLD-MCNC: 9.9 G/DL — LOW (ref 14–18)
HYPOCHROMIA BLD QL: SLIGHT — SIGNIFICANT CHANGE UP
LYMPHOCYTES # BLD AUTO: 1.1 K/UL — SIGNIFICANT CHANGE UP (ref 1–4.8)
LYMPHOCYTES # BLD AUTO: 9.2 % — LOW (ref 20–55)
MACROCYTES BLD QL: SLIGHT — SIGNIFICANT CHANGE UP
MAGNESIUM SERPL-MCNC: 1.9 MG/DL — SIGNIFICANT CHANGE UP (ref 1.6–2.6)
MCHC RBC-ENTMCNC: 25.5 PG — LOW (ref 27–31)
MCHC RBC-ENTMCNC: 26.1 PG — LOW (ref 27–31)
MCHC RBC-ENTMCNC: 28.5 G/DL — LOW (ref 32–36)
MCHC RBC-ENTMCNC: 30.3 G/DL — LOW (ref 32–36)
MCV RBC AUTO: 86.3 FL — SIGNIFICANT CHANGE UP (ref 80–94)
MCV RBC AUTO: 89.6 FL — SIGNIFICANT CHANGE UP (ref 80–94)
MONOCYTES # BLD AUTO: 0.7 K/UL — SIGNIFICANT CHANGE UP (ref 0–0.8)
MONOCYTES NFR BLD AUTO: 6 % — SIGNIFICANT CHANGE UP (ref 3–10)
NEUTROPHILS # BLD AUTO: 9.6 K/UL — HIGH (ref 1.8–8)
NEUTROPHILS NFR BLD AUTO: 83.7 % — HIGH (ref 37–73)
OVALOCYTES BLD QL SMEAR: SLIGHT — SIGNIFICANT CHANGE UP
PHOSPHATE SERPL-MCNC: 3.9 MG/DL — SIGNIFICANT CHANGE UP (ref 2.4–4.7)
PLAT MORPH BLD: NORMAL — SIGNIFICANT CHANGE UP
PLATELET # BLD AUTO: 195 K/UL — SIGNIFICANT CHANGE UP (ref 150–400)
PLATELET # BLD AUTO: 235 K/UL — SIGNIFICANT CHANGE UP (ref 150–400)
POIKILOCYTOSIS BLD QL AUTO: SLIGHT — SIGNIFICANT CHANGE UP
POLYCHROMASIA BLD QL SMEAR: SLIGHT — SIGNIFICANT CHANGE UP
POTASSIUM SERPL-MCNC: 3.5 MMOL/L — SIGNIFICANT CHANGE UP (ref 3.5–5.3)
POTASSIUM SERPL-SCNC: 3.5 MMOL/L — SIGNIFICANT CHANGE UP (ref 3.5–5.3)
RBC # BLD: 3.45 M/UL — LOW (ref 4.6–6.2)
RBC # BLD: 3.79 M/UL — LOW (ref 4.6–6.2)
RBC # FLD: 17.8 % — HIGH (ref 11–15.6)
RBC # FLD: 18.1 % — HIGH (ref 11–15.6)
RBC BLD AUTO: ABNORMAL
SODIUM SERPL-SCNC: 138 MMOL/L — SIGNIFICANT CHANGE UP (ref 135–145)
WBC # BLD: 10.9 K/UL — HIGH (ref 4.8–10.8)
WBC # BLD: 11.5 K/UL — HIGH (ref 4.8–10.8)
WBC # FLD AUTO: 10.9 K/UL — HIGH (ref 4.8–10.8)
WBC # FLD AUTO: 11.5 K/UL — HIGH (ref 4.8–10.8)

## 2017-06-29 PROCEDURE — 99291 CRITICAL CARE FIRST HOUR: CPT

## 2017-06-29 RX ORDER — POTASSIUM CHLORIDE 20 MEQ
40 PACKET (EA) ORAL EVERY 6 HOURS
Qty: 0 | Refills: 0 | Status: COMPLETED | OUTPATIENT
Start: 2017-06-29 | End: 2017-06-29

## 2017-06-29 RX ORDER — FENTANYL CITRATE 50 UG/ML
12.5 INJECTION INTRAVENOUS
Qty: 0 | Refills: 0 | Status: DISCONTINUED | OUTPATIENT
Start: 2017-06-29 | End: 2017-06-30

## 2017-06-29 RX ORDER — POTASSIUM CHLORIDE 20 MEQ
10 PACKET (EA) ORAL ONCE
Qty: 0 | Refills: 0 | Status: COMPLETED | OUTPATIENT
Start: 2017-06-29 | End: 2017-06-29

## 2017-06-29 RX ORDER — MAGNESIUM SULFATE 500 MG/ML
2 VIAL (ML) INJECTION ONCE
Qty: 0 | Refills: 0 | Status: COMPLETED | OUTPATIENT
Start: 2017-06-29 | End: 2017-06-29

## 2017-06-29 RX ADMIN — Medication 40 MILLIEQUIVALENT(S): at 06:10

## 2017-06-29 RX ADMIN — ALBUTEROL 2.5 MILLIGRAM(S): 90 AEROSOL, METERED ORAL at 03:50

## 2017-06-29 RX ADMIN — Medication 40 MILLIGRAM(S): at 05:02

## 2017-06-29 RX ADMIN — MILRINONE LACTATE 9.17 MICROGRAM(S)/KG/MIN: 1 INJECTION, SOLUTION INTRAVENOUS at 05:02

## 2017-06-29 RX ADMIN — Medication 1 MILLIGRAM(S): at 12:09

## 2017-06-29 RX ADMIN — Medication 40 MILLIEQUIVALENT(S): at 12:09

## 2017-06-29 RX ADMIN — PANTOPRAZOLE SODIUM 40 MILLIGRAM(S): 20 TABLET, DELAYED RELEASE ORAL at 05:02

## 2017-06-29 RX ADMIN — SENNA PLUS 10 MILLILITER(S): 8.6 TABLET ORAL at 21:01

## 2017-06-29 RX ADMIN — AMIODARONE HYDROCHLORIDE 400 MILLIGRAM(S): 400 TABLET ORAL at 14:41

## 2017-06-29 RX ADMIN — ALBUTEROL 2.5 MILLIGRAM(S): 90 AEROSOL, METERED ORAL at 20:36

## 2017-06-29 RX ADMIN — AMIODARONE HYDROCHLORIDE 400 MILLIGRAM(S): 400 TABLET ORAL at 05:02

## 2017-06-29 RX ADMIN — Medication 0.12 MILLIGRAM(S): at 05:02

## 2017-06-29 RX ADMIN — AMIODARONE HYDROCHLORIDE 400 MILLIGRAM(S): 400 TABLET ORAL at 21:00

## 2017-06-29 RX ADMIN — Medication 100 MILLIGRAM(S): at 12:09

## 2017-06-29 RX ADMIN — Medication 100 MILLIEQUIVALENT(S): at 12:09

## 2017-06-29 RX ADMIN — Medication 9 MILLIGRAM(S): at 21:00

## 2017-06-29 RX ADMIN — FENTANYL CITRATE 12.5 MICROGRAM(S): 50 INJECTION INTRAVENOUS at 21:01

## 2017-06-29 RX ADMIN — PANTOPRAZOLE SODIUM 40 MILLIGRAM(S): 20 TABLET, DELAYED RELEASE ORAL at 17:34

## 2017-06-29 RX ADMIN — Medication 10 MILLIGRAM(S): at 12:10

## 2017-06-29 RX ADMIN — Medication 40 MILLIGRAM(S): at 17:34

## 2017-06-29 RX ADMIN — Medication 50 MILLIGRAM(S): at 05:02

## 2017-06-29 RX ADMIN — LISINOPRIL 2.5 MILLIGRAM(S): 2.5 TABLET ORAL at 05:02

## 2017-06-29 RX ADMIN — Medication 100 MILLIGRAM(S): at 12:10

## 2017-06-29 RX ADMIN — Medication 50 MILLIGRAM(S): at 17:34

## 2017-06-29 RX ADMIN — FENTANYL CITRATE 12.5 MICROGRAM(S): 50 INJECTION INTRAVENOUS at 21:15

## 2017-06-29 RX ADMIN — Medication 50 MILLIGRAM(S): at 21:00

## 2017-06-29 RX ADMIN — MILRINONE LACTATE 9.17 MICROGRAM(S)/KG/MIN: 1 INJECTION, SOLUTION INTRAVENOUS at 14:41

## 2017-06-29 RX ADMIN — Medication 50 GRAM(S): at 06:10

## 2017-06-29 RX ADMIN — Medication 4 MILLIGRAM(S): at 21:00

## 2017-06-29 RX ADMIN — ALBUTEROL 2.5 MILLIGRAM(S): 90 AEROSOL, METERED ORAL at 08:50

## 2017-06-29 RX ADMIN — ALBUTEROL 2.5 MILLIGRAM(S): 90 AEROSOL, METERED ORAL at 15:04

## 2017-06-29 RX ADMIN — Medication 100 MILLIGRAM(S): at 05:02

## 2017-06-29 NOTE — PROGRESS NOTE ADULT - SUBJECTIVE AND OBJECTIVE BOX
INTERVAL HPI/OVERNIGHT EVENTS/SUBJECTIVE:  Cardioverted electrically yesterday.  In and out of A-fib overnight.      ICU Vital Signs Last 24 Hrs  T(C): 36.7 (29 Jun 2017 08:01), Max: 36.7 (29 Jun 2017 08:01)  T(F): 98 (29 Jun 2017 08:01), Max: 98 (29 Jun 2017 08:01)  HR: 69 (29 Jun 2017 09:00) (65 - 98)  BP: 104/72 (29 Jun 2017 08:00) (95/72 - 104/72)  BP(mean): 85 (29 Jun 2017 08:00) (72 - 85)  ABP: 92/63 (29 Jun 2017 09:00) (91/58 - 111/75)  ABP(mean): 75 (29 Jun 2017 09:00) (70 - 88)  RR: 17 (29 Jun 2017 09:00) (15 - 107)  SpO2: 100% (29 Jun 2017 09:00) (90% - 100%)      I&O's Detail    28 Jun 2017 07:01  -  29 Jun 2017 07:00  --------------------------------------------------------  IN:    Enteral Tube Flush: 240 mL    Free Water: 1050 mL    heparin Infusion: 352 mL    milrinone  Infusion: 128.8 mL    milrinone  Infusion: 34.3 mL    Packed Red Blood Cells: 277 mL    Pivot: 715 mL  Total IN: 2797.1 mL    OUT:    Indwelling Catheter - Urethral: 2100 mL  Total OUT: 2100 mL    Total NET: 697.1 mL      29 Jun 2017 07:01  -  29 Jun 2017 09:53  --------------------------------------------------------  IN:    heparin Infusion: 32 mL    milrinone  Infusion: 18.4 mL    Pivot: 55 mL  Total IN: 105.4 mL    OUT:    Indwelling Catheter - Urethral: 120 mL  Total OUT: 120 mL    Total NET: -14.6 mL          Mode: AC/ CMV (Assist Control/ Continuous Mandatory Ventilation)  RR (machine): 14  TV (machine): 450  FiO2: 30  PEEP: 8  MAP: 15  PIP: 36        MEDICATIONS  (STANDING):  amantadine Syrup 100 milliGRAM(s) Oral <User Schedule>  doxazosin 4 milliGRAM(s) Oral at bedtime  FLUoxetine Solution 10 milliGRAM(s) Oral daily  traZODone 50 milliGRAM(s) Oral at bedtime  digoxin     Tablet 0.125 milliGRAM(s) Oral daily  melatonin 9 milliGRAM(s) Oral at bedtime  ALBUTerol    0.083% 2.5 milliGRAM(s) Nebulizer every 6 hours  metoprolol 50 milliGRAM(s) Oral two times a day  pantoprazole   Suspension 40 milliGRAM(s) Oral two times a day before meals  senna Syrup 10 milliLiter(s) Oral at bedtime  lisinopril 2.5 milliGRAM(s) Oral daily  amiodarone    Tablet 400 milliGRAM(s) Oral every 8 hours  thiamine 100 milliGRAM(s) Enteral Tube daily  folic acid 1 milliGRAM(s) Enteral Tube daily  heparin  Infusion 1600 Unit(s)/Hr (16 mL/Hr) IV Continuous <Continuous>  furosemide   Injectable 40 milliGRAM(s) IV Push every 12 hours  milrinone Infusion 0.375 MICROgram(s)/kG/Min (9.169 mL/Hr) IV Continuous <Continuous>  potassium chloride   Powder 40 milliEquivalent(s) Oral every 6 hours    MEDICATIONS  (PRN):  acetaminophen    Suspension 650 milliGRAM(s) Oral every 6 hours PRN For Temp greater than 38.5 C (101.3 F)      NUTRITION/IVF: Tube feeds    CENTRAL LINE:Y      ARMAS: Y      A-LINE: Y          PHYSICAL EXAM:    Gen:  NAD    Eyes: PERRLA    Neurological: Awake and alert.  Follows commands.     Pulmonary: Non labored on PS/CPAP    Cardiovascular:  RRR    Gastrointestinal: Soft NT/ND    Genitourinary: Armas    Extremities:  Moderate pitting edema            LABS:  CBC Full  -  ( 28 Jun 2017 05:08 )  WBC Count : 12.0 K/uL  Hemoglobin : 8.3 g/dL  Hematocrit : 30.0 %  Platelet Count - Automated : 221 K/uL  Mean Cell Volume : 91.2 fl  Mean Cell Hemoglobin : 25.2 pg  Mean Cell Hemoglobin Concentration : 27.7 g/dL  Auto Neutrophil # : 9.1 K/uL  Auto Lymphocyte # : 1.9 K/uL  Auto Monocyte # : 0.8 K/uL  Auto Eosinophil # : 0.1 K/uL  Auto Basophil # : 0.0 K/uL  Auto Neutrophil % : 76.0 %  Auto Lymphocyte % : 10.0 %  Auto Monocyte % : 8.0 %  Auto Eosinophil % : 2.0 %  Auto Basophil % : 0.0 %    06-29    138  |  96<L>  |  43.0<H>  ----------------------------<  118<H>  3.5   |  30.0<H>  |  0.70    Ca    8.0<L>      29 Jun 2017 05:20  Phos  3.9     06-29  Mg     1.9     06-29      PT/INR - ( 28 Jun 2017 05:08 )   PT: 16.2 sec;   INR: 1.46 ratio         PTT - ( 29 Jun 2017 05:20 )  PTT:91.4 sec        ASSESSMENT/PLAN:  51yMale presenting with:    Neuro:  Polyneuropathy of critical illness.  Continue rehab services.  Delirium/anoxic encephalopathy improved.      CV: Acute on chronic systolic heart failure.  Volume overload.  Continue primacor, diurese.  Decrease free water.  Discussed anticoagulation with cards.  Feel high risk for continued paroxysmal athymias and would benefit from long term anticoagulation.    Will need to discuss removal of IVC filter with IR.      Pulm: Acute hypoxic resp failure.  Wean as tolerated.  If unable to liberate will need trach     GI/Nutrition: Tube feeds.      /Renal:  Hypokalemia.  Replete aggressively along with magnesium.      Proph: PPI    Dispo: ICU      CRITICAL CARE TIME SPENT: INTERVAL HPI/OVERNIGHT EVENTS/SUBJECTIVE:  Cardioverted electrically yesterday.  In and out of A-fib overnight.      ICU Vital Signs Last 24 Hrs  T(C): 36.7 (29 Jun 2017 08:01), Max: 36.7 (29 Jun 2017 08:01)  T(F): 98 (29 Jun 2017 08:01), Max: 98 (29 Jun 2017 08:01)  HR: 69 (29 Jun 2017 09:00) (65 - 98)  BP: 104/72 (29 Jun 2017 08:00) (95/72 - 104/72)  BP(mean): 85 (29 Jun 2017 08:00) (72 - 85)  ABP: 92/63 (29 Jun 2017 09:00) (91/58 - 111/75)  ABP(mean): 75 (29 Jun 2017 09:00) (70 - 88)  RR: 17 (29 Jun 2017 09:00) (15 - 107)  SpO2: 100% (29 Jun 2017 09:00) (90% - 100%)      I&O's Detail    28 Jun 2017 07:01  -  29 Jun 2017 07:00  --------------------------------------------------------  IN:    Enteral Tube Flush: 240 mL    Free Water: 1050 mL    heparin Infusion: 352 mL    milrinone  Infusion: 128.8 mL    milrinone  Infusion: 34.3 mL    Packed Red Blood Cells: 277 mL    Pivot: 715 mL  Total IN: 2797.1 mL    OUT:    Indwelling Catheter - Urethral: 2100 mL  Total OUT: 2100 mL    Total NET: 697.1 mL      29 Jun 2017 07:01  -  29 Jun 2017 09:53  --------------------------------------------------------  IN:    heparin Infusion: 32 mL    milrinone  Infusion: 18.4 mL    Pivot: 55 mL  Total IN: 105.4 mL    OUT:    Indwelling Catheter - Urethral: 120 mL  Total OUT: 120 mL    Total NET: -14.6 mL          Mode: AC/ CMV (Assist Control/ Continuous Mandatory Ventilation)  RR (machine): 14  TV (machine): 450  FiO2: 30  PEEP: 8  MAP: 15  PIP: 36        MEDICATIONS  (STANDING):  amantadine Syrup 100 milliGRAM(s) Oral <User Schedule>  doxazosin 4 milliGRAM(s) Oral at bedtime  FLUoxetine Solution 10 milliGRAM(s) Oral daily  traZODone 50 milliGRAM(s) Oral at bedtime  digoxin     Tablet 0.125 milliGRAM(s) Oral daily  melatonin 9 milliGRAM(s) Oral at bedtime  ALBUTerol    0.083% 2.5 milliGRAM(s) Nebulizer every 6 hours  metoprolol 50 milliGRAM(s) Oral two times a day  pantoprazole   Suspension 40 milliGRAM(s) Oral two times a day before meals  senna Syrup 10 milliLiter(s) Oral at bedtime  lisinopril 2.5 milliGRAM(s) Oral daily  amiodarone    Tablet 400 milliGRAM(s) Oral every 8 hours  thiamine 100 milliGRAM(s) Enteral Tube daily  folic acid 1 milliGRAM(s) Enteral Tube daily  heparin  Infusion 1600 Unit(s)/Hr (16 mL/Hr) IV Continuous <Continuous>  furosemide   Injectable 40 milliGRAM(s) IV Push every 12 hours  milrinone Infusion 0.375 MICROgram(s)/kG/Min (9.169 mL/Hr) IV Continuous <Continuous>  potassium chloride   Powder 40 milliEquivalent(s) Oral every 6 hours    MEDICATIONS  (PRN):  acetaminophen    Suspension 650 milliGRAM(s) Oral every 6 hours PRN For Temp greater than 38.5 C (101.3 F)      NUTRITION/IVF: Tube feeds    CENTRAL LINE:Y      ARMAS: Y      A-LINE: Y          PHYSICAL EXAM:    Gen:  NAD    Eyes: PERRLA    Neurological: Awake and alert.  Follows commands.     Pulmonary: Non labored on PS/CPAP    Cardiovascular:  RRR    Gastrointestinal: Soft NT/ND    Genitourinary: Armas    Extremities:  Moderate pitting edema            LABS:  CBC Full  -  ( 28 Jun 2017 05:08 )  WBC Count : 12.0 K/uL  Hemoglobin : 8.3 g/dL  Hematocrit : 30.0 %  Platelet Count - Automated : 221 K/uL  Mean Cell Volume : 91.2 fl  Mean Cell Hemoglobin : 25.2 pg  Mean Cell Hemoglobin Concentration : 27.7 g/dL  Auto Neutrophil # : 9.1 K/uL  Auto Lymphocyte # : 1.9 K/uL  Auto Monocyte # : 0.8 K/uL  Auto Eosinophil # : 0.1 K/uL  Auto Basophil # : 0.0 K/uL  Auto Neutrophil % : 76.0 %  Auto Lymphocyte % : 10.0 %  Auto Monocyte % : 8.0 %  Auto Eosinophil % : 2.0 %  Auto Basophil % : 0.0 %    06-29    138  |  96<L>  |  43.0<H>  ----------------------------<  118<H>  3.5   |  30.0<H>  |  0.70    Ca    8.0<L>      29 Jun 2017 05:20  Phos  3.9     06-29  Mg     1.9     06-29      PT/INR - ( 28 Jun 2017 05:08 )   PT: 16.2 sec;   INR: 1.46 ratio         PTT - ( 29 Jun 2017 05:20 )  PTT:91.4 sec        ASSESSMENT/PLAN:  51yMale presenting with:    Neuro:  Polyneuropathy of critical illness.  Continue rehab services.  Delirium/anoxic encephalopathy improved.      CV: Acute on chronic systolic heart failure.  Volume overload.  Continue primacor, diurese.  Decrease free water.  Discussed anticoagulation with cards.  Feel high risk for continued paroxysmal athymias and would benefit from long term anticoagulation.    Will need to discuss removal of IVC filter with IR.      Pulm: Acute hypoxic resp failure.  Wean as tolerated.  If unable to liberate will need trach     GI/Nutrition: Tube feeds.      /Renal:  Hypokalemia.  Replete aggressively along with magnesium.      Proph: PPI    Dispo: ICU      CRITICAL CARE TIME SPENT:  40 min

## 2017-06-29 NOTE — PROGRESS NOTE ADULT - SUBJECTIVE AND OBJECTIVE BOX
CHIEF COMPLAINT:Patient is a 51y old  Male who is seen with cardiogenic shock, afib, HFrEF.  Primaocor increased. Doing better clinically but still not diuresing adequately.   BUN/Cr better, +700 cc.   More alert.   Now in NSR after dcc.     Allergies  No Known Allergies    MEDICATIONS:  doxazosin 4 milliGRAM(s) Oral at bedtime  digoxin     Tablet 0.125 milliGRAM(s) Oral daily  metoprolol 50 milliGRAM(s) Oral two times a day  lisinopril 2.5 milliGRAM(s) Oral daily  amiodarone    Tablet 400 milliGRAM(s) Oral every 8 hours  furosemide   Injectable 40 milliGRAM(s) IV Push every 12 hours  milrinone Infusion 0.375 MICROgram(s)/kG/Min IV Continuous <Continuous>  ALBUTerol    0.083% 2.5 milliGRAM(s) Nebulizer every 6 hours  amantadine Syrup 100 milliGRAM(s) Oral <User Schedule>  FLUoxetine Solution 10 milliGRAM(s) Oral daily  traZODone 50 milliGRAM(s) Oral at bedtime  melatonin 9 milliGRAM(s) Oral at bedtime  acetaminophen    Suspension 650 milliGRAM(s) Oral every 6 hours PRN  pantoprazole   Suspension 40 milliGRAM(s) Oral two times a day before meals  senna Syrup 10 milliLiter(s) Oral at bedtime  thiamine 100 milliGRAM(s) Enteral Tube daily  folic acid 1 milliGRAM(s) Enteral Tube daily  heparin  Infusion 1600 Unit(s)/Hr IV Continuous <Continuous>  potassium chloride   Powder 40 milliEquivalent(s) Oral every 6 hours    PHYSICAL EXAM:  T(C): 36.6 (06-29-17 @ 04:00), Max: 36.6 (06-29-17 @ 04:00)  HR: 67 (06-29-17 @ 07:00) (67 - 98)  BP: 97/65 (06-29-17 @ 06:00) (95/72 - 104/69)  RR: 18 (06-29-17 @ 07:00) (15 - 107)  SpO2: 100% (06-29-17 @ 07:00) (90% - 100%)  Wt(kg): --    I&O's Summary    28 Jun 2017 07:01  -  29 Jun 2017 07:00  --------------------------------------------------------  IN: 2797.1 mL / OUT: 2100 mL / NET: 697.1 mL    Appearance: Normal	  HEENT:   NC/AT  Eye: Pink Conjunctiva  Lungs: b/l crackles improved.   CVS: RRR, Normal S1 and S2, edema b/l leg, anasarca  Pulses: Normal distal pulses.  Neuro: A&O x3    TELEMETRY: nsr, NSVT, afib.     LABS:	 	    CARDIAC MARKERS:                      8.3    12.0  )-----------( 221      ( 28 Jun 2017 05:08 )             30.0     06-29    138  |  96<L>  |  43.0<H>  ----------------------------<  118<H>  3.5   |  30.0<H>  |  0.70    Ca    8.0<L>      29 Jun 2017 05:20  Phos  3.9     06-29  Mg     1.9     06-29    ASSESSMENT/PLAN:

## 2017-06-29 NOTE — PROGRESS NOTE ADULT - ASSESSMENT
1. HFrEF, cont with primacor and lasix. Limit fluid intake to about 1.5 litter per day  2. Cont with lasix  3. K replaced.   4. Spontaneous trial and extubation  5. On heparin drip, AC with warfarin to inr 2-3

## 2017-06-30 LAB
ANION GAP SERPL CALC-SCNC: 11 MMOL/L — SIGNIFICANT CHANGE UP (ref 5–17)
APTT BLD: 81.2 SEC — HIGH (ref 27.5–37.4)
BASE EXCESS BLDA CALC-SCNC: 6.3 MMOL/L — HIGH (ref -3–3)
BASE EXCESS BLDV CALC-SCNC: 6.8 MMOL/L — HIGH (ref -3–3)
BASOPHILS # BLD AUTO: 0 K/UL — SIGNIFICANT CHANGE UP (ref 0–0.2)
BASOPHILS NFR BLD AUTO: 0.2 % — SIGNIFICANT CHANGE UP (ref 0–2)
BLOOD GAS COMMENTS ARTERIAL: SIGNIFICANT CHANGE UP
BLOOD GAS COMMENTS ARTERIAL: SIGNIFICANT CHANGE UP
BLOOD GAS COMMENTS, VENOUS: SIGNIFICANT CHANGE UP
BUN SERPL-MCNC: 44 MG/DL — HIGH (ref 8–20)
CALCIUM SERPL-MCNC: 8.1 MG/DL — LOW (ref 8.6–10.2)
CHLORIDE SERPL-SCNC: 95 MMOL/L — LOW (ref 98–107)
CO2 SERPL-SCNC: 28 MMOL/L — SIGNIFICANT CHANGE UP (ref 22–29)
CREAT SERPL-MCNC: 0.68 MG/DL — SIGNIFICANT CHANGE UP (ref 0.5–1.3)
EOSINOPHIL # BLD AUTO: 0.1 K/UL — SIGNIFICANT CHANGE UP (ref 0–0.5)
EOSINOPHIL NFR BLD AUTO: 0.9 % — SIGNIFICANT CHANGE UP (ref 0–5)
GAS PNL BLDA: SIGNIFICANT CHANGE UP
GAS PNL BLDV: SIGNIFICANT CHANGE UP
GLUCOSE SERPL-MCNC: 137 MG/DL — HIGH (ref 70–115)
HCO3 BLDA-SCNC: 30 MMOL/L — HIGH (ref 20–26)
HCO3 BLDV-SCNC: 30 MMOL/L — HIGH (ref 20–26)
HCT VFR BLD CALC: 32 % — LOW (ref 42–52)
HGB BLD-MCNC: 9.9 G/DL — LOW (ref 14–18)
HOROWITZ INDEX BLDA+IHG-RTO: SIGNIFICANT CHANGE UP
HOROWITZ INDEX BLDV+IHG-RTO: SIGNIFICANT CHANGE UP
INR BLD: 1.41 RATIO — HIGH (ref 0.88–1.16)
LYMPHOCYTES # BLD AUTO: 1.3 K/UL — SIGNIFICANT CHANGE UP (ref 1–4.8)
LYMPHOCYTES # BLD AUTO: 12.1 % — LOW (ref 20–55)
MAGNESIUM SERPL-MCNC: 2 MG/DL — SIGNIFICANT CHANGE UP (ref 1.6–2.6)
MCHC RBC-ENTMCNC: 26.5 PG — LOW (ref 27–31)
MCHC RBC-ENTMCNC: 30.9 G/DL — LOW (ref 32–36)
MCV RBC AUTO: 85.8 FL — SIGNIFICANT CHANGE UP (ref 80–94)
MONOCYTES # BLD AUTO: 0.7 K/UL — SIGNIFICANT CHANGE UP (ref 0–0.8)
MONOCYTES NFR BLD AUTO: 6.5 % — SIGNIFICANT CHANGE UP (ref 3–10)
NEUTROPHILS # BLD AUTO: 8.3 K/UL — HIGH (ref 1.8–8)
NEUTROPHILS NFR BLD AUTO: 79.3 % — HIGH (ref 37–73)
PCO2 BLDA: 44 MMHG — SIGNIFICANT CHANGE UP (ref 35–45)
PCO2 BLDV: 51 MMHG — HIGH (ref 35–50)
PH BLDA: 7.46 — HIGH (ref 7.35–7.45)
PH BLDV: 7.41 — SIGNIFICANT CHANGE UP (ref 7.35–7.45)
PHOSPHATE SERPL-MCNC: 3.4 MG/DL — SIGNIFICANT CHANGE UP (ref 2.4–4.7)
PLATELET # BLD AUTO: 248 K/UL — SIGNIFICANT CHANGE UP (ref 150–400)
PO2 BLDA: 86 MMHG — SIGNIFICANT CHANGE UP (ref 83–108)
PO2 BLDV: 28 MMHG — SIGNIFICANT CHANGE UP (ref 25–45)
POTASSIUM SERPL-MCNC: 4.6 MMOL/L — SIGNIFICANT CHANGE UP (ref 3.5–5.3)
POTASSIUM SERPL-SCNC: 4.6 MMOL/L — SIGNIFICANT CHANGE UP (ref 3.5–5.3)
PROTHROM AB SERPL-ACNC: 15.6 SEC — HIGH (ref 9.8–12.7)
RBC # BLD: 3.73 M/UL — LOW (ref 4.6–6.2)
RBC # FLD: 17.9 % — HIGH (ref 11–15.6)
SAO2 % BLDA: 98 % — SIGNIFICANT CHANGE UP (ref 95–99)
SAO2 % BLDV: 46 % — LOW (ref 67–88)
SODIUM SERPL-SCNC: 134 MMOL/L — LOW (ref 135–145)
WBC # BLD: 10.5 K/UL — SIGNIFICANT CHANGE UP (ref 4.8–10.8)
WBC # FLD AUTO: 10.5 K/UL — SIGNIFICANT CHANGE UP (ref 4.8–10.8)

## 2017-06-30 PROCEDURE — 99232 SBSQ HOSP IP/OBS MODERATE 35: CPT

## 2017-06-30 PROCEDURE — 99291 CRITICAL CARE FIRST HOUR: CPT

## 2017-06-30 RX ORDER — FUROSEMIDE 40 MG
40 TABLET ORAL ONCE
Qty: 0 | Refills: 0 | Status: COMPLETED | OUTPATIENT
Start: 2017-06-30 | End: 2017-06-30

## 2017-06-30 RX ORDER — FUROSEMIDE 40 MG
40 TABLET ORAL EVERY 6 HOURS
Qty: 0 | Refills: 0 | Status: DISCONTINUED | OUTPATIENT
Start: 2017-06-30 | End: 2017-07-01

## 2017-06-30 RX ADMIN — ALBUTEROL 2.5 MILLIGRAM(S): 90 AEROSOL, METERED ORAL at 15:00

## 2017-06-30 RX ADMIN — MILRINONE LACTATE 9.17 MICROGRAM(S)/KG/MIN: 1 INJECTION, SOLUTION INTRAVENOUS at 01:49

## 2017-06-30 RX ADMIN — Medication 40 MILLIGRAM(S): at 05:35

## 2017-06-30 RX ADMIN — ALBUTEROL 2.5 MILLIGRAM(S): 90 AEROSOL, METERED ORAL at 03:08

## 2017-06-30 RX ADMIN — MILRINONE LACTATE 9.17 MICROGRAM(S)/KG/MIN: 1 INJECTION, SOLUTION INTRAVENOUS at 08:40

## 2017-06-30 RX ADMIN — Medication 100 MILLIGRAM(S): at 05:36

## 2017-06-30 RX ADMIN — MILRINONE LACTATE 9.17 MICROGRAM(S)/KG/MIN: 1 INJECTION, SOLUTION INTRAVENOUS at 18:29

## 2017-06-30 RX ADMIN — Medication 40 MILLIGRAM(S): at 18:30

## 2017-06-30 RX ADMIN — PANTOPRAZOLE SODIUM 40 MILLIGRAM(S): 20 TABLET, DELAYED RELEASE ORAL at 05:37

## 2017-06-30 RX ADMIN — Medication 10 MILLIGRAM(S): at 12:17

## 2017-06-30 RX ADMIN — ALBUTEROL 2.5 MILLIGRAM(S): 90 AEROSOL, METERED ORAL at 20:12

## 2017-06-30 RX ADMIN — Medication 9 MILLIGRAM(S): at 22:57

## 2017-06-30 RX ADMIN — Medication 1 MILLIGRAM(S): at 12:18

## 2017-06-30 RX ADMIN — Medication 100 MILLIGRAM(S): at 12:17

## 2017-06-30 RX ADMIN — FENTANYL CITRATE 12.5 MICROGRAM(S): 50 INJECTION INTRAVENOUS at 05:09

## 2017-06-30 RX ADMIN — PANTOPRAZOLE SODIUM 40 MILLIGRAM(S): 20 TABLET, DELAYED RELEASE ORAL at 18:31

## 2017-06-30 RX ADMIN — AMIODARONE HYDROCHLORIDE 400 MILLIGRAM(S): 400 TABLET ORAL at 18:29

## 2017-06-30 RX ADMIN — Medication 50 MILLIGRAM(S): at 18:31

## 2017-06-30 RX ADMIN — Medication 50 MILLIGRAM(S): at 22:57

## 2017-06-30 RX ADMIN — LISINOPRIL 2.5 MILLIGRAM(S): 2.5 TABLET ORAL at 05:36

## 2017-06-30 RX ADMIN — SENNA PLUS 10 MILLILITER(S): 8.6 TABLET ORAL at 22:56

## 2017-06-30 RX ADMIN — ALBUTEROL 2.5 MILLIGRAM(S): 90 AEROSOL, METERED ORAL at 09:33

## 2017-06-30 RX ADMIN — Medication 100 MILLIGRAM(S): at 12:18

## 2017-06-30 RX ADMIN — Medication 0.12 MILLIGRAM(S): at 05:36

## 2017-06-30 RX ADMIN — FENTANYL CITRATE 12.5 MICROGRAM(S): 50 INJECTION INTRAVENOUS at 04:42

## 2017-06-30 RX ADMIN — Medication 50 MILLIGRAM(S): at 05:36

## 2017-06-30 RX ADMIN — AMIODARONE HYDROCHLORIDE 400 MILLIGRAM(S): 400 TABLET ORAL at 05:36

## 2017-06-30 RX ADMIN — Medication 40 MILLIGRAM(S): at 12:18

## 2017-06-30 NOTE — PROGRESS NOTE ADULT - ASSESSMENT
Non-ischemic CM.  Cont with BB and ACEI  Change to bumex drip 0.5 mg/hr for diuresis in am, DC lasix at that time.   On heparin s/p cardioversion for afib  Continue with primacor  Replace lytes.

## 2017-06-30 NOTE — AIRWAY REMOVAL NOTE  ADULT & PEDS - COUGH
nonproductive/infrequent
good/productive
productive/loose
thick residue came on his mouth, mouth care done by RN/none

## 2017-06-30 NOTE — PROGRESS NOTE ADULT - SUBJECTIVE AND OBJECTIVE BOX
INTERVAL HPI/OVERNIGHT EVENTS/SUBJECTIVE:  No overnight events.      ICU Vital Signs Last 24 Hrs  T(C): 35.8 (30 Jun 2017 08:00), Max: 36.7 (29 Jun 2017 16:15)  T(F): 96.5 (30 Jun 2017 08:00), Max: 98.1 (29 Jun 2017 16:15)  HR: 69 (30 Jun 2017 11:00) (66 - 97)  BP: 101/67 (30 Jun 2017 10:00) (92/61 - 125/66)  BP(mean): 80 (30 Jun 2017 10:00) (71 - 93)  ABP: 110/69 (30 Jun 2017 11:00) (89/65 - 116/73)  ABP(mean): 82 (30 Jun 2017 11:00) (75 - 91)  RR: 29 (30 Jun 2017 11:00) (22 - 96)  SpO2: 98% (30 Jun 2017 11:00) (90% - 100%)      I&O's Detail    29 Jun 2017 07:01  -  30 Jun 2017 07:00  --------------------------------------------------------  IN:    Enteral Tube Flush: 120 mL    Free Water: 950 mL    heparin Infusion: 384 mL    milrinone  Infusion: 220.8 mL    Pivot: 1320 mL    PRBCs (Packed Red Blood Cells): 322 mL    Solution: 100 mL  Total IN: 3416.8 mL    OUT:    Indwelling Catheter - Urethral: 1605 mL  Total OUT: 1605 mL    Total NET: 1811.8 mL      30 Jun 2017 07:01  -  30 Jun 2017 12:02  --------------------------------------------------------  IN:    heparin Infusion: 64 mL    milrinone  Infusion: 36.8 mL    Pivot: 220 mL  Total IN: 320.8 mL    OUT:    Indwelling Catheter - Urethral: 400 mL  Total OUT: 400 mL    Total NET: -79.2 mL          Mode: CPAP with PS  FiO2: 30  PEEP: 8  PS: 10  MAP: 11        MEDICATIONS  (STANDING):  amantadine Syrup 100 milliGRAM(s) Oral <User Schedule>  doxazosin 4 milliGRAM(s) Oral at bedtime  FLUoxetine Solution 10 milliGRAM(s) Oral daily  traZODone 50 milliGRAM(s) Oral at bedtime  digoxin     Tablet 0.125 milliGRAM(s) Oral daily  melatonin 9 milliGRAM(s) Oral at bedtime  ALBUTerol    0.083% 2.5 milliGRAM(s) Nebulizer every 6 hours  metoprolol 50 milliGRAM(s) Oral two times a day  pantoprazole   Suspension 40 milliGRAM(s) Oral two times a day before meals  senna Syrup 10 milliLiter(s) Oral at bedtime  lisinopril 2.5 milliGRAM(s) Oral daily  amiodarone    Tablet 400 milliGRAM(s) Oral every 12 hours  thiamine 100 milliGRAM(s) Enteral Tube daily  folic acid 1 milliGRAM(s) Enteral Tube daily  heparin  Infusion 1600 Unit(s)/Hr (16 mL/Hr) IV Continuous <Continuous>  furosemide   Injectable 40 milliGRAM(s) IV Push every 12 hours  milrinone Infusion 0.375 MICROgram(s)/kG/Min (9.169 mL/Hr) IV Continuous <Continuous>  furosemide   Injectable 40 milliGRAM(s) IV Push once    MEDICATIONS  (PRN):  acetaminophen    Suspension 650 milliGRAM(s) Oral every 6 hours PRN For Temp greater than 38.5 C (101.3 F)      NUTRITION/IVF:  Tube feeds    CENTRAL LINE: Y     ARMAS: Y     A-LINE:  Y        PHYSICAL EXAM:    Gen: NAD    Eyes: PERRLA    Neurological: Diffuse weakness, follows commands. awake and alert.      Pulmonary: No resp distress on CPAP/PS.      Cardiovascular:  RRR    Gastrointestinal: Soft NT    Genitourinary: Armas    Extremities:  Moderate pitting edema.              LABS:  CBC Full  -  ( 30 Jun 2017 04:41 )  WBC Count : 10.5 K/uL  Hemoglobin : 9.9 g/dL  Hematocrit : 32.0 %  Platelet Count - Automated : 248 K/uL  Mean Cell Volume : 85.8 fl  Mean Cell Hemoglobin : 26.5 pg  Mean Cell Hemoglobin Concentration : 30.9 g/dL  Auto Neutrophil # : 8.3 K/uL  Auto Lymphocyte # : 1.3 K/uL  Auto Monocyte # : 0.7 K/uL  Auto Eosinophil # : 0.1 K/uL  Auto Basophil # : 0.0 K/uL  Auto Neutrophil % : 79.3 %  Auto Lymphocyte % : 12.1 %  Auto Monocyte % : 6.5 %  Auto Eosinophil % : 0.9 %  Auto Basophil % : 0.2 %    06-30    134<L>  |  95<L>  |  44.0<H>  ----------------------------<  137<H>  4.6   |  28.0  |  0.68    Ca    8.1<L>      30 Jun 2017 04:41  Phos  3.4     06-30  Mg     2.0     06-30      PT/INR - ( 30 Jun 2017 04:40 )   PT: 15.6 sec;   INR: 1.41 ratio         PTT - ( 30 Jun 2017 04:40 )  PTT:81.2 sec            CAPILLARY BLOOD GLUCOSE      RADIOLOGY & ADDITIONAL STUDIES:    ASSESSMENT/PLAN:  51yMale presenting with:    Neuro: Polyneuropathy of critical illness.  Encephalopathy - stable.      CV: Acute on chronic systolic heart failure.  Milrinone.  Diuresis.  Monitor endpoints.  Paroxysmal A-fib.  Less ectopy.  Anticoagulated.      Pulm: Wean vent.  Possible extubation.      GI/Nutrition: Tube feeds.      /Renal: Armas.      Proph: PPI    Dispo: ICU      CRITICAL CARE TIME SPENT: 40 min

## 2017-06-30 NOTE — PROGRESS NOTE ADULT - SUBJECTIVE AND OBJECTIVE BOX
CHIEF COMPLAINT:Patient is a 51y old  Male who is seen with hfref, cardiogenic shock and paf.  In nsr. extubated.   Still volume overloaded.     :Allergies:  No Known Allergies      MEDICATIONS:  doxazosin 4 milliGRAM(s) Oral at bedtime  digoxin     Tablet 0.125 milliGRAM(s) Oral daily  metoprolol 50 milliGRAM(s) Oral two times a day  lisinopril 2.5 milliGRAM(s) Oral daily  amiodarone    Tablet 400 milliGRAM(s) Oral every 12 hours  furosemide   Injectable 40 milliGRAM(s) IV Push every 12 hours  milrinone Infusion 0.375 MICROgram(s)/kG/Min IV Continuous <Continuous>  ALBUTerol    0.083% 2.5 milliGRAM(s) Nebulizer every 6 hours  amantadine Syrup 100 milliGRAM(s) Oral <User Schedule>  FLUoxetine Solution 10 milliGRAM(s) Oral daily  traZODone 50 milliGRAM(s) Oral at bedtime  melatonin 9 milliGRAM(s) Oral at bedtime  acetaminophen    Suspension 650 milliGRAM(s) Oral every 6 hours PRN  pantoprazole   Suspension 40 milliGRAM(s) Oral two times a day before meals  senna Syrup 10 milliLiter(s) Oral at bedtime  thiamine 100 milliGRAM(s) Enteral Tube daily  folic acid 1 milliGRAM(s) Enteral Tube daily  heparin  Infusion 1600 Unit(s)/Hr IV Continuous <Continuous>      PHYSICAL EXAM:  T(C): 36.7 (06-30-17 @ 12:00), Max: 36.7 (06-30-17 @ 00:00)  HR: 84 (06-30-17 @ 16:00) (66 - 85)  BP: 95/69 (06-30-17 @ 16:00) (92/61 - 111/77)  RR: 37 (06-30-17 @ 16:00) (22 - 96)  SpO2: 98% (06-30-17 @ 16:00) (96% - 100%)  Wt(kg): --    I&O's Summary    29 Jun 2017 07:01  -  30 Jun 2017 07:00  --------------------------------------------------------  IN: 3416.8 mL / OUT: 1605 mL / NET: 1811.8 mL    30 Jun 2017 07:01  -  30 Jun 2017 17:12  --------------------------------------------------------  IN: 802 mL / OUT: 980 mL / NET: -178 mL    Appearance: Normal	  HEENT:   NC/AT  Eye: Pink Conjunctiva  Lungs: CTA B/L  CVS: RRR, Normal S1 and S2, anasarca  Pulses: Normal distal pulses.      TELEMETRY: 	    ECG:  	  RADIOLOGY:       LABS:	 	    CARDIAC MARKERS:                            9.9    10.5  )-----------( 248      ( 30 Jun 2017 04:41 )             32.0     06-30    134<L>  |  95<L>  |  44.0<H>  ----------------------------<  137<H>  4.6   |  28.0  |  0.68    Ca    8.1<L>      30 Jun 2017 04:41  Phos  3.4     06-30  Mg     2.0     06-30      ASSESSMENT/PLAN:

## 2017-06-30 NOTE — AIRWAY REMOVAL NOTE  ADULT & PEDS - RESPIRATORY RHYTHM/PATTERN
unlabored/rate regular/pattern regular/depth regular/no shortness of breath
rate regular/no shortness of breath
rate regular/unlabored/no shortness of breath/depth regular/pattern regular
tachypnea

## 2017-07-01 LAB
ANION GAP SERPL CALC-SCNC: 8 MMOL/L — SIGNIFICANT CHANGE UP (ref 5–17)
APTT BLD: 97.2 SEC — HIGH (ref 27.5–37.4)
BUN SERPL-MCNC: 44 MG/DL — HIGH (ref 8–20)
CALCIUM SERPL-MCNC: 8 MG/DL — LOW (ref 8.6–10.2)
CHLORIDE SERPL-SCNC: 98 MMOL/L — SIGNIFICANT CHANGE UP (ref 98–107)
CO2 SERPL-SCNC: 32 MMOL/L — HIGH (ref 22–29)
CREAT SERPL-MCNC: 0.65 MG/DL — SIGNIFICANT CHANGE UP (ref 0.5–1.3)
GLUCOSE SERPL-MCNC: 144 MG/DL — HIGH (ref 70–115)
HCT VFR BLD CALC: 32.2 % — LOW (ref 42–52)
HGB BLD-MCNC: 9.6 G/DL — LOW (ref 14–18)
MAGNESIUM SERPL-MCNC: 1.6 MG/DL — SIGNIFICANT CHANGE UP (ref 1.6–2.6)
MCHC RBC-ENTMCNC: 25.7 PG — LOW (ref 27–31)
MCHC RBC-ENTMCNC: 29.8 G/DL — LOW (ref 32–36)
MCV RBC AUTO: 86.3 FL — SIGNIFICANT CHANGE UP (ref 80–94)
PHOSPHATE SERPL-MCNC: 4.2 MG/DL — SIGNIFICANT CHANGE UP (ref 2.4–4.7)
PLATELET # BLD AUTO: 241 K/UL — SIGNIFICANT CHANGE UP (ref 150–400)
POTASSIUM SERPL-MCNC: 3.9 MMOL/L — SIGNIFICANT CHANGE UP (ref 3.5–5.3)
POTASSIUM SERPL-SCNC: 3.9 MMOL/L — SIGNIFICANT CHANGE UP (ref 3.5–5.3)
RBC # BLD: 3.73 M/UL — LOW (ref 4.6–6.2)
RBC # FLD: 17.6 % — HIGH (ref 11–15.6)
SODIUM SERPL-SCNC: 138 MMOL/L — SIGNIFICANT CHANGE UP (ref 135–145)
WBC # BLD: 10.5 K/UL — SIGNIFICANT CHANGE UP (ref 4.8–10.8)
WBC # FLD AUTO: 10.5 K/UL — SIGNIFICANT CHANGE UP (ref 4.8–10.8)

## 2017-07-01 PROCEDURE — 99232 SBSQ HOSP IP/OBS MODERATE 35: CPT

## 2017-07-01 RX ORDER — MAGNESIUM SULFATE 500 MG/ML
2 VIAL (ML) INJECTION ONCE
Qty: 0 | Refills: 0 | Status: COMPLETED | OUTPATIENT
Start: 2017-07-01 | End: 2017-07-01

## 2017-07-01 RX ORDER — FUROSEMIDE 40 MG
40 TABLET ORAL EVERY 12 HOURS
Qty: 0 | Refills: 0 | Status: DISCONTINUED | OUTPATIENT
Start: 2017-07-01 | End: 2017-07-09

## 2017-07-01 RX ADMIN — Medication 50 MILLIGRAM(S): at 06:58

## 2017-07-01 RX ADMIN — Medication 50 MILLIGRAM(S): at 22:46

## 2017-07-01 RX ADMIN — Medication 50 GRAM(S): at 09:30

## 2017-07-01 RX ADMIN — PANTOPRAZOLE SODIUM 40 MILLIGRAM(S): 20 TABLET, DELAYED RELEASE ORAL at 19:04

## 2017-07-01 RX ADMIN — Medication 50 MILLIGRAM(S): at 19:04

## 2017-07-01 RX ADMIN — Medication 4 MILLIGRAM(S): at 00:01

## 2017-07-01 RX ADMIN — Medication 100 MILLIGRAM(S): at 12:41

## 2017-07-01 RX ADMIN — Medication 40 MILLIGRAM(S): at 00:00

## 2017-07-01 RX ADMIN — ALBUTEROL 2.5 MILLIGRAM(S): 90 AEROSOL, METERED ORAL at 08:24

## 2017-07-01 RX ADMIN — AMIODARONE HYDROCHLORIDE 400 MILLIGRAM(S): 400 TABLET ORAL at 06:56

## 2017-07-01 RX ADMIN — Medication 0.12 MILLIGRAM(S): at 06:57

## 2017-07-01 RX ADMIN — PANTOPRAZOLE SODIUM 40 MILLIGRAM(S): 20 TABLET, DELAYED RELEASE ORAL at 06:57

## 2017-07-01 RX ADMIN — Medication 10 MILLIGRAM(S): at 12:42

## 2017-07-01 RX ADMIN — MILRINONE LACTATE 4.58 MICROGRAM(S)/KG/MIN: 1 INJECTION, SOLUTION INTRAVENOUS at 19:04

## 2017-07-01 RX ADMIN — Medication 9 MILLIGRAM(S): at 22:46

## 2017-07-01 RX ADMIN — Medication 40 MILLIGRAM(S): at 19:04

## 2017-07-01 RX ADMIN — AMIODARONE HYDROCHLORIDE 400 MILLIGRAM(S): 400 TABLET ORAL at 19:04

## 2017-07-01 RX ADMIN — Medication 100 MILLIGRAM(S): at 06:56

## 2017-07-01 RX ADMIN — Medication 40 MILLIGRAM(S): at 06:58

## 2017-07-01 RX ADMIN — ALBUTEROL 2.5 MILLIGRAM(S): 90 AEROSOL, METERED ORAL at 03:32

## 2017-07-01 RX ADMIN — ALBUTEROL 2.5 MILLIGRAM(S): 90 AEROSOL, METERED ORAL at 15:06

## 2017-07-01 RX ADMIN — ALBUTEROL 2.5 MILLIGRAM(S): 90 AEROSOL, METERED ORAL at 20:16

## 2017-07-01 RX ADMIN — SENNA PLUS 10 MILLILITER(S): 8.6 TABLET ORAL at 22:46

## 2017-07-01 RX ADMIN — LISINOPRIL 2.5 MILLIGRAM(S): 2.5 TABLET ORAL at 06:57

## 2017-07-01 RX ADMIN — Medication 4 MILLIGRAM(S): at 22:46

## 2017-07-01 NOTE — PROGRESS NOTE ADULT - ATTENDING COMMENTS
The patient was seen and examined  No new problems  Extubated yesterday    Neurologic:  Occasional delirium  Respiratory:  SaO2 okay  Hemodynamically okay  Urine flow is copious with diuretics  Hgb okay  ID no active issues    Plan:  Mobilize  OOB  Reduce milrinone to 1/2 of current dose  Continue diuresis  Anticoagulation

## 2017-07-01 NOTE — PROGRESS NOTE ADULT - SUBJECTIVE AND OBJECTIVE BOX
INTERVAL HPI/OVERNIGHT EVENTS:extubated yesturday, diuresed    PRESSORS: [ ] YES [ ] NO  WHICH:Milrinone  DOSE:    ANTIBIOTICS:                  DATE STARTED:  ANTIBIOTICS:                  DATE STARTED:  ANTIBIOTICS:                  DATE STARTED:    MEDICATIONS  (STANDING):  amantadine Syrup 100 milliGRAM(s) Oral <User Schedule>  doxazosin 4 milliGRAM(s) Oral at bedtime  FLUoxetine Solution 10 milliGRAM(s) Oral daily  traZODone 50 milliGRAM(s) Oral at bedtime  digoxin     Tablet 0.125 milliGRAM(s) Oral daily  melatonin 9 milliGRAM(s) Oral at bedtime  ALBUTerol    0.083% 2.5 milliGRAM(s) Nebulizer every 6 hours  metoprolol 50 milliGRAM(s) Oral two times a day  pantoprazole   Suspension 40 milliGRAM(s) Oral two times a day before meals  senna Syrup 10 milliLiter(s) Oral at bedtime  lisinopril 2.5 milliGRAM(s) Oral daily  amiodarone    Tablet 400 milliGRAM(s) Oral every 12 hours  thiamine 100 milliGRAM(s) Enteral Tube daily  folic acid 1 milliGRAM(s) Enteral Tube daily  heparin  Infusion 1600 Unit(s)/Hr (16 mL/Hr) IV Continuous <Continuous>  milrinone Infusion 0.375 MICROgram(s)/kG/Min (9.169 mL/Hr) IV Continuous <Continuous>  furosemide   Injectable 40 milliGRAM(s) IV Push every 6 hours    MEDICATIONS  (PRN):  acetaminophen    Suspension 650 milliGRAM(s) Oral every 6 hours PRN For Temp greater than 38.5 C (101.3 F)      Drug Dosing Weight  Height (cm): 165.1 (12 Jun 2017 06:38)  Weight (kg): 81.5 (12 Jun 2017 06:38)  BMI (kg/m2): 29.9 (12 Jun 2017 06:38)  BSA (m2): 1.89 (12 Jun 2017 06:38)    CENTRAL LINE: [ x] YES [ ] NO  LOCATION: Right IJ TLC  DATE INSERTED:6/419  REMOVE: [ ] YES X ] NO  EXPLAIN:    ARMAS: [x YES [ ] NO    DATE INSERTED:  REMOVE: [ ] YES [x ] NO  EXPLAIN:    A-LINE: [x ] YES [ ] NO  LOCATION:  right Ax DATE INSERTED:6/19  REMOVE: [ ] YES [ x] NO  EXPLAIN:    PAST MEDICAL & SURGICAL HISTORY:  Mitral regurgitation: severe MR  Cardiomyopathy, nonischemic  CAD (coronary artery disease)  Asthma  Atrial fibrillation  S/P laparoscopic cholecystectomy  History of humerus fracture: Metal pins in Left Humerus  Artificial pacemaker      ICU Vital Signs Last 24 Hrs  T(C): 36.7 (30 Jun 2017 20:00), Max: 36.7 (30 Jun 2017 12:00)  T(F): 98 (30 Jun 2017 20:00), Max: 98.1 (30 Jun 2017 12:00)  HR: 77 (01 Jul 2017 03:00) (68 - 85)  BP: 128/89 (01 Jul 2017 02:00) (95/69 - 128/89)  BP(mean): 104 (01 Jul 2017 02:00) (78 - 104)  ABP: 114/73 (01 Jul 2017 03:00) (100/63 - 118/76)  ABP(mean): 87 (01 Jul 2017 03:00) (76 - 91)  RR: 36 (01 Jul 2017 03:00) (21 - 50)  SpO2: 98% (01 Jul 2017 03:33) (97% - 100%)      ABG - ( 30 Jun 2017 12:41 )  pH: 7.46  /  pCO2: 44    /  pO2: 86    / HCO3: 30    / Base Excess: 6.3   /  SaO2: 98                  I&O's Detail    29 Jun 2017 07:01  -  30 Jun 2017 07:00  --------------------------------------------------------  IN:    Enteral Tube Flush: 120 mL    Free Water: 950 mL    heparin Infusion: 384 mL    milrinone  Infusion: 220.8 mL    Pivot: 1320 mL    PRBCs (Packed Red Blood Cells): 322 mL    Solution: 100 mL  Total IN: 3416.8 mL    OUT:    Indwelling Catheter - Urethral: 1605 mL  Total OUT: 1605 mL    Total NET: 1811.8 mL      30 Jun 2017 07:01  -  01 Jul 2017 03:47  --------------------------------------------------------  IN:    heparin Infusion: 256 mL    milrinone  Infusion: 184 mL    Pivot: 1100 mL  Total IN: 1540 mL    OUT:    Indwelling Catheter - Urethral: 2180 mL  Total OUT: 2180 mL    Total NET: -640 mL          Mode: Pt is on 40% Cam       Physical Exam:    Neurological: MCNAMARA     HEENT: PERRLA, EOMI, no drainage or redness    Neck: No bruits; no thyromegaly or nodules,  No JVD    Back: No deformity or limitation of movement    Respiratory: Breath Sounds equal & clear to auscultation, no accessory muscle use    Cardiovascular: afib,  no murmurs, gallops or rubs    Gastrointestinal: Soft, non-tender, normal bowel sounds, PEG    Extremities: No peripheral edema, No cyanosis, clubbing     Vascular: Equal and normal pulses: 2+ peripheral pulses throughout    Musculoskeletal: No joint pain, swelling or deformity; no limitation of movement    Skin: No rashes    LABS:  CBC Full  -  ( 30 Jun 2017 04:41 )  WBC Count : 10.5 K/uL  Hemoglobin : 9.9 g/dL  Hematocrit : 32.0 %  Platelet Count - Automated : 248 K/uL  Mean Cell Volume : 85.8 fl  Mean Cell Hemoglobin : 26.5 pg  Mean Cell Hemoglobin Concentration : 30.9 g/dL  Auto Neutrophil # : 8.3 K/uL  Auto Lymphocyte # : 1.3 K/uL  Auto Monocyte # : 0.7 K/uL  Auto Eosinophil # : 0.1 K/uL  Auto Basophil # : 0.0 K/uL  Auto Neutrophil % : 79.3 %  Auto Lymphocyte % : 12.1 %  Auto Monocyte % : 6.5 %  Auto Eosinophil % : 0.9 %  Auto Basophil % : 0.2 %    06-30    134<L>  |  95<L>  |  44.0<H>  ----------------------------<  137<H>  4.6   |  28.0  |  0.68    Ca    8.1<L>      30 Jun 2017 04:41  Phos  3.4     06-30  Mg     2.0     06-30      PT/INR - ( 30 Jun 2017 04:40 )   PT: 15.6 sec;   INR: 1.41 ratio         PTT - ( 30 Jun 2017 04:40 )  PTT:81.2 sec      Assessment and Plan:    Neuro: GCS 14. Monitor for delirium.  Continue to optimize pain control. Serial Neurologic assessments    HEENT: no issues    CV: Continue hemodynamic monitoring, lasix    Pulm: Pulmonary toilet.  Continue incentive spirometer.  Chest PT.  Encourage OOB to chair and ambulation     GI/Nutrition: Bowel Regimen    /Renal: monitor UOP. Monitor BMP.  Replete Lytes as needed      HEME- DVT prophylaxis, SCDs    ID: no issues    Lines/Tubes: R-IJ, R-ax darnell    Endo: no issues    Skin: intact    Dispo: SICU INTERVAL HPI/OVERNIGHT EVENTS:extubated yesterday, diuresed    PRESSORS: [ ] YES [X ] NO  WHICH:Milrinone  DOSE:    ANTIBIOTICS:                  DATE STARTED:  ANTIBIOTICS:                  DATE STARTED:  ANTIBIOTICS:                  DATE STARTED:    MEDICATIONS  (STANDING):  amantadine Syrup 100 milliGRAM(s) Oral <User Schedule>  doxazosin 4 milliGRAM(s) Oral at bedtime  FLUoxetine Solution 10 milliGRAM(s) Oral daily  traZODone 50 milliGRAM(s) Oral at bedtime  digoxin     Tablet 0.125 milliGRAM(s) Oral daily  melatonin 9 milliGRAM(s) Oral at bedtime  ALBUTerol    0.083% 2.5 milliGRAM(s) Nebulizer every 6 hours  metoprolol 50 milliGRAM(s) Oral two times a day  pantoprazole   Suspension 40 milliGRAM(s) Oral two times a day before meals  senna Syrup 10 milliLiter(s) Oral at bedtime  lisinopril 2.5 milliGRAM(s) Oral daily  amiodarone    Tablet 400 milliGRAM(s) Oral every 12 hours  thiamine 100 milliGRAM(s) Enteral Tube daily  folic acid 1 milliGRAM(s) Enteral Tube daily  heparin  Infusion 1600 Unit(s)/Hr (16 mL/Hr) IV Continuous <Continuous>  milrinone Infusion 0.375 MICROgram(s)/kG/Min (9.169 mL/Hr) IV Continuous <Continuous>  furosemide   Injectable 40 milliGRAM(s) IV Push every 6 hours    MEDICATIONS  (PRN):  acetaminophen    Suspension 650 milliGRAM(s) Oral every 6 hours PRN For Temp greater than 38.5 C (101.3 F)      Drug Dosing Weight  Height (cm): 165.1 (12 Jun 2017 06:38)  Weight (kg): 81.5 (12 Jun 2017 06:38)  BMI (kg/m2): 29.9 (12 Jun 2017 06:38)  BSA (m2): 1.89 (12 Jun 2017 06:38)    CENTRAL LINE: [ x] YES [ ] NO  LOCATION: Right IJ TLC  DATE INSERTED:6/419  REMOVE: [ ] YES X ] NO  EXPLAIN:    ARMAS: [x YES [ ] NO    DATE INSERTED:  REMOVE: [ ] YES [x ] NO  EXPLAIN:    A-LINE: [x ] YES [ ] NO  LOCATION:  right Ax DATE INSERTED:6/19  REMOVE: [ ] YES [ x] NO  EXPLAIN:    PAST MEDICAL & SURGICAL HISTORY:  Mitral regurgitation: severe MR  Cardiomyopathy, nonischemic  CAD (coronary artery disease)  Asthma  Atrial fibrillation  S/P laparoscopic cholecystectomy  History of humerus fracture: Metal pins in Left Humerus  Artificial pacemaker      ICU Vital Signs Last 24 Hrs  T(C): 36.7 (30 Jun 2017 20:00), Max: 36.7 (30 Jun 2017 12:00)  T(F): 98 (30 Jun 2017 20:00), Max: 98.1 (30 Jun 2017 12:00)  HR: 77 (01 Jul 2017 03:00) (68 - 85)  BP: 128/89 (01 Jul 2017 02:00) (95/69 - 128/89)  BP(mean): 104 (01 Jul 2017 02:00) (78 - 104)  ABP: 114/73 (01 Jul 2017 03:00) (100/63 - 118/76)  ABP(mean): 87 (01 Jul 2017 03:00) (76 - 91)  RR: 36 (01 Jul 2017 03:00) (21 - 50)  SpO2: 98% (01 Jul 2017 03:33) (97% - 100%)      ABG - ( 30 Jun 2017 12:41 )  pH: 7.46  /  pCO2: 44    /  pO2: 86    / HCO3: 30    / Base Excess: 6.3   /  SaO2: 98          I&O's Detail    29 Jun 2017 07:01  -  30 Jun 2017 07:00  --------------------------------------------------------  IN:    Enteral Tube Flush: 120 mL    Free Water: 950 mL    heparin Infusion: 384 mL    milrinone  Infusion: 220.8 mL    Pivot: 1320 mL    PRBCs (Packed Red Blood Cells): 322 mL    Solution: 100 mL  Total IN: 3416.8 mL    OUT:    Indwelling Catheter - Urethral: 1605 mL  Total OUT: 1605 mL    Total NET: 1811.8 mL      30 Jun 2017 07:01  -  01 Jul 2017 03:47  --------------------------------------------------------  IN:    heparin Infusion: 256 mL    milrinone  Infusion: 184 mL    Pivot: 1100 mL  Total IN: 1540 mL    OUT:    Indwelling Catheter - Urethral: 2180 mL  Total OUT: 2180 mL    Total NET: -640 mL      Mode: Pt is on 40% Cam       Physical Exam:    Neurological: MCNAMARA     HEENT: PERRLA, EOMI, no drainage or redness    Neck: No bruits; no thyromegaly or nodules,  No JVD    Back: No deformity or limitation of movement    Respiratory: Breath Sounds equal & clear to auscultation, no accessory muscle use    Cardiovascular: afib,  no murmurs, gallops or rubs    Gastrointestinal: Soft, non-tender, normal bowel sounds, PEG    Extremities: No peripheral edema, No cyanosis, clubbing     Vascular: Equal and normal pulses: 2+ peripheral pulses throughout    Musculoskeletal: No joint pain, swelling or deformity; no limitation of movement    Skin: No rashes    LABS:  CBC Full  -  ( 30 Jun 2017 04:41 )  WBC Count : 10.5 K/uL  Hemoglobin : 9.9 g/dL  Hematocrit : 32.0 %  Platelet Count - Automated : 248 K/uL  Mean Cell Volume : 85.8 fl  Mean Cell Hemoglobin : 26.5 pg  Mean Cell Hemoglobin Concentration : 30.9 g/dL  Auto Neutrophil # : 8.3 K/uL  Auto Lymphocyte # : 1.3 K/uL  Auto Monocyte # : 0.7 K/uL  Auto Eosinophil # : 0.1 K/uL  Auto Basophil # : 0.0 K/uL  Auto Neutrophil % : 79.3 %  Auto Lymphocyte % : 12.1 %  Auto Monocyte % : 6.5 %  Auto Eosinophil % : 0.9 %  Auto Basophil % : 0.2 %    06-30    134<L>  |  95<L>  |  44.0<H>  ----------------------------<  137<H>  4.6   |  28.0  |  0.68    Ca    8.1<L>      30 Jun 2017 04:41  Phos  3.4     06-30  Mg     2.0     06-30      PT/INR - ( 30 Jun 2017 04:40 )   PT: 15.6 sec;   INR: 1.41 ratio         PTT - ( 30 Jun 2017 04:40 )  PTT:81.2 sec      Assessment and Plan:    Neuro: GCS 14. Monitor for delirium.  Continue to optimize pain control. Serial Neurologic assessments    HEENT: no issues    CV: Continue hemodynamic monitoring, lasix    Pulm: Pulmonary toilet.  Continue incentive spirometer.  Chest PT.  Encourage OOB to chair and ambulation     GI/Nutrition: Bowel Regimen    /Renal: monitor UOP. Monitor BMP.  Replete Lytes as needed      HEME- DVT prophylaxis, SCDs    ID: no issues    Lines/Tubes: R-IJ, R-ax darnell    Endo: no issues    Skin: intact    Dispo: SICU

## 2017-07-01 NOTE — PROGRESS NOTE ADULT - SUBJECTIVE AND OBJECTIVE BOX
CHIEF COMPLAINT:Patient is a 51y old  Male who presents with a chief complaint of post op abdominal pain (05 May 2017 00:11)  seen with HFrEF, Afib  Sitting in a chair  on primacor. still positive despite lasix iv    Allergies    No Known Allergies    Intolerances      	  MEDICATIONS:  doxazosin 4 milliGRAM(s) Oral at bedtime  digoxin     Tablet 0.125 milliGRAM(s) Oral daily  metoprolol 50 milliGRAM(s) Oral two times a day  lisinopril 2.5 milliGRAM(s) Oral daily  amiodarone    Tablet 400 milliGRAM(s) Oral every 12 hours  milrinone Infusion 0.1875 MICROgram(s)/kG/Min IV Continuous <Continuous>  furosemide   Injectable 40 milliGRAM(s) IV Push every 12 hours  ALBUTerol    0.083% 2.5 milliGRAM(s) Nebulizer every 6 hours  amantadine Syrup 100 milliGRAM(s) Oral <User Schedule>  FLUoxetine Solution 10 milliGRAM(s) Oral daily  traZODone 50 milliGRAM(s) Oral at bedtime  melatonin 9 milliGRAM(s) Oral at bedtime  acetaminophen    Suspension 650 milliGRAM(s) Oral every 6 hours PRN  pantoprazole   Suspension 40 milliGRAM(s) Oral two times a day before meals  senna Syrup 10 milliLiter(s) Oral at bedtime  heparin  Infusion 1600 Unit(s)/Hr IV Continuous <Continuous>    PHYSICAL EXAM:  T(C): 36 (07-01-17 @ 08:00), Max: 36.7 (06-30-17 @ 20:00)  HR: 73 (07-01-17 @ 11:00) (68 - 95)  BP: 94/70 (07-01-17 @ 11:00) (94/70 - 142/86)  RR: 28 (07-01-17 @ 11:00) (21 - 50)  SpO2: 98% (07-01-17 @ 11:00) (94% - 100%)  Wt(kg): --    I&O's Summary    30 Jun 2017 07:01  -  01 Jul 2017 07:00  --------------------------------------------------------  IN: 1796.8 mL / OUT: 2580 mL / NET: -783.2 mL    01 Jul 2017 07:01  -  01 Jul 2017 12:11  --------------------------------------------------------  IN: 335.6 mL / OUT: 195 mL / NET: 140.6 mL      Appearance: Normal	  HEENT:   NC/AT  Eye: Pink Conjunctiva  Lungs: CTA B/L  CVS: RRR, Normal S1 and S2, 2-3 + edema  Pulses: Normal distal pulses.      TELEMETRY: nsr.        LABS:	 	                          9.6    10.5  )-----------( 241      ( 01 Jul 2017 04:27 )             32.2     07-01    138  |  98  |  44.0<H>  ----------------------------<  144<H>  3.9   |  32.0<H>  |  0.65    Ca    8.0<L>      01 Jul 2017 04:27  Phos  4.2     07-01  Mg     1.6     07-01    ASSESSMENT/PLAN:

## 2017-07-01 NOTE — PROGRESS NOTE ADULT - ASSESSMENT
1. Cardiogenic shock, cont with primacor  2. DC IV lasix. Bumex drip 0.5 mg/hr  3. replace lytes  4. Increase lisinopril 5 mg daily  5. on heparin, remains in nsr

## 2017-07-02 LAB
ANION GAP SERPL CALC-SCNC: 11 MMOL/L — SIGNIFICANT CHANGE UP (ref 5–17)
APTT BLD: 94 SEC — HIGH (ref 27.5–37.4)
APTT BLD: 99.1 SEC — HIGH (ref 27.5–37.4)
BASOPHILS # BLD AUTO: 0 K/UL — SIGNIFICANT CHANGE UP (ref 0–0.2)
BASOPHILS NFR BLD AUTO: 0.3 % — SIGNIFICANT CHANGE UP (ref 0–2)
BUN SERPL-MCNC: 46 MG/DL — HIGH (ref 8–20)
CALCIUM SERPL-MCNC: 8.2 MG/DL — LOW (ref 8.6–10.2)
CHLORIDE SERPL-SCNC: 92 MMOL/L — LOW (ref 98–107)
CO2 SERPL-SCNC: 32 MMOL/L — HIGH (ref 22–29)
CREAT SERPL-MCNC: 0.66 MG/DL — SIGNIFICANT CHANGE UP (ref 0.5–1.3)
EOSINOPHIL # BLD AUTO: 0.1 K/UL — SIGNIFICANT CHANGE UP (ref 0–0.5)
EOSINOPHIL NFR BLD AUTO: 0.8 % — SIGNIFICANT CHANGE UP (ref 0–5)
GLUCOSE SERPL-MCNC: 129 MG/DL — HIGH (ref 70–115)
HCT VFR BLD CALC: 33 % — LOW (ref 42–52)
HGB BLD-MCNC: 9.8 G/DL — LOW (ref 14–18)
INR BLD: 1.27 RATIO — HIGH (ref 0.88–1.16)
LYMPHOCYTES # BLD AUTO: 1.3 K/UL — SIGNIFICANT CHANGE UP (ref 1–4.8)
LYMPHOCYTES # BLD AUTO: 13.1 % — LOW (ref 20–55)
MAGNESIUM SERPL-MCNC: 2.1 MG/DL — SIGNIFICANT CHANGE UP (ref 1.6–2.6)
MCHC RBC-ENTMCNC: 26.3 PG — LOW (ref 27–31)
MCHC RBC-ENTMCNC: 29.7 G/DL — LOW (ref 32–36)
MCV RBC AUTO: 88.5 FL — SIGNIFICANT CHANGE UP (ref 80–94)
MONOCYTES # BLD AUTO: 0.7 K/UL — SIGNIFICANT CHANGE UP (ref 0–0.8)
MONOCYTES NFR BLD AUTO: 6.8 % — SIGNIFICANT CHANGE UP (ref 3–10)
NEUTROPHILS # BLD AUTO: 7.6 K/UL — SIGNIFICANT CHANGE UP (ref 1.8–8)
NEUTROPHILS NFR BLD AUTO: 78.4 % — HIGH (ref 37–73)
PHOSPHATE SERPL-MCNC: 4.1 MG/DL — SIGNIFICANT CHANGE UP (ref 2.4–4.7)
PLATELET # BLD AUTO: 256 K/UL — SIGNIFICANT CHANGE UP (ref 150–400)
POTASSIUM SERPL-MCNC: 4 MMOL/L — SIGNIFICANT CHANGE UP (ref 3.5–5.3)
POTASSIUM SERPL-SCNC: 4 MMOL/L — SIGNIFICANT CHANGE UP (ref 3.5–5.3)
PROTHROM AB SERPL-ACNC: 14 SEC — HIGH (ref 9.8–12.7)
RBC # BLD: 3.73 M/UL — LOW (ref 4.6–6.2)
RBC # FLD: 18.1 % — HIGH (ref 11–15.6)
SODIUM SERPL-SCNC: 135 MMOL/L — SIGNIFICANT CHANGE UP (ref 135–145)
WBC # BLD: 9.7 K/UL — SIGNIFICANT CHANGE UP (ref 4.8–10.8)
WBC # FLD AUTO: 9.7 K/UL — SIGNIFICANT CHANGE UP (ref 4.8–10.8)

## 2017-07-02 PROCEDURE — 74000: CPT | Mod: 26

## 2017-07-02 PROCEDURE — 99231 SBSQ HOSP IP/OBS SF/LOW 25: CPT

## 2017-07-02 PROCEDURE — 99291 CRITICAL CARE FIRST HOUR: CPT

## 2017-07-02 RX ORDER — FENTANYL CITRATE 50 UG/ML
25 INJECTION INTRAVENOUS ONCE
Qty: 0 | Refills: 0 | Status: DISCONTINUED | OUTPATIENT
Start: 2017-07-02 | End: 2017-07-02

## 2017-07-02 RX ORDER — FUROSEMIDE 40 MG
40 TABLET ORAL ONCE
Qty: 0 | Refills: 0 | Status: COMPLETED | OUTPATIENT
Start: 2017-07-02 | End: 2017-07-02

## 2017-07-02 RX ADMIN — Medication 0.12 MILLIGRAM(S): at 06:18

## 2017-07-02 RX ADMIN — Medication 10 MILLIGRAM(S): at 12:31

## 2017-07-02 RX ADMIN — Medication 40 MILLIGRAM(S): at 23:45

## 2017-07-02 RX ADMIN — ALBUTEROL 2.5 MILLIGRAM(S): 90 AEROSOL, METERED ORAL at 09:26

## 2017-07-02 RX ADMIN — ALBUTEROL 2.5 MILLIGRAM(S): 90 AEROSOL, METERED ORAL at 15:10

## 2017-07-02 RX ADMIN — FENTANYL CITRATE 25 MICROGRAM(S): 50 INJECTION INTRAVENOUS at 22:40

## 2017-07-02 RX ADMIN — Medication 100 MILLIGRAM(S): at 12:30

## 2017-07-02 RX ADMIN — AMIODARONE HYDROCHLORIDE 400 MILLIGRAM(S): 400 TABLET ORAL at 18:02

## 2017-07-02 RX ADMIN — FENTANYL CITRATE 25 MICROGRAM(S): 50 INJECTION INTRAVENOUS at 23:00

## 2017-07-02 RX ADMIN — LISINOPRIL 2.5 MILLIGRAM(S): 2.5 TABLET ORAL at 06:19

## 2017-07-02 RX ADMIN — ALBUTEROL 2.5 MILLIGRAM(S): 90 AEROSOL, METERED ORAL at 03:26

## 2017-07-02 RX ADMIN — Medication 40 MILLIGRAM(S): at 18:02

## 2017-07-02 RX ADMIN — ALBUTEROL 2.5 MILLIGRAM(S): 90 AEROSOL, METERED ORAL at 20:00

## 2017-07-02 RX ADMIN — Medication 50 MILLIGRAM(S): at 06:19

## 2017-07-02 RX ADMIN — Medication 40 MILLIGRAM(S): at 06:19

## 2017-07-02 RX ADMIN — Medication 50 MILLIGRAM(S): at 18:02

## 2017-07-02 RX ADMIN — PANTOPRAZOLE SODIUM 40 MILLIGRAM(S): 20 TABLET, DELAYED RELEASE ORAL at 18:02

## 2017-07-02 RX ADMIN — Medication 100 MILLIGRAM(S): at 06:18

## 2017-07-02 RX ADMIN — PANTOPRAZOLE SODIUM 40 MILLIGRAM(S): 20 TABLET, DELAYED RELEASE ORAL at 06:19

## 2017-07-02 RX ADMIN — AMIODARONE HYDROCHLORIDE 400 MILLIGRAM(S): 400 TABLET ORAL at 06:18

## 2017-07-02 NOTE — PROVIDER CONTACT NOTE (MEDICATION) - SITUATION
pt receives all medications via peg tube, need to confirm if medications on board between 1529-7218 are crushable for administration.

## 2017-07-02 NOTE — PROGRESS NOTE ADULT - SUBJECTIVE AND OBJECTIVE BOX
Mr. Coleman is volume overloaded with positive balance daily. His CVP was in the 20's. I have asked the team to start continuos diuretic therapy and continue with milrinone as well. He has NICM and will need medical management with diuretics and heart failure medications to suppress neurohormonal axis. He is off primacor as per surgical team decision.   please call me if I can be of assistance. I will sign off. Once discharged, he should follow with us in the office.   Thank you.

## 2017-07-02 NOTE — PROGRESS NOTE ADULT - SUBJECTIVE AND OBJECTIVE BOX
INTERVAL HPI/OVERNIGHT EVENTS:    Pt improving clinically.  Pt following commands and in no apparent distress.  Tolerating tube feeds.  Having BM daily.  Remains on Milrinone for cardiogenic shock.  No acute events overnight. Arterial line DC'd.      MEDICATIONS  (STANDING):  amantadine Syrup 100 milliGRAM(s) Oral <User Schedule>  doxazosin 4 milliGRAM(s) Oral at bedtime  FLUoxetine Solution 10 milliGRAM(s) Oral daily  traZODone 50 milliGRAM(s) Oral at bedtime  digoxin     Tablet 0.125 milliGRAM(s) Oral daily  melatonin 9 milliGRAM(s) Oral at bedtime  ALBUTerol    0.083% 2.5 milliGRAM(s) Nebulizer every 6 hours  metoprolol 50 milliGRAM(s) Oral two times a day  pantoprazole   Suspension 40 milliGRAM(s) Oral two times a day before meals  senna Syrup 10 milliLiter(s) Oral at bedtime  lisinopril 2.5 milliGRAM(s) Oral daily  amiodarone    Tablet 400 milliGRAM(s) Oral every 12 hours  heparin  Infusion 1600 Unit(s)/Hr (16 mL/Hr) IV Continuous <Continuous>  milrinone Infusion 0.1875 MICROgram(s)/kG/Min (4.584 mL/Hr) IV Continuous <Continuous>  furosemide   Injectable 40 milliGRAM(s) IV Push every 12 hours    MEDICATIONS  (PRN):  acetaminophen    Suspension 650 milliGRAM(s) Oral every 6 hours PRN For Temp greater than 38.5 C (101.3 F)      Drug Dosing Weight  Height (cm): 165.1 (12 Jun 2017 06:38)  Weight (kg): 81.5 (12 Jun 2017 06:38)  BMI (kg/m2): 29.9 (12 Jun 2017 06:38)  BSA (m2): 1.89 (12 Jun 2017 06:38)    CENTRAL LINE: RIJ TLC    PAST MEDICAL & SURGICAL HISTORY:  Mitral regurgitation: severe MR  Cardiomyopathy, nonischemic  CAD (coronary artery disease)  Asthma  Atrial fibrillation  S/P laparoscopic cholecystectomy  History of humerus fracture: Metal pins in Left Humerus  Artificial pacemaker      ICU Vital Signs Last 24 Hrs  T(C): 36.3 (02 Jul 2017 00:00), Max: 36.8 (01 Jul 2017 16:00)  T(F): 97.4 (02 Jul 2017 00:00), Max: 98.2 (01 Jul 2017 16:00)  HR: 74 (02 Jul 2017 00:00) (71 - 95)  BP: 121/82 (02 Jul 2017 00:00) (94/70 - 142/86)  BP(mean): 98 (02 Jul 2017 00:00) (78 - 108)  ABP: 104/70 (01 Jul 2017 11:00) (103/67 - 115/76)  ABP(mean): 82 (01 Jul 2017 11:00) (79 - 91)  RR: 43 (02 Jul 2017 00:00) (26 - 45)  SpO2: 99% (02 Jul 2017 03:29) (94% - 99%)      ABG - ( 30 Jun 2017 12:41 )  pH: 7.46  /  pCO2: 44    /  pO2: 86    / HCO3: 30    / Base Excess: 6.3   /  SaO2: 98                  I&O's Detail    30 Jun 2017 07:01  -  01 Jul 2017 07:00  --------------------------------------------------------  IN:    heparin Infusion: 256 mL    milrinone  Infusion: 220.8 mL    Pivot: 1320 mL  Total IN: 1796.8 mL    OUT:    Indwelling Catheter - Urethral: 2580 mL  Total OUT: 2580 mL    Total NET: -783.2 mL      01 Jul 2017 07:01  -  02 Jul 2017 05:23  --------------------------------------------------------  IN:    heparin Infusion: 240 mL    milrinone  Infusion: 59.8 mL    milrinone  Infusion: 18.4 mL    Pivot: 795 mL    Solution: 50 mL  Total IN: 1163.2 mL    OUT:    Indwelling Catheter - Urethral: 1015 mL  Total OUT: 1015 mL    Total NET: 148.2 mL        PHYSICAL EXAM:    Gen: NAD    Eyes: PERRLA    Neurological: Diffuse weakness, follows commands. awake and alert.      Pulmonary: No resp distress.  Breath sound equal and clear bilateral.     Cardiovascular:  RRR    Gastrointestinal: Soft NT    Genitourinary: Rogers    Extremities:  Moderate pitting edema.      LABS:  CBC Full  -  ( 01 Jul 2017 04:27 )  WBC Count : 10.5 K/uL  Hemoglobin : 9.6 g/dL  Hematocrit : 32.2 %  Platelet Count - Automated : 241 K/uL  Mean Cell Volume : 86.3 fl  Mean Cell Hemoglobin : 25.7 pg  Mean Cell Hemoglobin Concentration : 29.8 g/dL  Auto Neutrophil # : x  Auto Lymphocyte # : x  Auto Monocyte # : x  Auto Eosinophil # : x  Auto Basophil # : x  Auto Neutrophil % : x  Auto Lymphocyte % : x  Auto Monocyte % : x  Auto Eosinophil % : x  Auto Basophil % : x    07-01    138  |  98  |  44.0<H>  ----------------------------<  144<H>  3.9   |  32.0<H>  |  0.65    Ca    8.0<L>      01 Jul 2017 04:27  Phos  4.2     07-01  Mg     1.6     07-01      PTT - ( 01 Jul 2017 04:27 )  PTT:97.2 sec    Assessment and Plan:    Neuro: GCS 14. Monitor for delirium.  Continue to optimize pain control. Serial Neurologic assessments    HEENT: no issues    CV: Continue Milrinone  Continue hemodynamic monitoring, lasix.  Consider Bumex gtt    Pulm: Pulmonary toilet.  Continue incentive spirometer.  Chest PT.  Encourage OOB to chair and ambulation     GI/Nutrition: Bowel Regimen    /Renal: monitor UOP. Monitor BMP.  Replete Lytes as needed      HEME- DVT prophylaxis, SCDs    ID: no issues    Lines/Tubes: R-IJ    Endo: no issues    Skin: intact    Dispo: SICU

## 2017-07-03 LAB
ANION GAP SERPL CALC-SCNC: 14 MMOL/L — SIGNIFICANT CHANGE UP (ref 5–17)
APTT BLD: 74.9 SEC — HIGH (ref 27.5–37.4)
BASOPHILS # BLD AUTO: 0 K/UL — SIGNIFICANT CHANGE UP (ref 0–0.2)
BASOPHILS NFR BLD AUTO: 0.2 % — SIGNIFICANT CHANGE UP (ref 0–2)
BLD GP AB SCN SERPL QL: SIGNIFICANT CHANGE UP
BUN SERPL-MCNC: 50 MG/DL — HIGH (ref 8–20)
CALCIUM SERPL-MCNC: 8.5 MG/DL — LOW (ref 8.6–10.2)
CHLORIDE SERPL-SCNC: 94 MMOL/L — LOW (ref 98–107)
CO2 SERPL-SCNC: 31 MMOL/L — HIGH (ref 22–29)
CREAT SERPL-MCNC: 0.72 MG/DL — SIGNIFICANT CHANGE UP (ref 0.5–1.3)
DIGOXIN SERPL-MCNC: 1.9 NG/ML — SIGNIFICANT CHANGE UP (ref 0.8–2)
EOSINOPHIL # BLD AUTO: 0 K/UL — SIGNIFICANT CHANGE UP (ref 0–0.5)
EOSINOPHIL NFR BLD AUTO: 0.3 % — SIGNIFICANT CHANGE UP (ref 0–5)
GLUCOSE SERPL-MCNC: 114 MG/DL — SIGNIFICANT CHANGE UP (ref 70–115)
HCT VFR BLD CALC: 34.2 % — LOW (ref 42–52)
HGB BLD-MCNC: 10.2 G/DL — LOW (ref 14–18)
INR BLD: 1.35 RATIO — HIGH (ref 0.88–1.16)
LYMPHOCYTES # BLD AUTO: 1.2 K/UL — SIGNIFICANT CHANGE UP (ref 1–4.8)
LYMPHOCYTES # BLD AUTO: 10.1 % — LOW (ref 20–55)
MAGNESIUM SERPL-MCNC: 2.1 MG/DL — SIGNIFICANT CHANGE UP (ref 1.6–2.6)
MCHC RBC-ENTMCNC: 26.3 PG — LOW (ref 27–31)
MCHC RBC-ENTMCNC: 29.8 G/DL — LOW (ref 32–36)
MCV RBC AUTO: 88.1 FL — SIGNIFICANT CHANGE UP (ref 80–94)
MONOCYTES # BLD AUTO: 0.8 K/UL — SIGNIFICANT CHANGE UP (ref 0–0.8)
MONOCYTES NFR BLD AUTO: 6.6 % — SIGNIFICANT CHANGE UP (ref 3–10)
NEUTROPHILS # BLD AUTO: 9.5 K/UL — HIGH (ref 1.8–8)
NEUTROPHILS NFR BLD AUTO: 82 % — HIGH (ref 37–73)
PHOSPHATE SERPL-MCNC: 5.1 MG/DL — HIGH (ref 2.4–4.7)
PLATELET # BLD AUTO: 302 K/UL — SIGNIFICANT CHANGE UP (ref 150–400)
POTASSIUM SERPL-MCNC: 4.5 MMOL/L — SIGNIFICANT CHANGE UP (ref 3.5–5.3)
POTASSIUM SERPL-SCNC: 4.5 MMOL/L — SIGNIFICANT CHANGE UP (ref 3.5–5.3)
PROTHROM AB SERPL-ACNC: 14.9 SEC — HIGH (ref 9.8–12.7)
RBC # BLD: 3.88 M/UL — LOW (ref 4.6–6.2)
RBC # FLD: 18.3 % — HIGH (ref 11–15.6)
SODIUM SERPL-SCNC: 139 MMOL/L — SIGNIFICANT CHANGE UP (ref 135–145)
WBC # BLD: 11.5 K/UL — HIGH (ref 4.8–10.8)
WBC # FLD AUTO: 11.5 K/UL — HIGH (ref 4.8–10.8)

## 2017-07-03 PROCEDURE — 99232 SBSQ HOSP IP/OBS MODERATE 35: CPT

## 2017-07-03 RX ORDER — FENTANYL CITRATE 50 UG/ML
50 INJECTION INTRAVENOUS ONCE
Qty: 0 | Refills: 0 | Status: DISCONTINUED | OUTPATIENT
Start: 2017-07-03 | End: 2017-07-03

## 2017-07-03 RX ORDER — FUROSEMIDE 40 MG
40 TABLET ORAL ONCE
Qty: 0 | Refills: 0 | Status: COMPLETED | OUTPATIENT
Start: 2017-07-03 | End: 2017-07-03

## 2017-07-03 RX ADMIN — PANTOPRAZOLE SODIUM 40 MILLIGRAM(S): 20 TABLET, DELAYED RELEASE ORAL at 17:17

## 2017-07-03 RX ADMIN — AMIODARONE HYDROCHLORIDE 400 MILLIGRAM(S): 400 TABLET ORAL at 17:17

## 2017-07-03 RX ADMIN — FENTANYL CITRATE 50 MICROGRAM(S): 50 INJECTION INTRAVENOUS at 22:30

## 2017-07-03 RX ADMIN — ALBUTEROL 2.5 MILLIGRAM(S): 90 AEROSOL, METERED ORAL at 20:14

## 2017-07-03 RX ADMIN — Medication 40 MILLIGRAM(S): at 12:12

## 2017-07-03 RX ADMIN — Medication 50 MILLIGRAM(S): at 05:19

## 2017-07-03 RX ADMIN — Medication 100 MILLIGRAM(S): at 05:18

## 2017-07-03 RX ADMIN — PANTOPRAZOLE SODIUM 40 MILLIGRAM(S): 20 TABLET, DELAYED RELEASE ORAL at 05:19

## 2017-07-03 RX ADMIN — Medication 10 MILLIGRAM(S): at 12:12

## 2017-07-03 RX ADMIN — FENTANYL CITRATE 50 MICROGRAM(S): 50 INJECTION INTRAVENOUS at 22:11

## 2017-07-03 RX ADMIN — Medication 50 MILLIGRAM(S): at 21:16

## 2017-07-03 RX ADMIN — Medication 40 MILLIGRAM(S): at 17:17

## 2017-07-03 RX ADMIN — Medication 40 MILLIGRAM(S): at 05:19

## 2017-07-03 RX ADMIN — Medication 100 MILLIGRAM(S): at 12:12

## 2017-07-03 RX ADMIN — HEPARIN SODIUM 15 UNIT(S)/HR: 5000 INJECTION INTRAVENOUS; SUBCUTANEOUS at 08:33

## 2017-07-03 RX ADMIN — Medication 9 MILLIGRAM(S): at 21:16

## 2017-07-03 RX ADMIN — ALBUTEROL 2.5 MILLIGRAM(S): 90 AEROSOL, METERED ORAL at 14:35

## 2017-07-03 RX ADMIN — SENNA PLUS 10 MILLILITER(S): 8.6 TABLET ORAL at 21:16

## 2017-07-03 RX ADMIN — Medication 4 MILLIGRAM(S): at 22:12

## 2017-07-03 RX ADMIN — ALBUTEROL 2.5 MILLIGRAM(S): 90 AEROSOL, METERED ORAL at 03:13

## 2017-07-03 RX ADMIN — LISINOPRIL 2.5 MILLIGRAM(S): 2.5 TABLET ORAL at 05:18

## 2017-07-03 RX ADMIN — AMIODARONE HYDROCHLORIDE 400 MILLIGRAM(S): 400 TABLET ORAL at 05:19

## 2017-07-03 RX ADMIN — Medication 0.12 MILLIGRAM(S): at 05:19

## 2017-07-03 RX ADMIN — ALBUTEROL 2.5 MILLIGRAM(S): 90 AEROSOL, METERED ORAL at 08:43

## 2017-07-03 RX ADMIN — Medication 50 MILLIGRAM(S): at 17:17

## 2017-07-03 NOTE — PROGRESS NOTE ADULT - SUBJECTIVE AND OBJECTIVE BOX
INTERVAL HPI/OVERNIGHT EVENTS/SUBJECTIVE:    ICU Vital Signs Last 24 Hrs  T(C): 36.6 (03 Jul 2017 03:00), Max: 36.7 (03 Jul 2017 00:00)  T(F): 97.8 (03 Jul 2017 03:00), Max: 98 (03 Jul 2017 00:00)  HR: 66 (03 Jul 2017 09:00) (66 - 79)  BP: 104/70 (03 Jul 2017 09:00) (95/64 - 119/81)  BP(mean): 81 (03 Jul 2017 09:00) (70 - 93)  ABP: --  ABP(mean): --  RR: 29 (03 Jul 2017 09:00) (22 - 43)  SpO2: 100% (03 Jul 2017 09:00) (97% - 100%)      I&O's Detail    02 Jul 2017 07:01  -  03 Jul 2017 07:00  --------------------------------------------------------  IN:    Enteral Tube Flush: 180 mL    heparin Infusion: 361 mL    Pivot: 945 mL  Total IN: 1486 mL    OUT:    Indwelling Catheter - Urethral: 1325 mL    PEG (Percutaneous Endoscopic Gastrostomy) Tube: 160 mL  Total OUT: 1485 mL    Total NET: 1 mL      03 Jul 2017 07:01  -  03 Jul 2017 09:42  --------------------------------------------------------  IN:    heparin Infusion: 30 mL    Pivot: 90 mL  Total IN: 120 mL    OUT:    Indwelling Catheter - Urethral: 70 mL  Total OUT: 70 mL    Total NET: 50 mL                MEDICATIONS  (STANDING):  amantadine Syrup 100 milliGRAM(s) Oral <User Schedule>  doxazosin 4 milliGRAM(s) Oral at bedtime  FLUoxetine Solution 10 milliGRAM(s) Oral daily  traZODone 50 milliGRAM(s) Oral at bedtime  digoxin     Tablet 0.125 milliGRAM(s) Oral daily  melatonin 9 milliGRAM(s) Oral at bedtime  ALBUTerol    0.083% 2.5 milliGRAM(s) Nebulizer every 6 hours  metoprolol 50 milliGRAM(s) Oral two times a day  pantoprazole   Suspension 40 milliGRAM(s) Oral two times a day before meals  senna Syrup 10 milliLiter(s) Oral at bedtime  lisinopril 2.5 milliGRAM(s) Oral daily  amiodarone    Tablet 400 milliGRAM(s) Oral every 12 hours  heparin  Infusion 1600 Unit(s)/Hr (15 mL/Hr) IV Continuous <Continuous>  furosemide   Injectable 40 milliGRAM(s) IV Push every 12 hours    MEDICATIONS  (PRN):  acetaminophen    Suspension 650 milliGRAM(s) Oral every 6 hours PRN For Temp greater than 38.5 C (101.3 F)      NUTRITION/IVF:     CENTRAL LINE:  LOCATION:   DATE INSERTED:  CVP:  SCVO2:    ARMAS:   DATE INSERTED:    A-LINE:    LOCATION:   DATE INSERTED:   SVV:  CO/CI:     CHEST TUBE:  LOCATION:  DATE INSERTED: OUTPUT/24 HRS:  SUCTION/WATER SEAL:     NG/OG TUBE:  DATE INSERTED:  OUTPUT/24 HRS:    MISC:     PHYSICAL EXAM:    Gen:    Eyes:    Neurological:    ENMT:    Neck:    Pulmonary:    Cardiovascular:    Gastrointestinal:    Genitourinary:    Back:    Extremities:    Skin:    Musculoskeletal:          LABS:  CBC Full  -  ( 03 Jul 2017 05:32 )  WBC Count : 11.5 K/uL  Hemoglobin : 10.2 g/dL  Hematocrit : 34.2 %  Platelet Count - Automated : 302 K/uL  Mean Cell Volume : 88.1 fl  Mean Cell Hemoglobin : 26.3 pg  Mean Cell Hemoglobin Concentration : 29.8 g/dL  Auto Neutrophil # : 9.5 K/uL  Auto Lymphocyte # : 1.2 K/uL  Auto Monocyte # : 0.8 K/uL  Auto Eosinophil # : 0.0 K/uL  Auto Basophil # : 0.0 K/uL  Auto Neutrophil % : 82.0 %  Auto Lymphocyte % : 10.1 %  Auto Monocyte % : 6.6 %  Auto Eosinophil % : 0.3 %  Auto Basophil % : 0.2 %    07-03    139  |  94<L>  |  50.0<H>  ----------------------------<  114  4.5   |  31.0<H>  |  0.72    Ca    8.5<L>      03 Jul 2017 05:32  Phos  5.1     07-03  Mg     2.1     07-03      PT/INR - ( 03 Jul 2017 05:32 )   PT: 14.9 sec;   INR: 1.35 ratio         PTT - ( 03 Jul 2017 05:32 )  PTT:74.9 sec    RECENT CULTURES:            CAPILLARY BLOOD GLUCOSE      RADIOLOGY & ADDITIONAL STUDIES:    ASSESSMENT/PLAN:  51yMale presenting with:    Neuro:    CV:    Pulm:    GI/Nutrition:    /Renal:    ID:    Lines/Tubes:    Endo:    Skin:    Proph:    Dispo:      CRITICAL CARE TIME SPENT: INTERVAL HPI/OVERNIGHT EVENTS/SUBJECTIVE:  Extubated Friday and remains extubated.  Following commands intermittently and appears to be trying to comprehend conversation.  Off milrinone and BP has been ok.  Getting heparin gtt after cardioversion on 6/28/17.  Tolerating TF.      ICU Vital Signs Last 24 Hrs  T(C): 36.6 (03 Jul 2017 03:00), Max: 36.7 (03 Jul 2017 00:00)  T(F): 97.8 (03 Jul 2017 03:00), Max: 98 (03 Jul 2017 00:00)  HR: 66 (03 Jul 2017 09:00) (66 - 79)  BP: 104/70 (03 Jul 2017 09:00) (95/64 - 119/81)  BP(mean): 81 (03 Jul 2017 09:00) (70 - 93)  ABP: --  ABP(mean): --  RR: 29 (03 Jul 2017 09:00) (22 - 43)  SpO2: 100% (03 Jul 2017 09:00) (97% - 100%)      I&O's Detail    02 Jul 2017 07:01  -  03 Jul 2017 07:00  --------------------------------------------------------  IN:    Enteral Tube Flush: 180 mL    heparin Infusion: 361 mL    Pivot: 945 mL  Total IN: 1486 mL    OUT:    Indwelling Catheter - Urethral: 1325 mL    PEG (Percutaneous Endoscopic Gastrostomy) Tube: 160 mL  Total OUT: 1485 mL    Total NET: 1 mL      03 Jul 2017 07:01  -  03 Jul 2017 09:42  --------------------------------------------------------  IN:    heparin Infusion: 30 mL    Pivot: 90 mL  Total IN: 120 mL    OUT:    Indwelling Catheter - Urethral: 70 mL  Total OUT: 70 mL    Total NET: 50 mL      MEDICATIONS  (STANDING):  amantadine Syrup 100 milliGRAM(s) Oral <User Schedule>  doxazosin 4 milliGRAM(s) Oral at bedtime  FLUoxetine Solution 10 milliGRAM(s) Oral daily  traZODone 50 milliGRAM(s) Oral at bedtime  digoxin     Tablet 0.125 milliGRAM(s) Oral daily  melatonin 9 milliGRAM(s) Oral at bedtime  ALBUTerol    0.083% 2.5 milliGRAM(s) Nebulizer every 6 hours  metoprolol 50 milliGRAM(s) Oral two times a day  pantoprazole   Suspension 40 milliGRAM(s) Oral two times a day before meals  senna Syrup 10 milliLiter(s) Oral at bedtime  lisinopril 2.5 milliGRAM(s) Oral daily  amiodarone    Tablet 400 milliGRAM(s) Oral every 12 hours  heparin  Infusion 1600 Unit(s)/Hr (15 mL/Hr) IV Continuous <Continuous>  furosemide   Injectable 40 milliGRAM(s) IV Push every 12 hours    MEDICATIONS  (PRN):  acetaminophen    Suspension 650 milliGRAM(s) Oral every 6 hours PRN For Temp greater than 38.5 C (101.3 F)      NUTRITION/IVF:     CENTRAL LINE:  LOCATION:   DATE INSERTED:      MONTOYA:   DATE INSERTED:    PEG    PHYSICAL EXAM:    Gen:  NAD    Eyes:  PERRLA    Neurological:  Intermittently follows commands    ENMT:    Neck:  No JVD    Pulmonary:    Cardiovascular:    Gastrointestinal:  Softly distended, no pain on palpation    Genitourinary:    Back:    Extremities:    Skin:    Musculoskeletal:          LABS:  CBC Full  -  ( 03 Jul 2017 05:32 )  WBC Count : 11.5 K/uL  Hemoglobin : 10.2 g/dL  Hematocrit : 34.2 %  Platelet Count - Automated : 302 K/uL  Mean Cell Volume : 88.1 fl  Mean Cell Hemoglobin : 26.3 pg  Mean Cell Hemoglobin Concentration : 29.8 g/dL  Auto Neutrophil # : 9.5 K/uL  Auto Lymphocyte # : 1.2 K/uL  Auto Monocyte # : 0.8 K/uL  Auto Eosinophil # : 0.0 K/uL  Auto Basophil # : 0.0 K/uL  Auto Neutrophil % : 82.0 %  Auto Lymphocyte % : 10.1 %  Auto Monocyte % : 6.6 %  Auto Eosinophil % : 0.3 %  Auto Basophil % : 0.2 %    07-03    139  |  94<L>  |  50.0<H>  ----------------------------<  114  4.5   |  31.0<H>  |  0.72    Ca    8.5<L>      03 Jul 2017 05:32  Phos  5.1     07-03  Mg     2.1     07-03      PT/INR - ( 03 Jul 2017 05:32 )   PT: 14.9 sec;   INR: 1.35 ratio         PTT - ( 03 Jul 2017 05:32 )  PTT:74.9 sec      ASSESSMENT/PLAN:  51yMale w/ complicated hospital/SICU course    Neuro: More awake.  Avoid delirium.  Amantadine, trazadone and melatonin per PMR    CV:  Monitor BP    Pulm: Supplemental O2 as needed    GI/Nutrition:  Continue TF but monitor abdominal girth as distended. Last BM yesterday    /Renal: Montoya.      ID: None    Lines/Tubes: RIJ TLC; montoya    Endo:  No issues    Skin:  Intact    Proph:  on heparini gtt    Dispo:  SICU    CRITICAL CARE TIME SPENT: 35 min

## 2017-07-03 NOTE — PROGRESS NOTE ADULT - SUBJECTIVE AND OBJECTIVE BOX
Patient was extubated last Friday. medically being monitored for ongoing cardiac complications. Girlfriend and sister in law at bedside. He acknowledges them. Attempts to verbalize. Unable to follow commands.     FUNCTIONAL PROGRESS  Total A    REVIEW OF SYSTEMS  Unable to provide, nonverbal    VITALS  T(C): 36.6 (07-03-17 @ 03:00), Max: 36.7 (07-03-17 @ 00:00)  HR: 75 (07-03-17 @ 14:00) (66 - 79)  BP: 129/88 (07-03-17 @ 14:00) (95/74 - 129/88)  RR: 40 (07-03-17 @ 14:00) (22 - 43)  SpO2: 98% (07-03-17 @ 14:00) (97% - 100%)  Wt(kg): --    MEDICATIONS   amantadine Syrup 100 milliGRAM(s) <User Schedule>  doxazosin 4 milliGRAM(s) at bedtime  FLUoxetine Solution 10 milliGRAM(s) daily  traZODone 50 milliGRAM(s) at bedtime  digoxin     Tablet 0.125 milliGRAM(s) daily  melatonin 9 milliGRAM(s) at bedtime  ALBUTerol    0.083% 2.5 milliGRAM(s) every 6 hours  metoprolol 50 milliGRAM(s) two times a day  acetaminophen    Suspension 650 milliGRAM(s) every 6 hours PRN  pantoprazole   Suspension 40 milliGRAM(s) two times a day before meals  senna Syrup 10 milliLiter(s) at bedtime  lisinopril 2.5 milliGRAM(s) daily  amiodarone    Tablet 400 milliGRAM(s) every 12 hours  heparin  Infusion 1600 Unit(s)/Hr <Continuous>  furosemide   Injectable 40 milliGRAM(s) every 12 hours  bisacodyl Suppository 10 milliGRAM(s) daily PRN  furosemide   Injectable 40 milliGRAM(s) once      RECENT LABS/IMAGING  CBC Full  -  ( 03 Jul 2017 05:32 )  WBC Count : 11.5 K/uL  Hemoglobin : 10.2 g/dL  Hematocrit : 34.2 %  Platelet Count - Automated : 302 K/uL  Mean Cell Volume : 88.1 fl  Mean Cell Hemoglobin : 26.3 pg  Mean Cell Hemoglobin Concentration : 29.8 g/dL  Auto Neutrophil # : 9.5 K/uL  Auto Lymphocyte # : 1.2 K/uL  Auto Monocyte # : 0.8 K/uL  Auto Eosinophil # : 0.0 K/uL  Auto Basophil # : 0.0 K/uL  Auto Neutrophil % : 82.0 %  Auto Lymphocyte % : 10.1 %  Auto Monocyte % : 6.6 %  Auto Eosinophil % : 0.3 %  Auto Basophil % : 0.2 %    07-03    139  |  94<L>  |  50.0<H>  ----------------------------<  114  4.5   |  31.0<H>  |  0.72    Ca    8.5<L>      03 Jul 2017 05:32  Phos  5.1     07-03  Mg     2.1     07-03        Coma Recovery Scale - Revised    AUDITORY FUNCTION SCALE  2 - Localization to Sound  VISUAL FUNCTION SCALE  3 - Visual Pursuit *  MOTOR FUNCTION SCALE  4 - Object Manipulation *  OROMOTOR/VERBAL FUNCTION SCALE  1 - Oral Reflexive Movement  COMMUNICATION SCALE  0 - None  AROUSAL SCALE  2 - Eye Opening w/o Stimulation    TOTAL SCORE 12    ----------------------------------------------------------------------------------------  PHYSICAL EXAM    Constitutional - NAD  Extremities - BLE and right LE edema - pitting up to thighs  Neurologic Exam -                    Cognitive - Awake, Alert     Motor - Moves right more than left     Coordination - Significantly impaired due to motor weakness  Psychiatric - Affect Flat, Frustrated    ----------------------------------------------------------------------------------------  ASSESSMENT/PLAN  51M with cardiac arrest s/p ERCP for bile duct leak with prolonged hospitalization related to prolonged intubation and cognitive/behavioral deficits.    Arousal/Sleep wake cycle - Amantadine 100mg, Melatonin 9mg, Trazodone   50mg    Mood/Motor recovery - Prozac 10mg daily     Rehab - PT/OT for 3x/week program for ongoing daily stimulation. Patient was extubated last Friday. medically being monitored for ongoing cardiac complications. Girlfriend and sister in law at bedside. He acknowledges them. Attempts to verbalize. Unable to follow commands.     FUNCTIONAL PROGRESS  Total A    REVIEW OF SYSTEMS  Unable to provide, nonverbal    VITALS  T(C): 36.6 (07-03-17 @ 03:00), Max: 36.7 (07-03-17 @ 00:00)  HR: 75 (07-03-17 @ 14:00) (66 - 79)  BP: 129/88 (07-03-17 @ 14:00) (95/74 - 129/88)  RR: 40 (07-03-17 @ 14:00) (22 - 43)  SpO2: 98% (07-03-17 @ 14:00) (97% - 100%)  Wt(kg): --    MEDICATIONS   amantadine Syrup 100 milliGRAM(s) <User Schedule>  doxazosin 4 milliGRAM(s) at bedtime  FLUoxetine Solution 10 milliGRAM(s) daily  traZODone 50 milliGRAM(s) at bedtime  digoxin     Tablet 0.125 milliGRAM(s) daily  melatonin 9 milliGRAM(s) at bedtime  ALBUTerol    0.083% 2.5 milliGRAM(s) every 6 hours  metoprolol 50 milliGRAM(s) two times a day  acetaminophen    Suspension 650 milliGRAM(s) every 6 hours PRN  pantoprazole   Suspension 40 milliGRAM(s) two times a day before meals  senna Syrup 10 milliLiter(s) at bedtime  lisinopril 2.5 milliGRAM(s) daily  amiodarone    Tablet 400 milliGRAM(s) every 12 hours  heparin  Infusion 1600 Unit(s)/Hr <Continuous>  furosemide   Injectable 40 milliGRAM(s) every 12 hours  bisacodyl Suppository 10 milliGRAM(s) daily PRN  furosemide   Injectable 40 milliGRAM(s) once      RECENT LABS/IMAGING  CBC Full  -  ( 03 Jul 2017 05:32 )  WBC Count : 11.5 K/uL  Hemoglobin : 10.2 g/dL  Hematocrit : 34.2 %  Platelet Count - Automated : 302 K/uL  Mean Cell Volume : 88.1 fl  Mean Cell Hemoglobin : 26.3 pg  Mean Cell Hemoglobin Concentration : 29.8 g/dL  Auto Neutrophil # : 9.5 K/uL  Auto Lymphocyte # : 1.2 K/uL  Auto Monocyte # : 0.8 K/uL  Auto Eosinophil # : 0.0 K/uL  Auto Basophil # : 0.0 K/uL  Auto Neutrophil % : 82.0 %  Auto Lymphocyte % : 10.1 %  Auto Monocyte % : 6.6 %  Auto Eosinophil % : 0.3 %  Auto Basophil % : 0.2 %    07-03    139  |  94<L>  |  50.0<H>  ----------------------------<  114  4.5   |  31.0<H>  |  0.72    Ca    8.5<L>      03 Jul 2017 05:32  Phos  5.1     07-03  Mg     2.1     07-03        Coma Recovery Scale - Revised    AUDITORY FUNCTION SCALE  2 - Localization to Sound  VISUAL FUNCTION SCALE  3 - Visual Pursuit *  MOTOR FUNCTION SCALE  4 - Object Manipulation *  OROMOTOR/VERBAL FUNCTION SCALE  1 - Oral Reflexive Movement  COMMUNICATION SCALE  0 - None  AROUSAL SCALE  2 - Eye Opening w/o Stimulation    TOTAL SCORE 12    ----------------------------------------------------------------------------------------  PHYSICAL EXAM    Constitutional - NAD  Extremities - BLE and right LE edema - pitting up to thighs  Neurologic Exam -                    Cognitive - Awake, Alert     Motor - Moves right more than left     Coordination - Significantly impaired due to motor weakness  Psychiatric - Affect Flat, Frustrated    ----------------------------------------------------------------------------------------  ASSESSMENT/PLAN  51M with cardiac arrest s/p ERCP for bile duct leak with prolonged hospitalization related to prolonged intubation and cognitive/behavioral deficits.    Arousal/Sleep wake cycle - Amantadine 100mg, Melatonin 9mg, Trazodone   50mg    Mood/Motor recovery - Prozac 10mg daily     Rehab - PT/OT for 3x/week program for ongoing daily stimulation. Will continue to follow. Will need medical optimization for consideration to acute rehab.

## 2017-07-04 LAB
ANION GAP SERPL CALC-SCNC: 15 MMOL/L — SIGNIFICANT CHANGE UP (ref 5–17)
APTT BLD: 82.6 SEC — HIGH (ref 27.5–37.4)
BASOPHILS # BLD AUTO: 0 K/UL — SIGNIFICANT CHANGE UP (ref 0–0.2)
BASOPHILS NFR BLD AUTO: 0.2 % — SIGNIFICANT CHANGE UP (ref 0–2)
BUN SERPL-MCNC: 63 MG/DL — HIGH (ref 8–20)
CALCIUM SERPL-MCNC: 8.6 MG/DL — SIGNIFICANT CHANGE UP (ref 8.6–10.2)
CHLORIDE SERPL-SCNC: 96 MMOL/L — LOW (ref 98–107)
CO2 SERPL-SCNC: 29 MMOL/L — SIGNIFICANT CHANGE UP (ref 22–29)
CREAT SERPL-MCNC: 0.89 MG/DL — SIGNIFICANT CHANGE UP (ref 0.5–1.3)
EOSINOPHIL # BLD AUTO: 0 K/UL — SIGNIFICANT CHANGE UP (ref 0–0.5)
EOSINOPHIL NFR BLD AUTO: 0.1 % — SIGNIFICANT CHANGE UP (ref 0–5)
GLUCOSE SERPL-MCNC: 129 MG/DL — HIGH (ref 70–115)
HCT VFR BLD CALC: 35 % — LOW (ref 42–52)
HGB BLD-MCNC: 10.6 G/DL — LOW (ref 14–18)
INR BLD: 1.4 RATIO — HIGH (ref 0.88–1.16)
LYMPHOCYTES # BLD AUTO: 1.8 K/UL — SIGNIFICANT CHANGE UP (ref 1–4.8)
LYMPHOCYTES # BLD AUTO: 15.6 % — LOW (ref 20–55)
MAGNESIUM SERPL-MCNC: 2.2 MG/DL — SIGNIFICANT CHANGE UP (ref 1.8–2.6)
MCHC RBC-ENTMCNC: 26.2 PG — LOW (ref 27–31)
MCHC RBC-ENTMCNC: 30.3 G/DL — LOW (ref 32–36)
MCV RBC AUTO: 86.4 FL — SIGNIFICANT CHANGE UP (ref 80–94)
MONOCYTES # BLD AUTO: 1.1 K/UL — HIGH (ref 0–0.8)
MONOCYTES NFR BLD AUTO: 10.1 % — HIGH (ref 3–10)
NEUTROPHILS # BLD AUTO: 8.3 K/UL — HIGH (ref 1.8–8)
NEUTROPHILS NFR BLD AUTO: 73.5 % — HIGH (ref 37–73)
PHOSPHATE SERPL-MCNC: 4.7 MG/DL — SIGNIFICANT CHANGE UP (ref 2.4–4.7)
PLATELET # BLD AUTO: 333 K/UL — SIGNIFICANT CHANGE UP (ref 150–400)
POTASSIUM SERPL-MCNC: 4.7 MMOL/L — SIGNIFICANT CHANGE UP (ref 3.5–5.3)
POTASSIUM SERPL-SCNC: 4.7 MMOL/L — SIGNIFICANT CHANGE UP (ref 3.5–5.3)
PROTHROM AB SERPL-ACNC: 15.5 SEC — HIGH (ref 9.8–12.7)
RBC # BLD: 4.05 M/UL — LOW (ref 4.6–6.2)
RBC # FLD: 18.3 % — HIGH (ref 11–15.6)
SODIUM SERPL-SCNC: 140 MMOL/L — SIGNIFICANT CHANGE UP (ref 135–145)
WBC # BLD: 11.3 K/UL — HIGH (ref 4.8–10.8)
WBC # FLD AUTO: 11.3 K/UL — HIGH (ref 4.8–10.8)

## 2017-07-04 PROCEDURE — 99291 CRITICAL CARE FIRST HOUR: CPT

## 2017-07-04 PROCEDURE — 71010: CPT | Mod: 26

## 2017-07-04 PROCEDURE — 99292 CRITICAL CARE ADDL 30 MIN: CPT

## 2017-07-04 RX ORDER — FUROSEMIDE 40 MG
40 TABLET ORAL ONCE
Qty: 0 | Refills: 0 | Status: COMPLETED | OUTPATIENT
Start: 2017-07-04 | End: 2017-07-04

## 2017-07-04 RX ORDER — HALOPERIDOL DECANOATE 100 MG/ML
5 INJECTION INTRAMUSCULAR ONCE
Qty: 0 | Refills: 0 | Status: COMPLETED | OUTPATIENT
Start: 2017-07-04 | End: 2017-07-04

## 2017-07-04 RX ADMIN — Medication 40 MILLIGRAM(S): at 05:04

## 2017-07-04 RX ADMIN — PANTOPRAZOLE SODIUM 40 MILLIGRAM(S): 20 TABLET, DELAYED RELEASE ORAL at 17:52

## 2017-07-04 RX ADMIN — Medication 100 MILLIGRAM(S): at 05:03

## 2017-07-04 RX ADMIN — ALBUTEROL 2.5 MILLIGRAM(S): 90 AEROSOL, METERED ORAL at 02:16

## 2017-07-04 RX ADMIN — Medication 100 MILLIGRAM(S): at 11:10

## 2017-07-04 RX ADMIN — SENNA PLUS 10 MILLILITER(S): 8.6 TABLET ORAL at 21:07

## 2017-07-04 RX ADMIN — ALBUTEROL 2.5 MILLIGRAM(S): 90 AEROSOL, METERED ORAL at 08:35

## 2017-07-04 RX ADMIN — Medication 10 MILLIGRAM(S): at 11:10

## 2017-07-04 RX ADMIN — Medication 9 MILLIGRAM(S): at 21:08

## 2017-07-04 RX ADMIN — LISINOPRIL 2.5 MILLIGRAM(S): 2.5 TABLET ORAL at 05:02

## 2017-07-04 RX ADMIN — Medication 0.12 MILLIGRAM(S): at 05:03

## 2017-07-04 RX ADMIN — ALBUTEROL 2.5 MILLIGRAM(S): 90 AEROSOL, METERED ORAL at 20:32

## 2017-07-04 RX ADMIN — Medication 50 MILLIGRAM(S): at 21:09

## 2017-07-04 RX ADMIN — Medication 50 MILLIGRAM(S): at 17:52

## 2017-07-04 RX ADMIN — Medication 50 MILLIGRAM(S): at 05:04

## 2017-07-04 RX ADMIN — Medication 40 MILLIGRAM(S): at 17:51

## 2017-07-04 RX ADMIN — AMIODARONE HYDROCHLORIDE 400 MILLIGRAM(S): 400 TABLET ORAL at 05:02

## 2017-07-04 RX ADMIN — ALBUTEROL 2.5 MILLIGRAM(S): 90 AEROSOL, METERED ORAL at 15:40

## 2017-07-04 RX ADMIN — PANTOPRAZOLE SODIUM 40 MILLIGRAM(S): 20 TABLET, DELAYED RELEASE ORAL at 05:02

## 2017-07-04 RX ADMIN — Medication 4 MILLIGRAM(S): at 21:08

## 2017-07-04 RX ADMIN — Medication 40 MILLIGRAM(S): at 20:51

## 2017-07-04 NOTE — CHART NOTE - NSCHARTNOTEFT_GEN_A_CORE
Patient with minimal UOP noted.  Pt with 200 cc with mobilization.  Upon manipulating and flushing Montoya, large amount of gross hematuria expressed into montoya tubing.  Hep gtt DC'd, initial BP reading 90/60.  Montoya DC'd with continued gross hematuria noted.  3 way irrigation Montoya placed and urine now noted to be blood tinged only.    1 unit PRBC ordered.  No melena noted.  No other active signs of bleeding.  Labs, including cbc and coags, sent and additional IV access obtained.  Pt's spouse notified and pt consented for blood and possible central line placement.  Will administer bladder irrigation if gross hematuria persists.     critical care time:  35 minutes

## 2017-07-04 NOTE — PROGRESS NOTE ADULT - SUBJECTIVE AND OBJECTIVE BOX
INTERVAL HPI/OVERNIGHT EVENTS/SUBJECTIVE:  Patient continues to remain extubated. No apparent complaints of pain. Tolerating enteral feeds at goal. Continues with diuresis. BM x  1 yesterday      ICU Vital Signs Last 24 Hrs  T(C): 35.8 (04 Jul 2017 08:00), Max: 36.6 (03 Jul 2017 20:00)  T(F): 96.4 (04 Jul 2017 08:00), Max: 97.8 (03 Jul 2017 20:00)  HR: 61 (04 Jul 2017 11:00) (61 - 87)  BP: 114/62 (04 Jul 2017 11:00) (103/73 - 143/78)  BP(mean): 79 (04 Jul 2017 11:00) (73 - 99)  ABP: --  ABP(mean): --  RR: 25 (04 Jul 2017 09:00) (22 - 42)  SpO2: 98% (04 Jul 2017 11:00) (95% - 100%)      I&O's Detail    03 Jul 2017 07:01  -  04 Jul 2017 07:00  --------------------------------------------------------  IN:    Enteral Tube Flush: 360 mL    heparin Infusion: 360 mL    Pivot: 1080 mL  Total IN: 1800 mL    OUT:    Indwelling Catheter - Urethral: 735 mL  Total OUT: 735 mL    Total NET: 1065 mL      04 Jul 2017 07:01  -  04 Jul 2017 11:58  --------------------------------------------------------  IN:    Enteral Tube Flush: 50 mL    heparin Infusion: 75 mL    Pivot: 225 mL  Total IN: 350 mL    OUT:    Indwelling Catheter - Urethral: 80 mL  Total OUT: 80 mL    Total NET: 270 mL      MEDICATIONS  (STANDING):  amantadine Syrup 100 milliGRAM(s) Oral <User Schedule>  doxazosin 4 milliGRAM(s) Oral at bedtime  FLUoxetine Solution 10 milliGRAM(s) Oral daily  traZODone 50 milliGRAM(s) Oral at bedtime  digoxin     Tablet 0.125 milliGRAM(s) Oral daily  melatonin 9 milliGRAM(s) Oral at bedtime  ALBUTerol    0.083% 2.5 milliGRAM(s) Nebulizer every 6 hours  metoprolol 50 milliGRAM(s) Oral two times a day  pantoprazole   Suspension 40 milliGRAM(s) Oral two times a day before meals  senna Syrup 10 milliLiter(s) Oral at bedtime  lisinopril 2.5 milliGRAM(s) Oral daily  amiodarone    Tablet 400 milliGRAM(s) Oral daily  heparin  Infusion 1600 Unit(s)/Hr (15 mL/Hr) IV Continuous <Continuous>  furosemide   Injectable 40 milliGRAM(s) IV Push every 12 hours    MEDICATIONS  (PRN):  acetaminophen    Suspension 650 milliGRAM(s) Oral every 6 hours PRN For Temp greater than 38.5 C (101.3 F)  bisacodyl Suppository 10 milliGRAM(s) Rectal daily PRN Constipation      NUTRITION/IVF: Enteral feeds at goal    CENTRAL LINE: No    MONTOYA:   Yes  MISC:     PHYSICAL EXAM:      Eyes: EOMI    Neurological: Alert, occasionally following commands    Neck: supple    Pulmonary: coarse bs bilaterally. decreased at bases    Cardiovascular: afib. rate controlled    Gastrointestinal: distended abdomen. non tender. unchanged in appearance. PEG in place. Well healing midline wound    Genitourinary: montoya in place    Extremities: 4 extremity pitting edema    Skin:no skin breakdown    LABS:  CBC Full  -  ( 04 Jul 2017 05:31 )  WBC Count : 11.3 K/uL  Hemoglobin : 10.6 g/dL  Hematocrit : 35.0 %  Platelet Count - Automated : 333 K/uL  Mean Cell Volume : 86.4 fl  Mean Cell Hemoglobin : 26.2 pg  Mean Cell Hemoglobin Concentration : 30.3 g/dL  Auto Neutrophil # : 8.3 K/uL  Auto Lymphocyte # : 1.8 K/uL  Auto Monocyte # : 1.1 K/uL  Auto Eosinophil # : 0.0 K/uL  Auto Basophil # : 0.0 K/uL  Auto Neutrophil % : 73.5 %  Auto Lymphocyte % : 15.6 %  Auto Monocyte % : 10.1 %  Auto Eosinophil % : 0.1 %  Auto Basophil % : 0.2 %    07-04    140  |  96<L>  |  63.0<H>  ----------------------------<  129<H>  4.7   |  29.0  |  0.89    Ca    8.6      04 Jul 2017 05:31  Phos  4.7     07-04  Mg     2.2     07-04      PT/INR - ( 04 Jul 2017 05:31 )   PT: 15.5 sec;   INR: 1.40 ratio         PTT - ( 04 Jul 2017 05:31 )  PTT:82.6 sec    RECENT CULTURES:      RADIOLOGY & ADDITIONAL STUDIES:    ASSESSMENT/PLAN:  51yMale presenting with:    -continue with diuresis as tolerated  -OOBTC  -Fall risk  -continue enteral feeds at goal  -continue heparin drip (therapeutic). Need to discuss conversion strategy  -monitor BUN as increasing. No signs of bleeding. Possibly due to diuresis.   -Montoya removal in am?

## 2017-07-05 LAB
ANION GAP SERPL CALC-SCNC: 13 MMOL/L — SIGNIFICANT CHANGE UP (ref 5–17)
ANION GAP SERPL CALC-SCNC: 16 MMOL/L — SIGNIFICANT CHANGE UP (ref 5–17)
ANISOCYTOSIS BLD QL: SLIGHT — SIGNIFICANT CHANGE UP
APTT BLD: 28.4 SEC — SIGNIFICANT CHANGE UP (ref 27.5–37.4)
APTT BLD: 44.2 SEC — HIGH (ref 27.5–37.4)
BLD GP AB SCN SERPL QL: SIGNIFICANT CHANGE UP
BUN SERPL-MCNC: 77 MG/DL — HIGH (ref 8–20)
BUN SERPL-MCNC: 78 MG/DL — HIGH (ref 8–20)
CALCIUM SERPL-MCNC: 8.2 MG/DL — LOW (ref 8.6–10.2)
CALCIUM SERPL-MCNC: 8.4 MG/DL — LOW (ref 8.6–10.2)
CHLORIDE SERPL-SCNC: 95 MMOL/L — LOW (ref 98–107)
CHLORIDE SERPL-SCNC: 96 MMOL/L — LOW (ref 98–107)
CO2 SERPL-SCNC: 30 MMOL/L — HIGH (ref 22–29)
CO2 SERPL-SCNC: 32 MMOL/L — HIGH (ref 22–29)
CREAT SERPL-MCNC: 1.06 MG/DL — SIGNIFICANT CHANGE UP (ref 0.5–1.3)
CREAT SERPL-MCNC: 1.06 MG/DL — SIGNIFICANT CHANGE UP (ref 0.5–1.3)
GLUCOSE SERPL-MCNC: 133 MG/DL — HIGH (ref 70–115)
GLUCOSE SERPL-MCNC: 143 MG/DL — HIGH (ref 70–115)
HCT VFR BLD CALC: 32 % — LOW (ref 42–52)
HCT VFR BLD CALC: 35.6 % — LOW (ref 42–52)
HGB BLD-MCNC: 10.8 G/DL — LOW (ref 14–18)
HGB BLD-MCNC: 9.4 G/DL — LOW (ref 14–18)
HYPOCHROMIA BLD QL: SLIGHT — SIGNIFICANT CHANGE UP
INR BLD: 1.35 RATIO — HIGH (ref 0.88–1.16)
INR BLD: 1.52 RATIO — HIGH (ref 0.88–1.16)
LYMPHOCYTES # BLD AUTO: 1.4 K/UL — SIGNIFICANT CHANGE UP (ref 1–4.8)
LYMPHOCYTES # BLD AUTO: 12 % — LOW (ref 20–55)
MAGNESIUM SERPL-MCNC: 2 MG/DL — SIGNIFICANT CHANGE UP (ref 1.6–2.6)
MAGNESIUM SERPL-MCNC: 2.1 MG/DL — SIGNIFICANT CHANGE UP (ref 1.6–2.6)
MCHC RBC-ENTMCNC: 26 PG — LOW (ref 27–31)
MCHC RBC-ENTMCNC: 26.3 PG — LOW (ref 27–31)
MCHC RBC-ENTMCNC: 29.4 G/DL — LOW (ref 32–36)
MCHC RBC-ENTMCNC: 30.3 G/DL — LOW (ref 32–36)
MCV RBC AUTO: 86.8 FL — SIGNIFICANT CHANGE UP (ref 80–94)
MCV RBC AUTO: 88.4 FL — SIGNIFICANT CHANGE UP (ref 80–94)
MONOCYTES # BLD AUTO: 1.1 K/UL — HIGH (ref 0–0.8)
MONOCYTES NFR BLD AUTO: 9 % — SIGNIFICANT CHANGE UP (ref 3–10)
NEUTROPHILS # BLD AUTO: 9 K/UL — HIGH (ref 1.8–8)
NEUTROPHILS NFR BLD AUTO: 76 % — HIGH (ref 37–73)
NEUTS BAND # BLD: 3 % — SIGNIFICANT CHANGE UP (ref 0–8)
NRBC # BLD: 1 /100 — HIGH (ref 0–0)
OB PNL STL: NEGATIVE — SIGNIFICANT CHANGE UP
OVALOCYTES BLD QL SMEAR: SLIGHT — SIGNIFICANT CHANGE UP
PHOSPHATE SERPL-MCNC: 4.8 MG/DL — HIGH (ref 2.4–4.7)
PHOSPHATE SERPL-MCNC: 4.9 MG/DL — HIGH (ref 2.4–4.7)
PLAT MORPH BLD: NORMAL — SIGNIFICANT CHANGE UP
PLATELET # BLD AUTO: 257 K/UL — SIGNIFICANT CHANGE UP (ref 150–400)
PLATELET # BLD AUTO: 302 K/UL — SIGNIFICANT CHANGE UP (ref 150–400)
POIKILOCYTOSIS BLD QL AUTO: SLIGHT — SIGNIFICANT CHANGE UP
POLYCHROMASIA BLD QL SMEAR: SLIGHT — SIGNIFICANT CHANGE UP
POTASSIUM SERPL-MCNC: 4.3 MMOL/L — SIGNIFICANT CHANGE UP (ref 3.5–5.3)
POTASSIUM SERPL-MCNC: 4.4 MMOL/L — SIGNIFICANT CHANGE UP (ref 3.5–5.3)
POTASSIUM SERPL-SCNC: 4.3 MMOL/L — SIGNIFICANT CHANGE UP (ref 3.5–5.3)
POTASSIUM SERPL-SCNC: 4.4 MMOL/L — SIGNIFICANT CHANGE UP (ref 3.5–5.3)
PROTHROM AB SERPL-ACNC: 14.9 SEC — HIGH (ref 9.8–12.7)
PROTHROM AB SERPL-ACNC: 16.9 SEC — HIGH (ref 9.8–12.7)
RBC # BLD: 3.62 M/UL — LOW (ref 4.6–6.2)
RBC # BLD: 4.1 M/UL — LOW (ref 4.6–6.2)
RBC # FLD: 18.3 % — HIGH (ref 11–15.6)
RBC # FLD: 18.5 % — HIGH (ref 11–15.6)
RBC BLD AUTO: ABNORMAL
SODIUM SERPL-SCNC: 140 MMOL/L — SIGNIFICANT CHANGE UP (ref 135–145)
SODIUM SERPL-SCNC: 142 MMOL/L — SIGNIFICANT CHANGE UP (ref 135–145)
WBC # BLD: 11.4 K/UL — HIGH (ref 4.8–10.8)
WBC # BLD: 11.8 K/UL — HIGH (ref 4.8–10.8)
WBC # FLD AUTO: 11.4 K/UL — HIGH (ref 4.8–10.8)
WBC # FLD AUTO: 11.8 K/UL — HIGH (ref 4.8–10.8)

## 2017-07-05 PROCEDURE — 99232 SBSQ HOSP IP/OBS MODERATE 35: CPT

## 2017-07-05 RX ORDER — HEPARIN SODIUM 5000 [USP'U]/ML
5000 INJECTION INTRAVENOUS; SUBCUTANEOUS EVERY 8 HOURS
Qty: 0 | Refills: 0 | Status: DISCONTINUED | OUTPATIENT
Start: 2017-07-05 | End: 2017-08-02

## 2017-07-05 RX ORDER — ACETAMINOPHEN 500 MG
1000 TABLET ORAL ONCE
Qty: 0 | Refills: 0 | Status: COMPLETED | OUTPATIENT
Start: 2017-07-05 | End: 2017-07-05

## 2017-07-05 RX ADMIN — Medication 50 MILLIGRAM(S): at 21:47

## 2017-07-05 RX ADMIN — SENNA PLUS 10 MILLILITER(S): 8.6 TABLET ORAL at 21:46

## 2017-07-05 RX ADMIN — Medication 400 MILLIGRAM(S): at 12:30

## 2017-07-05 RX ADMIN — Medication 100 MILLIGRAM(S): at 05:20

## 2017-07-05 RX ADMIN — Medication 100 MILLIGRAM(S): at 12:30

## 2017-07-05 RX ADMIN — HEPARIN SODIUM 5000 UNIT(S): 5000 INJECTION INTRAVENOUS; SUBCUTANEOUS at 12:31

## 2017-07-05 RX ADMIN — Medication 50 MILLIGRAM(S): at 05:21

## 2017-07-05 RX ADMIN — Medication 9 MILLIGRAM(S): at 21:46

## 2017-07-05 RX ADMIN — HEPARIN SODIUM 5000 UNIT(S): 5000 INJECTION INTRAVENOUS; SUBCUTANEOUS at 21:47

## 2017-07-05 RX ADMIN — Medication 4 MILLIGRAM(S): at 21:46

## 2017-07-05 RX ADMIN — PANTOPRAZOLE SODIUM 40 MILLIGRAM(S): 20 TABLET, DELAYED RELEASE ORAL at 17:13

## 2017-07-05 RX ADMIN — AMIODARONE HYDROCHLORIDE 400 MILLIGRAM(S): 400 TABLET ORAL at 05:21

## 2017-07-05 RX ADMIN — Medication 0.12 MILLIGRAM(S): at 05:21

## 2017-07-05 RX ADMIN — Medication 40 MILLIGRAM(S): at 02:57

## 2017-07-05 RX ADMIN — ALBUTEROL 2.5 MILLIGRAM(S): 90 AEROSOL, METERED ORAL at 01:51

## 2017-07-05 RX ADMIN — ALBUTEROL 2.5 MILLIGRAM(S): 90 AEROSOL, METERED ORAL at 20:03

## 2017-07-05 RX ADMIN — Medication 50 MILLIGRAM(S): at 17:13

## 2017-07-05 RX ADMIN — Medication 1000 MILLIGRAM(S): at 12:58

## 2017-07-05 RX ADMIN — ALBUTEROL 2.5 MILLIGRAM(S): 90 AEROSOL, METERED ORAL at 15:26

## 2017-07-05 RX ADMIN — ALBUTEROL 2.5 MILLIGRAM(S): 90 AEROSOL, METERED ORAL at 08:45

## 2017-07-05 RX ADMIN — Medication 10 MILLIGRAM(S): at 11:16

## 2017-07-05 RX ADMIN — LISINOPRIL 2.5 MILLIGRAM(S): 2.5 TABLET ORAL at 05:21

## 2017-07-05 RX ADMIN — PANTOPRAZOLE SODIUM 40 MILLIGRAM(S): 20 TABLET, DELAYED RELEASE ORAL at 05:21

## 2017-07-05 RX ADMIN — Medication 40 MILLIGRAM(S): at 17:13

## 2017-07-05 NOTE — PROGRESS NOTE ADULT - SUBJECTIVE AND OBJECTIVE BOX
INTERVAL HPI/OVERNIGHT EVENTS:  developed hematuria overnight, required placement of 3 - way montoya for irrigation.  transfused 1 prbc.    PRESSORS: [ ] YES x[ ] NO  WHICH:  DOSE:    ANTIBIOTICS:            x      DATE STARTED:  ANTIBIOTICS:                  DATE STARTED:  ANTIBIOTICS:                  DATE STARTED:    MEDICATIONS  (STANDING):  amantadine Syrup 100 milliGRAM(s) Oral <User Schedule>  doxazosin 4 milliGRAM(s) Oral at bedtime  FLUoxetine Solution 10 milliGRAM(s) Oral daily  traZODone 50 milliGRAM(s) Oral at bedtime  digoxin     Tablet 0.125 milliGRAM(s) Oral daily  melatonin 9 milliGRAM(s) Oral at bedtime  ALBUTerol    0.083% 2.5 milliGRAM(s) Nebulizer every 6 hours  metoprolol 50 milliGRAM(s) Oral two times a day  pantoprazole   Suspension 40 milliGRAM(s) Oral two times a day before meals  senna Syrup 10 milliLiter(s) Oral at bedtime  lisinopril 2.5 milliGRAM(s) Oral daily  amiodarone    Tablet 400 milliGRAM(s) Oral daily  furosemide   Injectable 40 milliGRAM(s) IV Push every 12 hours  heparin  Injectable 5000 Unit(s) SubCutaneous every 8 hours    MEDICATIONS  (PRN):  acetaminophen    Suspension 650 milliGRAM(s) Oral every 6 hours PRN For Temp greater than 38.5 C (101.3 F)  bisacodyl Suppository 10 milliGRAM(s) Rectal daily PRN Constipation      Drug Dosing Weight  Height (cm): 165.1 (12 Jun 2017 06:38)  Weight (kg): 81.5 (12 Jun 2017 06:38)  BMI (kg/m2): 29.9 (12 Jun 2017 06:38)  BSA (m2): 1.89 (12 Jun 2017 06:38)    CENTRAL LINE: [ ] YES [x ] NO  LOCATION:   DATE INSERTED:  REMOVE: [ ] YES [ ] NO  EXPLAIN:    MONTOYA: [ x] YES [ ] NO    DATE INSERTED:  REMOVE: [ ] YES [x ] NO  EXPLAIN: hematuria.    A-LINE: [ ] YES x[ ] NO  LOCATION:   DATE INSERTED:  REMOVE: [ ] YES [ ] NO  EXPLAIN:    PAST MEDICAL & SURGICAL HISTORY:  Mitral regurgitation: severe MR  Cardiomyopathy, nonischemic  CAD (coronary artery disease)  Asthma  Atrial fibrillation  S/P laparoscopic cholecystectomy  History of humerus fracture: Metal pins in Left Humerus  Artificial pacemaker      ICU Vital Signs Last 24 Hrs  T(C): 36 (05 Jul 2017 11:00), Max: 36.9 (05 Jul 2017 01:00)  T(F): 96.8 (05 Jul 2017 11:00), Max: 98.5 (05 Jul 2017 01:00)  HR: 64 (05 Jul 2017 14:00) (64 - 94)  BP: 116/59 (05 Jul 2017 14:00) (81/64 - 130/59)  BP(mean): 68 (05 Jul 2017 14:00) (62 - 101)  ABP: --  ABP(mean): --  RR: 25 (05 Jul 2017 14:00) (20 - 42)  SpO2: 98% (05 Jul 2017 14:00) (92% - 100%)          I&O's Detail    04 Jul 2017 07:01  -  05 Jul 2017 07:00  --------------------------------------------------------  IN:    Enteral Tube Flush: 450 mL    heparin Infusion: 210 mL    Pivot: 1080 mL    PRBCs (Packed Red Blood Cells): 283 mL  Total IN: 2023 mL    OUT:    Indwelling Catheter - Urethral: 1650 mL  Total OUT: 1650 mL    Total NET: 373 mL      05 Jul 2017 07:01  -  05 Jul 2017 14:31  --------------------------------------------------------  IN:    Enteral Tube Flush: 30 mL    Pivot: 315 mL    Solution: 100 mL  Total IN: 445 mL    OUT:    Indwelling Catheter - Urethral: 520 mL  Total OUT: 520 mL    Total NET: -75 mL              Physical Exam:    Neurological:  No sensory/motor deficits    HEENT: PERRLA, EOMI, no drainage or redness    Neck: No bruits; no thyromegaly or nodules,  + JVD    Back: Normal spine flexure, No CVA tenderness, No deformity or limitation of movement    Respiratory: Breath Sounds equal & clear to auscultation, no accessory muscle use    Cardiovascular: Regular rate & rhythm, normal S1, S2; no murmurs, gallops or rubs    Gastrointestinal: Soft, non-tender, normal bowel sounds, peg in good position.    Extremities: No peripheral edema, No cyanosis, clubbing     Vascular: Equal and normal pulses: 2+ peripheral pulses throughout    Musculoskeletal: No joint pain, swelling or deformity; no limitation of movement    Skin: No rashes    LABS:  CBC Full  -  ( 05 Jul 2017 06:14 )  WBC Count : 11.8 K/uL  Hemoglobin : 10.8 g/dL  Hematocrit : 35.6 %  Platelet Count - Automated : 257 K/uL  Mean Cell Volume : 86.8 fl  Mean Cell Hemoglobin : 26.3 pg  Mean Cell Hemoglobin Concentration : 30.3 g/dL  Auto Neutrophil # : 9.0 K/uL  Auto Lymphocyte # : 1.4 K/uL  Auto Monocyte # : 1.1 K/uL  Auto Eosinophil # : x  Auto Basophil # : x  Auto Neutrophil % : 76.0 %  Auto Lymphocyte % : 12.0 %  Auto Monocyte % : 9.0 %  Auto Eosinophil % : x  Auto Basophil % : x    07-05    142  |  96<L>  |  77.0<H>  ----------------------------<  143<H>  4.4   |  30.0<H>  |  1.06    Ca    8.4<L>      05 Jul 2017 06:14  Phos  4.8     07-05  Mg     2.1     07-05      PT/INR - ( 05 Jul 2017 06:14 )   PT: 14.9 sec;   INR: 1.35 ratio         PTT - ( 05 Jul 2017 06:14 )  PTT:28.4 sec      Assessment and Plan:    Neuro: . Monitor for delirium.  Continue to optimize pain control. Serial Neurologic assessments    HEENT: no issues    CV: Continue hemodynamic monitoring    Pulm: Pulmonary toilet.  Continue incentive spirometer.  Chest PT.  Encourage OOB to chair and ambulation     GI/Nutrition: Bowel Regimen    /Renal: monitor UOP. Monitor BMP.  Replete Lytes as needed  . cont with lasix bid , continues to be + fluid balance.    HEME- DVT prophylaxis, SCDs. no full anticoagulation ( 3 episodes now on full AC).    ID: x    Lines/Tubes:   x  Endo: x    Skin: x    Dispo: cont with sicu care.

## 2017-07-05 NOTE — PROGRESS NOTE ADULT - SUBJECTIVE AND OBJECTIVE BOX
Not as alert and awake at time of visit. Does not open eyes when called, but does turn his head. No command following.     FUNCTIONAL PROGRESS  Total A    REVIEW OF SYSTEMS  Unable to provide    VITALS  T(C): 36.3 (07-05-17 @ 08:00), Max: 36.9 (07-05-17 @ 01:00)  HR: 66 (07-05-17 @ 11:00) (60 - 94)  BP: 102/59 (07-05-17 @ 11:00) (87/51 - 134/72)  RR: 24 (07-05-17 @ 11:00) (24 - 42)  SpO2: 100% (07-05-17 @ 11:00) (92% - 100%)  Wt(kg): --    MEDICATIONS   amantadine Syrup 100 milliGRAM(s) <User Schedule>  doxazosin 4 milliGRAM(s) at bedtime  FLUoxetine Solution 10 milliGRAM(s) daily  traZODone 50 milliGRAM(s) at bedtime  digoxin     Tablet 0.125 milliGRAM(s) daily  melatonin 9 milliGRAM(s) at bedtime  ALBUTerol    0.083% 2.5 milliGRAM(s) every 6 hours  metoprolol 50 milliGRAM(s) two times a day  acetaminophen    Suspension 650 milliGRAM(s) every 6 hours PRN  pantoprazole   Suspension 40 milliGRAM(s) two times a day before meals  senna Syrup 10 milliLiter(s) at bedtime  lisinopril 2.5 milliGRAM(s) daily  amiodarone    Tablet 400 milliGRAM(s) daily  furosemide   Injectable 40 milliGRAM(s) every 12 hours  bisacodyl Suppository 10 milliGRAM(s) daily PRN      RECENT LABS/IMAGING  CBC Full  -  ( 05 Jul 2017 06:14 )  WBC Count : 11.8 K/uL  Hemoglobin : 10.8 g/dL  Hematocrit : 35.6 %  Platelet Count - Automated : 257 K/uL  Mean Cell Volume : 86.8 fl  Mean Cell Hemoglobin : 26.3 pg  Mean Cell Hemoglobin Concentration : 30.3 g/dL  Auto Neutrophil # : x  Auto Lymphocyte # : x  Auto Monocyte # : x  Auto Eosinophil # : x  Auto Basophil # : x  Auto Neutrophil % : x  Auto Lymphocyte % : x  Auto Monocyte % : x  Auto Eosinophil % : x  Auto Basophil % : x    07-05    142  |  96<L>  |  77.0<H>  ----------------------------<  143<H>  4.4   |  30.0<H>  |  1.06    Ca    8.4<L>      05 Jul 2017 06:14  Phos  4.8     07-05  Mg     2.1     07-05          ----------------------------------------------------------------------------------------  PHYSICAL EXAM    Constitutional - NAD  Extremities - BLE edema to thighs  Neurologic Exam -                    Cognitive - lethargic, eyes closed    ----------------------------------------------------------------------------------------  ASSESSMENT/PLAN  51M with cardiac arrest s/p ERCP for bile duct leak with prolonged hospitalization related to prolonged intubation and cognitive/behavioral deficits.    Arousal/Sleep wake cycle - Amantadine 100mg, Melatonin 9mg, Trazodone   50mg. Consider adding provigil 100mg Q6AM    Mood/Motor recovery - Prozac 10mg daily     Rehab - PT/OT for 3x/week program for ongoing daily stimulation. Will continue to follow. Will need medical optimization for consideration to acute rehab.

## 2017-07-06 LAB
ALBUMIN SERPL ELPH-MCNC: 2.9 G/DL — LOW (ref 3.3–5.2)
ALP SERPL-CCNC: 196 U/L — HIGH (ref 40–120)
ALT FLD-CCNC: 77 U/L — HIGH
ANION GAP SERPL CALC-SCNC: 12 MMOL/L — SIGNIFICANT CHANGE UP (ref 5–17)
ANION GAP SERPL CALC-SCNC: 16 MMOL/L — SIGNIFICANT CHANGE UP (ref 5–17)
AST SERPL-CCNC: 50 U/L — HIGH
BASOPHILS # BLD AUTO: 0 K/UL — SIGNIFICANT CHANGE UP (ref 0–0.2)
BASOPHILS # BLD AUTO: 0 K/UL — SIGNIFICANT CHANGE UP (ref 0–0.2)
BASOPHILS NFR BLD AUTO: 0.1 % — SIGNIFICANT CHANGE UP (ref 0–2)
BASOPHILS NFR BLD AUTO: 0.1 % — SIGNIFICANT CHANGE UP (ref 0–2)
BILIRUB SERPL-MCNC: 0.8 MG/DL — SIGNIFICANT CHANGE UP (ref 0.4–2)
BUN SERPL-MCNC: 69 MG/DL — HIGH (ref 8–20)
BUN SERPL-MCNC: 76 MG/DL — HIGH (ref 8–20)
CALCIUM SERPL-MCNC: 8.4 MG/DL — LOW (ref 8.6–10.2)
CALCIUM SERPL-MCNC: 8.5 MG/DL — LOW (ref 8.6–10.2)
CHLORIDE SERPL-SCNC: 100 MMOL/L — SIGNIFICANT CHANGE UP (ref 98–107)
CHLORIDE SERPL-SCNC: 98 MMOL/L — SIGNIFICANT CHANGE UP (ref 98–107)
CO2 SERPL-SCNC: 31 MMOL/L — HIGH (ref 22–29)
CO2 SERPL-SCNC: 35 MMOL/L — HIGH (ref 22–29)
CREAT SERPL-MCNC: 0.85 MG/DL — SIGNIFICANT CHANGE UP (ref 0.5–1.3)
CREAT SERPL-MCNC: 0.92 MG/DL — SIGNIFICANT CHANGE UP (ref 0.5–1.3)
EOSINOPHIL # BLD AUTO: 0 K/UL — SIGNIFICANT CHANGE UP (ref 0–0.5)
EOSINOPHIL # BLD AUTO: 0 K/UL — SIGNIFICANT CHANGE UP (ref 0–0.5)
EOSINOPHIL NFR BLD AUTO: 0.3 % — SIGNIFICANT CHANGE UP (ref 0–5)
EOSINOPHIL NFR BLD AUTO: 0.5 % — SIGNIFICANT CHANGE UP (ref 0–5)
GLUCOSE SERPL-MCNC: 132 MG/DL — HIGH (ref 70–115)
GLUCOSE SERPL-MCNC: 142 MG/DL — HIGH (ref 70–115)
HCT VFR BLD CALC: 34.7 % — LOW (ref 42–52)
HCT VFR BLD CALC: 34.9 % — LOW (ref 42–52)
HGB BLD-MCNC: 10.1 G/DL — LOW (ref 14–18)
HGB BLD-MCNC: 10.2 G/DL — LOW (ref 14–18)
LACTATE SERPL-SCNC: 2.6 MMOL/L — HIGH (ref 0.5–2)
LYMPHOCYTES # BLD AUTO: 1.1 K/UL — SIGNIFICANT CHANGE UP (ref 1–4.8)
LYMPHOCYTES # BLD AUTO: 1.3 K/UL — SIGNIFICANT CHANGE UP (ref 1–4.8)
LYMPHOCYTES # BLD AUTO: 10.8 % — LOW (ref 20–55)
LYMPHOCYTES # BLD AUTO: 12.3 % — LOW (ref 20–55)
MAGNESIUM SERPL-MCNC: 2.1 MG/DL — SIGNIFICANT CHANGE UP (ref 1.6–2.6)
MCHC RBC-ENTMCNC: 25.8 PG — LOW (ref 27–31)
MCHC RBC-ENTMCNC: 26.2 PG — LOW (ref 27–31)
MCHC RBC-ENTMCNC: 29.1 G/DL — LOW (ref 32–36)
MCHC RBC-ENTMCNC: 29.2 G/DL — LOW (ref 32–36)
MCV RBC AUTO: 88.5 FL — SIGNIFICANT CHANGE UP (ref 80–94)
MCV RBC AUTO: 89.5 FL — SIGNIFICANT CHANGE UP (ref 80–94)
MONOCYTES # BLD AUTO: 0.8 K/UL — SIGNIFICANT CHANGE UP (ref 0–0.8)
MONOCYTES # BLD AUTO: 0.8 K/UL — SIGNIFICANT CHANGE UP (ref 0–0.8)
MONOCYTES NFR BLD AUTO: 7.6 % — SIGNIFICANT CHANGE UP (ref 3–10)
MONOCYTES NFR BLD AUTO: 7.6 % — SIGNIFICANT CHANGE UP (ref 3–10)
NEUTROPHILS # BLD AUTO: 8 K/UL — SIGNIFICANT CHANGE UP (ref 1.8–8)
NEUTROPHILS # BLD AUTO: 8.6 K/UL — HIGH (ref 1.8–8)
NEUTROPHILS NFR BLD AUTO: 79.2 % — HIGH (ref 37–73)
NEUTROPHILS NFR BLD AUTO: 80.8 % — HIGH (ref 37–73)
PHOSPHATE SERPL-MCNC: 3.6 MG/DL — SIGNIFICANT CHANGE UP (ref 2.4–4.7)
PLATELET # BLD AUTO: 267 K/UL — SIGNIFICANT CHANGE UP (ref 150–400)
PLATELET # BLD AUTO: 277 K/UL — SIGNIFICANT CHANGE UP (ref 150–400)
POTASSIUM SERPL-MCNC: 3.7 MMOL/L — SIGNIFICANT CHANGE UP (ref 3.5–5.3)
POTASSIUM SERPL-MCNC: 4 MMOL/L — SIGNIFICANT CHANGE UP (ref 3.5–5.3)
POTASSIUM SERPL-SCNC: 3.7 MMOL/L — SIGNIFICANT CHANGE UP (ref 3.5–5.3)
POTASSIUM SERPL-SCNC: 4 MMOL/L — SIGNIFICANT CHANGE UP (ref 3.5–5.3)
PROT SERPL-MCNC: 6.9 G/DL — SIGNIFICANT CHANGE UP (ref 6.6–8.7)
RBC # BLD: 3.9 M/UL — LOW (ref 4.6–6.2)
RBC # BLD: 3.92 M/UL — LOW (ref 4.6–6.2)
RBC # FLD: 18.4 % — HIGH (ref 11–15.6)
RBC # FLD: 18.5 % — HIGH (ref 11–15.6)
SODIUM SERPL-SCNC: 145 MMOL/L — SIGNIFICANT CHANGE UP (ref 135–145)
SODIUM SERPL-SCNC: 147 MMOL/L — HIGH (ref 135–145)
WBC # BLD: 10.8 K/UL — SIGNIFICANT CHANGE UP (ref 4.8–10.8)
WBC # BLD: 9.9 K/UL — SIGNIFICANT CHANGE UP (ref 4.8–10.8)
WBC # FLD AUTO: 10.8 K/UL — SIGNIFICANT CHANGE UP (ref 4.8–10.8)
WBC # FLD AUTO: 9.9 K/UL — SIGNIFICANT CHANGE UP (ref 4.8–10.8)

## 2017-07-06 RX ORDER — SODIUM CHLORIDE 9 MG/ML
500 INJECTION, SOLUTION INTRAVENOUS ONCE
Qty: 0 | Refills: 0 | Status: COMPLETED | OUTPATIENT
Start: 2017-07-06 | End: 2017-07-06

## 2017-07-06 RX ADMIN — Medication 40 MILLIGRAM(S): at 17:58

## 2017-07-06 RX ADMIN — Medication 9 MILLIGRAM(S): at 23:00

## 2017-07-06 RX ADMIN — Medication 50 MILLIGRAM(S): at 05:01

## 2017-07-06 RX ADMIN — HEPARIN SODIUM 5000 UNIT(S): 5000 INJECTION INTRAVENOUS; SUBCUTANEOUS at 14:45

## 2017-07-06 RX ADMIN — ALBUTEROL 2.5 MILLIGRAM(S): 90 AEROSOL, METERED ORAL at 03:13

## 2017-07-06 RX ADMIN — Medication 4 MILLIGRAM(S): at 23:00

## 2017-07-06 RX ADMIN — SENNA PLUS 10 MILLILITER(S): 8.6 TABLET ORAL at 23:00

## 2017-07-06 RX ADMIN — HEPARIN SODIUM 5000 UNIT(S): 5000 INJECTION INTRAVENOUS; SUBCUTANEOUS at 23:00

## 2017-07-06 RX ADMIN — AMIODARONE HYDROCHLORIDE 400 MILLIGRAM(S): 400 TABLET ORAL at 05:01

## 2017-07-06 RX ADMIN — ALBUTEROL 2.5 MILLIGRAM(S): 90 AEROSOL, METERED ORAL at 20:42

## 2017-07-06 RX ADMIN — Medication 50 MILLIGRAM(S): at 17:58

## 2017-07-06 RX ADMIN — ALBUTEROL 2.5 MILLIGRAM(S): 90 AEROSOL, METERED ORAL at 08:40

## 2017-07-06 RX ADMIN — Medication 10 MILLIGRAM(S): at 12:04

## 2017-07-06 RX ADMIN — Medication 40 MILLIGRAM(S): at 05:01

## 2017-07-06 RX ADMIN — Medication 100 MILLIGRAM(S): at 12:03

## 2017-07-06 RX ADMIN — Medication 100 MILLIGRAM(S): at 05:00

## 2017-07-06 RX ADMIN — PANTOPRAZOLE SODIUM 40 MILLIGRAM(S): 20 TABLET, DELAYED RELEASE ORAL at 05:01

## 2017-07-06 RX ADMIN — Medication 0.12 MILLIGRAM(S): at 05:00

## 2017-07-06 RX ADMIN — PANTOPRAZOLE SODIUM 40 MILLIGRAM(S): 20 TABLET, DELAYED RELEASE ORAL at 17:58

## 2017-07-06 RX ADMIN — HEPARIN SODIUM 5000 UNIT(S): 5000 INJECTION INTRAVENOUS; SUBCUTANEOUS at 05:00

## 2017-07-06 RX ADMIN — LISINOPRIL 2.5 MILLIGRAM(S): 2.5 TABLET ORAL at 05:00

## 2017-07-06 RX ADMIN — SODIUM CHLORIDE 1500 MILLILITER(S): 9 INJECTION, SOLUTION INTRAVENOUS at 20:20

## 2017-07-06 RX ADMIN — Medication 50 MILLIGRAM(S): at 23:00

## 2017-07-06 RX ADMIN — ALBUTEROL 2.5 MILLIGRAM(S): 90 AEROSOL, METERED ORAL at 15:05

## 2017-07-06 NOTE — DOWNTIME INTERRUPTION NOTE - WHICH MANUAL FORMS INITIATED?
[facility] down from start of shift; 7:00am to 6:00pm 7/6/17.     Pt remained hemodynamically stable. Pt able to follow simple commands when spoken to in Occitan. Pt montoya removed d/t broken seal, TOV - pt able to void on his own but still retaining large amount of urine in bladder via bladder ultrasound. As per JANNETTE Kendall and Dr. Yin- montoya required. #16 Malay montoya reinserted by Dr. Yin at bedside. Pt OOB to bedside recliner via jennifer lift. Pt receiving Pivot through PEG @ 45ml/hr. Family at bedside, will continue to monitor.     All assessments, medications, and I&O's recorded on paper charting. Doctors orders filed into the chart and reordered into [facility].    Meds on MAR marked as done, I&O's reconciled, orders verified.       Jesusita Phan RN

## 2017-07-07 LAB
ANION GAP SERPL CALC-SCNC: 12 MMOL/L — SIGNIFICANT CHANGE UP (ref 5–17)
BUN SERPL-MCNC: 68 MG/DL — HIGH (ref 8–20)
CALCIUM SERPL-MCNC: 8.8 MG/DL — SIGNIFICANT CHANGE UP (ref 8.6–10.2)
CHLORIDE SERPL-SCNC: 102 MMOL/L — SIGNIFICANT CHANGE UP (ref 98–107)
CO2 SERPL-SCNC: 36 MMOL/L — HIGH (ref 22–29)
CREAT SERPL-MCNC: 0.8 MG/DL — SIGNIFICANT CHANGE UP (ref 0.5–1.3)
GLUCOSE SERPL-MCNC: 136 MG/DL — HIGH (ref 70–115)
HCT VFR BLD CALC: 35.2 % — LOW (ref 42–52)
HGB BLD-MCNC: 10.1 G/DL — LOW (ref 14–18)
MAGNESIUM SERPL-MCNC: 1.9 MG/DL — SIGNIFICANT CHANGE UP (ref 1.6–2.6)
MCHC RBC-ENTMCNC: 25.8 PG — LOW (ref 27–31)
MCHC RBC-ENTMCNC: 28.7 G/DL — LOW (ref 32–36)
MCV RBC AUTO: 89.8 FL — SIGNIFICANT CHANGE UP (ref 80–94)
PHOSPHATE SERPL-MCNC: 3.1 MG/DL — SIGNIFICANT CHANGE UP (ref 2.4–4.7)
PLATELET # BLD AUTO: 265 K/UL — SIGNIFICANT CHANGE UP (ref 150–400)
POTASSIUM SERPL-MCNC: 3.6 MMOL/L — SIGNIFICANT CHANGE UP (ref 3.5–5.3)
POTASSIUM SERPL-SCNC: 3.6 MMOL/L — SIGNIFICANT CHANGE UP (ref 3.5–5.3)
RBC # BLD: 3.92 M/UL — LOW (ref 4.6–6.2)
RBC # FLD: 18.4 % — HIGH (ref 11–15.6)
SODIUM SERPL-SCNC: 150 MMOL/L — HIGH (ref 135–145)
WBC # BLD: 10 K/UL — SIGNIFICANT CHANGE UP (ref 4.8–10.8)
WBC # FLD AUTO: 10 K/UL — SIGNIFICANT CHANGE UP (ref 4.8–10.8)

## 2017-07-07 PROCEDURE — 99232 SBSQ HOSP IP/OBS MODERATE 35: CPT

## 2017-07-07 RX ORDER — MODAFINIL 200 MG/1
100 TABLET ORAL DAILY
Qty: 0 | Refills: 0 | Status: DISCONTINUED | OUTPATIENT
Start: 2017-07-07 | End: 2017-07-14

## 2017-07-07 RX ORDER — POTASSIUM CHLORIDE 20 MEQ
40 PACKET (EA) ORAL ONCE
Qty: 0 | Refills: 0 | Status: COMPLETED | OUTPATIENT
Start: 2017-07-07 | End: 2017-07-07

## 2017-07-07 RX ORDER — MAGNESIUM SULFATE 500 MG/ML
2 VIAL (ML) INJECTION ONCE
Qty: 0 | Refills: 0 | Status: COMPLETED | OUTPATIENT
Start: 2017-07-07 | End: 2017-07-07

## 2017-07-07 RX ADMIN — PANTOPRAZOLE SODIUM 40 MILLIGRAM(S): 20 TABLET, DELAYED RELEASE ORAL at 18:47

## 2017-07-07 RX ADMIN — MODAFINIL 100 MILLIGRAM(S): 200 TABLET ORAL at 12:14

## 2017-07-07 RX ADMIN — AMIODARONE HYDROCHLORIDE 400 MILLIGRAM(S): 400 TABLET ORAL at 05:04

## 2017-07-07 RX ADMIN — Medication 50 GRAM(S): at 06:52

## 2017-07-07 RX ADMIN — ALBUTEROL 2.5 MILLIGRAM(S): 90 AEROSOL, METERED ORAL at 09:25

## 2017-07-07 RX ADMIN — Medication 4 MILLIGRAM(S): at 21:32

## 2017-07-07 RX ADMIN — Medication 40 MILLIEQUIVALENT(S): at 06:52

## 2017-07-07 RX ADMIN — HEPARIN SODIUM 5000 UNIT(S): 5000 INJECTION INTRAVENOUS; SUBCUTANEOUS at 15:08

## 2017-07-07 RX ADMIN — Medication 0.12 MILLIGRAM(S): at 05:01

## 2017-07-07 RX ADMIN — HEPARIN SODIUM 5000 UNIT(S): 5000 INJECTION INTRAVENOUS; SUBCUTANEOUS at 21:32

## 2017-07-07 RX ADMIN — Medication 100 MILLIGRAM(S): at 12:14

## 2017-07-07 RX ADMIN — Medication 10 MILLIGRAM(S): at 12:14

## 2017-07-07 RX ADMIN — Medication 50 MILLIGRAM(S): at 21:32

## 2017-07-07 RX ADMIN — PANTOPRAZOLE SODIUM 40 MILLIGRAM(S): 20 TABLET, DELAYED RELEASE ORAL at 05:06

## 2017-07-07 RX ADMIN — SENNA PLUS 10 MILLILITER(S): 8.6 TABLET ORAL at 21:31

## 2017-07-07 RX ADMIN — ALBUTEROL 2.5 MILLIGRAM(S): 90 AEROSOL, METERED ORAL at 20:56

## 2017-07-07 RX ADMIN — Medication 9 MILLIGRAM(S): at 21:32

## 2017-07-07 RX ADMIN — Medication 40 MILLIGRAM(S): at 18:47

## 2017-07-07 RX ADMIN — LISINOPRIL 2.5 MILLIGRAM(S): 2.5 TABLET ORAL at 05:01

## 2017-07-07 RX ADMIN — Medication 40 MILLIGRAM(S): at 05:01

## 2017-07-07 RX ADMIN — ALBUTEROL 2.5 MILLIGRAM(S): 90 AEROSOL, METERED ORAL at 15:59

## 2017-07-07 RX ADMIN — HEPARIN SODIUM 5000 UNIT(S): 5000 INJECTION INTRAVENOUS; SUBCUTANEOUS at 05:02

## 2017-07-07 RX ADMIN — Medication 50 MILLIGRAM(S): at 05:01

## 2017-07-07 RX ADMIN — Medication 50 MILLIGRAM(S): at 18:47

## 2017-07-07 RX ADMIN — ALBUTEROL 2.5 MILLIGRAM(S): 90 AEROSOL, METERED ORAL at 03:10

## 2017-07-07 RX ADMIN — Medication 100 MILLIGRAM(S): at 05:01

## 2017-07-07 NOTE — PROGRESS NOTE ADULT - SUBJECTIVE AND OBJECTIVE BOX
INTERVAL HPI/OVERNIGHT EVENTS/SUBJECTIVE:    ICU Vital Signs Last 24 Hrs  T(C): 36.2 (07 Jul 2017 12:00), Max: 36.9 (06 Jul 2017 20:00)  T(F): 97.2 (07 Jul 2017 12:00), Max: 98.5 (06 Jul 2017 20:00)  HR: 86 (07 Jul 2017 16:00) (75 - 116)  BP: 110/78 (07 Jul 2017 16:00) (86/54 - 121/71)  BP(mean): 91 (07 Jul 2017 16:00) (66 - 94)  ABP: --  ABP(mean): --  RR: 26 (07 Jul 2017 16:00) (20 - 36)  SpO2: 94% (07 Jul 2017 16:00) (91% - 100%)      I&O's Detail    06 Jul 2017 07:01  -  07 Jul 2017 07:00  --------------------------------------------------------  IN:    Enteral Tube Flush: 610 mL    Free Water: 200 mL    Lactated Ringers IV Bolus: 500 mL    Pivot: 1080 mL    Solution: 50 mL  Total IN: 2440 mL    OUT:    Indwelling Catheter - Urethral: 2835 mL  Total OUT: 2835 mL    Total NET: -395 mL      07 Jul 2017 07:01  -  07 Jul 2017 16:53  --------------------------------------------------------  IN:    Free Water: 250 mL    Pivot: 405 mL  Total IN: 655 mL    OUT:    Indwelling Catheter - Urethral: 865 mL  Total OUT: 865 mL    Total NET: -210 mL                MEDICATIONS  (STANDING):  amantadine Syrup 100 milliGRAM(s) Oral <User Schedule>  doxazosin 4 milliGRAM(s) Oral at bedtime  FLUoxetine Solution 10 milliGRAM(s) Oral daily  traZODone 50 milliGRAM(s) Oral at bedtime  digoxin     Tablet 0.125 milliGRAM(s) Oral daily  melatonin 9 milliGRAM(s) Oral at bedtime  ALBUTerol    0.083% 2.5 milliGRAM(s) Nebulizer every 6 hours  metoprolol 50 milliGRAM(s) Oral two times a day  pantoprazole   Suspension 40 milliGRAM(s) Oral two times a day before meals  senna Syrup 10 milliLiter(s) Oral at bedtime  lisinopril 2.5 milliGRAM(s) Oral daily  furosemide   Injectable 40 milliGRAM(s) IV Push every 12 hours  heparin  Injectable 5000 Unit(s) SubCutaneous every 8 hours  modafinil 100 milliGRAM(s) Oral daily    MEDICATIONS  (PRN):  acetaminophen    Suspension 650 milliGRAM(s) Oral every 6 hours PRN For Temp greater than 38.5 C (101.3 F)  bisacodyl Suppository 10 milliGRAM(s) Rectal daily PRN Constipation      NUTRITION/IVF:     CENTRAL LINE:  LOCATION:   DATE INSERTED:  CVP:  SCVO2:    ARMAS:   DATE INSERTED:    A-LINE:    LOCATION:   DATE INSERTED:   SVV:  CO/CI:     CHEST TUBE:  LOCATION:  DATE INSERTED: OUTPUT/24 HRS:  SUCTION/WATER SEAL:     NG/OG TUBE:  DATE INSERTED:  OUTPUT/24 HRS:    MISC:     PHYSICAL EXAM:    Gen:    Eyes:    Neurological:    ENMT:    Neck:    Pulmonary:    Cardiovascular:    Gastrointestinal:    Genitourinary:    Back:    Extremities:    Skin:    Musculoskeletal:          LABS:  CBC Full  -  ( 07 Jul 2017 05:15 )  WBC Count : 10.0 K/uL  Hemoglobin : 10.1 g/dL  Hematocrit : 35.2 %  Platelet Count - Automated : 265 K/uL  Mean Cell Volume : 89.8 fl  Mean Cell Hemoglobin : 25.8 pg  Mean Cell Hemoglobin Concentration : 28.7 g/dL  Auto Neutrophil # : x  Auto Lymphocyte # : x  Auto Monocyte # : x  Auto Eosinophil # : x  Auto Basophil # : x  Auto Neutrophil % : x  Auto Lymphocyte % : x  Auto Monocyte % : x  Auto Eosinophil % : x  Auto Basophil % : x    07-07    150<H>  |  102  |  68.0<H>  ----------------------------<  136<H>  3.6   |  36.0<H>  |  0.80    Ca    8.8      07 Jul 2017 05:15  Phos  3.1     07-07  Mg     1.9     07-07    TPro  6.9  /  Alb  2.9<L>  /  TBili  0.8  /  DBili  x   /  AST  50<H>  /  ALT  77<H>  /  AlkPhos  196<H>  07-06        RECENT CULTURES:      LIVER FUNCTIONS - ( 06 Jul 2017 21:00 )  Alb: 2.9 g/dL / Pro: 6.9 g/dL / ALK PHOS: 196 U/L / ALT: 77 U/L / AST: 50 U/L / GGT: x               CAPILLARY BLOOD GLUCOSE      RADIOLOGY & ADDITIONAL STUDIES:    ASSESSMENT/PLAN:  51yMale presenting with:    Neuro:    CV:    Pulm:    GI/Nutrition:    /Renal:    ID:    Lines/Tubes:    Endo:    Skin:    Proph:    Dispo:      CRITICAL CARE TIME SPENT:

## 2017-07-07 NOTE — PROGRESS NOTE ADULT - SUBJECTIVE AND OBJECTIVE BOX
Patient much more alert and following commands more consistently.   Frustrated with his condition, as he attempts to move his bed sheet and cannot  it.     FUNCTIONAL PROGRESS  Total A    REVIEW OF SYSTEMS  Constitutional - +fatigue  Neurological - +weakness  Psychiatric - Frustrated    VITALS  T(C): 36.3 (07-07-17 @ 08:00), Max: 36.9 (07-06-17 @ 20:00)  HR: 82 (07-07-17 @ 10:00) (75 - 90)  BP: 96/57 (07-07-17 @ 10:00) (86/54 - 116/74)  RR: 36 (07-07-17 @ 10:00) (20 - 36)  SpO2: 100% (07-07-17 @ 10:00) (94% - 100%)  Wt(kg): --    MEDICATIONS   amantadine Syrup 100 milliGRAM(s) <User Schedule>  doxazosin 4 milliGRAM(s) at bedtime  FLUoxetine Solution 10 milliGRAM(s) daily  traZODone 50 milliGRAM(s) at bedtime  digoxin     Tablet 0.125 milliGRAM(s) daily  melatonin 9 milliGRAM(s) at bedtime  ALBUTerol    0.083% 2.5 milliGRAM(s) every 6 hours  metoprolol 50 milliGRAM(s) two times a day  acetaminophen    Suspension 650 milliGRAM(s) every 6 hours PRN  pantoprazole   Suspension 40 milliGRAM(s) two times a day before meals  senna Syrup 10 milliLiter(s) at bedtime  lisinopril 2.5 milliGRAM(s) daily  furosemide   Injectable 40 milliGRAM(s) every 12 hours  bisacodyl Suppository 10 milliGRAM(s) daily PRN  heparin  Injectable 5000 Unit(s) every 8 hours      RECENT LABS/IMAGING  CBC Full  -  ( 07 Jul 2017 05:15 )  WBC Count : 10.0 K/uL  Hemoglobin : 10.1 g/dL  Hematocrit : 35.2 %  Platelet Count - Automated : 265 K/uL  Mean Cell Volume : 89.8 fl  Mean Cell Hemoglobin : 25.8 pg  Mean Cell Hemoglobin Concentration : 28.7 g/dL  Auto Neutrophil # : x  Auto Lymphocyte # : x  Auto Monocyte # : x  Auto Eosinophil # : x  Auto Basophil # : x  Auto Neutrophil % : x  Auto Lymphocyte % : x  Auto Monocyte % : x  Auto Eosinophil % : x  Auto Basophil % : x    07-07    150<H>  |  102  |  68.0<H>  ----------------------------<  136<H>  3.6   |  36.0<H>  |  0.80    Ca    8.8      07 Jul 2017 05:15  Phos  3.1     07-07  Mg     1.9     07-07    TPro  6.9  /  Alb  2.9<L>  /  TBili  0.8  /  DBili  x   /  AST  50<H>  /  ALT  77<H>  /  AlkPhos  196<H>  07-06          ----------------------------------------------------------------------------------------  PHYSICAL EXAM    Constitutional - NAD  Extremities - BLE edema to thighs  Neurologic Exam -                    Cognitive - Awake and alert, follows one step commands more consistently, nods yes/no    Coma Recovery Scale - Revised    AUDITORY FUNCTION SCALE  4 - Consistent Movement to Command *  VISUAL FUNCTION SCALE  4 - Object Localization: Reaching *  MOTOR FUNCTION SCALE  4 - Object Manipulation *  OROMOTOR/VERBAL FUNCTION SCALE  1 - Oral Reflexive Movement  COMMUNICATION SCALE  1 - Non-Functional: Intentional *  AROUSAL SCALE  3 - Attention    TOTAL SCORE - 17  ----------------------------------------------------------------------------------------  ASSESSMENT/PLAN  51M with cardiac arrest s/p ERCP for bile duct leak with prolonged hospitalization related to prolonged intubation and cognitive/behavioral deficits.    Arousal/Sleep wake cycle - Amantadine 100mg, Melatonin 9mg, Trazodone   50mg. Consider adding provigil 100mg Q6AM    Mood/Motor recovery - Prozac 10mg daily     Rehab - PT/OT for 3x/week program for ongoing daily stimulation. Will continue to follow. Will need medical optimization for consideration to acute rehab.

## 2017-07-08 LAB
ANION GAP SERPL CALC-SCNC: 12 MMOL/L — SIGNIFICANT CHANGE UP (ref 5–17)
ANISOCYTOSIS BLD QL: SLIGHT — SIGNIFICANT CHANGE UP
BASOPHILS # BLD AUTO: 0 K/UL — SIGNIFICANT CHANGE UP (ref 0–0.2)
BASOPHILS NFR BLD AUTO: 0.1 % — SIGNIFICANT CHANGE UP (ref 0–2)
BUN SERPL-MCNC: 62 MG/DL — HIGH (ref 8–20)
CALCIUM SERPL-MCNC: 8.7 MG/DL — SIGNIFICANT CHANGE UP (ref 8.6–10.2)
CHLORIDE SERPL-SCNC: 101 MMOL/L — SIGNIFICANT CHANGE UP (ref 98–107)
CO2 SERPL-SCNC: 38 MMOL/L — HIGH (ref 22–29)
CREAT SERPL-MCNC: 0.72 MG/DL — SIGNIFICANT CHANGE UP (ref 0.5–1.3)
DACRYOCYTES BLD QL SMEAR: SLIGHT — SIGNIFICANT CHANGE UP
EOSINOPHIL # BLD AUTO: 0 K/UL — SIGNIFICANT CHANGE UP (ref 0–0.5)
EOSINOPHIL NFR BLD AUTO: 0.5 % — SIGNIFICANT CHANGE UP (ref 0–5)
GLUCOSE SERPL-MCNC: 140 MG/DL — HIGH (ref 70–115)
HCT VFR BLD CALC: 35.3 % — LOW (ref 42–52)
HGB BLD-MCNC: 10.1 G/DL — LOW (ref 14–18)
HYPOCHROMIA BLD QL: SLIGHT — SIGNIFICANT CHANGE UP
LYMPHOCYTES # BLD AUTO: 1 K/UL — SIGNIFICANT CHANGE UP (ref 1–4.8)
LYMPHOCYTES # BLD AUTO: 9.6 % — LOW (ref 20–55)
MAGNESIUM SERPL-MCNC: 2.1 MG/DL — SIGNIFICANT CHANGE UP (ref 1.6–2.6)
MCHC RBC-ENTMCNC: 26 PG — LOW (ref 27–31)
MCHC RBC-ENTMCNC: 28.6 G/DL — LOW (ref 32–36)
MCV RBC AUTO: 91 FL — SIGNIFICANT CHANGE UP (ref 80–94)
MONOCYTES # BLD AUTO: 0.6 K/UL — SIGNIFICANT CHANGE UP (ref 0–0.8)
MONOCYTES NFR BLD AUTO: 6 % — SIGNIFICANT CHANGE UP (ref 3–10)
NEUTROPHILS # BLD AUTO: 8.6 K/UL — HIGH (ref 1.8–8)
NEUTROPHILS NFR BLD AUTO: 83.3 % — HIGH (ref 37–73)
OVALOCYTES BLD QL SMEAR: SLIGHT — SIGNIFICANT CHANGE UP
PHOSPHATE SERPL-MCNC: 2.9 MG/DL — SIGNIFICANT CHANGE UP (ref 2.4–4.7)
PLAT MORPH BLD: NORMAL — SIGNIFICANT CHANGE UP
PLATELET # BLD AUTO: 261 K/UL — SIGNIFICANT CHANGE UP (ref 150–400)
POIKILOCYTOSIS BLD QL AUTO: SLIGHT — SIGNIFICANT CHANGE UP
POLYCHROMASIA BLD QL SMEAR: SLIGHT — SIGNIFICANT CHANGE UP
POTASSIUM SERPL-MCNC: 3.8 MMOL/L — SIGNIFICANT CHANGE UP (ref 3.5–5.3)
POTASSIUM SERPL-SCNC: 3.8 MMOL/L — SIGNIFICANT CHANGE UP (ref 3.5–5.3)
RBC # BLD: 3.88 M/UL — LOW (ref 4.6–6.2)
RBC # FLD: 18.4 % — HIGH (ref 11–15.6)
RBC BLD AUTO: ABNORMAL
SCHISTOCYTES BLD QL AUTO: SLIGHT — SIGNIFICANT CHANGE UP
SMUDGE CELLS # BLD: PRESENT — SIGNIFICANT CHANGE UP
SODIUM SERPL-SCNC: 151 MMOL/L — HIGH (ref 135–145)
SPHEROCYTES BLD QL SMEAR: SLIGHT — SIGNIFICANT CHANGE UP
TARGETS BLD QL SMEAR: SLIGHT — SIGNIFICANT CHANGE UP
WBC # BLD: 10.3 K/UL — SIGNIFICANT CHANGE UP (ref 4.8–10.8)
WBC # FLD AUTO: 10.3 K/UL — SIGNIFICANT CHANGE UP (ref 4.8–10.8)

## 2017-07-08 RX ORDER — FENTANYL CITRATE 50 UG/ML
25 INJECTION INTRAVENOUS ONCE
Qty: 0 | Refills: 0 | Status: DISCONTINUED | OUTPATIENT
Start: 2017-07-08 | End: 2017-07-08

## 2017-07-08 RX ORDER — FUROSEMIDE 40 MG
40 TABLET ORAL ONCE
Qty: 0 | Refills: 0 | Status: COMPLETED | OUTPATIENT
Start: 2017-07-08 | End: 2017-07-08

## 2017-07-08 RX ORDER — IPRATROPIUM/ALBUTEROL SULFATE 18-103MCG
3 AEROSOL WITH ADAPTER (GRAM) INHALATION EVERY 6 HOURS
Qty: 0 | Refills: 0 | Status: DISCONTINUED | OUTPATIENT
Start: 2017-07-08 | End: 2017-08-02

## 2017-07-08 RX ADMIN — Medication 9 MILLIGRAM(S): at 21:40

## 2017-07-08 RX ADMIN — Medication 50 MILLIGRAM(S): at 21:40

## 2017-07-08 RX ADMIN — ALBUTEROL 2.5 MILLIGRAM(S): 90 AEROSOL, METERED ORAL at 15:01

## 2017-07-08 RX ADMIN — Medication 50 MILLIGRAM(S): at 17:22

## 2017-07-08 RX ADMIN — Medication 10 MILLIGRAM(S): at 12:24

## 2017-07-08 RX ADMIN — Medication 3 MILLILITER(S): at 19:00

## 2017-07-08 RX ADMIN — Medication 100 MILLIGRAM(S): at 05:31

## 2017-07-08 RX ADMIN — SENNA PLUS 10 MILLILITER(S): 8.6 TABLET ORAL at 21:40

## 2017-07-08 RX ADMIN — PANTOPRAZOLE SODIUM 40 MILLIGRAM(S): 20 TABLET, DELAYED RELEASE ORAL at 05:31

## 2017-07-08 RX ADMIN — ALBUTEROL 2.5 MILLIGRAM(S): 90 AEROSOL, METERED ORAL at 09:00

## 2017-07-08 RX ADMIN — ALBUTEROL 2.5 MILLIGRAM(S): 90 AEROSOL, METERED ORAL at 03:38

## 2017-07-08 RX ADMIN — FENTANYL CITRATE 25 MICROGRAM(S): 50 INJECTION INTRAVENOUS at 18:09

## 2017-07-08 RX ADMIN — FENTANYL CITRATE 25 MICROGRAM(S): 50 INJECTION INTRAVENOUS at 18:15

## 2017-07-08 RX ADMIN — Medication 100 MILLIGRAM(S): at 12:24

## 2017-07-08 RX ADMIN — LISINOPRIL 2.5 MILLIGRAM(S): 2.5 TABLET ORAL at 05:33

## 2017-07-08 RX ADMIN — Medication 4 MILLIGRAM(S): at 21:40

## 2017-07-08 RX ADMIN — Medication 0.12 MILLIGRAM(S): at 05:31

## 2017-07-08 RX ADMIN — MODAFINIL 100 MILLIGRAM(S): 200 TABLET ORAL at 12:42

## 2017-07-08 RX ADMIN — Medication 40 MILLIGRAM(S): at 17:21

## 2017-07-08 RX ADMIN — Medication 40 MILLIGRAM(S): at 19:13

## 2017-07-08 RX ADMIN — Medication 40 MILLIGRAM(S): at 05:33

## 2017-07-08 RX ADMIN — HEPARIN SODIUM 5000 UNIT(S): 5000 INJECTION INTRAVENOUS; SUBCUTANEOUS at 21:40

## 2017-07-08 RX ADMIN — HEPARIN SODIUM 5000 UNIT(S): 5000 INJECTION INTRAVENOUS; SUBCUTANEOUS at 15:06

## 2017-07-08 RX ADMIN — PANTOPRAZOLE SODIUM 40 MILLIGRAM(S): 20 TABLET, DELAYED RELEASE ORAL at 17:22

## 2017-07-08 RX ADMIN — HEPARIN SODIUM 5000 UNIT(S): 5000 INJECTION INTRAVENOUS; SUBCUTANEOUS at 05:31

## 2017-07-08 RX ADMIN — Medication 50 MILLIGRAM(S): at 05:31

## 2017-07-08 RX ADMIN — AMIODARONE HYDROCHLORIDE 200 MILLIGRAM(S): 400 TABLET ORAL at 05:31

## 2017-07-08 NOTE — PROGRESS NOTE ADULT - SUBJECTIVE AND OBJECTIVE BOX
INTERVAL HPI/OVERNIGHT EVENTS/SUBJECTIVE:    Pt seen at bedside. Was OOB to chair for several hours on 7/7. Provigil was added as per Dr. Tenorio. No acute events in the last 24hrs. Plan for DC to BIU on Monday.     ICU Vital Signs Last 24 Hrs  T(C): 36.6 (08 Jul 2017 00:03), Max: 36.6 (07 Jul 2017 04:00)  T(F): 97.8 (08 Jul 2017 00:03), Max: 97.8 (07 Jul 2017 04:00)  HR: 90 (08 Jul 2017 02:00) (76 - 116)  BP: 121/85 (08 Jul 2017 02:00) (96/57 - 130/81)  BP(mean): 98 (08 Jul 2017 02:00) (73 - 104)  ABP: --  ABP(mean): --  RR: 27 (08 Jul 2017 02:00) (20 - 36)  SpO2: 97% (08 Jul 2017 02:00) (91% - 100%)      I&O's Detail    06 Jul 2017 07:01  -  07 Jul 2017 07:00  --------------------------------------------------------  IN:    Enteral Tube Flush: 610 mL    Free Water: 200 mL    Lactated Ringers IV Bolus: 500 mL    Pivot: 1080 mL    Solution: 50 mL  Total IN: 2440 mL    OUT:    Indwelling Catheter - Urethral: 2835 mL  Total OUT: 2835 mL    Total NET: -395 mL      07 Jul 2017 07:01  -  08 Jul 2017 02:27  --------------------------------------------------------  IN:    Free Water: 500 mL    Pivot: 900 mL  Total IN: 1400 mL    OUT:    Indwelling Catheter - Urethral: 1925 mL  Total OUT: 1925 mL    Total NET: -525 mL                MEDICATIONS  (STANDING):  amantadine Syrup 100 milliGRAM(s) Oral <User Schedule>  doxazosin 4 milliGRAM(s) Oral at bedtime  FLUoxetine Solution 10 milliGRAM(s) Oral daily  traZODone 50 milliGRAM(s) Oral at bedtime  digoxin     Tablet 0.125 milliGRAM(s) Oral daily  melatonin 9 milliGRAM(s) Oral at bedtime  ALBUTerol    0.083% 2.5 milliGRAM(s) Nebulizer every 6 hours  metoprolol 50 milliGRAM(s) Oral two times a day  pantoprazole   Suspension 40 milliGRAM(s) Oral two times a day before meals  senna Syrup 10 milliLiter(s) Oral at bedtime  lisinopril 2.5 milliGRAM(s) Oral daily  amiodarone    Tablet 200 milliGRAM(s) Oral daily  furosemide   Injectable 40 milliGRAM(s) IV Push every 12 hours  heparin  Injectable 5000 Unit(s) SubCutaneous every 8 hours  modafinil 100 milliGRAM(s) Oral daily    MEDICATIONS  (PRN):  acetaminophen    Suspension 650 milliGRAM(s) Oral every 6 hours PRN For Temp greater than 38.5 C (101.3 F)  bisacodyl Suppository 10 milliGRAM(s) Rectal daily PRN Constipation      NUTRITION/IVF: Pivot @45cc/hr, Prosource x2 25kcal/kg, 2gm/kg     ARMAS: yes  DATE INSERTED: 7/6        MISC:     PHYSICAL EXAM:     Gen:NAD, Well appearing, No cyanosis, Pallor.    Eyes: PERRL ~ 3mm, EOMI,     Neurological: A&Ox3, GCS 15, No focal defficit.     ENMT: Clear canals, clear throat.      Neck: Supple. NT AT, FROM no pain.  No JVD. No meningeal signs    Pulmonary: NAD, CTA, = BL ., 2LNC    Cardiovascular: RRR, S1, S2, No Murmurs, rubs or gallops noted.    Gastrointestinal: slightly distended, Soft, NT.    Genitourinary: Armas in place    Extremities: NT, AT, no edema, erythema or palpable cord noted. = 2+ pulses throughout.    Skin:     Musculoskeletal:          LABS:  CBC Full  -  ( 07 Jul 2017 05:15 )  WBC Count : 10.0 K/uL  Hemoglobin : 10.1 g/dL  Hematocrit : 35.2 %  Platelet Count - Automated : 265 K/uL  Mean Cell Volume : 89.8 fl  Mean Cell Hemoglobin : 25.8 pg  Mean Cell Hemoglobin Concentration : 28.7 g/dL  Auto Neutrophil # : x  Auto Lymphocyte # : x  Auto Monocyte # : x  Auto Eosinophil # : x  Auto Basophil # : x  Auto Neutrophil % : x  Auto Lymphocyte % : x  Auto Monocyte % : x  Auto Eosinophil % : x  Auto Basophil % : x    07-07    150<H>  |  102  |  68.0<H>  ----------------------------<  136<H>  3.6   |  36.0<H>  |  0.80    Ca    8.8      07 Jul 2017 05:15  Phos  3.1     07-07  Mg     1.9     07-07    TPro  6.9  /  Alb  2.9<L>  /  TBili  0.8  /  DBili  x   /  AST  50<H>  /  ALT  77<H>  /  AlkPhos  196<H>  07-06        RECENT CULTURES:      LIVER FUNCTIONS - ( 06 Jul 2017 21:00 )  Alb: 2.9 g/dL / Pro: 6.9 g/dL / ALK PHOS: 196 U/L / ALT: 77 U/L / AST: 50 U/L / GGT: x               CAPILLARY BLOOD GLUCOSE      RADIOLOGY & ADDITIONAL STUDIES:    ASSESSMENT/PLAN:  51yMale presenting with:        Neurological:    ENMT:    Neck:    Pulmonary:    Cardiovascular:    Gastrointestinal:    Genitourinary:    Heme:    ID:    Skin:    Lines/ Tubes:    Dispo:            CRITICAL CARE TIME SPENT: INTERVAL HPI/OVERNIGHT EVENTS/SUBJECTIVE:    Pt seen at bedside. Was OOB to chair for several hours on 7/7. Provigil was added as per Dr. Tenorio. No acute events in the last 24hrs. Plan for DC to BIU on Monday.     ICU Vital Signs Last 24 Hrs  T(C): 36.6 (08 Jul 2017 00:03), Max: 36.6 (07 Jul 2017 04:00)  T(F): 97.8 (08 Jul 2017 00:03), Max: 97.8 (07 Jul 2017 04:00)  HR: 90 (08 Jul 2017 02:00) (76 - 116)  BP: 121/85 (08 Jul 2017 02:00) (96/57 - 130/81)  BP(mean): 98 (08 Jul 2017 02:00) (73 - 104)  ABP: --  ABP(mean): --  RR: 27 (08 Jul 2017 02:00) (20 - 36)  SpO2: 97% (08 Jul 2017 02:00) (91% - 100%)      I&O's Detail    06 Jul 2017 07:01  -  07 Jul 2017 07:00  --------------------------------------------------------  IN:    Enteral Tube Flush: 610 mL    Free Water: 200 mL    Lactated Ringers IV Bolus: 500 mL    Pivot: 1080 mL    Solution: 50 mL  Total IN: 2440 mL    OUT:    Indwelling Catheter - Urethral: 2835 mL  Total OUT: 2835 mL    Total NET: -395 mL      07 Jul 2017 07:01  -  08 Jul 2017 02:27  --------------------------------------------------------  IN:    Free Water: 500 mL    Pivot: 900 mL  Total IN: 1400 mL    OUT:    Indwelling Catheter - Urethral: 1925 mL  Total OUT: 1925 mL    Total NET: -525 mL                MEDICATIONS  (STANDING):  amantadine Syrup 100 milliGRAM(s) Oral <User Schedule>  doxazosin 4 milliGRAM(s) Oral at bedtime  FLUoxetine Solution 10 milliGRAM(s) Oral daily  traZODone 50 milliGRAM(s) Oral at bedtime  digoxin     Tablet 0.125 milliGRAM(s) Oral daily  melatonin 9 milliGRAM(s) Oral at bedtime  ALBUTerol    0.083% 2.5 milliGRAM(s) Nebulizer every 6 hours  metoprolol 50 milliGRAM(s) Oral two times a day  pantoprazole   Suspension 40 milliGRAM(s) Oral two times a day before meals  senna Syrup 10 milliLiter(s) Oral at bedtime  lisinopril 2.5 milliGRAM(s) Oral daily  amiodarone    Tablet 200 milliGRAM(s) Oral daily  furosemide   Injectable 40 milliGRAM(s) IV Push every 12 hours  heparin  Injectable 5000 Unit(s) SubCutaneous every 8 hours  modafinil 100 milliGRAM(s) Oral daily    MEDICATIONS  (PRN):  acetaminophen    Suspension 650 milliGRAM(s) Oral every 6 hours PRN For Temp greater than 38.5 C (101.3 F)  bisacodyl Suppository 10 milliGRAM(s) Rectal daily PRN Constipation      NUTRITION/IVF: Pivot @45cc/hr, Prosource x2 25kcal/kg, 2gm/kg/ FW 250Q6    ARMAS: yes  DATE INSERTED: 7/6        MISC:     PHYSICAL EXAM:     Gen:NAD, Well appearing, No cyanosis, Pallor.    Eyes: PERRL ~ 3mm, EOMI,     Neurological: A&Ox3, GCS 12 (4/2/6) , No focal defficit.     ENMT: Clear canals, clear throat.      Neck: Supple. NT AT, FROM no pain.      Pulmonary: NAD, CTA, = BL ., 2LNC    Cardiovascular: RRR, S1, S2, No Murmurs, rubs or gallops noted.    Gastrointestinal: slightly hard and distended, NT. hypoactive bowel sounds, midline incision dressed, no blood or erythema noted    Genitourinary: Armas in place    Extremities: 3+ pitting edema of b/l LE,  NT, AT, no erythema or palpable cord noted. pulses not palpated due to edemat.            LABS:  CBC Full  -  ( 07 Jul 2017 05:15 )  WBC Count : 10.0 K/uL  Hemoglobin : 10.1 g/dL  Hematocrit : 35.2 %  Platelet Count - Automated : 265 K/uL  Mean Cell Volume : 89.8 fl  Mean Cell Hemoglobin : 25.8 pg  Mean Cell Hemoglobin Concentration : 28.7 g/dL  Auto Neutrophil # : x  Auto Lymphocyte # : x  Auto Monocyte # : x  Auto Eosinophil # : x  Auto Basophil # : x  Auto Neutrophil % : x  Auto Lymphocyte % : x  Auto Monocyte % : x  Auto Eosinophil % : x  Auto Basophil % : x    07-07    150<H>  |  102  |  68.0<H>  ----------------------------<  136<H>  3.6   |  36.0<H>  |  0.80    Ca    8.8      07 Jul 2017 05:15  Phos  3.1     07-07  Mg     1.9     07-07    TPro  6.9  /  Alb  2.9<L>  /  TBili  0.8  /  DBili  x   /  AST  50<H>  /  ALT  77<H>  /  AlkPhos  196<H>  07-06        LIVER FUNCTIONS - ( 06 Jul 2017 21:00 )  Alb: 2.9 g/dL / Pro: 6.9 g/dL / ALK PHOS: 196 U/L / ALT: 77 U/L / AST: 50 U/L / GGT: x               ASSESSMENT/PLAN:  51yMale presenting with post lap carissa bile leak. post code on 5/9 after ERCP         Neurological: monitor GCS    Pulmonary: keep on 2L NC    Cardiovascular: hemodynamically stable, monitor vitals for changes     Gastrointestinal: dressing change in am, monitor for BM. If no BM tomorrow, may need to place NG tube    Genitourinary: Armas stays in place for retention, monitor UOP    Heme: SCDs for DVT prophylaxis, monitor CBC    Lines/ Tubes: PIV, PEG    Dispo: SICU to BIU Monday INTERVAL HPI/OVERNIGHT EVENTS/SUBJECTIVE:    Pt seen at bedside. Was OOB to chair for several hours on 7/7. Provigil was added as per Dr. Tenorio. No acute events in the last 24hrs. Plan for DC to BIU on Monday.     ICU Vital Signs Last 24 Hrs  T(C): 36.6 (08 Jul 2017 00:03), Max: 36.6 (07 Jul 2017 04:00)  T(F): 97.8 (08 Jul 2017 00:03), Max: 97.8 (07 Jul 2017 04:00)  HR: 90 (08 Jul 2017 02:00) (76 - 116)  BP: 121/85 (08 Jul 2017 02:00) (96/57 - 130/81)  BP(mean): 98 (08 Jul 2017 02:00) (73 - 104)  ABP: --  ABP(mean): --  RR: 27 (08 Jul 2017 02:00) (20 - 36)  SpO2: 97% (08 Jul 2017 02:00) (91% - 100%)      I&O's Detail    06 Jul 2017 07:01  -  07 Jul 2017 07:00  --------------------------------------------------------  IN:    Enteral Tube Flush: 610 mL    Free Water: 200 mL    Lactated Ringers IV Bolus: 500 mL    Pivot: 1080 mL    Solution: 50 mL  Total IN: 2440 mL    OUT:    Indwelling Catheter - Urethral: 2835 mL  Total OUT: 2835 mL    Total NET: -395 mL      07 Jul 2017 07:01  -  08 Jul 2017 02:27  --------------------------------------------------------  IN:    Free Water: 500 mL    Pivot: 900 mL  Total IN: 1400 mL    OUT:    Indwelling Catheter - Urethral: 1925 mL  Total OUT: 1925 mL    Total NET: -525 mL                MEDICATIONS  (STANDING):  amantadine Syrup 100 milliGRAM(s) Oral <User Schedule>  doxazosin 4 milliGRAM(s) Oral at bedtime  FLUoxetine Solution 10 milliGRAM(s) Oral daily  traZODone 50 milliGRAM(s) Oral at bedtime  digoxin     Tablet 0.125 milliGRAM(s) Oral daily  melatonin 9 milliGRAM(s) Oral at bedtime  ALBUTerol    0.083% 2.5 milliGRAM(s) Nebulizer every 6 hours  metoprolol 50 milliGRAM(s) Oral two times a day  pantoprazole   Suspension 40 milliGRAM(s) Oral two times a day before meals  senna Syrup 10 milliLiter(s) Oral at bedtime  lisinopril 2.5 milliGRAM(s) Oral daily  amiodarone    Tablet 200 milliGRAM(s) Oral daily  furosemide   Injectable 40 milliGRAM(s) IV Push every 12 hours  heparin  Injectable 5000 Unit(s) SubCutaneous every 8 hours  modafinil 100 milliGRAM(s) Oral daily    MEDICATIONS  (PRN):  acetaminophen    Suspension 650 milliGRAM(s) Oral every 6 hours PRN For Temp greater than 38.5 C (101.3 F)  bisacodyl Suppository 10 milliGRAM(s) Rectal daily PRN Constipation      NUTRITION/IVF: Pivot @45cc/hr, Prosource x2 25kcal/kg, 2gm/kg/ FW 250Q6    ARMAS: yes  DATE INSERTED: 7/6        MISC:     PHYSICAL EXAM:     Gen:NAD, Well appearing, No cyanosis, Pallor.    Eyes: PERRL ~ 3mm, EOMI,     Neurological: A&O, but not able to determine at what level. GCS 12 (4/2/6) , No focal deficit.        Neck: Supple. NT AT, FROM no pain.      Pulmonary: NAD, CTA, = BL ., 2LNC    Cardiovascular: RRR, S1, S2, No Murmurs, rubs or gallops noted.    Gastrointestinal: slightly hard and distended, NT. hypoactive bowel sounds, midline incision dressed, no blood or erythema noted    Genitourinary: Armas in place    Extremities: 3+ pitting edema of b/l LE,  NT, AT, no erythema or palpable cord noted. pulses not palpated due to edemat.            LABS:  CBC Full  -  ( 07 Jul 2017 05:15 )  WBC Count : 10.0 K/uL  Hemoglobin : 10.1 g/dL  Hematocrit : 35.2 %  Platelet Count - Automated : 265 K/uL  Mean Cell Volume : 89.8 fl  Mean Cell Hemoglobin : 25.8 pg  Mean Cell Hemoglobin Concentration : 28.7 g/dL  Auto Neutrophil # : x  Auto Lymphocyte # : x  Auto Monocyte # : x  Auto Eosinophil # : x  Auto Basophil # : x  Auto Neutrophil % : x  Auto Lymphocyte % : x  Auto Monocyte % : x  Auto Eosinophil % : x  Auto Basophil % : x    07-07    150<H>  |  102  |  68.0<H>  ----------------------------<  136<H>  3.6   |  36.0<H>  |  0.80    Ca    8.8      07 Jul 2017 05:15  Phos  3.1     07-07  Mg     1.9     07-07    TPro  6.9  /  Alb  2.9<L>  /  TBili  0.8  /  DBili  x   /  AST  50<H>  /  ALT  77<H>  /  AlkPhos  196<H>  07-06        LIVER FUNCTIONS - ( 06 Jul 2017 21:00 )  Alb: 2.9 g/dL / Pro: 6.9 g/dL / ALK PHOS: 196 U/L / ALT: 77 U/L / AST: 50 U/L / GGT: x               ASSESSMENT/PLAN:  51yMale presenting with post lap carissa bile leak. post code on 5/9 after ERCP         Neurological: monitor GCS    Pulmonary: keep on 2L NC    Cardiovascular: hemodynamically stable, monitor vitals for changes     Gastrointestinal: dressing change in am, monitor for BM. If no BM tomorrow, may need to place NG tube    Genitourinary: Armas stays in place for retention, monitor UOP    Heme: SCDs for DVT prophylaxis, monitor CBC    Lines/ Tubes: PIV, PEG    Dispo: SICU to BIU Monday

## 2017-07-09 LAB
ANION GAP SERPL CALC-SCNC: 10 MMOL/L — SIGNIFICANT CHANGE UP (ref 5–17)
ANION GAP SERPL CALC-SCNC: 17 MMOL/L — SIGNIFICANT CHANGE UP (ref 5–17)
BASE EXCESS BLDV CALC-SCNC: 17.3 MMOL/L — HIGH (ref -3–3)
BASOPHILS # BLD AUTO: 0 K/UL — SIGNIFICANT CHANGE UP (ref 0–0.2)
BASOPHILS NFR BLD AUTO: 0.1 % — SIGNIFICANT CHANGE UP (ref 0–2)
BUN SERPL-MCNC: 64 MG/DL — HIGH (ref 8–20)
BUN SERPL-MCNC: 65 MG/DL — HIGH (ref 8–20)
CA-I SERPL-SCNC: 1.13 MMOL/L — SIGNIFICANT CHANGE UP (ref 1.12–1.3)
CALCIUM SERPL-MCNC: 8.4 MG/DL — LOW (ref 8.6–10.2)
CALCIUM SERPL-MCNC: 8.7 MG/DL — SIGNIFICANT CHANGE UP (ref 8.6–10.2)
CHLORIDE BLDV-SCNC: 101 MMOL/L — SIGNIFICANT CHANGE UP (ref 98–106)
CHLORIDE SERPL-SCNC: 101 MMOL/L — SIGNIFICANT CHANGE UP (ref 98–107)
CHLORIDE SERPL-SCNC: 102 MMOL/L — SIGNIFICANT CHANGE UP (ref 98–107)
CO2 SERPL-SCNC: 35 MMOL/L — HIGH (ref 22–29)
CO2 SERPL-SCNC: 41 MMOL/L — HIGH (ref 22–29)
CREAT SERPL-MCNC: 0.82 MG/DL — SIGNIFICANT CHANGE UP (ref 0.5–1.3)
CREAT SERPL-MCNC: 0.83 MG/DL — SIGNIFICANT CHANGE UP (ref 0.5–1.3)
DIGOXIN SERPL-MCNC: 1.7 NG/ML — SIGNIFICANT CHANGE UP (ref 0.8–2)
EOSINOPHIL # BLD AUTO: 0 K/UL — SIGNIFICANT CHANGE UP (ref 0–0.5)
EOSINOPHIL NFR BLD AUTO: 0.4 % — SIGNIFICANT CHANGE UP (ref 0–5)
GAS PNL BLDA: SIGNIFICANT CHANGE UP
GAS PNL BLDA: SIGNIFICANT CHANGE UP
GAS PNL BLDV: 150 MMOL/L — HIGH (ref 136–146)
GAS PNL BLDV: SIGNIFICANT CHANGE UP
GAS PNL BLDV: SIGNIFICANT CHANGE UP
GLUCOSE BLDV-MCNC: 128 MG/DL — HIGH (ref 70–99)
GLUCOSE SERPL-MCNC: 139 MG/DL — HIGH (ref 70–115)
GLUCOSE SERPL-MCNC: 147 MG/DL — HIGH (ref 70–115)
HCO3 BLDV-SCNC: 41 MMOL/L — HIGH (ref 20–26)
HCT VFR BLD CALC: 35.8 % — LOW (ref 42–52)
HCT VFR BLDA CALC: 29 — LOW (ref 39–50)
HGB BLD CALC-MCNC: 9.2 G/DL — LOW (ref 13–17)
HGB BLD-MCNC: 10.4 G/DL — LOW (ref 14–18)
LACTATE BLDV-MCNC: 0.9 MMOL/L — SIGNIFICANT CHANGE UP (ref 0.5–2)
LYMPHOCYTES # BLD AUTO: 1.3 K/UL — SIGNIFICANT CHANGE UP (ref 1–4.8)
LYMPHOCYTES # BLD AUTO: 12.5 % — LOW (ref 20–55)
MAGNESIUM SERPL-MCNC: 2 MG/DL — SIGNIFICANT CHANGE UP (ref 1.6–2.6)
MAGNESIUM SERPL-MCNC: 2.1 MG/DL — SIGNIFICANT CHANGE UP (ref 1.6–2.6)
MCHC RBC-ENTMCNC: 26.1 PG — LOW (ref 27–31)
MCHC RBC-ENTMCNC: 29.1 G/DL — LOW (ref 32–36)
MCV RBC AUTO: 89.7 FL — SIGNIFICANT CHANGE UP (ref 80–94)
MONOCYTES # BLD AUTO: 0.6 K/UL — SIGNIFICANT CHANGE UP (ref 0–0.8)
MONOCYTES NFR BLD AUTO: 5.6 % — SIGNIFICANT CHANGE UP (ref 3–10)
NEUTROPHILS # BLD AUTO: 8.6 K/UL — HIGH (ref 1.8–8)
NEUTROPHILS NFR BLD AUTO: 80.9 % — HIGH (ref 37–73)
OTHER CELLS CSF MANUAL: 8 ML/DL — LOW (ref 18–22)
PCO2 BLDV: 70 MMHG — HIGH (ref 35–50)
PH BLDV: 7.42 — SIGNIFICANT CHANGE UP (ref 7.35–7.45)
PHOSPHATE SERPL-MCNC: 3.3 MG/DL — SIGNIFICANT CHANGE UP (ref 2.4–4.7)
PLATELET # BLD AUTO: 272 K/UL — SIGNIFICANT CHANGE UP (ref 150–400)
PO2 BLDV: 32 MMHG — SIGNIFICANT CHANGE UP (ref 25–45)
POTASSIUM BLDV-SCNC: 4 MMOL/L — SIGNIFICANT CHANGE UP (ref 3.4–4.5)
POTASSIUM SERPL-MCNC: 3.8 MMOL/L — SIGNIFICANT CHANGE UP (ref 3.5–5.3)
POTASSIUM SERPL-MCNC: 4 MMOL/L — SIGNIFICANT CHANGE UP (ref 3.5–5.3)
POTASSIUM SERPL-SCNC: 3.8 MMOL/L — SIGNIFICANT CHANGE UP (ref 3.5–5.3)
POTASSIUM SERPL-SCNC: 4 MMOL/L — SIGNIFICANT CHANGE UP (ref 3.5–5.3)
RBC # BLD: 3.99 M/UL — LOW (ref 4.6–6.2)
RBC # FLD: 18.3 % — HIGH (ref 11–15.6)
SAO2 % BLDV: 60 % — LOW (ref 67–88)
SODIUM SERPL-SCNC: 153 MMOL/L — HIGH (ref 135–145)
SODIUM SERPL-SCNC: 153 MMOL/L — HIGH (ref 135–145)
WBC # BLD: 10.6 K/UL — SIGNIFICANT CHANGE UP (ref 4.8–10.8)
WBC # FLD AUTO: 10.6 K/UL — SIGNIFICANT CHANGE UP (ref 4.8–10.8)

## 2017-07-09 PROCEDURE — 71010: CPT | Mod: 26,77

## 2017-07-09 PROCEDURE — 36556 INSERT NON-TUNNEL CV CATH: CPT

## 2017-07-09 PROCEDURE — 71010: CPT | Mod: 26

## 2017-07-09 PROCEDURE — 76937 US GUIDE VASCULAR ACCESS: CPT | Mod: 26

## 2017-07-09 RX ORDER — CALCIUM GLUCONATE 100 MG/ML
2 VIAL (ML) INTRAVENOUS ONCE
Qty: 2 | Refills: 0 | Status: COMPLETED | OUTPATIENT
Start: 2017-07-09 | End: 2017-07-09

## 2017-07-09 RX ORDER — ALBUMIN HUMAN 25 %
100 VIAL (ML) INTRAVENOUS EVERY 6 HOURS
Qty: 0 | Refills: 0 | Status: COMPLETED | OUTPATIENT
Start: 2017-07-09 | End: 2017-07-09

## 2017-07-09 RX ORDER — MILRINONE LACTATE 1 MG/ML
4100 INJECTION, SOLUTION INTRAVENOUS ONCE
Qty: 0 | Refills: 0 | Status: COMPLETED | OUTPATIENT
Start: 2017-07-09 | End: 2017-07-09

## 2017-07-09 RX ORDER — POTASSIUM CHLORIDE 20 MEQ
10 PACKET (EA) ORAL ONCE
Qty: 0 | Refills: 0 | Status: COMPLETED | OUTPATIENT
Start: 2017-07-09 | End: 2017-07-09

## 2017-07-09 RX ORDER — BUMETANIDE 0.25 MG/ML
0.5 INJECTION INTRAMUSCULAR; INTRAVENOUS
Qty: 10 | Refills: 0 | Status: DISCONTINUED | OUTPATIENT
Start: 2017-07-09 | End: 2017-07-10

## 2017-07-09 RX ORDER — MILRINONE LACTATE 1 MG/ML
0.38 INJECTION, SOLUTION INTRAVENOUS
Qty: 20 | Refills: 0 | Status: DISCONTINUED | OUTPATIENT
Start: 2017-07-09 | End: 2017-07-10

## 2017-07-09 RX ADMIN — Medication 3 MILLILITER(S): at 20:10

## 2017-07-09 RX ADMIN — Medication 100 MILLIEQUIVALENT(S): at 18:23

## 2017-07-09 RX ADMIN — HEPARIN SODIUM 5000 UNIT(S): 5000 INJECTION INTRAVENOUS; SUBCUTANEOUS at 22:10

## 2017-07-09 RX ADMIN — Medication 9 MILLIGRAM(S): at 22:10

## 2017-07-09 RX ADMIN — Medication 50 MILLILITER(S): at 18:24

## 2017-07-09 RX ADMIN — Medication 50 MILLIGRAM(S): at 05:30

## 2017-07-09 RX ADMIN — Medication 50 MILLILITER(S): at 13:11

## 2017-07-09 RX ADMIN — Medication 10 MILLIGRAM(S): at 14:51

## 2017-07-09 RX ADMIN — Medication 40 MILLIGRAM(S): at 05:29

## 2017-07-09 RX ADMIN — Medication 50 MILLIGRAM(S): at 22:10

## 2017-07-09 RX ADMIN — Medication 100 MILLIGRAM(S): at 05:29

## 2017-07-09 RX ADMIN — Medication 0.12 MILLIGRAM(S): at 05:29

## 2017-07-09 RX ADMIN — Medication 3 MILLILITER(S): at 15:15

## 2017-07-09 RX ADMIN — AMIODARONE HYDROCHLORIDE 200 MILLIGRAM(S): 400 TABLET ORAL at 05:29

## 2017-07-09 RX ADMIN — Medication 3 MILLILITER(S): at 08:39

## 2017-07-09 RX ADMIN — PANTOPRAZOLE SODIUM 40 MILLIGRAM(S): 20 TABLET, DELAYED RELEASE ORAL at 05:29

## 2017-07-09 RX ADMIN — Medication 4 MILLIGRAM(S): at 22:10

## 2017-07-09 RX ADMIN — Medication 100 MILLIGRAM(S): at 13:20

## 2017-07-09 RX ADMIN — SENNA PLUS 10 MILLILITER(S): 8.6 TABLET ORAL at 22:10

## 2017-07-09 RX ADMIN — MILRINONE LACTATE 12.22 MICROGRAM(S)/KG/MIN: 1 INJECTION, SOLUTION INTRAVENOUS at 12:46

## 2017-07-09 RX ADMIN — HEPARIN SODIUM 5000 UNIT(S): 5000 INJECTION INTRAVENOUS; SUBCUTANEOUS at 14:52

## 2017-07-09 RX ADMIN — BUMETANIDE 5 MG/HR: 0.25 INJECTION INTRAMUSCULAR; INTRAVENOUS at 14:20

## 2017-07-09 RX ADMIN — HEPARIN SODIUM 5000 UNIT(S): 5000 INJECTION INTRAVENOUS; SUBCUTANEOUS at 05:29

## 2017-07-09 RX ADMIN — LISINOPRIL 2.5 MILLIGRAM(S): 2.5 TABLET ORAL at 05:29

## 2017-07-09 RX ADMIN — Medication 3 MILLILITER(S): at 03:07

## 2017-07-09 RX ADMIN — Medication 200 GRAM(S): at 20:47

## 2017-07-09 RX ADMIN — PANTOPRAZOLE SODIUM 40 MILLIGRAM(S): 20 TABLET, DELAYED RELEASE ORAL at 18:26

## 2017-07-09 RX ADMIN — MODAFINIL 100 MILLIGRAM(S): 200 TABLET ORAL at 14:25

## 2017-07-09 RX ADMIN — MILRINONE LACTATE 24.6 MICROGRAM(S): 1 INJECTION, SOLUTION INTRAVENOUS at 12:30

## 2017-07-09 RX ADMIN — Medication 50 MILLIGRAM(S): at 18:25

## 2017-07-09 NOTE — CHART NOTE - NSCHARTNOTEFT_GEN_A_CORE
Pt evaluated this AM.  Pt in obvious distress.  Tachypnea, head bobbing, lethargic.  Oxygen saturation 88-91% on 5 L NC.  Rales noted to bilateral lower and middle lung fields.  ABG obtained:  7.48/55/58.  Pt with minimal response to IV lasix for past 48 hours.  Decreasing UOP noted today.  Central line placed.  Milirnone gtt initiated with severe CHF and EF < 20%.  UOP with steady increase. Will continue to diurese and will add Bumex gtt if UOP decreases.  Will recheck BMP, ABG this afternoon.  Low threshold for intubation.

## 2017-07-09 NOTE — PROGRESS NOTE ADULT - SUBJECTIVE AND OBJECTIVE BOX
INTERVAL HPI/OVERNIGHT EVENTS/SUBJECTIVE:    Pt was doing well during the day, Free H2O increased.  Increased work of breathing developed, given Lasix 40mg IVP x2 with some increase in UOP.  Overnight respirations less labored.  Rested.       ICU Vital Signs Last 24 Hrs  T(C): 37.1 (09 Jul 2017 04:00), Max: 37.3 (08 Jul 2017 16:00)  T(F): 98.8 (09 Jul 2017 04:00), Max: 99.1 (08 Jul 2017 16:00)  HR: 96 (09 Jul 2017 05:00) (79 - 103)  BP: 125/74 (09 Jul 2017 05:00) (101/68 - 129/94)  BP(mean): 87 (09 Jul 2017 05:00) (77 - 102)  ABP: --  ABP(mean): --  RR: 32 (09 Jul 2017 05:00) (17 - 39)  SpO2: 90% (09 Jul 2017 05:00) (74% - 100%)        I&O's Summary    07 Jul 2017 07:01  -  08 Jul 2017 07:00  --------------------------------------------------------  IN: 1830 mL / OUT: 2300 mL / NET: -470 mL    08 Jul 2017 07:01  -  09 Jul 2017 06:08  --------------------------------------------------------  IN: 2240 mL / OUT: 1730 mL / NET: 510 mL                    MEDICATIONS  (STANDING):  amantadine Syrup 100 milliGRAM(s) Oral <User Schedule>  doxazosin 4 milliGRAM(s) Oral at bedtime  FLUoxetine Solution 10 milliGRAM(s) Oral daily  traZODone 50 milliGRAM(s) Oral at bedtime  digoxin     Tablet 0.125 milliGRAM(s) Oral daily  melatonin 9 milliGRAM(s) Oral at bedtime  ALBUTerol    0.083% 2.5 milliGRAM(s) Nebulizer every 6 hours  metoprolol 50 milliGRAM(s) Oral two times a day  pantoprazole   Suspension 40 milliGRAM(s) Oral two times a day before meals  senna Syrup 10 milliLiter(s) Oral at bedtime  lisinopril 2.5 milliGRAM(s) Oral daily  amiodarone    Tablet 200 milliGRAM(s) Oral daily  furosemide   Injectable 40 milliGRAM(s) IV Push every 12 hours  heparin  Injectable 5000 Unit(s) SubCutaneous every 8 hours  modafinil 100 milliGRAM(s) Oral daily    MEDICATIONS  (PRN):  acetaminophen    Suspension 650 milliGRAM(s) Oral every 6 hours PRN For Temp greater than 38.5 C (101.3 F)  bisacodyl Suppository 10 milliGRAM(s) Rectal daily PRN Constipation      NUTRITION/IVF: Pivot @45cc/hr, Prosource x2 25kcal/kg, 2gm/kg/ FW 250Q4    ARMAS: yes  DATE INSERTED: 7/6        PHYSICAL EXAM:     Gen:NAD, Well appearing, No cyanosis, Pallor.    Eyes: PERRL ~ 3mm, EOMI,     Neurological: A&O, but not able to determine at what level. GCS 12 (4/2/6) , No focal deficit.        Neck: Supple. NT AT, FROM no pain.      Pulmonary: NAD, CTA, = BL ., 2LNC    Cardiovascular: RRR, S1, S2, No Murmurs, rubs or gallops noted.    Gastrointestinal: slightly hard and distended, NT. hypoactive bowel sounds, midline incision dressed, no blood or erythema noted    Genitourinary: Armas in place    Extremities: 3+ pitting edema of b/l LE,  NT, AT, no erythema or palpable cord noted. pulses not palpated due to edemat.            LABS:                        10.4   10.6  )-----------( 272      ( 09 Jul 2017 04:49 )             35.8     07-09    153<H>  |  101  |  65.0<H>  ----------------------------<  139<H>  4.0   |  35.0<H>  |  0.83    Ca    8.7      09 Jul 2017 04:49  Phos  3.3     07-09  Mg     2.1     07-09            ASSESSMENT/PLAN:  51yMale presenting with post lap carissa bile leak. post code on 5/9 after ERCP         Neurological: monitor GCS    Pulmonary: keep on 2L NC    Cardiovascular: hemodynamically stable, monitor vitals for changes     Gastrointestinal: dressing change in am, monitor for BM. If no BM tomorrow, may need to place NG tube    Genitourinary: Armas stays in place for retention, monitor UOP    Heme: SCDs for DVT prophylaxis, monitor CBC    Lines/ Tubes: PIV, PEG    Dispo: SICU to BIU Monday

## 2017-07-10 LAB
ANION GAP SERPL CALC-SCNC: <9 MMOL/L — SIGNIFICANT CHANGE UP (ref 5–17)
ANISOCYTOSIS BLD QL: SLIGHT — SIGNIFICANT CHANGE UP
BASOPHILS # BLD AUTO: 0 K/UL — SIGNIFICANT CHANGE UP (ref 0–0.2)
BASOPHILS NFR BLD AUTO: 0.2 % — SIGNIFICANT CHANGE UP (ref 0–2)
BUN SERPL-MCNC: 64 MG/DL — HIGH (ref 8–20)
CALCIUM SERPL-MCNC: 8.8 MG/DL — SIGNIFICANT CHANGE UP (ref 8.6–10.2)
CHLORIDE SERPL-SCNC: 101 MMOL/L — SIGNIFICANT CHANGE UP (ref 98–107)
CO2 SERPL-SCNC: >43 MMOL/L — HIGH (ref 22–29)
CREAT SERPL-MCNC: 0.78 MG/DL — SIGNIFICANT CHANGE UP (ref 0.5–1.3)
EOSINOPHIL # BLD AUTO: 0.1 K/UL — SIGNIFICANT CHANGE UP (ref 0–0.5)
EOSINOPHIL NFR BLD AUTO: 0.9 % — SIGNIFICANT CHANGE UP (ref 0–5)
GAS PNL BLDA: SIGNIFICANT CHANGE UP
GLUCOSE SERPL-MCNC: 132 MG/DL — HIGH (ref 70–115)
HCT VFR BLD CALC: 32.1 % — LOW (ref 42–52)
HGB BLD-MCNC: 9.1 G/DL — LOW (ref 14–18)
HYPOCHROMIA BLD QL: SIGNIFICANT CHANGE UP
LYMPHOCYTES # BLD AUTO: 0.9 K/UL — LOW (ref 1–4.8)
LYMPHOCYTES # BLD AUTO: 10.3 % — LOW (ref 20–55)
MAGNESIUM SERPL-MCNC: 1.8 MG/DL — SIGNIFICANT CHANGE UP (ref 1.6–2.6)
MCHC RBC-ENTMCNC: 26.2 PG — LOW (ref 27–31)
MCHC RBC-ENTMCNC: 28.3 G/DL — LOW (ref 32–36)
MCV RBC AUTO: 92.5 FL — SIGNIFICANT CHANGE UP (ref 80–94)
MONOCYTES # BLD AUTO: 0.6 K/UL — SIGNIFICANT CHANGE UP (ref 0–0.8)
MONOCYTES NFR BLD AUTO: 6.2 % — SIGNIFICANT CHANGE UP (ref 3–10)
NEUTROPHILS # BLD AUTO: 7.5 K/UL — SIGNIFICANT CHANGE UP (ref 1.8–8)
NEUTROPHILS NFR BLD AUTO: 82 % — HIGH (ref 37–73)
OVALOCYTES BLD QL SMEAR: SLIGHT — SIGNIFICANT CHANGE UP
PHOSPHATE SERPL-MCNC: 3.5 MG/DL — SIGNIFICANT CHANGE UP (ref 2.4–4.7)
PLAT MORPH BLD: NORMAL — SIGNIFICANT CHANGE UP
PLATELET # BLD AUTO: 244 K/UL — SIGNIFICANT CHANGE UP (ref 150–400)
POIKILOCYTOSIS BLD QL AUTO: SLIGHT — SIGNIFICANT CHANGE UP
POLYCHROMASIA BLD QL SMEAR: SLIGHT — SIGNIFICANT CHANGE UP
POTASSIUM SERPL-MCNC: 3.5 MMOL/L — SIGNIFICANT CHANGE UP (ref 3.5–5.3)
POTASSIUM SERPL-SCNC: 3.5 MMOL/L — SIGNIFICANT CHANGE UP (ref 3.5–5.3)
RBC # BLD: 3.47 M/UL — LOW (ref 4.6–6.2)
RBC # FLD: 18.4 % — HIGH (ref 11–15.6)
RBC BLD AUTO: ABNORMAL
SODIUM SERPL-SCNC: 153 MMOL/L — HIGH (ref 135–145)
STOMATOCYTES BLD QL SMEAR: SLIGHT — SIGNIFICANT CHANGE UP
TARGETS BLD QL SMEAR: SLIGHT — SIGNIFICANT CHANGE UP
WBC # BLD: 9.2 K/UL — SIGNIFICANT CHANGE UP (ref 4.8–10.8)
WBC # FLD AUTO: 9.2 K/UL — SIGNIFICANT CHANGE UP (ref 4.8–10.8)

## 2017-07-10 PROCEDURE — 99232 SBSQ HOSP IP/OBS MODERATE 35: CPT

## 2017-07-10 RX ORDER — ACETAZOLAMIDE 250 MG/1
500 TABLET ORAL ONCE
Qty: 0 | Refills: 0 | Status: COMPLETED | OUTPATIENT
Start: 2017-07-10 | End: 2017-07-10

## 2017-07-10 RX ORDER — MAGNESIUM SULFATE 500 MG/ML
2 VIAL (ML) INJECTION ONCE
Qty: 0 | Refills: 0 | Status: COMPLETED | OUTPATIENT
Start: 2017-07-10 | End: 2017-07-10

## 2017-07-10 RX ORDER — POTASSIUM CHLORIDE 20 MEQ
10 PACKET (EA) ORAL
Qty: 0 | Refills: 0 | Status: COMPLETED | OUTPATIENT
Start: 2017-07-10 | End: 2017-07-10

## 2017-07-10 RX ADMIN — MODAFINIL 100 MILLIGRAM(S): 200 TABLET ORAL at 11:52

## 2017-07-10 RX ADMIN — Medication 100 MILLIEQUIVALENT(S): at 08:31

## 2017-07-10 RX ADMIN — PANTOPRAZOLE SODIUM 40 MILLIGRAM(S): 20 TABLET, DELAYED RELEASE ORAL at 18:00

## 2017-07-10 RX ADMIN — Medication 0.12 MILLIGRAM(S): at 05:47

## 2017-07-10 RX ADMIN — Medication 50 MILLIGRAM(S): at 22:07

## 2017-07-10 RX ADMIN — Medication 100 MILLIGRAM(S): at 11:52

## 2017-07-10 RX ADMIN — AMIODARONE HYDROCHLORIDE 200 MILLIGRAM(S): 400 TABLET ORAL at 05:47

## 2017-07-10 RX ADMIN — ACETAZOLAMIDE 110 MILLIGRAM(S): 250 TABLET ORAL at 03:06

## 2017-07-10 RX ADMIN — Medication 50 GRAM(S): at 06:21

## 2017-07-10 RX ADMIN — Medication 3 MILLILITER(S): at 02:42

## 2017-07-10 RX ADMIN — Medication 4 MILLIGRAM(S): at 22:06

## 2017-07-10 RX ADMIN — Medication 100 MILLIEQUIVALENT(S): at 07:36

## 2017-07-10 RX ADMIN — Medication 3 MILLILITER(S): at 10:23

## 2017-07-10 RX ADMIN — HEPARIN SODIUM 5000 UNIT(S): 5000 INJECTION INTRAVENOUS; SUBCUTANEOUS at 05:47

## 2017-07-10 RX ADMIN — LISINOPRIL 2.5 MILLIGRAM(S): 2.5 TABLET ORAL at 05:47

## 2017-07-10 RX ADMIN — HEPARIN SODIUM 5000 UNIT(S): 5000 INJECTION INTRAVENOUS; SUBCUTANEOUS at 14:25

## 2017-07-10 RX ADMIN — Medication 10 MILLIGRAM(S): at 11:52

## 2017-07-10 RX ADMIN — Medication 9 MILLIGRAM(S): at 22:06

## 2017-07-10 RX ADMIN — Medication 100 MILLIGRAM(S): at 05:47

## 2017-07-10 RX ADMIN — PANTOPRAZOLE SODIUM 40 MILLIGRAM(S): 20 TABLET, DELAYED RELEASE ORAL at 05:47

## 2017-07-10 RX ADMIN — Medication 50 MILLIGRAM(S): at 05:48

## 2017-07-10 RX ADMIN — HEPARIN SODIUM 5000 UNIT(S): 5000 INJECTION INTRAVENOUS; SUBCUTANEOUS at 22:06

## 2017-07-10 RX ADMIN — Medication 3 MILLILITER(S): at 20:46

## 2017-07-10 RX ADMIN — Medication 3 MILLILITER(S): at 14:45

## 2017-07-10 RX ADMIN — SENNA PLUS 10 MILLILITER(S): 8.6 TABLET ORAL at 22:06

## 2017-07-10 NOTE — PROGRESS NOTE ADULT - SUBJECTIVE AND OBJECTIVE BOX
Patient has girlfriend at bedside.   Attempting to verbalize his name and nods yes/no appropriately.   Following commands.   Continues to be frustrated.     FUNCTIONAL PROGRESS  Total A    REVIEW OF SYSTEMS  Constitutional - +fatigue  Neurological - +loss of strength    VITALS  T(C): 36.6 (07-11-17 @ 11:00), Max: 36.8 (07-10-17 @ 16:00)  HR: 102 (07-11-17 @ 14:48) (85 - 104)  BP: 124/85 (07-11-17 @ 12:00) (87/56 - 124/85)  RR: 30 (07-11-17 @ 12:00) (22 - 40)  SpO2: 90% (07-11-17 @ 14:48) (82% - 100%)  Wt(kg): --    MEDICATIONS   amantadine Syrup 100 milliGRAM(s) <User Schedule>  doxazosin 4 milliGRAM(s) at bedtime  FLUoxetine Solution 10 milliGRAM(s) daily  traZODone 50 milliGRAM(s) at bedtime  digoxin     Tablet 0.125 milliGRAM(s) daily  melatonin 9 milliGRAM(s) at bedtime  metoprolol 50 milliGRAM(s) two times a day  acetaminophen    Suspension 650 milliGRAM(s) every 6 hours PRN  pantoprazole   Suspension 40 milliGRAM(s) two times a day before meals  senna Syrup 10 milliLiter(s) at bedtime  lisinopril 2.5 milliGRAM(s) daily  amiodarone    Tablet 200 milliGRAM(s) daily  bisacodyl Suppository 10 milliGRAM(s) daily PRN  heparin  Injectable 5000 Unit(s) every 8 hours  modafinil 100 milliGRAM(s) daily  ALBUTerol/ipratropium for Nebulization 3 milliLiter(s) every 6 hours      RECENT LABS/IMAGING  CBC Full  -  ( 11 Jul 2017 04:48 )  WBC Count : 10.0 K/uL  Hemoglobin : 9.8 g/dL  Hematocrit : 35.2 %  Platelet Count - Automated : 260 K/uL  Mean Cell Volume : 92.1 fl  Mean Cell Hemoglobin : 25.7 pg  Mean Cell Hemoglobin Concentration : 27.8 g/dL  Auto Neutrophil # : 8.2 K/uL  Auto Lymphocyte # : 1.1 K/uL  Auto Monocyte # : 0.6 K/uL  Auto Eosinophil # : 0.0 K/uL  Auto Basophil # : 0.0 K/uL  Auto Neutrophil % : 82.0 %  Auto Lymphocyte % : 11.5 %  Auto Monocyte % : 5.5 %  Auto Eosinophil % : 0.5 %  Auto Basophil % : 0.1 %    07-11    156<H>  |  103  |  65.0<H>  ----------------------------<  136<H>  3.8   |  43.0<H>  |  0.83    Ca    8.9      11 Jul 2017 04:48  Phos  4.0     07-11  Mg     2.5     07-11        PHYSICAL EXAM  Constitutional - NAD  Extremities - BLE edema to thighs  Neurologic Exam -                    Cognitive - Awake and alert, follows one step commands more consistently, nods yes/no    ----------------------------------------------------------------------------------------  ASSESSMENT/PLAN  51M with cardiac arrest s/p ERCP for bile duct leak with prolonged hospitalization related to prolonged intubation and cognitive/behavioral deficits.    Arousal/Sleep wake cycle - Amantadine 100mg, Melatonin 9mg, Trazodone 50mg. Provigil 100mg     Mood/Motor recovery - Prozac 10mg daily     Rehab - PT/OT for 3x/week program for ongoing daily stimulation. Will continue to follow. Will need medical optimization for consideration to acute rehab.

## 2017-07-10 NOTE — PROVIDER CONTACT NOTE (CRITICAL VALUE NOTIFICATION) - ACTION/TREATMENT ORDERED:
Pt actively coding, orders in progress
To recheck, blood specimen resent, free water, 300cc, Q4H in progress as ordered.
will continue to monitor

## 2017-07-10 NOTE — PROGRESS NOTE ADULT - SUBJECTIVE AND OBJECTIVE BOX
INTERVAL HPI/OVERNIGHT EVENTS:  The patient was treated with milrinone and bumex.  These therapies were discontinued  PRESSORS: [ ] YES [ ] NO  WHICH:  DOSE:    ANTIBIOTICS:                  DATE STARTED:  ANTIBIOTICS:                  DATE STARTED:  ANTIBIOTICS:                  DATE STARTED:    MEDICATIONS  (STANDING):  amantadine Syrup 100 milliGRAM(s) Oral <User Schedule>  doxazosin 4 milliGRAM(s) Oral at bedtime  FLUoxetine Solution 10 milliGRAM(s) Oral daily  traZODone 50 milliGRAM(s) Oral at bedtime  digoxin     Tablet 0.125 milliGRAM(s) Oral daily  melatonin 9 milliGRAM(s) Oral at bedtime  metoprolol 50 milliGRAM(s) Oral two times a day  pantoprazole   Suspension 40 milliGRAM(s) Oral two times a day before meals  senna Syrup 10 milliLiter(s) Oral at bedtime  lisinopril 2.5 milliGRAM(s) Oral daily  amiodarone    Tablet 200 milliGRAM(s) Oral daily  heparin  Injectable 5000 Unit(s) SubCutaneous every 8 hours  modafinil 100 milliGRAM(s) Oral daily  ALBUTerol/ipratropium for Nebulization 3 milliLiter(s) Nebulizer every 6 hours    MEDICATIONS  (PRN):  acetaminophen    Suspension 650 milliGRAM(s) Oral every 6 hours PRN For Temp greater than 38.5 C (101.3 F)  bisacodyl Suppository 10 milliGRAM(s) Rectal daily PRN Constipation      Drug Dosing Weight  Height (cm): 165.1 (12 Jun 2017 06:38)  Weight (kg): 81.5 (12 Jun 2017 06:38)  BMI (kg/m2): 29.9 (12 Jun 2017 06:38)  BSA (m2): 1.89 (12 Jun 2017 06:38)    CENTRAL LINE: [ ] YES [ ] NO  LOCATION:   DATE INSERTED:  REMOVE: [ ] YES [ ] NO  EXPLAIN:    ARMAS: [ ] YES [ ] NO    DATE INSERTED:  REMOVE: [ ] YES [ ] NO  EXPLAIN:    A-LINE: [ ] YES [ ] NO  LOCATION:   DATE INSERTED:  REMOVE: [ ] YES [ ] NO  EXPLAIN:    PAST MEDICAL & SURGICAL HISTORY:  Mitral regurgitation: severe MR  Cardiomyopathy, nonischemic  CAD (coronary artery disease)  Asthma  Atrial fibrillation  S/P laparoscopic cholecystectomy  History of humerus fracture: Metal pins in Left Humerus  Artificial pacemaker      ICU Vital Signs Last 24 Hrs  T(C): 36.3 (10 Jul 2017 04:32), Max: 36.9 (09 Jul 2017 12:00)  T(F): 97.4 (10 Jul 2017 04:32), Max: 98.4 (09 Jul 2017 12:00)  HR: 68 (10 Jul 2017 10:23) (66 - 118)  BP: 96/61 (10 Jul 2017 10:00) (80/55 - 123/63)  BP(mean): 73 (10 Jul 2017 10:00) (63 - 87)  ABP: --  ABP(mean): --  RR: 38 (10 Jul 2017 10:00) (18 - 39)  SpO2: 100% (10 Jul 2017 10:23) (87% - 100%)      ABG - ( 10 Jul 2017 09:53 )  pH: 7.45  /  pCO2: 60    /  pO2: 97    / HCO3: 39    / Base Excess: 15.1  /  SaO2: 100                 I&O's Detail    09 Jul 2017 07:01  -  10 Jul 2017 07:00  --------------------------------------------------------  IN:    bumetanide Infusion: 55 mL    Enteral Tube Flush: 50 mL    Free Water: 50 mL    milrinone  Infusion: 70.2 mL    milrinone  Infusion: 73.6 mL    Pivot: 1080 mL    Solution: 100 mL    Solution: 50 mL  Total IN: 1528.8 mL    OUT:    Indwelling Catheter - Urethral: 2485 mL  Total OUT: 2485 mL    Total NET: -956.2 mL      10 Jul 2017 07:01  -  10 Jul 2017 11:36  --------------------------------------------------------  IN:    Pivot: 180 mL    Solution: 300 mL  Total IN: 480 mL    OUT:    Indwelling Catheter - Urethral: 185 mL  Total OUT: 185 mL    Total NET: 295 mL              LABS:  CBC Full  -  ( 10 Jul 2017 04:45 )  WBC Count : 9.2 K/uL  Hemoglobin : 9.1 g/dL  Hematocrit : 32.1 %  Platelet Count - Automated : 244 K/uL  Mean Cell Volume : 92.5 fl  Mean Cell Hemoglobin : 26.2 pg  Mean Cell Hemoglobin Concentration : 28.3 g/dL  Auto Neutrophil # : 7.5 K/uL  Auto Lymphocyte # : 0.9 K/uL  Auto Monocyte # : 0.6 K/uL  Auto Eosinophil # : 0.1 K/uL  Auto Basophil # : 0.0 K/uL  Auto Neutrophil % : 82.0 %  Auto Lymphocyte % : 10.3 %  Auto Monocyte % : 6.2 %  Auto Eosinophil % : 0.9 %  Auto Basophil % : 0.2 %    07-10    153<H>  |  101  |  64.0<H>  ----------------------------<  132<H>  3.5   |  >43.0<H>  |  0.78    Ca    8.8      10 Jul 2017 04:45  Phos  3.5     07-10  Mg     1.8     07-10            Assessment and Plan:    Neuro: GCS 15. Monitor for delirium.  Serial Neurologic assessments    HEENT: no issues    CV: Continue hemodynamic monitoring    Pulm: Pulmonary toilet.  Continue incentive spirometer.  Chest PT.  Encourage OOB to chair and ambulation     GI/Nutrition: Bowel Regimen    /Renal: monitor UOP. Monitor BMP.  Significant metabolic alkalosis.  Dose of diamox given.  The patient is breathing well.  Will continue to follow      HEME- DVT prophylaxis, SCDs    ID: No active issues

## 2017-07-11 LAB
ANION GAP SERPL CALC-SCNC: 10 MMOL/L — SIGNIFICANT CHANGE UP (ref 5–17)
ANISOCYTOSIS BLD QL: SLIGHT — SIGNIFICANT CHANGE UP
BASE EXCESS BLDA CALC-SCNC: 14.9 MMOL/L — HIGH (ref -3–3)
BASOPHILS # BLD AUTO: 0 K/UL — SIGNIFICANT CHANGE UP (ref 0–0.2)
BASOPHILS NFR BLD AUTO: 0.1 % — SIGNIFICANT CHANGE UP (ref 0–2)
BUN SERPL-MCNC: 65 MG/DL — HIGH (ref 8–20)
CALCIUM SERPL-MCNC: 8.9 MG/DL — SIGNIFICANT CHANGE UP (ref 8.6–10.2)
CHLORIDE SERPL-SCNC: 103 MMOL/L — SIGNIFICANT CHANGE UP (ref 98–107)
CO2 SERPL-SCNC: 43 MMOL/L — HIGH (ref 22–29)
CREAT SERPL-MCNC: 0.83 MG/DL — SIGNIFICANT CHANGE UP (ref 0.5–1.3)
ELLIPTOCYTES BLD QL SMEAR: SLIGHT — SIGNIFICANT CHANGE UP
EOSINOPHIL # BLD AUTO: 0 K/UL — SIGNIFICANT CHANGE UP (ref 0–0.5)
EOSINOPHIL NFR BLD AUTO: 0.5 % — SIGNIFICANT CHANGE UP (ref 0–5)
GAS PNL BLDA: SIGNIFICANT CHANGE UP
GAS PNL BLDA: SIGNIFICANT CHANGE UP
GLUCOSE SERPL-MCNC: 136 MG/DL — HIGH (ref 70–115)
HCO3 BLDA-SCNC: 39 MMOL/L — HIGH (ref 20–26)
HCT VFR BLD CALC: 35.2 % — LOW (ref 42–52)
HGB BLD-MCNC: 9.8 G/DL — LOW (ref 14–18)
HYPOCHROMIA BLD QL: SIGNIFICANT CHANGE UP
LYMPHOCYTES # BLD AUTO: 1.1 K/UL — SIGNIFICANT CHANGE UP (ref 1–4.8)
LYMPHOCYTES # BLD AUTO: 11.5 % — LOW (ref 20–55)
MACROCYTES BLD QL: SLIGHT — SIGNIFICANT CHANGE UP
MAGNESIUM SERPL-MCNC: 2.5 MG/DL — SIGNIFICANT CHANGE UP (ref 1.8–2.6)
MCHC RBC-ENTMCNC: 25.7 PG — LOW (ref 27–31)
MCHC RBC-ENTMCNC: 27.8 G/DL — LOW (ref 32–36)
MCV RBC AUTO: 92.1 FL — SIGNIFICANT CHANGE UP (ref 80–94)
MICROCYTES BLD QL: SLIGHT — SIGNIFICANT CHANGE UP
MONOCYTES # BLD AUTO: 0.6 K/UL — SIGNIFICANT CHANGE UP (ref 0–0.8)
MONOCYTES NFR BLD AUTO: 5.5 % — SIGNIFICANT CHANGE UP (ref 3–10)
NEUTROPHILS # BLD AUTO: 8.2 K/UL — HIGH (ref 1.8–8)
NEUTROPHILS NFR BLD AUTO: 82 % — HIGH (ref 37–73)
OVALOCYTES BLD QL SMEAR: SLIGHT — SIGNIFICANT CHANGE UP
PCO2 BLDA: 70 MMHG — CRITICAL HIGH (ref 35–45)
PH BLDA: 7.4 — SIGNIFICANT CHANGE UP (ref 7.35–7.45)
PHOSPHATE SERPL-MCNC: 4 MG/DL — SIGNIFICANT CHANGE UP (ref 2.4–4.7)
PLAT MORPH BLD: NORMAL — SIGNIFICANT CHANGE UP
PLATELET # BLD AUTO: 260 K/UL — SIGNIFICANT CHANGE UP (ref 150–400)
PO2 BLDA: 83 MMHG — SIGNIFICANT CHANGE UP (ref 83–108)
POIKILOCYTOSIS BLD QL AUTO: SLIGHT — SIGNIFICANT CHANGE UP
POLYCHROMASIA BLD QL SMEAR: SLIGHT — SIGNIFICANT CHANGE UP
POTASSIUM SERPL-MCNC: 3.8 MMOL/L — SIGNIFICANT CHANGE UP (ref 3.5–5.3)
POTASSIUM SERPL-SCNC: 3.8 MMOL/L — SIGNIFICANT CHANGE UP (ref 3.5–5.3)
RBC # BLD: 3.82 M/UL — LOW (ref 4.6–6.2)
RBC # FLD: 18.5 % — HIGH (ref 11–15.6)
RBC BLD AUTO: ABNORMAL
SAO2 % BLDA: 96 % — SIGNIFICANT CHANGE UP (ref 95–99)
SCHISTOCYTES BLD QL AUTO: SLIGHT — SIGNIFICANT CHANGE UP
SODIUM SERPL-SCNC: 156 MMOL/L — HIGH (ref 135–145)
SPHEROCYTES BLD QL SMEAR: SLIGHT — SIGNIFICANT CHANGE UP
STOMATOCYTES BLD QL SMEAR: PRESENT — SIGNIFICANT CHANGE UP
TARGETS BLD QL SMEAR: SLIGHT — SIGNIFICANT CHANGE UP
WBC # BLD: 10 K/UL — SIGNIFICANT CHANGE UP (ref 4.8–10.8)
WBC # FLD AUTO: 10 K/UL — SIGNIFICANT CHANGE UP (ref 4.8–10.8)

## 2017-07-11 RX ADMIN — HEPARIN SODIUM 5000 UNIT(S): 5000 INJECTION INTRAVENOUS; SUBCUTANEOUS at 22:47

## 2017-07-11 RX ADMIN — Medication 50 MILLIGRAM(S): at 17:29

## 2017-07-11 RX ADMIN — Medication 3 MILLILITER(S): at 14:47

## 2017-07-11 RX ADMIN — Medication 50 MILLIGRAM(S): at 22:48

## 2017-07-11 RX ADMIN — Medication 0.12 MILLIGRAM(S): at 06:36

## 2017-07-11 RX ADMIN — MODAFINIL 100 MILLIGRAM(S): 200 TABLET ORAL at 11:58

## 2017-07-11 RX ADMIN — Medication 50 MILLIGRAM(S): at 06:36

## 2017-07-11 RX ADMIN — Medication 4 MILLIGRAM(S): at 22:47

## 2017-07-11 RX ADMIN — Medication 3 MILLILITER(S): at 08:30

## 2017-07-11 RX ADMIN — HEPARIN SODIUM 5000 UNIT(S): 5000 INJECTION INTRAVENOUS; SUBCUTANEOUS at 06:36

## 2017-07-11 RX ADMIN — Medication 3 MILLILITER(S): at 03:30

## 2017-07-11 RX ADMIN — PANTOPRAZOLE SODIUM 40 MILLIGRAM(S): 20 TABLET, DELAYED RELEASE ORAL at 06:37

## 2017-07-11 RX ADMIN — Medication 100 MILLIGRAM(S): at 11:58

## 2017-07-11 RX ADMIN — SENNA PLUS 10 MILLILITER(S): 8.6 TABLET ORAL at 22:47

## 2017-07-11 RX ADMIN — AMIODARONE HYDROCHLORIDE 200 MILLIGRAM(S): 400 TABLET ORAL at 06:36

## 2017-07-11 RX ADMIN — Medication 10 MILLIGRAM(S): at 11:58

## 2017-07-11 RX ADMIN — Medication 9 MILLIGRAM(S): at 22:47

## 2017-07-11 RX ADMIN — PANTOPRAZOLE SODIUM 40 MILLIGRAM(S): 20 TABLET, DELAYED RELEASE ORAL at 17:29

## 2017-07-11 RX ADMIN — Medication 3 MILLILITER(S): at 20:50

## 2017-07-11 RX ADMIN — Medication 10 MILLIGRAM(S): at 22:48

## 2017-07-11 RX ADMIN — Medication 100 MILLIGRAM(S): at 06:36

## 2017-07-11 RX ADMIN — HEPARIN SODIUM 5000 UNIT(S): 5000 INJECTION INTRAVENOUS; SUBCUTANEOUS at 13:00

## 2017-07-11 RX ADMIN — LISINOPRIL 2.5 MILLIGRAM(S): 2.5 TABLET ORAL at 06:36

## 2017-07-11 NOTE — PROGRESS NOTE ADULT - SUBJECTIVE AND OBJECTIVE BOX
Pt seen and examined.  Remains non verbal. No major overnight events.    Vital Signs Last 24 Hrs  T(C): 36.6 (11 Jul 2017 11:00), Max: 36.8 (10 Jul 2017 16:00)  T(F): 97.9 (11 Jul 2017 11:00), Max: 98.2 (10 Jul 2017 16:00)  HR: 101 (11 Jul 2017 11:00) (73 - 101)  BP: 116/74 (11 Jul 2017 11:00) (87/56 - 116/74)  BP(mean): 82 (11 Jul 2017 10:00) (67 - 95)  RR: 38 (11 Jul 2017 11:00) (22 - 49)  SpO2: 98% (11 Jul 2017 11:00) (82% - 100%)    NAD  Awake and alert, follows commands  Abd mildly distended, soft,NT  RRR  Mild Tachypnea, BS equal bibasilar crackles    I&O's Summary    10 Jul 2017 07:01  -  11 Jul 2017 07:00  --------------------------------------------------------  IN: 1380 mL / OUT: 1515 mL / NET: -135 mL    11 Jul 2017 07:01  -  11 Jul 2017 12:10  --------------------------------------------------------  IN: 480 mL / OUT: 170 mL / NET: 310 mL        07-11    156<H>  |  103  |  65.0<H>  ----------------------------<  136<H>  3.8   |  43.0<H>  |  0.83    Ca    8.9      11 Jul 2017 04:48  Phos  4.0     07-11  Mg     2.5     07-11                        9.8    10.0  )-----------( 260      ( 11 Jul 2017 04:48 )             35.2       52 y/o M with acute on chronic systolic heart failure s/p cardiac arrest during endoscopy.      Neuro-polymyopathy of critical illness.  Encephalopathy improved.  Continue PT/OT/PMR.    Pulm - cont pulm toilet, NT suctioning.     CV - Unclear volume stat.  Adequate urine output.      Renal - Hypernatremic and metabolic alkalosis.  Possible adverse effect of lasix.  Free water deficit.  Will add free water to tube feeds.  Follow closely.       DVT ppx.

## 2017-07-12 LAB
ANION GAP SERPL CALC-SCNC: 10 MMOL/L — SIGNIFICANT CHANGE UP (ref 5–17)
ANISOCYTOSIS BLD QL: SLIGHT — SIGNIFICANT CHANGE UP
APPEARANCE UR: CLEAR — SIGNIFICANT CHANGE UP
BASE EXCESS BLDA CALC-SCNC: 16.8 MMOL/L — HIGH (ref -3–3)
BASOPHILS # BLD AUTO: 0 K/UL — SIGNIFICANT CHANGE UP (ref 0–0.2)
BILIRUB UR-MCNC: NEGATIVE — SIGNIFICANT CHANGE UP
BLOOD GAS COMMENTS ARTERIAL: SIGNIFICANT CHANGE UP
BUN SERPL-MCNC: 56 MG/DL — HIGH (ref 8–20)
CALCIUM SERPL-MCNC: 8.6 MG/DL — SIGNIFICANT CHANGE UP (ref 8.6–10.2)
CHLORIDE SERPL-SCNC: 104 MMOL/L — SIGNIFICANT CHANGE UP (ref 98–107)
CHLORIDE UR-SCNC: <27 MMOL/L — SIGNIFICANT CHANGE UP
CO2 SERPL-SCNC: 43 MMOL/L — HIGH (ref 22–29)
COLOR SPEC: YELLOW — SIGNIFICANT CHANGE UP
CREAT ?TM UR-MCNC: 60 MG/DL — SIGNIFICANT CHANGE UP
CREAT SERPL-MCNC: 0.81 MG/DL — SIGNIFICANT CHANGE UP (ref 0.5–1.3)
DIFF PNL FLD: ABNORMAL
EOSINOPHIL # BLD AUTO: 0 K/UL — SIGNIFICANT CHANGE UP (ref 0–0.5)
EPI CELLS # UR: SIGNIFICANT CHANGE UP
GAS PNL BLDA: SIGNIFICANT CHANGE UP
GAS PNL BLDA: SIGNIFICANT CHANGE UP
GLUCOSE SERPL-MCNC: 109 MG/DL — SIGNIFICANT CHANGE UP (ref 70–115)
GLUCOSE UR QL: NEGATIVE MG/DL — SIGNIFICANT CHANGE UP
HCO3 BLDA-SCNC: 41 MMOL/L — HIGH (ref 20–26)
HCT VFR BLD CALC: 33.9 % — LOW (ref 42–52)
HGB BLD-MCNC: 9.5 G/DL — LOW (ref 14–18)
HOROWITZ INDEX BLDA+IHG-RTO: SIGNIFICANT CHANGE UP
HYPOCHROMIA BLD QL: SLIGHT — SIGNIFICANT CHANGE UP
KETONES UR-MCNC: NEGATIVE — SIGNIFICANT CHANGE UP
LEUKOCYTE ESTERASE UR-ACNC: ABNORMAL
LYMPHOCYTES # BLD AUTO: 1.2 K/UL — SIGNIFICANT CHANGE UP (ref 1–4.8)
LYMPHOCYTES # BLD AUTO: 12 % — LOW (ref 20–55)
MACROCYTES BLD QL: SLIGHT — SIGNIFICANT CHANGE UP
MAGNESIUM SERPL-MCNC: 2.4 MG/DL — SIGNIFICANT CHANGE UP (ref 1.6–2.6)
MCHC RBC-ENTMCNC: 26 PG — LOW (ref 27–31)
MCHC RBC-ENTMCNC: 28 G/DL — LOW (ref 32–36)
MCV RBC AUTO: 92.6 FL — SIGNIFICANT CHANGE UP (ref 80–94)
MICROCYTES BLD QL: SLIGHT — SIGNIFICANT CHANGE UP
MONOCYTES # BLD AUTO: 0.7 K/UL — SIGNIFICANT CHANGE UP (ref 0–0.8)
MONOCYTES NFR BLD AUTO: 7 % — SIGNIFICANT CHANGE UP (ref 3–10)
NEUTROPHILS # BLD AUTO: 8 K/UL — SIGNIFICANT CHANGE UP (ref 1.8–8)
NEUTROPHILS NFR BLD AUTO: 79 % — HIGH (ref 37–73)
NEUTS BAND # BLD: 2 % — SIGNIFICANT CHANGE UP (ref 0–8)
NITRITE UR-MCNC: NEGATIVE — SIGNIFICANT CHANGE UP
NRBC # BLD: 3 /100 — HIGH (ref 0–0)
NT-PROBNP SERPL-SCNC: HIGH PG/ML (ref 0–300)
OSMOLALITY UR: 615 MOSM/KG — SIGNIFICANT CHANGE UP (ref 300–1000)
PCO2 BLDA: 75 MMHG — CRITICAL HIGH (ref 35–45)
PH BLDA: 7.38 — SIGNIFICANT CHANGE UP (ref 7.35–7.45)
PH UR: 9 — HIGH (ref 5–8)
PHOSPHATE SERPL-MCNC: 4.1 MG/DL — SIGNIFICANT CHANGE UP (ref 2.4–4.7)
PLAT MORPH BLD: NORMAL — SIGNIFICANT CHANGE UP
PLATELET # BLD AUTO: 263 K/UL — SIGNIFICANT CHANGE UP (ref 150–400)
PO2 BLDA: 62 MMHG — LOW (ref 83–108)
POIKILOCYTOSIS BLD QL AUTO: SLIGHT — SIGNIFICANT CHANGE UP
POLYCHROMASIA BLD QL SMEAR: SLIGHT — SIGNIFICANT CHANGE UP
POTASSIUM SERPL-MCNC: 3.7 MMOL/L — SIGNIFICANT CHANGE UP (ref 3.5–5.3)
POTASSIUM SERPL-SCNC: 3.7 MMOL/L — SIGNIFICANT CHANGE UP (ref 3.5–5.3)
PROT UR-MCNC: 30 MG/DL
RBC # BLD: 3.66 M/UL — LOW (ref 4.6–6.2)
RBC # FLD: 18.8 % — HIGH (ref 11–15.6)
RBC BLD AUTO: ABNORMAL
RBC CASTS # UR COMP ASSIST: ABNORMAL /HPF (ref 0–4)
SAO2 % BLDA: 92 % — LOW (ref 95–99)
SODIUM SERPL-SCNC: 157 MMOL/L — HIGH (ref 135–145)
SODIUM UR-SCNC: <30 MMOL/L — SIGNIFICANT CHANGE UP
SP GR SPEC: 1.01 — SIGNIFICANT CHANGE UP (ref 1.01–1.02)
TRI-PHOS CRY UR QL COMP ASSIST: ABNORMAL
UROBILINOGEN FLD QL: 1 MG/DL
WBC # BLD: 9.9 K/UL — SIGNIFICANT CHANGE UP (ref 4.8–10.8)
WBC # FLD AUTO: 9.9 K/UL — SIGNIFICANT CHANGE UP (ref 4.8–10.8)
WBC UR QL: SIGNIFICANT CHANGE UP

## 2017-07-12 PROCEDURE — 71010: CPT | Mod: 26

## 2017-07-12 PROCEDURE — 99232 SBSQ HOSP IP/OBS MODERATE 35: CPT

## 2017-07-12 PROCEDURE — 99291 CRITICAL CARE FIRST HOUR: CPT

## 2017-07-12 RX ORDER — POTASSIUM CHLORIDE 20 MEQ
40 PACKET (EA) ORAL ONCE
Qty: 0 | Refills: 0 | Status: COMPLETED | OUTPATIENT
Start: 2017-07-12 | End: 2017-07-12

## 2017-07-12 RX ADMIN — Medication 4 MILLIGRAM(S): at 22:12

## 2017-07-12 RX ADMIN — AMIODARONE HYDROCHLORIDE 200 MILLIGRAM(S): 400 TABLET ORAL at 05:17

## 2017-07-12 RX ADMIN — Medication 50 MILLIGRAM(S): at 05:17

## 2017-07-12 RX ADMIN — Medication 0.12 MILLIGRAM(S): at 05:17

## 2017-07-12 RX ADMIN — HEPARIN SODIUM 5000 UNIT(S): 5000 INJECTION INTRAVENOUS; SUBCUTANEOUS at 05:16

## 2017-07-12 RX ADMIN — Medication 10 MILLIGRAM(S): at 12:43

## 2017-07-12 RX ADMIN — HEPARIN SODIUM 5000 UNIT(S): 5000 INJECTION INTRAVENOUS; SUBCUTANEOUS at 22:11

## 2017-07-12 RX ADMIN — MODAFINIL 100 MILLIGRAM(S): 200 TABLET ORAL at 12:43

## 2017-07-12 RX ADMIN — LISINOPRIL 2.5 MILLIGRAM(S): 2.5 TABLET ORAL at 05:17

## 2017-07-12 RX ADMIN — Medication 50 MILLIGRAM(S): at 17:25

## 2017-07-12 RX ADMIN — Medication 3 MILLILITER(S): at 20:25

## 2017-07-12 RX ADMIN — Medication 9 MILLIGRAM(S): at 22:11

## 2017-07-12 RX ADMIN — Medication 3 MILLILITER(S): at 15:33

## 2017-07-12 RX ADMIN — Medication 100 MILLIGRAM(S): at 05:16

## 2017-07-12 RX ADMIN — PANTOPRAZOLE SODIUM 40 MILLIGRAM(S): 20 TABLET, DELAYED RELEASE ORAL at 05:18

## 2017-07-12 RX ADMIN — PANTOPRAZOLE SODIUM 40 MILLIGRAM(S): 20 TABLET, DELAYED RELEASE ORAL at 17:25

## 2017-07-12 RX ADMIN — Medication 100 MILLIGRAM(S): at 12:43

## 2017-07-12 RX ADMIN — Medication 3 MILLILITER(S): at 03:41

## 2017-07-12 RX ADMIN — Medication 50 MILLIGRAM(S): at 22:11

## 2017-07-12 RX ADMIN — Medication 40 MILLIEQUIVALENT(S): at 08:53

## 2017-07-12 RX ADMIN — SENNA PLUS 10 MILLILITER(S): 8.6 TABLET ORAL at 22:11

## 2017-07-12 RX ADMIN — Medication 3 MILLILITER(S): at 09:25

## 2017-07-12 RX ADMIN — HEPARIN SODIUM 5000 UNIT(S): 5000 INJECTION INTRAVENOUS; SUBCUTANEOUS at 15:30

## 2017-07-12 NOTE — PROGRESS NOTE ADULT - SUBJECTIVE AND OBJECTIVE BOX
INTERVAL HPI/OVERNIGHT EVENTS/SUBJECTIVE:  No acute events.  Remains non verbal.      ICU Vital Signs Last 24 Hrs  T(C): 36.7 (12 Jul 2017 07:40), Max: 37 (12 Jul 2017 04:00)  T(F): 98.1 (12 Jul 2017 07:40), Max: 98.6 (12 Jul 2017 04:00)  HR: 72 (12 Jul 2017 10:00) (64 - 107)  BP: 104/66 (12 Jul 2017 10:00) (86/61 - 124/85)  BP(mean): 81 (12 Jul 2017 10:00) (64 - 101)  ABP: --  ABP(mean): --  RR: 30 (12 Jul 2017 10:00) (11 - 62)  SpO2: 99% (12 Jul 2017 10:00) (90% - 100%)      I&O's Detail    11 Jul 2017 07:01  -  12 Jul 2017 07:00  --------------------------------------------------------  IN:    Free Water: 1650 mL    Pivot: 1035 mL  Total IN: 2685 mL    OUT:    Indwelling Catheter - Urethral: 1730 mL  Total OUT: 1730 mL    Total NET: 955 mL      12 Jul 2017 07:01  -  12 Jul 2017 10:56  --------------------------------------------------------  IN:    Enteral Tube Flush: 50 mL    Pivot: 90 mL  Total IN: 140 mL    OUT:    Indwelling Catheter - Urethral: 60 mL  Total OUT: 60 mL    Total NET: 80 mL            ABG - ( 12 Jul 2017 03:55 )  pH: 7.38  /  pCO2: 75    /  pO2: 62    / HCO3: 41    / Base Excess: 16.8  /  SaO2: 92                  MEDICATIONS  (STANDING):  amantadine Syrup 100 milliGRAM(s) Oral <User Schedule>  doxazosin 4 milliGRAM(s) Oral at bedtime  FLUoxetine Solution 10 milliGRAM(s) Oral daily  traZODone 50 milliGRAM(s) Oral at bedtime  digoxin     Tablet 0.125 milliGRAM(s) Oral daily  melatonin 9 milliGRAM(s) Oral at bedtime  metoprolol 50 milliGRAM(s) Oral two times a day  pantoprazole   Suspension 40 milliGRAM(s) Oral two times a day before meals  senna Syrup 10 milliLiter(s) Oral at bedtime  lisinopril 2.5 milliGRAM(s) Oral daily  amiodarone    Tablet 200 milliGRAM(s) Oral daily  heparin  Injectable 5000 Unit(s) SubCutaneous every 8 hours  modafinil 100 milliGRAM(s) Oral daily  ALBUTerol/ipratropium for Nebulization 3 milliLiter(s) Nebulizer every 6 hours    MEDICATIONS  (PRN):  acetaminophen    Suspension 650 milliGRAM(s) Oral every 6 hours PRN For Temp greater than 38.5 C (101.3 F)  bisacodyl Suppository 10 milliGRAM(s) Rectal daily PRN Constipation      NUTRITION/IVF: Tube feeds    CENTRAL LINE: Y     MONTOYA:  Y    A-LINE: N         PHYSICAL EXAM:    Gen: NAD    Eyes: PERRLA    Neurological:  Awake and alert.  Non verbal, follows commands.      Pulmonary: Non labored resp.  junky cough,  Decreased BS at bases.      Cardiovascular:  RRR    Gastrointestinal: Soft, non tender, mild distention    Genitourinary: Montoya    Extremities:  moderate pitting edema - anasarca        LABS:  CBC Full  -  ( 12 Jul 2017 05:25 )  WBC Count : 9.9 K/uL  Hemoglobin : 9.5 g/dL  Hematocrit : 33.9 %  Platelet Count - Automated : 263 K/uL  Mean Cell Volume : 92.6 fl  Mean Cell Hemoglobin : 26.0 pg  Mean Cell Hemoglobin Concentration : 28.0 g/dL  Auto Neutrophil # : 8.0 K/uL  Auto Lymphocyte # : 1.2 K/uL  Auto Monocyte # : 0.7 K/uL  Auto Eosinophil # : 0.0 K/uL  Auto Basophil # : 0.0 K/uL  Auto Neutrophil % : 79.0 %  Auto Lymphocyte % : 12.0 %  Auto Monocyte % : 7.0 %  Auto Eosinophil % : x  Auto Basophil % : x    07-12    157<H>  |  104  |  56.0<H>  ----------------------------<  109  3.7   |  43.0<H>  |  0.81    Ca    8.6      12 Jul 2017 05:25  Phos  4.1     07-12  Mg     2.4     07-12              ASSESSMENT/PLAN:  51yMale presenting with:  Bile leak, cardiac arrest, encephalah, acute on chronic systolic HF.    Neuro: Encephalopathy stable.  Slightly sleepier.  Possible CO2 narcosis developing.  Acidotic on AM  ABG    CV:  Heart failure.  Possible pulm edema.  Labs concerning for intravascular depletion.  Further diuresis potentially harmful.  Will check urine lytes, osm, specific gravity, BNP.       Pulm: Hypercarbic resp failure.  Had been appropriate for metabolic alkalosis but increased hypercarbia this AM on ABG.  Recheck now.  If worsening will need possible intubation.      GI/Nutrition: Tube feeds.  PPI for GI bleed.      /Renal: Strict I/O's.  Urinary retention.  Maintain montoya.      Proph:  [facility]    Dispo: ICU      CRITICAL CARE TIME SPENT: 45

## 2017-07-12 NOTE — PROGRESS NOTE ADULT - SUBJECTIVE AND OBJECTIVE BOX
Patient in bed, fatigued. Having more difficulty with following commands more consistently.   Closes his eyes after a few attempts to move his arms. Limited movement in the legs. As per RN, having more need to suction. Concern for CO2 accumulation.     FUNCTIONAL PROGRESS   - PT  Bed Mobility  Bed Mobility Training Treatment not Performed : pt received hoyered OOB to chair  Bed Mobility  Bed Mobility Training Scooting : dependent (less than 25% patient effort);  3 person to scoot back into chair  Therapeutic Exercise  Therapeutic Exercise Detail : weight-shifting A-P in chair, RLE AROM LAQs and ankle pumps (following commands 50% of the time), RUE shoulder flexion to 45deg (following command 25% of the time), LUE/LLE PROM knee extension, ankle DF/PF and shoulder flexion/ext.       REVIEW OF SYSTEMS  Constitutional - No fever,  +fatigue  Neurological - +loss of strength    VITALS  T(C): 2.7 (17 @ 16:00), Max: 37 (17 @ 04:00)  HR: 87 (17 @ 16:00) (64 - 105)  BP: 107/59 (17 @ 16:00) (86/61 - 123/64)  RR: 30 (17 @ 16:00) (11 - 38)  SpO2: 97% (17 @ 16:00) (90% - 100%)  Wt(kg): --    MEDICATIONS   amantadine Syrup 100 milliGRAM(s) <User Schedule>  doxazosin 4 milliGRAM(s) at bedtime  FLUoxetine Solution 10 milliGRAM(s) daily  traZODone 50 milliGRAM(s) at bedtime  digoxin     Tablet 0.125 milliGRAM(s) daily  melatonin 9 milliGRAM(s) at bedtime  metoprolol 50 milliGRAM(s) two times a day  acetaminophen    Suspension 650 milliGRAM(s) every 6 hours PRN  pantoprazole   Suspension 40 milliGRAM(s) two times a day before meals  senna Syrup 10 milliLiter(s) at bedtime  lisinopril 2.5 milliGRAM(s) daily  amiodarone    Tablet 200 milliGRAM(s) daily  bisacodyl Suppository 10 milliGRAM(s) daily PRN  heparin  Injectable 5000 Unit(s) every 8 hours  modafinil 100 milliGRAM(s) daily  ALBUTerol/ipratropium for Nebulization 3 milliLiter(s) every 6 hours      RECENT LABS/IMAGING  CBC Full  -  ( 2017 05:25 )  WBC Count : 9.9 K/uL  Hemoglobin : 9.5 g/dL  Hematocrit : 33.9 %  Platelet Count - Automated : 263 K/uL  Mean Cell Volume : 92.6 fl  Mean Cell Hemoglobin : 26.0 pg  Mean Cell Hemoglobin Concentration : 28.0 g/dL  Auto Neutrophil # : 8.0 K/uL  Auto Lymphocyte # : 1.2 K/uL  Auto Monocyte # : 0.7 K/uL  Auto Eosinophil # : 0.0 K/uL  Auto Basophil # : 0.0 K/uL  Auto Neutrophil % : 79.0 %  Auto Lymphocyte % : 12.0 %  Auto Monocyte % : 7.0 %  Auto Eosinophil % : x  Auto Basophil % : x    07-12    157<H>  |  104  |  56.0<H>  ----------------------------<  109  3.7   |  43.0<H>  |  0.81    Ca    8.6      2017 05:25  Phos  4.1     07-12  Mg     2.4     07-12      Urinalysis Basic - ( 2017 11:51 )    Color: Yellow / Appearance: Clear / S.015 / pH: x  Gluc: x / Ketone: Negative  / Bili: Negative / Urobili: 1 mg/dL   Blood: x / Protein: 30 mg/dL / Nitrite: Negative   Leuk Esterase: Moderate / RBC: 3-5 /HPF / WBC 3-5   Sq Epi: x / Non Sq Epi: x / Bacteria: x            PHYSICAL EXAM  Constitutional - NAD, Fatigued  Extremities - BLE edema to thighs  Neurologic Exam -                    Cognitive - Awake, Fatigued     Motor, moves right UE, and left UE with significant cues. Unable to maintain movement due to fatigue    ----------------------------------------------------------------------------------------  ASSESSMENT/PLAN  51M with cardiac arrest s/p ERCP for bile duct leak with prolonged hospitalization related to prolonged intubation and cognitive/behavioral deficits.    Arousal/Sleep wake cycle - Amantadine 100mg, Melatonin 9mg, Trazodone 50mg. Provigil 100mg     Mood/Motor recovery - Prozac 10mg daily     Rehab - PT  for 3x/week program for ongoing daily stimulation. Will continue to follow. Will need medical optimization for consideration to acute rehab.

## 2017-07-13 LAB
ANION GAP SERPL CALC-SCNC: 8 MMOL/L — SIGNIFICANT CHANGE UP (ref 5–17)
ANION GAP SERPL CALC-SCNC: 9 MMOL/L — SIGNIFICANT CHANGE UP (ref 5–17)
ANISOCYTOSIS BLD QL: SLIGHT — SIGNIFICANT CHANGE UP
BASE EXCESS BLDV CALC-SCNC: 15.4 MMOL/L — HIGH (ref -3–3)
BASOPHILS # BLD AUTO: 0 K/UL — SIGNIFICANT CHANGE UP (ref 0–0.2)
BASOPHILS NFR BLD AUTO: 0 % — SIGNIFICANT CHANGE UP (ref 0–2)
BLOOD GAS COMMENTS, VENOUS: SIGNIFICANT CHANGE UP
BUN SERPL-MCNC: 61 MG/DL — HIGH (ref 8–20)
BUN SERPL-MCNC: 63 MG/DL — HIGH (ref 8–20)
CALCIUM SERPL-MCNC: 8.1 MG/DL — LOW (ref 8.6–10.2)
CALCIUM SERPL-MCNC: 8.6 MG/DL — SIGNIFICANT CHANGE UP (ref 8.6–10.2)
CHLORIDE SERPL-SCNC: 102 MMOL/L — SIGNIFICANT CHANGE UP (ref 98–107)
CHLORIDE SERPL-SCNC: 103 MMOL/L — SIGNIFICANT CHANGE UP (ref 98–107)
CO2 SERPL-SCNC: 39 MMOL/L — HIGH (ref 22–29)
CO2 SERPL-SCNC: 43 MMOL/L — HIGH (ref 22–29)
CREAT SERPL-MCNC: 0.83 MG/DL — SIGNIFICANT CHANGE UP (ref 0.5–1.3)
CREAT SERPL-MCNC: 0.87 MG/DL — SIGNIFICANT CHANGE UP (ref 0.5–1.3)
ELLIPTOCYTES BLD QL SMEAR: SLIGHT — SIGNIFICANT CHANGE UP
EOSINOPHIL # BLD AUTO: 0 K/UL — SIGNIFICANT CHANGE UP (ref 0–0.5)
EOSINOPHIL NFR BLD AUTO: 1 % — SIGNIFICANT CHANGE UP (ref 0–6)
GAS PNL BLDA: SIGNIFICANT CHANGE UP
GAS PNL BLDA: SIGNIFICANT CHANGE UP
GAS PNL BLDV: SIGNIFICANT CHANGE UP
GLUCOSE SERPL-MCNC: 134 MG/DL — HIGH (ref 70–115)
GLUCOSE SERPL-MCNC: 141 MG/DL — HIGH (ref 70–115)
HCO3 BLDV-SCNC: 39 MMOL/L — HIGH (ref 20–26)
HCT VFR BLD CALC: 35.5 % — LOW (ref 42–52)
HGB BLD-MCNC: 9.7 G/DL — LOW (ref 14–18)
HOROWITZ INDEX BLDV+IHG-RTO: 50 — SIGNIFICANT CHANGE UP
HYPOCHROMIA BLD QL: SLIGHT — SIGNIFICANT CHANGE UP
LYMPHOCYTES # BLD AUTO: 1.6 K/UL — SIGNIFICANT CHANGE UP (ref 1–4.8)
LYMPHOCYTES # BLD AUTO: 14 % — LOW (ref 20–55)
MAGNESIUM SERPL-MCNC: 2.4 MG/DL — SIGNIFICANT CHANGE UP (ref 1.6–2.6)
MCHC RBC-ENTMCNC: 25.6 PG — LOW (ref 27–31)
MCHC RBC-ENTMCNC: 27.3 G/DL — LOW (ref 32–36)
MCV RBC AUTO: 93.7 FL — SIGNIFICANT CHANGE UP (ref 80–94)
MONOCYTES # BLD AUTO: 0.7 K/UL — SIGNIFICANT CHANGE UP (ref 0–0.8)
MONOCYTES NFR BLD AUTO: 7 % — SIGNIFICANT CHANGE UP (ref 3–10)
MYELOCYTES NFR BLD: 1 % — HIGH (ref 0–0)
NEUTROPHILS # BLD AUTO: 8.3 K/UL — HIGH (ref 1.8–8)
NEUTROPHILS NFR BLD AUTO: 76 % — HIGH (ref 37–73)
NEUTS BAND # BLD: 1 % — SIGNIFICANT CHANGE UP (ref 0–8)
NT-PROBNP SERPL-SCNC: HIGH PG/ML (ref 0–300)
OVALOCYTES BLD QL SMEAR: SLIGHT — SIGNIFICANT CHANGE UP
PCO2 BLDV: 70 MMHG — HIGH (ref 35–50)
PH BLDV: 7.4 — SIGNIFICANT CHANGE UP (ref 7.35–7.45)
PHOSPHATE SERPL-MCNC: 4.1 MG/DL — SIGNIFICANT CHANGE UP (ref 2.4–4.7)
PLAT MORPH BLD: NORMAL — SIGNIFICANT CHANGE UP
PLATELET # BLD AUTO: 269 K/UL — SIGNIFICANT CHANGE UP (ref 150–400)
PO2 BLDV: 33 MMHG — SIGNIFICANT CHANGE UP (ref 25–45)
POIKILOCYTOSIS BLD QL AUTO: SLIGHT — SIGNIFICANT CHANGE UP
POLYCHROMASIA BLD QL SMEAR: SLIGHT — SIGNIFICANT CHANGE UP
POTASSIUM SERPL-MCNC: 3.4 MMOL/L — LOW (ref 3.5–5.3)
POTASSIUM SERPL-MCNC: 4.2 MMOL/L — SIGNIFICANT CHANGE UP (ref 3.5–5.3)
POTASSIUM SERPL-SCNC: 3.4 MMOL/L — LOW (ref 3.5–5.3)
POTASSIUM SERPL-SCNC: 4.2 MMOL/L — SIGNIFICANT CHANGE UP (ref 3.5–5.3)
RBC # BLD: 3.79 M/UL — LOW (ref 4.6–6.2)
RBC # FLD: 18.8 % — HIGH (ref 11–15.6)
RBC BLD AUTO: ABNORMAL
SAO2 % BLDV: 60 % — LOW (ref 67–88)
SCHISTOCYTES BLD QL AUTO: SLIGHT — SIGNIFICANT CHANGE UP
SODIUM SERPL-SCNC: 150 MMOL/L — HIGH (ref 135–145)
SODIUM SERPL-SCNC: 154 MMOL/L — HIGH (ref 135–145)
STOMATOCYTES BLD QL SMEAR: PRESENT — SIGNIFICANT CHANGE UP
UUN UR-MCNC: 1025 MG/DL — SIGNIFICANT CHANGE UP
WBC # BLD: 10.7 K/UL — SIGNIFICANT CHANGE UP (ref 4.8–10.8)
WBC # FLD AUTO: 10.7 K/UL — SIGNIFICANT CHANGE UP (ref 4.8–10.8)

## 2017-07-13 PROCEDURE — 36620 INSERTION CATHETER ARTERY: CPT

## 2017-07-13 PROCEDURE — 76937 US GUIDE VASCULAR ACCESS: CPT | Mod: 26

## 2017-07-13 PROCEDURE — 31500 INSERT EMERGENCY AIRWAY: CPT

## 2017-07-13 PROCEDURE — 71010: CPT | Mod: 26

## 2017-07-13 RX ORDER — SUCCINYLCHOLINE CHLORIDE 100 MG/5ML
100 SYRINGE (ML) INTRAVENOUS ONCE
Qty: 0 | Refills: 0 | Status: COMPLETED | OUTPATIENT
Start: 2017-07-13 | End: 2017-07-13

## 2017-07-13 RX ORDER — CHLORHEXIDINE GLUCONATE 213 G/1000ML
15 SOLUTION TOPICAL
Qty: 0 | Refills: 0 | Status: DISCONTINUED | OUTPATIENT
Start: 2017-07-13 | End: 2017-08-04

## 2017-07-13 RX ORDER — IMIPENEM AND CILASTATIN 250; 250 MG/100ML; MG/100ML
500 INJECTION, POWDER, FOR SOLUTION INTRAVENOUS ONCE
Qty: 0 | Refills: 0 | Status: COMPLETED | OUTPATIENT
Start: 2017-07-13 | End: 2017-07-13

## 2017-07-13 RX ORDER — NOREPINEPHRINE BITARTRATE/D5W 8 MG/250ML
0.05 PLASTIC BAG, INJECTION (ML) INTRAVENOUS
Qty: 16 | Refills: 0 | Status: DISCONTINUED | OUTPATIENT
Start: 2017-07-13 | End: 2017-07-14

## 2017-07-13 RX ORDER — ACETAZOLAMIDE 250 MG/1
500 TABLET ORAL ONCE
Qty: 0 | Refills: 0 | Status: COMPLETED | OUTPATIENT
Start: 2017-07-13 | End: 2017-07-13

## 2017-07-13 RX ORDER — MILRINONE LACTATE 1 MG/ML
0.25 INJECTION, SOLUTION INTRAVENOUS
Qty: 20 | Refills: 0 | Status: DISCONTINUED | OUTPATIENT
Start: 2017-07-13 | End: 2017-08-02

## 2017-07-13 RX ORDER — ETOMIDATE 2 MG/ML
20 INJECTION INTRAVENOUS ONCE
Qty: 0 | Refills: 0 | Status: COMPLETED | OUTPATIENT
Start: 2017-07-13 | End: 2017-07-13

## 2017-07-13 RX ORDER — IMIPENEM AND CILASTATIN 250; 250 MG/100ML; MG/100ML
INJECTION, POWDER, FOR SOLUTION INTRAVENOUS
Qty: 0 | Refills: 0 | Status: COMPLETED | OUTPATIENT
Start: 2017-07-13 | End: 2017-07-18

## 2017-07-13 RX ORDER — IMIPENEM AND CILASTATIN 250; 250 MG/100ML; MG/100ML
500 INJECTION, POWDER, FOR SOLUTION INTRAVENOUS EVERY 6 HOURS
Qty: 0 | Refills: 0 | Status: COMPLETED | OUTPATIENT
Start: 2017-07-14 | End: 2017-07-18

## 2017-07-13 RX ORDER — POTASSIUM CHLORIDE 20 MEQ
40 PACKET (EA) ORAL ONCE
Qty: 0 | Refills: 0 | Status: COMPLETED | OUTPATIENT
Start: 2017-07-13 | End: 2017-07-13

## 2017-07-13 RX ADMIN — IMIPENEM AND CILASTATIN 100 MILLIGRAM(S): 250; 250 INJECTION, POWDER, FOR SOLUTION INTRAVENOUS at 20:00

## 2017-07-13 RX ADMIN — HEPARIN SODIUM 5000 UNIT(S): 5000 INJECTION INTRAVENOUS; SUBCUTANEOUS at 05:06

## 2017-07-13 RX ADMIN — Medication 100 MILLIGRAM(S): at 07:40

## 2017-07-13 RX ADMIN — HEPARIN SODIUM 5000 UNIT(S): 5000 INJECTION INTRAVENOUS; SUBCUTANEOUS at 22:02

## 2017-07-13 RX ADMIN — Medication 10 MILLIGRAM(S): at 14:57

## 2017-07-13 RX ADMIN — LISINOPRIL 2.5 MILLIGRAM(S): 2.5 TABLET ORAL at 05:07

## 2017-07-13 RX ADMIN — MILRINONE LACTATE 4.89 MICROGRAM(S)/KG/MIN: 1 INJECTION, SOLUTION INTRAVENOUS at 17:58

## 2017-07-13 RX ADMIN — AMIODARONE HYDROCHLORIDE 200 MILLIGRAM(S): 400 TABLET ORAL at 05:06

## 2017-07-13 RX ADMIN — MODAFINIL 100 MILLIGRAM(S): 200 TABLET ORAL at 11:12

## 2017-07-13 RX ADMIN — HEPARIN SODIUM 5000 UNIT(S): 5000 INJECTION INTRAVENOUS; SUBCUTANEOUS at 14:57

## 2017-07-13 RX ADMIN — Medication 3 MILLILITER(S): at 03:40

## 2017-07-13 RX ADMIN — Medication 3 MILLILITER(S): at 08:34

## 2017-07-13 RX ADMIN — Medication 3 MILLILITER(S): at 21:04

## 2017-07-13 RX ADMIN — Medication 3 MILLILITER(S): at 15:20

## 2017-07-13 RX ADMIN — Medication 50 MILLIGRAM(S): at 22:02

## 2017-07-13 RX ADMIN — Medication 50 MILLIGRAM(S): at 05:08

## 2017-07-13 RX ADMIN — ACETAZOLAMIDE 110 MILLIGRAM(S): 250 TABLET ORAL at 16:00

## 2017-07-13 RX ADMIN — PANTOPRAZOLE SODIUM 40 MILLIGRAM(S): 20 TABLET, DELAYED RELEASE ORAL at 05:06

## 2017-07-13 RX ADMIN — CHLORHEXIDINE GLUCONATE 15 MILLILITER(S): 213 SOLUTION TOPICAL at 17:57

## 2017-07-13 RX ADMIN — Medication 4 MILLIGRAM(S): at 22:02

## 2017-07-13 RX ADMIN — SENNA PLUS 10 MILLILITER(S): 8.6 TABLET ORAL at 22:02

## 2017-07-13 RX ADMIN — Medication 3.82 MICROGRAM(S)/KG/MIN: at 17:58

## 2017-07-13 RX ADMIN — Medication 100 MILLIGRAM(S): at 11:09

## 2017-07-13 RX ADMIN — Medication 9 MILLIGRAM(S): at 22:02

## 2017-07-13 RX ADMIN — ETOMIDATE 20 MILLIGRAM(S): 2 INJECTION INTRAVENOUS at 07:35

## 2017-07-13 RX ADMIN — Medication 0.12 MILLIGRAM(S): at 05:07

## 2017-07-13 RX ADMIN — Medication 100 MILLIGRAM(S): at 05:07

## 2017-07-13 RX ADMIN — PANTOPRAZOLE SODIUM 40 MILLIGRAM(S): 20 TABLET, DELAYED RELEASE ORAL at 17:57

## 2017-07-13 NOTE — PROGRESS NOTE ADULT - SUBJECTIVE AND OBJECTIVE BOX
INTERVAL HPI/OVERNIGHT EVENTS:  The patient was intubated early this morning for dyspnea  PRESSORS: [X] YES [ ] NO  WHICH: Norepinephrine  DOSE:  Inotropic support with MILRINONE  ANTIBIOTICS:                  DATE STARTED:  ANTIBIOTICS:                  DATE STARTED:  ANTIBIOTICS:                  DATE STARTED:    MEDICATIONS  (STANDING):  amantadine Syrup 100 milliGRAM(s) Oral <User Schedule>  doxazosin 4 milliGRAM(s) Oral at bedtime  FLUoxetine Solution 10 milliGRAM(s) Oral daily  traZODone 50 milliGRAM(s) Oral at bedtime  digoxin     Tablet 0.125 milliGRAM(s) Oral daily  melatonin 9 milliGRAM(s) Oral at bedtime  metoprolol 50 milliGRAM(s) Oral two times a day  pantoprazole   Suspension 40 milliGRAM(s) Oral two times a day before meals  senna Syrup 10 milliLiter(s) Oral at bedtime  lisinopril 2.5 milliGRAM(s) Oral daily  amiodarone    Tablet 200 milliGRAM(s) Oral daily  heparin  Injectable 5000 Unit(s) SubCutaneous every 8 hours  modafinil 100 milliGRAM(s) Oral daily  ALBUTerol/ipratropium for Nebulization 3 milliLiter(s) Nebulizer every 6 hours  chlorhexidine 0.12% Liquid 15 milliLiter(s) Swish and Spit two times a day  norepinephrine Infusion 0.05 MICROgram(s)/kG/Min (3.82 mL/Hr) IV Continuous <Continuous>  milrinone Infusion 0.2 MICROgram(s)/kG/Min (4.89 mL/Hr) IV Continuous <Continuous>    MEDICATIONS  (PRN):  acetaminophen    Suspension 650 milliGRAM(s) Oral every 6 hours PRN For Temp greater than 38.5 C (101.3 F)  bisacodyl Suppository 10 milliGRAM(s) Rectal daily PRN Constipation      Drug Dosing Weight  Height (cm): 165.1 (2017 06:38)  Weight (kg): 81.5 (2017 06:38)  BMI (kg/m2): 29.9 (2017 06:38)  BSA (m2): 1.89 (2017 06:38)    CENTRAL LINE: [ ] YES [ ] NO  LOCATION:   DATE INSERTED:  REMOVE: [ ] YES [ ] NO  EXPLAIN:    ARMAS: [ ] YES [ ] NO    DATE INSERTED:  REMOVE: [ ] YES [ ] NO  EXPLAIN:    A-LINE: [ ] YES [ ] NO  LOCATION:   DATE INSERTED:  REMOVE: [ ] YES [ ] NO  EXPLAIN:    PAST MEDICAL & SURGICAL HISTORY:  Mitral regurgitation: severe MR  Cardiomyopathy, nonischemic  CAD (coronary artery disease)  Asthma  Atrial fibrillation  S/P laparoscopic cholecystectomy  History of humerus fracture: Metal pins in Left Humerus  Artificial pacemaker      ICU Vital Signs Last 24 Hrs  T(C): 36.5 (2017 16:00), Max: 36.9 (2017 08:00)  T(F): 97.7 (2017 16:00), Max: 98.5 (2017 08:00)  HR: 75 (2017 18:00) (60 - 90)  BP: 104/52 (2017 13:00) (75/48 - 113/67)  BP(mean): 70 (2017 13:00) (57 - 87)  ABP: 93/54 (2017 18:00) (88/48 - 96/52)  ABP(mean): 68 (2017 18:00) (60 - 68)  RR: 18 (2017 18:00) (16 - 45)  SpO2: 100% (2017 18:00) (93% - 100%)      ABG - ( 2017 04:08 )  pH: 7.37  /  pCO2: 77    /  pO2: 74    / HCO3: 39    / Base Excess: 15.4  /  SaO2: 96                  I&O's Detail    2017 07:01  -  2017 07:00  --------------------------------------------------------  IN:    Enteral Tube Flush: 150 mL    Free Water: 2100 mL    Pivot: 990 mL  Total IN: 3240 mL    OUT:    Indwelling Catheter - Urethral: 875 mL  Total OUT: 875 mL    Total NET: 2365 mL      2017 07:01  -  2017 18:30  --------------------------------------------------------  IN:    Enteral Tube Flush: 50 mL    Free Water: 900 mL    milrinone  Infusion: 53.9 mL    norepinephrine Infusion: 58.6 mL    Pivot: 480 mL  Total IN: 1542.5 mL    OUT:    Indwelling Catheter - Urethral: 635 mL  Total OUT: 635 mL    Total NET: 907.5 mL          Mode: AC/ CMV (Assist Control/ Continuous Mandatory Ventilation)  RR (machine): 16  TV (machine): 450  FiO2: 50  PEEP: 10  ITime:   MAP: 14  PIP: 31      LABS:  CBC Full  -  ( 2017 04:30 )  WBC Count : 10.7 K/uL  Hemoglobin : 9.7 g/dL  Hematocrit : 35.5 %  Platelet Count - Automated : 269 K/uL  Mean Cell Volume : 93.7 fl  Mean Cell Hemoglobin : 25.6 pg  Mean Cell Hemoglobin Concentration : 27.3 g/dL  Auto Neutrophil # : 8.3 K/uL  Auto Lymphocyte # : 1.6 K/uL  Auto Monocyte # : 0.7 K/uL  Auto Eosinophil # : 0.0 K/uL  Auto Basophil # : 0.0 K/uL  Auto Neutrophil % : 76.0 %  Auto Lymphocyte % : 14.0 %  Auto Monocyte % : 7.0 %  Auto Eosinophil % : 1.0 %  Auto Basophil % : 0.0 %    07-13    154<H>  |  103  |  63.0<H>  ----------------------------<  134<H>  4.2   |  43.0<H>  |  0.87    Ca    8.6      2017 04:30  Phos  4.1     07-13  Mg     2.4     07-13        Urinalysis Basic - ( 2017 11:51 )    Color: Yellow / Appearance: Clear / S.015 / pH: x  Gluc: x / Ketone: Negative  / Bili: Negative / Urobili: 1 mg/dL   Blood: x / Protein: 30 mg/dL / Nitrite: Negative   Leuk Esterase: Moderate / RBC: 3-5 /HPF / WBC 3-5   Sq Epi: x / Non Sq Epi: x / Bacteria: x        Assessment and Plan:    Neuro: GCS 10T. Monitor for delirium.  Continue to optimize pain control. Serial Neurologic assessments    HEENT: no issues    CV: Unstable.  On norepinephrine and milrinone.  Will follow daily BNP's    Pulm: Mechanical ventilation.  Increased sputum--sent for culture    GI/Nutrition: Bowel Regimen    /Renal: monitor UOP. Monitor BMP.  Significant metabolic alkalosis--diamox x1 today. Continue free water.  Provide chloride as possible      HEME- DVT prophylaxis, SCDs    ID: Empiric PNA coverage with imipenim     Lines/Tubes:

## 2017-07-13 NOTE — PROCEDURE NOTE - ADDITIONAL PROCEDURE DETAILS
Pt with worsening respiratory distress this AM.  Increasing O2 requirement.  Rapid shallow breathing noted.  Crackles noted throughout entire chest ausculatory exam.  Suspect worsening pulmonary edema from volume overload.  Pt still hypernatremic and metabolic alkalosis.  Likely intravascularly hypovolemic, BP borderline.  Rapid aggressive diuresis unsafe.  Positive pressure ventilation required.

## 2017-07-13 NOTE — PROCEDURE NOTE - NSTRACHPOSTINTU_RESP_A_CORE
Breath sounds bilateral/Positive end tidal Co2 noted/Breath sounds equal/Chest excursion noted
Breath sounds bilateral/Chest X-Ray/Chest excursion noted/Appropriate capnography/Breath sounds equal

## 2017-07-14 LAB
ALBUMIN SERPL ELPH-MCNC: 2.7 G/DL — LOW (ref 3.3–5.2)
ALP SERPL-CCNC: 141 U/L — HIGH (ref 40–120)
ALT FLD-CCNC: 37 U/L — SIGNIFICANT CHANGE UP
ANION GAP SERPL CALC-SCNC: 10 MMOL/L — SIGNIFICANT CHANGE UP (ref 5–17)
ANION GAP SERPL CALC-SCNC: 9 MMOL/L — SIGNIFICANT CHANGE UP (ref 5–17)
ANISOCYTOSIS BLD QL: SLIGHT — SIGNIFICANT CHANGE UP
AST SERPL-CCNC: 38 U/L — SIGNIFICANT CHANGE UP
BILIRUB SERPL-MCNC: 1 MG/DL — SIGNIFICANT CHANGE UP (ref 0.4–2)
BUN SERPL-MCNC: 43 MG/DL — HIGH (ref 8–20)
BUN SERPL-MCNC: 55 MG/DL — HIGH (ref 8–20)
CALCIUM SERPL-MCNC: 8.2 MG/DL — LOW (ref 8.6–10.2)
CALCIUM SERPL-MCNC: 8.3 MG/DL — LOW (ref 8.6–10.2)
CHLORIDE SERPL-SCNC: 104 MMOL/L — SIGNIFICANT CHANGE UP (ref 98–107)
CHLORIDE SERPL-SCNC: 104 MMOL/L — SIGNIFICANT CHANGE UP (ref 98–107)
CO2 SERPL-SCNC: 35 MMOL/L — HIGH (ref 22–29)
CO2 SERPL-SCNC: 39 MMOL/L — HIGH (ref 22–29)
CREAT SERPL-MCNC: 0.57 MG/DL — SIGNIFICANT CHANGE UP (ref 0.5–1.3)
CREAT SERPL-MCNC: 0.72 MG/DL — SIGNIFICANT CHANGE UP (ref 0.5–1.3)
CULTURE RESULTS: SIGNIFICANT CHANGE UP
DACRYOCYTES BLD QL SMEAR: SLIGHT — SIGNIFICANT CHANGE UP
GAS PNL BLDA: SIGNIFICANT CHANGE UP
GLUCOSE SERPL-MCNC: 118 MG/DL — HIGH (ref 70–115)
GLUCOSE SERPL-MCNC: 129 MG/DL — HIGH (ref 70–115)
GRAM STN FLD: SIGNIFICANT CHANGE UP
HCT VFR BLD CALC: 31.6 % — LOW (ref 42–52)
HGB BLD-MCNC: 8.9 G/DL — LOW (ref 14–18)
HYPOCHROMIA BLD QL: SLIGHT — SIGNIFICANT CHANGE UP
LYMPHOCYTES # BLD AUTO: 4 % — LOW (ref 20–55)
MACROCYTES BLD QL: SLIGHT — SIGNIFICANT CHANGE UP
MAGNESIUM SERPL-MCNC: 2.1 MG/DL — SIGNIFICANT CHANGE UP (ref 1.6–2.6)
MCHC RBC-ENTMCNC: 25.4 PG — LOW (ref 27–31)
MCHC RBC-ENTMCNC: 28.2 G/DL — LOW (ref 32–36)
MCV RBC AUTO: 90.3 FL — SIGNIFICANT CHANGE UP (ref 80–94)
MICROCYTES BLD QL: SLIGHT — SIGNIFICANT CHANGE UP
MONOCYTES NFR BLD AUTO: 2 % — LOW (ref 3–10)
NEUTROPHILS NFR BLD AUTO: 91 % — HIGH (ref 37–73)
NEUTS BAND # BLD: 2 % — SIGNIFICANT CHANGE UP (ref 0–8)
NT-PROBNP SERPL-SCNC: HIGH PG/ML (ref 0–300)
OVALOCYTES BLD QL SMEAR: SLIGHT — SIGNIFICANT CHANGE UP
PHOSPHATE SERPL-MCNC: 1.8 MG/DL — LOW (ref 2.4–4.7)
PLAT MORPH BLD: NORMAL — SIGNIFICANT CHANGE UP
PLATELET # BLD AUTO: 250 K/UL — SIGNIFICANT CHANGE UP (ref 150–400)
POIKILOCYTOSIS BLD QL AUTO: SLIGHT — SIGNIFICANT CHANGE UP
POTASSIUM SERPL-MCNC: 3.4 MMOL/L — LOW (ref 3.5–5.3)
POTASSIUM SERPL-MCNC: 3.4 MMOL/L — LOW (ref 3.5–5.3)
POTASSIUM SERPL-SCNC: 3.4 MMOL/L — LOW (ref 3.5–5.3)
POTASSIUM SERPL-SCNC: 3.4 MMOL/L — LOW (ref 3.5–5.3)
PROT SERPL-MCNC: 6.5 G/DL — LOW (ref 6.6–8.7)
RBC # BLD: 3.5 M/UL — LOW (ref 4.6–6.2)
RBC # FLD: 18.8 % — HIGH (ref 11–15.6)
RBC BLD AUTO: ABNORMAL
SODIUM SERPL-SCNC: 149 MMOL/L — HIGH (ref 135–145)
SODIUM SERPL-SCNC: 152 MMOL/L — HIGH (ref 135–145)
SPECIMEN SOURCE: SIGNIFICANT CHANGE UP
SPECIMEN SOURCE: SIGNIFICANT CHANGE UP
TSH SERPL-MCNC: 8.63 UIU/ML — HIGH (ref 0.27–4.2)
VARIANT LYMPHS # BLD: 1 % — SIGNIFICANT CHANGE UP (ref 0–6)
WBC # BLD: 13.2 K/UL — HIGH (ref 4.8–10.8)
WBC # FLD AUTO: 13.2 K/UL — HIGH (ref 4.8–10.8)

## 2017-07-14 PROCEDURE — 99291 CRITICAL CARE FIRST HOUR: CPT | Mod: 24

## 2017-07-14 RX ORDER — ACETAZOLAMIDE 250 MG/1
250 TABLET ORAL ONCE
Qty: 0 | Refills: 0 | Status: COMPLETED | OUTPATIENT
Start: 2017-07-14 | End: 2017-07-14

## 2017-07-14 RX ORDER — POTASSIUM CHLORIDE 20 MEQ
20 PACKET (EA) ORAL
Qty: 0 | Refills: 0 | Status: COMPLETED | OUTPATIENT
Start: 2017-07-14 | End: 2017-07-15

## 2017-07-14 RX ORDER — POTASSIUM PHOSPHATE, MONOBASIC POTASSIUM PHOSPHATE, DIBASIC 236; 224 MG/ML; MG/ML
15 INJECTION, SOLUTION INTRAVENOUS EVERY 6 HOURS
Qty: 0 | Refills: 0 | Status: COMPLETED | OUTPATIENT
Start: 2017-07-14 | End: 2017-07-14

## 2017-07-14 RX ADMIN — PANTOPRAZOLE SODIUM 40 MILLIGRAM(S): 20 TABLET, DELAYED RELEASE ORAL at 17:52

## 2017-07-14 RX ADMIN — MILRINONE LACTATE 4.89 MICROGRAM(S)/KG/MIN: 1 INJECTION, SOLUTION INTRAVENOUS at 21:35

## 2017-07-14 RX ADMIN — CHLORHEXIDINE GLUCONATE 15 MILLILITER(S): 213 SOLUTION TOPICAL at 17:51

## 2017-07-14 RX ADMIN — PANTOPRAZOLE SODIUM 40 MILLIGRAM(S): 20 TABLET, DELAYED RELEASE ORAL at 05:49

## 2017-07-14 RX ADMIN — MILRINONE LACTATE 4.89 MICROGRAM(S)/KG/MIN: 1 INJECTION, SOLUTION INTRAVENOUS at 00:33

## 2017-07-14 RX ADMIN — IMIPENEM AND CILASTATIN 100 MILLIGRAM(S): 250; 250 INJECTION, POWDER, FOR SOLUTION INTRAVENOUS at 23:10

## 2017-07-14 RX ADMIN — Medication 10 MILLIGRAM(S): at 13:09

## 2017-07-14 RX ADMIN — POTASSIUM PHOSPHATE, MONOBASIC POTASSIUM PHOSPHATE, DIBASIC 62.5 MILLIMOLE(S): 236; 224 INJECTION, SOLUTION INTRAVENOUS at 08:54

## 2017-07-14 RX ADMIN — Medication 50 MILLIGRAM(S): at 21:33

## 2017-07-14 RX ADMIN — Medication 4 MILLIGRAM(S): at 21:32

## 2017-07-14 RX ADMIN — Medication 100 MILLIGRAM(S): at 05:46

## 2017-07-14 RX ADMIN — Medication 100 MILLIEQUIVALENT(S): at 23:09

## 2017-07-14 RX ADMIN — Medication 0.12 MILLIGRAM(S): at 05:46

## 2017-07-14 RX ADMIN — Medication 3 MILLILITER(S): at 15:16

## 2017-07-14 RX ADMIN — CHLORHEXIDINE GLUCONATE 15 MILLILITER(S): 213 SOLUTION TOPICAL at 05:46

## 2017-07-14 RX ADMIN — SENNA PLUS 10 MILLILITER(S): 8.6 TABLET ORAL at 21:32

## 2017-07-14 RX ADMIN — HEPARIN SODIUM 5000 UNIT(S): 5000 INJECTION INTRAVENOUS; SUBCUTANEOUS at 21:33

## 2017-07-14 RX ADMIN — ACETAZOLAMIDE 105 MILLIGRAM(S): 250 TABLET ORAL at 16:33

## 2017-07-14 RX ADMIN — POTASSIUM PHOSPHATE, MONOBASIC POTASSIUM PHOSPHATE, DIBASIC 62.5 MILLIMOLE(S): 236; 224 INJECTION, SOLUTION INTRAVENOUS at 15:01

## 2017-07-14 RX ADMIN — Medication 40 MILLIEQUIVALENT(S): at 00:25

## 2017-07-14 RX ADMIN — IMIPENEM AND CILASTATIN 100 MILLIGRAM(S): 250; 250 INJECTION, POWDER, FOR SOLUTION INTRAVENOUS at 17:51

## 2017-07-14 RX ADMIN — IMIPENEM AND CILASTATIN 100 MILLIGRAM(S): 250; 250 INJECTION, POWDER, FOR SOLUTION INTRAVENOUS at 00:25

## 2017-07-14 RX ADMIN — HEPARIN SODIUM 5000 UNIT(S): 5000 INJECTION INTRAVENOUS; SUBCUTANEOUS at 05:46

## 2017-07-14 RX ADMIN — HEPARIN SODIUM 5000 UNIT(S): 5000 INJECTION INTRAVENOUS; SUBCUTANEOUS at 15:02

## 2017-07-14 RX ADMIN — Medication 9 MILLIGRAM(S): at 21:32

## 2017-07-14 RX ADMIN — Medication 3 MILLILITER(S): at 20:46

## 2017-07-14 RX ADMIN — IMIPENEM AND CILASTATIN 100 MILLIGRAM(S): 250; 250 INJECTION, POWDER, FOR SOLUTION INTRAVENOUS at 05:46

## 2017-07-14 RX ADMIN — MODAFINIL 100 MILLIGRAM(S): 200 TABLET ORAL at 12:49

## 2017-07-14 RX ADMIN — IMIPENEM AND CILASTATIN 100 MILLIGRAM(S): 250; 250 INJECTION, POWDER, FOR SOLUTION INTRAVENOUS at 13:09

## 2017-07-14 RX ADMIN — AMIODARONE HYDROCHLORIDE 200 MILLIGRAM(S): 400 TABLET ORAL at 05:46

## 2017-07-14 RX ADMIN — Medication 3 MILLILITER(S): at 03:15

## 2017-07-14 RX ADMIN — Medication 3 MILLILITER(S): at 08:51

## 2017-07-14 RX ADMIN — Medication 100 MILLIGRAM(S): at 12:49

## 2017-07-14 NOTE — PROGRESS NOTE ADULT - SUBJECTIVE AND OBJECTIVE BOX
INTERVAL HPI/OVERNIGHT EVENTS:  No significant new problems  PRESSORS: [X] YES [ ] NO  WHICH:  norepinephrine  DOSE:  Inotrope:  Milrinone  ANTIBIOTICS:                  DATE STARTED:  ANTIBIOTICS:                  DATE STARTED:  ANTIBIOTICS:                  DATE STARTED:    MEDICATIONS  (STANDING):  amantadine Syrup 100 milliGRAM(s) Oral <User Schedule>  doxazosin 4 milliGRAM(s) Oral at bedtime  FLUoxetine Solution 10 milliGRAM(s) Oral daily  traZODone 50 milliGRAM(s) Oral at bedtime  digoxin     Tablet 0.125 milliGRAM(s) Oral daily  melatonin 9 milliGRAM(s) Oral at bedtime  pantoprazole   Suspension 40 milliGRAM(s) Oral two times a day before meals  senna Syrup 10 milliLiter(s) Oral at bedtime  lisinopril 2.5 milliGRAM(s) Oral daily  amiodarone    Tablet 200 milliGRAM(s) Oral daily  heparin  Injectable 5000 Unit(s) SubCutaneous every 8 hours  modafinil 100 milliGRAM(s) Oral daily  ALBUTerol/ipratropium for Nebulization 3 milliLiter(s) Nebulizer every 6 hours  chlorhexidine 0.12% Liquid 15 milliLiter(s) Swish and Spit two times a day  norepinephrine Infusion 0.05 MICROgram(s)/kG/Min (3.82 mL/Hr) IV Continuous <Continuous>  milrinone Infusion 0.2 MICROgram(s)/kG/Min (4.89 mL/Hr) IV Continuous <Continuous>  imipenem/cilastatin  IVPB   IV Intermittent   imipenem/cilastatin  IVPB 500 milliGRAM(s) IV Intermittent every 6 hours  potassium phosphate IVPB 15 milliMole(s) IV Intermittent every 6 hours    MEDICATIONS  (PRN):  acetaminophen    Suspension 650 milliGRAM(s) Oral every 6 hours PRN For Temp greater than 38.5 C (101.3 F)  bisacodyl Suppository 10 milliGRAM(s) Rectal daily PRN Constipation      Drug Dosing Weight  Height (cm): 165.1 (12 Jun 2017 06:38)  Weight (kg): 81.5 (12 Jun 2017 06:38)  BMI (kg/m2): 29.9 (12 Jun 2017 06:38)  BSA (m2): 1.89 (12 Jun 2017 06:38)    CENTRAL LINE: [ ] YES [ ] NO  LOCATION:   DATE INSERTED:  REMOVE: [ ] YES [ ] NO  EXPLAIN:    ARMAS: [ ] YES [ ] NO    DATE INSERTED:  REMOVE: [ ] YES [ ] NO  EXPLAIN:    A-LINE: [ ] YES [ ] NO  LOCATION:   DATE INSERTED:  REMOVE: [ ] YES [ ] NO  EXPLAIN:    PAST MEDICAL & SURGICAL HISTORY:  Mitral regurgitation: severe MR  Cardiomyopathy, nonischemic  CAD (coronary artery disease)  Asthma  Atrial fibrillation  S/P laparoscopic cholecystectomy  History of humerus fracture: Metal pins in Left Humerus  Artificial pacemaker      ICU Vital Signs Last 24 Hrs  T(C): 37.4 (14 Jul 2017 09:00), Max: 37.4 (14 Jul 2017 09:00)  T(F): 99.3 (14 Jul 2017 09:00), Max: 99.3 (14 Jul 2017 09:00)  HR: 95 (14 Jul 2017 11:00) (66 - 95)  BP: 104/52 (13 Jul 2017 13:00) (104/52 - 104/52)  BP(mean): 70 (13 Jul 2017 13:00) (70 - 70)  ABP: 105/66 (14 Jul 2017 11:00) (83/49 - 107/63)  ABP(mean): 82 (14 Jul 2017 11:00) (60 - 82)  RR: 32 (14 Jul 2017 11:00) (16 - 32)  SpO2: 89% (14 Jul 2017 11:00) (89% - 100%)      ABG - ( 14 Jul 2017 06:27 )  pH: 7.48  /  pCO2: 53    /  pO2: 154   / HCO3: 38    / Base Excess: 14.3  /  SaO2: 100                 I&O's Detail    13 Jul 2017 07:01  -  14 Jul 2017 07:00  --------------------------------------------------------  IN:    Enteral Tube Flush: 80 mL    Free Water: 1650 mL    milrinone  Infusion: 112.7 mL    norepinephrine Infusion: 140.6 mL    Pivot: 1020 mL    Solution: 300 mL  Total IN: 3303.3 mL    OUT:    Indwelling Catheter - Urethral: 2060 mL  Total OUT: 2060 mL    Total NET: 1243.3 mL      14 Jul 2017 07:01  -  14 Jul 2017 12:06  --------------------------------------------------------  IN:    Free Water: 300 mL    milrinone  Infusion: 19.6 mL    norepinephrine Infusion: 20.2 mL    Pivot: 180 mL    Solution: 187.5 mL  Total IN: 707.3 mL    OUT:    Indwelling Catheter - Urethral: 560 mL  Total OUT: 560 mL    Total NET: 147.3 mL          Mode: AC/ CMV (Assist Control/ Continuous Mandatory Ventilation)  RR (machine): 16  TV (machine): 450  FiO2: 50  PEEP: 10  MAP: 14  PIP: 33      LABS:  CBC Full  -  ( 14 Jul 2017 04:34 )  WBC Count : 13.2 K/uL  Hemoglobin : 8.9 g/dL  Hematocrit : 31.6 %  Platelet Count - Automated : 250 K/uL  Mean Cell Volume : 90.3 fl  Mean Cell Hemoglobin : 25.4 pg  Mean Cell Hemoglobin Concentration : 28.2 g/dL  Auto Neutrophil # : x  Auto Lymphocyte # : x  Auto Monocyte # : x  Auto Eosinophil # : x  Auto Basophil # : x  Auto Neutrophil % : 91.0 %  Auto Lymphocyte % : 4.0 %  Auto Monocyte % : 2.0 %  Auto Eosinophil % : x  Auto Basophil % : x    07-14    152<H>  |  104  |  55.0<H>  ----------------------------<  129<H>  3.4<L>   |  39.0<H>  |  0.72    Ca    8.3<L>      14 Jul 2017 04:34  Phos  1.8     07-14  Mg     2.1     07-14    TPro  6.5<L>  /  Alb  2.7<L>  /  TBili  1.0  /  DBili  x   /  AST  38  /  ALT  37  /  AlkPhos  141<H>  07-14          Assessment and Plan:    Neuro: GCS 10T. Monitor for delirium.  Continue to optimize pain control. Serial Neurologic assessments    HEENT: no issues    CV: Continue hemodynamic monitoring, titrating NE to MAP.  BNP is trending downward    Pulm: Mechanical ventilation, VC.  IBR=218.  Continue incentive spirometer.  Chest PT.      GI/Nutrition: TEN, Bowel Regimen    /Renal: Metabolic alkalosis is slowly improving.  Will give another 1x dose of diamox.  monitor UOP. Monitor BMP.  Replete Lytes as needed      HEME- DVT prophylaxis, SCDs    ID: On imipenim for presumed PNA.  Follow the culture

## 2017-07-15 LAB
ANION GAP SERPL CALC-SCNC: 10 MMOL/L — SIGNIFICANT CHANGE UP (ref 5–17)
ANION GAP SERPL CALC-SCNC: 11 MMOL/L — SIGNIFICANT CHANGE UP (ref 5–17)
ANISOCYTOSIS BLD QL: SLIGHT — SIGNIFICANT CHANGE UP
BASOPHILS # BLD AUTO: 0 K/UL — SIGNIFICANT CHANGE UP (ref 0–0.2)
BASOPHILS NFR BLD AUTO: 0.1 % — SIGNIFICANT CHANGE UP (ref 0–2)
BUN SERPL-MCNC: 38 MG/DL — HIGH (ref 8–20)
BUN SERPL-MCNC: 43 MG/DL — HIGH (ref 8–20)
C DIFF BY PCR RESULT: SIGNIFICANT CHANGE UP
C DIFF TOX GENS STL QL NAA+PROBE: SIGNIFICANT CHANGE UP
CALCIUM SERPL-MCNC: 8 MG/DL — LOW (ref 8.6–10.2)
CALCIUM SERPL-MCNC: 8.3 MG/DL — LOW (ref 8.6–10.2)
CHLORIDE SERPL-SCNC: 105 MMOL/L — SIGNIFICANT CHANGE UP (ref 98–107)
CHLORIDE SERPL-SCNC: 108 MMOL/L — HIGH (ref 98–107)
CO2 SERPL-SCNC: 32 MMOL/L — HIGH (ref 22–29)
CO2 SERPL-SCNC: 35 MMOL/L — HIGH (ref 22–29)
CREAT SERPL-MCNC: 0.53 MG/DL — SIGNIFICANT CHANGE UP (ref 0.5–1.3)
CREAT SERPL-MCNC: 0.6 MG/DL — SIGNIFICANT CHANGE UP (ref 0.5–1.3)
EOSINOPHIL # BLD AUTO: 0.2 K/UL — SIGNIFICANT CHANGE UP (ref 0–0.5)
EOSINOPHIL NFR BLD AUTO: 1.2 % — SIGNIFICANT CHANGE UP (ref 0–5)
GAS PNL BLDA: SIGNIFICANT CHANGE UP
GLUCOSE SERPL-MCNC: 111 MG/DL — SIGNIFICANT CHANGE UP (ref 70–115)
GLUCOSE SERPL-MCNC: 114 MG/DL — SIGNIFICANT CHANGE UP (ref 70–115)
HCT VFR BLD CALC: 30.6 % — LOW (ref 42–52)
HGB BLD-MCNC: 8.5 G/DL — LOW (ref 14–18)
LYMPHOCYTES # BLD AUTO: 1.2 K/UL — SIGNIFICANT CHANGE UP (ref 1–4.8)
LYMPHOCYTES # BLD AUTO: 10.1 % — LOW (ref 20–55)
MACROCYTES BLD QL: SLIGHT — SIGNIFICANT CHANGE UP
MAGNESIUM SERPL-MCNC: 2 MG/DL — SIGNIFICANT CHANGE UP (ref 1.6–2.6)
MAGNESIUM SERPL-MCNC: 2.3 MG/DL — SIGNIFICANT CHANGE UP (ref 1.6–2.6)
MCHC RBC-ENTMCNC: 25.4 PG — LOW (ref 27–31)
MCHC RBC-ENTMCNC: 27.8 G/DL — LOW (ref 32–36)
MCV RBC AUTO: 91.3 FL — SIGNIFICANT CHANGE UP (ref 80–94)
MICROCYTES BLD QL: SLIGHT — SIGNIFICANT CHANGE UP
MONOCYTES # BLD AUTO: 0.5 K/UL — SIGNIFICANT CHANGE UP (ref 0–0.8)
MONOCYTES NFR BLD AUTO: 4.4 % — SIGNIFICANT CHANGE UP (ref 3–10)
NEUTROPHILS # BLD AUTO: 10.2 K/UL — HIGH (ref 1.8–8)
NEUTROPHILS NFR BLD AUTO: 83.8 % — HIGH (ref 37–73)
NT-PROBNP SERPL-SCNC: HIGH PG/ML (ref 0–300)
PHOSPHATE SERPL-MCNC: 2.7 MG/DL — SIGNIFICANT CHANGE UP (ref 2.4–4.7)
PHOSPHATE SERPL-MCNC: 2.9 MG/DL — SIGNIFICANT CHANGE UP (ref 2.4–4.7)
PLAT MORPH BLD: ABNORMAL
PLATELET # BLD AUTO: 238 K/UL — SIGNIFICANT CHANGE UP (ref 150–400)
PLATELET CLUMP BLD QL SMEAR: SIGNIFICANT CHANGE UP
POTASSIUM SERPL-MCNC: 3.7 MMOL/L — SIGNIFICANT CHANGE UP (ref 3.5–5.3)
POTASSIUM SERPL-MCNC: 3.8 MMOL/L — SIGNIFICANT CHANGE UP (ref 3.5–5.3)
POTASSIUM SERPL-SCNC: 3.7 MMOL/L — SIGNIFICANT CHANGE UP (ref 3.5–5.3)
POTASSIUM SERPL-SCNC: 3.8 MMOL/L — SIGNIFICANT CHANGE UP (ref 3.5–5.3)
RBC # BLD: 3.35 M/UL — LOW (ref 4.6–6.2)
RBC # FLD: 19.2 % — HIGH (ref 11–15.6)
RBC BLD AUTO: ABNORMAL
SODIUM SERPL-SCNC: 148 MMOL/L — HIGH (ref 135–145)
SODIUM SERPL-SCNC: 153 MMOL/L — HIGH (ref 135–145)
WBC # BLD: 12.3 K/UL — HIGH (ref 4.8–10.8)
WBC # FLD AUTO: 12.3 K/UL — HIGH (ref 4.8–10.8)

## 2017-07-15 PROCEDURE — 99291 CRITICAL CARE FIRST HOUR: CPT | Mod: 24

## 2017-07-15 RX ORDER — METOPROLOL TARTRATE 50 MG
2.5 TABLET ORAL ONCE
Qty: 0 | Refills: 0 | Status: COMPLETED | OUTPATIENT
Start: 2017-07-15 | End: 2017-07-15

## 2017-07-15 RX ADMIN — Medication 2.5 MILLIGRAM(S): at 18:10

## 2017-07-15 RX ADMIN — LISINOPRIL 2.5 MILLIGRAM(S): 2.5 TABLET ORAL at 05:35

## 2017-07-15 RX ADMIN — IMIPENEM AND CILASTATIN 100 MILLIGRAM(S): 250; 250 INJECTION, POWDER, FOR SOLUTION INTRAVENOUS at 17:23

## 2017-07-15 RX ADMIN — IMIPENEM AND CILASTATIN 100 MILLIGRAM(S): 250; 250 INJECTION, POWDER, FOR SOLUTION INTRAVENOUS at 12:02

## 2017-07-15 RX ADMIN — Medication 100 MILLIGRAM(S): at 12:02

## 2017-07-15 RX ADMIN — IMIPENEM AND CILASTATIN 100 MILLIGRAM(S): 250; 250 INJECTION, POWDER, FOR SOLUTION INTRAVENOUS at 05:35

## 2017-07-15 RX ADMIN — MILRINONE LACTATE 4.89 MICROGRAM(S)/KG/MIN: 1 INJECTION, SOLUTION INTRAVENOUS at 17:23

## 2017-07-15 RX ADMIN — PANTOPRAZOLE SODIUM 40 MILLIGRAM(S): 20 TABLET, DELAYED RELEASE ORAL at 05:35

## 2017-07-15 RX ADMIN — Medication 3 MILLILITER(S): at 03:56

## 2017-07-15 RX ADMIN — Medication 50 MILLIGRAM(S): at 21:27

## 2017-07-15 RX ADMIN — Medication 100 MILLIEQUIVALENT(S): at 00:13

## 2017-07-15 RX ADMIN — PANTOPRAZOLE SODIUM 40 MILLIGRAM(S): 20 TABLET, DELAYED RELEASE ORAL at 17:24

## 2017-07-15 RX ADMIN — CHLORHEXIDINE GLUCONATE 15 MILLILITER(S): 213 SOLUTION TOPICAL at 17:23

## 2017-07-15 RX ADMIN — Medication 3 MILLILITER(S): at 08:07

## 2017-07-15 RX ADMIN — HEPARIN SODIUM 5000 UNIT(S): 5000 INJECTION INTRAVENOUS; SUBCUTANEOUS at 14:50

## 2017-07-15 RX ADMIN — Medication 10 MILLIGRAM(S): at 12:02

## 2017-07-15 RX ADMIN — Medication 9 MILLIGRAM(S): at 21:27

## 2017-07-15 RX ADMIN — Medication 0.12 MILLIGRAM(S): at 05:35

## 2017-07-15 RX ADMIN — HEPARIN SODIUM 5000 UNIT(S): 5000 INJECTION INTRAVENOUS; SUBCUTANEOUS at 05:35

## 2017-07-15 RX ADMIN — Medication 100 MILLIGRAM(S): at 05:35

## 2017-07-15 RX ADMIN — Medication 100 MILLIEQUIVALENT(S): at 01:16

## 2017-07-15 RX ADMIN — AMIODARONE HYDROCHLORIDE 200 MILLIGRAM(S): 400 TABLET ORAL at 05:35

## 2017-07-15 RX ADMIN — CHLORHEXIDINE GLUCONATE 15 MILLILITER(S): 213 SOLUTION TOPICAL at 05:35

## 2017-07-15 RX ADMIN — Medication 4 MILLIGRAM(S): at 21:27

## 2017-07-15 RX ADMIN — IMIPENEM AND CILASTATIN 100 MILLIGRAM(S): 250; 250 INJECTION, POWDER, FOR SOLUTION INTRAVENOUS at 23:42

## 2017-07-15 RX ADMIN — HEPARIN SODIUM 5000 UNIT(S): 5000 INJECTION INTRAVENOUS; SUBCUTANEOUS at 21:27

## 2017-07-15 RX ADMIN — Medication 3 MILLILITER(S): at 20:18

## 2017-07-15 RX ADMIN — SENNA PLUS 10 MILLILITER(S): 8.6 TABLET ORAL at 21:27

## 2017-07-15 RX ADMIN — Medication 3 MILLILITER(S): at 15:17

## 2017-07-15 NOTE — PROGRESS NOTE ADULT - SUBJECTIVE AND OBJECTIVE BOX
INTERVAL HPI/OVERNIGHT EVENTS/SUBJECTIVE:  Weaned off of levophed, continues on milrinone.     ICU Vital Signs Last 24 Hrs  T(C): 37.5 (15 Jul 2017 00:00), Max: 38.2 (14 Jul 2017 12:00)  T(F): 99.5 (15 Jul 2017 00:00), Max: 100.8 (14 Jul 2017 12:00)  HR: 93 (15 Jul 2017 00:33) (66 - 99)  BP: --  BP(mean): --  ABP: 98/61 (15 Jul 2017 00:00) (93/54 - 107/63)  ABP(mean): 75 (15 Jul 2017 00:00) (69 - 82)  RR: 16 (15 Jul 2017 00:00) (14 - 29)  SpO2: 99% (15 Jul 2017 00:33) (95% - 100%)      I&O's Detail    13 Jul 2017 07:01  -  14 Jul 2017 07:00  --------------------------------------------------------  IN:    Enteral Tube Flush: 80 mL    Free Water: 1650 mL    milrinone  Infusion: 112.7 mL    norepinephrine Infusion: 140.6 mL    Pivot: 1020 mL    Solution: 300 mL  Total IN: 3303.3 mL    OUT:    Indwelling Catheter - Urethral: 2060 mL  Total OUT: 2060 mL    Total NET: 1243.3 mL      14 Jul 2017 07:01  -  15 Jul 2017 00:37  --------------------------------------------------------  IN:    Enteral Tube Flush: 30 mL    Free Water: 1200 mL    milrinone  Infusion: 88.2 mL    norepinephrine Infusion: 20.2 mL    Pivot: 810 mL    Solution: 50 mL    Solution: 500 mL    Solution: 300 mL    Solution: 300 mL  Total IN: 3298.4 mL    OUT:    Indwelling Catheter - Urethral: 1770 mL  Total OUT: 1770 mL    Total NET: 1528.4 mL          Mode: AC/ CMV (Assist Control/ Continuous Mandatory Ventilation)  RR (machine): 12  TV (machine): 450  FiO2: 40  PEEP: 5  MAP: 13  PIP: 30    ABG - ( 14 Jul 2017 06:27 )  pH: 7.48  /  pCO2: 53    /  pO2: 154   / HCO3: 38    / Base Excess: 14.3  /  SaO2: 100                 MEDICATIONS  (STANDING):  amantadine Syrup 100 milliGRAM(s) Oral <User Schedule>  doxazosin 4 milliGRAM(s) Oral at bedtime  FLUoxetine Solution 10 milliGRAM(s) Oral daily  traZODone 50 milliGRAM(s) Oral at bedtime  digoxin     Tablet 0.125 milliGRAM(s) Oral daily  melatonin 9 milliGRAM(s) Oral at bedtime  pantoprazole   Suspension 40 milliGRAM(s) Oral two times a day before meals  senna Syrup 10 milliLiter(s) Oral at bedtime  lisinopril 2.5 milliGRAM(s) Oral daily  amiodarone    Tablet 200 milliGRAM(s) Oral daily  heparin  Injectable 5000 Unit(s) SubCutaneous every 8 hours  ALBUTerol/ipratropium for Nebulization 3 milliLiter(s) Nebulizer every 6 hours  chlorhexidine 0.12% Liquid 15 milliLiter(s) Swish and Spit two times a day  milrinone Infusion 0.2 MICROgram(s)/kG/Min (4.89 mL/Hr) IV Continuous <Continuous>  imipenem/cilastatin  IVPB   IV Intermittent   imipenem/cilastatin  IVPB 500 milliGRAM(s) IV Intermittent every 6 hours  potassium chloride  20 mEq/100 mL IVPB 20 milliEquivalent(s) IV Intermittent every 1 hour    MEDICATIONS  (PRN):  acetaminophen    Suspension 650 milliGRAM(s) Oral every 6 hours PRN For Temp greater than 38.5 C (101.3 F)  bisacodyl Suppository 10 milliGRAM(s) Rectal daily PRN Constipation      Physical Exam:    Gen: NAD    Eyes: PERRL, EOMI    Neurological: occasionally follows commands    Neck: trachea midline, no JVD    Pulmonary: coarse breath sounds b/l    Cardiovascular: irregular    Gastrointestinal: Soft, NTTP    Skin: +sacral decubitus      LABS:  Pending    ASSESSMENT/PLAN:  51y Male with respiratory failure, possible PNA    Neuro: Continue current regiment, sleep hygiene    CV: Continue milrinone, trend BNP    Pulm: Wean vent as tolerated    GI/Nutrition: Continue tube feeds    /Renal: Rogers for strict I&O    ID: Imepenem until 7/18    Endo: No issue    Skin: Repositioning for DTI prevention    DVT Prophylaxis: SCDs, SQH, IVC filter

## 2017-07-16 LAB
-  AMIKACIN: SIGNIFICANT CHANGE UP
-  AMPICILLIN/SULBACTAM: SIGNIFICANT CHANGE UP
-  AMPICILLIN: SIGNIFICANT CHANGE UP
-  AZTREONAM: SIGNIFICANT CHANGE UP
-  CEFAZOLIN: SIGNIFICANT CHANGE UP
-  CEFEPIME: SIGNIFICANT CHANGE UP
-  CEFOXITIN: SIGNIFICANT CHANGE UP
-  CEFTAZIDIME: SIGNIFICANT CHANGE UP
-  CEFTRIAXONE: SIGNIFICANT CHANGE UP
-  CIPROFLOXACIN: SIGNIFICANT CHANGE UP
-  ERTAPENEM: SIGNIFICANT CHANGE UP
-  GENTAMICIN: SIGNIFICANT CHANGE UP
-  IMIPENEM: SIGNIFICANT CHANGE UP
-  LEVOFLOXACIN: SIGNIFICANT CHANGE UP
-  MEROPENEM: SIGNIFICANT CHANGE UP
-  PIPERACILLIN/TAZOBACTAM: SIGNIFICANT CHANGE UP
-  TOBRAMYCIN: SIGNIFICANT CHANGE UP
-  TRIMETHOPRIM/SULFAMETHOXAZOLE: SIGNIFICANT CHANGE UP
ANION GAP SERPL CALC-SCNC: 12 MMOL/L — SIGNIFICANT CHANGE UP (ref 5–17)
ANION GAP SERPL CALC-SCNC: 8 MMOL/L — SIGNIFICANT CHANGE UP (ref 5–17)
BASOPHILS # BLD AUTO: 0 K/UL — SIGNIFICANT CHANGE UP (ref 0–0.2)
BASOPHILS NFR BLD AUTO: 0.1 % — SIGNIFICANT CHANGE UP (ref 0–2)
BUN SERPL-MCNC: 38 MG/DL — HIGH (ref 8–20)
BUN SERPL-MCNC: 39 MG/DL — HIGH (ref 8–20)
CALCIUM SERPL-MCNC: 8.2 MG/DL — LOW (ref 8.6–10.2)
CALCIUM SERPL-MCNC: 8.2 MG/DL — LOW (ref 8.6–10.2)
CHLORIDE SERPL-SCNC: 106 MMOL/L — SIGNIFICANT CHANGE UP (ref 98–107)
CHLORIDE SERPL-SCNC: 106 MMOL/L — SIGNIFICANT CHANGE UP (ref 98–107)
CO2 SERPL-SCNC: 31 MMOL/L — HIGH (ref 22–29)
CO2 SERPL-SCNC: 34 MMOL/L — HIGH (ref 22–29)
CREAT SERPL-MCNC: 0.56 MG/DL — SIGNIFICANT CHANGE UP (ref 0.5–1.3)
CREAT SERPL-MCNC: 0.59 MG/DL — SIGNIFICANT CHANGE UP (ref 0.5–1.3)
CULTURE RESULTS: SIGNIFICANT CHANGE UP
EOSINOPHIL # BLD AUTO: 0.2 K/UL — SIGNIFICANT CHANGE UP (ref 0–0.5)
EOSINOPHIL NFR BLD AUTO: 2.2 % — SIGNIFICANT CHANGE UP (ref 0–5)
GAS PNL BLDA: SIGNIFICANT CHANGE UP
GLUCOSE SERPL-MCNC: 120 MG/DL — HIGH (ref 70–115)
GLUCOSE SERPL-MCNC: 123 MG/DL — HIGH (ref 70–115)
HCT VFR BLD CALC: 31.3 % — LOW (ref 42–52)
HGB BLD-MCNC: 8.8 G/DL — LOW (ref 14–18)
LYMPHOCYTES # BLD AUTO: 1.2 K/UL — SIGNIFICANT CHANGE UP (ref 1–4.8)
LYMPHOCYTES # BLD AUTO: 12.1 % — LOW (ref 20–55)
MAGNESIUM SERPL-MCNC: 2.1 MG/DL — SIGNIFICANT CHANGE UP (ref 1.6–2.6)
MCHC RBC-ENTMCNC: 25.4 PG — LOW (ref 27–31)
MCHC RBC-ENTMCNC: 28.1 G/DL — LOW (ref 32–36)
MCV RBC AUTO: 90.5 FL — SIGNIFICANT CHANGE UP (ref 80–94)
METHOD TYPE: SIGNIFICANT CHANGE UP
MONOCYTES # BLD AUTO: 0.4 K/UL — SIGNIFICANT CHANGE UP (ref 0–0.8)
MONOCYTES NFR BLD AUTO: 4.2 % — SIGNIFICANT CHANGE UP (ref 3–10)
NEUTROPHILS # BLD AUTO: 7.9 K/UL — SIGNIFICANT CHANGE UP (ref 1.8–8)
NEUTROPHILS NFR BLD AUTO: 81 % — HIGH (ref 37–73)
NT-PROBNP SERPL-SCNC: HIGH PG/ML (ref 0–300)
ORGANISM # SPEC MICROSCOPIC CNT: SIGNIFICANT CHANGE UP
ORGANISM # SPEC MICROSCOPIC CNT: SIGNIFICANT CHANGE UP
PHOSPHATE SERPL-MCNC: 2.6 MG/DL — SIGNIFICANT CHANGE UP (ref 2.4–4.7)
PLATELET # BLD AUTO: 220 K/UL — SIGNIFICANT CHANGE UP (ref 150–400)
POTASSIUM SERPL-MCNC: 3.9 MMOL/L — SIGNIFICANT CHANGE UP (ref 3.5–5.3)
POTASSIUM SERPL-MCNC: 4.4 MMOL/L — SIGNIFICANT CHANGE UP (ref 3.5–5.3)
POTASSIUM SERPL-SCNC: 3.9 MMOL/L — SIGNIFICANT CHANGE UP (ref 3.5–5.3)
POTASSIUM SERPL-SCNC: 4.4 MMOL/L — SIGNIFICANT CHANGE UP (ref 3.5–5.3)
RBC # BLD: 3.46 M/UL — LOW (ref 4.6–6.2)
RBC # FLD: 19 % — HIGH (ref 11–15.6)
SODIUM SERPL-SCNC: 148 MMOL/L — HIGH (ref 135–145)
SODIUM SERPL-SCNC: 149 MMOL/L — HIGH (ref 135–145)
SPECIMEN SOURCE: SIGNIFICANT CHANGE UP
WBC # BLD: 9.8 K/UL — SIGNIFICANT CHANGE UP (ref 4.8–10.8)
WBC # FLD AUTO: 9.8 K/UL — SIGNIFICANT CHANGE UP (ref 4.8–10.8)

## 2017-07-16 PROCEDURE — 99291 CRITICAL CARE FIRST HOUR: CPT | Mod: 24

## 2017-07-16 RX ORDER — POTASSIUM CHLORIDE 20 MEQ
40 PACKET (EA) ORAL ONCE
Qty: 0 | Refills: 0 | Status: COMPLETED | OUTPATIENT
Start: 2017-07-16 | End: 2017-07-16

## 2017-07-16 RX ORDER — ACETAZOLAMIDE 250 MG/1
250 TABLET ORAL ONCE
Qty: 0 | Refills: 0 | Status: COMPLETED | OUTPATIENT
Start: 2017-07-16 | End: 2017-07-16

## 2017-07-16 RX ORDER — POTASSIUM PHOSPHATE, MONOBASIC POTASSIUM PHOSPHATE, DIBASIC 236; 224 MG/ML; MG/ML
15 INJECTION, SOLUTION INTRAVENOUS ONCE
Qty: 0 | Refills: 0 | Status: COMPLETED | OUTPATIENT
Start: 2017-07-16 | End: 2017-07-16

## 2017-07-16 RX ADMIN — Medication 3 MILLILITER(S): at 14:17

## 2017-07-16 RX ADMIN — Medication 3 MILLILITER(S): at 20:49

## 2017-07-16 RX ADMIN — SENNA PLUS 10 MILLILITER(S): 8.6 TABLET ORAL at 21:32

## 2017-07-16 RX ADMIN — PANTOPRAZOLE SODIUM 40 MILLIGRAM(S): 20 TABLET, DELAYED RELEASE ORAL at 18:33

## 2017-07-16 RX ADMIN — ACETAZOLAMIDE 105 MILLIGRAM(S): 250 TABLET ORAL at 12:53

## 2017-07-16 RX ADMIN — AMIODARONE HYDROCHLORIDE 200 MILLIGRAM(S): 400 TABLET ORAL at 05:40

## 2017-07-16 RX ADMIN — Medication 100 MILLIGRAM(S): at 05:40

## 2017-07-16 RX ADMIN — CHLORHEXIDINE GLUCONATE 15 MILLILITER(S): 213 SOLUTION TOPICAL at 05:40

## 2017-07-16 RX ADMIN — Medication 4 MILLIGRAM(S): at 21:33

## 2017-07-16 RX ADMIN — Medication 0.12 MILLIGRAM(S): at 05:40

## 2017-07-16 RX ADMIN — HEPARIN SODIUM 5000 UNIT(S): 5000 INJECTION INTRAVENOUS; SUBCUTANEOUS at 21:33

## 2017-07-16 RX ADMIN — IMIPENEM AND CILASTATIN 100 MILLIGRAM(S): 250; 250 INJECTION, POWDER, FOR SOLUTION INTRAVENOUS at 17:40

## 2017-07-16 RX ADMIN — Medication 100 MILLIGRAM(S): at 11:47

## 2017-07-16 RX ADMIN — Medication 10 MILLIGRAM(S): at 11:47

## 2017-07-16 RX ADMIN — Medication 3 MILLILITER(S): at 08:16

## 2017-07-16 RX ADMIN — IMIPENEM AND CILASTATIN 100 MILLIGRAM(S): 250; 250 INJECTION, POWDER, FOR SOLUTION INTRAVENOUS at 23:09

## 2017-07-16 RX ADMIN — CHLORHEXIDINE GLUCONATE 15 MILLILITER(S): 213 SOLUTION TOPICAL at 17:40

## 2017-07-16 RX ADMIN — Medication 40 MILLIEQUIVALENT(S): at 07:34

## 2017-07-16 RX ADMIN — Medication 3 MILLILITER(S): at 03:54

## 2017-07-16 RX ADMIN — IMIPENEM AND CILASTATIN 100 MILLIGRAM(S): 250; 250 INJECTION, POWDER, FOR SOLUTION INTRAVENOUS at 05:39

## 2017-07-16 RX ADMIN — MILRINONE LACTATE 4.89 MICROGRAM(S)/KG/MIN: 1 INJECTION, SOLUTION INTRAVENOUS at 18:33

## 2017-07-16 RX ADMIN — Medication 9 MILLIGRAM(S): at 21:33

## 2017-07-16 RX ADMIN — HEPARIN SODIUM 5000 UNIT(S): 5000 INJECTION INTRAVENOUS; SUBCUTANEOUS at 14:32

## 2017-07-16 RX ADMIN — POTASSIUM PHOSPHATE, MONOBASIC POTASSIUM PHOSPHATE, DIBASIC 62.5 MILLIMOLE(S): 236; 224 INJECTION, SOLUTION INTRAVENOUS at 07:34

## 2017-07-16 RX ADMIN — IMIPENEM AND CILASTATIN 100 MILLIGRAM(S): 250; 250 INJECTION, POWDER, FOR SOLUTION INTRAVENOUS at 11:47

## 2017-07-16 RX ADMIN — Medication 50 MILLIGRAM(S): at 21:33

## 2017-07-16 RX ADMIN — HEPARIN SODIUM 5000 UNIT(S): 5000 INJECTION INTRAVENOUS; SUBCUTANEOUS at 05:40

## 2017-07-16 RX ADMIN — PANTOPRAZOLE SODIUM 40 MILLIGRAM(S): 20 TABLET, DELAYED RELEASE ORAL at 05:39

## 2017-07-16 RX ADMIN — LISINOPRIL 2.5 MILLIGRAM(S): 2.5 TABLET ORAL at 05:40

## 2017-07-16 NOTE — PROGRESS NOTE ADULT - SUBJECTIVE AND OBJECTIVE BOX
INTERVAL HPI/OVERNIGHT EVENTS/SUBJECTIVE:  Continues on milrinone and ventilator. Still having loose bowel movements.     ICU Vital Signs Last 24 Hrs  T(C): 36.9 (16 Jul 2017 08:00), Max: 37.2 (15 Jul 2017 20:00)  T(F): 98.4 (16 Jul 2017 08:00), Max: 98.9 (15 Jul 2017 20:00)  HR: 97 (16 Jul 2017 11:00) (89 - 120)  BP: --  BP(mean): --  ABP: 105/72 (16 Jul 2017 11:00) (95/62 - 116/68)  ABP(mean): 86 (16 Jul 2017 11:00) (74 - 88)  RR: 32 (16 Jul 2017 11:00) (19 - 51)  SpO2: 100% (16 Jul 2017 11:00) (93% - 100%)      I&O's Detail    15 Jul 2017 07:01  -  16 Jul 2017 07:00  --------------------------------------------------------  IN:    Enteral Tube Flush: 30 mL    Free Water: 1700 mL    milrinone  Infusion: 117.6 mL    Pivot: 1080 mL    Solution: 400 mL  Total IN: 3327.6 mL    OUT:    Indwelling Catheter - Urethral: 1300 mL  Total OUT: 1300 mL    Total NET: 2027.6 mL      16 Jul 2017 07:01  -  16 Jul 2017 12:01  --------------------------------------------------------  IN:    milrinone  Infusion: 19.6 mL    Pivot: 180 mL    Solution: 250 mL  Total IN: 449.6 mL    OUT:    Indwelling Catheter - Urethral: 300 mL  Total OUT: 300 mL    Total NET: 149.6 mL          Mode: CPAP with PS  FiO2: 40  PEEP: 8  PS: 15    ABG - ( 16 Jul 2017 04:04 )  pH: 7.44  /  pCO2: 52    /  pO2: 103   / HCO3: 34    / Base Excess: 9.7   /  SaO2: 99                  MEDICATIONS  (STANDING):  amantadine Syrup 100 milliGRAM(s) Oral <User Schedule>  doxazosin 4 milliGRAM(s) Oral at bedtime  FLUoxetine Solution 10 milliGRAM(s) Oral daily  traZODone 50 milliGRAM(s) Oral at bedtime  digoxin     Tablet 0.125 milliGRAM(s) Oral daily  melatonin 9 milliGRAM(s) Oral at bedtime  pantoprazole   Suspension 40 milliGRAM(s) Oral two times a day before meals  senna Syrup 10 milliLiter(s) Oral at bedtime  lisinopril 2.5 milliGRAM(s) Oral daily  amiodarone    Tablet 200 milliGRAM(s) Oral daily  heparin  Injectable 5000 Unit(s) SubCutaneous every 8 hours  ALBUTerol/ipratropium for Nebulization 3 milliLiter(s) Nebulizer every 6 hours  chlorhexidine 0.12% Liquid 15 milliLiter(s) Swish and Spit two times a day  milrinone Infusion 0.2 MICROgram(s)/kG/Min (4.89 mL/Hr) IV Continuous <Continuous>  imipenem/cilastatin  IVPB   IV Intermittent   imipenem/cilastatin  IVPB 500 milliGRAM(s) IV Intermittent every 6 hours  acetazolamide  IVPB 250 milliGRAM(s) IV Intermittent once    MEDICATIONS  (PRN):  acetaminophen    Suspension 650 milliGRAM(s) Oral every 6 hours PRN For Temp greater than 38.5 C (101.3 F)  bisacodyl Suppository 10 milliGRAM(s) Rectal daily PRN Constipation        Physical Exam:    Gen: intubated, NAD    Eyes: PERRL, EOMI    Neurological: without gross deficit    Pulmonary: coarse breath sounds b/l    Cardiovascular: irregular    Gastrointestinal: soft, non distended      LABS:  CBC Full  -  ( 16 Jul 2017 03:41 )  WBC Count : 9.8 K/uL  Hemoglobin : 8.8 g/dL  Hematocrit : 31.3 %  Platelet Count - Automated : 220 K/uL  Mean Cell Volume : 90.5 fl  Mean Cell Hemoglobin : 25.4 pg  Mean Cell Hemoglobin Concentration : 28.1 g/dL  Auto Neutrophil # : 7.9 K/uL  Auto Lymphocyte # : 1.2 K/uL  Auto Monocyte # : 0.4 K/uL  Auto Eosinophil # : 0.2 K/uL  Auto Basophil # : 0.0 K/uL  Auto Neutrophil % : 81.0 %  Auto Lymphocyte % : 12.1 %  Auto Monocyte % : 4.2 %  Auto Eosinophil % : 2.2 %  Auto Basophil % : 0.1 %    07-16    148<H>  |  106  |  39.0<H>  ----------------------------<  123<H>  3.9   |  34.0<H>  |  0.56    Ca    8.2<L>      16 Jul 2017 03:41  Phos  2.6     07-16  Mg     2.1     07-16          RECENT CULTURES:            CAPILLARY BLOOD GLUCOSE        ASSESSMENT/PLAN:  51y Male with systolic heart failure and respiratory failure, possible PNA    Neuro: Continue current regiment    CV: Continue milrinone gtt, diuresis with diamox. Goal is net negative daily. Will trend BNPs daily    Pulm: wean vent as tolerated    GI/Nutrition: Pivot at 45cc/hr with prosource    /Renal: montoya for strict I&O    ID: Imepenem for a 5 day course    Endo: no issues    Skin: Repositioning for DTI prevention    DVT Prophylaxis: SCDs, SQH, IVC filter

## 2017-07-16 NOTE — PROGRESS NOTE ADULT - ATTENDING COMMENTS
agree with all above; we did not diurese yesterday, today he is 2000 cc positive and BNP is up slightly with HCO3 of 34. We will give a dose of Diamox today. He continues to become more nutritionally replete. Needs PT while in ICU. Assume we can make some headway with PSV back to 8-10 and diuresis may facilitate this.

## 2017-07-17 LAB
ANION GAP SERPL CALC-SCNC: 11 MMOL/L — SIGNIFICANT CHANGE UP (ref 5–17)
ANISOCYTOSIS BLD QL: SLIGHT — SIGNIFICANT CHANGE UP
BUN SERPL-MCNC: 39 MG/DL — HIGH (ref 8–20)
CALCIUM SERPL-MCNC: 8.1 MG/DL — LOW (ref 8.6–10.2)
CHLORIDE SERPL-SCNC: 106 MMOL/L — SIGNIFICANT CHANGE UP (ref 98–107)
CO2 SERPL-SCNC: 31 MMOL/L — HIGH (ref 22–29)
CREAT SERPL-MCNC: 0.58 MG/DL — SIGNIFICANT CHANGE UP (ref 0.5–1.3)
DACRYOCYTES BLD QL SMEAR: SLIGHT — SIGNIFICANT CHANGE UP
DIGOXIN SERPL-MCNC: 1.4 NG/ML — SIGNIFICANT CHANGE UP (ref 0.8–2)
EOSINOPHIL # BLD AUTO: 0.1 K/UL — SIGNIFICANT CHANGE UP (ref 0–0.5)
EOSINOPHIL NFR BLD AUTO: 1 % — SIGNIFICANT CHANGE UP (ref 0–6)
GAS PNL BLDA: SIGNIFICANT CHANGE UP
GLUCOSE SERPL-MCNC: 85 MG/DL — SIGNIFICANT CHANGE UP (ref 70–115)
HCT VFR BLD CALC: 30.1 % — LOW (ref 42–52)
HGB BLD-MCNC: 8.4 G/DL — LOW (ref 14–18)
HYPOCHROMIA BLD QL: SIGNIFICANT CHANGE UP
LYMPHOCYTES # BLD AUTO: 1.2 K/UL — SIGNIFICANT CHANGE UP (ref 1–4.8)
LYMPHOCYTES # BLD AUTO: 14 % — LOW (ref 20–55)
MAGNESIUM SERPL-MCNC: 2 MG/DL — SIGNIFICANT CHANGE UP (ref 1.6–2.6)
MCHC RBC-ENTMCNC: 25.7 PG — LOW (ref 27–31)
MCHC RBC-ENTMCNC: 27.9 G/DL — LOW (ref 32–36)
MCV RBC AUTO: 92 FL — SIGNIFICANT CHANGE UP (ref 80–94)
MONOCYTES # BLD AUTO: 0.5 K/UL — SIGNIFICANT CHANGE UP (ref 0–0.8)
MONOCYTES NFR BLD AUTO: 6 % — SIGNIFICANT CHANGE UP (ref 3–10)
NEUTROPHILS # BLD AUTO: 7.1 K/UL — SIGNIFICANT CHANGE UP (ref 1.8–8)
NEUTROPHILS NFR BLD AUTO: 76 % — HIGH (ref 37–73)
NEUTS BAND # BLD: 3 % — SIGNIFICANT CHANGE UP (ref 0–8)
NRBC # BLD: 1 /100 — HIGH (ref 0–0)
NT-PROBNP SERPL-SCNC: HIGH PG/ML (ref 0–300)
OVALOCYTES BLD QL SMEAR: SLIGHT — SIGNIFICANT CHANGE UP
PHOSPHATE SERPL-MCNC: 2.9 MG/DL — SIGNIFICANT CHANGE UP (ref 2.4–4.7)
PLAT MORPH BLD: NORMAL — SIGNIFICANT CHANGE UP
PLATELET # BLD AUTO: 229 K/UL — SIGNIFICANT CHANGE UP (ref 150–400)
POIKILOCYTOSIS BLD QL AUTO: SLIGHT — SIGNIFICANT CHANGE UP
POTASSIUM SERPL-MCNC: 4 MMOL/L — SIGNIFICANT CHANGE UP (ref 3.5–5.3)
POTASSIUM SERPL-SCNC: 4 MMOL/L — SIGNIFICANT CHANGE UP (ref 3.5–5.3)
RBC # BLD: 3.27 M/UL — LOW (ref 4.6–6.2)
RBC # FLD: 19.3 % — HIGH (ref 11–15.6)
RBC BLD AUTO: ABNORMAL
SODIUM SERPL-SCNC: 148 MMOL/L — HIGH (ref 135–145)
WBC # BLD: 9 K/UL — SIGNIFICANT CHANGE UP (ref 4.8–10.8)
WBC # FLD AUTO: 9 K/UL — SIGNIFICANT CHANGE UP (ref 4.8–10.8)

## 2017-07-17 PROCEDURE — 71010: CPT | Mod: 26

## 2017-07-17 PROCEDURE — 74000: CPT | Mod: 26

## 2017-07-17 PROCEDURE — 99233 SBSQ HOSP IP/OBS HIGH 50: CPT

## 2017-07-17 PROCEDURE — 99291 CRITICAL CARE FIRST HOUR: CPT

## 2017-07-17 RX ADMIN — PANTOPRAZOLE SODIUM 40 MILLIGRAM(S): 20 TABLET, DELAYED RELEASE ORAL at 05:42

## 2017-07-17 RX ADMIN — HEPARIN SODIUM 5000 UNIT(S): 5000 INJECTION INTRAVENOUS; SUBCUTANEOUS at 05:41

## 2017-07-17 RX ADMIN — SENNA PLUS 10 MILLILITER(S): 8.6 TABLET ORAL at 22:02

## 2017-07-17 RX ADMIN — HEPARIN SODIUM 5000 UNIT(S): 5000 INJECTION INTRAVENOUS; SUBCUTANEOUS at 22:02

## 2017-07-17 RX ADMIN — Medication 100 MILLIGRAM(S): at 05:41

## 2017-07-17 RX ADMIN — Medication 9 MILLIGRAM(S): at 22:02

## 2017-07-17 RX ADMIN — IMIPENEM AND CILASTATIN 100 MILLIGRAM(S): 250; 250 INJECTION, POWDER, FOR SOLUTION INTRAVENOUS at 17:27

## 2017-07-17 RX ADMIN — Medication 3 MILLILITER(S): at 08:20

## 2017-07-17 RX ADMIN — Medication 4 MILLIGRAM(S): at 22:02

## 2017-07-17 RX ADMIN — PANTOPRAZOLE SODIUM 40 MILLIGRAM(S): 20 TABLET, DELAYED RELEASE ORAL at 17:27

## 2017-07-17 RX ADMIN — LISINOPRIL 2.5 MILLIGRAM(S): 2.5 TABLET ORAL at 05:41

## 2017-07-17 RX ADMIN — CHLORHEXIDINE GLUCONATE 15 MILLILITER(S): 213 SOLUTION TOPICAL at 05:41

## 2017-07-17 RX ADMIN — Medication 3 MILLILITER(S): at 03:16

## 2017-07-17 RX ADMIN — Medication 10 MILLIGRAM(S): at 11:25

## 2017-07-17 RX ADMIN — HEPARIN SODIUM 5000 UNIT(S): 5000 INJECTION INTRAVENOUS; SUBCUTANEOUS at 14:04

## 2017-07-17 RX ADMIN — Medication 100 MILLIGRAM(S): at 11:26

## 2017-07-17 RX ADMIN — IMIPENEM AND CILASTATIN 100 MILLIGRAM(S): 250; 250 INJECTION, POWDER, FOR SOLUTION INTRAVENOUS at 11:25

## 2017-07-17 RX ADMIN — IMIPENEM AND CILASTATIN 100 MILLIGRAM(S): 250; 250 INJECTION, POWDER, FOR SOLUTION INTRAVENOUS at 05:42

## 2017-07-17 RX ADMIN — Medication 0.12 MILLIGRAM(S): at 05:41

## 2017-07-17 RX ADMIN — IMIPENEM AND CILASTATIN 100 MILLIGRAM(S): 250; 250 INJECTION, POWDER, FOR SOLUTION INTRAVENOUS at 23:24

## 2017-07-17 RX ADMIN — CHLORHEXIDINE GLUCONATE 15 MILLILITER(S): 213 SOLUTION TOPICAL at 17:27

## 2017-07-17 RX ADMIN — AMIODARONE HYDROCHLORIDE 200 MILLIGRAM(S): 400 TABLET ORAL at 05:41

## 2017-07-17 RX ADMIN — Medication 50 MILLIGRAM(S): at 22:02

## 2017-07-17 RX ADMIN — Medication 3 MILLILITER(S): at 20:56

## 2017-07-17 RX ADMIN — Medication 3 MILLILITER(S): at 14:54

## 2017-07-17 NOTE — PROGRESS NOTE ADULT - ASSESSMENT
50 yo M with nonischemic cardiomyopathy with severe LV dysfunction, now s/p cardiac arrest during ERCP, with anoxic brain injury. He has had severe CHF with exacerbation off of inotropic support. He is warm and wet, with evidence of substantial volume overload.   -continue inotropic supported diuresis. Continue IV milrinone, and bumex gtt, to ensure >1L fluid removal daily. Monitor I/Os and daily weights.   -hold beta blockade for now.   -may need to remain on inotropic support following next trial of extubation.   -no active arrhythmias at this time (though history of VT s/p epicardial VT ablation). Can continue low dose amiodarone.  -Dr. Sullivan to resume care for CHF

## 2017-07-17 NOTE — PROGRESS NOTE ADULT - SUBJECTIVE AND OBJECTIVE BOX
INTERVAL HPI/OVERNIGHT EVENTS/SUBJECTIVE:  Tube feeds unable to be delivered by pump, seems under pressure.  Increased abd distention.      ICU Vital Signs Last 24 Hrs  T(C): 37.4 (17 Jul 2017 08:00), Max: 37.4 (17 Jul 2017 08:00)  T(F): 99.3 (17 Jul 2017 08:00), Max: 99.3 (17 Jul 2017 08:00)  HR: 89 (17 Jul 2017 11:39) (89 - 115)  BP: --  BP(mean): --  ABP: 106/70 (17 Jul 2017 10:00) (94/58 - 112/74)  ABP(mean): 77 (17 Jul 2017 06:00) (72 - 88)  RR: 14 (17 Jul 2017 10:00) (14 - 42)  SpO2: 98% (17 Jul 2017 11:39) (95% - 100%)      I&O's Detail    16 Jul 2017 07:01  -  17 Jul 2017 07:00  --------------------------------------------------------  IN:    Enteral Tube Flush: 30 mL    Free Water: 2100 mL    milrinone  Infusion: 117.6 mL    Pivot: 1080 mL    Solution: 250 mL    Solution: 100 mL    Solution: 200 mL  Total IN: 3877.6 mL    OUT:    Indwelling Catheter - Urethral: 1220 mL  Total OUT: 1220 mL    Total NET: 2657.6 mL      17 Jul 2017 07:01  -  17 Jul 2017 11:42  --------------------------------------------------------  IN:    Free Water: 350 mL    milrinone  Infusion: 24.5 mL    Pivot: 135 mL    Solution: 100 mL  Total IN: 609.5 mL    OUT:    Indwelling Catheter - Urethral: 275 mL  Total OUT: 275 mL    Total NET: 334.5 mL          Mode: AC/ CMV (Assist Control/ Continuous Mandatory Ventilation)  RR (machine): 12  TV (machine): 450  FiO2: 40  PEEP: 8  MAP: 12  PIP: 34    ABG - ( 17 Jul 2017 03:43 )  pH: 7.43  /  pCO2: 52    /  pO2: 103   / HCO3: 32    / Base Excess: 8.5   /  SaO2: 99                  MEDICATIONS  (STANDING):  amantadine Syrup 100 milliGRAM(s) Oral <User Schedule>  doxazosin 4 milliGRAM(s) Oral at bedtime  FLUoxetine Solution 10 milliGRAM(s) Oral daily  traZODone 50 milliGRAM(s) Oral at bedtime  digoxin     Tablet 0.125 milliGRAM(s) Oral daily  melatonin 9 milliGRAM(s) Oral at bedtime  pantoprazole   Suspension 40 milliGRAM(s) Oral two times a day before meals  senna Syrup 10 milliLiter(s) Oral at bedtime  lisinopril 2.5 milliGRAM(s) Oral daily  amiodarone    Tablet 200 milliGRAM(s) Oral daily  heparin  Injectable 5000 Unit(s) SubCutaneous every 8 hours  ALBUTerol/ipratropium for Nebulization 3 milliLiter(s) Nebulizer every 6 hours  chlorhexidine 0.12% Liquid 15 milliLiter(s) Swish and Spit two times a day  milrinone Infusion 0.2 MICROgram(s)/kG/Min (4.89 mL/Hr) IV Continuous <Continuous>  imipenem/cilastatin  IVPB   IV Intermittent   imipenem/cilastatin  IVPB 500 milliGRAM(s) IV Intermittent every 6 hours    MEDICATIONS  (PRN):  acetaminophen    Suspension 650 milliGRAM(s) Oral every 6 hours PRN For Temp greater than 38.5 C (101.3 F)  bisacodyl Suppository 10 milliGRAM(s) Rectal daily PRN Constipation      NUTRITION/IVF: Tube feeds being held.      CENTRAL LINE: Y     ARMAS:  Y     A-LINE:  Y        PHYSICAL EXAM:    Gen:  NAD    Eyes: PERRLA    Neurological: Wakes up to stimuli, does not follow commands.  Non focal.      Neck: Trachea midline.      Pulmonary: Non labored on vent, bilateral crackles.    Cardiovascular: RRR    Gastrointestinal:  Distended, not tender, non tympanitic.    Genitourinary: Armas    Extremities:  Severe pitting edema      LABS:  CBC Full  -  ( 17 Jul 2017 06:31 )  WBC Count : 9.0 K/uL  Hemoglobin : 8.4 g/dL  Hematocrit : 30.1 %  Platelet Count - Automated : 229 K/uL  Mean Cell Volume : 92.0 fl  Mean Cell Hemoglobin : 25.7 pg  Mean Cell Hemoglobin Concentration : 27.9 g/dL  Auto Neutrophil # : 7.1 K/uL  Auto Lymphocyte # : 1.2 K/uL  Auto Monocyte # : 0.5 K/uL  Auto Eosinophil # : 0.1 K/uL  Auto Basophil # : x  Auto Neutrophil % : 76.0 %  Auto Lymphocyte % : 14.0 %  Auto Monocyte % : 6.0 %  Auto Eosinophil % : 1.0 %  Auto Basophil % : x    07-17    148<H>  |  106  |  39.0<H>  ----------------------------<  85  4.0   |  31.0<H>  |  0.58    Ca    8.1<L>      17 Jul 2017 06:31  Phos  2.9     07-17  Mg     2.0     07-17          RADIOLOGY & ADDITIONAL STUDIES:    ASSESSMENT/PLAN:  51yMale presenting with:  Cardiac arrest during ERCP, now with acute on chronic systolic heart failure, acute hypoxic resp failure.    Neuro: Encephalopathy secondary to other medical conditions     CV: CHF-on milrinone for output maintenance, possible end stage failure.  Reengage pt's cardiologist for input to option vs futility.  Reengage palliative care.      Pulm: Vent support.  Spontaneous mode as tolerated.  Finish course of abx for pna from last week.  Likely pulmonary edema secondary to HF.  Improved sodium and bicarb, may attempt diuresis again.      GI/Nutrition: Unclear as to why pump not delivering tube feeds.  KUB w/o sings of obstruction or ileus, + BM, liquid overnight.  Possible development of ascites.  Will bedside ultrasound.  PEG to gravity for a few hours.  Retry feeding later.      /Renal: Hypernatremia and metabolic alkalosis improving.  Cont free water.      Proph: DVT    Dispo: ICU      CRITICAL CARE TIME SPENT: 50 min

## 2017-07-17 NOTE — PROGRESS NOTE ADULT - SUBJECTIVE AND OBJECTIVE BOX
Cardiology asked to reconsult for ongoing CHF which has been refractory to medical therapy.   He is back on milrinone gtt, and now started on IV Bumex.   He is intubated, but awake and alert, responsive to some commands. Per family he has remained confused.     MEDICATIONS  (STANDING):  amantadine Syrup 100 milliGRAM(s) Oral <User Schedule>  doxazosin 4 milliGRAM(s) Oral at bedtime  FLUoxetine Solution 10 milliGRAM(s) Oral daily  traZODone 50 milliGRAM(s) Oral at bedtime  digoxin     Tablet 0.125 milliGRAM(s) Oral daily  melatonin 9 milliGRAM(s) Oral at bedtime  pantoprazole   Suspension 40 milliGRAM(s) Oral two times a day before meals  senna Syrup 10 milliLiter(s) Oral at bedtime  lisinopril 2.5 milliGRAM(s) Oral daily  amiodarone    Tablet 200 milliGRAM(s) Oral daily  heparin  Injectable 5000 Unit(s) SubCutaneous every 8 hours  ALBUTerol/ipratropium for Nebulization 3 milliLiter(s) Nebulizer every 6 hours  chlorhexidine 0.12% Liquid 15 milliLiter(s) Swish and Spit two times a day  milrinone Infusion 0.2 MICROgram(s)/kG/Min (4.89 mL/Hr) IV Continuous <Continuous>  imipenem/cilastatin  IVPB   IV Intermittent   imipenem/cilastatin  IVPB 500 milliGRAM(s) IV Intermittent every 6 hours    MEDICATIONS  (PRN):  acetaminophen    Suspension 650 milliGRAM(s) Oral every 6 hours PRN For Temp greater than 38.5 C (101.3 F)  bisacodyl Suppository 10 milliGRAM(s) Rectal daily PRN Constipation      Allergies    No Known Allergies    Intolerances      PAST MEDICAL & SURGICAL HISTORY:  Mitral regurgitation: severe MR  Cardiomyopathy, nonischemic  CAD (coronary artery disease)  Asthma  Atrial fibrillation  S/P laparoscopic cholecystectomy  History of humerus fracture: Metal pins in Left Humerus  Artificial pacemaker      Vital Signs Last 24 Hrs  T(C): 37.4 (17 Jul 2017 16:00), Max: 37.4 (17 Jul 2017 08:00)  T(F): 99.4 (17 Jul 2017 16:00), Max: 99.4 (17 Jul 2017 12:00)  HR: 90 (17 Jul 2017 18:00) (87 - 115)  BP: --  BP(mean): --  RR: 15 (17 Jul 2017 18:00) (12 - 42)  SpO2: 99% (17 Jul 2017 18:00) (95% - 99%)    Physical Exam:  Constitutional: intubated, awake and alert. Responsive to commands.   Cardiovascular: RRR, 2/6 HSM apex. JVP not assessible  Pulmonary: scattered rales  GI: soft NTND +BS, +anasarca  Extremities: 3+ pitting edema bilaterally to above knees  Neuro: non focal      LABS:                        8.4    9.0   )-----------( 229      ( 17 Jul 2017 06:31 )             30.1     07-17    148<H>  |  106  |  39.0<H>  ----------------------------<  85  4.0   |  31.0<H>  |  0.58    Ca    8.1<L>      17 Jul 2017 06:31  Phos  2.9     07-17  Mg     2.0     07-17      RADIOLOGY & ADDITIONAL TESTS:  < from: TTE Echo Complete w/Doppler (06.20.17 @ 08:56) >   1. Endocardial visualization was enhanced with intravenous echo contrast.   2. Biventricular enlargement with biventricular systolic dysfunction.   There is severe global hypokinesis of the left ventricle. Left   ventricular ejection fraction, by visual estimation, is <20%. RV systolic   function is moderately reduced.   3. Severe functional mitral regurgitation.   4. Moderate tricuspid regurgitation.   5. Estimated pulmonary artery systolic pressure is 66.9 mmHg assuming a   right atrial pressure of 15 mmHg, which is consistent with severe   pulmonary hypertension.   6. No pericardial effusion.   7. ** Compared to TTE dated 6/12/17, no significant changes.    < end of copied text >    < from: Cardiac Cath Lab - Adult (03.26.16 @ 12:35) >  CONTRAST GIVEN: Omnipaque 21 ml.  CORONARY VESSELS: The coronary circulation is right dominant.  LM:   --  LM: Normal.  LAD:   --  LAD: Normal.  CX:   --  Circumflex: Normal.  RCA:   --  RCA: Normal.    < end of copied text >

## 2017-07-18 LAB
ANION GAP SERPL CALC-SCNC: 11 MMOL/L — SIGNIFICANT CHANGE UP (ref 5–17)
ANION GAP SERPL CALC-SCNC: 13 MMOL/L — SIGNIFICANT CHANGE UP (ref 5–17)
ANISOCYTOSIS BLD QL: SLIGHT — SIGNIFICANT CHANGE UP
BASOPHILS # BLD AUTO: 0 K/UL — SIGNIFICANT CHANGE UP (ref 0–0.2)
BASOPHILS NFR BLD AUTO: 0 % — SIGNIFICANT CHANGE UP (ref 0–2)
BUN SERPL-MCNC: 27 MG/DL — HIGH (ref 8–20)
BUN SERPL-MCNC: 32 MG/DL — HIGH (ref 8–20)
CALCIUM SERPL-MCNC: 8.2 MG/DL — LOW (ref 8.6–10.2)
CALCIUM SERPL-MCNC: 8.3 MG/DL — LOW (ref 8.6–10.2)
CHLORIDE SERPL-SCNC: 104 MMOL/L — SIGNIFICANT CHANGE UP (ref 98–107)
CHLORIDE SERPL-SCNC: 105 MMOL/L — SIGNIFICANT CHANGE UP (ref 98–107)
CO2 SERPL-SCNC: 29 MMOL/L — SIGNIFICANT CHANGE UP (ref 22–29)
CO2 SERPL-SCNC: 30 MMOL/L — HIGH (ref 22–29)
CREAT SERPL-MCNC: 0.52 MG/DL — SIGNIFICANT CHANGE UP (ref 0.5–1.3)
CREAT SERPL-MCNC: 0.53 MG/DL — SIGNIFICANT CHANGE UP (ref 0.5–1.3)
DACRYOCYTES BLD QL SMEAR: SLIGHT — SIGNIFICANT CHANGE UP
EOSINOPHIL # BLD AUTO: 0.1 K/UL — SIGNIFICANT CHANGE UP (ref 0–0.5)
EOSINOPHIL NFR BLD AUTO: 2 % — SIGNIFICANT CHANGE UP (ref 0–6)
GAS PNL BLDA: SIGNIFICANT CHANGE UP
GLUCOSE SERPL-MCNC: 82 MG/DL — SIGNIFICANT CHANGE UP (ref 70–115)
GLUCOSE SERPL-MCNC: 97 MG/DL — SIGNIFICANT CHANGE UP (ref 70–115)
HCT VFR BLD CALC: 30.4 % — LOW (ref 42–52)
HCT VFR BLD CALC: 30.5 % — LOW (ref 42–52)
HGB BLD-MCNC: 8.6 G/DL — LOW (ref 14–18)
HGB BLD-MCNC: 8.7 G/DL — LOW (ref 14–18)
HYPOCHROMIA BLD QL: SIGNIFICANT CHANGE UP
LACTATE SERPL-SCNC: 0.7 MMOL/L — SIGNIFICANT CHANGE UP (ref 0.5–2)
LYMPHOCYTES # BLD AUTO: 0.9 K/UL — LOW (ref 1–4.8)
LYMPHOCYTES # BLD AUTO: 12 % — LOW (ref 20–55)
MAGNESIUM SERPL-MCNC: 1.6 MG/DL — SIGNIFICANT CHANGE UP (ref 1.6–2.6)
MAGNESIUM SERPL-MCNC: 2 MG/DL — SIGNIFICANT CHANGE UP (ref 1.6–2.6)
MCHC RBC-ENTMCNC: 25.7 PG — LOW (ref 27–31)
MCHC RBC-ENTMCNC: 25.7 PG — LOW (ref 27–31)
MCHC RBC-ENTMCNC: 28.2 G/DL — LOW (ref 32–36)
MCHC RBC-ENTMCNC: 28.6 G/DL — LOW (ref 32–36)
MCV RBC AUTO: 89.7 FL — SIGNIFICANT CHANGE UP (ref 80–94)
MCV RBC AUTO: 91 FL — SIGNIFICANT CHANGE UP (ref 80–94)
MONOCYTES # BLD AUTO: 0.5 K/UL — SIGNIFICANT CHANGE UP (ref 0–0.8)
MONOCYTES NFR BLD AUTO: 6 % — SIGNIFICANT CHANGE UP (ref 3–10)
NEUTROPHILS # BLD AUTO: 6.8 K/UL — SIGNIFICANT CHANGE UP (ref 1.8–8)
NEUTROPHILS NFR BLD AUTO: 78 % — HIGH (ref 37–73)
NEUTS BAND # BLD: 1 % — SIGNIFICANT CHANGE UP (ref 0–8)
NT-PROBNP SERPL-SCNC: HIGH PG/ML (ref 0–300)
OVALOCYTES BLD QL SMEAR: SLIGHT — SIGNIFICANT CHANGE UP
PHOSPHATE SERPL-MCNC: 2.9 MG/DL — SIGNIFICANT CHANGE UP (ref 2.4–4.7)
PHOSPHATE SERPL-MCNC: 3.2 MG/DL — SIGNIFICANT CHANGE UP (ref 2.4–4.7)
PLAT MORPH BLD: NORMAL — SIGNIFICANT CHANGE UP
PLATELET # BLD AUTO: 219 K/UL — SIGNIFICANT CHANGE UP (ref 150–400)
PLATELET # BLD AUTO: 240 K/UL — SIGNIFICANT CHANGE UP (ref 150–400)
POIKILOCYTOSIS BLD QL AUTO: SLIGHT — SIGNIFICANT CHANGE UP
POLYCHROMASIA BLD QL SMEAR: SLIGHT — SIGNIFICANT CHANGE UP
POTASSIUM SERPL-MCNC: 3.5 MMOL/L — SIGNIFICANT CHANGE UP (ref 3.5–5.3)
POTASSIUM SERPL-MCNC: 3.7 MMOL/L — SIGNIFICANT CHANGE UP (ref 3.5–5.3)
POTASSIUM SERPL-SCNC: 3.5 MMOL/L — SIGNIFICANT CHANGE UP (ref 3.5–5.3)
POTASSIUM SERPL-SCNC: 3.7 MMOL/L — SIGNIFICANT CHANGE UP (ref 3.5–5.3)
RBC # BLD: 3.35 M/UL — LOW (ref 4.6–6.2)
RBC # BLD: 3.39 M/UL — LOW (ref 4.6–6.2)
RBC # FLD: 18.9 % — HIGH (ref 11–15.6)
RBC # FLD: 19.2 % — HIGH (ref 11–15.6)
RBC BLD AUTO: ABNORMAL
SCHISTOCYTES BLD QL AUTO: SLIGHT — SIGNIFICANT CHANGE UP
SODIUM SERPL-SCNC: 145 MMOL/L — SIGNIFICANT CHANGE UP (ref 135–145)
SODIUM SERPL-SCNC: 147 MMOL/L — HIGH (ref 135–145)
TARGETS BLD QL SMEAR: SLIGHT — SIGNIFICANT CHANGE UP
VARIANT LYMPHS # BLD: 1 % — SIGNIFICANT CHANGE UP (ref 0–6)
WBC # BLD: 8.4 K/UL — SIGNIFICANT CHANGE UP (ref 4.8–10.8)
WBC # BLD: 9.8 K/UL — SIGNIFICANT CHANGE UP (ref 4.8–10.8)
WBC # FLD AUTO: 8.4 K/UL — SIGNIFICANT CHANGE UP (ref 4.8–10.8)
WBC # FLD AUTO: 9.8 K/UL — SIGNIFICANT CHANGE UP (ref 4.8–10.8)

## 2017-07-18 PROCEDURE — 36556 INSERT NON-TUNNEL CV CATH: CPT

## 2017-07-18 PROCEDURE — 99233 SBSQ HOSP IP/OBS HIGH 50: CPT | Mod: 25

## 2017-07-18 PROCEDURE — 93503 INSERT/PLACE HEART CATHETER: CPT

## 2017-07-18 PROCEDURE — 71010: CPT | Mod: 26

## 2017-07-18 PROCEDURE — 49083 ABD PARACENTESIS W/IMAGING: CPT

## 2017-07-18 PROCEDURE — 99291 CRITICAL CARE FIRST HOUR: CPT

## 2017-07-18 RX ORDER — MIDAZOLAM HYDROCHLORIDE 1 MG/ML
2 INJECTION, SOLUTION INTRAMUSCULAR; INTRAVENOUS ONCE
Qty: 0 | Refills: 0 | Status: DISCONTINUED | OUTPATIENT
Start: 2017-07-18 | End: 2017-07-18

## 2017-07-18 RX ORDER — POTASSIUM CHLORIDE 20 MEQ
10 PACKET (EA) ORAL
Qty: 0 | Refills: 0 | Status: COMPLETED | OUTPATIENT
Start: 2017-07-18 | End: 2017-07-19

## 2017-07-18 RX ORDER — MAGNESIUM SULFATE 500 MG/ML
2 VIAL (ML) INJECTION ONCE
Qty: 0 | Refills: 0 | Status: COMPLETED | OUTPATIENT
Start: 2017-07-18 | End: 2017-07-18

## 2017-07-18 RX ORDER — BUMETANIDE 0.25 MG/ML
1 INJECTION INTRAMUSCULAR; INTRAVENOUS
Qty: 0 | Refills: 0 | Status: COMPLETED | OUTPATIENT
Start: 2017-07-18 | End: 2017-07-19

## 2017-07-18 RX ORDER — FENTANYL CITRATE 50 UG/ML
75 INJECTION INTRAVENOUS ONCE
Qty: 0 | Refills: 0 | Status: DISCONTINUED | OUTPATIENT
Start: 2017-07-18 | End: 2017-07-18

## 2017-07-18 RX ADMIN — PANTOPRAZOLE SODIUM 40 MILLIGRAM(S): 20 TABLET, DELAYED RELEASE ORAL at 06:00

## 2017-07-18 RX ADMIN — Medication 3 MILLILITER(S): at 21:05

## 2017-07-18 RX ADMIN — CHLORHEXIDINE GLUCONATE 15 MILLILITER(S): 213 SOLUTION TOPICAL at 06:00

## 2017-07-18 RX ADMIN — MILRINONE LACTATE 9.05 MICROGRAM(S)/KG/MIN: 1 INJECTION, SOLUTION INTRAVENOUS at 17:51

## 2017-07-18 RX ADMIN — Medication 10 MILLIGRAM(S): at 13:44

## 2017-07-18 RX ADMIN — Medication 100 MILLIGRAM(S): at 13:44

## 2017-07-18 RX ADMIN — HEPARIN SODIUM 5000 UNIT(S): 5000 INJECTION INTRAVENOUS; SUBCUTANEOUS at 13:44

## 2017-07-18 RX ADMIN — MIDAZOLAM HYDROCHLORIDE 2 MILLIGRAM(S): 1 INJECTION, SOLUTION INTRAMUSCULAR; INTRAVENOUS at 11:33

## 2017-07-18 RX ADMIN — FENTANYL CITRATE 75 MICROGRAM(S): 50 INJECTION INTRAVENOUS at 10:12

## 2017-07-18 RX ADMIN — Medication 3 MILLILITER(S): at 03:40

## 2017-07-18 RX ADMIN — LISINOPRIL 2.5 MILLIGRAM(S): 2.5 TABLET ORAL at 06:01

## 2017-07-18 RX ADMIN — Medication 50 MILLIGRAM(S): at 21:24

## 2017-07-18 RX ADMIN — IMIPENEM AND CILASTATIN 100 MILLIGRAM(S): 250; 250 INJECTION, POWDER, FOR SOLUTION INTRAVENOUS at 13:45

## 2017-07-18 RX ADMIN — IMIPENEM AND CILASTATIN 100 MILLIGRAM(S): 250; 250 INJECTION, POWDER, FOR SOLUTION INTRAVENOUS at 06:00

## 2017-07-18 RX ADMIN — Medication 0.12 MILLIGRAM(S): at 06:00

## 2017-07-18 RX ADMIN — BUMETANIDE 1 MILLIGRAM(S): 0.25 INJECTION INTRAMUSCULAR; INTRAVENOUS at 17:51

## 2017-07-18 RX ADMIN — SENNA PLUS 10 MILLILITER(S): 8.6 TABLET ORAL at 21:24

## 2017-07-18 RX ADMIN — MILRINONE LACTATE 4.89 MICROGRAM(S)/KG/MIN: 1 INJECTION, SOLUTION INTRAVENOUS at 06:05

## 2017-07-18 RX ADMIN — Medication 100 MILLIEQUIVALENT(S): at 22:00

## 2017-07-18 RX ADMIN — CHLORHEXIDINE GLUCONATE 15 MILLILITER(S): 213 SOLUTION TOPICAL at 17:51

## 2017-07-18 RX ADMIN — HEPARIN SODIUM 5000 UNIT(S): 5000 INJECTION INTRAVENOUS; SUBCUTANEOUS at 21:24

## 2017-07-18 RX ADMIN — Medication 50 GRAM(S): at 22:42

## 2017-07-18 RX ADMIN — Medication 3 MILLILITER(S): at 14:25

## 2017-07-18 RX ADMIN — MIDAZOLAM HYDROCHLORIDE 2 MILLIGRAM(S): 1 INJECTION, SOLUTION INTRAMUSCULAR; INTRAVENOUS at 15:15

## 2017-07-18 RX ADMIN — PANTOPRAZOLE SODIUM 40 MILLIGRAM(S): 20 TABLET, DELAYED RELEASE ORAL at 17:51

## 2017-07-18 RX ADMIN — IMIPENEM AND CILASTATIN 100 MILLIGRAM(S): 250; 250 INJECTION, POWDER, FOR SOLUTION INTRAVENOUS at 17:51

## 2017-07-18 RX ADMIN — Medication 9 MILLIGRAM(S): at 21:24

## 2017-07-18 RX ADMIN — Medication 100 MILLIGRAM(S): at 06:00

## 2017-07-18 RX ADMIN — Medication 100 MILLIEQUIVALENT(S): at 23:00

## 2017-07-18 RX ADMIN — FENTANYL CITRATE 75 MICROGRAM(S): 50 INJECTION INTRAVENOUS at 11:37

## 2017-07-18 RX ADMIN — Medication 3 MILLILITER(S): at 08:50

## 2017-07-18 RX ADMIN — HEPARIN SODIUM 5000 UNIT(S): 5000 INJECTION INTRAVENOUS; SUBCUTANEOUS at 06:00

## 2017-07-18 RX ADMIN — AMIODARONE HYDROCHLORIDE 200 MILLIGRAM(S): 400 TABLET ORAL at 06:00

## 2017-07-18 NOTE — PROGRESS NOTE ADULT - SUBJECTIVE AND OBJECTIVE BOX
CHIEF COMPLAINT:Patient is a 51y old  Male who presents with a chief complaint of post op abdominal pain (05 May 2017 00:11)  Seen previously with afib, he was cardioverted. Due to anemia not on long term AC.  Over the past week or so, he  ws intubated, diuresed with elevation of Na and Hco3. He has large ascities as well, not tolerating tube feeds.   He did not tolerate CPAP and is no on AC.     Allergies:  No Known Allergies    MEDICATIONS:  doxazosin 4 milliGRAM(s) Oral at bedtime  digoxin     Tablet 0.125 milliGRAM(s) Oral daily  lisinopril 2.5 milliGRAM(s) Oral daily  amiodarone    Tablet 200 milliGRAM(s) Oral daily  milrinone Infusion 0.2 MICROgram(s)/kG/Min IV Continuous <Continuous>  imipenem/cilastatin  IVPB   IV Intermittent   imipenem/cilastatin  IVPB 500 milliGRAM(s) IV Intermittent every 6 hours  ALBUTerol/ipratropium for Nebulization 3 milliLiter(s) Nebulizer every 6 hours  amantadine Syrup 100 milliGRAM(s) Oral <User Schedule>  FLUoxetine Solution 10 milliGRAM(s) Oral daily  traZODone 50 milliGRAM(s) Oral at bedtime  melatonin 9 milliGRAM(s) Oral at bedtime  acetaminophen    Suspension 650 milliGRAM(s) Oral every 6 hours PRN  pantoprazole   Suspension 40 milliGRAM(s) Oral two times a day before meals  senna Syrup 10 milliLiter(s) Oral at bedtime  bisacodyl Suppository 10 milliGRAM(s) Rectal daily PRN  heparin  Injectable 5000 Unit(s) SubCutaneous every 8 hours  chlorhexidine 0.12% Liquid 15 milliLiter(s) Swish and Spit two times a day      PHYSICAL EXAM:  T(C): 36.1 (17 @ 08:00), Max: 37.4 (17 @ 12:00)  HR: 92 (17 @ 09:00) (86 - 98)  BP: --  RR: 15 (17 @ 09:00) (12 - 22)  SpO2: 100% (17 @ 09:00) (96% - 100%)  Wt(kg): --    I&O's Summary    2017 07:01  -  2017 07:00  --------------------------------------------------------  IN: 2602.6 mL / OUT: 1250 mL / NET: 1352.6 mL    2017 07:  -  2017 09:23  --------------------------------------------------------  IN: 4.9 mL / OUT: 100 mL / NET: -95.1 mL    Daily     Daily Weight in k.3 (2017 05:00)    Appearance: Intubated, in wrist restrains 	  HEENT:   NC/AT  Eye: Pale Conjunctiva  Lungs: Crckles b/l.   CVS: RRR, Normal S1 and S2, edema feet and arm.   Pulses: Normal distal pulses.    LABS:	 	                          8.7    8.4   )-----------( 240      ( 2017 04:48 )             30.4     0718    147<H>  |  105  |  32.0<H>  ----------------------------<  82  3.7   |  29.0  |  0.53    Ca    8.3<L>      2017 04:48  Phos  2.9       Mg     2.0     18    proBNP: Serum Pro-Brain Natriuretic Peptide: 23439 pg/mL ( @ 04:48)      ASSESSMENT/PLAN:

## 2017-07-18 NOTE — PROGRESS NOTE ADULT - SUBJECTIVE AND OBJECTIVE BOX
INTERVAL HPI/OVERNIGHT EVENTS/SUBJECTIVE:      ICU Vital Signs Last 24 Hrs  T(C): 36.1 (18 Jul 2017 08:00), Max: 37.4 (17 Jul 2017 12:00)  T(F): 97 (18 Jul 2017 08:00), Max: 99.4 (17 Jul 2017 12:00)  HR: 92 (18 Jul 2017 10:00) (86 - 98)  BP: --  BP(mean): --  ABP: 105/70 (18 Jul 2017 10:00) (85/79 - 108/72)  ABP(mean): 83 (18 Jul 2017 10:00) (71 - 85)  RR: 15 (18 Jul 2017 10:00) (12 - 22)  SpO2: 100% (18 Jul 2017 10:00) (96% - 100%)      I&O's Detail    17 Jul 2017 07:01  -  18 Jul 2017 07:00  --------------------------------------------------------  IN:    Free Water: 2050 mL    milrinone  Infusion: 117.6 mL    Pivot: 135 mL    Solution: 300 mL  Total IN: 2602.6 mL    OUT:    Indwelling Catheter - Urethral: 1250 mL  Total OUT: 1250 mL    Total NET: 1352.6 mL      18 Jul 2017 07:01  -  18 Jul 2017 11:07  --------------------------------------------------------  IN:    milrinone  Infusion: 14.7 mL  Total IN: 14.7 mL    OUT:    Indwelling Catheter - Urethral: 125 mL  Total OUT: 125 mL    Total NET: -110.3 mL          Mode: AC/ CMV (Assist Control/ Continuous Mandatory Ventilation)  RR (machine): 12  TV (machine): 450  FiO2: 40  PEEP: 8  MAP: 13  PIP: 36    ABG - ( 18 Jul 2017 04:21 )  pH: 7.38  /  pCO2: 53    /  pO2: 120   / HCO3: 29    / Base Excess: 4.7   /  SaO2: 99                  MEDICATIONS  (STANDING):  amantadine Syrup 100 milliGRAM(s) Oral <User Schedule>  FLUoxetine Solution 10 milliGRAM(s) Oral daily  traZODone 50 milliGRAM(s) Oral at bedtime  digoxin     Tablet 0.125 milliGRAM(s) Oral daily  melatonin 9 milliGRAM(s) Oral at bedtime  pantoprazole   Suspension 40 milliGRAM(s) Oral two times a day before meals  senna Syrup 10 milliLiter(s) Oral at bedtime  lisinopril 2.5 milliGRAM(s) Oral daily  amiodarone    Tablet 200 milliGRAM(s) Oral daily  heparin  Injectable 5000 Unit(s) SubCutaneous every 8 hours  ALBUTerol/ipratropium for Nebulization 3 milliLiter(s) Nebulizer every 6 hours  chlorhexidine 0.12% Liquid 15 milliLiter(s) Swish and Spit two times a day  milrinone Infusion 0.2 MICROgram(s)/kG/Min (4.89 mL/Hr) IV Continuous <Continuous>  imipenem/cilastatin  IVPB   IV Intermittent   imipenem/cilastatin  IVPB 500 milliGRAM(s) IV Intermittent every 6 hours    MEDICATIONS  (PRN):  acetaminophen    Suspension 650 milliGRAM(s) Oral every 6 hours PRN For Temp greater than 38.5 C (101.3 F)  bisacodyl Suppository 10 milliGRAM(s) Rectal daily PRN Constipation      PHYSICAL EXAM:    Gen:  NAD    Eyes: PERRLA    Neck: Trachea midline    Pulmonary: Non labored on vent.      Cardiovascular:  RRR    Gastrointestinal: Distended, non tender.      Genitourinary: [place]    Extremities: Severe pitting edema.            LABS:  CBC Full  -  ( 18 Jul 2017 04:48 )  WBC Count : 8.4 K/uL  Hemoglobin : 8.7 g/dL  Hematocrit : 30.4 %  Platelet Count - Automated : 240 K/uL  Mean Cell Volume : 89.7 fl  Mean Cell Hemoglobin : 25.7 pg  Mean Cell Hemoglobin Concentration : 28.6 g/dL  Auto Neutrophil # : 6.8 K/uL  Auto Lymphocyte # : 0.9 K/uL  Auto Monocyte # : 0.5 K/uL  Auto Eosinophil # : 0.1 K/uL  Auto Basophil # : 0.0 K/uL  Auto Neutrophil % : 78.0 %  Auto Lymphocyte % : 12.0 %  Auto Monocyte % : 6.0 %  Auto Eosinophil % : 2.0 %  Auto Basophil % : 0.0 %    07-18    147<H>  |  105  |  32.0<H>  ----------------------------<  82  3.7   |  29.0  |  0.53    Ca    8.3<L>      18 Jul 2017 04:48  Phos  2.9     07-18  Mg     2.0     07-18              ASSESSMENT/PLAN:  51yMale presenting with:  Acute on chronic systolic heart failure after cardiac arrest.      Neuro: Encephalopsy and polyneuropathy of critical illness.  Supportive care.      CV: Heart failure.  Cardiology involved.  Plan to float swan today to better understand volume status.     Pulm: Hypoxemic respiratory failure.  Vent support.  Diuresis if indicated by swan.      GI/Nutrition: Tube feeds still being held.  Abdomen distended with ascites by bedside ultrasound.  Will paracentes for tense ascites today.      Renal: Alkalosis and hypernatremia resolved.  Will decrease free water.     Proph:  PPI and SQ heparin    Dispo: ICU.  Poor prognosis.        CRITICAL CARE TIME SPENT:  45 min

## 2017-07-18 NOTE — PROGRESS NOTE ADULT - ASSESSMENT
1, HFrEF. Non-ischemic CM. Increase primacor to 0.375 mcg/kg/min  2. PT is not severely volume overloaded based on the labs and exam. There is an element of ARDS which requires diagnosis and mgmt using [place]-Que, and measurement of PCWP.   3. Paracentesis   4. Monitor i/os.   5. Check serum cortisol level.   6. Afib, s/p DCC, not a candidate for full AC.   7. Pt has suffered some degree of brain injury post ERCP. He is not a candidate for LVAD or heart transplant at this time. He may need long term inotropic therapy. He has poor prognosis  8. Due to multiple intubations, I think he may benefit from trach.   9. DC doxazosin

## 2017-07-18 NOTE — PROCEDURE NOTE - NSPOSTPRCRAD_GEN_A_CORE
central line located in the
17cm/depth of insertion/post-procedure radiography performed/central line located in the superior vena cava/no pneumothorax/central line located in the
central line located in the superior vena cava/post-procedure radiography performed/central line located in the/no pneumothorax
post procedure radiography not performed
chest tube in correct position
post-procedure radiography performed/central line located in the superior vena cava/central line located in the
Pending

## 2017-07-18 NOTE — PROGRESS NOTE ADULT - SUBJECTIVE AND OBJECTIVE BOX
[place]-Que Catheter was inserted via right IJ cordis.   PCWP of 30 mmHg, PA 70/27, RV 68/10, CVP 9 mmHg.  Pt tolerated the procedure well.   Will plan to use bumex for diuretic therapy. 1mg iv bid and increase as needed for diuresis. Free water about 1 litter per day.

## 2017-07-18 NOTE — PROCEDURE NOTE - NSPOSTCAREGUIDE_GEN_A_CORE
Care for catheter as per unit/ICU protocols
Keep the cast/splint/dressing clean and dry
Care for catheter as per unit/ICU protocols
Verbal/written post procedure instructions were given to patient/caregiver
Verbal/written post procedure instructions were given to patient/caregiver/Care for catheter as per unit/ICU protocols
Care for catheter as per unit/ICU protocols

## 2017-07-19 LAB
ANION GAP SERPL CALC-SCNC: 12 MMOL/L — SIGNIFICANT CHANGE UP (ref 5–17)
BASOPHILS # BLD AUTO: 0 K/UL — SIGNIFICANT CHANGE UP (ref 0–0.2)
BASOPHILS NFR BLD AUTO: 0.2 % — SIGNIFICANT CHANGE UP (ref 0–2)
BUN SERPL-MCNC: 26 MG/DL — HIGH (ref 8–20)
CALCIUM SERPL-MCNC: 8 MG/DL — LOW (ref 8.6–10.2)
CHLORIDE SERPL-SCNC: 105 MMOL/L — SIGNIFICANT CHANGE UP (ref 98–107)
CO2 SERPL-SCNC: 29 MMOL/L — SIGNIFICANT CHANGE UP (ref 22–29)
CREAT SERPL-MCNC: 0.5 MG/DL — SIGNIFICANT CHANGE UP (ref 0.5–1.3)
EOSINOPHIL # BLD AUTO: 0.1 K/UL — SIGNIFICANT CHANGE UP (ref 0–0.5)
EOSINOPHIL NFR BLD AUTO: 1.2 % — SIGNIFICANT CHANGE UP (ref 0–5)
GAS PNL BLDA: SIGNIFICANT CHANGE UP
GLUCOSE SERPL-MCNC: 112 MG/DL — SIGNIFICANT CHANGE UP (ref 70–115)
HCT VFR BLD CALC: 31.4 % — LOW (ref 42–52)
HGB BLD-MCNC: 9.3 G/DL — LOW (ref 14–18)
LYMPHOCYTES # BLD AUTO: 1.2 K/UL — SIGNIFICANT CHANGE UP (ref 1–4.8)
LYMPHOCYTES # BLD AUTO: 12 % — LOW (ref 20–55)
MAGNESIUM SERPL-MCNC: 2.1 MG/DL — SIGNIFICANT CHANGE UP (ref 1.6–2.6)
MCHC RBC-ENTMCNC: 26.1 PG — LOW (ref 27–31)
MCHC RBC-ENTMCNC: 29.6 G/DL — LOW (ref 32–36)
MCV RBC AUTO: 88 FL — SIGNIFICANT CHANGE UP (ref 80–94)
MONOCYTES # BLD AUTO: 0.7 K/UL — SIGNIFICANT CHANGE UP (ref 0–0.8)
MONOCYTES NFR BLD AUTO: 6.5 % — SIGNIFICANT CHANGE UP (ref 3–10)
NEUTROPHILS # BLD AUTO: 8 K/UL — SIGNIFICANT CHANGE UP (ref 1.8–8)
NEUTROPHILS NFR BLD AUTO: 79.2 % — HIGH (ref 37–73)
PHOSPHATE SERPL-MCNC: 2.9 MG/DL — SIGNIFICANT CHANGE UP (ref 2.4–4.7)
PLATELET # BLD AUTO: 235 K/UL — SIGNIFICANT CHANGE UP (ref 150–400)
POTASSIUM SERPL-MCNC: 3.9 MMOL/L — SIGNIFICANT CHANGE UP (ref 3.5–5.3)
POTASSIUM SERPL-SCNC: 3.9 MMOL/L — SIGNIFICANT CHANGE UP (ref 3.5–5.3)
RBC # BLD: 3.57 M/UL — LOW (ref 4.6–6.2)
RBC # FLD: 18.7 % — HIGH (ref 11–15.6)
SODIUM SERPL-SCNC: 146 MMOL/L — HIGH (ref 135–145)
WBC # BLD: 10.1 K/UL — SIGNIFICANT CHANGE UP (ref 4.8–10.8)
WBC # FLD AUTO: 10.1 K/UL — SIGNIFICANT CHANGE UP (ref 4.8–10.8)

## 2017-07-19 PROCEDURE — 99291 CRITICAL CARE FIRST HOUR: CPT

## 2017-07-19 PROCEDURE — 99233 SBSQ HOSP IP/OBS HIGH 50: CPT

## 2017-07-19 RX ORDER — HALOPERIDOL DECANOATE 100 MG/ML
2.5 INJECTION INTRAMUSCULAR ONCE
Qty: 0 | Refills: 0 | Status: COMPLETED | OUTPATIENT
Start: 2017-07-19 | End: 2017-07-19

## 2017-07-19 RX ORDER — BUMETANIDE 0.25 MG/ML
1 INJECTION INTRAMUSCULAR; INTRAVENOUS
Qty: 0 | Refills: 0 | Status: COMPLETED | OUTPATIENT
Start: 2017-07-19 | End: 2017-07-20

## 2017-07-19 RX ORDER — HALOPERIDOL DECANOATE 100 MG/ML
5 INJECTION INTRAMUSCULAR ONCE
Qty: 0 | Refills: 0 | Status: COMPLETED | OUTPATIENT
Start: 2017-07-19 | End: 2017-07-19

## 2017-07-19 RX ADMIN — Medication 100 MILLIEQUIVALENT(S): at 00:00

## 2017-07-19 RX ADMIN — Medication 3 MILLILITER(S): at 15:27

## 2017-07-19 RX ADMIN — BUMETANIDE 1 MILLIGRAM(S): 0.25 INJECTION INTRAMUSCULAR; INTRAVENOUS at 17:51

## 2017-07-19 RX ADMIN — HEPARIN SODIUM 5000 UNIT(S): 5000 INJECTION INTRAVENOUS; SUBCUTANEOUS at 21:26

## 2017-07-19 RX ADMIN — HEPARIN SODIUM 5000 UNIT(S): 5000 INJECTION INTRAVENOUS; SUBCUTANEOUS at 13:11

## 2017-07-19 RX ADMIN — PANTOPRAZOLE SODIUM 40 MILLIGRAM(S): 20 TABLET, DELAYED RELEASE ORAL at 05:27

## 2017-07-19 RX ADMIN — Medication 0.12 MILLIGRAM(S): at 05:27

## 2017-07-19 RX ADMIN — PANTOPRAZOLE SODIUM 40 MILLIGRAM(S): 20 TABLET, DELAYED RELEASE ORAL at 17:51

## 2017-07-19 RX ADMIN — MILRINONE LACTATE 12.22 MICROGRAM(S)/KG/MIN: 1 INJECTION, SOLUTION INTRAVENOUS at 14:08

## 2017-07-19 RX ADMIN — HALOPERIDOL DECANOATE 2.5 MILLIGRAM(S): 100 INJECTION INTRAMUSCULAR at 21:40

## 2017-07-19 RX ADMIN — AMIODARONE HYDROCHLORIDE 200 MILLIGRAM(S): 400 TABLET ORAL at 05:27

## 2017-07-19 RX ADMIN — BUMETANIDE 1 MILLIGRAM(S): 0.25 INJECTION INTRAMUSCULAR; INTRAVENOUS at 05:28

## 2017-07-19 RX ADMIN — SENNA PLUS 10 MILLILITER(S): 8.6 TABLET ORAL at 21:27

## 2017-07-19 RX ADMIN — Medication 9 MILLIGRAM(S): at 21:27

## 2017-07-19 RX ADMIN — Medication 50 MILLIGRAM(S): at 21:27

## 2017-07-19 RX ADMIN — Medication 10 MILLIGRAM(S): at 13:23

## 2017-07-19 RX ADMIN — MILRINONE LACTATE 12.22 MICROGRAM(S)/KG/MIN: 1 INJECTION, SOLUTION INTRAVENOUS at 21:26

## 2017-07-19 RX ADMIN — Medication 3 MILLILITER(S): at 08:20

## 2017-07-19 RX ADMIN — HALOPERIDOL DECANOATE 5 MILLIGRAM(S): 100 INJECTION INTRAMUSCULAR at 01:21

## 2017-07-19 RX ADMIN — Medication 3 MILLILITER(S): at 20:53

## 2017-07-19 RX ADMIN — LISINOPRIL 2.5 MILLIGRAM(S): 2.5 TABLET ORAL at 05:27

## 2017-07-19 RX ADMIN — HEPARIN SODIUM 5000 UNIT(S): 5000 INJECTION INTRAVENOUS; SUBCUTANEOUS at 05:27

## 2017-07-19 RX ADMIN — Medication 100 MILLIGRAM(S): at 05:27

## 2017-07-19 RX ADMIN — Medication 100 MILLIGRAM(S): at 13:11

## 2017-07-19 RX ADMIN — Medication 3 MILLILITER(S): at 03:44

## 2017-07-19 RX ADMIN — MILRINONE LACTATE 9.05 MICROGRAM(S)/KG/MIN: 1 INJECTION, SOLUTION INTRAVENOUS at 03:51

## 2017-07-19 RX ADMIN — CHLORHEXIDINE GLUCONATE 15 MILLILITER(S): 213 SOLUTION TOPICAL at 05:27

## 2017-07-19 RX ADMIN — CHLORHEXIDINE GLUCONATE 15 MILLILITER(S): 213 SOLUTION TOPICAL at 17:51

## 2017-07-19 NOTE — PROGRESS NOTE ADULT - ASSESSMENT
1, HFrEF, PCWP of 28 mmHg. CO of 6 l/min on primacor.   2. Cont with swan for another day  3. Cont with diuretics.  4. Decrease free water to 250 cc q6h for now  5. Replace lytes.  6. Add low dose lisinopril 2.5 mg.. 1/2 tab daily.   7. Plan to extubate.

## 2017-07-19 NOTE — PROGRESS NOTE ADULT - SUBJECTIVE AND OBJECTIVE BOX
INTERVAL HPI/OVERNIGHT EVENTS/SUBJECTIVE:  [place] placed. Paracentesis.  Diuresis    ICU Vital Signs Last 24 Hrs  T(C): 37.7 (19 Jul 2017 08:00), Max: 37.7 (19 Jul 2017 08:00)  T(F): 99.9 (19 Jul 2017 08:00), Max: 99.9 (19 Jul 2017 08:00)  HR: 98 (19 Jul 2017 11:00) (83 - 110)  BP: --  BP(mean): --  ABP: 94/72 (19 Jul 2017 11:00) (88/62 - 110/70)  ABP(mean): 80 (19 Jul 2017 11:00) (68 - 85)  RR: 24 (19 Jul 2017 11:00) (13 - 36)  SpO2: 100% (19 Jul 2017 11:00) (77% - 100%)      I&O's Detail    18 Jul 2017 07:01  -  19 Jul 2017 07:00  --------------------------------------------------------  IN:    Free Water: 2100 mL    milrinone  Infusion: 144 mL    milrinone  Infusion: 55.6 mL    Pivot: 720 mL    Solution: 200 mL    Solution: 100 mL    Solution: 300 mL  Total IN: 3619.6 mL    OUT:    Drain: 4800 mL    Indwelling Catheter - Urethral: 2120 mL  Total OUT: 6920 mL    Total NET: -3300.4 mL      19 Jul 2017 07:01  -  19 Jul 2017 11:30  --------------------------------------------------------  IN:    milrinone  Infusion: 27 mL    Pivot: 90 mL  Total IN: 117 mL    OUT:    Indwelling Catheter - Urethral: 450 mL  Total OUT: 450 mL    Total NET: -333 mL          Mode: CPAP with PS  FiO2: 40  PEEP: 8  PS: 15  MAP: 12    ABG - ( 19 Jul 2017 04:58 )  pH: 7.44  /  pCO2: 49    /  pO2: 147   / HCO3: 31    / Base Excess: 7.3   /  SaO2: 100                 MEDICATIONS  (STANDING):  amantadine Syrup 100 milliGRAM(s) Oral <User Schedule>  FLUoxetine Solution 10 milliGRAM(s) Oral daily  traZODone 50 milliGRAM(s) Oral at bedtime  digoxin     Tablet 0.125 milliGRAM(s) Oral daily  melatonin 9 milliGRAM(s) Oral at bedtime  pantoprazole   Suspension 40 milliGRAM(s) Oral two times a day before meals  senna Syrup 10 milliLiter(s) Oral at bedtime  lisinopril 2.5 milliGRAM(s) Oral daily  amiodarone    Tablet 200 milliGRAM(s) Oral daily  heparin  Injectable 5000 Unit(s) SubCutaneous every 8 hours  ALBUTerol/ipratropium for Nebulization 3 milliLiter(s) Nebulizer every 6 hours  chlorhexidine 0.12% Liquid 15 milliLiter(s) Swish and Spit two times a day  milrinone Infusion 0.37 MICROgram(s)/kG/Min (9.047 mL/Hr) IV Continuous <Continuous>    MEDICATIONS  (PRN):  acetaminophen    Suspension 650 milliGRAM(s) Oral every 6 hours PRN For Temp greater than 38.5 C (101.3 F)  bisacodyl Suppository 10 milliGRAM(s) Rectal daily PRN Constipation      NUTRITION/IVF: Tube feeds    CENTRAL LINE: Y     ARMSA:  Y     A-LINE:  Y           PHYSICAL EXAM:    Gen:  NAD    Eyes: PERRLA    Neurological: Awake and alert.  Following commands.      Neck:  Trachea midline    Pulmonary: Non labored on CPAP/PS    Cardiovascular: RRR    Gastrointestinal: Soft, improved distention    Genitourinary: Armas    Extremities: Severe edema        LABS:  CBC Full  -  ( 19 Jul 2017 05:31 )  WBC Count : 10.1 K/uL  Hemoglobin : 9.3 g/dL  Hematocrit : 31.4 %  Platelet Count - Automated : 235 K/uL  Mean Cell Volume : 88.0 fl  Mean Cell Hemoglobin : 26.1 pg  Mean Cell Hemoglobin Concentration : 29.6 g/dL  Auto Neutrophil # : 8.0 K/uL  Auto Lymphocyte # : 1.2 K/uL  Auto Monocyte # : 0.7 K/uL  Auto Eosinophil # : 0.1 K/uL  Auto Basophil # : 0.0 K/uL  Auto Neutrophil % : 79.2 %  Auto Lymphocyte % : 12.0 %  Auto Monocyte % : 6.5 %  Auto Eosinophil % : 1.2 %  Auto Basophil % : 0.2 %    07-19    146<H>  |  105  |  26.0<H>  ----------------------------<  112  3.9   |  29.0  |  0.50    Ca    8.0<L>      19 Jul 2017 05:31  Phos  2.9     07-19  Mg     2.1     07-19                ASSESSMENT/PLAN:  51yMale presenting with:    Neuro: Encephalopathy much improved. Likely better perfusion.       CV: Acute on chronic systolic HF.  Improved CI with increased milrinone. Still very high wedge pressure.  Needs continued diuresis.      Pulm:  Acute resp failure.  With drainage of tense ascites and improved arousal, wean PS and assess for extubation.     GI/Nutrition: Tube feeds now tolerated with ascites drainage.  Ascites secondary to HF.      Proph: PPI and sqh    Dispo:  ICU      CRITICAL CARE TIME SPENT: 40 min

## 2017-07-19 NOTE — ADVANCED PRACTICE NURSE CONSULT - ASSESSMENT
Pt is 52 y/o male, on vent, pt on two point wrist restrain for safety. 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 Pt on tube feeding for nutrition support. Scrotum edema noted, [place]oya bedside bag to divert the urine. Assessed pt's posterior pelvic skin with BERYL Mc, partial thickness skin loss noted at pt's right buttock, wound edge irregular, area measured [id number], elongated shape, periwound skin with mild erythema, blanchable. Superabsorbent incontinence pad used for skin moisture management in posterior pelvic skin. Ashok wedge pillows utilized for offloading the bony prominence at posterior pelvic skin.

## 2017-07-19 NOTE — ADVANCED PRACTICE NURSE CONSULT - RECOMMEDATIONS
Wound/skin care recommendation for right buttock:  1.  clean the bilateral buttocks and IAD areas with bedside care spray and pat dry,   2. apply Triad cream to pt's bilateral buttocks,  3. apply Triad cream at least twice a day and after each incontinence.   4. No secondary cover dressing needed after apply Triad cream.   5. Use superabsorbent purple pads for incontinence care   6. continue nutritional support for wound healing,  7. continue pressure injury prevention protocol, offload the sacrum and coccyx bony prominence -- continued elfego wedge pillows for offloading coccyx/sacrum.   wound care recommendation discussed with BERYL Mc, please contact wound care nurse if further follow up is needed.

## 2017-07-19 NOTE — PROGRESS NOTE ADULT - SUBJECTIVE AND OBJECTIVE BOX
CHIEF COMPLAINT: Patient is a 51y old  Male who is seen with cardiogenic shock  On primacor and bumex.   Good diuresis   S/P Paracentesis . Tolerating po feeding.     Allergies  No Known Allergies    MEDICATIONS:  digoxin     Tablet 0.125 milliGRAM(s) Oral daily  lisinopril 2.5 milliGRAM(s) Oral daily  amiodarone    Tablet 200 milliGRAM(s) Oral daily  milrinone Infusion 0.37 MICROgram(s)/kG/Min IV Continuous <Continuous>  ALBUTerol/ipratropium for Nebulization 3 milliLiter(s) Nebulizer every 6 hours  amantadine Syrup 100 milliGRAM(s) Oral <User Schedule>  FLUoxetine Solution 10 milliGRAM(s) Oral daily  traZODone 50 milliGRAM(s) Oral at bedtime  melatonin 9 milliGRAM(s) Oral at bedtime  acetaminophen    Suspension 650 milliGRAM(s) Oral every 6 hours PRN  pantoprazole   Suspension 40 milliGRAM(s) Oral two times a day before meals  senna Syrup 10 milliLiter(s) Oral at bedtime  bisacodyl Suppository 10 milliGRAM(s) Rectal daily PRN  heparin  Injectable 5000 Unit(s) SubCutaneous every 8 hours  chlorhexidine 0.12% Liquid 15 milliLiter(s) Swish and Spit two times a day    PHYSICAL EXAM:  T(C): 37.7 (07-19-17 @ 08:00), Max: 37.7 (07-19-17 @ 08:00)  HR: 104 (07-19-17 @ 09:00) (83 - 110)  BP: --  RR: 26 (07-19-17 @ 09:00) (13 - 36)  SpO2: 100% (07-19-17 @ 09:00) (77% - 100%)  Wt(kg): --    I&O's Summary    18 Jul 2017 07:01  -  19 Jul 2017 07:00  --------------------------------------------------------  IN: 3619.6 mL / OUT: 6920 mL / NET: -3300.4 mL    19 Jul 2017 07:01  -  19 Jul 2017 09:58  --------------------------------------------------------  IN: 117 mL / OUT: 450 mL / NET: -333 mL    Appearance: on CPAP, agitated.   HEENT:   NC/AT  Eye: Pink Conjunctiva  Lungs: CTA B/L  CVS: RRR, Normal S1 and S2  Pulses: Normal distal pulses.    TELEMETRY:Sinus tach    LABS:	 	                        9.3    10.1  )-----------( 235      ( 19 Jul 2017 05:31 )             31.4     07-19    146<H>  |  105  |  26.0<H>  ----------------------------<  112  3.9   |  29.0  |  0.50    Ca    8.0<L>      19 Jul 2017 05:31  Phos  2.9     07-19  Mg     2.1     07-19      proBNP:   Lipid Profile:   HgA1c:   TSH:     ASSESSMENT/PLAN:

## 2017-07-19 NOTE — PROGRESS NOTE ADULT - SUBJECTIVE AND OBJECTIVE BOX
INTERVAL HPI/OVERNIGHT EVENTS:  events noted - s/p paracentesis, SG catheter increase PCWP on Bumex and milrinone infusion.     PRESENT SYMPTOMS: SOURCE:  Patient/Family/Team    PAIN SCALE:  0 = none  1 = mild   2 = moderate  3 = severe    Pain:     Dyspnea:  [ ] YES [ ] NO  on vent/ CPAP  Anxiety:  [ ] YES [ ] NO  na  Fatigue: [x ] YES [ ] NO  Nausea: [ ] YES [ x] NO  Loss of Appetite: [ ] YES [ ] NO   on TF  Other symptoms: __________    MEDICATIONS  (STANDING):  amantadine Syrup 100 milliGRAM(s) Oral <User Schedule>  FLUoxetine Solution 10 milliGRAM(s) Oral daily  traZODone 50 milliGRAM(s) Oral at bedtime  digoxin     Tablet 0.125 milliGRAM(s) Oral daily  melatonin 9 milliGRAM(s) Oral at bedtime  pantoprazole   Suspension 40 milliGRAM(s) Oral two times a day before meals  senna Syrup 10 milliLiter(s) Oral at bedtime  lisinopril 2.5 milliGRAM(s) Oral daily  amiodarone    Tablet 200 milliGRAM(s) Oral daily  heparin  Injectable 5000 Unit(s) SubCutaneous every 8 hours  ALBUTerol/ipratropium for Nebulization 3 milliLiter(s) Nebulizer every 6 hours  chlorhexidine 0.12% Liquid 15 milliLiter(s) Swish and Spit two times a day  milrinone Infusion 0.5 MICROgram(s)/kG/Min (12.225 mL/Hr) IV Continuous <Continuous>  buMETAnide Injectable 1 milliGRAM(s) IV Push two times a day    MEDICATIONS  (PRN):  acetaminophen    Suspension 650 milliGRAM(s) Oral every 6 hours PRN For Temp greater than 38.5 C (101.3 F)  bisacodyl Suppository 10 milliGRAM(s) Rectal daily PRN Constipation      Allergies    No Known Allergies    Intolerances    Karnofsky Performance Score/Palliative Performance Status Version 2:         %    Vital Signs Last 24 Hrs  T(C): 37.7 (19 Jul 2017 16:00), Max: 37.8 (19 Jul 2017 12:00)  T(F): 99.9 (19 Jul 2017 16:00), Max: 100 (19 Jul 2017 12:00)  HR: 100 (19 Jul 2017 16:00) (83 - 110)  BP: --  BP(mean): --  RR: 26 (19 Jul 2017 16:00) (13 - 36)  SpO2: 100% (19 Jul 2017 16:00) (77% - 100%)    PHYSICAL EXAM:    General:  awake, intubated    HEENT: [ ] normal  [ ] dry mouth  [x ] ET tube/trach    Lungs: [x ] comfortable [ ] tachypnea/labored breathing  [ ] excessive secretions    CV: [ x] normal  [ ] tachycardia    GI: [ ] normal  [ ] distended  [ ] tender  [ ] no BS               [x ] PEG/NG/OG tube      MSK: [ ] normal  [ x] weakness  [ ] edema             [ ] ambulatory  [ x] bedbound/wheelchair bound    Skin: [ ] normal  [ ] pressure ulcers- Stage_____  [x ] no rash    LABS:                        9.3    10.1  )-----------( 235      ( 19 Jul 2017 05:31 )             31.4     07-19    146<H>  |  105  |  26.0<H>  ----------------------------<  112  3.9   |  29.0  |  0.50    Ca    8.0<L>      19 Jul 2017 05:31  Phos  2.9     07-19  Mg     2.1     07-19          I&O's Summary    18 Jul 2017 07:01  -  19 Jul 2017 07:00  --------------------------------------------------------  IN: 3619.6 mL / OUT: 6920 mL / NET: -3300.4 mL    19 Jul 2017 07:01  -  19 Jul 2017 16:29  --------------------------------------------------------  IN: 1202 mL / OUT: 1650 mL / NET: -448 mL        RADIOLOGY & ADDITIONAL STUDIES:  < from: Xray Chest 1 View AP/PA. (07.18.17 @ 15:19) >  EXAM:  CHEST SINGLE VIEW FRONTAL                          PROCEDURE DATE:  07/18/2017          INTERPRETATION:  Chest, single portable view    HISTORY: Exchange of central line    Comparison: 7/17    IMPRESSION:    Support Devices: Interval placement of a right-sided [place] catheter   with the tip in the right distal pulmonary artery, otherwise stable.   Right IJ central venous catheter removed.  Heart: Cardiomegaly stable  Mediastinum:  Unremarkable  Lungs/Airways: Mildly worsened pulmonary edema  Bones/Soft tissues: Unremarkable          < end of copied text >    Thank you for the opportunity to assist with the care of this patient.   [place] [phone].

## 2017-07-19 NOTE — PROGRESS NOTE ADULT - ASSESSMENT
51yr man, NICM, s/p lap cholecystecomy, found to have bile duct leak. Underwent an ERCP and had cardiac arrest. Long hospital course with several intubations/extubations, s/p PEG, s/p paracentesis and SGC found to have elevated PCWP.

## 2017-07-20 LAB
ALBUMIN SERPL ELPH-MCNC: 3.1 G/DL — LOW (ref 3.3–5.2)
ALP SERPL-CCNC: 138 U/L — HIGH (ref 40–120)
ALT FLD-CCNC: 23 U/L — SIGNIFICANT CHANGE UP
ANION GAP SERPL CALC-SCNC: 11 MMOL/L — SIGNIFICANT CHANGE UP (ref 5–17)
ANION GAP SERPL CALC-SCNC: 11 MMOL/L — SIGNIFICANT CHANGE UP (ref 5–17)
ANISOCYTOSIS BLD QL: SLIGHT — SIGNIFICANT CHANGE UP
AST SERPL-CCNC: 34 U/L — SIGNIFICANT CHANGE UP
BASOPHILS # BLD AUTO: 0 K/UL — SIGNIFICANT CHANGE UP (ref 0–0.2)
BASOPHILS # BLD AUTO: 0 K/UL — SIGNIFICANT CHANGE UP (ref 0–0.2)
BASOPHILS NFR BLD AUTO: 0.1 % — SIGNIFICANT CHANGE UP (ref 0–2)
BILIRUB SERPL-MCNC: 0.6 MG/DL — SIGNIFICANT CHANGE UP (ref 0.4–2)
BUN SERPL-MCNC: 21 MG/DL — HIGH (ref 8–20)
BUN SERPL-MCNC: 22 MG/DL — HIGH (ref 8–20)
CALCIUM SERPL-MCNC: 7.2 MG/DL — LOW (ref 8.6–10.2)
CALCIUM SERPL-MCNC: 7.8 MG/DL — LOW (ref 8.6–10.2)
CHLORIDE SERPL-SCNC: 100 MMOL/L — SIGNIFICANT CHANGE UP (ref 98–107)
CHLORIDE SERPL-SCNC: 109 MMOL/L — HIGH (ref 98–107)
CO2 SERPL-SCNC: 30 MMOL/L — HIGH (ref 22–29)
CO2 SERPL-SCNC: 32 MMOL/L — HIGH (ref 22–29)
CREAT SERPL-MCNC: 0.47 MG/DL — LOW (ref 0.5–1.3)
CREAT SERPL-MCNC: 0.56 MG/DL — SIGNIFICANT CHANGE UP (ref 0.5–1.3)
EOSINOPHIL # BLD AUTO: 0 K/UL — SIGNIFICANT CHANGE UP (ref 0–0.5)
EOSINOPHIL # BLD AUTO: 0.1 K/UL — SIGNIFICANT CHANGE UP (ref 0–0.5)
EOSINOPHIL NFR BLD AUTO: 0.8 % — SIGNIFICANT CHANGE UP (ref 0–5)
GAS PNL BLDA: SIGNIFICANT CHANGE UP
GLUCOSE SERPL-MCNC: 122 MG/DL — HIGH (ref 70–115)
GLUCOSE SERPL-MCNC: 145 MG/DL — HIGH (ref 70–115)
HCT VFR BLD CALC: 28.7 % — LOW (ref 42–52)
HCT VFR BLD CALC: 29.7 % — LOW (ref 42–52)
HGB BLD-MCNC: 8.3 G/DL — LOW (ref 14–18)
HGB BLD-MCNC: 8.5 G/DL — LOW (ref 14–18)
HYPOCHROMIA BLD QL: SLIGHT — SIGNIFICANT CHANGE UP
LYMPHOCYTES # BLD AUTO: 0.9 K/UL — LOW (ref 1–4.8)
LYMPHOCYTES # BLD AUTO: 1 K/UL — SIGNIFICANT CHANGE UP (ref 1–4.8)
LYMPHOCYTES # BLD AUTO: 11 % — LOW (ref 20–55)
LYMPHOCYTES # BLD AUTO: 9.9 % — LOW (ref 20–55)
MACROCYTES BLD QL: SLIGHT — SIGNIFICANT CHANGE UP
MAGNESIUM SERPL-MCNC: 1.5 MG/DL — LOW (ref 1.6–2.6)
MAGNESIUM SERPL-MCNC: 2.1 MG/DL — SIGNIFICANT CHANGE UP (ref 1.6–2.6)
MCHC RBC-ENTMCNC: 25.5 PG — LOW (ref 27–31)
MCHC RBC-ENTMCNC: 25.6 PG — LOW (ref 27–31)
MCHC RBC-ENTMCNC: 28.6 G/DL — LOW (ref 32–36)
MCHC RBC-ENTMCNC: 28.9 G/DL — LOW (ref 32–36)
MCV RBC AUTO: 88.3 FL — SIGNIFICANT CHANGE UP (ref 80–94)
MCV RBC AUTO: 89.5 FL — SIGNIFICANT CHANGE UP (ref 80–94)
MICROCYTES BLD QL: SLIGHT — SIGNIFICANT CHANGE UP
MONOCYTES # BLD AUTO: 0.5 K/UL — SIGNIFICANT CHANGE UP (ref 0–0.8)
MONOCYTES # BLD AUTO: 0.6 K/UL — SIGNIFICANT CHANGE UP (ref 0–0.8)
MONOCYTES NFR BLD AUTO: 5.3 % — SIGNIFICANT CHANGE UP (ref 3–10)
MONOCYTES NFR BLD AUTO: 6 % — SIGNIFICANT CHANGE UP (ref 3–10)
MYELOCYTES NFR BLD: 1 % — HIGH (ref 0–0)
NEUTROPHILS # BLD AUTO: 7.6 K/UL — SIGNIFICANT CHANGE UP (ref 1.8–8)
NEUTROPHILS # BLD AUTO: 7.9 K/UL — SIGNIFICANT CHANGE UP (ref 1.8–8)
NEUTROPHILS NFR BLD AUTO: 81 % — HIGH (ref 37–73)
NEUTROPHILS NFR BLD AUTO: 82.8 % — HIGH (ref 37–73)
NEUTS BAND # BLD: 1 % — SIGNIFICANT CHANGE UP (ref 0–8)
NT-PROBNP SERPL-SCNC: HIGH PG/ML (ref 0–300)
PHOSPHATE SERPL-MCNC: 2.8 MG/DL — SIGNIFICANT CHANGE UP (ref 2.4–4.7)
PHOSPHATE SERPL-MCNC: 3.5 MG/DL — SIGNIFICANT CHANGE UP (ref 2.4–4.7)
PLAT MORPH BLD: NORMAL — SIGNIFICANT CHANGE UP
PLATELET # BLD AUTO: 198 K/UL — SIGNIFICANT CHANGE UP (ref 150–400)
PLATELET # BLD AUTO: 203 K/UL — SIGNIFICANT CHANGE UP (ref 150–400)
POIKILOCYTOSIS BLD QL AUTO: SLIGHT — SIGNIFICANT CHANGE UP
POLYCHROMASIA BLD QL SMEAR: SLIGHT — SIGNIFICANT CHANGE UP
POTASSIUM SERPL-MCNC: 3 MMOL/L — LOW (ref 3.5–5.3)
POTASSIUM SERPL-MCNC: 3.8 MMOL/L — SIGNIFICANT CHANGE UP (ref 3.5–5.3)
POTASSIUM SERPL-SCNC: 3 MMOL/L — LOW (ref 3.5–5.3)
POTASSIUM SERPL-SCNC: 3.8 MMOL/L — SIGNIFICANT CHANGE UP (ref 3.5–5.3)
PROT SERPL-MCNC: 6.3 G/DL — LOW (ref 6.6–8.7)
RBC # BLD: 3.25 M/UL — LOW (ref 4.6–6.2)
RBC # BLD: 3.32 M/UL — LOW (ref 4.6–6.2)
RBC # FLD: 19.1 % — HIGH (ref 11–15.6)
RBC # FLD: 19.2 % — HIGH (ref 11–15.6)
RBC BLD AUTO: ABNORMAL
SODIUM SERPL-SCNC: 143 MMOL/L — SIGNIFICANT CHANGE UP (ref 135–145)
SODIUM SERPL-SCNC: 150 MMOL/L — HIGH (ref 135–145)
WBC # BLD: 9.2 K/UL — SIGNIFICANT CHANGE UP (ref 4.8–10.8)
WBC # BLD: 9.7 K/UL — SIGNIFICANT CHANGE UP (ref 4.8–10.8)
WBC # FLD AUTO: 9.2 K/UL — SIGNIFICANT CHANGE UP (ref 4.8–10.8)
WBC # FLD AUTO: 9.7 K/UL — SIGNIFICANT CHANGE UP (ref 4.8–10.8)

## 2017-07-20 PROCEDURE — 99291 CRITICAL CARE FIRST HOUR: CPT

## 2017-07-20 PROCEDURE — 93010 ELECTROCARDIOGRAM REPORT: CPT

## 2017-07-20 RX ORDER — ALBUMIN HUMAN 25 %
100 VIAL (ML) INTRAVENOUS EVERY 6 HOURS
Qty: 0 | Refills: 0 | Status: COMPLETED | OUTPATIENT
Start: 2017-07-20 | End: 2017-07-21

## 2017-07-20 RX ORDER — FENTANYL CITRATE 50 UG/ML
50 INJECTION INTRAVENOUS ONCE
Qty: 0 | Refills: 0 | Status: DISCONTINUED | OUTPATIENT
Start: 2017-07-20 | End: 2017-07-20

## 2017-07-20 RX ORDER — POTASSIUM CHLORIDE 20 MEQ
40 PACKET (EA) ORAL ONCE
Qty: 0 | Refills: 0 | Status: COMPLETED | OUTPATIENT
Start: 2017-07-20 | End: 2017-07-20

## 2017-07-20 RX ORDER — POTASSIUM CHLORIDE 20 MEQ
40 PACKET (EA) ORAL ONCE
Qty: 0 | Refills: 0 | Status: DISCONTINUED | OUTPATIENT
Start: 2017-07-20 | End: 2017-07-20

## 2017-07-20 RX ORDER — MAGNESIUM SULFATE 500 MG/ML
2 VIAL (ML) INJECTION
Qty: 0 | Refills: 0 | Status: COMPLETED | OUTPATIENT
Start: 2017-07-20 | End: 2017-07-20

## 2017-07-20 RX ORDER — BUMETANIDE 0.25 MG/ML
1 INJECTION INTRAMUSCULAR; INTRAVENOUS THREE TIMES A DAY
Qty: 0 | Refills: 0 | Status: DISCONTINUED | OUTPATIENT
Start: 2017-07-20 | End: 2017-07-21

## 2017-07-20 RX ORDER — POTASSIUM CHLORIDE 20 MEQ
20 PACKET (EA) ORAL
Qty: 0 | Refills: 0 | Status: DISCONTINUED | OUTPATIENT
Start: 2017-07-20 | End: 2017-07-21

## 2017-07-20 RX ORDER — MORPHINE SULFATE 50 MG/1
4 CAPSULE, EXTENDED RELEASE ORAL ONCE
Qty: 0 | Refills: 0 | Status: DISCONTINUED | OUTPATIENT
Start: 2017-07-20 | End: 2017-07-20

## 2017-07-20 RX ORDER — MAGNESIUM SULFATE 500 MG/ML
2 VIAL (ML) INJECTION ONCE
Qty: 0 | Refills: 0 | Status: COMPLETED | OUTPATIENT
Start: 2017-07-20 | End: 2017-07-20

## 2017-07-20 RX ORDER — POTASSIUM CHLORIDE 20 MEQ
20 PACKET (EA) ORAL
Qty: 0 | Refills: 0 | Status: COMPLETED | OUTPATIENT
Start: 2017-07-20 | End: 2017-07-20

## 2017-07-20 RX ORDER — POTASSIUM PHOSPHATE, MONOBASIC POTASSIUM PHOSPHATE, DIBASIC 236; 224 MG/ML; MG/ML
15 INJECTION, SOLUTION INTRAVENOUS ONCE
Qty: 0 | Refills: 0 | Status: COMPLETED | OUTPATIENT
Start: 2017-07-20 | End: 2017-07-20

## 2017-07-20 RX ADMIN — Medication 50 MILLIGRAM(S): at 21:30

## 2017-07-20 RX ADMIN — HEPARIN SODIUM 5000 UNIT(S): 5000 INJECTION INTRAVENOUS; SUBCUTANEOUS at 05:35

## 2017-07-20 RX ADMIN — Medication 50 GRAM(S): at 08:46

## 2017-07-20 RX ADMIN — Medication 100 MILLIGRAM(S): at 05:35

## 2017-07-20 RX ADMIN — BUMETANIDE 1 MILLIGRAM(S): 0.25 INJECTION INTRAMUSCULAR; INTRAVENOUS at 13:10

## 2017-07-20 RX ADMIN — PANTOPRAZOLE SODIUM 40 MILLIGRAM(S): 20 TABLET, DELAYED RELEASE ORAL at 17:03

## 2017-07-20 RX ADMIN — Medication 3 MILLILITER(S): at 15:22

## 2017-07-20 RX ADMIN — Medication 50 MILLIEQUIVALENT(S): at 09:28

## 2017-07-20 RX ADMIN — HEPARIN SODIUM 5000 UNIT(S): 5000 INJECTION INTRAVENOUS; SUBCUTANEOUS at 13:10

## 2017-07-20 RX ADMIN — CHLORHEXIDINE GLUCONATE 15 MILLILITER(S): 213 SOLUTION TOPICAL at 17:03

## 2017-07-20 RX ADMIN — Medication 3 MILLILITER(S): at 03:24

## 2017-07-20 RX ADMIN — FENTANYL CITRATE 50 MICROGRAM(S): 50 INJECTION INTRAVENOUS at 16:01

## 2017-07-20 RX ADMIN — Medication 40 MILLIEQUIVALENT(S): at 17:35

## 2017-07-20 RX ADMIN — MILRINONE LACTATE 12.22 MICROGRAM(S)/KG/MIN: 1 INJECTION, SOLUTION INTRAVENOUS at 05:35

## 2017-07-20 RX ADMIN — Medication 10 MILLIGRAM(S): at 11:01

## 2017-07-20 RX ADMIN — Medication 100 MILLIGRAM(S): at 11:01

## 2017-07-20 RX ADMIN — BUMETANIDE 1 MILLIGRAM(S): 0.25 INJECTION INTRAMUSCULAR; INTRAVENOUS at 21:29

## 2017-07-20 RX ADMIN — FENTANYL CITRATE 50 MICROGRAM(S): 50 INJECTION INTRAVENOUS at 20:30

## 2017-07-20 RX ADMIN — Medication 100 MILLILITER(S): at 17:03

## 2017-07-20 RX ADMIN — FENTANYL CITRATE 50 MICROGRAM(S): 50 INJECTION INTRAVENOUS at 08:27

## 2017-07-20 RX ADMIN — Medication 20 MILLIEQUIVALENT(S): at 12:15

## 2017-07-20 RX ADMIN — FENTANYL CITRATE 50 MICROGRAM(S): 50 INJECTION INTRAVENOUS at 20:48

## 2017-07-20 RX ADMIN — Medication 50 GRAM(S): at 09:30

## 2017-07-20 RX ADMIN — MORPHINE SULFATE 4 MILLIGRAM(S): 50 CAPSULE, EXTENDED RELEASE ORAL at 22:21

## 2017-07-20 RX ADMIN — MORPHINE SULFATE 4 MILLIGRAM(S): 50 CAPSULE, EXTENDED RELEASE ORAL at 22:04

## 2017-07-20 RX ADMIN — Medication 9 MILLIGRAM(S): at 21:29

## 2017-07-20 RX ADMIN — FENTANYL CITRATE 50 MICROGRAM(S): 50 INJECTION INTRAVENOUS at 17:56

## 2017-07-20 RX ADMIN — Medication 0.12 MILLIGRAM(S): at 05:35

## 2017-07-20 RX ADMIN — BUMETANIDE 1 MILLIGRAM(S): 0.25 INJECTION INTRAMUSCULAR; INTRAVENOUS at 05:35

## 2017-07-20 RX ADMIN — FENTANYL CITRATE 50 MICROGRAM(S): 50 INJECTION INTRAVENOUS at 12:20

## 2017-07-20 RX ADMIN — Medication 3 MILLILITER(S): at 20:02

## 2017-07-20 RX ADMIN — Medication 50 GRAM(S): at 17:33

## 2017-07-20 RX ADMIN — AMIODARONE HYDROCHLORIDE 200 MILLIGRAM(S): 400 TABLET ORAL at 05:35

## 2017-07-20 RX ADMIN — PANTOPRAZOLE SODIUM 40 MILLIGRAM(S): 20 TABLET, DELAYED RELEASE ORAL at 05:35

## 2017-07-20 RX ADMIN — Medication 50 MILLIEQUIVALENT(S): at 10:48

## 2017-07-20 RX ADMIN — HALOPERIDOL DECANOATE 2.5 MILLIGRAM(S): 100 INJECTION INTRAMUSCULAR at 01:10

## 2017-07-20 RX ADMIN — Medication 100 MILLILITER(S): at 12:21

## 2017-07-20 RX ADMIN — Medication 3 MILLILITER(S): at 08:52

## 2017-07-20 RX ADMIN — HEPARIN SODIUM 5000 UNIT(S): 5000 INJECTION INTRAVENOUS; SUBCUTANEOUS at 21:29

## 2017-07-20 RX ADMIN — Medication 20 MILLIEQUIVALENT(S): at 17:03

## 2017-07-20 RX ADMIN — CHLORHEXIDINE GLUCONATE 15 MILLILITER(S): 213 SOLUTION TOPICAL at 05:34

## 2017-07-20 RX ADMIN — Medication 50 MILLIEQUIVALENT(S): at 08:27

## 2017-07-20 RX ADMIN — MILRINONE LACTATE 12.22 MICROGRAM(S)/KG/MIN: 1 INJECTION, SOLUTION INTRAVENOUS at 12:21

## 2017-07-20 RX ADMIN — POTASSIUM PHOSPHATE, MONOBASIC POTASSIUM PHOSPHATE, DIBASIC 62.5 MILLIMOLE(S): 236; 224 INJECTION, SOLUTION INTRAVENOUS at 08:27

## 2017-07-20 NOTE — PROGRESS NOTE ADULT - SUBJECTIVE AND OBJECTIVE BOX
INTERVAL HPI/OVERNIGHT EVENTS/SUBJECTIVE:  Intermittent V-tach overnight.      ICU Vital Signs Last 24 Hrs  T(C): 37.1 (20 Jul 2017 08:00), Max: 38.1 (19 Jul 2017 20:00)  T(F): 98.8 (20 Jul 2017 08:00), Max: 100.6 (19 Jul 2017 20:00)  HR: 89 (20 Jul 2017 11:00) (84 - 135)  BP: --  BP(mean): --  ABP: 99/64 (20 Jul 2017 11:00) (86/50 - 120/70)  ABP(mean): 76 (20 Jul 2017 11:00) (62 - 92)  RR: 14 (20 Jul 2017 11:00) (12 - 50)  SpO2: 100% (20 Jul 2017 11:00) (88% - 100%)      I&O's Detail    19 Jul 2017 07:01  -  20 Jul 2017 07:00  --------------------------------------------------------  IN:    Free Water: 2100 mL    milrinone  Infusion: 222.9 mL    milrinone  Infusion: 36 mL    Pivot: 990 mL  Total IN: 3348.9 mL    OUT:    Drain: 1050 mL    Indwelling Catheter - Urethral: 1615 mL  Total OUT: 2665 mL    Total NET: 683.9 mL      20 Jul 2017 07:01  -  20 Jul 2017 11:47  --------------------------------------------------------  IN:    Free Water: 350 mL    milrinone  Infusion: 48.8 mL    Pivot: 180 mL    Solution: 62.5 mL    Solution: 100 mL    Solution: 300 mL  Total IN: 1041.3 mL    OUT:    Indwelling Catheter - Urethral: 575 mL  Total OUT: 575 mL    Total NET: 466.3 mL          Mode: CPAP with PS  RR (machine): 12  TV (machine): 450  FiO2: 40  PEEP: 8  MAP: 12  PIP: 30    ABG - ( 20 Jul 2017 08:51 )  pH: 7.42  /  pCO2: 55    /  pO2: 123   / HCO3: 33    / Base Excess: 9.4   /  SaO2: 99                  MEDICATIONS  (STANDING):  amantadine Syrup 100 milliGRAM(s) Oral <User Schedule>  FLUoxetine Solution 10 milliGRAM(s) Oral daily  traZODone 50 milliGRAM(s) Oral at bedtime  digoxin     Tablet 0.125 milliGRAM(s) Oral daily  melatonin 9 milliGRAM(s) Oral at bedtime  pantoprazole   Suspension 40 milliGRAM(s) Oral two times a day before meals  senna Syrup 10 milliLiter(s) Oral at bedtime  lisinopril 2.5 milliGRAM(s) Oral daily  amiodarone    Tablet 200 milliGRAM(s) Oral daily  heparin  Injectable 5000 Unit(s) SubCutaneous every 8 hours  ALBUTerol/ipratropium for Nebulization 3 milliLiter(s) Nebulizer every 6 hours  chlorhexidine 0.12% Liquid 15 milliLiter(s) Swish and Spit two times a day  milrinone Infusion 0.5 MICROgram(s)/kG/Min (12.225 mL/Hr) IV Continuous <Continuous>    MEDICATIONS  (PRN):  acetaminophen    Suspension 650 milliGRAM(s) Oral every 6 hours PRN For Temp greater than 38.5 C (101.3 F)  bisacodyl Suppository 10 milliGRAM(s) Rectal daily PRN Constipation      NUTRITION/IVF: Tube feeds    CENTRAL LINE: Y     ARMAS:  N     A-LINE:  Y           PHYSICAL EXAM:    Gen:  Agitated    Eyes: PERRLA    Neurological: RASS +3    Neck: Trachea midline    Pulmonary: Mech vent, on AC    Cardiovascular: RRR    Gastrointestinal: Soft, mild distention     Genitourinary: Armas    Extremities: Anasarca      LABS:  CBC Full  -  ( 20 Jul 2017 06:00 )  WBC Count : 9.7 K/uL  Hemoglobin : 8.3 g/dL  Hematocrit : 28.7 %  Platelet Count - Automated : 203 K/uL  Mean Cell Volume : 88.3 fl  Mean Cell Hemoglobin : 25.5 pg  Mean Cell Hemoglobin Concentration : 28.9 g/dL  Auto Neutrophil # : 7.9 K/uL  Auto Lymphocyte # : 1.0 K/uL  Auto Monocyte # : 0.6 K/uL  Auto Eosinophil # : 0.0 K/uL  Auto Basophil # : 0.0 K/uL  Auto Neutrophil % : 81.0 %  Auto Lymphocyte % : 11.0 %  Auto Monocyte % : 6.0 %  Auto Eosinophil % : x  Auto Basophil % : x    07-20    150<H>  |  109<H>  |  22.0<H>  ----------------------------<  122<H>  3.0<L>   |  30.0<H>  |  0.47<L>    Ca    7.2<L>      20 Jul 2017 06:00  Phos  2.8     07-20  Mg     1.5     07-20            ASSESSMENT/PLAN:  51yMale presenting with:  [place]cut sally chronic systolic heart failure.      Neuro: Encephalopathy improved.  Agitated today.  Fentanyl as needed for comfort.      CV: Continue milrione for HF.  Bumex for diuresis with volume overload.  .      Pulm: Acute resp failure.  CPAP/PS trial.  determine if eligible for extubation     GI/Nutrition: Tube feeds.  Ascites drained.  Hypoalbunemia, cardiac cirrhosis.  Will give albumin to improve oncotic pressure    /Renal: Hypokalemia and hypomagnesemia.  From diuresis.  Replete.  Mild hypernatremia.  From diuresis.  Hope to mobilize water from tissues with albumin.      Proph:  SQH    Dispo: ICU      CRITICAL CARE TIME SPENT:  45 min

## 2017-07-21 LAB
ANION GAP SERPL CALC-SCNC: 12 MMOL/L — SIGNIFICANT CHANGE UP (ref 5–17)
ANION GAP SERPL CALC-SCNC: 12 MMOL/L — SIGNIFICANT CHANGE UP (ref 5–17)
ANISOCYTOSIS BLD QL: SLIGHT — SIGNIFICANT CHANGE UP
BASOPHILS # BLD AUTO: 0 K/UL — SIGNIFICANT CHANGE UP (ref 0–0.2)
BASOPHILS NFR BLD AUTO: 0.1 % — SIGNIFICANT CHANGE UP (ref 0–2)
BUN SERPL-MCNC: 25 MG/DL — HIGH (ref 8–20)
BUN SERPL-MCNC: 29 MG/DL — HIGH (ref 8–20)
CALCIUM SERPL-MCNC: 7.8 MG/DL — LOW (ref 8.6–10.2)
CALCIUM SERPL-MCNC: 8.3 MG/DL — LOW (ref 8.6–10.2)
CHLORIDE SERPL-SCNC: 102 MMOL/L — SIGNIFICANT CHANGE UP (ref 98–107)
CHLORIDE SERPL-SCNC: 103 MMOL/L — SIGNIFICANT CHANGE UP (ref 98–107)
CO2 SERPL-SCNC: 29 MMOL/L — SIGNIFICANT CHANGE UP (ref 22–29)
CO2 SERPL-SCNC: 30 MMOL/L — HIGH (ref 22–29)
CREAT SERPL-MCNC: 0.57 MG/DL — SIGNIFICANT CHANGE UP (ref 0.5–1.3)
CREAT SERPL-MCNC: 0.62 MG/DL — SIGNIFICANT CHANGE UP (ref 0.5–1.3)
EOSINOPHIL # BLD AUTO: 0.1 K/UL — SIGNIFICANT CHANGE UP (ref 0–0.5)
EOSINOPHIL NFR BLD AUTO: 0.7 % — SIGNIFICANT CHANGE UP (ref 0–5)
GAS PNL BLDA: SIGNIFICANT CHANGE UP
GLUCOSE SERPL-MCNC: 128 MG/DL — HIGH (ref 70–115)
GLUCOSE SERPL-MCNC: 141 MG/DL — HIGH (ref 70–115)
HCT VFR BLD CALC: 31.2 % — LOW (ref 42–52)
HGB BLD-MCNC: 9 G/DL — LOW (ref 14–18)
LACTATE SERPL-SCNC: 0.8 MMOL/L — SIGNIFICANT CHANGE UP (ref 0.5–2)
LYMPHOCYTES # BLD AUTO: 1.9 K/UL — SIGNIFICANT CHANGE UP (ref 1–4.8)
LYMPHOCYTES # BLD AUTO: 16.9 % — LOW (ref 20–55)
MACROCYTES BLD QL: SLIGHT — SIGNIFICANT CHANGE UP
MAGNESIUM SERPL-MCNC: 1.9 MG/DL — SIGNIFICANT CHANGE UP (ref 1.6–2.6)
MAGNESIUM SERPL-MCNC: 2.3 MG/DL — SIGNIFICANT CHANGE UP (ref 1.6–2.6)
MCHC RBC-ENTMCNC: 25.4 PG — LOW (ref 27–31)
MCHC RBC-ENTMCNC: 28.8 G/DL — LOW (ref 32–36)
MCV RBC AUTO: 88.1 FL — SIGNIFICANT CHANGE UP (ref 80–94)
MICROCYTES BLD QL: SLIGHT — SIGNIFICANT CHANGE UP
MONOCYTES # BLD AUTO: 0.7 K/UL — SIGNIFICANT CHANGE UP (ref 0–0.8)
MONOCYTES NFR BLD AUTO: 6.4 % — SIGNIFICANT CHANGE UP (ref 3–10)
NEUTROPHILS # BLD AUTO: 8.3 K/UL — HIGH (ref 1.8–8)
NEUTROPHILS NFR BLD AUTO: 74.6 % — HIGH (ref 37–73)
NT-PROBNP SERPL-SCNC: HIGH PG/ML (ref 0–300)
PHOSPHATE SERPL-MCNC: 3.1 MG/DL — SIGNIFICANT CHANGE UP (ref 2.4–4.7)
PHOSPHATE SERPL-MCNC: 3.1 MG/DL — SIGNIFICANT CHANGE UP (ref 2.4–4.7)
PLAT MORPH BLD: NORMAL — SIGNIFICANT CHANGE UP
PLATELET # BLD AUTO: 214 K/UL — SIGNIFICANT CHANGE UP (ref 150–400)
POLYCHROMASIA BLD QL SMEAR: SLIGHT — SIGNIFICANT CHANGE UP
POTASSIUM SERPL-MCNC: 4.1 MMOL/L — SIGNIFICANT CHANGE UP (ref 3.5–5.3)
POTASSIUM SERPL-MCNC: 5.3 MMOL/L — SIGNIFICANT CHANGE UP (ref 3.5–5.3)
POTASSIUM SERPL-SCNC: 4.1 MMOL/L — SIGNIFICANT CHANGE UP (ref 3.5–5.3)
POTASSIUM SERPL-SCNC: 5.3 MMOL/L — SIGNIFICANT CHANGE UP (ref 3.5–5.3)
RBC # BLD: 3.54 M/UL — LOW (ref 4.6–6.2)
RBC # FLD: 19.4 % — HIGH (ref 11–15.6)
RBC BLD AUTO: ABNORMAL
SODIUM SERPL-SCNC: 143 MMOL/L — SIGNIFICANT CHANGE UP (ref 135–145)
SODIUM SERPL-SCNC: 145 MMOL/L — SIGNIFICANT CHANGE UP (ref 135–145)
WBC # BLD: 11.2 K/UL — HIGH (ref 4.8–10.8)
WBC # FLD AUTO: 11.2 K/UL — HIGH (ref 4.8–10.8)

## 2017-07-21 PROCEDURE — 71010: CPT | Mod: 26,77

## 2017-07-21 PROCEDURE — 99291 CRITICAL CARE FIRST HOUR: CPT

## 2017-07-21 PROCEDURE — 71010: CPT | Mod: 26

## 2017-07-21 RX ORDER — MORPHINE SULFATE 50 MG/1
6 CAPSULE, EXTENDED RELEASE ORAL ONCE
Qty: 0 | Refills: 0 | Status: DISCONTINUED | OUTPATIENT
Start: 2017-07-21 | End: 2017-07-21

## 2017-07-21 RX ORDER — BUMETANIDE 0.25 MG/ML
1 INJECTION INTRAMUSCULAR; INTRAVENOUS ONCE
Qty: 0 | Refills: 0 | Status: COMPLETED | OUTPATIENT
Start: 2017-07-21 | End: 2017-07-21

## 2017-07-21 RX ORDER — FENTANYL CITRATE 50 UG/ML
50 INJECTION INTRAVENOUS ONCE
Qty: 0 | Refills: 0 | Status: DISCONTINUED | OUTPATIENT
Start: 2017-07-21 | End: 2017-07-21

## 2017-07-21 RX ORDER — DOPAMINE HYDROCHLORIDE 40 MG/ML
2.5 INJECTION, SOLUTION, CONCENTRATE INTRAVENOUS
Qty: 400 | Refills: 0 | Status: DISCONTINUED | OUTPATIENT
Start: 2017-07-21 | End: 2017-07-22

## 2017-07-21 RX ORDER — MORPHINE SULFATE 50 MG/1
4 CAPSULE, EXTENDED RELEASE ORAL ONCE
Qty: 0 | Refills: 0 | Status: DISCONTINUED | OUTPATIENT
Start: 2017-07-21 | End: 2017-07-21

## 2017-07-21 RX ORDER — MIDAZOLAM HYDROCHLORIDE 1 MG/ML
2 INJECTION, SOLUTION INTRAMUSCULAR; INTRAVENOUS ONCE
Qty: 0 | Refills: 0 | Status: DISCONTINUED | OUTPATIENT
Start: 2017-07-21 | End: 2017-07-21

## 2017-07-21 RX ORDER — ETOMIDATE 2 MG/ML
10 INJECTION INTRAVENOUS ONCE
Qty: 0 | Refills: 0 | Status: COMPLETED | OUTPATIENT
Start: 2017-07-21 | End: 2017-07-21

## 2017-07-21 RX ORDER — MORPHINE SULFATE 50 MG/1
6 CAPSULE, EXTENDED RELEASE ORAL EVERY 4 HOURS
Qty: 0 | Refills: 0 | Status: DISCONTINUED | OUTPATIENT
Start: 2017-07-21 | End: 2017-07-22

## 2017-07-21 RX ORDER — FUROSEMIDE 40 MG
10 TABLET ORAL
Qty: 500 | Refills: 0 | Status: DISCONTINUED | OUTPATIENT
Start: 2017-07-21 | End: 2017-07-26

## 2017-07-21 RX ORDER — LIDOCAINE 4 G/100G
1 CREAM TOPICAL ONCE
Qty: 0 | Refills: 0 | Status: COMPLETED | OUTPATIENT
Start: 2017-07-21 | End: 2017-07-21

## 2017-07-21 RX ORDER — NOREPINEPHRINE BITARTRATE/D5W 8 MG/250ML
0.2 PLASTIC BAG, INJECTION (ML) INTRAVENOUS
Qty: 16 | Refills: 0 | Status: DISCONTINUED | OUTPATIENT
Start: 2017-07-21 | End: 2017-07-21

## 2017-07-21 RX ADMIN — HEPARIN SODIUM 5000 UNIT(S): 5000 INJECTION INTRAVENOUS; SUBCUTANEOUS at 14:00

## 2017-07-21 RX ADMIN — Medication 100 MILLILITER(S): at 06:34

## 2017-07-21 RX ADMIN — CHLORHEXIDINE GLUCONATE 15 MILLILITER(S): 213 SOLUTION TOPICAL at 05:33

## 2017-07-21 RX ADMIN — Medication 3 MILLILITER(S): at 14:29

## 2017-07-21 RX ADMIN — FENTANYL CITRATE 50 MICROGRAM(S): 50 INJECTION INTRAVENOUS at 09:30

## 2017-07-21 RX ADMIN — PANTOPRAZOLE SODIUM 40 MILLIGRAM(S): 20 TABLET, DELAYED RELEASE ORAL at 05:36

## 2017-07-21 RX ADMIN — Medication 3 MILLILITER(S): at 03:22

## 2017-07-21 RX ADMIN — LISINOPRIL 2.5 MILLIGRAM(S): 2.5 TABLET ORAL at 05:33

## 2017-07-21 RX ADMIN — BUMETANIDE 1 MILLIGRAM(S): 0.25 INJECTION INTRAMUSCULAR; INTRAVENOUS at 05:33

## 2017-07-21 RX ADMIN — Medication 100 MILLILITER(S): at 00:37

## 2017-07-21 RX ADMIN — DOPAMINE HYDROCHLORIDE 7.64 MICROGRAM(S)/KG/MIN: 40 INJECTION, SOLUTION, CONCENTRATE INTRAVENOUS at 16:36

## 2017-07-21 RX ADMIN — HEPARIN SODIUM 5000 UNIT(S): 5000 INJECTION INTRAVENOUS; SUBCUTANEOUS at 05:33

## 2017-07-21 RX ADMIN — LIDOCAINE 1 APPLICATION(S): 4 CREAM TOPICAL at 16:09

## 2017-07-21 RX ADMIN — SENNA PLUS 10 MILLILITER(S): 8.6 TABLET ORAL at 21:11

## 2017-07-21 RX ADMIN — MORPHINE SULFATE 6 MILLIGRAM(S): 50 CAPSULE, EXTENDED RELEASE ORAL at 21:47

## 2017-07-21 RX ADMIN — FENTANYL CITRATE 50 MICROGRAM(S): 50 INJECTION INTRAVENOUS at 19:05

## 2017-07-21 RX ADMIN — Medication 3 MILLILITER(S): at 08:20

## 2017-07-21 RX ADMIN — Medication 100 MILLIGRAM(S): at 05:33

## 2017-07-21 RX ADMIN — MIDAZOLAM HYDROCHLORIDE 2 MILLIGRAM(S): 1 INJECTION, SOLUTION INTRAMUSCULAR; INTRAVENOUS at 09:15

## 2017-07-21 RX ADMIN — BUMETANIDE 1 MILLIGRAM(S): 0.25 INJECTION INTRAMUSCULAR; INTRAVENOUS at 10:35

## 2017-07-21 RX ADMIN — Medication 2.5 MG/HR: at 16:09

## 2017-07-21 RX ADMIN — PANTOPRAZOLE SODIUM 40 MILLIGRAM(S): 20 TABLET, DELAYED RELEASE ORAL at 18:10

## 2017-07-21 RX ADMIN — Medication 50 MILLIGRAM(S): at 21:11

## 2017-07-21 RX ADMIN — Medication 10 MILLIGRAM(S): at 12:31

## 2017-07-21 RX ADMIN — FENTANYL CITRATE 50 MICROGRAM(S): 50 INJECTION INTRAVENOUS at 09:16

## 2017-07-21 RX ADMIN — MORPHINE SULFATE 6 MILLIGRAM(S): 50 CAPSULE, EXTENDED RELEASE ORAL at 04:30

## 2017-07-21 RX ADMIN — MORPHINE SULFATE 4 MILLIGRAM(S): 50 CAPSULE, EXTENDED RELEASE ORAL at 00:37

## 2017-07-21 RX ADMIN — MORPHINE SULFATE 6 MILLIGRAM(S): 50 CAPSULE, EXTENDED RELEASE ORAL at 04:42

## 2017-07-21 RX ADMIN — FENTANYL CITRATE 50 MICROGRAM(S): 50 INJECTION INTRAVENOUS at 19:00

## 2017-07-21 RX ADMIN — CHLORHEXIDINE GLUCONATE 15 MILLILITER(S): 213 SOLUTION TOPICAL at 18:07

## 2017-07-21 RX ADMIN — DOPAMINE HYDROCHLORIDE 15.28 MICROGRAM(S)/KG/MIN: 40 INJECTION, SOLUTION, CONCENTRATE INTRAVENOUS at 09:00

## 2017-07-21 RX ADMIN — BUMETANIDE 1 MILLIGRAM(S): 0.25 INJECTION INTRAMUSCULAR; INTRAVENOUS at 14:00

## 2017-07-21 RX ADMIN — MORPHINE SULFATE 4 MILLIGRAM(S): 50 CAPSULE, EXTENDED RELEASE ORAL at 01:00

## 2017-07-21 RX ADMIN — MILRINONE LACTATE 12.22 MICROGRAM(S)/KG/MIN: 1 INJECTION, SOLUTION INTRAVENOUS at 02:22

## 2017-07-21 RX ADMIN — FENTANYL CITRATE 50 MICROGRAM(S): 50 INJECTION INTRAVENOUS at 18:30

## 2017-07-21 RX ADMIN — Medication 100 MILLIGRAM(S): at 12:30

## 2017-07-21 RX ADMIN — FENTANYL CITRATE 50 MICROGRAM(S): 50 INJECTION INTRAVENOUS at 16:33

## 2017-07-21 RX ADMIN — MILRINONE LACTATE 12.22 MICROGRAM(S)/KG/MIN: 1 INJECTION, SOLUTION INTRAVENOUS at 18:07

## 2017-07-21 RX ADMIN — HEPARIN SODIUM 5000 UNIT(S): 5000 INJECTION INTRAVENOUS; SUBCUTANEOUS at 21:11

## 2017-07-21 RX ADMIN — AMIODARONE HYDROCHLORIDE 200 MILLIGRAM(S): 400 TABLET ORAL at 05:33

## 2017-07-21 RX ADMIN — MORPHINE SULFATE 6 MILLIGRAM(S): 50 CAPSULE, EXTENDED RELEASE ORAL at 21:27

## 2017-07-21 RX ADMIN — Medication 3 MILLILITER(S): at 19:20

## 2017-07-21 RX ADMIN — ETOMIDATE 10 MILLIGRAM(S): 2 INJECTION INTRAVENOUS at 09:47

## 2017-07-21 RX ADMIN — Medication 9 MILLIGRAM(S): at 21:11

## 2017-07-21 RX ADMIN — Medication 0.12 MILLIGRAM(S): at 05:33

## 2017-07-21 NOTE — PROGRESS NOTE ADULT - SUBJECTIVE AND OBJECTIVE BOX
[place] catheter removed without issue, SLIC placed into introducer port with sterile technique. patient tolerated procedure well.

## 2017-07-21 NOTE — PROGRESS NOTE ADULT - SUBJECTIVE AND OBJECTIVE BOX
INTERVAL HPI/OVERNIGHT EVENTS/SUBJECTIVE:  ET tube cuff leak overnight.      ICU Vital Signs Last 24 Hrs  T(C): 37.8 (21 Jul 2017 04:00), Max: 37.8 (21 Jul 2017 04:00)  T(F): 100 (21 Jul 2017 04:00), Max: 100 (21 Jul 2017 04:00)  HR: 100 (21 Jul 2017 08:08) (84 - 104)  BP: --  BP(mean): --  ABP: 111/70 (21 Jul 2017 06:00) (92/58 - 120/75)  ABP(mean): 85 (21 Jul 2017 06:00) (70 - 94)  RR: 16 (21 Jul 2017 06:00) (12 - 32)  SpO2: 97% (21 Jul 2017 08:08) (93% - 100%)      I&O's Detail    20 Jul 2017 07:01  -  21 Jul 2017 07:00  --------------------------------------------------------  IN:    Albumin 25%: 200 mL    Free Water: 2100 mL    milrinone  Infusion: 292.8 mL    Pivot: 1080 mL    Solution: 62.5 mL    Solution: 300 mL    Solution: 150 mL  Total IN: 4185.3 mL    OUT:    Indwelling Catheter - Urethral: 1615 mL  Total OUT: 1615 mL    Total NET: 2570.3 mL      21 Jul 2017 07:01  -  21 Jul 2017 08:20  --------------------------------------------------------  IN:    milrinone  Infusion: 12.2 mL    Pivot: 45 mL  Total IN: 57.2 mL    OUT:    Indwelling Catheter - Urethral: 125 mL  Total OUT: 125 mL    Total NET: -67.8 mL          Mode: CPAP with PS  RR (machine): 12  TV (machine): 450  FiO2: 40  PEEP: 8  PS: 12  MAP: 11  PIP: 30    ABG - ( 21 Jul 2017 04:30 )  pH: 7.42  /  pCO2: 49    /  pO2: 74    / HCO3: 30    / Base Excess: 6.0   /  SaO2: 97                  MEDICATIONS  (STANDING):  amantadine Syrup 100 milliGRAM(s) Oral <User Schedule>  FLUoxetine Solution 10 milliGRAM(s) Oral daily  traZODone 50 milliGRAM(s) Oral at bedtime  digoxin     Tablet 0.125 milliGRAM(s) Oral daily  melatonin 9 milliGRAM(s) Oral at bedtime  pantoprazole   Suspension 40 milliGRAM(s) Oral two times a day before meals  senna Syrup 10 milliLiter(s) Oral at bedtime  lisinopril 2.5 milliGRAM(s) Oral daily  amiodarone    Tablet 200 milliGRAM(s) Oral daily  heparin  Injectable 5000 Unit(s) SubCutaneous every 8 hours  ALBUTerol/ipratropium for Nebulization 3 milliLiter(s) Nebulizer every 6 hours  chlorhexidine 0.12% Liquid 15 milliLiter(s) Swish and Spit two times a day  milrinone Infusion 0.5 MICROgram(s)/kG/Min (12.225 mL/Hr) IV Continuous <Continuous>  buMETAnide Injectable 1 milliGRAM(s) IV Push three times a day    MEDICATIONS  (PRN):  acetaminophen    Suspension 650 milliGRAM(s) Oral every 6 hours PRN For Temp greater than 38.5 C (101.3 F)  bisacodyl Suppository 10 milliGRAM(s) Rectal daily PRN Constipation      NUTRITION/IVF: Tube feeds    CENTRAL LINE: Y     ARMAS:  N     A-LINE:  Y          PHYSICAL EXAM:    Gen: NAD    Eyes: PERRLA    Neurological: Follows commands off sedation    Neck: trachea midline    Pulmonary: Non labored on vent    Cardiovascular: RRR    Gastrointestinal: Soft, mild distention    Genitourinary: Armas    Extremities: Moderate pitting edema        LABS:  CBC Full  -  ( 21 Jul 2017 03:46 )  WBC Count : 11.2 K/uL  Hemoglobin : 9.0 g/dL  Hematocrit : 31.2 %  Platelet Count - Automated : 214 K/uL  Mean Cell Volume : 88.1 fl  Mean Cell Hemoglobin : 25.4 pg  Mean Cell Hemoglobin Concentration : 28.8 g/dL  Auto Neutrophil # : 8.3 K/uL  Auto Lymphocyte # : 1.9 K/uL  Auto Monocyte # : 0.7 K/uL  Auto Eosinophil # : 0.1 K/uL  Auto Basophil # : 0.0 K/uL  Auto Neutrophil % : 74.6 %  Auto Lymphocyte % : 16.9 %  Auto Monocyte % : 6.4 %  Auto Eosinophil % : 0.7 %  Auto Basophil % : 0.1 %    07-21    143  |  102  |  25.0<H>  ----------------------------<  141<H>  5.3   |  29.0  |  0.62    Ca    8.3<L>      21 Jul 2017 03:46  Phos  3.1     07-21  Mg     2.3     07-21    TPro  6.3<L>  /  Alb  3.1<L>  /  TBili  0.6  /  DBili  x   /  AST  34  /  ALT  23  /  AlkPhos  138<H>  07-20        RECENT CULTURES:      LIVER FUNCTIONS - ( 20 Jul 2017 16:54 )  Alb: 3.1 g/dL / Pro: 6.3 g/dL / ALK PHOS: 138 U/L / ALT: 23 U/L / AST: 34 U/L / GGT: x               ASSESSMENT/PLAN:  51yMale presenting with: acute on chronic systolic hf.    Neuro: Encephalopathy. Polyneuropathy of critical illness.  Supportive care. Pain control    CV: Remains in acute on chronic decompensated systolic hf.  Volume overload.  Less thna desired output from bumex yesterday.  Given penchant for hypernatremia following diuresis will add dopamine drip for naturesis.  Bumex drip if not appropriate response.  COnt milrinone ot augment cardiac output.      Pulm:  Acute resp failure.  Wean vent as tolerated.  Cuff leak sealed with balloon reinflation.  Will monitor. CXR to check position. Tube exchange if necessary.      GI/Nutrition:  Ascites beginning to reaccumulate.  Will paracentesis again if becomes tense.  Tube feeds at goal.      /Renal: Armas for urinary retention.      Lines/Tubes: PA pressure and wedge remains high.  Diuresis.      Proph: PPI.  SQH    Dispo: ICU      CRITICAL CARE TIME SPENT: 45 min

## 2017-07-21 NOTE — CHART NOTE - NSCHARTNOTEFT_GEN_A_CORE
Pt noted to have an air leak on ventilator. Dr. Vargas from anesthesia came to exchange the ETT. Tube exchanged over a bougie under direct visualization via glidescope. Tube exchanged successfully and placement confirmed via direct visualization. SpO2 remained greater than 85% throughout. ETT is 25 cm at the lip. CXR ordered.

## 2017-07-21 NOTE — CHART NOTE - NSCHARTNOTEFT_GEN_A_CORE
Pt noted to have audible ETT Cuff leak.  CXR also noted to have [place] very deep.  [place] adjusted, ETT exchanged via bougie with aide of Versed 2 mg, Fentanyl 50mcg and Etomidate 10 mg.  Repeat CXR Performed.

## 2017-07-22 LAB
ALBUMIN SERPL ELPH-MCNC: 3.1 G/DL — LOW (ref 3.3–5.2)
ALP SERPL-CCNC: 144 U/L — HIGH (ref 40–120)
ALT FLD-CCNC: 24 U/L — SIGNIFICANT CHANGE UP
ANION GAP SERPL CALC-SCNC: 13 MMOL/L — SIGNIFICANT CHANGE UP (ref 5–17)
ANISOCYTOSIS BLD QL: SLIGHT — SIGNIFICANT CHANGE UP
AST SERPL-CCNC: 41 U/L — HIGH
BASO STIPL BLD QL SMEAR: PRESENT — SIGNIFICANT CHANGE UP
BASOPHILS # BLD AUTO: 0 K/UL — SIGNIFICANT CHANGE UP (ref 0–0.2)
BASOPHILS NFR BLD AUTO: 0.2 % — SIGNIFICANT CHANGE UP (ref 0–2)
BILIRUB DIRECT SERPL-MCNC: 0.3 MG/DL — SIGNIFICANT CHANGE UP (ref 0–0.3)
BILIRUB INDIRECT FLD-MCNC: 0.6 MG/DL — SIGNIFICANT CHANGE UP (ref 0.2–1)
BILIRUB SERPL-MCNC: 0.9 MG/DL — SIGNIFICANT CHANGE UP (ref 0.4–2)
BUN SERPL-MCNC: 30 MG/DL — HIGH (ref 8–20)
CALCIUM SERPL-MCNC: 7.9 MG/DL — LOW (ref 8.6–10.2)
CHLORIDE SERPL-SCNC: 98 MMOL/L — SIGNIFICANT CHANGE UP (ref 98–107)
CO2 SERPL-SCNC: 31 MMOL/L — HIGH (ref 22–29)
CREAT SERPL-MCNC: 0.47 MG/DL — LOW (ref 0.5–1.3)
ELLIPTOCYTES BLD QL SMEAR: SLIGHT — SIGNIFICANT CHANGE UP
EOSINOPHIL # BLD AUTO: 0.1 K/UL — SIGNIFICANT CHANGE UP (ref 0–0.5)
EOSINOPHIL NFR BLD AUTO: 1.2 % — SIGNIFICANT CHANGE UP (ref 0–5)
GAS PNL BLDA: SIGNIFICANT CHANGE UP
GLUCOSE SERPL-MCNC: 113 MG/DL — SIGNIFICANT CHANGE UP (ref 70–115)
HCT VFR BLD CALC: 30.1 % — LOW (ref 42–52)
HGB BLD-MCNC: 8.6 G/DL — LOW (ref 14–18)
HYPOCHROMIA BLD QL: SLIGHT — SIGNIFICANT CHANGE UP
LYMPHOCYTES # BLD AUTO: 1.2 K/UL — SIGNIFICANT CHANGE UP (ref 1–4.8)
LYMPHOCYTES # BLD AUTO: 11.3 % — LOW (ref 20–55)
MACROCYTES BLD QL: SLIGHT — SIGNIFICANT CHANGE UP
MAGNESIUM SERPL-MCNC: 1.6 MG/DL — SIGNIFICANT CHANGE UP (ref 1.6–2.6)
MCHC RBC-ENTMCNC: 25.3 PG — LOW (ref 27–31)
MCHC RBC-ENTMCNC: 28.6 G/DL — LOW (ref 32–36)
MCV RBC AUTO: 88.5 FL — SIGNIFICANT CHANGE UP (ref 80–94)
MICROCYTES BLD QL: SLIGHT — SIGNIFICANT CHANGE UP
MONOCYTES # BLD AUTO: 0.5 K/UL — SIGNIFICANT CHANGE UP (ref 0–0.8)
MONOCYTES NFR BLD AUTO: 4.7 % — SIGNIFICANT CHANGE UP (ref 3–10)
NEUTROPHILS # BLD AUTO: 8.5 K/UL — HIGH (ref 1.8–8)
NEUTROPHILS NFR BLD AUTO: 81.1 % — HIGH (ref 37–73)
OVALOCYTES BLD QL SMEAR: SLIGHT — SIGNIFICANT CHANGE UP
PHOSPHATE SERPL-MCNC: 2.7 MG/DL — SIGNIFICANT CHANGE UP (ref 2.4–4.7)
PLAT MORPH BLD: NORMAL — SIGNIFICANT CHANGE UP
PLATELET # BLD AUTO: 243 K/UL — SIGNIFICANT CHANGE UP (ref 150–400)
POIKILOCYTOSIS BLD QL AUTO: SLIGHT — SIGNIFICANT CHANGE UP
POLYCHROMASIA BLD QL SMEAR: SLIGHT — SIGNIFICANT CHANGE UP
POTASSIUM SERPL-MCNC: 3.6 MMOL/L — SIGNIFICANT CHANGE UP (ref 3.5–5.3)
POTASSIUM SERPL-SCNC: 3.6 MMOL/L — SIGNIFICANT CHANGE UP (ref 3.5–5.3)
PROT SERPL-MCNC: 6.4 G/DL — LOW (ref 6.6–8.7)
RBC # BLD: 3.4 M/UL — LOW (ref 4.6–6.2)
RBC # FLD: 19.5 % — HIGH (ref 11–15.6)
RBC BLD AUTO: ABNORMAL
SODIUM SERPL-SCNC: 142 MMOL/L — SIGNIFICANT CHANGE UP (ref 135–145)
TARGETS BLD QL SMEAR: SLIGHT — SIGNIFICANT CHANGE UP
WBC # BLD: 10.5 K/UL — SIGNIFICANT CHANGE UP (ref 4.8–10.8)
WBC # FLD AUTO: 10.5 K/UL — SIGNIFICANT CHANGE UP (ref 4.8–10.8)

## 2017-07-22 PROCEDURE — 99291 CRITICAL CARE FIRST HOUR: CPT

## 2017-07-22 PROCEDURE — 71010: CPT | Mod: 26,76

## 2017-07-22 RX ORDER — ACETAZOLAMIDE 250 MG/1
250 TABLET ORAL ONCE
Qty: 0 | Refills: 0 | Status: COMPLETED | OUTPATIENT
Start: 2017-07-22 | End: 2017-07-22

## 2017-07-22 RX ORDER — POTASSIUM PHOSPHATE, MONOBASIC POTASSIUM PHOSPHATE, DIBASIC 236; 224 MG/ML; MG/ML
15 INJECTION, SOLUTION INTRAVENOUS ONCE
Qty: 0 | Refills: 0 | Status: COMPLETED | OUTPATIENT
Start: 2017-07-22 | End: 2017-07-22

## 2017-07-22 RX ORDER — HYDROMORPHONE HYDROCHLORIDE 2 MG/ML
0.5 INJECTION INTRAMUSCULAR; INTRAVENOUS; SUBCUTANEOUS
Qty: 0 | Refills: 0 | Status: DISCONTINUED | OUTPATIENT
Start: 2017-07-22 | End: 2017-07-23

## 2017-07-22 RX ORDER — MAGNESIUM SULFATE 500 MG/ML
4 VIAL (ML) INJECTION ONCE
Qty: 0 | Refills: 0 | Status: COMPLETED | OUTPATIENT
Start: 2017-07-22 | End: 2017-07-22

## 2017-07-22 RX ORDER — ALBUMIN HUMAN 25 %
100 VIAL (ML) INTRAVENOUS EVERY 6 HOURS
Qty: 0 | Refills: 0 | Status: COMPLETED | OUTPATIENT
Start: 2017-07-22 | End: 2017-07-23

## 2017-07-22 RX ORDER — POTASSIUM CHLORIDE 20 MEQ
40 PACKET (EA) ORAL ONCE
Qty: 0 | Refills: 0 | Status: COMPLETED | OUTPATIENT
Start: 2017-07-22 | End: 2017-07-22

## 2017-07-22 RX ORDER — MAGNESIUM SULFATE 500 MG/ML
2 VIAL (ML) INJECTION ONCE
Qty: 0 | Refills: 0 | Status: COMPLETED | OUTPATIENT
Start: 2017-07-22 | End: 2017-07-22

## 2017-07-22 RX ADMIN — Medication 3 MILLILITER(S): at 20:07

## 2017-07-22 RX ADMIN — Medication 40 MILLIEQUIVALENT(S): at 08:02

## 2017-07-22 RX ADMIN — PANTOPRAZOLE SODIUM 40 MILLIGRAM(S): 20 TABLET, DELAYED RELEASE ORAL at 17:07

## 2017-07-22 RX ADMIN — HYDROMORPHONE HYDROCHLORIDE 0.5 MILLIGRAM(S): 2 INJECTION INTRAMUSCULAR; INTRAVENOUS; SUBCUTANEOUS at 09:46

## 2017-07-22 RX ADMIN — Medication 100 MILLILITER(S): at 17:06

## 2017-07-22 RX ADMIN — HYDROMORPHONE HYDROCHLORIDE 0.5 MILLIGRAM(S): 2 INJECTION INTRAMUSCULAR; INTRAVENOUS; SUBCUTANEOUS at 20:49

## 2017-07-22 RX ADMIN — Medication 100 MILLIGRAM(S): at 11:24

## 2017-07-22 RX ADMIN — ACETAZOLAMIDE 250 MILLIGRAM(S): 250 TABLET ORAL at 11:24

## 2017-07-22 RX ADMIN — HYDROMORPHONE HYDROCHLORIDE 0.5 MILLIGRAM(S): 2 INJECTION INTRAMUSCULAR; INTRAVENOUS; SUBCUTANEOUS at 11:56

## 2017-07-22 RX ADMIN — Medication 0.12 MILLIGRAM(S): at 05:01

## 2017-07-22 RX ADMIN — Medication 50 MILLIGRAM(S): at 21:17

## 2017-07-22 RX ADMIN — HYDROMORPHONE HYDROCHLORIDE 0.5 MILLIGRAM(S): 2 INJECTION INTRAMUSCULAR; INTRAVENOUS; SUBCUTANEOUS at 19:02

## 2017-07-22 RX ADMIN — Medication 3 MILLILITER(S): at 02:58

## 2017-07-22 RX ADMIN — Medication 10 MILLIGRAM(S): at 11:24

## 2017-07-22 RX ADMIN — HYDROMORPHONE HYDROCHLORIDE 0.5 MILLIGRAM(S): 2 INJECTION INTRAMUSCULAR; INTRAVENOUS; SUBCUTANEOUS at 20:35

## 2017-07-22 RX ADMIN — CHLORHEXIDINE GLUCONATE 15 MILLILITER(S): 213 SOLUTION TOPICAL at 05:02

## 2017-07-22 RX ADMIN — HEPARIN SODIUM 5000 UNIT(S): 5000 INJECTION INTRAVENOUS; SUBCUTANEOUS at 21:17

## 2017-07-22 RX ADMIN — PANTOPRAZOLE SODIUM 40 MILLIGRAM(S): 20 TABLET, DELAYED RELEASE ORAL at 05:01

## 2017-07-22 RX ADMIN — HEPARIN SODIUM 5000 UNIT(S): 5000 INJECTION INTRAVENOUS; SUBCUTANEOUS at 05:01

## 2017-07-22 RX ADMIN — Medication 9 MILLIGRAM(S): at 21:17

## 2017-07-22 RX ADMIN — Medication 100 MILLIGRAM(S): at 05:02

## 2017-07-22 RX ADMIN — MORPHINE SULFATE 6 MILLIGRAM(S): 50 CAPSULE, EXTENDED RELEASE ORAL at 08:02

## 2017-07-22 RX ADMIN — HYDROMORPHONE HYDROCHLORIDE 0.5 MILLIGRAM(S): 2 INJECTION INTRAMUSCULAR; INTRAVENOUS; SUBCUTANEOUS at 12:06

## 2017-07-22 RX ADMIN — HYDROMORPHONE HYDROCHLORIDE 0.5 MILLIGRAM(S): 2 INJECTION INTRAMUSCULAR; INTRAVENOUS; SUBCUTANEOUS at 13:58

## 2017-07-22 RX ADMIN — HEPARIN SODIUM 5000 UNIT(S): 5000 INJECTION INTRAVENOUS; SUBCUTANEOUS at 14:01

## 2017-07-22 RX ADMIN — LISINOPRIL 2.5 MILLIGRAM(S): 2.5 TABLET ORAL at 05:01

## 2017-07-22 RX ADMIN — HYDROMORPHONE HYDROCHLORIDE 0.5 MILLIGRAM(S): 2 INJECTION INTRAMUSCULAR; INTRAVENOUS; SUBCUTANEOUS at 18:27

## 2017-07-22 RX ADMIN — POTASSIUM PHOSPHATE, MONOBASIC POTASSIUM PHOSPHATE, DIBASIC 62.5 MILLIMOLE(S): 236; 224 INJECTION, SOLUTION INTRAVENOUS at 09:46

## 2017-07-22 RX ADMIN — MILRINONE LACTATE 12.22 MICROGRAM(S)/KG/MIN: 1 INJECTION, SOLUTION INTRAVENOUS at 20:37

## 2017-07-22 RX ADMIN — AMIODARONE HYDROCHLORIDE 200 MILLIGRAM(S): 400 TABLET ORAL at 05:01

## 2017-07-22 RX ADMIN — Medication 100 MILLILITER(S): at 12:07

## 2017-07-22 RX ADMIN — HYDROMORPHONE HYDROCHLORIDE 0.5 MILLIGRAM(S): 2 INJECTION INTRAMUSCULAR; INTRAVENOUS; SUBCUTANEOUS at 22:59

## 2017-07-22 RX ADMIN — HYDROMORPHONE HYDROCHLORIDE 0.5 MILLIGRAM(S): 2 INJECTION INTRAMUSCULAR; INTRAVENOUS; SUBCUTANEOUS at 22:36

## 2017-07-22 RX ADMIN — Medication 3 MILLILITER(S): at 14:39

## 2017-07-22 RX ADMIN — Medication 3 MILLILITER(S): at 08:26

## 2017-07-22 RX ADMIN — MORPHINE SULFATE 6 MILLIGRAM(S): 50 CAPSULE, EXTENDED RELEASE ORAL at 07:35

## 2017-07-22 RX ADMIN — Medication 50 GRAM(S): at 07:04

## 2017-07-22 RX ADMIN — Medication 100 MILLILITER(S): at 23:10

## 2017-07-22 RX ADMIN — HYDROMORPHONE HYDROCHLORIDE 0.5 MILLIGRAM(S): 2 INJECTION INTRAMUSCULAR; INTRAVENOUS; SUBCUTANEOUS at 14:21

## 2017-07-22 RX ADMIN — CHLORHEXIDINE GLUCONATE 15 MILLILITER(S): 213 SOLUTION TOPICAL at 17:07

## 2017-07-22 RX ADMIN — MORPHINE SULFATE 6 MILLIGRAM(S): 50 CAPSULE, EXTENDED RELEASE ORAL at 03:45

## 2017-07-22 RX ADMIN — MORPHINE SULFATE 6 MILLIGRAM(S): 50 CAPSULE, EXTENDED RELEASE ORAL at 03:21

## 2017-07-22 RX ADMIN — Medication 100 GRAM(S): at 08:43

## 2017-07-22 RX ADMIN — HYDROMORPHONE HYDROCHLORIDE 0.5 MILLIGRAM(S): 2 INJECTION INTRAMUSCULAR; INTRAVENOUS; SUBCUTANEOUS at 10:03

## 2017-07-22 NOTE — PROGRESS NOTE ADULT - ASSESSMENT
51yMale presenting with: Cardiac arrest  SP ERCP for bile leak after cholecytectomy for cholecystitis.  Now very difficult to wean from vent with delerium.  Cuff leak fixed.          Neurological: Continue meds as written    Pulmonary:Will discuss with family about plan for trach this week and possible long term prognosis    Cardiovascular: Continue lasix drip to decrease volume and afterload in CHF. Continue Milirnone     Gastrointestinal: TF At goal    Genitourinary: Lasix to keep 500-1 L net neg over each 24 hour    Heme: Continue SQH    ID: None    Skin:    Lines/ Tubes: Maintain all current tubes.  Liekly trach    Dispo: Critical care as above

## 2017-07-22 NOTE — PROGRESS NOTE ADULT - SUBJECTIVE AND OBJECTIVE BOX
INTERVAL HPI/OVERNIGHT EVENTS/SUBJECTIVE: Had audible ETT cuff leak.  Exchanged in day then later again in PM.  Improved now.  AM Multiple episodes of Ectopy yesterday while on Dopamine and Levo.  Improved after Nash, Dopamine, Levo DC'd.  Started on Lasix drip.  Continues to have intermittent agitation through today.  Can not make needs known    ICU Vital Signs Last 24 Hrs  T(C): 37.8 (22 Jul 2017 16:00), Max: 37.8 (22 Jul 2017 16:00)  T(F): 100.1 (22 Jul 2017 16:00), Max: 100.1 (22 Jul 2017 16:00)  HR: 106 (22 Jul 2017 19:00) (91 - 123)  BP: --  BP(mean): --  ABP: 110/66 (22 Jul 2017 19:00) (96/50 - 125/65)  ABP(mean): 82 (22 Jul 2017 19:00) (67 - 86)  RR: 20 (22 Jul 2017 19:00) (15 - 35)  SpO2: 94% (22 Jul 2017 19:00) (93% - 100%)      I&O's Detail    21 Jul 2017 07:01  -  22 Jul 2017 07:00  --------------------------------------------------------  IN:    DOPamine Infusion: 84 mL    DOPamine Infusion: 83.6 mL    Enteral Tube Flush: 50 mL    Free Water: 1500 mL    furosemide Infusion: 35 mL    milrinone  Infusion: 280.6 mL    norepinephrine Infusion: 6.2 mL    Pivot: 945 mL    Solution: 50 mL  Total IN: 3034.4 mL    OUT:    Indwelling Catheter - Urethral: 2225 mL  Total OUT: 2225 mL    Total NET: 809.4 mL      22 Jul 2017 07:01  -  22 Jul 2017 19:58  --------------------------------------------------------  IN:    Albumin 25%: 100 mL    Free Water: 750 mL    furosemide Infusion: 7.5 mL    furosemide Infusion: 45 mL    milrinone  Infusion: 146.4 mL    Pivot: 540 mL    Solution: 100 mL    Solution: 252 mL  Total IN: 1940.9 mL    OUT:    Indwelling Catheter - Urethral: 1900 mL    Other: 100 mL  Total OUT: 2000 mL    Total NET: -59.1 mL          Mode: AC/ CMV (Assist Control/ Continuous Mandatory Ventilation)  RR (machine): 15  TV (machine): 450  FiO2: 50  PEEP: 8  MAP: 11  PIP: 32    ABG - ( 22 Jul 2017 04:20 )  pH: 7.41  /  pCO2: 55    /  pO2: 105   / HCO3: 33    / Base Excess: 8.8   /  SaO2: 99                  MEDICATIONS  (STANDING):  amantadine Syrup 100 milliGRAM(s) Oral <User Schedule>  FLUoxetine Solution 10 milliGRAM(s) Oral daily  traZODone 50 milliGRAM(s) Oral at bedtime  digoxin     Tablet 0.125 milliGRAM(s) Oral daily  melatonin 9 milliGRAM(s) Oral at bedtime  pantoprazole   Suspension 40 milliGRAM(s) Oral two times a day before meals  senna Syrup 10 milliLiter(s) Oral at bedtime  lisinopril 2.5 milliGRAM(s) Oral daily  amiodarone    Tablet 200 milliGRAM(s) Oral daily  heparin  Injectable 5000 Unit(s) SubCutaneous every 8 hours  ALBUTerol/ipratropium for Nebulization 3 milliLiter(s) Nebulizer every 6 hours  chlorhexidine 0.12% Liquid 15 milliLiter(s) Swish and Spit two times a day  milrinone Infusion 0.5 MICROgram(s)/kG/Min (12.225 mL/Hr) IV Continuous <Continuous>  furosemide Infusion 10 mG/Hr (5 mL/Hr) IV Continuous <Continuous>  albumin human 25% IVPB 100 milliLiter(s) IV Intermittent every 6 hours    MEDICATIONS  (PRN):  acetaminophen    Suspension 650 milliGRAM(s) Oral every 6 hours PRN For Temp greater than 38.5 C (101.3 F)  bisacodyl Suppository 10 milliGRAM(s) Rectal daily PRN Constipation  HYDROmorphone  Injectable 0.5 milliGRAM(s) IV Push every 2 hours PRN Moderate Pain (4 - 6)      NUTRITION/IVF: Pivot @ 45    CENTRAL LINE:  LOCATION:   DATE INSERTED:  R IJ Introducer (7/13)    ARMAS:   Yes    A-LINE:    R Ax Xiao 7/13:       PHYSICAL EXAM:     Gen:NAD, Well appearing, No cyanosis, Pallor.    Eyes: PERRL ~ 3mm, EOMI,     Neurological: Actively looks around and tracks to movement and attends to voice.  Does not follow commands in Georgian.    ENMT: Clear canals, clear throat.      Neck: Supple. NT AT, FROM no pain.  No JVD. No meningeal signs    Pulmonary: NAD, CTA, = BL .      Cardiovascular: RRR, S1, S2, No Murmurs, rubs or gallops noted.    Gastrointestinal: ND, Soft, NT.    Extremities: NT, AT. Mild edema LE BL =. No erythema or palpable cord noted.  FROM, = 2+ pulses throughout.    LABS:  CBC Full  -  ( 22 Jul 2017 05:35 )  WBC Count : 10.5 K/uL  Hemoglobin : 8.6 g/dL  Hematocrit : 30.1 %  Platelet Count - Automated : 243 K/uL  Mean Cell Volume : 88.5 fl  Mean Cell Hemoglobin : 25.3 pg  Mean Cell Hemoglobin Concentration : 28.6 g/dL  Auto Neutrophil # : 8.5 K/uL  Auto Lymphocyte # : 1.2 K/uL  Auto Monocyte # : 0.5 K/uL  Auto Eosinophil # : 0.1 K/uL  Auto Basophil # : 0.0 K/uL  Auto Neutrophil % : 81.1 %  Auto Lymphocyte % : 11.3 %  Auto Monocyte % : 4.7 %  Auto Eosinophil % : 1.2 %  Auto Basophil % : 0.2 %    07-22    142  |  98  |  30.0<H>  ----------------------------<  113  3.6   |  31.0<H>  |  0.47<L>    Ca    7.9<L>      22 Jul 2017 05:35  Phos  2.7     07-22  Mg     1.6     07-22    TPro  6.4<L>  /  Alb  3.1<L>  /  TBili  0.9  /  DBili  0.3  /  AST  41<H>  /  ALT  24  /  AlkPhos  144<H>  07-22        RECENT CULTURES:      LIVER FUNCTIONS - ( 22 Jul 2017 05:35 )  Alb: 3.1 g/dL / Pro: 6.4 g/dL / ALK PHOS: 144 U/L / ALT: 24 U/L / AST: 41 U/L / GGT: x                 51yMale presenting with: Cardiac arrest  SP ERCP for bile leak after cholecytectomy for cholecystitis.  Now very difficult to wean from vent with delerium.  Cuff leak fixed.          Neurological: Continue meds as written    Pulmonary:Will discuss with family about plan for trach this week and possible long term prognosis    Cardiovascular: Continue lasix drip to decrease volume and afterload in CHF. Continue Milirnone     Gastrointestinal: TF At goal    Genitourinary: Lasix to keep 500-1 L net neg over each 24 hour    Heme: Continue SQH    ID: None    Skin:    Lines/ Tubes: Maintain all current tubes.  Liekly trach    Dispo: Critical care as above            CRITICAL CARE TIME SPENT:60 min

## 2017-07-23 LAB
ANION GAP SERPL CALC-SCNC: 15 MMOL/L — SIGNIFICANT CHANGE UP (ref 5–17)
BASOPHILS # BLD AUTO: 0 K/UL — SIGNIFICANT CHANGE UP (ref 0–0.2)
BASOPHILS NFR BLD AUTO: 0.2 % — SIGNIFICANT CHANGE UP (ref 0–2)
BUN SERPL-MCNC: 32 MG/DL — HIGH (ref 8–20)
CALCIUM SERPL-MCNC: 8.1 MG/DL — LOW (ref 8.6–10.2)
CHLORIDE SERPL-SCNC: 97 MMOL/L — LOW (ref 98–107)
CO2 SERPL-SCNC: 32 MMOL/L — HIGH (ref 22–29)
CREAT SERPL-MCNC: 0.54 MG/DL — SIGNIFICANT CHANGE UP (ref 0.5–1.3)
EOSINOPHIL # BLD AUTO: 0.1 K/UL — SIGNIFICANT CHANGE UP (ref 0–0.5)
EOSINOPHIL NFR BLD AUTO: 1.3 % — SIGNIFICANT CHANGE UP (ref 0–5)
GLUCOSE SERPL-MCNC: 105 MG/DL — SIGNIFICANT CHANGE UP (ref 70–115)
HCT VFR BLD CALC: 27.1 % — LOW (ref 42–52)
HGB BLD-MCNC: 7.9 G/DL — LOW (ref 14–18)
LYMPHOCYTES # BLD AUTO: 0.9 K/UL — LOW (ref 1–4.8)
LYMPHOCYTES # BLD AUTO: 10.1 % — LOW (ref 20–55)
MAGNESIUM SERPL-MCNC: 2.1 MG/DL — SIGNIFICANT CHANGE UP (ref 1.6–2.6)
MCHC RBC-ENTMCNC: 25.5 PG — LOW (ref 27–31)
MCHC RBC-ENTMCNC: 29.2 G/DL — LOW (ref 32–36)
MCV RBC AUTO: 87.4 FL — SIGNIFICANT CHANGE UP (ref 80–94)
MONOCYTES # BLD AUTO: 0.4 K/UL — SIGNIFICANT CHANGE UP (ref 0–0.8)
MONOCYTES NFR BLD AUTO: 4.8 % — SIGNIFICANT CHANGE UP (ref 3–10)
NEUTROPHILS # BLD AUTO: 7.6 K/UL — SIGNIFICANT CHANGE UP (ref 1.8–8)
NEUTROPHILS NFR BLD AUTO: 82.8 % — HIGH (ref 37–73)
PHOSPHATE SERPL-MCNC: 2.9 MG/DL — SIGNIFICANT CHANGE UP (ref 2.4–4.7)
PLATELET # BLD AUTO: 243 K/UL — SIGNIFICANT CHANGE UP (ref 150–400)
POTASSIUM SERPL-MCNC: 3.5 MMOL/L — SIGNIFICANT CHANGE UP (ref 3.5–5.3)
POTASSIUM SERPL-SCNC: 3.5 MMOL/L — SIGNIFICANT CHANGE UP (ref 3.5–5.3)
RBC # BLD: 3.1 M/UL — LOW (ref 4.6–6.2)
RBC # FLD: 19.8 % — HIGH (ref 11–15.6)
SODIUM SERPL-SCNC: 144 MMOL/L — SIGNIFICANT CHANGE UP (ref 135–145)
WBC # BLD: 9.2 K/UL — SIGNIFICANT CHANGE UP (ref 4.8–10.8)
WBC # FLD AUTO: 9.2 K/UL — SIGNIFICANT CHANGE UP (ref 4.8–10.8)

## 2017-07-23 RX ORDER — ACETAZOLAMIDE 250 MG/1
250 TABLET ORAL EVERY 6 HOURS
Qty: 0 | Refills: 0 | Status: COMPLETED | OUTPATIENT
Start: 2017-07-23 | End: 2017-07-24

## 2017-07-23 RX ORDER — HYDROMORPHONE HYDROCHLORIDE 2 MG/ML
1 INJECTION INTRAMUSCULAR; INTRAVENOUS; SUBCUTANEOUS ONCE
Qty: 0 | Refills: 0 | Status: DISCONTINUED | OUTPATIENT
Start: 2017-07-23 | End: 2017-07-23

## 2017-07-23 RX ORDER — HYDROMORPHONE HYDROCHLORIDE 2 MG/ML
1 INJECTION INTRAMUSCULAR; INTRAVENOUS; SUBCUTANEOUS
Qty: 0 | Refills: 0 | Status: DISCONTINUED | OUTPATIENT
Start: 2017-07-23 | End: 2017-07-30

## 2017-07-23 RX ORDER — HYDROMORPHONE HYDROCHLORIDE 2 MG/ML
0.5 INJECTION INTRAMUSCULAR; INTRAVENOUS; SUBCUTANEOUS ONCE
Qty: 0 | Refills: 0 | Status: DISCONTINUED | OUTPATIENT
Start: 2017-07-23 | End: 2017-07-23

## 2017-07-23 RX ORDER — MAGNESIUM SULFATE 500 MG/ML
1 VIAL (ML) INJECTION ONCE
Qty: 0 | Refills: 0 | Status: COMPLETED | OUTPATIENT
Start: 2017-07-23 | End: 2017-07-23

## 2017-07-23 RX ORDER — POTASSIUM CHLORIDE 20 MEQ
40 PACKET (EA) ORAL ONCE
Qty: 0 | Refills: 0 | Status: COMPLETED | OUTPATIENT
Start: 2017-07-23 | End: 2017-07-23

## 2017-07-23 RX ADMIN — Medication 3 MILLILITER(S): at 20:12

## 2017-07-23 RX ADMIN — Medication 3 MILLILITER(S): at 03:15

## 2017-07-23 RX ADMIN — Medication 3 MILLILITER(S): at 09:08

## 2017-07-23 RX ADMIN — Medication 100 MILLILITER(S): at 06:05

## 2017-07-23 RX ADMIN — HYDROMORPHONE HYDROCHLORIDE 1 MILLIGRAM(S): 2 INJECTION INTRAMUSCULAR; INTRAVENOUS; SUBCUTANEOUS at 16:00

## 2017-07-23 RX ADMIN — Medication 10 MILLIGRAM(S): at 11:35

## 2017-07-23 RX ADMIN — HYDROMORPHONE HYDROCHLORIDE 0.5 MILLIGRAM(S): 2 INJECTION INTRAMUSCULAR; INTRAVENOUS; SUBCUTANEOUS at 09:56

## 2017-07-23 RX ADMIN — Medication 40 MILLIEQUIVALENT(S): at 06:05

## 2017-07-23 RX ADMIN — HYDROMORPHONE HYDROCHLORIDE 1 MILLIGRAM(S): 2 INJECTION INTRAMUSCULAR; INTRAVENOUS; SUBCUTANEOUS at 15:00

## 2017-07-23 RX ADMIN — HYDROMORPHONE HYDROCHLORIDE 1 MILLIGRAM(S): 2 INJECTION INTRAMUSCULAR; INTRAVENOUS; SUBCUTANEOUS at 20:03

## 2017-07-23 RX ADMIN — HYDROMORPHONE HYDROCHLORIDE 0.5 MILLIGRAM(S): 2 INJECTION INTRAMUSCULAR; INTRAVENOUS; SUBCUTANEOUS at 06:31

## 2017-07-23 RX ADMIN — Medication 50 MILLIGRAM(S): at 21:26

## 2017-07-23 RX ADMIN — HYDROMORPHONE HYDROCHLORIDE 0.5 MILLIGRAM(S): 2 INJECTION INTRAMUSCULAR; INTRAVENOUS; SUBCUTANEOUS at 06:46

## 2017-07-23 RX ADMIN — HYDROMORPHONE HYDROCHLORIDE 1 MILLIGRAM(S): 2 INJECTION INTRAMUSCULAR; INTRAVENOUS; SUBCUTANEOUS at 01:05

## 2017-07-23 RX ADMIN — HEPARIN SODIUM 5000 UNIT(S): 5000 INJECTION INTRAVENOUS; SUBCUTANEOUS at 21:25

## 2017-07-23 RX ADMIN — HYDROMORPHONE HYDROCHLORIDE 0.5 MILLIGRAM(S): 2 INJECTION INTRAMUSCULAR; INTRAVENOUS; SUBCUTANEOUS at 00:40

## 2017-07-23 RX ADMIN — ACETAZOLAMIDE 105 MILLIGRAM(S): 250 TABLET ORAL at 14:16

## 2017-07-23 RX ADMIN — Medication 9 MILLIGRAM(S): at 21:25

## 2017-07-23 RX ADMIN — HEPARIN SODIUM 5000 UNIT(S): 5000 INJECTION INTRAVENOUS; SUBCUTANEOUS at 13:08

## 2017-07-23 RX ADMIN — HYDROMORPHONE HYDROCHLORIDE 0.5 MILLIGRAM(S): 2 INJECTION INTRAMUSCULAR; INTRAVENOUS; SUBCUTANEOUS at 11:00

## 2017-07-23 RX ADMIN — MILRINONE LACTATE 12.22 MICROGRAM(S)/KG/MIN: 1 INJECTION, SOLUTION INTRAVENOUS at 20:18

## 2017-07-23 RX ADMIN — MILRINONE LACTATE 12.22 MICROGRAM(S)/KG/MIN: 1 INJECTION, SOLUTION INTRAVENOUS at 11:40

## 2017-07-23 RX ADMIN — PANTOPRAZOLE SODIUM 40 MILLIGRAM(S): 20 TABLET, DELAYED RELEASE ORAL at 17:17

## 2017-07-23 RX ADMIN — HYDROMORPHONE HYDROCHLORIDE 0.5 MILLIGRAM(S): 2 INJECTION INTRAMUSCULAR; INTRAVENOUS; SUBCUTANEOUS at 01:05

## 2017-07-23 RX ADMIN — PANTOPRAZOLE SODIUM 40 MILLIGRAM(S): 20 TABLET, DELAYED RELEASE ORAL at 06:05

## 2017-07-23 RX ADMIN — LISINOPRIL 2.5 MILLIGRAM(S): 2.5 TABLET ORAL at 06:05

## 2017-07-23 RX ADMIN — CHLORHEXIDINE GLUCONATE 15 MILLILITER(S): 213 SOLUTION TOPICAL at 17:17

## 2017-07-23 RX ADMIN — AMIODARONE HYDROCHLORIDE 200 MILLIGRAM(S): 400 TABLET ORAL at 06:05

## 2017-07-23 RX ADMIN — HYDROMORPHONE HYDROCHLORIDE 0.5 MILLIGRAM(S): 2 INJECTION INTRAMUSCULAR; INTRAVENOUS; SUBCUTANEOUS at 05:00

## 2017-07-23 RX ADMIN — ACETAZOLAMIDE 105 MILLIGRAM(S): 250 TABLET ORAL at 17:18

## 2017-07-23 RX ADMIN — HYDROMORPHONE HYDROCHLORIDE 0.5 MILLIGRAM(S): 2 INJECTION INTRAMUSCULAR; INTRAVENOUS; SUBCUTANEOUS at 10:00

## 2017-07-23 RX ADMIN — HEPARIN SODIUM 5000 UNIT(S): 5000 INJECTION INTRAVENOUS; SUBCUTANEOUS at 06:05

## 2017-07-23 RX ADMIN — CHLORHEXIDINE GLUCONATE 15 MILLILITER(S): 213 SOLUTION TOPICAL at 06:14

## 2017-07-23 RX ADMIN — HYDROMORPHONE HYDROCHLORIDE 0.5 MILLIGRAM(S): 2 INJECTION INTRAMUSCULAR; INTRAVENOUS; SUBCUTANEOUS at 13:21

## 2017-07-23 RX ADMIN — Medication 100 MILLIGRAM(S): at 11:34

## 2017-07-23 RX ADMIN — Medication 100 MILLIGRAM(S): at 06:05

## 2017-07-23 RX ADMIN — Medication 0.12 MILLIGRAM(S): at 06:05

## 2017-07-23 RX ADMIN — HYDROMORPHONE HYDROCHLORIDE 0.5 MILLIGRAM(S): 2 INJECTION INTRAMUSCULAR; INTRAVENOUS; SUBCUTANEOUS at 12:21

## 2017-07-23 RX ADMIN — HYDROMORPHONE HYDROCHLORIDE 1 MILLIGRAM(S): 2 INJECTION INTRAMUSCULAR; INTRAVENOUS; SUBCUTANEOUS at 19:48

## 2017-07-23 RX ADMIN — SENNA PLUS 10 MILLILITER(S): 8.6 TABLET ORAL at 21:25

## 2017-07-23 RX ADMIN — Medication 3 MILLILITER(S): at 15:54

## 2017-07-23 RX ADMIN — Medication 40 MILLIEQUIVALENT(S): at 20:18

## 2017-07-23 RX ADMIN — HYDROMORPHONE HYDROCHLORIDE 1 MILLIGRAM(S): 2 INJECTION INTRAMUSCULAR; INTRAVENOUS; SUBCUTANEOUS at 00:43

## 2017-07-23 RX ADMIN — HYDROMORPHONE HYDROCHLORIDE 0.5 MILLIGRAM(S): 2 INJECTION INTRAMUSCULAR; INTRAVENOUS; SUBCUTANEOUS at 04:26

## 2017-07-23 RX ADMIN — HYDROMORPHONE HYDROCHLORIDE 0.5 MILLIGRAM(S): 2 INJECTION INTRAMUSCULAR; INTRAVENOUS; SUBCUTANEOUS at 08:56

## 2017-07-23 RX ADMIN — Medication 100 GRAM(S): at 20:18

## 2017-07-23 NOTE — PROGRESS NOTE ADULT - SUBJECTIVE AND OBJECTIVE BOX
INTERVAL HPI/OVERNIGHT EVENTS/SUBJECTIVE:  Intermittent periods of agitation that respond well to analgesia.     ICU Vital Signs Last 24 Hrs  T(C): 35.9 (23 Jul 2017 08:05), Max: 37.8 (22 Jul 2017 16:00)  T(F): 96.6 (23 Jul 2017 08:05), Max: 100.1 (22 Jul 2017 16:00)  HR: 100 (23 Jul 2017 13:00) (95 - 133)  BP: --  BP(mean): --  ABP: 110/57 (23 Jul 2017 13:00) (96/50 - 152/57)  ABP(mean): 76 (23 Jul 2017 13:00) (67 - 93)  RR: 15 (23 Jul 2017 13:00) (15 - 37)  SpO2: 97% (23 Jul 2017 13:00) (94% - 100%)      I&O's Detail    22 Jul 2017 07:01  -  23 Jul 2017 07:00  --------------------------------------------------------  IN:    Albumin 25%: 300 mL    Free Water: 1500 mL    furosemide Infusion: 105 mL    furosemide Infusion: 7.5 mL    milrinone  Infusion: 292.8 mL    Pivot: 1035 mL    Solution: 100 mL    Solution: 252 mL  Total IN: 3592.3 mL    OUT:    Drain: 110 mL    Indwelling Catheter - Urethral: 3475 mL    Other: 100 mL  Total OUT: 3685 mL    Total NET: -92.7 mL      23 Jul 2017 07:01  -  23 Jul 2017 13:43  --------------------------------------------------------  IN:    Free Water: 250 mL    furosemide Infusion: 30 mL    milrinone  Infusion: 73.2 mL    Pivot: 315 mL  Total IN: 668.2 mL    OUT:    Indwelling Catheter - Urethral: 960 mL  Total OUT: 960 mL    Total NET: -291.8 mL          Mode: AC/ CMV (Assist Control/ Continuous Mandatory Ventilation)  RR (machine): 15  TV (machine): 450  FiO2: 50  PEEP: 8  MAP: 21  PIP: 21    ABG - ( 22 Jul 2017 04:20 )  pH: 7.41  /  pCO2: 55    /  pO2: 105   / HCO3: 33    / Base Excess: 8.8   /  SaO2: 99                  MEDICATIONS  (STANDING):  amantadine Syrup 100 milliGRAM(s) Oral <User Schedule>  FLUoxetine Solution 10 milliGRAM(s) Oral daily  traZODone 50 milliGRAM(s) Oral at bedtime  digoxin     Tablet 0.125 milliGRAM(s) Oral daily  melatonin 9 milliGRAM(s) Oral at bedtime  pantoprazole   Suspension 40 milliGRAM(s) Oral two times a day before meals  senna Syrup 10 milliLiter(s) Oral at bedtime  lisinopril 2.5 milliGRAM(s) Oral daily  amiodarone    Tablet 200 milliGRAM(s) Oral daily  heparin  Injectable 5000 Unit(s) SubCutaneous every 8 hours  ALBUTerol/ipratropium for Nebulization 3 milliLiter(s) Nebulizer every 6 hours  chlorhexidine 0.12% Liquid 15 milliLiter(s) Swish and Spit two times a day  milrinone Infusion 0.5 MICROgram(s)/kG/Min (12.225 mL/Hr) IV Continuous <Continuous>  furosemide Infusion 10 mG/Hr (5 mL/Hr) IV Continuous <Continuous>  acetazolamide  IVPB 250 milliGRAM(s) IV Intermittent every 6 hours    MEDICATIONS  (PRN):  acetaminophen    Suspension 650 milliGRAM(s) Oral every 6 hours PRN For Temp greater than 38.5 C (101.3 F)  bisacodyl Suppository 10 milliGRAM(s) Rectal daily PRN Constipation  HYDROmorphone  Injectable 1 milliGRAM(s) IV Push every 3 hours PRN Agitation      Physical Exam:    Gen: restless    Eyes: PERRL, EOMI    Neurological: not following commands but is moving purposefully     Neck: trachea midline, no JVD    Pulmonary: CTAB    Cardiovascular: irregular    Gastrointestinal: soft, NTTP      LABS:  CBC Full  -  ( 23 Jul 2017 04:45 )  WBC Count : 9.2 K/uL  Hemoglobin : 7.9 g/dL  Hematocrit : 27.1 %  Platelet Count - Automated : 243 K/uL  Mean Cell Volume : 87.4 fl  Mean Cell Hemoglobin : 25.5 pg  Mean Cell Hemoglobin Concentration : 29.2 g/dL  Auto Neutrophil # : 7.6 K/uL  Auto Lymphocyte # : 0.9 K/uL  Auto Monocyte # : 0.4 K/uL  Auto Eosinophil # : 0.1 K/uL  Auto Basophil # : 0.0 K/uL  Auto Neutrophil % : 82.8 %  Auto Lymphocyte % : 10.1 %  Auto Monocyte % : 4.8 %  Auto Eosinophil % : 1.3 %  Auto Basophil % : 0.2 %    07-23    144  |  97<L>  |  32.0<H>  ----------------------------<  105  3.5   |  32.0<H>  |  0.54    Ca    8.1<L>      23 Jul 2017 04:45  Phos  2.9     07-23  Mg     2.1     07-23    TPro  6.4<L>  /  Alb  3.1<L>  /  TBili  0.9  /  DBili  0.3  /  AST  41<H>  /  ALT  24  /  AlkPhos  144<H>  07-22        RECENT CULTURES:      LIVER FUNCTIONS - ( 22 Jul 2017 05:35 )  Alb: 3.1 g/dL / Pro: 6.4 g/dL / ALK PHOS: 144 U/L / ALT: 24 U/L / AST: 41 U/L / GGT: x               CAPILLARY BLOOD GLUCOSE        ASSESSMENT/PLAN:  51y Male with respiratory failure s/p cardiac arrest during ERCP    Neuro: dilaudid dose increased    CV: diamox ordered for continued diuresis and rising bicarb    Pulm: PSV as tolerated    GI/Nutrition: continue tube feeds    /Renal: Rogers for I&O    ID: no acute issues    Endo: no issue    Skin: Repositioning for DTI prevention    DVT Prophylaxis: SCDs, IVC filter

## 2017-07-24 LAB
ANION GAP SERPL CALC-SCNC: 15 MMOL/L — SIGNIFICANT CHANGE UP (ref 5–17)
BASE EXCESS BLDA CALC-SCNC: 9.9 MMOL/L — HIGH (ref -3–3)
BLOOD GAS COMMENTS ARTERIAL: SIGNIFICANT CHANGE UP
BUN SERPL-MCNC: 35 MG/DL — HIGH (ref 8–20)
CALCIUM SERPL-MCNC: 8.2 MG/DL — LOW (ref 8.6–10.2)
CHLORIDE SERPL-SCNC: 94 MMOL/L — LOW (ref 98–107)
CO2 SERPL-SCNC: 32 MMOL/L — HIGH (ref 22–29)
CREAT SERPL-MCNC: 0.51 MG/DL — SIGNIFICANT CHANGE UP (ref 0.5–1.3)
DIGOXIN SERPL-MCNC: 1.3 NG/ML — SIGNIFICANT CHANGE UP (ref 0.8–2)
EOSINOPHIL NFR BLD AUTO: 1 % — SIGNIFICANT CHANGE UP (ref 0–6)
GAS PNL BLDA: SIGNIFICANT CHANGE UP
GLUCOSE SERPL-MCNC: 123 MG/DL — HIGH (ref 70–115)
HCO3 BLDA-SCNC: 34 MMOL/L — HIGH (ref 20–26)
HCT VFR BLD CALC: 27.8 % — LOW (ref 42–52)
HGB BLD-MCNC: 8 G/DL — LOW (ref 14–18)
HOROWITZ INDEX BLDA+IHG-RTO: SIGNIFICANT CHANGE UP
HYPOCHROMIA BLD QL: SLIGHT — SIGNIFICANT CHANGE UP
LYMPHOCYTES # BLD AUTO: 4 % — LOW (ref 20–55)
MACROCYTES BLD QL: SLIGHT — SIGNIFICANT CHANGE UP
MAGNESIUM SERPL-MCNC: 2.3 MG/DL — SIGNIFICANT CHANGE UP (ref 1.6–2.6)
MCHC RBC-ENTMCNC: 25.5 PG — LOW (ref 27–31)
MCHC RBC-ENTMCNC: 28.8 G/DL — LOW (ref 32–36)
MCV RBC AUTO: 88.5 FL — SIGNIFICANT CHANGE UP (ref 80–94)
MICROCYTES BLD QL: SLIGHT — SIGNIFICANT CHANGE UP
MONOCYTES NFR BLD AUTO: 1 % — LOW (ref 3–10)
NEUTROPHILS NFR BLD AUTO: 94 % — HIGH (ref 37–73)
PCO2 BLDA: 56 MMHG — HIGH (ref 35–45)
PH BLDA: 7.42 — SIGNIFICANT CHANGE UP (ref 7.35–7.45)
PHOSPHATE SERPL-MCNC: 2.6 MG/DL — SIGNIFICANT CHANGE UP (ref 2.4–4.7)
PLAT MORPH BLD: NORMAL — SIGNIFICANT CHANGE UP
PLATELET # BLD AUTO: 208 K/UL — SIGNIFICANT CHANGE UP (ref 150–400)
PO2 BLDA: 88 MMHG — SIGNIFICANT CHANGE UP (ref 83–108)
POTASSIUM SERPL-MCNC: 3.6 MMOL/L — SIGNIFICANT CHANGE UP (ref 3.5–5.3)
POTASSIUM SERPL-SCNC: 3.6 MMOL/L — SIGNIFICANT CHANGE UP (ref 3.5–5.3)
RBC # BLD: 3.14 M/UL — LOW (ref 4.6–6.2)
RBC # FLD: 20.4 % — HIGH (ref 11–15.6)
RBC BLD AUTO: ABNORMAL
SAO2 % BLDA: 98 % — SIGNIFICANT CHANGE UP (ref 95–99)
SODIUM SERPL-SCNC: 141 MMOL/L — SIGNIFICANT CHANGE UP (ref 135–145)
WBC # BLD: 10.6 K/UL — SIGNIFICANT CHANGE UP (ref 4.8–10.8)
WBC # FLD AUTO: 10.6 K/UL — SIGNIFICANT CHANGE UP (ref 4.8–10.8)

## 2017-07-24 PROCEDURE — 36556 INSERT NON-TUNNEL CV CATH: CPT

## 2017-07-24 PROCEDURE — 71010: CPT | Mod: 26

## 2017-07-24 PROCEDURE — 99291 CRITICAL CARE FIRST HOUR: CPT

## 2017-07-24 RX ORDER — HYDROMORPHONE HYDROCHLORIDE 2 MG/ML
1 INJECTION INTRAMUSCULAR; INTRAVENOUS; SUBCUTANEOUS ONCE
Qty: 0 | Refills: 0 | Status: DISCONTINUED | OUTPATIENT
Start: 2017-07-24 | End: 2017-07-24

## 2017-07-24 RX ORDER — POTASSIUM PHOSPHATE, MONOBASIC POTASSIUM PHOSPHATE, DIBASIC 236; 224 MG/ML; MG/ML
15 INJECTION, SOLUTION INTRAVENOUS ONCE
Qty: 0 | Refills: 0 | Status: COMPLETED | OUTPATIENT
Start: 2017-07-24 | End: 2017-07-24

## 2017-07-24 RX ORDER — POTASSIUM CHLORIDE 20 MEQ
40 PACKET (EA) ORAL ONCE
Qty: 0 | Refills: 0 | Status: COMPLETED | OUTPATIENT
Start: 2017-07-24 | End: 2017-07-24

## 2017-07-24 RX ADMIN — Medication 650 MILLIGRAM(S): at 12:02

## 2017-07-24 RX ADMIN — CHLORHEXIDINE GLUCONATE 15 MILLILITER(S): 213 SOLUTION TOPICAL at 06:14

## 2017-07-24 RX ADMIN — HYDROMORPHONE HYDROCHLORIDE 1 MILLIGRAM(S): 2 INJECTION INTRAMUSCULAR; INTRAVENOUS; SUBCUTANEOUS at 12:00

## 2017-07-24 RX ADMIN — MILRINONE LACTATE 12.22 MICROGRAM(S)/KG/MIN: 1 INJECTION, SOLUTION INTRAVENOUS at 03:30

## 2017-07-24 RX ADMIN — HYDROMORPHONE HYDROCHLORIDE 1 MILLIGRAM(S): 2 INJECTION INTRAMUSCULAR; INTRAVENOUS; SUBCUTANEOUS at 04:15

## 2017-07-24 RX ADMIN — Medication 3 MILLILITER(S): at 03:52

## 2017-07-24 RX ADMIN — PANTOPRAZOLE SODIUM 40 MILLIGRAM(S): 20 TABLET, DELAYED RELEASE ORAL at 18:07

## 2017-07-24 RX ADMIN — AMIODARONE HYDROCHLORIDE 200 MILLIGRAM(S): 400 TABLET ORAL at 06:13

## 2017-07-24 RX ADMIN — HEPARIN SODIUM 5000 UNIT(S): 5000 INJECTION INTRAVENOUS; SUBCUTANEOUS at 23:06

## 2017-07-24 RX ADMIN — Medication 0.12 MILLIGRAM(S): at 06:13

## 2017-07-24 RX ADMIN — HEPARIN SODIUM 5000 UNIT(S): 5000 INJECTION INTRAVENOUS; SUBCUTANEOUS at 13:58

## 2017-07-24 RX ADMIN — HYDROMORPHONE HYDROCHLORIDE 1 MILLIGRAM(S): 2 INJECTION INTRAMUSCULAR; INTRAVENOUS; SUBCUTANEOUS at 00:00

## 2017-07-24 RX ADMIN — Medication 10 MILLIGRAM(S): at 12:01

## 2017-07-24 RX ADMIN — Medication 3 MILLILITER(S): at 14:36

## 2017-07-24 RX ADMIN — MILRINONE LACTATE 12.22 MICROGRAM(S)/KG/MIN: 1 INJECTION, SOLUTION INTRAVENOUS at 18:08

## 2017-07-24 RX ADMIN — CHLORHEXIDINE GLUCONATE 15 MILLILITER(S): 213 SOLUTION TOPICAL at 18:07

## 2017-07-24 RX ADMIN — Medication 100 MILLIGRAM(S): at 06:14

## 2017-07-24 RX ADMIN — Medication 100 MILLIGRAM(S): at 12:01

## 2017-07-24 RX ADMIN — POTASSIUM PHOSPHATE, MONOBASIC POTASSIUM PHOSPHATE, DIBASIC 62.5 MILLIMOLE(S): 236; 224 INJECTION, SOLUTION INTRAVENOUS at 07:31

## 2017-07-24 RX ADMIN — HYDROMORPHONE HYDROCHLORIDE 1 MILLIGRAM(S): 2 INJECTION INTRAMUSCULAR; INTRAVENOUS; SUBCUTANEOUS at 07:00

## 2017-07-24 RX ADMIN — Medication 9 MILLIGRAM(S): at 23:06

## 2017-07-24 RX ADMIN — HYDROMORPHONE HYDROCHLORIDE 1 MILLIGRAM(S): 2 INJECTION INTRAMUSCULAR; INTRAVENOUS; SUBCUTANEOUS at 04:00

## 2017-07-24 RX ADMIN — HYDROMORPHONE HYDROCHLORIDE 1 MILLIGRAM(S): 2 INJECTION INTRAMUSCULAR; INTRAVENOUS; SUBCUTANEOUS at 16:40

## 2017-07-24 RX ADMIN — HYDROMORPHONE HYDROCHLORIDE 1 MILLIGRAM(S): 2 INJECTION INTRAMUSCULAR; INTRAVENOUS; SUBCUTANEOUS at 16:25

## 2017-07-24 RX ADMIN — Medication 5 MG/HR: at 06:00

## 2017-07-24 RX ADMIN — MILRINONE LACTATE 12.22 MICROGRAM(S)/KG/MIN: 1 INJECTION, SOLUTION INTRAVENOUS at 13:58

## 2017-07-24 RX ADMIN — SENNA PLUS 10 MILLILITER(S): 8.6 TABLET ORAL at 23:06

## 2017-07-24 RX ADMIN — HEPARIN SODIUM 5000 UNIT(S): 5000 INJECTION INTRAVENOUS; SUBCUTANEOUS at 06:14

## 2017-07-24 RX ADMIN — HYDROMORPHONE HYDROCHLORIDE 1 MILLIGRAM(S): 2 INJECTION INTRAMUSCULAR; INTRAVENOUS; SUBCUTANEOUS at 22:35

## 2017-07-24 RX ADMIN — HYDROMORPHONE HYDROCHLORIDE 1 MILLIGRAM(S): 2 INJECTION INTRAMUSCULAR; INTRAVENOUS; SUBCUTANEOUS at 16:05

## 2017-07-24 RX ADMIN — PANTOPRAZOLE SODIUM 40 MILLIGRAM(S): 20 TABLET, DELAYED RELEASE ORAL at 06:14

## 2017-07-24 RX ADMIN — HYDROMORPHONE HYDROCHLORIDE 1 MILLIGRAM(S): 2 INJECTION INTRAMUSCULAR; INTRAVENOUS; SUBCUTANEOUS at 16:20

## 2017-07-24 RX ADMIN — Medication 50 MILLIGRAM(S): at 23:07

## 2017-07-24 RX ADMIN — Medication 3 MILLILITER(S): at 20:52

## 2017-07-24 RX ADMIN — ACETAZOLAMIDE 105 MILLIGRAM(S): 250 TABLET ORAL at 00:32

## 2017-07-24 RX ADMIN — Medication 5 MG/HR: at 18:07

## 2017-07-24 RX ADMIN — HYDROMORPHONE HYDROCHLORIDE 1 MILLIGRAM(S): 2 INJECTION INTRAMUSCULAR; INTRAVENOUS; SUBCUTANEOUS at 22:50

## 2017-07-24 RX ADMIN — Medication 40 MILLIEQUIVALENT(S): at 07:30

## 2017-07-24 RX ADMIN — LISINOPRIL 2.5 MILLIGRAM(S): 2.5 TABLET ORAL at 06:13

## 2017-07-24 RX ADMIN — Medication 3 MILLILITER(S): at 08:07

## 2017-07-24 RX ADMIN — HYDROMORPHONE HYDROCHLORIDE 1 MILLIGRAM(S): 2 INJECTION INTRAMUSCULAR; INTRAVENOUS; SUBCUTANEOUS at 13:00

## 2017-07-24 NOTE — PROCEDURE NOTE - NSPATIENTPOSTION_GEN_A_CORE
Reverse trendelenburg
supine
supine/HOB elevated 10 degrees
supine
Trendelenburg
supine
Trendelenburg
sitting

## 2017-07-24 NOTE — PROCEDURE NOTE - NSINFORMCONSENT_GEN_A_CORE
This was an emergent procedure.
Acute Pneumothorax/This was an emergent procedure.
Benefits, risks, and possible complications of procedure explained to patient/caregiver who verbalized understanding and gave verbal consent.
Benefits, risks, and possible complications of procedure explained to patient/caregiver who verbalized understanding and gave written consent.
This was an emergent procedure.

## 2017-07-24 NOTE — PROCEDURE NOTE - NSCOMPLICATION_GEN_A_CORE
no complications

## 2017-07-24 NOTE — PROCEDURE NOTE - NSPROCDETAILS_GEN_ALL_CORE
connected to ventilator/patient pre-oxygenated, tube inserted, placement confirmed
patient pre-oxygenated, tube inserted, placement confirmed
guidewire recovered
guidewire recovered
guidewire recovered/lumen(s) aspirated and flushed/ultrasound guidance/sterile technique, catheter placed/sterile dressing applied
lumen(s) aspirated and flushed/guidewire recovered/sterile dressing applied/sterile technique, catheter placed
sterile dressing applied/sterile technique, catheter placed/guidewire recovered/lumen(s) aspirated and flushed
sterile technique, catheter placed/lumen(s) aspirated and flushed/sterile dressing applied/guidewire recovered
sterile technique, catheter placed/sterile dressing applied/guidewire recovered/lumen(s) aspirated and flushed
sutured in place/positive blood return obtained via catheter/location identified, draped/prepped, sterile technique used, needle inserted/introduced/connected to a pressurized flush line/Seldinger technique
connected to a pressurized flush line/all materials/supplies accounted for at end of procedure/sutured in place/positive blood return obtained via catheter/Seldinger technique/location identified, draped/prepped, sterile technique used, needle inserted/introduced
sterile dressing applied/guidewire recovered/sterile technique, catheter placed/ultrasound guidance/lumen(s) aspirated and flushed
ultrasound utilization/Seldinger technique/location identified, sterile technique used, catheter introduced, fluid drained/sterile dressing applied
supine position/thoracostomy tube placed percutaneously/Seldinger technique/dressing applied/secured in place

## 2017-07-24 NOTE — PROGRESS NOTE ADULT - SUBJECTIVE AND OBJECTIVE BOX
INTERVAL HPI/OVERNIGHT EVENTS/SUBJECTIVE: received diamox with improvement of alkalosis.    ICU Vital Signs Last 24 Hrs  T(C): 36.3 (24 Jul 2017 06:21), Max: 37.1 (23 Jul 2017 16:00)  T(F): 97.3 (24 Jul 2017 06:21), Max: 98.7 (23 Jul 2017 16:00)  HR: 100 (24 Jul 2017 10:00) (91 - 110)  BP: --  BP(mean): --  ABP: 91/53 (24 Jul 2017 10:00) (91/53 - 149/64)  ABP(mean): 67 (24 Jul 2017 10:00) (63 - 96)  RR: 17 (24 Jul 2017 10:00) (15 - 35)  SpO2: 99% (24 Jul 2017 10:00) (93% - 100%)      I&O's Detail    23 Jul 2017 07:01  -  24 Jul 2017 07:00  --------------------------------------------------------  IN:    Enteral Tube Flush: 120 mL    Free Water: 1500 mL    furosemide Infusion: 120 mL    milrinone  Infusion: 292.8 mL    Pivot: 1080 mL    Solution: 50 mL    Solution: 150 mL  Total IN: 3312.8 mL    OUT:    Drain: 400 mL    Indwelling Catheter - Urethral: 3305 mL  Total OUT: 3705 mL    Total NET: -392.2 mL      24 Jul 2017 07:01  -  24 Jul 2017 11:42  --------------------------------------------------------  IN:    Enteral Tube Flush: 60 mL    Free Water: 250 mL    furosemide Infusion: 15 mL    milrinone  Infusion: 36.6 mL    Pivot: 135 mL    Solution: 250 mL  Total IN: 746.6 mL    OUT:    Indwelling Catheter - Urethral: 1000 mL  Total OUT: 1000 mL    Total NET: -253.4 mL          Mode: AC/ CMV (Assist Control/ Continuous Mandatory Ventilation)  RR (machine): 15  TV (machine): 450  FiO2: 50  PEEP: 8  MAP: 11  PIP: 26    ABG - ( 24 Jul 2017 04:32 )  pH: 7.42  /  pCO2: 56    /  pO2: 88    / HCO3: 34    / Base Excess: 9.9   /  SaO2: 98                  MEDICATIONS  (STANDING):  amantadine Syrup 100 milliGRAM(s) Oral <User Schedule>  FLUoxetine Solution 10 milliGRAM(s) Oral daily  traZODone 50 milliGRAM(s) Oral at bedtime  digoxin     Tablet 0.125 milliGRAM(s) Oral daily  melatonin 9 milliGRAM(s) Oral at bedtime  pantoprazole   Suspension 40 milliGRAM(s) Oral two times a day before meals  senna Syrup 10 milliLiter(s) Oral at bedtime  lisinopril 2.5 milliGRAM(s) Oral daily  amiodarone    Tablet 200 milliGRAM(s) Oral daily  heparin  Injectable 5000 Unit(s) SubCutaneous every 8 hours  ALBUTerol/ipratropium for Nebulization 3 milliLiter(s) Nebulizer every 6 hours  chlorhexidine 0.12% Liquid 15 milliLiter(s) Swish and Spit two times a day  milrinone Infusion 0.5 MICROgram(s)/kG/Min (12.225 mL/Hr) IV Continuous <Continuous>  furosemide Infusion 10 mG/Hr (5 mL/Hr) IV Continuous <Continuous>    MEDICATIONS  (PRN):  acetaminophen    Suspension 650 milliGRAM(s) Oral every 6 hours PRN For Temp greater than 38.5 C (101.3 F)  bisacodyl Suppository 10 milliGRAM(s) Rectal daily PRN Constipation  HYDROmorphone  Injectable 1 milliGRAM(s) IV Push every 3 hours PRN Agitation          PHYSICAL EXAM:    Gen:    Eyes:    Neurological:    ENMT:    Neck:    Pulmonary:    Cardiovascular:    Gastrointestinal:    Genitourinary:    Back:    Extremities:    Skin:    Musculoskeletal:          LABS:  CBC Full  -  ( 24 Jul 2017 05:37 )  WBC Count : 10.6 K/uL  Hemoglobin : 8.0 g/dL  Hematocrit : 27.8 %  Platelet Count - Automated : 208 K/uL  Mean Cell Volume : 88.5 fl  Mean Cell Hemoglobin : 25.5 pg  Mean Cell Hemoglobin Concentration : 28.8 g/dL  Auto Neutrophil # : x  Auto Lymphocyte # : x  Auto Monocyte # : x  Auto Eosinophil # : x  Auto Basophil # : x  Auto Neutrophil % : 94.0 %  Auto Lymphocyte % : 4.0 %  Auto Monocyte % : 1.0 %  Auto Eosinophil % : 1.0 %  Auto Basophil % : x    07-24    141  |  94<L>  |  35.0<H>  ----------------------------<  123<H>  3.6   |  32.0<H>  |  0.51    Ca    8.2<L>      24 Jul 2017 05:37  Phos  2.6     07-24  Mg     2.3     07-24          RECENT CULTURES:            CAPILLARY BLOOD GLUCOSE      RADIOLOGY & ADDITIONAL STUDIES:    ASSESSMENT/PLAN:  51yMale presenting with:    Neuro:    HEENT:    CV:    Pulm:    GI/Nutrition:    /Renal:    ID:    Lines/Tubes:    Endo:    Skin:    Proph:    Dispo:      CRITICAL CARE TIME SPENT: INTERVAL HPI/OVERNIGHT EVENTS/SUBJECTIVE: received diamox with improvement of alkalosis.    ICU Vital Signs Last 24 Hrs  T(C): 36.3 (24 Jul 2017 06:21), Max: 37.1 (23 Jul 2017 16:00)  T(F): 97.3 (24 Jul 2017 06:21), Max: 98.7 (23 Jul 2017 16:00)  HR: 100 (24 Jul 2017 10:00) (91 - 110)  BP: --  BP(mean): --  ABP: 91/53 (24 Jul 2017 10:00) (91/53 - 149/64)  ABP(mean): 67 (24 Jul 2017 10:00) (63 - 96)  RR: 17 (24 Jul 2017 10:00) (15 - 35)  SpO2: 99% (24 Jul 2017 10:00) (93% - 100%)      I&O's Detail    23 Jul 2017 07:01  -  24 Jul 2017 07:00  --------------------------------------------------------  IN:    Enteral Tube Flush: 120 mL    Free Water: 1500 mL    furosemide Infusion: 120 mL    milrinone  Infusion: 292.8 mL    Pivot: 1080 mL    Solution: 50 mL    Solution: 150 mL  Total IN: 3312.8 mL    OUT:    Drain: 400 mL    Indwelling Catheter - Urethral: 3305 mL  Total OUT: 3705 mL    Total NET: -392.2 mL      24 Jul 2017 07:01  -  24 Jul 2017 11:42  --------------------------------------------------------  IN:    Enteral Tube Flush: 60 mL    Free Water: 250 mL    furosemide Infusion: 15 mL    milrinone  Infusion: 36.6 mL    Pivot: 135 mL    Solution: 250 mL  Total IN: 746.6 mL    OUT:    Indwelling Catheter - Urethral: 1000 mL  Total OUT: 1000 mL    Total NET: -253.4 mL          Mode: AC/ CMV (Assist Control/ Continuous Mandatory Ventilation)  RR (machine): 15  TV (machine): 450  FiO2: 50  PEEP: 8  MAP: 11  PIP: 26    ABG - ( 24 Jul 2017 04:32 )  pH: 7.42  /  pCO2: 56    /  pO2: 88    / HCO3: 34    / Base Excess: 9.9   /  SaO2: 98                  MEDICATIONS  (STANDING):  amantadine Syrup 100 milliGRAM(s) Oral <User Schedule>  FLUoxetine Solution 10 milliGRAM(s) Oral daily  traZODone 50 milliGRAM(s) Oral at bedtime  digoxin     Tablet 0.125 milliGRAM(s) Oral daily  melatonin 9 milliGRAM(s) Oral at bedtime  pantoprazole   Suspension 40 milliGRAM(s) Oral two times a day before meals  senna Syrup 10 milliLiter(s) Oral at bedtime  lisinopril 2.5 milliGRAM(s) Oral daily  amiodarone    Tablet 200 milliGRAM(s) Oral daily  heparin  Injectable 5000 Unit(s) SubCutaneous every 8 hours  ALBUTerol/ipratropium for Nebulization 3 milliLiter(s) Nebulizer every 6 hours  chlorhexidine 0.12% Liquid 15 milliLiter(s) Swish and Spit two times a day  milrinone Infusion 0.5 MICROgram(s)/kG/Min (12.225 mL/Hr) IV Continuous <Continuous>  furosemide Infusion 10 mG/Hr (5 mL/Hr) IV Continuous <Continuous>    MEDICATIONS  (PRN):  acetaminophen    Suspension 650 milliGRAM(s) Oral every 6 hours PRN For Temp greater than 38.5 C (101.3 F)  bisacodyl Suppository 10 milliGRAM(s) Rectal daily PRN Constipation  HYDROmorphone  Injectable 1 milliGRAM(s) IV Push every 3 hours PRN Agitation          PHYSICAL EXAM:    Gen:NAD    Eyes: MARTÍNEZ    Neurological: RASS 0 to -1, non focal.    Neck: No JVD    Pulmonary: coarse bilateral crackles  bibasilar    Cardiovascular: Irreg rate rhtyhm    Gastrointestinal: obese soft,  pEG in place    Genitourinary: clear urine in montoya    Extremities: edema    Skin:intact    Musculoskeletal: no deformities.          LABS:  CBC Full  -  ( 24 Jul 2017 05:37 )  WBC Count : 10.6 K/uL  Hemoglobin : 8.0 g/dL  Hematocrit : 27.8 %  Platelet Count - Automated : 208 K/uL  Mean Cell Volume : 88.5 fl  Mean Cell Hemoglobin : 25.5 pg  Mean Cell Hemoglobin Concentration : 28.8 g/dL  Auto Neutrophil # : x  Auto Lymphocyte # : x  Auto Monocyte # : x  Auto Eosinophil # : x  Auto Basophil # : x  Auto Neutrophil % : 94.0 %  Auto Lymphocyte % : 4.0 %  Auto Monocyte % : 1.0 %  Auto Eosinophil % : 1.0 %  Auto Basophil % : x    07-24    141  |  94<L>  |  35.0<H>  ----------------------------<  123<H>  3.6   |  32.0<H>  |  0.51    Ca    8.2<L>      24 Jul 2017 05:37  Phos  2.6     07-24  Mg     2.3     07-24          RECENT CULTURES:            CAPILLARY BLOOD GLUCOSE      RADIOLOGY & ADDITIONAL STUDIES:    ASSESSMENT/PLAN:  51yMale presenting with: cardiac deterioration  after lap cholecystectomy.    Neuro:Cont current care    CV:rate control afib, end stage cardiac failure, cont current care. appreciate cardiology input.    Pulm: will benefit from trach, will discuss with family    GI/Nutrition: TF at goal    /Renal: lasix drip for preload decreasing and overall volume overload    ID: no issues    Endo: glycemia at target    Proph:HSQ/ IVF    Dispo: ICU      CRITICAL CARE TIME SPENT: 38 minutes

## 2017-07-24 NOTE — PROCEDURE NOTE - NSCVLACTUALSITE_VASC_A_CORE
left/subclavian vein
right/internal jugular
right/internal jugular
right/subclavian vein
subclavian vein/right
internal jugular/right
right/internal jugular
right/internal jugular

## 2017-07-24 NOTE — PROCEDURE NOTE - NSPERFORMEDBY_GEN_A_CORE
Myself
Attending/Myself
Myself

## 2017-07-24 NOTE — PROCEDURE NOTE - NSINDICATIONS_GEN_A_CORE
respiratory failure/respiratory distress
arterial puncture to obtain ABG's/monitoring purposes
ascites/abdominal compartment syndrome/respiratory compromise
critical illness/hemodynamic monitoring
critical illness/hemodynamic monitoring/venous access
critical illness/venous access
emergency venous access
venous access/critical illness
critical illness
emergency venous access/hypertonic/irritant infusion/venous access/hemodynamic monitoring/critical illness
hemodynamic monitoring/volume resuscitation/critical illness
monitoring purposes/cannulation purposes/critical patient/arterial puncture to obtain ABG's
pneumothorax
respiratory distress/airway protection/critical patient

## 2017-07-24 NOTE — PROCEDURE NOTE - ESTIMATED BLOOD LOSS
none
minimal
none
minimal
minimal
none
minimal
minimal
none
minimal
minimal

## 2017-07-24 NOTE — PROCEDURE NOTE - NSSITEPREP_SKIN_A_CORE
povidone iodine (if allergic to chlorhexidine)
Adherence to aseptic technique: hand hygiene prior to donning barriers (gown, gloves), don cap and mask, sterile drape over patient/chlorhexidine
chlorhexidine
Adherence to aseptic technique: hand hygiene prior to donning barriers (gown, gloves), don cap and mask, sterile drape over patient/chlorhexidine
chlorhexidine
chlorhexidine/Adherence to aseptic technique: hand hygiene prior to donning barriers (gown, gloves), don cap and mask, sterile drape over patient
chlorhexidine/Adherence to aseptic technique: hand hygiene prior to donning barriers (gown, gloves), don cap and mask, sterile drape over patient

## 2017-07-24 NOTE — PROCEDURE NOTE - NSTOLERANCE_GEN_A_CORE
Patient tolerated procedure well.

## 2017-07-24 NOTE — PROCEDURE NOTE - PROCEDURE DATE TIME, MLM
13-Jul-2017 07:36
09-May-2017 10:03
09-May-2017 12:40
09-Jul-2017 11:00
09-Jun-2017 01:15
10-Jackson-2017 17:46
13-Jul-2017 13:30
14-May-2017 21:00
15-May-2017 14:00
18-Jul-2017 10:14
18-Jul-2017 15:12
19-Jun-2017 19:30
19-Jun-2017 21:30
19-Jun-2017 22:00
24-Jul-2017

## 2017-07-24 NOTE — PROCEDURE NOTE - NSCVLATTEMPTSITEVASC_A_CORE
internal jugular/right
left/subclavian vein
right/internal jugular
right/subclavian vein
subclavian vein/right
internal jugular/right
right/internal jugular
right/internal jugular

## 2017-07-24 NOTE — PROCEDURE NOTE - NSANESTHESIA_GEN_A_CORE
1% lidocaine

## 2017-07-25 LAB
ANION GAP SERPL CALC-SCNC: 14 MMOL/L — SIGNIFICANT CHANGE UP (ref 5–17)
ANION GAP SERPL CALC-SCNC: 14 MMOL/L — SIGNIFICANT CHANGE UP (ref 5–17)
ANISOCYTOSIS BLD QL: SLIGHT — SIGNIFICANT CHANGE UP
BASOPHILS # BLD AUTO: 0 K/UL — SIGNIFICANT CHANGE UP (ref 0–0.2)
BASOPHILS NFR BLD AUTO: 0 % — SIGNIFICANT CHANGE UP (ref 0–2)
BLD GP AB SCN SERPL QL: SIGNIFICANT CHANGE UP
BUN SERPL-MCNC: 26 MG/DL — HIGH (ref 8–20)
BUN SERPL-MCNC: 29 MG/DL — HIGH (ref 8–20)
CALCIUM SERPL-MCNC: 8.2 MG/DL — LOW (ref 8.6–10.2)
CALCIUM SERPL-MCNC: 8.3 MG/DL — LOW (ref 8.6–10.2)
CHLORIDE SERPL-SCNC: 94 MMOL/L — LOW (ref 98–107)
CHLORIDE SERPL-SCNC: 94 MMOL/L — LOW (ref 98–107)
CO2 SERPL-SCNC: 32 MMOL/L — HIGH (ref 22–29)
CO2 SERPL-SCNC: 33 MMOL/L — HIGH (ref 22–29)
CREAT SERPL-MCNC: 0.52 MG/DL — SIGNIFICANT CHANGE UP (ref 0.5–1.3)
CREAT SERPL-MCNC: 0.54 MG/DL — SIGNIFICANT CHANGE UP (ref 0.5–1.3)
ELLIPTOCYTES BLD QL SMEAR: SLIGHT — SIGNIFICANT CHANGE UP
EOSINOPHIL # BLD AUTO: 0.2 K/UL — SIGNIFICANT CHANGE UP (ref 0–0.5)
EOSINOPHIL NFR BLD AUTO: 2 % — SIGNIFICANT CHANGE UP (ref 0–6)
GAS PNL BLDA: SIGNIFICANT CHANGE UP
GLUCOSE SERPL-MCNC: 106 MG/DL — SIGNIFICANT CHANGE UP (ref 70–115)
GLUCOSE SERPL-MCNC: 138 MG/DL — HIGH (ref 70–115)
HCT VFR BLD CALC: 28.3 % — LOW (ref 42–52)
HGB BLD-MCNC: 8.2 G/DL — LOW (ref 14–18)
HYPOCHROMIA BLD QL: SLIGHT — SIGNIFICANT CHANGE UP
LYMPHOCYTES # BLD AUTO: 1.5 K/UL — SIGNIFICANT CHANGE UP (ref 1–4.8)
LYMPHOCYTES # BLD AUTO: 11 % — LOW (ref 20–55)
MAGNESIUM SERPL-MCNC: 1.7 MG/DL — SIGNIFICANT CHANGE UP (ref 1.6–2.6)
MAGNESIUM SERPL-MCNC: 2.1 MG/DL — SIGNIFICANT CHANGE UP (ref 1.6–2.6)
MCHC RBC-ENTMCNC: 25.3 PG — LOW (ref 27–31)
MCHC RBC-ENTMCNC: 29 G/DL — LOW (ref 32–36)
MCV RBC AUTO: 87.3 FL — SIGNIFICANT CHANGE UP (ref 80–94)
METAMYELOCYTES # FLD: 1 % — HIGH (ref 0–0)
MONOCYTES # BLD AUTO: 0.6 K/UL — SIGNIFICANT CHANGE UP (ref 0–0.8)
MONOCYTES NFR BLD AUTO: 5 % — SIGNIFICANT CHANGE UP (ref 3–10)
MYELOCYTES NFR BLD: 1 % — HIGH (ref 0–0)
NEUTROPHILS # BLD AUTO: 9.8 K/UL — HIGH (ref 1.8–8)
NEUTROPHILS NFR BLD AUTO: 78 % — HIGH (ref 37–73)
NEUTS BAND # BLD: 2 % — SIGNIFICANT CHANGE UP (ref 0–8)
NT-PROBNP SERPL-SCNC: HIGH PG/ML (ref 0–300)
OVALOCYTES BLD QL SMEAR: SLIGHT — SIGNIFICANT CHANGE UP
PHOSPHATE SERPL-MCNC: 3.3 MG/DL — SIGNIFICANT CHANGE UP (ref 2.4–4.7)
PHOSPHATE SERPL-MCNC: 4.2 MG/DL — SIGNIFICANT CHANGE UP (ref 2.4–4.7)
PLAT MORPH BLD: NORMAL — SIGNIFICANT CHANGE UP
PLATELET # BLD AUTO: 291 K/UL — SIGNIFICANT CHANGE UP (ref 150–400)
POIKILOCYTOSIS BLD QL AUTO: SLIGHT — SIGNIFICANT CHANGE UP
POLYCHROMASIA BLD QL SMEAR: SLIGHT — SIGNIFICANT CHANGE UP
POTASSIUM SERPL-MCNC: 3.6 MMOL/L — SIGNIFICANT CHANGE UP (ref 3.5–5.3)
POTASSIUM SERPL-MCNC: 3.6 MMOL/L — SIGNIFICANT CHANGE UP (ref 3.5–5.3)
POTASSIUM SERPL-SCNC: 3.6 MMOL/L — SIGNIFICANT CHANGE UP (ref 3.5–5.3)
POTASSIUM SERPL-SCNC: 3.6 MMOL/L — SIGNIFICANT CHANGE UP (ref 3.5–5.3)
RBC # BLD: 3.24 M/UL — LOW (ref 4.6–6.2)
RBC # FLD: 20.2 % — HIGH (ref 11–15.6)
RBC BLD AUTO: ABNORMAL
SCHISTOCYTES BLD QL AUTO: SLIGHT — SIGNIFICANT CHANGE UP
SODIUM SERPL-SCNC: 140 MMOL/L — SIGNIFICANT CHANGE UP (ref 135–145)
SODIUM SERPL-SCNC: 141 MMOL/L — SIGNIFICANT CHANGE UP (ref 135–145)
STOMATOCYTES BLD QL SMEAR: PRESENT — SIGNIFICANT CHANGE UP
TARGETS BLD QL SMEAR: SLIGHT — SIGNIFICANT CHANGE UP
TYPE + AB SCN PNL BLD: SIGNIFICANT CHANGE UP
WBC # BLD: 12.2 K/UL — HIGH (ref 4.8–10.8)
WBC # FLD AUTO: 12.2 K/UL — HIGH (ref 4.8–10.8)

## 2017-07-25 PROCEDURE — 99233 SBSQ HOSP IP/OBS HIGH 50: CPT

## 2017-07-25 PROCEDURE — 99291 CRITICAL CARE FIRST HOUR: CPT

## 2017-07-25 RX ORDER — MAGNESIUM SULFATE 500 MG/ML
2 VIAL (ML) INJECTION ONCE
Qty: 0 | Refills: 0 | Status: COMPLETED | OUTPATIENT
Start: 2017-07-25 | End: 2017-07-25

## 2017-07-25 RX ORDER — ACETAZOLAMIDE 250 MG/1
250 TABLET ORAL EVERY 6 HOURS
Qty: 0 | Refills: 0 | Status: COMPLETED | OUTPATIENT
Start: 2017-07-25 | End: 2017-07-26

## 2017-07-25 RX ORDER — POTASSIUM CHLORIDE 20 MEQ
20 PACKET (EA) ORAL ONCE
Qty: 0 | Refills: 0 | Status: COMPLETED | OUTPATIENT
Start: 2017-07-25 | End: 2017-07-26

## 2017-07-25 RX ORDER — POTASSIUM PHOSPHATE, MONOBASIC POTASSIUM PHOSPHATE, DIBASIC 236; 224 MG/ML; MG/ML
15 INJECTION, SOLUTION INTRAVENOUS ONCE
Qty: 0 | Refills: 0 | Status: COMPLETED | OUTPATIENT
Start: 2017-07-25 | End: 2017-07-25

## 2017-07-25 RX ADMIN — Medication 0.12 MILLIGRAM(S): at 06:14

## 2017-07-25 RX ADMIN — PANTOPRAZOLE SODIUM 40 MILLIGRAM(S): 20 TABLET, DELAYED RELEASE ORAL at 17:11

## 2017-07-25 RX ADMIN — LISINOPRIL 2.5 MILLIGRAM(S): 2.5 TABLET ORAL at 06:13

## 2017-07-25 RX ADMIN — HYDROMORPHONE HYDROCHLORIDE 1 MILLIGRAM(S): 2 INJECTION INTRAMUSCULAR; INTRAVENOUS; SUBCUTANEOUS at 10:05

## 2017-07-25 RX ADMIN — Medication 50 GRAM(S): at 09:02

## 2017-07-25 RX ADMIN — Medication 50 MILLIGRAM(S): at 22:12

## 2017-07-25 RX ADMIN — HEPARIN SODIUM 5000 UNIT(S): 5000 INJECTION INTRAVENOUS; SUBCUTANEOUS at 22:11

## 2017-07-25 RX ADMIN — Medication 3 MILLILITER(S): at 03:25

## 2017-07-25 RX ADMIN — Medication 9 MILLIGRAM(S): at 23:14

## 2017-07-25 RX ADMIN — HEPARIN SODIUM 5000 UNIT(S): 5000 INJECTION INTRAVENOUS; SUBCUTANEOUS at 06:13

## 2017-07-25 RX ADMIN — POTASSIUM PHOSPHATE, MONOBASIC POTASSIUM PHOSPHATE, DIBASIC 62.5 MILLIMOLE(S): 236; 224 INJECTION, SOLUTION INTRAVENOUS at 09:13

## 2017-07-25 RX ADMIN — HYDROMORPHONE HYDROCHLORIDE 1 MILLIGRAM(S): 2 INJECTION INTRAMUSCULAR; INTRAVENOUS; SUBCUTANEOUS at 09:50

## 2017-07-25 RX ADMIN — Medication 100 MILLIGRAM(S): at 06:13

## 2017-07-25 RX ADMIN — HYDROMORPHONE HYDROCHLORIDE 1 MILLIGRAM(S): 2 INJECTION INTRAMUSCULAR; INTRAVENOUS; SUBCUTANEOUS at 04:45

## 2017-07-25 RX ADMIN — PANTOPRAZOLE SODIUM 40 MILLIGRAM(S): 20 TABLET, DELAYED RELEASE ORAL at 06:14

## 2017-07-25 RX ADMIN — HYDROMORPHONE HYDROCHLORIDE 1 MILLIGRAM(S): 2 INJECTION INTRAMUSCULAR; INTRAVENOUS; SUBCUTANEOUS at 05:00

## 2017-07-25 RX ADMIN — MILRINONE LACTATE 12.22 MICROGRAM(S)/KG/MIN: 1 INJECTION, SOLUTION INTRAVENOUS at 17:13

## 2017-07-25 RX ADMIN — Medication 3 MILLILITER(S): at 21:17

## 2017-07-25 RX ADMIN — CHLORHEXIDINE GLUCONATE 15 MILLILITER(S): 213 SOLUTION TOPICAL at 06:13

## 2017-07-25 RX ADMIN — SENNA PLUS 10 MILLILITER(S): 8.6 TABLET ORAL at 22:11

## 2017-07-25 RX ADMIN — AMIODARONE HYDROCHLORIDE 200 MILLIGRAM(S): 400 TABLET ORAL at 06:13

## 2017-07-25 RX ADMIN — HYDROMORPHONE HYDROCHLORIDE 1 MILLIGRAM(S): 2 INJECTION INTRAMUSCULAR; INTRAVENOUS; SUBCUTANEOUS at 15:05

## 2017-07-25 RX ADMIN — HYDROMORPHONE HYDROCHLORIDE 1 MILLIGRAM(S): 2 INJECTION INTRAMUSCULAR; INTRAVENOUS; SUBCUTANEOUS at 20:15

## 2017-07-25 RX ADMIN — Medication 10 MILLIGRAM(S): at 11:16

## 2017-07-25 RX ADMIN — ACETAZOLAMIDE 105 MILLIGRAM(S): 250 TABLET ORAL at 20:15

## 2017-07-25 RX ADMIN — MILRINONE LACTATE 12.22 MICROGRAM(S)/KG/MIN: 1 INJECTION, SOLUTION INTRAVENOUS at 01:01

## 2017-07-25 RX ADMIN — HEPARIN SODIUM 5000 UNIT(S): 5000 INJECTION INTRAVENOUS; SUBCUTANEOUS at 13:22

## 2017-07-25 RX ADMIN — CHLORHEXIDINE GLUCONATE 15 MILLILITER(S): 213 SOLUTION TOPICAL at 17:12

## 2017-07-25 RX ADMIN — Medication 3 MILLILITER(S): at 14:50

## 2017-07-25 RX ADMIN — MILRINONE LACTATE 12.22 MICROGRAM(S)/KG/MIN: 1 INJECTION, SOLUTION INTRAVENOUS at 09:51

## 2017-07-25 RX ADMIN — HYDROMORPHONE HYDROCHLORIDE 1 MILLIGRAM(S): 2 INJECTION INTRAMUSCULAR; INTRAVENOUS; SUBCUTANEOUS at 14:50

## 2017-07-25 RX ADMIN — Medication 3 MILLILITER(S): at 09:09

## 2017-07-25 RX ADMIN — Medication 100 MILLIGRAM(S): at 11:17

## 2017-07-25 RX ADMIN — ACETAZOLAMIDE 105 MILLIGRAM(S): 250 TABLET ORAL at 14:19

## 2017-07-25 RX ADMIN — HYDROMORPHONE HYDROCHLORIDE 1 MILLIGRAM(S): 2 INJECTION INTRAMUSCULAR; INTRAVENOUS; SUBCUTANEOUS at 20:00

## 2017-07-25 NOTE — CONSULT NOTE ADULT - ASSESSMENT
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 Up. Request from SICU to see if pt is candidate for LVAD.

## 2017-07-25 NOTE — CONSULT NOTE ADULT - PROBLEM SELECTOR RECOMMENDATION 9
Continue with ICU care.  CT Head per my eye no bleed.  Get MRI Brain without mello r/o cva/mass.  MRA head without mello r/o aneurysm.  EEG has been ordered to assess for sz.  DVT prophylaxis.  Will follow.
Multifactorial- possible infection, anoxia, debility. Cont to monitor. Neurology consulted
Repeat blood cultures pending  All blood cultures this admission have been negative to date  Would continue to draw blood cultures during fever spikes  CT C/A/P with no acute findings  Patient has Rogers, however has been on many days of antibiotics that a UTI would have been treated at this point and there is no Pyelonephritis on CT C/A/P  Infiltrate on CT could take time to resolve given he was just treated for PNA.   LFT's have been trending up, Alk phos from 197 (6/10) to  , 332 (6/20) and given he has a CBD STENT this should be investigated as a possible source of fever  Would D/C Diflucan since patient has been on this since 6/7 - 6/16 and 6/19 to now total of 14 days  Will continue Vancomycin and Ertapenem for the time being and take off antibiotics one by one if cultures remain negative
case discussed with Dr. Sánchez and Dr. Up and it has been determined pt is not a candidate for LVAD placement given mental status and body habitus.  Appreciate consult please contact CT surgery with any further questions.

## 2017-07-25 NOTE — CONSULT NOTE ADULT - CONSULT REQUESTED BY NAME
Dr Garcia (Trauma Surgery)
Kameron Garcia [facility]
[place]
chloé
dr. luevano
Dr Landen Coulter

## 2017-07-25 NOTE — CONSULT NOTE ADULT - CONSULT REQUESTED DATE/TIME
21-Jun-2017 17:23
08-May-2017 17:39
09-May-2017 10:00
25-Jul-2017 17:18
28-May-2017 16:20
31-May-2017 15:47

## 2017-07-25 NOTE — PROGRESS NOTE ADULT - ASSESSMENT
51yr man, NICM, s/p lap cholecystecomy, found to have bile duct leak. Underwent an ERCP and had cardiac arrest. Long hospital course with several intubations/extubations, s/p PEG, s/p paracentesis

## 2017-07-25 NOTE — PROGRESS NOTE ADULT - SUBJECTIVE AND OBJECTIVE BOX
INTERVAL HPI/OVERNIGHT EVENTS/SUBJECTIVE:  No events.  Remains intubated on vent support.    ICU Vital Signs Last 24 Hrs  T(C): 36.6 (25 Jul 2017 08:01), Max: 37.1 (24 Jul 2017 12:15)  T(F): 97.9 (25 Jul 2017 08:01), Max: 98.7 (24 Jul 2017 12:15)  HR: 99 (25 Jul 2017 11:00) (93 - 114)  BP: 118/62 (24 Jul 2017 20:01) (118/62 - 118/62)  BP(mean): --  ABP: 105/60 (25 Jul 2017 11:00) (91/40 - 119/65)  ABP(mean): 77 (25 Jul 2017 11:00) (59 - 84)  RR: 17 (25 Jul 2017 11:00) (15 - 38)  SpO2: 95% (25 Jul 2017 11:00) (92% - 100%)      I&O's Detail    24 Jul 2017 07:01  -  25 Jul 2017 07:00  --------------------------------------------------------  IN:    Enteral Tube Flush: 180 mL    Free Water: 1500 mL    furosemide Infusion: 120 mL    milrinone  Infusion: 292.8 mL    Pivot: 1060 mL    Solution: 250 mL  Total IN: 3402.8 mL    OUT:    Drain: 50 mL    Indwelling Catheter - Urethral: 3350 mL  Total OUT: 3400 mL    Total NET: 2.8 mL      25 Jul 2017 07:01  -  25 Jul 2017 11:08  --------------------------------------------------------  IN:    furosemide Infusion: 15 mL    milrinone  Infusion: 36.6 mL    Pivot: 180 mL    Solution: 50 mL    Solution: 189 mL  Total IN: 470.6 mL    OUT:    Indwelling Catheter - Urethral: 275 mL  Total OUT: 275 mL    Total NET: 195.6 mL          Mode: AC/ CMV (Assist Control/ Continuous Mandatory Ventilation)  RR (machine): 15  TV (machine): 450  FiO2: 40  PEEP: 8  MAP: 15  PIP: 36    ABG - ( 25 Jul 2017 03:51 )  pH: 7.46  /  pCO2: 50    /  pO2: 82    / HCO3: 34    / Base Excess: 10.4  /  SaO2: 98                  MEDICATIONS  (STANDING):  amantadine Syrup 100 milliGRAM(s) Oral <User Schedule>  FLUoxetine Solution 10 milliGRAM(s) Oral daily  traZODone 50 milliGRAM(s) Oral at bedtime  digoxin     Tablet 0.125 milliGRAM(s) Oral daily  melatonin 9 milliGRAM(s) Oral at bedtime  pantoprazole   Suspension 40 milliGRAM(s) Oral two times a day before meals  senna Syrup 10 milliLiter(s) Oral at bedtime  lisinopril 2.5 milliGRAM(s) Oral daily  amiodarone    Tablet 200 milliGRAM(s) Oral daily  heparin  Injectable 5000 Unit(s) SubCutaneous every 8 hours  ALBUTerol/ipratropium for Nebulization 3 milliLiter(s) Nebulizer every 6 hours  chlorhexidine 0.12% Liquid 15 milliLiter(s) Swish and Spit two times a day  milrinone Infusion 0.5 MICROgram(s)/kG/Min (12.225 mL/Hr) IV Continuous <Continuous>  furosemide Infusion 10 mG/Hr (5 mL/Hr) IV Continuous <Continuous>    MEDICATIONS  (PRN):  acetaminophen    Suspension 650 milliGRAM(s) Oral every 6 hours PRN For Temp greater than 38.5 C (101.3 F)  bisacodyl Suppository 10 milliGRAM(s) Rectal daily PRN Constipation  HYDROmorphone  Injectable 1 milliGRAM(s) IV Push every 3 hours PRN Agitation      NUTRITION/IVF: Tube feeds     CENTRAL LINE: Y     ARMAS:  Y     A-LINE: Y           PHYSICAL EXAM:    Gen: NAD    Eyes: PERRLA    Neurological: Arouses to voice.  Follows commands.      Neck: Trachea midline.    Pulmonary: Non labored on mech vent    Cardiovascular: Irrgular    Gastrointestinal: Distended.  Firm but not hard.    Genitourinary: Armas    Extremities: Mild edema            LABS:  CBC Full  -  ( 25 Jul 2017 04:38 )  WBC Count : 12.2 K/uL  Hemoglobin : 8.2 g/dL  Hematocrit : 28.3 %  Platelet Count - Automated : 291 K/uL  Mean Cell Volume : 87.3 fl  Mean Cell Hemoglobin : 25.3 pg  Mean Cell Hemoglobin Concentration : 29.0 g/dL  Auto Neutrophil # : 9.8 K/uL  Auto Lymphocyte # : 1.5 K/uL  Auto Monocyte # : 0.6 K/uL  Auto Eosinophil # : 0.2 K/uL  Auto Basophil # : 0.0 K/uL  Auto Neutrophil % : 78.0 %  Auto Lymphocyte % : 11.0 %  Auto Monocyte % : 5.0 %  Auto Eosinophil % : 2.0 %  Auto Basophil % : 0.0 %    07-25    140  |  94<L>  |  29.0<H>  ----------------------------<  106  3.6   |  32.0<H>  |  0.52    Ca    8.2<L>      25 Jul 2017 04:38  Phos  3.3     07-25  Mg     1.7     07-25          ASSESSMENT/PLAN:  51yMale presenting with:  Acute on chronic heart failure after cardiac arrest.      Neuro: Delirium and encephalopathy.  Sleep wake maintenance.      CV: Heart failure with volume overload.  Continue inotropic support and diuresis.  Will check with CT surgery as to candidacy fro LVAD or transplant.      Pulm: Acute resp failure.  CPAP trail.  Wean as tolerated.  Likely needs tracheostomy.      GI/Nutrition: Ascites reaccumulated.  Will observe closely.  may need paracentesis.        /Renal: Lasix drip.  Goal approx 500 cc neg per day.  Diamox to blunt metabolic alkalosis from lasix.      Proph: SHQ, PPI    Dispo: ICU      CRITICAL CARE TIME SPENT: 45 min I will SWITCH the dose or number of times a day I take the medications listed below when I get home from the hospital:  None

## 2017-07-25 NOTE — PROGRESS NOTE ADULT - SUBJECTIVE AND OBJECTIVE BOX
INTERVAL HPI/OVERNIGHT EVENTS:    PRESENT SYMPTOMS: SOURCE:  Patient/Family/Team    PAIN SCALE:  0 = none  1 = mild   2 = moderate  3 = severe    Pain: appears comfortable    Dyspnea:  [x ] YES [ ] NO - vented on CPAP  Anxiety:  [ ] YES [ ] NO  na  Fatigue: [ x] YES [ ] NO  Nausea: [ ] YES [ x] NO  Loss of Appetite: [ ] YES [ ] NO  na on TF  Other symptoms: __________    MEDICATIONS  (STANDING):  amantadine Syrup 100 milliGRAM(s) Oral <User Schedule>  FLUoxetine Solution 10 milliGRAM(s) Oral daily  traZODone 50 milliGRAM(s) Oral at bedtime  digoxin     Tablet 0.125 milliGRAM(s) Oral daily  melatonin 9 milliGRAM(s) Oral at bedtime  pantoprazole   Suspension 40 milliGRAM(s) Oral two times a day before meals  senna Syrup 10 milliLiter(s) Oral at bedtime  lisinopril 2.5 milliGRAM(s) Oral daily  amiodarone    Tablet 200 milliGRAM(s) Oral daily  heparin  Injectable 5000 Unit(s) SubCutaneous every 8 hours  ALBUTerol/ipratropium for Nebulization 3 milliLiter(s) Nebulizer every 6 hours  chlorhexidine 0.12% Liquid 15 milliLiter(s) Swish and Spit two times a day  milrinone Infusion 0.5 MICROgram(s)/kG/Min (12.225 mL/Hr) IV Continuous <Continuous>  furosemide Infusion 10 mG/Hr (5 mL/Hr) IV Continuous <Continuous>    MEDICATIONS  (PRN):  acetaminophen    Suspension 650 milliGRAM(s) Oral every 6 hours PRN For Temp greater than 38.5 C (101.3 F)  bisacodyl Suppository 10 milliGRAM(s) Rectal daily PRN Constipation  HYDROmorphone  Injectable 1 milliGRAM(s) IV Push every 3 hours PRN Agitation      Allergies    No Known Allergies    Intolerances       Karnofsky Performance Score/Palliative Performance Status Version 2: 20 %    Vital Signs Last 24 Hrs  T(C): 36.6 (25 Jul 2017 08:01), Max: 36.9 (25 Jul 2017 04:00)  T(F): 97.9 (25 Jul 2017 08:01), Max: 98.4 (25 Jul 2017 04:00)  HR: 102 (25 Jul 2017 12:00) (93 - 114)  BP: 118/62 (24 Jul 2017 20:01) (118/62 - 118/62)  BP(mean): --  RR: 24 (25 Jul 2017 12:00) (15 - 27)  SpO2: 94% (25 Jul 2017 12:00) (92% - 100%)    PHYSICAL EXAM:    General: NAD, eyes closed, arouses to tactile stimuli    HEENT: [ ] normal  [ ] dry mouth  [x ] ET tube/trach    Lungs: [x ] comfortable [ ] tachypnea/labored breathing  [ ] excessive secretions    CV: [ x] normal  [ ] tachycardia    GI: [x ] normal  [ ] distended  [ ] tender  [ ] no BS               [x ] PEG/NG/OG tube    MSK: [ ] normal  [x ] weakness  [ ] edema             [ ] ambulatory  [x ] bedbound/wheelchair bound    Skin: [ ] normal  [ ] pressure ulcers- Stage_____  [x ] no rash    LABS:                        8.2    12.2  )-----------( 291      ( 25 Jul 2017 04:38 )             28.3     07-25    140  |  94<L>  |  29.0<H>  ----------------------------<  106  3.6   |  32.0<H>  |  0.52    Ca    8.2<L>      25 Jul 2017 04:38  Phos  3.3     07-25  Mg     1.7     07-25          I&O's Summary    24 Jul 2017 07:01  -  25 Jul 2017 07:00  --------------------------------------------------------  IN: 3402.8 mL / OUT: 3400 mL / NET: 2.8 mL    25 Jul 2017 07:01  -  25 Jul 2017 12:37  --------------------------------------------------------  IN: 863 mL / OUT: 375 mL / NET: 488 mL        RADIOLOGY & ADDITIONAL STUDIES:    ASSESSMENT and PLAN:    ADVANCE DIRECTIVES:  [ ] YES [ ] NO    DNR: [ ] YES [ ] NO      Date Completed:                    MOLST [ ] YES [ ] NO   Date Completed:     COUNSELING:  Advanced Care Planning Discussion - Time Spent ______Minutes.   More than 50% time spent in counseling and coordinating care. ______ Minutes.     Thank you for the opportunity to assist with the care of this patient.   [facility] Consult [facility] [phone].

## 2017-07-25 NOTE — CONSULT NOTE ADULT - SUBJECTIVE AND OBJECTIVE BOX
Patient intubated and sedated, History obtained from the chart          Surgeon: Gonzalo    Consult requesting by: Dr. Coulter     HISTORY OF PRESENT ILLNESS:    53 y/o  male with a complicated hospital course, initially came in on 5/4/17 with abdominal pain. Patient had a cholecystectomy 5/2/17. He was c/o lower abdominal pain since surgery, also complained of constipation, urinary retention. Symptoms associated with abdominal distension. He did not have any fever on admission. Patient had Rogers placed to relieve urinary retention, was started on IV Fluids, and started on laxatives for constipation. He continued to have abdominal pain and had a CT scan A/P on 5/7 reporting bile leak. Patient was started on IV Zosyn on 5/8. Patient was taken to the OR for ERCP on 5/9 which was complicated with cardiac arrest and abdominal compartment syndrome, he was resuscitated with return of circulation and went for emergent Exp Lap and abdominal washout. Post op patient was in the SICU on vasopressors. Antibiotics changed to Meropenem, and Fluconazole. Patient was taken back to the OR for ERCP 5/10 with finding of bile leak near cystic duct, sphincterotomy and placement of STENT and closure of laparotomy. Diflucan discontinue 5/11/17 (4 days). Meropenem was stopped 5/14 (6 Days). On 5/15 patient has was agitated and bite his tongue, had Fever, tachycardia, and increased Vent requirements, so he was started on Cefepime and Vancomycin for a possible VAP. CXR had reported congestion. Vancomycin and Cefepime stopped on 5/19 (5 Days). Patient again spiked fever on 5/21 and was started on vancomycin and Zosyn. All central lines removed. A CT scan done was noted to have fluid collection in the gall bladder fossa and so patient underwent IR guided drainage on 5/23 with culture growing Enterococcus raffinousus. Patient was continued on Vancomycin and Zosyn till 5/28 (8 Days). Patient again spiked fever on 5/31 and started on Vancomycin and Meropenem. A duplex U/S done on 6/1 reported a DVT in the femoral vein so patient had IVC filter placed. Antibiotics stopped on 6/5 (6 Days). Patient started on Diflucan on 6/7 after abdominal culture grew out Candida Albicans. Patient developed a Pneumothorax due to NGT placement on 6/9 and rt sided chest tube placement. Patient was started on Cefepime and Vancomycin again on 6/10 due to increasing lactic acidosis and possible HAP. Patient had PEG placement on 6/12. Diflucan stopped on 6/16 (10 Days) and stopped Vancomycin and Cefepime on 6/18 (8 Days). Patient spiked fever on 6/19 and was started on Vancomycin, Ertapenem and Fluconazole. A CT C/A/P done reporting basilar infiltrates and CBD Stent. Pt has been on and off Primacor over last several weeks. Pt does not tolerate being of primacor and goes into acute heart failure.     PAST MEDICAL & SURGICAL HISTORY:  Mitral regurgitation: severe MR  Cardiomyopathy, nonischemic  CAD (coronary artery disease)  Asthma  Atrial fibrillation  S/P laparoscopic cholecystectomy  History of humerus fracture: Metal pins in Left Humerus  Artificial pacemaker              MEDICATIONS  (STANDING):  amantadine Syrup 100 milliGRAM(s) Oral <User Schedule>  FLUoxetine Solution 10 milliGRAM(s) Oral daily  traZODone 50 milliGRAM(s) Oral at bedtime  digoxin     Tablet 0.125 milliGRAM(s) Oral daily  melatonin 9 milliGRAM(s) Oral at bedtime  pantoprazole   Suspension 40 milliGRAM(s) Oral two times a day before meals  senna Syrup 10 milliLiter(s) Oral at bedtime  lisinopril 2.5 milliGRAM(s) Oral daily  amiodarone    Tablet 200 milliGRAM(s) Oral daily  heparin  Injectable 5000 Unit(s) SubCutaneous every 8 hours  ALBUTerol/ipratropium for Nebulization 3 milliLiter(s) Nebulizer every 6 hours  chlorhexidine 0.12% Liquid 15 milliLiter(s) Swish and Spit two times a day  milrinone Infusion 0.5 MICROgram(s)/kG/Min (12.225 mL/Hr) IV Continuous <Continuous>  furosemide Infusion 10 mG/Hr (5 mL/Hr) IV Continuous <Continuous>  acetazolamide  IVPB 250 milliGRAM(s) IV Intermittent every 6 hours    MEDICATIONS  (PRN):  acetaminophen    Suspension 650 milliGRAM(s) Oral every 6 hours PRN For Temp greater than 38.5 C (101.3 F)  bisacodyl Suppository 10 milliGRAM(s) Rectal daily PRN Constipation  HYDROmorphone  Injectable 1 milliGRAM(s) IV Push every 3 hours PRN Agitation    Antiplatelet therapy:   none                        Last dose/amt:    Allergies    No Known Allergies    Intolerances        SOCIAL HISTORY:  Smoker: unkown  ETOH use: [ ] Yes  [ x] No              FREQUENCY / QUANTITY:  Ilicit Drug use:  [ ] Yes  [x ] No  Occupation:unkown  Live with:no family   Assisted device use: none    FAMILY HISTORY:  No pertinent family history in first degree relatives      Review of Systems  unable to obtain ROS as pt is intubated and disoriented at baseline      PHYSICAL EXAM  Vital Signs Last 24 Hrs  T(C): 36.6 (25 Jul 2017 12:00), Max: 36.9 (25 Jul 2017 04:00)  T(F): 97.8 (25 Jul 2017 12:00), Max: 98.4 (25 Jul 2017 04:00)  HR: 103 (25 Jul 2017 17:05) (93 - 115)  BP: 118/62 (24 Jul 2017 20:01) (118/62 - 118/62)  BP(mean): --  RR: 18 (25 Jul 2017 16:00) (15 - 34)  SpO2: 93% (25 Jul 2017 17:05) (92% - 100%)    CONSTITUTIONAL:                                                                            Neuro: AAOx1 intermitantly following commands unable to vocalize anything due to ET tube  Pulm: Crackles b/l lung bases  CV: Irreg/irreg, positive s/1/s2                                                          LABS:                        8.2    12.2  )-----------( 291      ( 25 Jul 2017 04:38 )             28.3     07-25    140  |  94<L>  |  29.0<H>  ----------------------------<  106  3.6   |  32.0<H>  |  0.52    Ca    8.2<L>      25 Jul 2017 04:38  Phos  3.3     07-25  Mg     1.7     07-25                Serum Pro-Brain Natriuretic Peptide: 11825 pg/mL (07-25 @ 04:38)  < from: HANNAH Echo Doppler (06.28.17 @ 13:04) >   Summary:   1. Technically fair study.   2. Severely decreased global left ventricular systolic function.   3. Left ventricular ejection fraction, by visual estimation, is <20%.   4. Moderately increased left ventricular internal cavity size.   5. The left ventricular diastolic function could not be assessed in this   study.   6. Severely enlarged left atrium.   7. Moderately enlarged right ventricle.   8. Moderately reduced RV systolic function.   9. Severely dilated right atrium.  10. Tethered mitral valve leaflets. Severe mitral valve regurgitation.  11. Moderate-severe tricuspid regurgitation.  12. Estimated pulmonary artery systolic pressure is 40.8 mmHg assuming a   right atrial pressure of 15 mmHg, which is consistent with mild pulmonary   hypertension.  13. There is no evidence of pericardial effusion.  14. HANNAH probe was removed. The patient was cardioverted to sinus rhythm   using 200 Joules of direct synchronized current. No acute complications.     Joni Sullivan MD Electronically signed on 6/28/2017 at 4:40:21 PM         < end of copied text >

## 2017-07-26 LAB
ANION GAP SERPL CALC-SCNC: 13 MMOL/L — SIGNIFICANT CHANGE UP (ref 5–17)
ANION GAP SERPL CALC-SCNC: 17 MMOL/L — SIGNIFICANT CHANGE UP (ref 5–17)
ANISOCYTOSIS BLD QL: SLIGHT — SIGNIFICANT CHANGE UP
BASE EXCESS BLDV CALC-SCNC: 11.3 MMOL/L — HIGH (ref -3–3)
BASE EXCESS BLDV CALC-SCNC: 11.4 MMOL/L — HIGH (ref -3–3)
BASOPHILS # BLD AUTO: 0 K/UL — SIGNIFICANT CHANGE UP (ref 0–0.2)
BASOPHILS NFR BLD AUTO: 0.1 % — SIGNIFICANT CHANGE UP (ref 0–2)
BLOOD GAS COMMENTS, VENOUS: SIGNIFICANT CHANGE UP
BUN SERPL-MCNC: 27 MG/DL — HIGH (ref 8–20)
BUN SERPL-MCNC: 27 MG/DL — HIGH (ref 8–20)
CALCIUM SERPL-MCNC: 8.2 MG/DL — LOW (ref 8.6–10.2)
CALCIUM SERPL-MCNC: 8.4 MG/DL — LOW (ref 8.6–10.2)
CHLORIDE SERPL-SCNC: 92 MMOL/L — LOW (ref 98–107)
CHLORIDE SERPL-SCNC: 94 MMOL/L — LOW (ref 98–107)
CO2 SERPL-SCNC: 30 MMOL/L — HIGH (ref 22–29)
CO2 SERPL-SCNC: 33 MMOL/L — HIGH (ref 22–29)
CREAT SERPL-MCNC: 0.61 MG/DL — SIGNIFICANT CHANGE UP (ref 0.5–1.3)
CREAT SERPL-MCNC: 0.64 MG/DL — SIGNIFICANT CHANGE UP (ref 0.5–1.3)
ELLIPTOCYTES BLD QL SMEAR: SLIGHT — SIGNIFICANT CHANGE UP
EOSINOPHIL # BLD AUTO: 0.1 K/UL — SIGNIFICANT CHANGE UP (ref 0–0.5)
EOSINOPHIL NFR BLD AUTO: 0.7 % — SIGNIFICANT CHANGE UP (ref 0–5)
GAS PNL BLDA: SIGNIFICANT CHANGE UP
GAS PNL BLDV: SIGNIFICANT CHANGE UP
GAS PNL BLDV: SIGNIFICANT CHANGE UP
GLUCOSE SERPL-MCNC: 115 MG/DL — SIGNIFICANT CHANGE UP (ref 70–115)
GLUCOSE SERPL-MCNC: 127 MG/DL — HIGH (ref 70–115)
HCO3 BLDV-SCNC: 34 MMOL/L — HIGH (ref 20–26)
HCO3 BLDV-SCNC: 35 MMOL/L — HIGH (ref 20–26)
HCT VFR BLD CALC: 29.2 % — LOW (ref 42–52)
HGB BLD-MCNC: 8.7 G/DL — LOW (ref 14–18)
HOROWITZ INDEX BLDV+IHG-RTO: 0.6 — SIGNIFICANT CHANGE UP
HOROWITZ INDEX BLDV+IHG-RTO: SIGNIFICANT CHANGE UP
HYPOCHROMIA BLD QL: SIGNIFICANT CHANGE UP
LYMPHOCYTES # BLD AUTO: 14.3 % — LOW (ref 20–55)
LYMPHOCYTES # BLD AUTO: 2 K/UL — SIGNIFICANT CHANGE UP (ref 1–4.8)
MAGNESIUM SERPL-MCNC: 1.7 MG/DL — SIGNIFICANT CHANGE UP (ref 1.6–2.6)
MAGNESIUM SERPL-MCNC: 2 MG/DL — SIGNIFICANT CHANGE UP (ref 1.6–2.6)
MCHC RBC-ENTMCNC: 25.6 PG — LOW (ref 27–31)
MCHC RBC-ENTMCNC: 29.8 G/DL — LOW (ref 32–36)
MCV RBC AUTO: 85.9 FL — SIGNIFICANT CHANGE UP (ref 80–94)
MONOCYTES # BLD AUTO: 0.8 K/UL — SIGNIFICANT CHANGE UP (ref 0–0.8)
MONOCYTES NFR BLD AUTO: 5.7 % — SIGNIFICANT CHANGE UP (ref 3–10)
NEUTROPHILS # BLD AUTO: 11.3 K/UL — HIGH (ref 1.8–8)
NEUTROPHILS NFR BLD AUTO: 78.8 % — HIGH (ref 37–73)
OVALOCYTES BLD QL SMEAR: SLIGHT — SIGNIFICANT CHANGE UP
PCO2 BLDV: 56 MMHG — HIGH (ref 35–50)
PCO2 BLDV: 61 MMHG — HIGH (ref 35–50)
PH BLDV: 7.4 — SIGNIFICANT CHANGE UP (ref 7.35–7.45)
PH BLDV: 7.43 — SIGNIFICANT CHANGE UP (ref 7.35–7.45)
PHOSPHATE SERPL-MCNC: 3.4 MG/DL — SIGNIFICANT CHANGE UP (ref 2.4–4.7)
PHOSPHATE SERPL-MCNC: 4 MG/DL — SIGNIFICANT CHANGE UP (ref 2.4–4.7)
PLAT MORPH BLD: NORMAL — SIGNIFICANT CHANGE UP
PLATELET # BLD AUTO: 270 K/UL — SIGNIFICANT CHANGE UP (ref 150–400)
PLATELET CLUMP BLD QL SMEAR: SIGNIFICANT CHANGE UP
PO2 BLDV: 28 MMHG — SIGNIFICANT CHANGE UP (ref 25–45)
PO2 BLDV: 37 MMHG — SIGNIFICANT CHANGE UP (ref 25–45)
POIKILOCYTOSIS BLD QL AUTO: SLIGHT — SIGNIFICANT CHANGE UP
POLYCHROMASIA BLD QL SMEAR: SLIGHT — SIGNIFICANT CHANGE UP
POTASSIUM SERPL-MCNC: 3.2 MMOL/L — LOW (ref 3.5–5.3)
POTASSIUM SERPL-MCNC: 4.3 MMOL/L — SIGNIFICANT CHANGE UP (ref 3.5–5.3)
POTASSIUM SERPL-SCNC: 3.2 MMOL/L — LOW (ref 3.5–5.3)
POTASSIUM SERPL-SCNC: 4.3 MMOL/L — SIGNIFICANT CHANGE UP (ref 3.5–5.3)
RBC # BLD: 3.4 M/UL — LOW (ref 4.6–6.2)
RBC # FLD: 20.5 % — HIGH (ref 11–15.6)
RBC BLD AUTO: ABNORMAL
SAO2 % BLDV: 48 % — LOW (ref 67–88)
SAO2 % BLDV: 69 % — SIGNIFICANT CHANGE UP (ref 67–88)
SCHISTOCYTES BLD QL AUTO: SLIGHT — SIGNIFICANT CHANGE UP
SODIUM SERPL-SCNC: 139 MMOL/L — SIGNIFICANT CHANGE UP (ref 135–145)
SODIUM SERPL-SCNC: 140 MMOL/L — SIGNIFICANT CHANGE UP (ref 135–145)
TARGETS BLD QL SMEAR: SLIGHT — SIGNIFICANT CHANGE UP
WBC # BLD: 14.4 K/UL — HIGH (ref 4.8–10.8)
WBC # FLD AUTO: 14.4 K/UL — HIGH (ref 4.8–10.8)

## 2017-07-26 PROCEDURE — 99291 CRITICAL CARE FIRST HOUR: CPT

## 2017-07-26 RX ORDER — FUROSEMIDE 40 MG
20 TABLET ORAL
Qty: 500 | Refills: 0 | Status: DISCONTINUED | OUTPATIENT
Start: 2017-07-26 | End: 2017-07-31

## 2017-07-26 RX ADMIN — HYDROMORPHONE HYDROCHLORIDE 1 MILLIGRAM(S): 2 INJECTION INTRAMUSCULAR; INTRAVENOUS; SUBCUTANEOUS at 04:00

## 2017-07-26 RX ADMIN — CHLORHEXIDINE GLUCONATE 15 MILLILITER(S): 213 SOLUTION TOPICAL at 17:18

## 2017-07-26 RX ADMIN — MILRINONE LACTATE 12.22 MICROGRAM(S)/KG/MIN: 1 INJECTION, SOLUTION INTRAVENOUS at 02:13

## 2017-07-26 RX ADMIN — CHLORHEXIDINE GLUCONATE 15 MILLILITER(S): 213 SOLUTION TOPICAL at 05:52

## 2017-07-26 RX ADMIN — AMIODARONE HYDROCHLORIDE 200 MILLIGRAM(S): 400 TABLET ORAL at 05:52

## 2017-07-26 RX ADMIN — PANTOPRAZOLE SODIUM 40 MILLIGRAM(S): 20 TABLET, DELAYED RELEASE ORAL at 05:52

## 2017-07-26 RX ADMIN — HYDROMORPHONE HYDROCHLORIDE 1 MILLIGRAM(S): 2 INJECTION INTRAMUSCULAR; INTRAVENOUS; SUBCUTANEOUS at 08:50

## 2017-07-26 RX ADMIN — MILRINONE LACTATE 12.22 MICROGRAM(S)/KG/MIN: 1 INJECTION, SOLUTION INTRAVENOUS at 17:00

## 2017-07-26 RX ADMIN — HEPARIN SODIUM 5000 UNIT(S): 5000 INJECTION INTRAVENOUS; SUBCUTANEOUS at 21:01

## 2017-07-26 RX ADMIN — HYDROMORPHONE HYDROCHLORIDE 1 MILLIGRAM(S): 2 INJECTION INTRAMUSCULAR; INTRAVENOUS; SUBCUTANEOUS at 19:45

## 2017-07-26 RX ADMIN — MILRINONE LACTATE 12.22 MICROGRAM(S)/KG/MIN: 1 INJECTION, SOLUTION INTRAVENOUS at 22:29

## 2017-07-26 RX ADMIN — HYDROMORPHONE HYDROCHLORIDE 1 MILLIGRAM(S): 2 INJECTION INTRAMUSCULAR; INTRAVENOUS; SUBCUTANEOUS at 15:35

## 2017-07-26 RX ADMIN — HYDROMORPHONE HYDROCHLORIDE 1 MILLIGRAM(S): 2 INJECTION INTRAMUSCULAR; INTRAVENOUS; SUBCUTANEOUS at 19:28

## 2017-07-26 RX ADMIN — Medication 10 MILLIGRAM(S): at 12:24

## 2017-07-26 RX ADMIN — Medication 7.5 MG/HR: at 15:08

## 2017-07-26 RX ADMIN — Medication 0.12 MILLIGRAM(S): at 05:53

## 2017-07-26 RX ADMIN — ACETAZOLAMIDE 105 MILLIGRAM(S): 250 TABLET ORAL at 13:21

## 2017-07-26 RX ADMIN — HYDROMORPHONE HYDROCHLORIDE 1 MILLIGRAM(S): 2 INJECTION INTRAMUSCULAR; INTRAVENOUS; SUBCUTANEOUS at 04:15

## 2017-07-26 RX ADMIN — LISINOPRIL 2.5 MILLIGRAM(S): 2.5 TABLET ORAL at 05:52

## 2017-07-26 RX ADMIN — HEPARIN SODIUM 5000 UNIT(S): 5000 INJECTION INTRAVENOUS; SUBCUTANEOUS at 05:54

## 2017-07-26 RX ADMIN — Medication 3 MILLILITER(S): at 09:16

## 2017-07-26 RX ADMIN — Medication 3 MILLILITER(S): at 04:09

## 2017-07-26 RX ADMIN — MILRINONE LACTATE 12.22 MICROGRAM(S)/KG/MIN: 1 INJECTION, SOLUTION INTRAVENOUS at 12:23

## 2017-07-26 RX ADMIN — PANTOPRAZOLE SODIUM 40 MILLIGRAM(S): 20 TABLET, DELAYED RELEASE ORAL at 17:18

## 2017-07-26 RX ADMIN — Medication 3 MILLILITER(S): at 16:19

## 2017-07-26 RX ADMIN — Medication 9 MILLIGRAM(S): at 21:02

## 2017-07-26 RX ADMIN — HYDROMORPHONE HYDROCHLORIDE 1 MILLIGRAM(S): 2 INJECTION INTRAMUSCULAR; INTRAVENOUS; SUBCUTANEOUS at 22:48

## 2017-07-26 RX ADMIN — HYDROMORPHONE HYDROCHLORIDE 1 MILLIGRAM(S): 2 INJECTION INTRAMUSCULAR; INTRAVENOUS; SUBCUTANEOUS at 08:35

## 2017-07-26 RX ADMIN — Medication 100 MILLIEQUIVALENT(S): at 01:00

## 2017-07-26 RX ADMIN — Medication 3 MILLILITER(S): at 19:46

## 2017-07-26 RX ADMIN — HYDROMORPHONE HYDROCHLORIDE 1 MILLIGRAM(S): 2 INJECTION INTRAMUSCULAR; INTRAVENOUS; SUBCUTANEOUS at 15:50

## 2017-07-26 RX ADMIN — ACETAZOLAMIDE 105 MILLIGRAM(S): 250 TABLET ORAL at 21:01

## 2017-07-26 RX ADMIN — HEPARIN SODIUM 5000 UNIT(S): 5000 INJECTION INTRAVENOUS; SUBCUTANEOUS at 13:20

## 2017-07-26 RX ADMIN — HYDROMORPHONE HYDROCHLORIDE 1 MILLIGRAM(S): 2 INJECTION INTRAMUSCULAR; INTRAVENOUS; SUBCUTANEOUS at 22:28

## 2017-07-26 RX ADMIN — SENNA PLUS 10 MILLILITER(S): 8.6 TABLET ORAL at 21:01

## 2017-07-26 RX ADMIN — ACETAZOLAMIDE 105 MILLIGRAM(S): 250 TABLET ORAL at 02:14

## 2017-07-26 RX ADMIN — ACETAZOLAMIDE 105 MILLIGRAM(S): 250 TABLET ORAL at 08:15

## 2017-07-26 RX ADMIN — Medication 50 MILLIGRAM(S): at 21:02

## 2017-07-26 RX ADMIN — Medication 100 MILLIGRAM(S): at 12:23

## 2017-07-26 RX ADMIN — Medication 100 MILLIGRAM(S): at 05:52

## 2017-07-26 NOTE — PROGRESS NOTE ADULT - SUBJECTIVE AND OBJECTIVE BOX
CHIEF COMPLAINT: Patient is a 51y old  Male who is seen with HFrEF. Pt is on lasix drip but still has positive balance. He is on CPAP and 40% FIO2. Copious amount of secretions.     Allergies  No Known Allergies    MEDICATIONS:  digoxin     Tablet 0.125 milliGRAM(s) Oral daily  lisinopril 2.5 milliGRAM(s) Oral daily  amiodarone    Tablet 200 milliGRAM(s) Oral daily  milrinone Infusion 0.5 MICROgram(s)/kG/Min IV Continuous <Continuous>  furosemide Infusion 10 mG/Hr IV Continuous <Continuous>  acetazolamide  IVPB 250 milliGRAM(s) IV Intermittent every 6 hours  ALBUTerol/ipratropium for Nebulization 3 milliLiter(s) Nebulizer every 6 hours  amantadine Syrup 100 milliGRAM(s) Oral <User Schedule>  FLUoxetine Solution 10 milliGRAM(s) Oral daily  traZODone 50 milliGRAM(s) Oral at bedtime  melatonin 9 milliGRAM(s) Oral at bedtime  acetaminophen    Suspension 650 milliGRAM(s) Oral every 6 hours PRN  HYDROmorphone  Injectable 1 milliGRAM(s) IV Push every 3 hours PRN  pantoprazole   Suspension 40 milliGRAM(s) Oral two times a day before meals  senna Syrup 10 milliLiter(s) Oral at bedtime  bisacodyl Suppository 10 milliGRAM(s) Rectal daily PRN  heparin  Injectable 5000 Unit(s) SubCutaneous every 8 hours  chlorhexidine 0.12% Liquid 15 milliLiter(s) Swish and Spit two times a day    PHYSICAL EXAM:  T(C): 37.2 (07-26-17 @ 04:00), Max: 37.2 (07-25-17 @ 20:00)  HR: 101 (07-26-17 @ 07:00) (97 - 115)  BP: 132/73 (07-26-17 @ 03:00) (132/73 - 132/73)  RR: 19 (07-26-17 @ 07:00) (12 - 34)  SpO2: 98% (07-26-17 @ 07:00) (92% - 100%)    25 Jul 2017 07:01  -  26 Jul 2017 07:00  --------------------------------------------------------  IN: 3404.8 mL / OUT: 2275 mL / NET: 1129.8 mL    26 Jul 2017 07:01  -  26 Jul 2017 07:49  --------------------------------------------------------  IN: 62.2 mL / OUT: 75 mL / NET: -12.8 mL    Appearance: Intubated, comfortable. 	  HEENT:   NC/AT  Eye: Pink Conjunctiva  Lungs: Decrease air entry at left base.   CVS: RRR, Normal S1 and S2, 1-2+ edema lower ext, b/l venodynes  Pulses: Normal distal pulses.    TELEMETRY: NSR, PVCs. 	      LABS:	 	                         8.7    14.4  )-----------( 270      ( 26 Jul 2017 03:41 )             29.2     07-26    139  |  92<L>  |  27.0<H>  ----------------------------<  127<H>  4.3   |  30.0<H>  |  0.61    Ca    8.4<L>      26 Jul 2017 03:41  Phos  4.0     07-26  Mg     2.0     07-26  TSH:     ASSESSMENT/PLAN:

## 2017-07-26 NOTE — PROGRESS NOTE ADULT - ASSESSMENT
52 yo male on primacor and lasix drip. Still positive balance with Cxr of pulmonary vascular congestion and pleural effusion.   Pt has been intubated for a long period of time nad needs to be trached. There is no NOK available to consent. Consider pursuing administrative consent.   Increase lasix to 15 mg /hr, monitor electrolytes and use demadex for contraction alkalosis.

## 2017-07-26 NOTE — PROGRESS NOTE ADULT - SUBJECTIVE AND OBJECTIVE BOX
INTERVAL HPI/OVERNIGHT EVENTS/SUBJECTIVE: lactic acidosis, increased leukocytosis cardiac related. lasix drip increases,     ICU Vital Signs Last 24 Hrs  T(C): 37.2 (26 Jul 2017 04:00), Max: 37.2 (25 Jul 2017 20:00)  T(F): 98.9 (26 Jul 2017 04:00), Max: 98.9 (25 Jul 2017 20:00)  HR: 100 (26 Jul 2017 10:00) (97 - 115)  BP: 132/73 (26 Jul 2017 03:00) (132/73 - 132/73)  BP(mean): --  ABP: 108/61 (26 Jul 2017 10:00) (99/53 - 141/76)  ABP(mean): 79 (26 Jul 2017 10:00) (69 - 100)  RR: 25 (26 Jul 2017 10:00) (12 - 34)  SpO2: 96% (26 Jul 2017 10:00) (92% - 100%)      I&O's Detail    25 Jul 2017 07:01  -  26 Jul 2017 07:00  --------------------------------------------------------  IN:    Enteral Tube Flush: 180 mL    Free Water: 1000 mL    furosemide Infusion: 120 mL    milrinone  Infusion: 292.8 mL    Pivot: 1060 mL    Solution: 50 mL    Solution: 252 mL    Solution: 350 mL    Solution: 100 mL  Total IN: 3404.8 mL    OUT:    Indwelling Catheter - Urethral: 2275 mL  Total OUT: 2275 mL    Total NET: 1129.8 mL      26 Jul 2017 07:01  -  26 Jul 2017 10:55  --------------------------------------------------------  IN:    furosemide Infusion: 5 mL    furosemide Infusion: 22.5 mL    milrinone  Infusion: 48.8 mL    Pivot: 180 mL    Solution: 50 mL  Total IN: 306.3 mL    OUT:    Indwelling Catheter - Urethral: 350 mL  Total OUT: 350 mL    Total NET: -43.7 mL          Mode: CPAP with PS  FiO2: 40  PEEP: 8  PS: 12  MAP: 10    ABG - ( 26 Jul 2017 03:33 )  pH: 7.45  /  pCO2: 48    /  pO2: 72    / HCO3: 32    / Base Excess: 8.2   /  SaO2: 96                  MEDICATIONS  (STANDING):  amantadine Syrup 100 milliGRAM(s) Oral <User Schedule>  FLUoxetine Solution 10 milliGRAM(s) Oral daily  traZODone 50 milliGRAM(s) Oral at bedtime  digoxin     Tablet 0.125 milliGRAM(s) Oral daily  melatonin 9 milliGRAM(s) Oral at bedtime  pantoprazole   Suspension 40 milliGRAM(s) Oral two times a day before meals  senna Syrup 10 milliLiter(s) Oral at bedtime  lisinopril 2.5 milliGRAM(s) Oral daily  amiodarone    Tablet 200 milliGRAM(s) Oral daily  heparin  Injectable 5000 Unit(s) SubCutaneous every 8 hours  ALBUTerol/ipratropium for Nebulization 3 milliLiter(s) Nebulizer every 6 hours  chlorhexidine 0.12% Liquid 15 milliLiter(s) Swish and Spit two times a day  milrinone Infusion 0.5 MICROgram(s)/kG/Min (12.225 mL/Hr) IV Continuous <Continuous>  acetazolamide  IVPB 250 milliGRAM(s) IV Intermittent every 6 hours  furosemide Infusion 15 mG/Hr (7.5 mL/Hr) IV Continuous <Continuous>    MEDICATIONS  (PRN):  acetaminophen    Suspension 650 milliGRAM(s) Oral every 6 hours PRN For Temp greater than 38.5 C (101.3 F)  bisacodyl Suppository 10 milliGRAM(s) Rectal daily PRN Constipation  HYDROmorphone  Injectable 1 milliGRAM(s) IV Push every 3 hours PRN Agitation          PHYSICAL EXAM:    Gen:    Eyes:    Neurological:    ENMT:    Neck:    Pulmonary:    Cardiovascular:    Gastrointestinal:    Genitourinary:    Back:    Extremities:    Skin:    Musculoskeletal:          LABS:  CBC Full  -  ( 26 Jul 2017 03:41 )  WBC Count : 14.4 K/uL  Hemoglobin : 8.7 g/dL  Hematocrit : 29.2 %  Platelet Count - Automated : 270 K/uL  Mean Cell Volume : 85.9 fl  Mean Cell Hemoglobin : 25.6 pg  Mean Cell Hemoglobin Concentration : 29.8 g/dL  Auto Neutrophil # : 11.3 K/uL  Auto Lymphocyte # : 2.0 K/uL  Auto Monocyte # : 0.8 K/uL  Auto Eosinophil # : 0.1 K/uL  Auto Basophil # : 0.0 K/uL  Auto Neutrophil % : 78.8 %  Auto Lymphocyte % : 14.3 %  Auto Monocyte % : 5.7 %  Auto Eosinophil % : 0.7 %  Auto Basophil % : 0.1 %    07-26    139  |  92<L>  |  27.0<H>  ----------------------------<  127<H>  4.3   |  30.0<H>  |  0.61    Ca    8.4<L>      26 Jul 2017 03:41  Phos  4.0     07-26  Mg     2.0     07-26          RECENT CULTURES:            CAPILLARY BLOOD GLUCOSE      RADIOLOGY & ADDITIONAL STUDIES:    ASSESSMENT/PLAN:  51yMale presenting with:    Neuro:    HEENT:    CV:    Pulm:    GI/Nutrition:    /Renal:    ID:    Lines/Tubes:    Endo:    Skin:    Proph:    Dispo:      CRITICAL CARE TIME SPENT: INTERVAL HPI/OVERNIGHT EVENTS/SUBJECTIVE: lactic acidosis, increased leukocytosis cardiac related. lasix drip increases,     ICU Vital Signs Last 24 Hrs  T(C): 37.2 (26 Jul 2017 04:00), Max: 37.2 (25 Jul 2017 20:00)  T(F): 98.9 (26 Jul 2017 04:00), Max: 98.9 (25 Jul 2017 20:00)  HR: 100 (26 Jul 2017 10:00) (97 - 115)  BP: 132/73 (26 Jul 2017 03:00) (132/73 - 132/73)  BP(mean): --  ABP: 108/61 (26 Jul 2017 10:00) (99/53 - 141/76)  ABP(mean): 79 (26 Jul 2017 10:00) (69 - 100)  RR: 25 (26 Jul 2017 10:00) (12 - 34)  SpO2: 96% (26 Jul 2017 10:00) (92% - 100%)      I&O's Detail    25 Jul 2017 07:01  -  26 Jul 2017 07:00  --------------------------------------------------------  IN:    Enteral Tube Flush: 180 mL    Free Water: 1000 mL    furosemide Infusion: 120 mL    milrinone  Infusion: 292.8 mL    Pivot: 1060 mL    Solution: 50 mL    Solution: 252 mL    Solution: 350 mL    Solution: 100 mL  Total IN: 3404.8 mL    OUT:    Indwelling Catheter - Urethral: 2275 mL  Total OUT: 2275 mL    Total NET: 1129.8 mL      26 Jul 2017 07:01  -  26 Jul 2017 10:55  --------------------------------------------------------  IN:    furosemide Infusion: 5 mL    furosemide Infusion: 22.5 mL    milrinone  Infusion: 48.8 mL    Pivot: 180 mL    Solution: 50 mL  Total IN: 306.3 mL    OUT:    Indwelling Catheter - Urethral: 350 mL  Total OUT: 350 mL    Total NET: -43.7 mL          Mode: CPAP with PS  FiO2: 40  PEEP: 8  PS: 12  MAP: 10    ABG - ( 26 Jul 2017 03:33 )  pH: 7.45  /  pCO2: 48    /  pO2: 72    / HCO3: 32    / Base Excess: 8.2   /  SaO2: 96                  MEDICATIONS  (STANDING):  amantadine Syrup 100 milliGRAM(s) Oral <User Schedule>  FLUoxetine Solution 10 milliGRAM(s) Oral daily  traZODone 50 milliGRAM(s) Oral at bedtime  digoxin     Tablet 0.125 milliGRAM(s) Oral daily  melatonin 9 milliGRAM(s) Oral at bedtime  pantoprazole   Suspension 40 milliGRAM(s) Oral two times a day before meals  senna Syrup 10 milliLiter(s) Oral at bedtime  lisinopril 2.5 milliGRAM(s) Oral daily  amiodarone    Tablet 200 milliGRAM(s) Oral daily  heparin  Injectable 5000 Unit(s) SubCutaneous every 8 hours  ALBUTerol/ipratropium for Nebulization 3 milliLiter(s) Nebulizer every 6 hours  chlorhexidine 0.12% Liquid 15 milliLiter(s) Swish and Spit two times a day  milrinone Infusion 0.5 MICROgram(s)/kG/Min (12.225 mL/Hr) IV Continuous <Continuous>  acetazolamide  IVPB 250 milliGRAM(s) IV Intermittent every 6 hours  furosemide Infusion 15 mG/Hr (7.5 mL/Hr) IV Continuous <Continuous>    MEDICATIONS  (PRN):  acetaminophen    Suspension 650 milliGRAM(s) Oral every 6 hours PRN For Temp greater than 38.5 C (101.3 F)  bisacodyl Suppository 10 milliGRAM(s) Rectal daily PRN Constipation  HYDROmorphone  Injectable 1 milliGRAM(s) IV Push every 3 hours PRN Agitation          PHYSICAL EXAM:    Gen: Agitated,     Eyes: anicteric sclerae    Neurological: agitated, does not follow commands non focal.    ENMT: MARTÍNEZ    Neck: supple, left IJ in place    Pulmonary: coarse bilateral with bibasilar rales.    Cardiovascular: irreg , rate control.    Gastrointestinal: soft, non distended.    Genitourinary: clear urine in [place]    Extremities: +2 peripheral edema.    Skin: stage 2 sacral ulcer.    Musculoskeletal: no deformities          LABS:  CBC Full  -  ( 26 Jul 2017 03:41 )  WBC Count : 14.4 K/uL  Hemoglobin : 8.7 g/dL  Hematocrit : 29.2 %  Platelet Count - Automated : 270 K/uL  Mean Cell Volume : 85.9 fl  Mean Cell Hemoglobin : 25.6 pg  Mean Cell Hemoglobin Concentration : 29.8 g/dL  Auto Neutrophil # : 11.3 K/uL  Auto Lymphocyte # : 2.0 K/uL  Auto Monocyte # : 0.8 K/uL  Auto Eosinophil # : 0.1 K/uL  Auto Basophil # : 0.0 K/uL  Auto Neutrophil % : 78.8 %  Auto Lymphocyte % : 14.3 %  Auto Monocyte % : 5.7 %  Auto Eosinophil % : 0.7 %  Auto Basophil % : 0.1 %    07-26    139  |  92<L>  |  27.0<H>  ----------------------------<  127<H>  4.3   |  30.0<H>  |  0.61    Ca    8.4<L>      26 Jul 2017 03:41  Phos  4.0     07-26  Mg     2.0     07-26          RECENT CULTURES:            CAPILLARY BLOOD GLUCOSE      RADIOLOGY & ADDITIONAL STUDIES:    ASSESSMENT/PLAN:  51yMale presenting with: end stage haert failure on milrinone drip, intubated.    Neuro: Off sedation, melatonin multifactorial encephalopathy    CV: Cardiac failure, with volume overload, lasix drio increase with goal of reduction of preload, plan for SVO2 to asses for increase on milrinone since perfusion has worsen, now with lactic acidosic and leukocytosis. patient is not a candidate for LVAD or transplant. will try to arrange family meeting to readdress end stage cardiac issues.    Pulm: Intubated, copiouis secretions, c/w CHF. will discuss tracheostomy with family, however, this will not affect the patients underlying severe cardiac disfunction with increased mortality.    GI/Nutrition: TF at goal.    /Renal: lasix drip Diamox to prevent contraction alkalosis. bun/cr stable NA is normal on current free water replacement.    ID: leukocytosis related to decreased in perfusion, no systemic sign of infections at current time, will hold on any antibiotics.    Endo: glycemia at target    Skin: off load and wound care for pressure wounds.    Proph:SQH    Dispo: ICU       CRITICAL CARE TIME SPENT: 40 minutes.

## 2017-07-27 LAB
ANION GAP SERPL CALC-SCNC: 12 MMOL/L — SIGNIFICANT CHANGE UP (ref 5–17)
ANION GAP SERPL CALC-SCNC: 17 MMOL/L — SIGNIFICANT CHANGE UP (ref 5–17)
ANISOCYTOSIS BLD QL: SLIGHT — SIGNIFICANT CHANGE UP
BUN SERPL-MCNC: 30 MG/DL — HIGH (ref 8–20)
BUN SERPL-MCNC: 34 MG/DL — HIGH (ref 8–20)
CALCIUM SERPL-MCNC: 8.1 MG/DL — LOW (ref 8.6–10.2)
CALCIUM SERPL-MCNC: 8.7 MG/DL — SIGNIFICANT CHANGE UP (ref 8.6–10.2)
CHLORIDE SERPL-SCNC: 94 MMOL/L — LOW (ref 98–107)
CHLORIDE SERPL-SCNC: 96 MMOL/L — LOW (ref 98–107)
CO2 SERPL-SCNC: 30 MMOL/L — HIGH (ref 22–29)
CO2 SERPL-SCNC: 34 MMOL/L — HIGH (ref 22–29)
CREAT SERPL-MCNC: 0.67 MG/DL — SIGNIFICANT CHANGE UP (ref 0.5–1.3)
CREAT SERPL-MCNC: 0.79 MG/DL — SIGNIFICANT CHANGE UP (ref 0.5–1.3)
GLUCOSE SERPL-MCNC: 108 MG/DL — SIGNIFICANT CHANGE UP (ref 70–115)
GLUCOSE SERPL-MCNC: 132 MG/DL — HIGH (ref 70–115)
HCT VFR BLD CALC: 26.1 % — LOW (ref 42–52)
HGB BLD-MCNC: 7.5 G/DL — LOW (ref 14–18)
HYPOCHROMIA BLD QL: SLIGHT — SIGNIFICANT CHANGE UP
LACTATE SERPL-SCNC: 2.9 MMOL/L — HIGH (ref 0.5–2)
LYMPHOCYTES # BLD AUTO: 2 % — LOW (ref 20–55)
MACROCYTES BLD QL: SLIGHT — SIGNIFICANT CHANGE UP
MAGNESIUM SERPL-MCNC: 1.8 MG/DL — SIGNIFICANT CHANGE UP (ref 1.6–2.6)
MAGNESIUM SERPL-MCNC: 3 MG/DL — HIGH (ref 1.6–2.6)
MCHC RBC-ENTMCNC: 25.6 PG — LOW (ref 27–31)
MCHC RBC-ENTMCNC: 28.7 G/DL — LOW (ref 32–36)
MCV RBC AUTO: 89.1 FL — SIGNIFICANT CHANGE UP (ref 80–94)
MICROCYTES BLD QL: SLIGHT — SIGNIFICANT CHANGE UP
MONOCYTES NFR BLD AUTO: 5 % — SIGNIFICANT CHANGE UP (ref 3–10)
NEUTROPHILS NFR BLD AUTO: 86 % — HIGH (ref 37–73)
NEUTS BAND # BLD: 7 % — SIGNIFICANT CHANGE UP (ref 0–8)
NRBC # BLD: 1 /100 — HIGH (ref 0–0)
OVALOCYTES BLD QL SMEAR: SLIGHT — SIGNIFICANT CHANGE UP
PHOSPHATE SERPL-MCNC: 2.8 MG/DL — SIGNIFICANT CHANGE UP (ref 2.4–4.7)
PHOSPHATE SERPL-MCNC: 3.5 MG/DL — SIGNIFICANT CHANGE UP (ref 2.4–4.7)
PLAT MORPH BLD: NORMAL — SIGNIFICANT CHANGE UP
PLATELET # BLD AUTO: 307 K/UL — SIGNIFICANT CHANGE UP (ref 150–400)
POIKILOCYTOSIS BLD QL AUTO: SLIGHT — SIGNIFICANT CHANGE UP
POLYCHROMASIA BLD QL SMEAR: SLIGHT — SIGNIFICANT CHANGE UP
POTASSIUM SERPL-MCNC: 3.2 MMOL/L — LOW (ref 3.5–5.3)
POTASSIUM SERPL-MCNC: 4.6 MMOL/L — SIGNIFICANT CHANGE UP (ref 3.5–5.3)
POTASSIUM SERPL-SCNC: 3.2 MMOL/L — LOW (ref 3.5–5.3)
POTASSIUM SERPL-SCNC: 4.6 MMOL/L — SIGNIFICANT CHANGE UP (ref 3.5–5.3)
RBC # BLD: 2.93 M/UL — LOW (ref 4.6–6.2)
RBC # FLD: 20.6 % — HIGH (ref 11–15.6)
RBC BLD AUTO: ABNORMAL
SODIUM SERPL-SCNC: 141 MMOL/L — SIGNIFICANT CHANGE UP (ref 135–145)
SODIUM SERPL-SCNC: 142 MMOL/L — SIGNIFICANT CHANGE UP (ref 135–145)
WBC # BLD: 17.6 K/UL — HIGH (ref 4.8–10.8)
WBC # FLD AUTO: 17.6 K/UL — HIGH (ref 4.8–10.8)

## 2017-07-27 PROCEDURE — 99291 CRITICAL CARE FIRST HOUR: CPT

## 2017-07-27 PROCEDURE — 71010: CPT | Mod: 26

## 2017-07-27 PROCEDURE — 99233 SBSQ HOSP IP/OBS HIGH 50: CPT

## 2017-07-27 RX ORDER — POTASSIUM CHLORIDE 20 MEQ
20 PACKET (EA) ORAL
Qty: 0 | Refills: 0 | Status: COMPLETED | OUTPATIENT
Start: 2017-07-27 | End: 2017-07-27

## 2017-07-27 RX ORDER — MAGNESIUM SULFATE 500 MG/ML
2 VIAL (ML) INJECTION ONCE
Qty: 0 | Refills: 0 | Status: COMPLETED | OUTPATIENT
Start: 2017-07-27 | End: 2017-07-27

## 2017-07-27 RX ORDER — FENTANYL CITRATE 50 UG/ML
50 INJECTION INTRAVENOUS ONCE
Qty: 0 | Refills: 0 | Status: DISCONTINUED | OUTPATIENT
Start: 2017-07-27 | End: 2017-07-27

## 2017-07-27 RX ORDER — MAGNESIUM SULFATE 500 MG/ML
4 VIAL (ML) INJECTION ONCE
Qty: 0 | Refills: 0 | Status: DISCONTINUED | OUTPATIENT
Start: 2017-07-27 | End: 2017-07-27

## 2017-07-27 RX ORDER — POTASSIUM PHOSPHATE, MONOBASIC POTASSIUM PHOSPHATE, DIBASIC 236; 224 MG/ML; MG/ML
15 INJECTION, SOLUTION INTRAVENOUS ONCE
Qty: 0 | Refills: 0 | Status: COMPLETED | OUTPATIENT
Start: 2017-07-27 | End: 2017-07-27

## 2017-07-27 RX ORDER — ACETAZOLAMIDE 250 MG/1
250 TABLET ORAL ONCE
Qty: 0 | Refills: 0 | Status: COMPLETED | OUTPATIENT
Start: 2017-07-27 | End: 2017-07-27

## 2017-07-27 RX ORDER — VECURONIUM BROMIDE 20 MG/1
10 INJECTION, POWDER, FOR SOLUTION INTRAVENOUS ONCE
Qty: 0 | Refills: 0 | Status: COMPLETED | OUTPATIENT
Start: 2017-07-27 | End: 2017-07-27

## 2017-07-27 RX ORDER — FENTANYL CITRATE 50 UG/ML
25 INJECTION INTRAVENOUS ONCE
Qty: 0 | Refills: 0 | Status: DISCONTINUED | OUTPATIENT
Start: 2017-07-27 | End: 2017-07-27

## 2017-07-27 RX ORDER — MAGNESIUM SULFATE 500 MG/ML
2 VIAL (ML) INJECTION
Qty: 0 | Refills: 0 | Status: COMPLETED | OUTPATIENT
Start: 2017-07-27 | End: 2017-07-27

## 2017-07-27 RX ADMIN — CHLORHEXIDINE GLUCONATE 15 MILLILITER(S): 213 SOLUTION TOPICAL at 18:40

## 2017-07-27 RX ADMIN — LISINOPRIL 2.5 MILLIGRAM(S): 2.5 TABLET ORAL at 05:29

## 2017-07-27 RX ADMIN — Medication 10 MILLIGRAM(S): at 11:29

## 2017-07-27 RX ADMIN — FENTANYL CITRATE 25 MICROGRAM(S): 50 INJECTION INTRAVENOUS at 01:37

## 2017-07-27 RX ADMIN — PANTOPRAZOLE SODIUM 40 MILLIGRAM(S): 20 TABLET, DELAYED RELEASE ORAL at 18:40

## 2017-07-27 RX ADMIN — AMIODARONE HYDROCHLORIDE 200 MILLIGRAM(S): 400 TABLET ORAL at 05:29

## 2017-07-27 RX ADMIN — Medication 100 MILLIEQUIVALENT(S): at 03:21

## 2017-07-27 RX ADMIN — HYDROMORPHONE HYDROCHLORIDE 1 MILLIGRAM(S): 2 INJECTION INTRAMUSCULAR; INTRAVENOUS; SUBCUTANEOUS at 05:29

## 2017-07-27 RX ADMIN — Medication 100 MILLIEQUIVALENT(S): at 09:35

## 2017-07-27 RX ADMIN — PANTOPRAZOLE SODIUM 40 MILLIGRAM(S): 20 TABLET, DELAYED RELEASE ORAL at 05:37

## 2017-07-27 RX ADMIN — FENTANYL CITRATE 50 MICROGRAM(S): 50 INJECTION INTRAVENOUS at 14:15

## 2017-07-27 RX ADMIN — Medication 3 MILLILITER(S): at 14:53

## 2017-07-27 RX ADMIN — Medication 50 GRAM(S): at 01:15

## 2017-07-27 RX ADMIN — HYDROMORPHONE HYDROCHLORIDE 1 MILLIGRAM(S): 2 INJECTION INTRAMUSCULAR; INTRAVENOUS; SUBCUTANEOUS at 09:15

## 2017-07-27 RX ADMIN — HYDROMORPHONE HYDROCHLORIDE 1 MILLIGRAM(S): 2 INJECTION INTRAMUSCULAR; INTRAVENOUS; SUBCUTANEOUS at 18:45

## 2017-07-27 RX ADMIN — FENTANYL CITRATE 50 MICROGRAM(S): 50 INJECTION INTRAVENOUS at 14:30

## 2017-07-27 RX ADMIN — FENTANYL CITRATE 25 MICROGRAM(S): 50 INJECTION INTRAVENOUS at 20:45

## 2017-07-27 RX ADMIN — Medication 0.12 MILLIGRAM(S): at 05:29

## 2017-07-27 RX ADMIN — FENTANYL CITRATE 25 MICROGRAM(S): 50 INJECTION INTRAVENOUS at 01:14

## 2017-07-27 RX ADMIN — Medication 3 MILLILITER(S): at 03:30

## 2017-07-27 RX ADMIN — Medication 7.5 MG/HR: at 11:33

## 2017-07-27 RX ADMIN — Medication 100 MILLIEQUIVALENT(S): at 11:32

## 2017-07-27 RX ADMIN — Medication 3 MILLILITER(S): at 20:59

## 2017-07-27 RX ADMIN — Medication 50 GRAM(S): at 11:32

## 2017-07-27 RX ADMIN — VECURONIUM BROMIDE 10 MILLIGRAM(S): 20 INJECTION, POWDER, FOR SOLUTION INTRAVENOUS at 23:58

## 2017-07-27 RX ADMIN — Medication 100 MILLIGRAM(S): at 05:37

## 2017-07-27 RX ADMIN — HYDROMORPHONE HYDROCHLORIDE 1 MILLIGRAM(S): 2 INJECTION INTRAMUSCULAR; INTRAVENOUS; SUBCUTANEOUS at 15:53

## 2017-07-27 RX ADMIN — Medication 100 MILLIEQUIVALENT(S): at 02:22

## 2017-07-27 RX ADMIN — HYDROMORPHONE HYDROCHLORIDE 1 MILLIGRAM(S): 2 INJECTION INTRAMUSCULAR; INTRAVENOUS; SUBCUTANEOUS at 12:45

## 2017-07-27 RX ADMIN — FENTANYL CITRATE 50 MICROGRAM(S): 50 INJECTION INTRAVENOUS at 23:58

## 2017-07-27 RX ADMIN — Medication 7.5 MG/HR: at 20:47

## 2017-07-27 RX ADMIN — HYDROMORPHONE HYDROCHLORIDE 1 MILLIGRAM(S): 2 INJECTION INTRAMUSCULAR; INTRAVENOUS; SUBCUTANEOUS at 05:50

## 2017-07-27 RX ADMIN — HYDROMORPHONE HYDROCHLORIDE 1 MILLIGRAM(S): 2 INJECTION INTRAMUSCULAR; INTRAVENOUS; SUBCUTANEOUS at 09:00

## 2017-07-27 RX ADMIN — HEPARIN SODIUM 5000 UNIT(S): 5000 INJECTION INTRAVENOUS; SUBCUTANEOUS at 05:29

## 2017-07-27 RX ADMIN — FENTANYL CITRATE 50 MICROGRAM(S): 50 INJECTION INTRAVENOUS at 10:30

## 2017-07-27 RX ADMIN — FENTANYL CITRATE 50 MICROGRAM(S): 50 INJECTION INTRAVENOUS at 10:45

## 2017-07-27 RX ADMIN — HEPARIN SODIUM 5000 UNIT(S): 5000 INJECTION INTRAVENOUS; SUBCUTANEOUS at 13:42

## 2017-07-27 RX ADMIN — Medication 3 MILLILITER(S): at 08:50

## 2017-07-27 RX ADMIN — Medication 7.5 MG/HR: at 18:41

## 2017-07-27 RX ADMIN — FENTANYL CITRATE 25 MICROGRAM(S): 50 INJECTION INTRAVENOUS at 20:50

## 2017-07-27 RX ADMIN — CHLORHEXIDINE GLUCONATE 15 MILLILITER(S): 213 SOLUTION TOPICAL at 05:37

## 2017-07-27 RX ADMIN — POTASSIUM PHOSPHATE, MONOBASIC POTASSIUM PHOSPHATE, DIBASIC 62.5 MILLIMOLE(S): 236; 224 INJECTION, SOLUTION INTRAVENOUS at 09:35

## 2017-07-27 RX ADMIN — MILRINONE LACTATE 12.22 MICROGRAM(S)/KG/MIN: 1 INJECTION, SOLUTION INTRAVENOUS at 18:40

## 2017-07-27 RX ADMIN — MILRINONE LACTATE 12.22 MICROGRAM(S)/KG/MIN: 1 INJECTION, SOLUTION INTRAVENOUS at 11:33

## 2017-07-27 RX ADMIN — Medication 100 MILLIGRAM(S): at 11:29

## 2017-07-27 RX ADMIN — HYDROMORPHONE HYDROCHLORIDE 1 MILLIGRAM(S): 2 INJECTION INTRAMUSCULAR; INTRAVENOUS; SUBCUTANEOUS at 13:00

## 2017-07-27 RX ADMIN — HYDROMORPHONE HYDROCHLORIDE 1 MILLIGRAM(S): 2 INJECTION INTRAMUSCULAR; INTRAVENOUS; SUBCUTANEOUS at 19:15

## 2017-07-27 RX ADMIN — ACETAZOLAMIDE 250 MILLIGRAM(S): 250 TABLET ORAL at 18:47

## 2017-07-27 RX ADMIN — HEPARIN SODIUM 5000 UNIT(S): 5000 INJECTION INTRAVENOUS; SUBCUTANEOUS at 22:32

## 2017-07-27 RX ADMIN — MILRINONE LACTATE 12.22 MICROGRAM(S)/KG/MIN: 1 INJECTION, SOLUTION INTRAVENOUS at 03:21

## 2017-07-27 RX ADMIN — Medication 100 MILLIEQUIVALENT(S): at 10:00

## 2017-07-27 RX ADMIN — Medication 100 MILLIEQUIVALENT(S): at 01:15

## 2017-07-27 RX ADMIN — Medication 650 MILLIGRAM(S): at 07:50

## 2017-07-27 RX ADMIN — HYDROMORPHONE HYDROCHLORIDE 1 MILLIGRAM(S): 2 INJECTION INTRAMUSCULAR; INTRAVENOUS; SUBCUTANEOUS at 16:15

## 2017-07-27 RX ADMIN — Medication 50 GRAM(S): at 09:35

## 2017-07-27 RX ADMIN — Medication 50 GRAM(S): at 10:00

## 2017-07-27 NOTE — PROGRESS NOTE ADULT - ASSESSMENT
51yr man, NICM, s/p lap cholecystecomy, found to have bile duct leak. Underwent an ERCP and had cardiac arrest. Long hospital course with several intubations/extubations, s/p PEG, s/p paracentesis. Continues to be vented, encephalopathic. Poor overall outlook.

## 2017-07-27 NOTE — PROGRESS NOTE ADULT - PROBLEM SELECTOR PROBLEM 5
Atrial fibrillation
Bile leak, postoperative
Gastrointestinal hemorrhage, unspecified gastrointestinal hemorrhage type
Septic shock
Ventricular tachycardia
Coronary artery disease with other form of angina pectoris
Encephalopathy
Encounter for palliative care
Encounter for palliative care
Urinary retention
Ventricular tachycardia

## 2017-07-27 NOTE — PROGRESS NOTE ADULT - SUBJECTIVE AND OBJECTIVE BOX
INTERVAL HPI/OVERNIGHT EVENTS:    PRESSORS: [ ] YES [ ] NO  WHICH:  DOSE:    ANTIBIOTICS:                  DATE STARTED:  ANTIBIOTICS:                  DATE STARTED:  ANTIBIOTICS:                  DATE STARTED:    MEDICATIONS  (STANDING):  amantadine Syrup 100 milliGRAM(s) Oral <User Schedule>  FLUoxetine Solution 10 milliGRAM(s) Oral daily  traZODone 50 milliGRAM(s) Oral at bedtime  digoxin     Tablet 0.125 milliGRAM(s) Oral daily  melatonin 9 milliGRAM(s) Oral at bedtime  pantoprazole   Suspension 40 milliGRAM(s) Oral two times a day before meals  senna Syrup 10 milliLiter(s) Oral at bedtime  lisinopril 2.5 milliGRAM(s) Oral daily  amiodarone    Tablet 200 milliGRAM(s) Oral daily  heparin  Injectable 5000 Unit(s) SubCutaneous every 8 hours  ALBUTerol/ipratropium for Nebulization 3 milliLiter(s) Nebulizer every 6 hours  chlorhexidine 0.12% Liquid 15 milliLiter(s) Swish and Spit two times a day  milrinone Infusion 0.5 MICROgram(s)/kG/Min (12.225 mL/Hr) IV Continuous <Continuous>  furosemide Infusion 15 mG/Hr (7.5 mL/Hr) IV Continuous <Continuous>    MEDICATIONS  (PRN):  acetaminophen    Suspension 650 milliGRAM(s) Oral every 6 hours PRN For Temp greater than 38.5 C (101.3 F)  bisacodyl Suppository 10 milliGRAM(s) Rectal daily PRN Constipation  HYDROmorphone  Injectable 1 milliGRAM(s) IV Push every 3 hours PRN Agitation      Drug Dosing Weight  Height (cm): 165.1 (12 Jun 2017 06:38)  Weight (kg): 81.5 (12 Jun 2017 06:38)  BMI (kg/m2): 29.9 (12 Jun 2017 06:38)  BSA (m2): 1.89 (12 Jun 2017 06:38)    CENTRAL LINE: [ ] YES [ ] NO  LOCATION:   DATE INSERTED:  REMOVE: [ ] YES [ ] NO  EXPLAIN:    ARMAS: [ ] YES [ ] NO    DATE INSERTED:  REMOVE: [ ] YES [ ] NO  EXPLAIN:    A-LINE: [ ] YES [ ] NO  LOCATION:   DATE INSERTED:  REMOVE: [ ] YES [ ] NO  EXPLAIN:    PAST MEDICAL & SURGICAL HISTORY:  Mitral regurgitation: severe MR  Cardiomyopathy, nonischemic  CAD (coronary artery disease)  Asthma  Atrial fibrillation  S/P laparoscopic cholecystectomy  History of humerus fracture: Metal pins in Left Humerus  Artificial pacemaker      ICU Vital Signs Last 24 Hrs  T(C): 37.8 (27 Jul 2017 04:00), Max: 37.8 (27 Jul 2017 00:00)  T(F): 100 (27 Jul 2017 04:00), Max: 100.1 (27 Jul 2017 00:00)  HR: 112 (27 Jul 2017 06:00) (97 - 123)  BP: --  BP(mean): --  ABP: 119/65 (27 Jul 2017 06:00) (99/53 - 129/53)  ABP(mean): 85 (27 Jul 2017 06:00) (68 - 85)  RR: 26 (27 Jul 2017 06:00) (19 - 38)  SpO2: 100% (27 Jul 2017 06:00) (89% - 992%)      ABG - ( 26 Jul 2017 03:33 )  pH: 7.45  /  pCO2: 48    /  pO2: 72    / HCO3: 32    / Base Excess: 8.2   /  SaO2: 96                  I&O's Detail    25 Jul 2017 07:01  -  26 Jul 2017 07:00  --------------------------------------------------------  IN:    Enteral Tube Flush: 180 mL    Free Water: 1000 mL    furosemide Infusion: 120 mL    milrinone  Infusion: 292.8 mL    Pivot: 1060 mL    Solution: 50 mL    Solution: 252 mL    Solution: 350 mL    Solution: 100 mL  Total IN: 3404.8 mL    OUT:    Indwelling Catheter - Urethral: 2275 mL  Total OUT: 2275 mL    Total NET: 1129.8 mL      26 Jul 2017 07:01  -  27 Jul 2017 06:30  --------------------------------------------------------  IN:    Enteral Tube Flush: 100 mL    Free Water: 600 mL    furosemide Infusion: 5 mL    furosemide Infusion: 135 mL    milrinone  Infusion: 323.3 mL    Pivot: 855 mL    Solution: 100 mL    Solution: 300 mL    Solution: 100 mL  Total IN: 2518.3 mL    OUT:    Indwelling Catheter - Urethral: 1595 mL  Total OUT: 1595 mL    Total NET: 923.3 mL          Mode: AC/ CMV (Assist Control/ Continuous Mandatory Ventilation)  RR (machine): 15  TV (machine): 450  FiO2: 100  PEEP: 12  MAP: 16  PIP: 22      Physical Exam:    Neurological:  No sensory/motor deficits    HEENT: PERRLA, EOMI, no drainage or redness    Neck: No bruits; no thyromegaly or nodules,  No JVD    Back: Normal spine flexure, No CVA tenderness, No deformity or limitation of movement    Respiratory: Breath Sounds equal & clear to auscultation, no accessory muscle use    Cardiovascular: Regular rate & rhythm, normal S1, S2; no murmurs, gallops or rubs    Gastrointestinal: Soft, non-tender, normal bowel sounds    Extremities: No peripheral edema, No cyanosis, clubbing     Vascular: Equal and normal pulses: 2+ peripheral pulses throughout    Musculoskeletal: No joint pain, swelling or deformity; no limitation of movement    Skin: No rashes    LABS:  CBC Full  -  ( 26 Jul 2017 03:41 )  WBC Count : 14.4 K/uL  Hemoglobin : 8.7 g/dL  Hematocrit : 29.2 %  Platelet Count - Automated : 270 K/uL  Mean Cell Volume : 85.9 fl  Mean Cell Hemoglobin : 25.6 pg  Mean Cell Hemoglobin Concentration : 29.8 g/dL  Auto Neutrophil # : 11.3 K/uL  Auto Lymphocyte # : 2.0 K/uL  Auto Monocyte # : 0.8 K/uL  Auto Eosinophil # : 0.1 K/uL  Auto Basophil # : 0.0 K/uL  Auto Neutrophil % : 78.8 %  Auto Lymphocyte % : 14.3 %  Auto Monocyte % : 5.7 %  Auto Eosinophil % : 0.7 %  Auto Basophil % : 0.1 %    07-26    140  |  94<L>  |  27.0<H>  ----------------------------<  115  3.2<L>   |  33.0<H>  |  0.64    Ca    8.2<L>      26 Jul 2017 22:13  Phos  3.4     07-26  Mg     1.7     07-26            Assessment and Plan:    Neuro: GCS 15. Monitor for delirium.  Continue to optimize pain control. Serial Neurologic assessments    HEENT: no issues    CV: Continue hemodynamic monitoring    Pulm: Pulmonary toilet.  Continue incentive spirometer.  Chest PT.  Encourage OOB to chair and ambulation     GI/Nutrition: Bowel Regimen    /Renal: monitor UOP. Monitor BMP.  Replete Lytes as needed      HEME- DVT prophylaxis, SCDs    ID:     Lines/Tubes:     Endo:     Skin:     Dispo: INTERVAL HPI/OVERNIGHT EVENTS: milrinone increase with improvement of SVO2        MEDICATIONS  (STANDING):  amantadine Syrup 100 milliGRAM(s) Oral <User Schedule>  FLUoxetine Solution 10 milliGRAM(s) Oral daily  traZODone 50 milliGRAM(s) Oral at bedtime  digoxin     Tablet 0.125 milliGRAM(s) Oral daily  melatonin 9 milliGRAM(s) Oral at bedtime  pantoprazole   Suspension 40 milliGRAM(s) Oral two times a day before meals  senna Syrup 10 milliLiter(s) Oral at bedtime  lisinopril 2.5 milliGRAM(s) Oral daily  amiodarone    Tablet 200 milliGRAM(s) Oral daily  heparin  Injectable 5000 Unit(s) SubCutaneous every 8 hours  ALBUTerol/ipratropium for Nebulization 3 milliLiter(s) Nebulizer every 6 hours  chlorhexidine 0.12% Liquid 15 milliLiter(s) Swish and Spit two times a day  milrinone Infusion 0.5 MICROgram(s)/kG/Min (12.225 mL/Hr) IV Continuous <Continuous>  furosemide Infusion 15 mG/Hr (7.5 mL/Hr) IV Continuous <Continuous>    MEDICATIONS  (PRN):  acetaminophen    Suspension 650 milliGRAM(s) Oral every 6 hours PRN For Temp greater than 38.5 C (101.3 F)  bisacodyl Suppository 10 milliGRAM(s) Rectal daily PRN Constipation  HYDROmorphone  Injectable 1 milliGRAM(s) IV Push every 3 hours PRN Agitation      Drug Dosing Weight  Height (cm): 165.1 (12 Jun 2017 06:38)  Weight (kg): 81.5 (12 Jun 2017 06:38)  BMI (kg/m2): 29.9 (12 Jun 2017 06:38)  BSA (m2): 1.89 (12 Jun 2017 06:38)        PAST MEDICAL & SURGICAL HISTORY:  Mitral regurgitation: severe MR  Cardiomyopathy, nonischemic  CAD (coronary artery disease)  Asthma  Atrial fibrillation  S/P laparoscopic cholecystectomy  History of humerus fracture: Metal pins in Left Humerus  Artificial pacemaker      ICU Vital Signs Last 24 Hrs  T(C): 37.8 (27 Jul 2017 04:00), Max: 37.8 (27 Jul 2017 00:00)  T(F): 100 (27 Jul 2017 04:00), Max: 100.1 (27 Jul 2017 00:00)  HR: 112 (27 Jul 2017 06:00) (97 - 123)  BP: --  BP(mean): --  ABP: 119/65 (27 Jul 2017 06:00) (99/53 - 129/53)  ABP(mean): 85 (27 Jul 2017 06:00) (68 - 85)  RR: 26 (27 Jul 2017 06:00) (19 - 38)  SpO2: 100% (27 Jul 2017 06:00) (89% - 992%)      ABG - ( 26 Jul 2017 03:33 )  pH: 7.45  /  pCO2: 48    /  pO2: 72    / HCO3: 32    / Base Excess: 8.2   /  SaO2: 96                  I&O's Detail    25 Jul 2017 07:01  -  26 Jul 2017 07:00  --------------------------------------------------------  IN:    Enteral Tube Flush: 180 mL    Free Water: 1000 mL    furosemide Infusion: 120 mL    milrinone  Infusion: 292.8 mL    Pivot: 1060 mL    Solution: 50 mL    Solution: 252 mL    Solution: 350 mL    Solution: 100 mL  Total IN: 3404.8 mL    OUT:    Indwelling Catheter - Urethral: 2275 mL  Total OUT: 2275 mL    Total NET: 1129.8 mL      26 Jul 2017 07:01  -  27 Jul 2017 06:30  --------------------------------------------------------  IN:    Enteral Tube Flush: 100 mL    Free Water: 600 mL    furosemide Infusion: 5 mL    furosemide Infusion: 135 mL    milrinone  Infusion: 323.3 mL    Pivot: 855 mL    Solution: 100 mL    Solution: 300 mL    Solution: 100 mL  Total IN: 2518.3 mL    OUT:    Indwelling Catheter - Urethral: 1595 mL  Total OUT: 1595 mL    Total NET: 923.3 mL          Mode: AC/ CMV (Assist Control/ Continuous Mandatory Ventilation)  RR (machine): 15  TV (machine): 450  FiO2: 100  PEEP: 12  MAP: 16  PIP: 22      Physical Exam:    Neurological:  awake, frustrated and impulsive    HEENT: PERRLA, EOMI, no drainage or redness    Neck: JVD    Respiratory: rales globally    Cardiovascular: irreg rate controlled.    Gastrointestinal: obese, soft, PEG in place.    Extremities:  +2 edema    Musculoskeletal: no deformities.    Skin: intact.    LABS:  CBC Full  -  ( 26 Jul 2017 03:41 )  WBC Count : 14.4 K/uL  Hemoglobin : 8.7 g/dL  Hematocrit : 29.2 %  Platelet Count - Automated : 270 K/uL  Mean Cell Volume : 85.9 fl  Mean Cell Hemoglobin : 25.6 pg  Mean Cell Hemoglobin Concentration : 29.8 g/dL  Auto Neutrophil # : 11.3 K/uL  Auto Lymphocyte # : 2.0 K/uL  Auto Monocyte # : 0.8 K/uL  Auto Eosinophil # : 0.1 K/uL  Auto Basophil # : 0.0 K/uL  Auto Neutrophil % : 78.8 %  Auto Lymphocyte % : 14.3 %  Auto Monocyte % : 5.7 %  Auto Eosinophil % : 0.7 %  Auto Basophil % : 0.1 %    07-26    140  |  94<L>  |  27.0<H>  ----------------------------<  115  3.2<L>   |  33.0<H>  |  0.64    Ca    8.2<L>      26 Jul 2017 22:13  Phos  3.4     07-26  Mg     1.7     07-26            Assessment and Plan: severe non ischemic cardiomyopathy s/p lap cholecystectomy complicated by bile leak.    Neuro:  off sedation, continue current regime.    CV:  milrinone increased, SVO2 improved not a candidate for destination therapy of transplant. continue aggressive diresis     Pulm: vent dependant resp failure, thin secretion from pulmonary  edema secondary to heart failure.    GI/Nutrition: TF at goal.    /Renal:   cont aggressive diuresis and prevention of contraction alkalosis. sodium stable.    HEME- H/H stable on HSQ    ID: leukocytosis related to perfusion and no signs of systematic infection. no need for antibiotics at the current time.    Endo: glycemia at traget    Dispo: ICU

## 2017-07-27 NOTE — PROGRESS NOTE ADULT - ATTENDING COMMENTS
Patient remains critically ill and requires my critical care.  Spoke with partner about the end stage of his heart disease and the fact that he is not  a transplant candidate.  She will try to arrange a family meeting with the patients brothers ( only relatives in [facility], at the current time unable to contact with spouse or children overseas by our team, palliative or ) to discuss goals of care on Saturday. Patient remains critically ill and requires my critical care.  Spoke with partner about the end stage of his heart disease and the fact that he is not  a transplant candidate.  She will try to arrange a family meeting with the patients brothers ( only relatives in [facility], at the current time unable to contact with spouse or children overseas by our team, palliative or ) to discuss goals of care on Saturday.  40 minutes of critical care time.

## 2017-07-27 NOTE — PROGRESS NOTE ADULT - PROBLEM SELECTOR PLAN 5
Several attempts made  to obtain contact numbers of family in [facility] ( wife, children, parents) but no return call. Spoke to Rahel Reina, Assist Director of . She was able to obtain a number of wife in [facility],  from niece Sunni Lion, but number given was disconnected. She tried to call again for any other numbers   ( children, parents? ) but thus far has not heard back. Next Surrogates at this point are patient's siblings.

## 2017-07-27 NOTE — CHART NOTE - NSCHARTNOTEFT_GEN_A_CORE
Called to bedside for hypoxia,   patient has been dyspneic with saturations low 80 with minimal response to increase of FIO2 to 100%.  CXR had complete left white out.  On arrival, HD normal, sedated. no breath sounds on left hemithorax.  patient was sedated and paralyzed.  lavage , valve bag manual ventilated and suctioned with red rubber catheter with thick tan secretions.  this was done multiple times, with improvement of saturations to 100%.  Bronchoscopy evidence no foreign bodies and minimal secretions of at distal bronchi.  BS retuned to left side with rhonchi.  CXR improved aeration.    Events discussed with family as well ad his end stage cardiac diseases.  Awaiting family meeting on Saturday to readdress goals of care with immediate family in the [place].

## 2017-07-27 NOTE — PROGRESS NOTE ADULT - PROBLEM SELECTOR PLAN 1
Vented will needs trach. However, patient's overall outlook for meaningful recovery is poor. Will need family discussion regarding GOC.

## 2017-07-27 NOTE — PROGRESS NOTE ADULT - SUBJECTIVE AND OBJECTIVE BOX
INTERVAL HPI/OVERNIGHT EVENTS:    PRESENT SYMPTOMS: SOURCE:  Patient/Family/Team    PAIN SCALE:  0 = none  1 = mild   2 = moderate  3 = severe    Pain: does not appear to be in pain    Dyspnea:  [x ] YES [ ] NO  Anxiety:  [ x] YES [ ] NO  Fatigue: [x ] YES [ ] NO  Nausea: [ ] YES [ x] NO  Loss of Appetite: [ ] YES [ ] NO  on TF  Other symptoms: __________    MEDICATIONS  (STANDING):  amantadine Syrup 100 milliGRAM(s) Oral <User Schedule>  FLUoxetine Solution 10 milliGRAM(s) Oral daily  traZODone 50 milliGRAM(s) Oral at bedtime  digoxin     Tablet 0.125 milliGRAM(s) Oral daily  melatonin 9 milliGRAM(s) Oral at bedtime  pantoprazole   Suspension 40 milliGRAM(s) Oral two times a day before meals  senna Syrup 10 milliLiter(s) Oral at bedtime  lisinopril 2.5 milliGRAM(s) Oral daily  amiodarone    Tablet 200 milliGRAM(s) Oral daily  heparin  Injectable 5000 Unit(s) SubCutaneous every 8 hours  ALBUTerol/ipratropium for Nebulization 3 milliLiter(s) Nebulizer every 6 hours  chlorhexidine 0.12% Liquid 15 milliLiter(s) Swish and Spit two times a day  milrinone Infusion 0.5 MICROgram(s)/kG/Min (12.225 mL/Hr) IV Continuous <Continuous>  furosemide Infusion 15 mG/Hr (7.5 mL/Hr) IV Continuous <Continuous>    MEDICATIONS  (PRN):  acetaminophen    Suspension 650 milliGRAM(s) Oral every 6 hours PRN For Temp greater than 38.5 C (101.3 F)  bisacodyl Suppository 10 milliGRAM(s) Rectal daily PRN Constipation  HYDROmorphone  Injectable 1 milliGRAM(s) IV Push every 3 hours PRN Agitation      Allergies    No Known Allergies    Intolerances    Karnofsky Performance Score/Palliative Performance Status Version 2: 10    %    Vital Signs Last 24 Hrs  T(C): 38 (27 Jul 2017 12:00), Max: 39.2 (27 Jul 2017 08:01)  T(F): 100.4 (27 Jul 2017 12:00), Max: 102.5 (27 Jul 2017 08:01)  HR: 113 (27 Jul 2017 16:00) (101 - 123)  BP: --  BP(mean): --  RR: 32 (27 Jul 2017 16:00) (18 - 38)  SpO2: 97% (27 Jul 2017 16:00) (89% - 992%)    PHYSICAL EXAM:    General: Eyes open, vented, NAD    HEENT: [ ] normal  [ ] dry mouth  [x ] ET tube/trach    Lungs: [x ] comfortable- vented  [ ] tachypnea/labored breathing  [ ] excessive secretions    CV: [x ] normal  [ ] tachycardia    GI: [ ] normal  [ ] distended  [ ] tender  [ ] no BS               [ x] PEG/NG/OG tube    MSK: [ ] normal  [x ] weakness  [ ] edema             [ ] ambulatory  [ x] bedbound/wheelchair bound    Skin: [ ] normal  [ ] pressure ulcers- Stage_____  [ x] no rash    LABS:                        7.5    17.6  )-----------( 307      ( 27 Jul 2017 07:56 )             26.1     07-27    142  |  96<L>  |  30.0<H>  ----------------------------<  132<H>  3.2<L>   |  34.0<H>  |  0.67    Ca    8.1<L>      27 Jul 2017 07:56  Phos  2.8     07-27  Mg     1.8     07-27          I&O's Summary    26 Jul 2017 07:01  -  27 Jul 2017 07:00  --------------------------------------------------------  IN: 2821.5 mL / OUT: 1595 mL / NET: 1226.5 mL    27 Jul 2017 07:01  -  27 Jul 2017 16:10  --------------------------------------------------------  IN: 1767 mL / OUT: 550 mL / NET: 1217 mL        Thank you for the opportunity to assist with the care of this patient.   [facility] [phone].

## 2017-07-28 LAB
ANION GAP SERPL CALC-SCNC: 13 MMOL/L — SIGNIFICANT CHANGE UP (ref 5–17)
ANISOCYTOSIS BLD QL: SLIGHT — SIGNIFICANT CHANGE UP
BUN SERPL-MCNC: 39 MG/DL — HIGH (ref 8–20)
CALCIUM SERPL-MCNC: 8.8 MG/DL — SIGNIFICANT CHANGE UP (ref 8.6–10.2)
CHLORIDE SERPL-SCNC: 95 MMOL/L — LOW (ref 98–107)
CO2 SERPL-SCNC: 32 MMOL/L — HIGH (ref 22–29)
CREAT SERPL-MCNC: 0.95 MG/DL — SIGNIFICANT CHANGE UP (ref 0.5–1.3)
GAS PNL BLDA: SIGNIFICANT CHANGE UP
GLUCOSE SERPL-MCNC: 131 MG/DL — HIGH (ref 70–115)
HCT VFR BLD CALC: 26.8 % — LOW (ref 42–52)
HGB BLD-MCNC: 7.8 G/DL — LOW (ref 14–18)
HYPOCHROMIA BLD QL: SLIGHT — SIGNIFICANT CHANGE UP
LYMPHOCYTES # BLD AUTO: 7 % — LOW (ref 20–55)
MACROCYTES BLD QL: SLIGHT — SIGNIFICANT CHANGE UP
MAGNESIUM SERPL-MCNC: 2.8 MG/DL — HIGH (ref 1.6–2.6)
MCHC RBC-ENTMCNC: 25.6 PG — LOW (ref 27–31)
MCHC RBC-ENTMCNC: 29.1 G/DL — LOW (ref 32–36)
MCV RBC AUTO: 87.9 FL — SIGNIFICANT CHANGE UP (ref 80–94)
METAMYELOCYTES # FLD: 1 % — HIGH (ref 0–0)
MICROCYTES BLD QL: SLIGHT — SIGNIFICANT CHANGE UP
MONOCYTES NFR BLD AUTO: 4 % — SIGNIFICANT CHANGE UP (ref 3–10)
NEUTROPHILS NFR BLD AUTO: 87 % — HIGH (ref 37–73)
NEUTS BAND # BLD: 1 % — SIGNIFICANT CHANGE UP (ref 0–8)
NRBC # BLD: 1 /100 — HIGH (ref 0–0)
OVALOCYTES BLD QL SMEAR: SLIGHT — SIGNIFICANT CHANGE UP
PHOSPHATE SERPL-MCNC: 3.8 MG/DL — SIGNIFICANT CHANGE UP (ref 2.4–4.7)
PLAT MORPH BLD: NORMAL — SIGNIFICANT CHANGE UP
PLATELET # BLD AUTO: 345 K/UL — SIGNIFICANT CHANGE UP (ref 150–400)
POIKILOCYTOSIS BLD QL AUTO: SLIGHT — SIGNIFICANT CHANGE UP
POLYCHROMASIA BLD QL SMEAR: SLIGHT — SIGNIFICANT CHANGE UP
POTASSIUM SERPL-MCNC: 4.5 MMOL/L — SIGNIFICANT CHANGE UP (ref 3.5–5.3)
POTASSIUM SERPL-SCNC: 4.5 MMOL/L — SIGNIFICANT CHANGE UP (ref 3.5–5.3)
RBC # BLD: 3.05 M/UL — LOW (ref 4.6–6.2)
RBC # FLD: 20.5 % — HIGH (ref 11–15.6)
RBC BLD AUTO: ABNORMAL
SODIUM SERPL-SCNC: 140 MMOL/L — SIGNIFICANT CHANGE UP (ref 135–145)
WBC # BLD: 18 K/UL — HIGH (ref 4.8–10.8)
WBC # FLD AUTO: 18 K/UL — HIGH (ref 4.8–10.8)

## 2017-07-28 PROCEDURE — 99291 CRITICAL CARE FIRST HOUR: CPT

## 2017-07-28 PROCEDURE — 71010: CPT | Mod: 26

## 2017-07-28 RX ORDER — FENTANYL CITRATE 50 UG/ML
25 INJECTION INTRAVENOUS ONCE
Qty: 0 | Refills: 0 | Status: DISCONTINUED | OUTPATIENT
Start: 2017-07-28 | End: 2017-07-28

## 2017-07-28 RX ORDER — FENTANYL CITRATE 50 UG/ML
50 INJECTION INTRAVENOUS ONCE
Qty: 0 | Refills: 0 | Status: DISCONTINUED | OUTPATIENT
Start: 2017-07-28 | End: 2017-07-28

## 2017-07-28 RX ORDER — FENTANYL CITRATE 50 UG/ML
1 INJECTION INTRAVENOUS
Qty: 5000 | Refills: 0 | Status: DISCONTINUED | OUTPATIENT
Start: 2017-07-28 | End: 2017-08-02

## 2017-07-28 RX ADMIN — CHLORHEXIDINE GLUCONATE 15 MILLILITER(S): 213 SOLUTION TOPICAL at 05:15

## 2017-07-28 RX ADMIN — Medication 7.5 MG/HR: at 17:04

## 2017-07-28 RX ADMIN — FENTANYL CITRATE 25 MICROGRAM(S): 50 INJECTION INTRAVENOUS at 06:31

## 2017-07-28 RX ADMIN — SENNA PLUS 10 MILLILITER(S): 8.6 TABLET ORAL at 21:21

## 2017-07-28 RX ADMIN — HEPARIN SODIUM 5000 UNIT(S): 5000 INJECTION INTRAVENOUS; SUBCUTANEOUS at 21:21

## 2017-07-28 RX ADMIN — HYDROMORPHONE HYDROCHLORIDE 1 MILLIGRAM(S): 2 INJECTION INTRAMUSCULAR; INTRAVENOUS; SUBCUTANEOUS at 10:40

## 2017-07-28 RX ADMIN — HYDROMORPHONE HYDROCHLORIDE 1 MILLIGRAM(S): 2 INJECTION INTRAMUSCULAR; INTRAVENOUS; SUBCUTANEOUS at 15:00

## 2017-07-28 RX ADMIN — FENTANYL CITRATE 50 MICROGRAM(S): 50 INJECTION INTRAVENOUS at 07:45

## 2017-07-28 RX ADMIN — FENTANYL CITRATE 50 MICROGRAM(S): 50 INJECTION INTRAVENOUS at 07:30

## 2017-07-28 RX ADMIN — Medication 9 MILLIGRAM(S): at 21:21

## 2017-07-28 RX ADMIN — Medication 3 MILLILITER(S): at 21:14

## 2017-07-28 RX ADMIN — Medication 100 MILLIGRAM(S): at 11:50

## 2017-07-28 RX ADMIN — HEPARIN SODIUM 5000 UNIT(S): 5000 INJECTION INTRAVENOUS; SUBCUTANEOUS at 14:03

## 2017-07-28 RX ADMIN — Medication 100 MILLIGRAM(S): at 05:26

## 2017-07-28 RX ADMIN — HYDROMORPHONE HYDROCHLORIDE 1 MILLIGRAM(S): 2 INJECTION INTRAMUSCULAR; INTRAVENOUS; SUBCUTANEOUS at 03:47

## 2017-07-28 RX ADMIN — Medication 10 MILLIGRAM(S): at 11:51

## 2017-07-28 RX ADMIN — Medication 0.12 MILLIGRAM(S): at 05:26

## 2017-07-28 RX ADMIN — HYDROMORPHONE HYDROCHLORIDE 1 MILLIGRAM(S): 2 INJECTION INTRAMUSCULAR; INTRAVENOUS; SUBCUTANEOUS at 15:15

## 2017-07-28 RX ADMIN — MILRINONE LACTATE 12.22 MICROGRAM(S)/KG/MIN: 1 INJECTION, SOLUTION INTRAVENOUS at 21:22

## 2017-07-28 RX ADMIN — PANTOPRAZOLE SODIUM 40 MILLIGRAM(S): 20 TABLET, DELAYED RELEASE ORAL at 05:26

## 2017-07-28 RX ADMIN — HYDROMORPHONE HYDROCHLORIDE 1 MILLIGRAM(S): 2 INJECTION INTRAMUSCULAR; INTRAVENOUS; SUBCUTANEOUS at 10:25

## 2017-07-28 RX ADMIN — MILRINONE LACTATE 12.22 MICROGRAM(S)/KG/MIN: 1 INJECTION, SOLUTION INTRAVENOUS at 17:04

## 2017-07-28 RX ADMIN — HEPARIN SODIUM 5000 UNIT(S): 5000 INJECTION INTRAVENOUS; SUBCUTANEOUS at 05:14

## 2017-07-28 RX ADMIN — AMIODARONE HYDROCHLORIDE 200 MILLIGRAM(S): 400 TABLET ORAL at 05:26

## 2017-07-28 RX ADMIN — Medication 3 MILLILITER(S): at 08:44

## 2017-07-28 RX ADMIN — HYDROMORPHONE HYDROCHLORIDE 1 MILLIGRAM(S): 2 INJECTION INTRAMUSCULAR; INTRAVENOUS; SUBCUTANEOUS at 04:00

## 2017-07-28 RX ADMIN — Medication 10 MILLIGRAM(S): at 17:05

## 2017-07-28 RX ADMIN — Medication 3 MILLILITER(S): at 02:49

## 2017-07-28 RX ADMIN — Medication 50 MILLIGRAM(S): at 21:21

## 2017-07-28 RX ADMIN — Medication 650 MILLIGRAM(S): at 04:30

## 2017-07-28 RX ADMIN — FENTANYL CITRATE 25 MICROGRAM(S): 50 INJECTION INTRAVENOUS at 05:45

## 2017-07-28 RX ADMIN — CHLORHEXIDINE GLUCONATE 15 MILLILITER(S): 213 SOLUTION TOPICAL at 17:04

## 2017-07-28 RX ADMIN — FENTANYL CITRATE 4.08 MICROGRAM(S)/KG/HR: 50 INJECTION INTRAVENOUS at 11:51

## 2017-07-28 RX ADMIN — Medication 3 MILLILITER(S): at 15:29

## 2017-07-28 RX ADMIN — FENTANYL CITRATE 4.08 MICROGRAM(S)/KG/HR: 50 INJECTION INTRAVENOUS at 17:04

## 2017-07-28 RX ADMIN — PANTOPRAZOLE SODIUM 40 MILLIGRAM(S): 20 TABLET, DELAYED RELEASE ORAL at 17:04

## 2017-07-28 NOTE — PROGRESS NOTE ADULT - ATTENDING COMMENTS
Family reiterated of end stage of huis cardiomyopathy last night,.  awaiting possible family meeting tomorrow with brothers ans sister.

## 2017-07-28 NOTE — PROGRESS NOTE ADULT - SUBJECTIVE AND OBJECTIVE BOX
INTERVAL HPI/OVERNIGHT EVENTS/SUBJECTIVE: present for the events last night. family reiterated of end stage of his heart disease    ICU Vital Signs Last 24 Hrs  T(C): 36 (28 Jul 2017 08:00), Max: 38.8 (28 Jul 2017 00:00)  T(F): 96.8 (28 Jul 2017 08:00), Max: 101.9 (28 Jul 2017 00:00)  HR: 110 (28 Jul 2017 11:00) (102 - 118)  BP: --  BP(mean): --  ABP: 86/45 (28 Jul 2017 11:00) (86/45 - 147/58)  ABP(mean): 58 (28 Jul 2017 11:00) (58 - 94)  RR: 21 (28 Jul 2017 11:00) (14 - 46)  SpO2: 100% (28 Jul 2017 11:00) (79% - 100%)      I&O's Detail    27 Jul 2017 07:01  -  28 Jul 2017 07:00  --------------------------------------------------------  IN:    Free Water: 800 mL    furosemide Infusion: 180 mL    milrinone  Infusion: 292.8 mL    Pivot: 1170 mL    Solution: 300 mL    Solution: 300 mL    Solution: 250 mL  Total IN: 3292.8 mL    OUT:    Indwelling Catheter - Urethral: 1090 mL  Total OUT: 1090 mL    Total NET: 2202.8 mL      28 Jul 2017 07:01  -  28 Jul 2017 11:47  --------------------------------------------------------  IN:    Free Water: 200 mL    furosemide Infusion: 37.5 mL    milrinone  Infusion: 61 mL    Pivot: 225 mL  Total IN: 523.5 mL    OUT:    Indwelling Catheter - Urethral: 240 mL  Total OUT: 240 mL    Total NET: 283.5 mL          Mode: AC/ CMV (Assist Control/ Continuous Mandatory Ventilation)  RR (machine): 14  TV (machine): 450  FiO2: 60  PEEP: 15  MAP: 19  PIP: 41    ABG - ( 28 Jul 2017 04:12 )  pH: 7.42  /  pCO2: 51    /  pO2: 81    / HCO3: 32    / Base Excess: 7.7   /  SaO2: 97                  MEDICATIONS  (STANDING):  amantadine Syrup 100 milliGRAM(s) Oral <User Schedule>  FLUoxetine Solution 10 milliGRAM(s) Oral daily  traZODone 50 milliGRAM(s) Oral at bedtime  digoxin     Tablet 0.125 milliGRAM(s) Oral daily  melatonin 9 milliGRAM(s) Oral at bedtime  pantoprazole   Suspension 40 milliGRAM(s) Oral two times a day before meals  senna Syrup 10 milliLiter(s) Oral at bedtime  lisinopril 2.5 milliGRAM(s) Oral daily  amiodarone    Tablet 200 milliGRAM(s) Oral daily  heparin  Injectable 5000 Unit(s) SubCutaneous every 8 hours  ALBUTerol/ipratropium for Nebulization 3 milliLiter(s) Nebulizer every 6 hours  chlorhexidine 0.12% Liquid 15 milliLiter(s) Swish and Spit two times a day  milrinone Infusion 0.5 MICROgram(s)/kG/Min (12.225 mL/Hr) IV Continuous <Continuous>  furosemide Infusion 15 mG/Hr (7.5 mL/Hr) IV Continuous <Continuous>  fentaNYL   Infusion 1 MICROgram(s)/kG/Hr (4.075 mL/Hr) IV Continuous <Continuous>    MEDICATIONS  (PRN):  acetaminophen    Suspension 650 milliGRAM(s) Oral every 6 hours PRN For Temp greater than 38.5 C (101.3 F)  bisacodyl Suppository 10 milliGRAM(s) Rectal daily PRN Constipation  HYDROmorphone  Injectable 1 milliGRAM(s) IV Push every 3 hours PRN Agitation        PHYSICAL EXAM:    Gen: NAD    Eyes: anicteric sclerae    Neurological: sedated, follows simple commands, non focal.    ENMT: MARTÍNEZ    Neck: mild JVD    Pulmonary: left side with rhonchi and decrease in base, right side with global rales.    Cardiovascular: irreg rate rhythm     Gastrointestinal: obese soft, non distended no tender    Genitourinary: clear urine in montoya    Extremities: improved edema    Musculoskeletal: no deformities.          LABS:  CBC Full  -  ( 28 Jul 2017 04:21 )  WBC Count : 18.0 K/uL  Hemoglobin : 7.8 g/dL  Hematocrit : 26.8 %  Platelet Count - Automated : 345 K/uL  Mean Cell Volume : 87.9 fl  Mean Cell Hemoglobin : 25.6 pg  Mean Cell Hemoglobin Concentration : 29.1 g/dL  Auto Neutrophil # : x  Auto Lymphocyte # : x  Auto Monocyte # : x  Auto Eosinophil # : x  Auto Basophil # : x  Auto Neutrophil % : 87.0 %  Auto Lymphocyte % : 7.0 %  Auto Monocyte % : 4.0 %  Auto Eosinophil % : x  Auto Basophil % : x    07-28    140  |  95<L>  |  39.0<H>  ----------------------------<  131<H>  4.5   |  32.0<H>  |  0.95    Ca    8.8      28 Jul 2017 04:21  Phos  3.8     07-28  Mg     2.8     07-28          RECENT CULTURES:            CAPILLARY BLOOD GLUCOSE      RADIOLOGY & ADDITIONAL STUDIES:    ASSESSMENT/PLAN:  51yMale presenting with :  end stage cardiomyopathy, had mucous plugged last night.    Neuro: resume fentanyl drip for agitation and patient confort    HEENT: mouth care for wale laceration ( self created by biting down)    CV: Milrinone back to 0.05, perfusion compromised with LA 2.5, continue supportive care    Pulm: vent suport, PEEP up to 15 FIO2 to 60%, wean slowly, aggressive pulmonary toilette to prevent further ventilation compromised as last night,    GI/Nutrition:TF at goal.    /Renal: lasix drip to improved with preload, non alkalotic, received Diamox yesterday    ID: no issues, leukocytosis related to poor perfusion, no systemic signs of infection    Endo: glycemia at target    Skin:intact    Proph:SQH    Dispo: ICU      CRITICAL CARE TIME SPENT: 40 minutes

## 2017-07-29 LAB
ANION GAP SERPL CALC-SCNC: 15 MMOL/L — SIGNIFICANT CHANGE UP (ref 5–17)
ANISOCYTOSIS BLD QL: SIGNIFICANT CHANGE UP
BASOPHILS # BLD AUTO: 0 K/UL — SIGNIFICANT CHANGE UP (ref 0–0.2)
BASOPHILS NFR BLD AUTO: 0.1 % — SIGNIFICANT CHANGE UP (ref 0–2)
BUN SERPL-MCNC: 47 MG/DL — HIGH (ref 8–20)
CALCIUM SERPL-MCNC: 8.4 MG/DL — LOW (ref 8.6–10.2)
CHLORIDE SERPL-SCNC: 97 MMOL/L — LOW (ref 98–107)
CO2 SERPL-SCNC: 31 MMOL/L — HIGH (ref 22–29)
CREAT SERPL-MCNC: 0.88 MG/DL — SIGNIFICANT CHANGE UP (ref 0.5–1.3)
EOSINOPHIL # BLD AUTO: 0.1 K/UL — SIGNIFICANT CHANGE UP (ref 0–0.5)
EOSINOPHIL NFR BLD AUTO: 0.5 % — SIGNIFICANT CHANGE UP (ref 0–5)
GAS PNL BLDA: SIGNIFICANT CHANGE UP
GLUCOSE SERPL-MCNC: 128 MG/DL — HIGH (ref 70–115)
HCT VFR BLD CALC: 26.3 % — LOW (ref 42–52)
HGB BLD-MCNC: 7.8 G/DL — LOW (ref 14–18)
HYPOCHROMIA BLD QL: SLIGHT — SIGNIFICANT CHANGE UP
LYMPHOCYTES # BLD AUTO: 1.6 K/UL — SIGNIFICANT CHANGE UP (ref 1–4.8)
LYMPHOCYTES # BLD AUTO: 11 % — LOW (ref 20–55)
MACROCYTES BLD QL: SLIGHT — SIGNIFICANT CHANGE UP
MAGNESIUM SERPL-MCNC: 2.3 MG/DL — SIGNIFICANT CHANGE UP (ref 1.6–2.6)
MCHC RBC-ENTMCNC: 25.7 PG — LOW (ref 27–31)
MCHC RBC-ENTMCNC: 29.7 G/DL — LOW (ref 32–36)
MCV RBC AUTO: 86.8 FL — SIGNIFICANT CHANGE UP (ref 80–94)
MICROCYTES BLD QL: SLIGHT — SIGNIFICANT CHANGE UP
MONOCYTES # BLD AUTO: 0.8 K/UL — SIGNIFICANT CHANGE UP (ref 0–0.8)
MONOCYTES NFR BLD AUTO: 5.3 % — SIGNIFICANT CHANGE UP (ref 3–10)
NEUTROPHILS # BLD AUTO: 12.1 K/UL — HIGH (ref 1.8–8)
NEUTROPHILS NFR BLD AUTO: 82.8 % — HIGH (ref 37–73)
PHOSPHATE SERPL-MCNC: 2.9 MG/DL — SIGNIFICANT CHANGE UP (ref 2.4–4.7)
PLAT MORPH BLD: NORMAL — SIGNIFICANT CHANGE UP
PLATELET # BLD AUTO: 327 K/UL — SIGNIFICANT CHANGE UP (ref 150–400)
POLYCHROMASIA BLD QL SMEAR: SLIGHT — SIGNIFICANT CHANGE UP
POTASSIUM SERPL-MCNC: 4 MMOL/L — SIGNIFICANT CHANGE UP (ref 3.5–5.3)
POTASSIUM SERPL-SCNC: 4 MMOL/L — SIGNIFICANT CHANGE UP (ref 3.5–5.3)
RBC # BLD: 3.03 M/UL — LOW (ref 4.6–6.2)
RBC # FLD: 20.6 % — HIGH (ref 11–15.6)
RBC BLD AUTO: ABNORMAL
SODIUM SERPL-SCNC: 143 MMOL/L — SIGNIFICANT CHANGE UP (ref 135–145)
WBC # BLD: 14.6 K/UL — HIGH (ref 4.8–10.8)
WBC # FLD AUTO: 14.6 K/UL — HIGH (ref 4.8–10.8)

## 2017-07-29 PROCEDURE — 71010: CPT | Mod: 26

## 2017-07-29 PROCEDURE — 99291 CRITICAL CARE FIRST HOUR: CPT

## 2017-07-29 RX ADMIN — CHLORHEXIDINE GLUCONATE 15 MILLILITER(S): 213 SOLUTION TOPICAL at 05:34

## 2017-07-29 RX ADMIN — Medication 3 MILLILITER(S): at 09:33

## 2017-07-29 RX ADMIN — HYDROMORPHONE HYDROCHLORIDE 1 MILLIGRAM(S): 2 INJECTION INTRAMUSCULAR; INTRAVENOUS; SUBCUTANEOUS at 06:42

## 2017-07-29 RX ADMIN — Medication 650 MILLIGRAM(S): at 18:54

## 2017-07-29 RX ADMIN — PANTOPRAZOLE SODIUM 40 MILLIGRAM(S): 20 TABLET, DELAYED RELEASE ORAL at 05:34

## 2017-07-29 RX ADMIN — Medication 10 MILLIGRAM(S): at 12:00

## 2017-07-29 RX ADMIN — Medication 650 MILLIGRAM(S): at 11:00

## 2017-07-29 RX ADMIN — MILRINONE LACTATE 12.22 MICROGRAM(S)/KG/MIN: 1 INJECTION, SOLUTION INTRAVENOUS at 15:00

## 2017-07-29 RX ADMIN — Medication 7.5 MG/HR: at 05:57

## 2017-07-29 RX ADMIN — Medication 3 MILLILITER(S): at 21:36

## 2017-07-29 RX ADMIN — HYDROMORPHONE HYDROCHLORIDE 1 MILLIGRAM(S): 2 INJECTION INTRAMUSCULAR; INTRAVENOUS; SUBCUTANEOUS at 15:45

## 2017-07-29 RX ADMIN — HYDROMORPHONE HYDROCHLORIDE 1 MILLIGRAM(S): 2 INJECTION INTRAMUSCULAR; INTRAVENOUS; SUBCUTANEOUS at 15:30

## 2017-07-29 RX ADMIN — MILRINONE LACTATE 12.22 MICROGRAM(S)/KG/MIN: 1 INJECTION, SOLUTION INTRAVENOUS at 21:01

## 2017-07-29 RX ADMIN — Medication 0.12 MILLIGRAM(S): at 05:34

## 2017-07-29 RX ADMIN — HEPARIN SODIUM 5000 UNIT(S): 5000 INJECTION INTRAVENOUS; SUBCUTANEOUS at 21:01

## 2017-07-29 RX ADMIN — AMIODARONE HYDROCHLORIDE 200 MILLIGRAM(S): 400 TABLET ORAL at 05:34

## 2017-07-29 RX ADMIN — HYDROMORPHONE HYDROCHLORIDE 1 MILLIGRAM(S): 2 INJECTION INTRAMUSCULAR; INTRAVENOUS; SUBCUTANEOUS at 05:34

## 2017-07-29 RX ADMIN — PANTOPRAZOLE SODIUM 40 MILLIGRAM(S): 20 TABLET, DELAYED RELEASE ORAL at 16:58

## 2017-07-29 RX ADMIN — Medication 50 MILLIGRAM(S): at 21:01

## 2017-07-29 RX ADMIN — HEPARIN SODIUM 5000 UNIT(S): 5000 INJECTION INTRAVENOUS; SUBCUTANEOUS at 15:25

## 2017-07-29 RX ADMIN — Medication 3 MILLILITER(S): at 15:04

## 2017-07-29 RX ADMIN — HYDROMORPHONE HYDROCHLORIDE 1 MILLIGRAM(S): 2 INJECTION INTRAMUSCULAR; INTRAVENOUS; SUBCUTANEOUS at 20:00

## 2017-07-29 RX ADMIN — Medication 100 MILLIGRAM(S): at 05:34

## 2017-07-29 RX ADMIN — Medication 9 MILLIGRAM(S): at 21:01

## 2017-07-29 RX ADMIN — SENNA PLUS 10 MILLILITER(S): 8.6 TABLET ORAL at 21:01

## 2017-07-29 RX ADMIN — Medication 100 MILLIGRAM(S): at 12:00

## 2017-07-29 RX ADMIN — HYDROMORPHONE HYDROCHLORIDE 1 MILLIGRAM(S): 2 INJECTION INTRAMUSCULAR; INTRAVENOUS; SUBCUTANEOUS at 20:43

## 2017-07-29 RX ADMIN — Medication 3 MILLILITER(S): at 03:43

## 2017-07-29 RX ADMIN — CHLORHEXIDINE GLUCONATE 15 MILLILITER(S): 213 SOLUTION TOPICAL at 16:57

## 2017-07-29 RX ADMIN — HEPARIN SODIUM 5000 UNIT(S): 5000 INJECTION INTRAVENOUS; SUBCUTANEOUS at 05:34

## 2017-07-29 NOTE — PROGRESS NOTE ADULT - SUBJECTIVE AND OBJECTIVE BOX
INTERVAL HPI/OVERNIGHT EVENTS:    Pt with mucous plug overnight able to be resolved with aggressive suctioning and lavage.  Pt more comfortable on fentanyl gtt.  Remains on Milrinone for Heart failure.      MEDICATIONS  (STANDING):  amantadine Syrup 100 milliGRAM(s) Oral <User Schedule>  FLUoxetine Solution 10 milliGRAM(s) Oral daily  traZODone 50 milliGRAM(s) Oral at bedtime  digoxin     Tablet 0.125 milliGRAM(s) Oral daily  melatonin 9 milliGRAM(s) Oral at bedtime  pantoprazole   Suspension 40 milliGRAM(s) Oral two times a day before meals  senna Syrup 10 milliLiter(s) Oral at bedtime  lisinopril 2.5 milliGRAM(s) Oral daily  amiodarone    Tablet 200 milliGRAM(s) Oral daily  heparin  Injectable 5000 Unit(s) SubCutaneous every 8 hours  ALBUTerol/ipratropium for Nebulization 3 milliLiter(s) Nebulizer every 6 hours  chlorhexidine 0.12% Liquid 15 milliLiter(s) Swish and Spit two times a day  milrinone Infusion 0.5 MICROgram(s)/kG/Min (12.225 mL/Hr) IV Continuous <Continuous>  furosemide Infusion 15 mG/Hr (7.5 mL/Hr) IV Continuous <Continuous>  fentaNYL   Infusion 1 MICROgram(s)/kG/Hr (4.075 mL/Hr) IV Continuous <Continuous>    MEDICATIONS  (PRN):  acetaminophen    Suspension 650 milliGRAM(s) Oral every 6 hours PRN For Temp greater than 38.5 C (101.3 F)  bisacodyl Suppository 10 milliGRAM(s) Rectal daily PRN Constipation  HYDROmorphone  Injectable 1 milliGRAM(s) IV Push every 3 hours PRN Agitation      Drug Dosing Weight  Height (cm): 165.1 (12 Jun 2017 06:38)  Weight (kg): 81.5 (12 Jun 2017 06:38)  BMI (kg/m2): 29.9 (12 Jun 2017 06:38)  BSA (m2): 1.89 (12 Jun 2017 06:38)      PAST MEDICAL & SURGICAL HISTORY:  Mitral regurgitation: severe MR  Cardiomyopathy, nonischemic  CAD (coronary artery disease)  Asthma  Atrial fibrillation  S/P laparoscopic cholecystectomy  History of humerus fracture: Metal pins in Left Humerus  Artificial pacemaker      ICU Vital Signs Last 24 Hrs  T(C): 38 (29 Jul 2017 04:00), Max: 38.2 (29 Jul 2017 00:00)  T(F): 100.4 (29 Jul 2017 04:00), Max: 100.7 (29 Jul 2017 00:00)  HR: 116 (29 Jul 2017 09:41) (105 - 122)  BP: --  BP(mean): --  ABP: 96/49 (29 Jul 2017 08:00) (86/45 - 110/60)  ABP(mean): 66 (29 Jul 2017 08:00) (58 - 77)  RR: 18 (29 Jul 2017 08:00) (14 - 33)  SpO2: 96% (29 Jul 2017 09:41) (81% - 100%)      ABG - ( 29 Jul 2017 04:56 )  pH: 7.44  /  pCO2: 52    /  pO2: 73    / HCO3: 33    / Base Excess: 9.6   /  SaO2: 96                  I&O's Detail    28 Jul 2017 07:01  -  29 Jul 2017 07:00  --------------------------------------------------------  IN:    Enteral Tube Flush: 200 mL    fentaNYL  Infusion: 77.9 mL    Free Water: 400 mL    furosemide Infusion: 172.5 mL    milrinone  Infusion: 280.6 mL    Pivot: 1035 mL  Total IN: 2166 mL    OUT:    Indwelling Catheter - Urethral: 1325 mL  Total OUT: 1325 mL    Total NET: 841 mL          Mode: AC/ CMV (Assist Control/ Continuous Mandatory Ventilation)  RR (machine): 14  TV (machine): 450  FiO2: 60  PEEP: 10  MAP: 14  PIP: 34      PHYSICAL EXAM:    Gen: NAD    Eyes: anicteric sclerae    Neurological: sedated, follows simple commands, non focal.    ENMT: MARTÍNEZ    Neck: mild JVD    Pulmonary: left side with rhonchi and decrease in base, right side with global rales.    Cardiovascular: irreg rate rhythm     Gastrointestinal: obese soft, non distended no tender    Genitourinary: clear urine in montoya    Extremities: improved edema    Musculoskeletal: no deformities.      LABS:  CBC Full  -  ( 29 Jul 2017 05:12 )  WBC Count : 14.6 K/uL  Hemoglobin : 7.8 g/dL  Hematocrit : 26.3 %  Platelet Count - Automated : 327 K/uL  Mean Cell Volume : 86.8 fl  Mean Cell Hemoglobin : 25.7 pg  Mean Cell Hemoglobin Concentration : 29.7 g/dL  Auto Neutrophil # : 12.1 K/uL  Auto Lymphocyte # : 1.6 K/uL  Auto Monocyte # : 0.8 K/uL  Auto Eosinophil # : 0.1 K/uL  Auto Basophil # : 0.0 K/uL  Auto Neutrophil % : 82.8 %  Auto Lymphocyte % : 11.0 %  Auto Monocyte % : 5.3 %  Auto Eosinophil % : 0.5 %  Auto Basophil % : 0.1 %    07-29    143  |  97<L>  |  47.0<H>  ----------------------------<  128<H>  4.0   |  31.0<H>  |  0.88    Ca    8.4<L>      29 Jul 2017 05:12  Phos  2.9     07-29  Mg     2.3     07-29          ASSESSMENT/PLAN:  51yMale presenting with :  end stage cardiomyopathy, had mucous plugged last night.    Neuro: resume fentanyl drip for agitation and patient confort    HEENT: mouth care for wale laceration ( self created by biting down)    CV: Milrinone back to 0.05, perfusion compromised with LA 2, continue supportive care    Pulm: vent suport, PEEP weaned to 10 and FIO2 to 60%.  Wean slowly, aggressive pulmonary toilette to prevent further ventilation compromised     GI/Nutrition:TF at goal.    /Renal: lasix drip to improved with preload, non alkalotic    ID: no issues, leukocytosis related to poor perfusion, no systemic signs of infection    Endo: glycemia at target    Skin:intact    Proph:SQH    Dispo: ICU.  Family meeting today for discussion of goals of care

## 2017-07-30 LAB
ANION GAP SERPL CALC-SCNC: 16 MMOL/L — SIGNIFICANT CHANGE UP (ref 5–17)
BUN SERPL-MCNC: 63 MG/DL — HIGH (ref 8–20)
CALCIUM SERPL-MCNC: 8.9 MG/DL — SIGNIFICANT CHANGE UP (ref 8.6–10.2)
CHLORIDE SERPL-SCNC: 94 MMOL/L — LOW (ref 98–107)
CO2 SERPL-SCNC: 32 MMOL/L — HIGH (ref 22–29)
CREAT SERPL-MCNC: 1.26 MG/DL — SIGNIFICANT CHANGE UP (ref 0.5–1.3)
GLUCOSE SERPL-MCNC: 117 MG/DL — HIGH (ref 70–115)
HCT VFR BLD CALC: 27.3 % — LOW (ref 42–52)
HGB BLD-MCNC: 7.7 G/DL — LOW (ref 14–18)
MAGNESIUM SERPL-MCNC: 2.4 MG/DL — SIGNIFICANT CHANGE UP (ref 1.6–2.6)
MCHC RBC-ENTMCNC: 25.8 PG — LOW (ref 27–31)
MCHC RBC-ENTMCNC: 28.2 G/DL — LOW (ref 32–36)
MCV RBC AUTO: 91.6 FL — SIGNIFICANT CHANGE UP (ref 80–94)
PLATELET # BLD AUTO: 280 K/UL — SIGNIFICANT CHANGE UP (ref 150–400)
POTASSIUM SERPL-MCNC: 4.3 MMOL/L — SIGNIFICANT CHANGE UP (ref 3.5–5.3)
POTASSIUM SERPL-SCNC: 4.3 MMOL/L — SIGNIFICANT CHANGE UP (ref 3.5–5.3)
RBC # BLD: 2.98 M/UL — LOW (ref 4.6–6.2)
RBC # FLD: 20.5 % — HIGH (ref 11–15.6)
SODIUM SERPL-SCNC: 142 MMOL/L — SIGNIFICANT CHANGE UP (ref 135–145)
WBC # BLD: 20.1 K/UL — HIGH (ref 4.8–10.8)
WBC # FLD AUTO: 20.1 K/UL — HIGH (ref 4.8–10.8)

## 2017-07-30 PROCEDURE — 71010: CPT | Mod: 26

## 2017-07-30 RX ORDER — ETOMIDATE 2 MG/ML
10 INJECTION INTRAVENOUS ONCE
Qty: 0 | Refills: 0 | Status: COMPLETED | OUTPATIENT
Start: 2017-07-30 | End: 2017-07-30

## 2017-07-30 RX ORDER — MEROPENEM 1 G/30ML
INJECTION INTRAVENOUS
Qty: 0 | Refills: 0 | Status: DISCONTINUED | OUTPATIENT
Start: 2017-07-30 | End: 2017-07-31

## 2017-07-30 RX ORDER — VANCOMYCIN HCL 1 G
750 VIAL (EA) INTRAVENOUS EVERY 12 HOURS
Qty: 0 | Refills: 0 | Status: DISCONTINUED | OUTPATIENT
Start: 2017-07-31 | End: 2017-08-01

## 2017-07-30 RX ORDER — MEROPENEM 1 G/30ML
1000 INJECTION INTRAVENOUS EVERY 8 HOURS
Qty: 0 | Refills: 0 | Status: DISCONTINUED | OUTPATIENT
Start: 2017-07-30 | End: 2017-07-31

## 2017-07-30 RX ORDER — HYDROMORPHONE HYDROCHLORIDE 2 MG/ML
2 INJECTION INTRAMUSCULAR; INTRAVENOUS; SUBCUTANEOUS ONCE
Qty: 0 | Refills: 0 | Status: DISCONTINUED | OUTPATIENT
Start: 2017-07-30 | End: 2017-07-30

## 2017-07-30 RX ORDER — VANCOMYCIN HCL 1 G
750 VIAL (EA) INTRAVENOUS ONCE
Qty: 0 | Refills: 0 | Status: COMPLETED | OUTPATIENT
Start: 2017-07-30 | End: 2017-07-30

## 2017-07-30 RX ORDER — MIDAZOLAM HYDROCHLORIDE 1 MG/ML
4 INJECTION, SOLUTION INTRAMUSCULAR; INTRAVENOUS ONCE
Qty: 0 | Refills: 0 | Status: DISCONTINUED | OUTPATIENT
Start: 2017-07-30 | End: 2017-07-30

## 2017-07-30 RX ORDER — MIDAZOLAM HYDROCHLORIDE 1 MG/ML
2 INJECTION, SOLUTION INTRAMUSCULAR; INTRAVENOUS ONCE
Qty: 0 | Refills: 0 | Status: DISCONTINUED | OUTPATIENT
Start: 2017-07-30 | End: 2017-07-30

## 2017-07-30 RX ORDER — MEROPENEM 1 G/30ML
1000 INJECTION INTRAVENOUS ONCE
Qty: 0 | Refills: 0 | Status: COMPLETED | OUTPATIENT
Start: 2017-07-30 | End: 2017-07-30

## 2017-07-30 RX ORDER — VANCOMYCIN HCL 1 G
VIAL (EA) INTRAVENOUS
Qty: 0 | Refills: 0 | Status: DISCONTINUED | OUTPATIENT
Start: 2017-07-30 | End: 2017-08-01

## 2017-07-30 RX ADMIN — HEPARIN SODIUM 5000 UNIT(S): 5000 INJECTION INTRAVENOUS; SUBCUTANEOUS at 13:39

## 2017-07-30 RX ADMIN — Medication 3 MILLILITER(S): at 15:41

## 2017-07-30 RX ADMIN — MEROPENEM 200 MILLIGRAM(S): 1 INJECTION INTRAVENOUS at 21:30

## 2017-07-30 RX ADMIN — Medication 0.12 MILLIGRAM(S): at 05:15

## 2017-07-30 RX ADMIN — PANTOPRAZOLE SODIUM 40 MILLIGRAM(S): 20 TABLET, DELAYED RELEASE ORAL at 17:16

## 2017-07-30 RX ADMIN — HYDROMORPHONE HYDROCHLORIDE 2 MILLIGRAM(S): 2 INJECTION INTRAMUSCULAR; INTRAVENOUS; SUBCUTANEOUS at 01:54

## 2017-07-30 RX ADMIN — HYDROMORPHONE HYDROCHLORIDE 1 MILLIGRAM(S): 2 INJECTION INTRAMUSCULAR; INTRAVENOUS; SUBCUTANEOUS at 13:21

## 2017-07-30 RX ADMIN — Medication 10 MILLIGRAM(S): at 13:19

## 2017-07-30 RX ADMIN — Medication 9 MILLIGRAM(S): at 21:30

## 2017-07-30 RX ADMIN — MIDAZOLAM HYDROCHLORIDE 2 MILLIGRAM(S): 1 INJECTION, SOLUTION INTRAMUSCULAR; INTRAVENOUS at 13:39

## 2017-07-30 RX ADMIN — HYDROMORPHONE HYDROCHLORIDE 1 MILLIGRAM(S): 2 INJECTION INTRAMUSCULAR; INTRAVENOUS; SUBCUTANEOUS at 06:21

## 2017-07-30 RX ADMIN — CHLORHEXIDINE GLUCONATE 15 MILLILITER(S): 213 SOLUTION TOPICAL at 17:16

## 2017-07-30 RX ADMIN — Medication 100 MILLIGRAM(S): at 05:15

## 2017-07-30 RX ADMIN — HYDROMORPHONE HYDROCHLORIDE 2 MILLIGRAM(S): 2 INJECTION INTRAMUSCULAR; INTRAVENOUS; SUBCUTANEOUS at 01:19

## 2017-07-30 RX ADMIN — Medication 3 MILLILITER(S): at 21:08

## 2017-07-30 RX ADMIN — MILRINONE LACTATE 12.22 MICROGRAM(S)/KG/MIN: 1 INJECTION, SOLUTION INTRAVENOUS at 13:20

## 2017-07-30 RX ADMIN — SENNA PLUS 10 MILLILITER(S): 8.6 TABLET ORAL at 21:30

## 2017-07-30 RX ADMIN — HEPARIN SODIUM 5000 UNIT(S): 5000 INJECTION INTRAVENOUS; SUBCUTANEOUS at 21:30

## 2017-07-30 RX ADMIN — MEROPENEM 200 MILLIGRAM(S): 1 INJECTION INTRAVENOUS at 14:38

## 2017-07-30 RX ADMIN — AMIODARONE HYDROCHLORIDE 200 MILLIGRAM(S): 400 TABLET ORAL at 05:15

## 2017-07-30 RX ADMIN — Medication 10 MG/HR: at 13:20

## 2017-07-30 RX ADMIN — MIDAZOLAM HYDROCHLORIDE 4 MILLIGRAM(S): 1 INJECTION, SOLUTION INTRAMUSCULAR; INTRAVENOUS at 22:50

## 2017-07-30 RX ADMIN — HYDROMORPHONE HYDROCHLORIDE 1 MILLIGRAM(S): 2 INJECTION INTRAMUSCULAR; INTRAVENOUS; SUBCUTANEOUS at 14:55

## 2017-07-30 RX ADMIN — ETOMIDATE 10 MILLIGRAM(S): 2 INJECTION INTRAVENOUS at 01:55

## 2017-07-30 RX ADMIN — HEPARIN SODIUM 5000 UNIT(S): 5000 INJECTION INTRAVENOUS; SUBCUTANEOUS at 05:15

## 2017-07-30 RX ADMIN — Medication 3 MILLILITER(S): at 08:12

## 2017-07-30 RX ADMIN — Medication 100 MILLIGRAM(S): at 13:18

## 2017-07-30 RX ADMIN — MILRINONE LACTATE 12.22 MICROGRAM(S)/KG/MIN: 1 INJECTION, SOLUTION INTRAVENOUS at 05:15

## 2017-07-30 RX ADMIN — CHLORHEXIDINE GLUCONATE 15 MILLILITER(S): 213 SOLUTION TOPICAL at 05:16

## 2017-07-30 RX ADMIN — Medication 150 MILLIGRAM(S): at 15:55

## 2017-07-30 RX ADMIN — MIDAZOLAM HYDROCHLORIDE 2 MILLIGRAM(S): 1 INJECTION, SOLUTION INTRAMUSCULAR; INTRAVENOUS at 18:55

## 2017-07-30 RX ADMIN — FENTANYL CITRATE 4.08 MICROGRAM(S)/KG/HR: 50 INJECTION INTRAVENOUS at 17:15

## 2017-07-30 RX ADMIN — Medication 50 MILLIGRAM(S): at 21:30

## 2017-07-30 RX ADMIN — PANTOPRAZOLE SODIUM 40 MILLIGRAM(S): 20 TABLET, DELAYED RELEASE ORAL at 05:15

## 2017-07-30 RX ADMIN — Medication 3 MILLILITER(S): at 03:21

## 2017-07-30 NOTE — PROGRESS NOTE ADULT - SUBJECTIVE AND OBJECTIVE BOX
INTERVAL HPI/OVERNIGHT EVENTS:    Pt bit through bite block. No further episodes of plugging on ventilator.   Remains on Milrinone for Heart failure.      MEDICATIONS  (STANDING):  amantadine Syrup 100 milliGRAM(s) Oral <User Schedule>  FLUoxetine Solution 10 milliGRAM(s) Oral daily  traZODone 50 milliGRAM(s) Oral at bedtime  digoxin     Tablet 0.125 milliGRAM(s) Oral daily  melatonin 9 milliGRAM(s) Oral at bedtime  pantoprazole   Suspension 40 milliGRAM(s) Oral two times a day before meals  senna Syrup 10 milliLiter(s) Oral at bedtime  lisinopril 2.5 milliGRAM(s) Oral daily  amiodarone    Tablet 200 milliGRAM(s) Oral daily  heparin  Injectable 5000 Unit(s) SubCutaneous every 8 hours  ALBUTerol/ipratropium for Nebulization 3 milliLiter(s) Nebulizer every 6 hours  chlorhexidine 0.12% Liquid 15 milliLiter(s) Swish and Spit two times a day  milrinone Infusion 0.5 MICROgram(s)/kG/Min (12.225 mL/Hr) IV Continuous <Continuous>  furosemide Infusion 15 mG/Hr (7.5 mL/Hr) IV Continuous <Continuous>  fentaNYL   Infusion 1 MICROgram(s)/kG/Hr (4.075 mL/Hr) IV Continuous <Continuous>    MEDICATIONS  (PRN):  acetaminophen    Suspension 650 milliGRAM(s) Oral every 6 hours PRN For Temp greater than 38.5 C (101.3 F)  bisacodyl Suppository 10 milliGRAM(s) Rectal daily PRN Constipation  HYDROmorphone  Injectable 1 milliGRAM(s) IV Push every 3 hours PRN Agitation        Drug Dosing Weight  Height (cm): 165.1 (12 Jun 2017 06:38)  Weight (kg): 81.5 (12 Jun 2017 06:38)  BMI (kg/m2): 29.9 (12 Jun 2017 06:38)  BSA (m2): 1.89 (12 Jun 2017 06:38)      PAST MEDICAL & SURGICAL HISTORY:  Mitral regurgitation: severe MR  Cardiomyopathy, nonischemic  CAD (coronary artery disease)  Asthma  Atrial fibrillation  S/P laparoscopic cholecystectomy  History of humerus fracture: Metal pins in Left Humerus  Artificial pacemaker      ICU Vital Signs Last 24 Hrs  T(C): 38.3 (30 Jul 2017 00:00), Max: 38.9 (29 Jul 2017 12:00)  T(F): 101 (30 Jul 2017 00:00), Max: 102 (29 Jul 2017 12:00)  HR: 115 (30 Jul 2017 00:00) (110 - 123)  BP: --  BP(mean): --  ABP: 94/51 (30 Jul 2017 00:00) (91/45 - 114/59)  ABP(mean): 66 (30 Jul 2017 00:00) (60 - 81)  RR: 18 (30 Jul 2017 00:00) (16 - 31)  SpO2: 95% (30 Jul 2017 00:00) (86% - 100%)        ABG - ( 29 Jul 2017 04:56 )  pH: 7.44  /  pCO2: 52    /  pO2: 73    / HCO3: 33    / Base Excess: 9.6   /  SaO2: 96                I&O's Detail    28 Jul 2017 07:01  -  29 Jul 2017 07:00  --------------------------------------------------------  IN:    Enteral Tube Flush: 200 mL    fentaNYL  Infusion: 77.9 mL    Free Water: 400 mL    furosemide Infusion: 172.5 mL    milrinone  Infusion: 280.6 mL    Pivot: 1035 mL  Total IN: 2166 mL    OUT:    Indwelling Catheter - Urethral: 1325 mL  Total OUT: 1325 mL    Total NET: 841 mL      29 Jul 2017 07:01  -  30 Jul 2017 00:56  --------------------------------------------------------  IN:    fentaNYL  Infusion: 48 mL    Free Water: 400 mL    furosemide Infusion: 90 mL    milrinone  Infusion: 144 mL    Pivot: 540 mL  Total IN: 1222 mL    OUT:    Indwelling Catheter - Urethral: 490 mL  Total OUT: 490 mL    Total NET: 732 mL              Mode: AC/ CMV (Assist Control/ Continuous Mandatory Ventilation)  RR (machine): 14  TV (machine): 450  FiO2: 60  PEEP: 10  MAP: 14  PIP: 34      PHYSICAL EXAM:    Gen: NAD    Neurological: sedated, follows simple commands, non focal.    ENMT: MARTÍNEZ    Neck: mild JVD    Pulmonary: left side with rhonchi and decrease in base, right side with global rales.    Cardiovascular: irreg rate rhythm     Gastrointestinal: obese soft, non distended no tender    Genitourinary: clear urine in montoya    Extremities: improved edema    Musculoskeletal: no deformities.      LABS:            ASSESSMENT/PLAN:  51yMale presenting with :  end stage cardiomyopathy, had mucous plugged last night.    Neuro: resume fentanyl drip for agitation and patient comfort    HEENT: mouth care for lip laceration ( self created by biting down)    CV: Continue lasix and milrinone gtts    Pulm: vent suport, PEEP weaned to 10 and FIO2 to 60%.  Wean slowly, aggressive pulmonary toilet to prevent further ventilation compromised     GI/Nutrition:TF at goal.    /Renal: lasix drip to continue    ID: no issues, leukocytosis related to poor perfusion, no systemic signs of infection    Endo: glycemia at target    Skin:intact    Proph:SQH    Dispo: ICU.  Family meeting today for discussion of goals of care

## 2017-07-31 LAB
ANION GAP SERPL CALC-SCNC: 18 MMOL/L — HIGH (ref 5–17)
ANISOCYTOSIS BLD QL: SLIGHT — SIGNIFICANT CHANGE UP
APTT BLD: 36.2 SEC — SIGNIFICANT CHANGE UP (ref 27.5–37.4)
BASOPHILS # BLD AUTO: 0 K/UL — SIGNIFICANT CHANGE UP (ref 0–0.2)
BUN SERPL-MCNC: 80 MG/DL — HIGH (ref 8–20)
CALCIUM SERPL-MCNC: 8.4 MG/DL — LOW (ref 8.6–10.2)
CHLORIDE SERPL-SCNC: 94 MMOL/L — LOW (ref 98–107)
CO2 SERPL-SCNC: 30 MMOL/L — HIGH (ref 22–29)
CREAT SERPL-MCNC: 1.96 MG/DL — HIGH (ref 0.5–1.3)
EOSINOPHIL # BLD AUTO: 0.1 K/UL — SIGNIFICANT CHANGE UP (ref 0–0.5)
EOSINOPHIL NFR BLD AUTO: 1 % — SIGNIFICANT CHANGE UP (ref 0–6)
GLUCOSE SERPL-MCNC: 141 MG/DL — HIGH (ref 70–115)
HCT VFR BLD CALC: 24.6 % — LOW (ref 42–52)
HGB BLD-MCNC: 7.1 G/DL — LOW (ref 14–18)
HYPOCHROMIA BLD QL: SLIGHT — SIGNIFICANT CHANGE UP
INR BLD: 1.67 RATIO — HIGH (ref 0.88–1.16)
LYMPHOCYTES # BLD AUTO: 1.7 K/UL — SIGNIFICANT CHANGE UP (ref 1–4.8)
LYMPHOCYTES # BLD AUTO: 10 % — LOW (ref 20–55)
MACROCYTES BLD QL: SLIGHT — SIGNIFICANT CHANGE UP
MAGNESIUM SERPL-MCNC: 2.4 MG/DL — SIGNIFICANT CHANGE UP (ref 1.6–2.6)
MCHC RBC-ENTMCNC: 25.4 PG — LOW (ref 27–31)
MCHC RBC-ENTMCNC: 28.9 G/DL — LOW (ref 32–36)
MCV RBC AUTO: 87.9 FL — SIGNIFICANT CHANGE UP (ref 80–94)
MICROCYTES BLD QL: SLIGHT — SIGNIFICANT CHANGE UP
MONOCYTES # BLD AUTO: 0.8 K/UL — SIGNIFICANT CHANGE UP (ref 0–0.8)
MONOCYTES NFR BLD AUTO: 4 % — SIGNIFICANT CHANGE UP (ref 3–10)
NEUTROPHILS # BLD AUTO: 15.3 K/UL — HIGH (ref 1.8–8)
NEUTROPHILS NFR BLD AUTO: 82 % — HIGH (ref 37–73)
NEUTS BAND # BLD: 3 % — SIGNIFICANT CHANGE UP (ref 0–8)
NRBC # BLD: 5 /100 — HIGH (ref 0–0)
PHOSPHATE SERPL-MCNC: 3 MG/DL — SIGNIFICANT CHANGE UP (ref 2.4–4.7)
PLAT MORPH BLD: NORMAL — SIGNIFICANT CHANGE UP
PLATELET # BLD AUTO: 219 K/UL — SIGNIFICANT CHANGE UP (ref 150–400)
POIKILOCYTOSIS BLD QL AUTO: SLIGHT — SIGNIFICANT CHANGE UP
POLYCHROMASIA BLD QL SMEAR: SLIGHT — SIGNIFICANT CHANGE UP
POTASSIUM SERPL-MCNC: 4.3 MMOL/L — SIGNIFICANT CHANGE UP (ref 3.5–5.3)
POTASSIUM SERPL-SCNC: 4.3 MMOL/L — SIGNIFICANT CHANGE UP (ref 3.5–5.3)
PROTHROM AB SERPL-ACNC: 18.6 SEC — HIGH (ref 9.8–12.7)
RBC # BLD: 2.8 M/UL — LOW (ref 4.6–6.2)
RBC # FLD: 20.5 % — HIGH (ref 11–15.6)
RBC BLD AUTO: ABNORMAL
SODIUM SERPL-SCNC: 142 MMOL/L — SIGNIFICANT CHANGE UP (ref 135–145)
VANCOMYCIN TROUGH SERPL-MCNC: 19 UG/ML — SIGNIFICANT CHANGE UP (ref 10–20)
WBC # BLD: 18.9 K/UL — HIGH (ref 4.8–10.8)
WBC # FLD AUTO: 18.9 K/UL — HIGH (ref 4.8–10.8)

## 2017-07-31 RX ORDER — METOCLOPRAMIDE HCL 10 MG
5 TABLET ORAL ONCE
Qty: 0 | Refills: 0 | Status: COMPLETED | OUTPATIENT
Start: 2017-07-31 | End: 2017-07-31

## 2017-07-31 RX ORDER — MIDAZOLAM HYDROCHLORIDE 1 MG/ML
2 INJECTION, SOLUTION INTRAMUSCULAR; INTRAVENOUS ONCE
Qty: 0 | Refills: 0 | Status: DISCONTINUED | OUTPATIENT
Start: 2017-07-31 | End: 2017-07-31

## 2017-07-31 RX ORDER — MEROPENEM 1 G/30ML
1000 INJECTION INTRAVENOUS EVERY 12 HOURS
Qty: 0 | Refills: 0 | Status: DISCONTINUED | OUTPATIENT
Start: 2017-07-31 | End: 2017-08-02

## 2017-07-31 RX ORDER — ALBUMIN HUMAN 25 %
250 VIAL (ML) INTRAVENOUS EVERY 6 HOURS
Qty: 0 | Refills: 0 | Status: COMPLETED | OUTPATIENT
Start: 2017-07-31 | End: 2017-07-31

## 2017-07-31 RX ADMIN — Medication 0.12 MILLIGRAM(S): at 05:25

## 2017-07-31 RX ADMIN — PANTOPRAZOLE SODIUM 40 MILLIGRAM(S): 20 TABLET, DELAYED RELEASE ORAL at 05:25

## 2017-07-31 RX ADMIN — Medication 100 MILLIGRAM(S): at 12:09

## 2017-07-31 RX ADMIN — MEROPENEM 200 MILLIGRAM(S): 1 INJECTION INTRAVENOUS at 05:25

## 2017-07-31 RX ADMIN — HEPARIN SODIUM 5000 UNIT(S): 5000 INJECTION INTRAVENOUS; SUBCUTANEOUS at 14:33

## 2017-07-31 RX ADMIN — Medication 10 MILLIGRAM(S): at 17:17

## 2017-07-31 RX ADMIN — MEROPENEM 200 MILLIGRAM(S): 1 INJECTION INTRAVENOUS at 17:14

## 2017-07-31 RX ADMIN — MILRINONE LACTATE 12.22 MICROGRAM(S)/KG/MIN: 1 INJECTION, SOLUTION INTRAVENOUS at 22:32

## 2017-07-31 RX ADMIN — Medication 3 MILLILITER(S): at 03:25

## 2017-07-31 RX ADMIN — AMIODARONE HYDROCHLORIDE 200 MILLIGRAM(S): 400 TABLET ORAL at 05:25

## 2017-07-31 RX ADMIN — SENNA PLUS 10 MILLILITER(S): 8.6 TABLET ORAL at 22:31

## 2017-07-31 RX ADMIN — Medication 150 MILLIGRAM(S): at 06:41

## 2017-07-31 RX ADMIN — Medication 3 MILLILITER(S): at 08:18

## 2017-07-31 RX ADMIN — Medication 3 MILLILITER(S): at 15:34

## 2017-07-31 RX ADMIN — Medication 125 MILLILITER(S): at 01:18

## 2017-07-31 RX ADMIN — Medication 5 MILLIGRAM(S): at 14:34

## 2017-07-31 RX ADMIN — HYDROMORPHONE HYDROCHLORIDE 1 MILLIGRAM(S): 2 INJECTION INTRAMUSCULAR; INTRAVENOUS; SUBCUTANEOUS at 07:05

## 2017-07-31 RX ADMIN — MIDAZOLAM HYDROCHLORIDE 2 MILLIGRAM(S): 1 INJECTION, SOLUTION INTRAMUSCULAR; INTRAVENOUS at 22:32

## 2017-07-31 RX ADMIN — MILRINONE LACTATE 12.22 MICROGRAM(S)/KG/MIN: 1 INJECTION, SOLUTION INTRAVENOUS at 05:25

## 2017-07-31 RX ADMIN — Medication 125 MILLILITER(S): at 07:45

## 2017-07-31 RX ADMIN — CHLORHEXIDINE GLUCONATE 15 MILLILITER(S): 213 SOLUTION TOPICAL at 05:24

## 2017-07-31 RX ADMIN — HEPARIN SODIUM 5000 UNIT(S): 5000 INJECTION INTRAVENOUS; SUBCUTANEOUS at 22:32

## 2017-07-31 RX ADMIN — PANTOPRAZOLE SODIUM 40 MILLIGRAM(S): 20 TABLET, DELAYED RELEASE ORAL at 17:14

## 2017-07-31 RX ADMIN — Medication 50 MILLIGRAM(S): at 22:31

## 2017-07-31 RX ADMIN — Medication 150 MILLIGRAM(S): at 18:19

## 2017-07-31 RX ADMIN — HEPARIN SODIUM 5000 UNIT(S): 5000 INJECTION INTRAVENOUS; SUBCUTANEOUS at 05:24

## 2017-07-31 RX ADMIN — MIDAZOLAM HYDROCHLORIDE 2 MILLIGRAM(S): 1 INJECTION, SOLUTION INTRAMUSCULAR; INTRAVENOUS at 18:55

## 2017-07-31 RX ADMIN — Medication 9 MILLIGRAM(S): at 22:31

## 2017-07-31 RX ADMIN — CHLORHEXIDINE GLUCONATE 15 MILLILITER(S): 213 SOLUTION TOPICAL at 17:13

## 2017-07-31 RX ADMIN — Medication 125 MILLILITER(S): at 14:40

## 2017-07-31 RX ADMIN — MIDAZOLAM HYDROCHLORIDE 2 MILLIGRAM(S): 1 INJECTION, SOLUTION INTRAMUSCULAR; INTRAVENOUS at 05:44

## 2017-07-31 RX ADMIN — Medication 3 MILLILITER(S): at 20:29

## 2017-07-31 RX ADMIN — Medication 650 MILLIGRAM(S): at 12:13

## 2017-08-01 DIAGNOSIS — Z51.5 ENCOUNTER FOR PALLIATIVE CARE: ICD-10-CM

## 2017-08-01 DIAGNOSIS — Z99.11 DEPENDENCE ON RESPIRATOR [VENTILATOR] STATUS: ICD-10-CM

## 2017-08-01 DIAGNOSIS — N17.9 ACUTE KIDNEY FAILURE, UNSPECIFIED: ICD-10-CM

## 2017-08-01 LAB
ANION GAP SERPL CALC-SCNC: 20 MMOL/L — HIGH (ref 5–17)
ANISOCYTOSIS BLD QL: SLIGHT — SIGNIFICANT CHANGE UP
BASOPHILS # BLD AUTO: 0 K/UL — SIGNIFICANT CHANGE UP (ref 0–0.2)
BUN SERPL-MCNC: 92 MG/DL — HIGH (ref 8–20)
CALCIUM SERPL-MCNC: 8.3 MG/DL — LOW (ref 8.6–10.2)
CHLORIDE SERPL-SCNC: 93 MMOL/L — LOW (ref 98–107)
CO2 SERPL-SCNC: 28 MMOL/L — SIGNIFICANT CHANGE UP (ref 22–29)
CREAT SERPL-MCNC: 2.34 MG/DL — HIGH (ref 0.5–1.3)
EOSINOPHIL # BLD AUTO: 0 K/UL — SIGNIFICANT CHANGE UP (ref 0–0.5)
GLUCOSE SERPL-MCNC: 95 MG/DL — SIGNIFICANT CHANGE UP (ref 70–115)
HCT VFR BLD CALC: 31.6 % — LOW (ref 42–52)
HGB BLD-MCNC: 9.1 G/DL — LOW (ref 14–18)
HYPOCHROMIA BLD QL: SLIGHT — SIGNIFICANT CHANGE UP
LYMPHOCYTES # BLD AUTO: 2 K/UL — SIGNIFICANT CHANGE UP (ref 1–4.8)
LYMPHOCYTES # BLD AUTO: 8 % — LOW (ref 20–55)
MACROCYTES BLD QL: SLIGHT — SIGNIFICANT CHANGE UP
MAGNESIUM SERPL-MCNC: 2.4 MG/DL — SIGNIFICANT CHANGE UP (ref 1.6–2.6)
MCHC RBC-ENTMCNC: 24.5 PG — LOW (ref 27–31)
MCHC RBC-ENTMCNC: 28.8 G/DL — LOW (ref 32–36)
MCV RBC AUTO: 85.2 FL — SIGNIFICANT CHANGE UP (ref 80–94)
MONOCYTES # BLD AUTO: 0.8 K/UL — SIGNIFICANT CHANGE UP (ref 0–0.8)
MONOCYTES NFR BLD AUTO: 4 % — SIGNIFICANT CHANGE UP (ref 3–10)
MYELOCYTES NFR BLD: 1 % — HIGH (ref 0–0)
NEUTROPHILS # BLD AUTO: 14.7 K/UL — HIGH (ref 1.8–8)
NEUTROPHILS NFR BLD AUTO: 86 % — HIGH (ref 37–73)
NEUTS BAND # BLD: 1 % — SIGNIFICANT CHANGE UP (ref 0–8)
NRBC # BLD: 6 /100 — HIGH (ref 0–0)
PHOSPHATE SERPL-MCNC: 3.7 MG/DL — SIGNIFICANT CHANGE UP (ref 2.4–4.7)
PLAT MORPH BLD: NORMAL — SIGNIFICANT CHANGE UP
PLATELET # BLD AUTO: 233 K/UL — SIGNIFICANT CHANGE UP (ref 150–400)
POIKILOCYTOSIS BLD QL AUTO: SLIGHT — SIGNIFICANT CHANGE UP
POLYCHROMASIA BLD QL SMEAR: SLIGHT — SIGNIFICANT CHANGE UP
POTASSIUM SERPL-MCNC: 5 MMOL/L — SIGNIFICANT CHANGE UP (ref 3.5–5.3)
POTASSIUM SERPL-SCNC: 5 MMOL/L — SIGNIFICANT CHANGE UP (ref 3.5–5.3)
RBC # BLD: 3.71 M/UL — LOW (ref 4.6–6.2)
RBC # FLD: 20.4 % — HIGH (ref 11–15.6)
RBC BLD AUTO: ABNORMAL
SODIUM SERPL-SCNC: 141 MMOL/L — SIGNIFICANT CHANGE UP (ref 135–145)
VANCOMYCIN TROUGH SERPL-MCNC: 25 UG/ML — HIGH (ref 10–20)
WBC # BLD: 15.7 K/UL — HIGH (ref 4.8–10.8)
WBC # FLD AUTO: 15.7 K/UL — HIGH (ref 4.8–10.8)

## 2017-08-01 PROCEDURE — 99233 SBSQ HOSP IP/OBS HIGH 50: CPT

## 2017-08-01 PROCEDURE — 99291 CRITICAL CARE FIRST HOUR: CPT

## 2017-08-01 RX ORDER — ALBUMIN HUMAN 25 %
100 VIAL (ML) INTRAVENOUS EVERY 6 HOURS
Qty: 0 | Refills: 0 | Status: COMPLETED | OUTPATIENT
Start: 2017-08-01 | End: 2017-08-02

## 2017-08-01 RX ORDER — MIDAZOLAM HYDROCHLORIDE 1 MG/ML
2 INJECTION, SOLUTION INTRAMUSCULAR; INTRAVENOUS ONCE
Qty: 0 | Refills: 0 | Status: DISCONTINUED | OUTPATIENT
Start: 2017-08-01 | End: 2017-08-01

## 2017-08-01 RX ORDER — HALOPERIDOL DECANOATE 100 MG/ML
5 INJECTION INTRAMUSCULAR ONCE
Qty: 0 | Refills: 0 | Status: COMPLETED | OUTPATIENT
Start: 2017-08-01 | End: 2017-08-01

## 2017-08-01 RX ADMIN — Medication 50 MILLIGRAM(S): at 21:19

## 2017-08-01 RX ADMIN — Medication 50 MILLILITER(S): at 08:28

## 2017-08-01 RX ADMIN — HEPARIN SODIUM 5000 UNIT(S): 5000 INJECTION INTRAVENOUS; SUBCUTANEOUS at 21:16

## 2017-08-01 RX ADMIN — Medication 100 MILLIGRAM(S): at 11:19

## 2017-08-01 RX ADMIN — Medication 0.12 MILLIGRAM(S): at 05:34

## 2017-08-01 RX ADMIN — PANTOPRAZOLE SODIUM 40 MILLIGRAM(S): 20 TABLET, DELAYED RELEASE ORAL at 05:34

## 2017-08-01 RX ADMIN — AMIODARONE HYDROCHLORIDE 200 MILLIGRAM(S): 400 TABLET ORAL at 05:34

## 2017-08-01 RX ADMIN — Medication 50 MILLILITER(S): at 21:17

## 2017-08-01 RX ADMIN — MILRINONE LACTATE 12.22 MICROGRAM(S)/KG/MIN: 1 INJECTION, SOLUTION INTRAVENOUS at 05:35

## 2017-08-01 RX ADMIN — Medication 9 MILLIGRAM(S): at 21:16

## 2017-08-01 RX ADMIN — PANTOPRAZOLE SODIUM 40 MILLIGRAM(S): 20 TABLET, DELAYED RELEASE ORAL at 18:42

## 2017-08-01 RX ADMIN — MILRINONE LACTATE 6.11 MICROGRAM(S)/KG/MIN: 1 INJECTION, SOLUTION INTRAVENOUS at 18:42

## 2017-08-01 RX ADMIN — MIDAZOLAM HYDROCHLORIDE 2 MILLIGRAM(S): 1 INJECTION, SOLUTION INTRAMUSCULAR; INTRAVENOUS at 05:34

## 2017-08-01 RX ADMIN — MEROPENEM 200 MILLIGRAM(S): 1 INJECTION INTRAVENOUS at 05:34

## 2017-08-01 RX ADMIN — SENNA PLUS 10 MILLILITER(S): 8.6 TABLET ORAL at 21:16

## 2017-08-01 RX ADMIN — Medication 50 MILLILITER(S): at 14:32

## 2017-08-01 RX ADMIN — MEROPENEM 200 MILLIGRAM(S): 1 INJECTION INTRAVENOUS at 18:42

## 2017-08-01 RX ADMIN — FENTANYL CITRATE 6.11 MICROGRAM(S)/KG/HR: 50 INJECTION INTRAVENOUS at 18:43

## 2017-08-01 RX ADMIN — Medication 3 MILLILITER(S): at 14:56

## 2017-08-01 RX ADMIN — Medication 3 MILLILITER(S): at 20:57

## 2017-08-01 RX ADMIN — CHLORHEXIDINE GLUCONATE 15 MILLILITER(S): 213 SOLUTION TOPICAL at 05:34

## 2017-08-01 RX ADMIN — HEPARIN SODIUM 5000 UNIT(S): 5000 INJECTION INTRAVENOUS; SUBCUTANEOUS at 14:33

## 2017-08-01 RX ADMIN — CHLORHEXIDINE GLUCONATE 15 MILLILITER(S): 213 SOLUTION TOPICAL at 18:42

## 2017-08-01 RX ADMIN — Medication 10 MILLIGRAM(S): at 14:32

## 2017-08-01 RX ADMIN — HALOPERIDOL DECANOATE 5 MILLIGRAM(S): 100 INJECTION INTRAMUSCULAR at 10:28

## 2017-08-01 RX ADMIN — Medication 3 MILLILITER(S): at 03:44

## 2017-08-01 RX ADMIN — HEPARIN SODIUM 5000 UNIT(S): 5000 INJECTION INTRAVENOUS; SUBCUTANEOUS at 05:34

## 2017-08-01 RX ADMIN — Medication 3 MILLILITER(S): at 08:22

## 2017-08-01 RX ADMIN — Medication 100 MILLIGRAM(S): at 05:34

## 2017-08-01 NOTE — PROGRESS NOTE ADULT - SUBJECTIVE AND OBJECTIVE BOX
OVERNIGHT EVENTS:  worsening clinical status, lack of improvement with aggressive medical therapy.     Present Symptoms:     Dyspnea: vented, respiratory failure   Nausea/Vomiting: unable   Anxiety:  unable   Depression: unable   Fatigue: unable  Loss of appetite: unable     Pain: none             Character-            Duration-            Effect-            Factors-            Frequency-            Location-            Severity-    Review of Systems: Reviewed                  Unable to obtain due to poor mentation   All others negative    MEDICATIONS  (STANDING):  amantadine Syrup 100 milliGRAM(s) Oral <User Schedule>  FLUoxetine Solution 10 milliGRAM(s) Oral daily  traZODone 50 milliGRAM(s) Oral at bedtime  digoxin     Tablet 0.125 milliGRAM(s) Oral daily  melatonin 9 milliGRAM(s) Oral at bedtime  pantoprazole   Suspension 40 milliGRAM(s) Oral two times a day before meals  senna Syrup 10 milliLiter(s) Oral at bedtime  lisinopril 2.5 milliGRAM(s) Oral daily  amiodarone    Tablet 200 milliGRAM(s) Oral daily  heparin  Injectable 5000 Unit(s) SubCutaneous every 8 hours  ALBUTerol/ipratropium for Nebulization 3 milliLiter(s) Nebulizer every 6 hours  chlorhexidine 0.12% Liquid 15 milliLiter(s) Swish and Spit two times a day  milrinone Infusion 0.5 MICROgram(s)/kG/Min (12.225 mL/Hr) IV Continuous <Continuous>  fentaNYL   Infusion 1.5 MICROgram(s)/kG/Hr (6.112 mL/Hr) IV Continuous <Continuous>  meropenem IVPB 1000 milliGRAM(s) IV Intermittent every 12 hours  albumin human 25% IVPB 100 milliLiter(s) IV Intermittent every 6 hours    MEDICATIONS  (PRN):  acetaminophen    Suspension 650 milliGRAM(s) Oral every 6 hours PRN For Temp greater than 38.5 C (101.3 F)  bisacodyl Suppository 10 milliGRAM(s) Rectal daily PRN Constipation    PHYSICAL EXAM:    Vital Signs Last 24 Hrs  T(C): 38.1 (01 Aug 2017 12:00), Max: 38.1 (01 Aug 2017 12:00)  T(F): 100.5 (01 Aug 2017 12:00), Max: 100.5 (01 Aug 2017 12:00)  HR: 104 (01 Aug 2017 15:00) (103 - 132)  BP: --  BP(mean): --  RR: 19 (01 Aug 2017 14:00) (15 - 320)  SpO2: 99% (01 Aug 2017 15:00) (93% - 100%)    General: lethargic                Karnofsky:  10%    HEENT: ET tube/trach    Lungs: tachypnea/labored breathing     CV: normal    GI: OG tube     : lindsay    MSK: normal  weakness      Skin: no rash    LABS:                      9.1    15.7  )-----------( 233      ( 01 Aug 2017 05:09 )             31.6     08-01    141  |  93<L>  |  92.0<H>  ----------------------------<  95  5.0   |  28.0  |  2.34<H>    Ca    8.3<L>      01 Aug 2017 05:09  Phos  3.7     08-01  Mg     2.4     08-01    PT/INR - ( 31 Jul 2017 04:37 )   PT: 18.6 sec;   INR: 1.67 ratio       PTT - ( 31 Jul 2017 04:37 )  PTT:36.2 sec    I&O's Summary    31 Jul 2017 07:01  -  01 Aug 2017 07:00  --------------------------------------------------------  IN: 3453.8 mL / OUT: 1268 mL / NET: 2185.8 mL    01 Aug 2017 07:01  -  01 Aug 2017 15:51  --------------------------------------------------------  IN: 487.3 mL / OUT: 510 mL / NET: -22.7 mL    RADIOLOGY & ADDITIONAL STUDIES:     ADVANCE DIRECTIVES: now DNR.

## 2017-08-01 NOTE — CHART NOTE - NSCHARTNOTEFT_GEN_A_CORE
Called patient's sister, Imelda Branch, with the aid of an  ([employer]  id# [phone]). It was explained to her that Shaun's multi-organ failure is worsening. It was agreed that his code status would be changed to DNR and that there will be no escalation of care at this point. She is in agreement. Palliative care and SICU attending aware of conversation and are in agreement.

## 2017-08-01 NOTE — PROGRESS NOTE ADULT - SUBJECTIVE AND OBJECTIVE BOX
INTERVAL HPI/OVERNIGHT EVENTS/SUBJECTIVE:  Decreased UOP.  Intolerant of tube feeds    ICU Vital Signs Last 24 Hrs  T(C): 38.1 (01 Aug 2017 12:00), Max: 38.1 (01 Aug 2017 12:00)  T(F): 100.5 (01 Aug 2017 12:00), Max: 100.5 (01 Aug 2017 12:00)  HR: 104 (01 Aug 2017 15:00) (103 - 132)  BP: --  BP(mean): --  ABP: 94/56 (01 Aug 2017 14:00) (82/42 - 116/52)  ABP(mean): 70 (01 Aug 2017 14:00) (55 - 78)  RR: 19 (01 Aug 2017 14:00) (15 - 320)  SpO2: 99% (01 Aug 2017 15:00) (93% - 100%)      I&O's Detail    31 Jul 2017 07:01  -  01 Aug 2017 07:00  --------------------------------------------------------  IN:    Albumin 5%  - 250 mL: 250 mL    fentaNYL  Infusion: 96 mL    Free Water: 800 mL    furosemide Infusion: 40 mL    milrinone  Infusion: 292.8 mL    Other: 1400 mL    Pivot: 225 mL    Solution: 250 mL    Solution: 100 mL  Total IN: 3453.8 mL    OUT:    Indwelling Catheter - Urethral: 268 mL    Other: 1000 mL  Total OUT: 1268 mL    Total NET: 2185.8 mL      01 Aug 2017 07:01  -  01 Aug 2017 15:30  --------------------------------------------------------  IN:    Albumin 25%: 200 mL    fentaNYL  Infusion: 18.3 mL    fentaNYL  Infusion: 8 mL    Free Water: 200 mL    milrinone  Infusion: 61 mL  Total IN: 487.3 mL    OUT:    Indwelling Catheter - Urethral: 60 mL    Other: 450 mL  Total OUT: 510 mL    Total NET: -22.7 mL          Mode: AC/ CMV (Assist Control/ Continuous Mandatory Ventilation)  RR (machine): 14  TV (machine): 450  FiO2: 60  PEEP: 8  MAP: 15  PIP: 27        MEDICATIONS  (STANDING):  amantadine Syrup 100 milliGRAM(s) Oral <User Schedule>  FLUoxetine Solution 10 milliGRAM(s) Oral daily  traZODone 50 milliGRAM(s) Oral at bedtime  digoxin     Tablet 0.125 milliGRAM(s) Oral daily  melatonin 9 milliGRAM(s) Oral at bedtime  pantoprazole   Suspension 40 milliGRAM(s) Oral two times a day before meals  senna Syrup 10 milliLiter(s) Oral at bedtime  lisinopril 2.5 milliGRAM(s) Oral daily  amiodarone    Tablet 200 milliGRAM(s) Oral daily  heparin  Injectable 5000 Unit(s) SubCutaneous every 8 hours  ALBUTerol/ipratropium for Nebulization 3 milliLiter(s) Nebulizer every 6 hours  chlorhexidine 0.12% Liquid 15 milliLiter(s) Swish and Spit two times a day  milrinone Infusion 0.5 MICROgram(s)/kG/Min (12.225 mL/Hr) IV Continuous <Continuous>  fentaNYL   Infusion 1.5 MICROgram(s)/kG/Hr (6.112 mL/Hr) IV Continuous <Continuous>  meropenem IVPB 1000 milliGRAM(s) IV Intermittent every 12 hours  albumin human 25% IVPB 100 milliLiter(s) IV Intermittent every 6 hours    MEDICATIONS  (PRN):  acetaminophen    Suspension 650 milliGRAM(s) Oral every 6 hours PRN For Temp greater than 38.5 C (101.3 F)  bisacodyl Suppository 10 milliGRAM(s) Rectal daily PRN Constipation          PHYSICAL EXAM:    Neurological: Unresponsive    Neck: Trachea midline    Pulmonary: Coarse BS throughout, tachypnic    Cardiovascular: Tachycardia    Gastrointestinal: Softly distended, PEG to gravity    Genitourinary: Rogers    Extremities: Diffuse anasarca    Skin: Diaphoretic            LABS:  CBC Full  -  ( 01 Aug 2017 05:09 )  WBC Count : 15.7 K/uL  Hemoglobin : 9.1 g/dL  Hematocrit : 31.6 %  Platelet Count - Automated : 233 K/uL  Mean Cell Volume : 85.2 fl  Mean Cell Hemoglobin : 24.5 pg  Mean Cell Hemoglobin Concentration : 28.8 g/dL  Auto Neutrophil # : 14.7 K/uL  Auto Lymphocyte # : 2.0 K/uL  Auto Monocyte # : 0.8 K/uL  Auto Eosinophil # : 0.0 K/uL  Auto Basophil # : 0.0 K/uL  Auto Neutrophil % : 86.0 %  Auto Lymphocyte % : 8.0 %  Auto Monocyte % : 4.0 %  Auto Eosinophil % : x  Auto Basophil % : x    08-01    141  |  93<L>  |  92.0<H>  ----------------------------<  95  5.0   |  28.0  |  2.34<H>    Ca    8.3<L>      01 Aug 2017 05:09  Phos  3.7     08-01  Mg     2.4     08-01      PT/INR - ( 31 Jul 2017 04:37 )   PT: 18.6 sec;   INR: 1.67 ratio         PTT - ( 31 Jul 2017 04:37 )  PTT:36.2 sec    RECENT CULTURES:            CAPILLARY BLOOD GLUCOSE      RADIOLOGY & ADDITIONAL STUDIES:    ASSESSMENT/PLAN:  51yMale presenting with: End stage multiorgan failure    Neuro: Encephalopathy.  Multifactorial.  When awake appears in pain and severe distress.  Will work to keep patient comfortable      CV: Acute on chronic CHF. End stage.  Not a candidate for transplant or LVAD.    Pulm: Acute resp failure.  Vent support.      GI/Nutrition: Ileus.  Now decompressed through peg tube.  Intestinal failure    /Renal: Rising creat and near anuria.  Acute renal failure.      ID:  Likely HCAP. Cont abx.      Proph: SQH, PPI    Dispo: ICU.  Need family meeting.  Pt actively dying.  Poor prognosis.  No escalation of therapy as given end stage nature of disease will be futile.        CRITICAL CARE TIME SPENT:  50 min

## 2017-08-01 NOTE — PROGRESS NOTE ADULT - ASSESSMENT
51M with worsening multiorgan failure, with no improvement despite best standard of medical practice.

## 2017-08-01 NOTE — PROGRESS NOTE ADULT - ATTENDING COMMENTS
Thank you for the opportunity to assist with the care of this patient.   [facility] [phone].

## 2017-08-02 LAB
ANION GAP SERPL CALC-SCNC: 26 MMOL/L — HIGH (ref 5–17)
ANISOCYTOSIS BLD QL: SLIGHT — SIGNIFICANT CHANGE UP
BUN SERPL-MCNC: 103 MG/DL — HIGH (ref 8–20)
CALCIUM SERPL-MCNC: 8.3 MG/DL — LOW (ref 8.6–10.2)
CHLORIDE SERPL-SCNC: 93 MMOL/L — LOW (ref 98–107)
CO2 SERPL-SCNC: 25 MMOL/L — SIGNIFICANT CHANGE UP (ref 22–29)
CREAT SERPL-MCNC: 3.32 MG/DL — HIGH (ref 0.5–1.3)
GIANT PLATELETS BLD QL SMEAR: PRESENT — SIGNIFICANT CHANGE UP
GLUCOSE SERPL-MCNC: 73 MG/DL — SIGNIFICANT CHANGE UP (ref 70–115)
HCT VFR BLD CALC: 24.1 % — LOW (ref 42–52)
HGB BLD-MCNC: 7.2 G/DL — LOW (ref 14–18)
HYPOCHROMIA BLD QL: SLIGHT — SIGNIFICANT CHANGE UP
LYMPHOCYTES # BLD AUTO: 6 % — LOW (ref 20–55)
MACROCYTES BLD QL: SLIGHT — SIGNIFICANT CHANGE UP
MAGNESIUM SERPL-MCNC: 2.6 MG/DL — SIGNIFICANT CHANGE UP (ref 1.6–2.6)
MCHC RBC-ENTMCNC: 25.6 PG — LOW (ref 27–31)
MCHC RBC-ENTMCNC: 29.9 G/DL — LOW (ref 32–36)
MCV RBC AUTO: 85.7 FL — SIGNIFICANT CHANGE UP (ref 80–94)
MICROCYTES BLD QL: SLIGHT — SIGNIFICANT CHANGE UP
MONOCYTES NFR BLD AUTO: 5 % — SIGNIFICANT CHANGE UP (ref 3–10)
MYELOCYTES NFR BLD: 1 % — HIGH (ref 0–0)
NEUTROPHILS NFR BLD AUTO: 86 % — HIGH (ref 37–73)
NEUTS BAND # BLD: 2 % — SIGNIFICANT CHANGE UP (ref 0–8)
NRBC # BLD: 9 /100 — HIGH (ref 0–0)
OVALOCYTES BLD QL SMEAR: SLIGHT — SIGNIFICANT CHANGE UP
PHOSPHATE SERPL-MCNC: 5 MG/DL — HIGH (ref 2.4–4.7)
PLAT MORPH BLD: SIGNIFICANT CHANGE UP
PLATELET # BLD AUTO: 255 K/UL — SIGNIFICANT CHANGE UP (ref 150–400)
POIKILOCYTOSIS BLD QL AUTO: SLIGHT — SIGNIFICANT CHANGE UP
POLYCHROMASIA BLD QL SMEAR: SLIGHT — SIGNIFICANT CHANGE UP
POTASSIUM SERPL-MCNC: 5.5 MMOL/L — HIGH (ref 3.5–5.3)
POTASSIUM SERPL-SCNC: 5.5 MMOL/L — HIGH (ref 3.5–5.3)
RBC # BLD: 2.73 M/UL — LOW (ref 4.6–6.2)
RBC # FLD: 20.2 % — HIGH (ref 11–15.6)
RBC BLD AUTO: ABNORMAL
SODIUM SERPL-SCNC: 144 MMOL/L — SIGNIFICANT CHANGE UP (ref 135–145)
STOMATOCYTES BLD QL SMEAR: PRESENT — SIGNIFICANT CHANGE UP
TARGETS BLD QL SMEAR: SLIGHT — SIGNIFICANT CHANGE UP
WBC # BLD: 15.2 K/UL — HIGH (ref 4.8–10.8)
WBC # FLD AUTO: 15.2 K/UL — HIGH (ref 4.8–10.8)

## 2017-08-02 PROCEDURE — 99233 SBSQ HOSP IP/OBS HIGH 50: CPT

## 2017-08-02 RX ORDER — HYDROMORPHONE HYDROCHLORIDE 2 MG/ML
2 INJECTION INTRAMUSCULAR; INTRAVENOUS; SUBCUTANEOUS
Qty: 100 | Refills: 0 | Status: DISCONTINUED | OUTPATIENT
Start: 2017-08-02 | End: 2017-08-04

## 2017-08-02 RX ORDER — HALOPERIDOL DECANOATE 100 MG/ML
5 INJECTION INTRAMUSCULAR ONCE
Qty: 0 | Refills: 0 | Status: COMPLETED | OUTPATIENT
Start: 2017-08-02 | End: 2017-08-02

## 2017-08-02 RX ADMIN — Medication 10 MILLIGRAM(S): at 12:21

## 2017-08-02 RX ADMIN — MILRINONE LACTATE 6.11 MICROGRAM(S)/KG/MIN: 1 INJECTION, SOLUTION INTRAVENOUS at 06:27

## 2017-08-02 RX ADMIN — Medication 100 MILLIGRAM(S): at 12:21

## 2017-08-02 RX ADMIN — FENTANYL CITRATE 6.11 MICROGRAM(S)/KG/HR: 50 INJECTION INTRAVENOUS at 02:11

## 2017-08-02 RX ADMIN — Medication 0.12 MILLIGRAM(S): at 05:14

## 2017-08-02 RX ADMIN — AMIODARONE HYDROCHLORIDE 200 MILLIGRAM(S): 400 TABLET ORAL at 05:14

## 2017-08-02 RX ADMIN — HALOPERIDOL DECANOATE 5 MILLIGRAM(S): 100 INJECTION INTRAMUSCULAR at 08:10

## 2017-08-02 RX ADMIN — Medication 3 MILLILITER(S): at 03:51

## 2017-08-02 RX ADMIN — MEROPENEM 200 MILLIGRAM(S): 1 INJECTION INTRAVENOUS at 05:13

## 2017-08-02 RX ADMIN — HYDROMORPHONE HYDROCHLORIDE 2 MG/HR: 2 INJECTION INTRAMUSCULAR; INTRAVENOUS; SUBCUTANEOUS at 17:18

## 2017-08-02 RX ADMIN — Medication 50 MILLILITER(S): at 01:55

## 2017-08-02 RX ADMIN — Medication 3 MILLILITER(S): at 08:34

## 2017-08-02 RX ADMIN — Medication 100 MILLIGRAM(S): at 05:14

## 2017-08-02 RX ADMIN — PANTOPRAZOLE SODIUM 40 MILLIGRAM(S): 20 TABLET, DELAYED RELEASE ORAL at 05:13

## 2017-08-02 RX ADMIN — Medication 650 MILLIGRAM(S): at 00:24

## 2017-08-02 RX ADMIN — CHLORHEXIDINE GLUCONATE 15 MILLILITER(S): 213 SOLUTION TOPICAL at 05:13

## 2017-08-02 RX ADMIN — HEPARIN SODIUM 5000 UNIT(S): 5000 INJECTION INTRAVENOUS; SUBCUTANEOUS at 05:14

## 2017-08-02 RX ADMIN — HYDROMORPHONE HYDROCHLORIDE 2 MG/HR: 2 INJECTION INTRAMUSCULAR; INTRAVENOUS; SUBCUTANEOUS at 18:00

## 2017-08-02 RX ADMIN — Medication 650 MILLIGRAM(S): at 12:20

## 2017-08-02 RX ADMIN — CHLORHEXIDINE GLUCONATE 15 MILLILITER(S): 213 SOLUTION TOPICAL at 17:17

## 2017-08-02 NOTE — PROGRESS NOTE ADULT - ATTENDING COMMENTS
Thank you for the opportunity to assist with the care of this patient.   [facility] [phone].

## 2017-08-02 NOTE — PROGRESS NOTE ADULT - SUBJECTIVE AND OBJECTIVE BOX
OVERNIGHT EVENTS: patient not any better, doing poorly.     Present Symptoms:     Dyspnea: vented respiratory failure   Nausea/Vomiting: No  Anxiety:  No  Depression: No  Fatigue: Yes   Loss of appetite: No    Pain: none            Character-            Duration-            Effect-            Factors-            Frequency-            Location-            Severity-    Review of Systems: Reviewed              Unable to obtain due to poor mentation   All others negative    MEDICATIONS  (STANDING):  amantadine Syrup 100 milliGRAM(s) Oral <User Schedule>  FLUoxetine Solution 10 milliGRAM(s) Oral daily  traZODone 50 milliGRAM(s) Oral at bedtime  digoxin     Tablet 0.125 milliGRAM(s) Oral daily  melatonin 9 milliGRAM(s) Oral at bedtime  pantoprazole   Suspension 40 milliGRAM(s) Oral two times a day before meals  senna Syrup 10 milliLiter(s) Oral at bedtime  amiodarone    Tablet 200 milliGRAM(s) Oral daily  heparin  Injectable 5000 Unit(s) SubCutaneous every 8 hours  ALBUTerol/ipratropium for Nebulization 3 milliLiter(s) Nebulizer every 6 hours  chlorhexidine 0.12% Liquid 15 milliLiter(s) Swish and Spit two times a day  milrinone Infusion 0.25 MICROgram(s)/kG/Min (6.112 mL/Hr) IV Continuous <Continuous>  fentaNYL   Infusion 1 MICROgram(s)/kG/Hr (4.075 mL/Hr) IV Continuous <Continuous>    MEDICATIONS  (PRN):  acetaminophen    Suspension 650 milliGRAM(s) Oral every 6 hours PRN For Temp greater than 38.5 C (101.3 F)  bisacodyl Suppository 10 milliGRAM(s) Rectal daily PRN Constipation    PHYSICAL EXAM:    Vital Signs Last 24 Hrs  T(C): 37.9 (02 Aug 2017 08:02), Max: 38.6 (02 Aug 2017 00:00)  T(F): 100.3 (02 Aug 2017 08:02), Max: 101.5 (02 Aug 2017 00:00)  HR: 104 (02 Aug 2017 13:05) (93 - 110)  BP: --  BP(mean): --  RR: 20 (02 Aug 2017 12:00) (14 - 27)  SpO2: 95% (02 Aug 2017 13:05) (92% - 100%)    General: lethargic                 Karnofsky:  10%    HEENT: ET tube    Lungs: tachypnea/labored breathing     CV: normal      GI: OG tube     :  lindsay    MSK: weakness  edema         Skin: no rash    LABS:                      7.2    15.2  )-----------( 255      ( 02 Aug 2017 04:38 )             24.1     08-02    144  |  93<L>  |  103.0<H>  ----------------------------<  73  5.5<H>   |  25.0  |  3.32<H>    Ca    8.3<L>      02 Aug 2017 04:38  Phos  5.0     08-02  Mg     2.6     08-02    I&O's Summary    01 Aug 2017 07:01  -  02 Aug 2017 07:00  --------------------------------------------------------  IN: 1619.1 mL / OUT: 1280 mL / NET: 339.1 mL    02 Aug 2017 07:01  -  02 Aug 2017 14:10  --------------------------------------------------------  IN: 273.2 mL / OUT: 0 mL / NET: 273.2 mL    RADIOLOGY & ADDITIONAL STUDIES:    ADVANCE DIRECTIVES: DNR

## 2017-08-02 NOTE — PROGRESS NOTE ADULT - PROBLEM SELECTOR PLAN 1
family is unable to remove the ventilator at this time. there are other family in [facility] and they wanted to see if they can make it, unfortunately, [place] [employer] denied their Visa.

## 2017-08-02 NOTE — PROGRESS NOTE ADULT - SUBJECTIVE AND OBJECTIVE BOX
Pt seen and examined.    Unresponsive.   No urine output.  Abd softly distended.  Large volume continues to drain from PEG to gravity.        08-02    144  |  93<L>  |  103.0<H>  ----------------------------<  73  5.5<H>   |  25.0  |  3.32<H>    Ca    8.3<L>      02 Aug 2017 04:38  Phos  5.0     08-02  Mg     2.6     08-02        Continues to have worsening multiorgan failure.   Further aggressive intervention futile to restore pt to independent functional status.  Given current heart function, pt unlikely to be ever able to survive outside of an ICU setting.  Conversation had with pt's family yesterday afternoon via .  Have advised pt will die soon.  I have conveyed my concern that continued invasive intervention such as mech vent are causing suffering without any hope of improving outcome.  Have advised transition from therapies to prolong life to therapies to ensure comfort and end of suffering.  Family was waiting for more family to arrive before making any decisions.  Following conversation with palliative care today, family is not ready to withdraw mechanical ventilation but is agreeable to stopping inotropic support and transitioning to comfort measures.  pt remains DNR.

## 2017-08-03 PROCEDURE — 99233 SBSQ HOSP IP/OBS HIGH 50: CPT

## 2017-08-03 RX ADMIN — CHLORHEXIDINE GLUCONATE 15 MILLILITER(S): 213 SOLUTION TOPICAL at 05:03

## 2017-08-03 RX ADMIN — HYDROMORPHONE HYDROCHLORIDE 2 MG/HR: 2 INJECTION INTRAMUSCULAR; INTRAVENOUS; SUBCUTANEOUS at 17:53

## 2017-08-03 RX ADMIN — Medication 2 MILLIGRAM(S): at 15:40

## 2017-08-03 RX ADMIN — HYDROMORPHONE HYDROCHLORIDE 2 MG/HR: 2 INJECTION INTRAMUSCULAR; INTRAVENOUS; SUBCUTANEOUS at 19:18

## 2017-08-03 NOTE — PROGRESS NOTE ADULT - SUBJECTIVE AND OBJECTIVE BOX
OVERNIGHT EVENTS: more comfortable     Present Symptoms:     Dyspnea: 0   Nausea/Vomiting: No  Anxiety:  No  Depression: unable   Fatigue: Yes   Loss of appetite: unable     Pain: none             Character-            Duration-            Effect-            Factors-            Frequency-            Location-            Severity-    Review of Systems: Reviewed                Unable to obtain due to poor mentation   All others negative    MEDICATIONS  (STANDING):  chlorhexidine 0.12% Liquid 15 milliLiter(s) Swish and Spit two times a day  HYDROmorphone Infusion 2 mG/Hr (2 mL/Hr) IV Continuous <Continuous>    MEDICATIONS  (PRN):  LORazepam   Injectable 2 milliGRAM(s) IV Push every 1 hour PRN Agitation    PHYSICAL EXAM:    Vital Signs Last 24 Hrs  T(C): 36.8 (02 Aug 2017 22:00), Max: 37.2 (02 Aug 2017 16:00)  T(F): 98.2 (02 Aug 2017 22:00), Max: 98.9 (02 Aug 2017 16:00)  HR: 96 (03 Aug 2017 08:00) (95 - 108)  BP: --  BP(mean): --  RR: 14 (03 Aug 2017 08:00) (14 - 33)  SpO2: 98% (03 Aug 2017 08:00) (95% - 100%)    General: lethargic                    Karnofsky:  20%    HEENT: ET tube    Lungs: comfortable    CV: normal      GI: nondistended               : lindsay    MSK: weakness  edema              Skin: no rash    LABS:                     7.2    15.2  )-----------( 255      ( 02 Aug 2017 04:38 )             24.1     08-02    144  |  93<L>  |  103.0<H>  ----------------------------<  73  5.5<H>   |  25.0  |  3.32<H>    Ca    8.3<L>      02 Aug 2017 04:38  Phos  5.0     08-02  Mg     2.6     08-02    I&O's Summary    02 Aug 2017 07:01  -  03 Aug 2017 07:00  --------------------------------------------------------  IN: 319.4 mL / OUT: 500 mL / NET: -180.6 mL    RADIOLOGY & ADDITIONAL STUDIES:    ADVANCE DIRECTIVES: DNR, no escalation, comfort measures only

## 2017-08-03 NOTE — PROGRESS NOTE ADULT - ATTENDING COMMENTS
Thank you for the opportunity to assist with the care of this patient.   [facility] Consult Service [phone].

## 2017-08-03 NOTE — PROGRESS NOTE ADULT - SUBJECTIVE AND OBJECTIVE BOX
Pt seen and examined.      Appears comfortable.  Does not arouse to voice.  Remains nearly anuric.      COntinue comfort measures for end of life.

## 2017-08-03 NOTE — DISCHARGE NOTE FOR THE EXPIRED PATIENT - HOSPITAL COURSE
The patient is a 51 year old male with significant cardiovascular history including atrial fibrillation and cardiomyopathy s/p laparoscopic cholecystectomy POD #2. Returned to ER on 4/5/17The patient was discharged and returned to the ED today complaining of lower abdominal pain, constipation, and urinary retention. Found to have Bile leak with but went into cardiac arrest during ERCP.  Coded again once in SICU.  Has had multiple complications since then. The patient is a 51 year old male with significant cardiovascular history including atrial fibrillation and cardiomyopathy s/p laparoscopic cholecystectomy POD #2. The patient was discharged and returned to the ED 2017 complaining of lower abdominal pain, constipation, and urinary retention. He was worked up for and found to have Bile leak and went for ERCP 17. During that procedure he had cardiac arrest.  ROSC was achieved and he had an emergent exploratory laparotomy at that time. His abdomen was washed out and left open and transferred to SICU. Upon arrival to SICU he lost his pulse again and ROSC was again achieved.  5/10 ERCP was completed with stent placement and abdominal closure.  he had IR drainage subhepatic collection.  he had an IVC filter placed for DVT and he could not be anticoagulated due to episodes of GI bleeding. On  he had a PEG placed. He Has had multiple complications since then including elevated liver enzymes, GI Bleeding, pneumonia, DVT, multiple intubations for respiratory failure due to cardiogenic shock and septic shock. Over the past 2 weeks his nonischemic cardiomyopathy progressed to decompensated heart failure. Despite cardiac support with inotropes and aggressive diuresis patient progressed to multisystem organ failure, uremia and hyperkalemia. Multiple family meeting were had over the last several days and and ultimately withdrawal of care was decided 17 and the patient  shortly after extubation. His family was at his side at the time of expiration, autopsy was offered and declined. ICU attending made aware. The patient is a 51 year old male with significant cardiovascular history including atrial fibrillation and cardiomyopathy s/p laparoscopic cholecystectomy POD #2. The patient was discharged and returned to the ED 2017 complaining of lower abdominal pain, constipation, and urinary retention. He was worked up for and found to have Bile leak and went for ERCP 17. During that procedure he had cardiac arrest.  ROSC was achieved and he had an emergent exploratory laparotomy at that time. His abdomen was washed out and left open and transferred to SICU. Upon arrival to SICU he lost his pulse again and ROSC was again achieved.  5/10 ERCP was completed with stent placement and abdominal closure.  he had IR drainage subhepatic collection.  he had an IVC filter placed for DVT and he could not be anticoagulated due to episodes of GI bleeding. On  he had a PEG placed. He Has had multiple complications since then including elevated liver enzymes, GI Bleeding, pneumonia, DVT, multiple intubations for respiratory failure due to cardiogenic shock and septic shock. Over the past 2 weeks his nonischemic cardiomyopathy progressed to decompensated heart failure. Despite cardiac support with inotropes and aggressive diuresis patient progressed to multisystem organ failure, uremia and hyperkalemia. Multiple family meeting were had over the last several days and and ultimately withdrawal of care was decided 17 and the patient  shortly after extubation. His family was at his side at the time of expiration, autopsy was offered and declined. ICU attending made aware.  .

## 2017-08-03 NOTE — DISCHARGE NOTE FOR THE EXPIRED PATIENT - SECONDARY DIAGNOSIS.
Cardiogenic shock Ventricular tachycardia Cardiac arrest Bile leak, postoperative Gastrointestinal hemorrhage, unspecified gastrointestinal hemorrhage type Functional quadriplegia Encephalopathy

## 2017-08-04 PROCEDURE — 70450 CT HEAD/BRAIN W/O DYE: CPT

## 2017-08-04 PROCEDURE — 99231 SBSQ HOSP IP/OBS SF/LOW 25: CPT

## 2017-08-04 PROCEDURE — 94640 AIRWAY INHALATION TREATMENT: CPT

## 2017-08-04 PROCEDURE — 96374 THER/PROPH/DIAG INJ IV PUSH: CPT

## 2017-08-04 PROCEDURE — 87205 SMEAR GRAM STAIN: CPT

## 2017-08-04 PROCEDURE — 86920 COMPATIBILITY TEST SPIN: CPT

## 2017-08-04 PROCEDURE — 84133 ASSAY OF URINE POTASSIUM: CPT

## 2017-08-04 PROCEDURE — 82248 BILIRUBIN DIRECT: CPT

## 2017-08-04 PROCEDURE — 87070 CULTURE OTHR SPECIMN AEROBIC: CPT

## 2017-08-04 PROCEDURE — 84295 ASSAY OF SERUM SODIUM: CPT

## 2017-08-04 PROCEDURE — 76857 US EXAM PELVIC LIMITED: CPT

## 2017-08-04 PROCEDURE — 84145 PROCALCITONIN (PCT): CPT

## 2017-08-04 PROCEDURE — 86900 BLOOD TYPING SEROLOGIC ABO: CPT

## 2017-08-04 PROCEDURE — 92526 ORAL FUNCTION THERAPY: CPT

## 2017-08-04 PROCEDURE — 36600 WITHDRAWAL OF ARTERIAL BLOOD: CPT

## 2017-08-04 PROCEDURE — 85730 THROMBOPLASTIN TIME PARTIAL: CPT

## 2017-08-04 PROCEDURE — 80053 COMPREHEN METABOLIC PANEL: CPT

## 2017-08-04 PROCEDURE — 85610 PROTHROMBIN TIME: CPT

## 2017-08-04 PROCEDURE — 84480 ASSAY TRIIODOTHYRONINE (T3): CPT

## 2017-08-04 PROCEDURE — C1887: CPT

## 2017-08-04 PROCEDURE — C1880: CPT

## 2017-08-04 PROCEDURE — 97530 THERAPEUTIC ACTIVITIES: CPT

## 2017-08-04 PROCEDURE — 93325 DOPPLER ECHO COLOR FLOW MAPG: CPT

## 2017-08-04 PROCEDURE — 99221 1ST HOSP IP/OBS SF/LOW 40: CPT

## 2017-08-04 PROCEDURE — 97163 PT EVAL HIGH COMPLEX 45 MIN: CPT

## 2017-08-04 PROCEDURE — 74018 RADEX ABDOMEN 1 VIEW: CPT

## 2017-08-04 PROCEDURE — 93970 EXTREMITY STUDY: CPT

## 2017-08-04 PROCEDURE — 83036 HEMOGLOBIN GLYCOSYLATED A1C: CPT

## 2017-08-04 PROCEDURE — 82570 ASSAY OF URINE CREATININE: CPT

## 2017-08-04 PROCEDURE — 70496 CT ANGIOGRAPHY HEAD: CPT

## 2017-08-04 PROCEDURE — 84300 ASSAY OF URINE SODIUM: CPT

## 2017-08-04 PROCEDURE — 84100 ASSAY OF PHOSPHORUS: CPT

## 2017-08-04 PROCEDURE — 74177 CT ABD & PELVIS W/CONTRAST: CPT

## 2017-08-04 PROCEDURE — 83690 ASSAY OF LIPASE: CPT

## 2017-08-04 PROCEDURE — 82803 BLOOD GASES ANY COMBINATION: CPT

## 2017-08-04 PROCEDURE — 74000: CPT

## 2017-08-04 PROCEDURE — 92610 EVALUATE SWALLOWING FUNCTION: CPT

## 2017-08-04 PROCEDURE — 71045 X-RAY EXAM CHEST 1 VIEW: CPT

## 2017-08-04 PROCEDURE — 86901 BLOOD TYPING SEROLOGIC RH(D): CPT

## 2017-08-04 PROCEDURE — P9045: CPT

## 2017-08-04 PROCEDURE — 83935 ASSAY OF URINE OSMOLALITY: CPT

## 2017-08-04 PROCEDURE — 87493 C DIFF AMPLIFIED PROBE: CPT

## 2017-08-04 PROCEDURE — 80076 HEPATIC FUNCTION PANEL: CPT

## 2017-08-04 PROCEDURE — 93312 ECHO TRANSESOPHAGEAL: CPT

## 2017-08-04 PROCEDURE — 82272 OCCULT BLD FECES 1-3 TESTS: CPT

## 2017-08-04 PROCEDURE — 83880 ASSAY OF NATRIURETIC PEPTIDE: CPT

## 2017-08-04 PROCEDURE — 86850 RBC ANTIBODY SCREEN: CPT

## 2017-08-04 PROCEDURE — 80162 ASSAY OF DIGOXIN TOTAL: CPT

## 2017-08-04 PROCEDURE — 82550 ASSAY OF CK (CPK): CPT

## 2017-08-04 PROCEDURE — 97167 OT EVAL HIGH COMPLEX 60 MIN: CPT

## 2017-08-04 PROCEDURE — 87075 CULTR BACTERIA EXCEPT BLOOD: CPT

## 2017-08-04 PROCEDURE — 87086 URINE CULTURE/COLONY COUNT: CPT

## 2017-08-04 PROCEDURE — 82436 ASSAY OF URINE CHLORIDE: CPT

## 2017-08-04 PROCEDURE — 87040 BLOOD CULTURE FOR BACTERIA: CPT

## 2017-08-04 PROCEDURE — 94799 UNLISTED PULMONARY SVC/PX: CPT

## 2017-08-04 PROCEDURE — 78226 HEPATOBILIARY SYSTEM IMAGING: CPT

## 2017-08-04 PROCEDURE — 84484 ASSAY OF TROPONIN QUANT: CPT

## 2017-08-04 PROCEDURE — 82435 ASSAY OF BLOOD CHLORIDE: CPT

## 2017-08-04 PROCEDURE — 87186 SC STD MICRODIL/AGAR DIL: CPT

## 2017-08-04 PROCEDURE — P9016: CPT

## 2017-08-04 PROCEDURE — 87103 BLOOD FUNGUS CULTURE: CPT

## 2017-08-04 PROCEDURE — 36415 COLL VENOUS BLD VENIPUNCTURE: CPT

## 2017-08-04 PROCEDURE — 81001 URINALYSIS AUTO W/SCOPE: CPT

## 2017-08-04 PROCEDURE — 85027 COMPLETE CBC AUTOMATED: CPT

## 2017-08-04 PROCEDURE — 85014 HEMATOCRIT: CPT

## 2017-08-04 PROCEDURE — A9537: CPT

## 2017-08-04 PROCEDURE — 82947 ASSAY GLUCOSE BLOOD QUANT: CPT

## 2017-08-04 PROCEDURE — 93306 TTE W/DOPPLER COMPLETE: CPT

## 2017-08-04 PROCEDURE — 71260 CT THORAX DX C+: CPT

## 2017-08-04 PROCEDURE — 76942 ECHO GUIDE FOR BIOPSY: CPT

## 2017-08-04 PROCEDURE — 82140 ASSAY OF AMMONIA: CPT

## 2017-08-04 PROCEDURE — 93005 ELECTROCARDIOGRAM TRACING: CPT

## 2017-08-04 PROCEDURE — 94003 VENT MGMT INPAT SUBQ DAY: CPT

## 2017-08-04 PROCEDURE — 83735 ASSAY OF MAGNESIUM: CPT

## 2017-08-04 PROCEDURE — 74022 RADEX COMPL AQT ABD SERIES: CPT

## 2017-08-04 PROCEDURE — 36592 COLLECT BLOOD FROM PICC: CPT

## 2017-08-04 PROCEDURE — 84436 ASSAY OF TOTAL THYROXINE: CPT

## 2017-08-04 PROCEDURE — C1729: CPT

## 2017-08-04 PROCEDURE — C1769: CPT

## 2017-08-04 PROCEDURE — T1013: CPT

## 2017-08-04 PROCEDURE — 83010 ASSAY OF HAPTOGLOBIN QUANT: CPT

## 2017-08-04 PROCEDURE — 93320 DOPPLER ECHO COMPLETE: CPT

## 2017-08-04 PROCEDURE — 83605 ASSAY OF LACTIC ACID: CPT

## 2017-08-04 PROCEDURE — C1894: CPT

## 2017-08-04 PROCEDURE — 82553 CREATINE MB FRACTION: CPT

## 2017-08-04 PROCEDURE — 80048 BASIC METABOLIC PNL TOTAL CA: CPT

## 2017-08-04 PROCEDURE — 80202 ASSAY OF VANCOMYCIN: CPT

## 2017-08-04 PROCEDURE — 84132 ASSAY OF SERUM POTASSIUM: CPT

## 2017-08-04 PROCEDURE — 97110 THERAPEUTIC EXERCISES: CPT

## 2017-08-04 PROCEDURE — 76700 US EXAM ABDOM COMPLETE: CPT

## 2017-08-04 PROCEDURE — 76000 FLUOROSCOPY <1 HR PHYS/QHP: CPT

## 2017-08-04 PROCEDURE — 82977 ASSAY OF GGT: CPT

## 2017-08-04 PROCEDURE — 94002 VENT MGMT INPAT INIT DAY: CPT

## 2017-08-04 PROCEDURE — 36430 TRANSFUSION BLD/BLD COMPNT: CPT

## 2017-08-04 PROCEDURE — 84443 ASSAY THYROID STIM HORMONE: CPT

## 2017-08-04 PROCEDURE — 84540 ASSAY OF URINE/UREA-N: CPT

## 2017-08-04 PROCEDURE — 77012 CT SCAN FOR NEEDLE BIOPSY: CPT

## 2017-08-04 PROCEDURE — 92950 HEART/LUNG RESUSCITATION CPR: CPT

## 2017-08-04 PROCEDURE — 99285 EMERGENCY DEPT VISIT HI MDM: CPT | Mod: 25

## 2017-08-04 PROCEDURE — 82330 ASSAY OF CALCIUM: CPT

## 2017-08-04 PROCEDURE — 82150 ASSAY OF AMYLASE: CPT

## 2017-08-04 PROCEDURE — 85045 AUTOMATED RETICULOCYTE COUNT: CPT

## 2017-08-04 PROCEDURE — 94760 N-INVAS EAR/PLS OXIMETRY 1: CPT

## 2017-08-04 PROCEDURE — P9047: CPT

## 2017-08-04 RX ORDER — ROBINUL 0.2 MG/ML
0.2 INJECTION INTRAMUSCULAR; INTRAVENOUS
Qty: 0 | Refills: 0 | Status: DISCONTINUED | OUTPATIENT
Start: 2017-08-04 | End: 2017-08-04

## 2017-08-04 RX ADMIN — CHLORHEXIDINE GLUCONATE 15 MILLILITER(S): 213 SOLUTION TOPICAL at 05:28

## 2017-08-04 RX ADMIN — HYDROMORPHONE HYDROCHLORIDE 2 MG/HR: 2 INJECTION INTRAMUSCULAR; INTRAVENOUS; SUBCUTANEOUS at 01:45

## 2017-08-04 RX ADMIN — CHLORHEXIDINE GLUCONATE 15 MILLILITER(S): 213 SOLUTION TOPICAL at 17:17

## 2017-08-04 RX ADMIN — Medication 2 MILLIGRAM(S): at 21:12

## 2017-08-04 RX ADMIN — ROBINUL 0.2 MILLIGRAM(S): 0.2 INJECTION INTRAMUSCULAR; INTRAVENOUS at 20:22

## 2017-08-04 RX ADMIN — Medication 2 MILLIGRAM(S): at 05:28

## 2017-08-04 RX ADMIN — HYDROMORPHONE HYDROCHLORIDE 2 MG/HR: 2 INJECTION INTRAMUSCULAR; INTRAVENOUS; SUBCUTANEOUS at 01:29

## 2017-08-04 NOTE — PROGRESS NOTE ADULT - PROBLEM SELECTOR PLAN 2
- will trend
keep in [place] and monitor UOP
- GGT downtrending
-off primacor
-off primacor
elevated GGT  - may need to evaluate CBD stent for patency
- I described to family that the medication he is on right now is only prolonging his dying process and not helping him and that we will be stopping it.
- coumadin on hold  - trend INR  - discuss plan for AC with attending
-has been on primacor
-monitor urine output  -continue montoya catheter
Continue Hep drip in therapeutic range.  Start Coumadin when more stable.
Ef <20%. Patient not a candidate for transplant or LVAD.
On Amantadine, Provigil, Trazadone  Melatonin to maintain sleep wake cycle.
Patient has multiple reasons for encephalopathy - toxic metabolic vs hypoxia/anoxia given history of cardiac arrest.  MRI Brain will help to see if there are changes consistent with hypoxia/anoxia.  EEG to look for encephalopathy and r/o complex partial seizures.  Continue supportive care.
Plan as per cardiology.  On  Milrinone infusion
See plan as above.
s/p SGC on milrinone infusion and Bumex
keep in [place] and monitor UOP
will trend.    - may need to evaluate CBD stent for patency

## 2017-08-04 NOTE — CHART NOTE - NSCHARTNOTEFT_GEN_A_CORE
Family meeting had with the aid of the , Next of kin including Imelda Branch and other siblings present with other extended family. It was explained to the family what the current condition of the patient is and the decision has been made to electively remove mechanical ventilation. All questions answered and the process explain to the family. Minutes after this discussion the patient's niece fainted and a rapid response was called. She regained consciousness upon being placed in the chair, witnesses said that she slowly collapsed and was caught by her significant other and she did not hit her head and had appropriate mentation upon arousal. She has agreed to seek treatment in the ED. The family has requested that we delay the extubation for now but will likely proceed in the next few hours. ICU attending aware.

## 2017-08-04 NOTE — PROGRESS NOTE ADULT - I WAS PHYSICALLY PRESENT FOR THE KEY PORTIONS OF THE EVALUATION AND MANAGEMENT (E/M) SERVICE PROVIDED.  I AGREE WITH THE ABOVE HISTORY, PHYSICAL, AND PLAN WHICH I HAVE REVIEWED AND EDITED WHERE APPROPRIATE

## 2017-08-04 NOTE — AIRWAY REMOVAL NOTE  ADULT & PEDS - RESPIRATORY EXPANSION/ACCESSORY MUSCLES/RETRACTIONS
expansion symmetric/no retractions/no use of accessory muscles
diaphragmatic
no retractions/suprasternal retractions/expansion symmetric/no use of accessory muscles
no use of accessory muscles/expansion symmetric/no retractions
no use of accessory muscles/no retractions

## 2017-08-04 NOTE — PROGRESS NOTE ADULT - SUBJECTIVE AND OBJECTIVE BOX
OVERNIGHT EVENTS: nothing new, appears more comfortable     Present Symptoms:     Dyspnea: vented   Nausea/Vomiting: unable   Anxiety:  unable   Depression: unable   Fatigue: unable   Loss of appetite: unable     Pain: none             Character-            Duration-            Effect-            Factors-            Frequency-            Location-            Severity-    Review of Systems: Reviewed                  Unable to obtain due to poor mentation   All others negative    MEDICATIONS  (STANDING):  chlorhexidine 0.12% Liquid 15 milliLiter(s) Swish and Spit two times a day  HYDROmorphone Infusion 2 mG/Hr (2 mL/Hr) IV Continuous <Continuous>    MEDICATIONS  (PRN):  LORazepam   Injectable 2 milliGRAM(s) IV Push every 1 hour PRN Agitation    PHYSICAL EXAM:    Vital Signs Last 24 Hrs  T(C): --  T(F): --  HR: 100 (04 Aug 2017 11:00) (88 - 101)  BP: --  BP(mean): --  RR: 67 (04 Aug 2017 11:00) (14 - 67)  SpO2: 91% (04 Aug 2017 11:00) (90% - 97%)    General: lethargic                   Karnofsky:  10%    HEENT: ET tube    Lungs: comfortable    CV: normal      GI: NG/OG    : lindsay    MSK: weakness  edema           Skin:  no rash    LABS: none     I&O's Summary    03 Aug 2017 07:01  -  04 Aug 2017 07:00  --------------------------------------------------------  IN: 48 mL / OUT: 280 mL / NET: -232 mL    04 Aug 2017 07:01  -  04 Aug 2017 11:51  --------------------------------------------------------  IN: 11 mL / OUT: 0 mL / NET: 11 mL    RADIOLOGY & ADDITIONAL STUDIES:    ADVANCE DIRECTIVES: DNR, do not escalate

## 2017-08-04 NOTE — PROGRESS NOTE ADULT - ATTENDING COMMENTS
Thank you for the opportunity to assist with the care of this patient.   [facility] Consult Service [phone].

## 2017-08-04 NOTE — PROGRESS NOTE ADULT - NSHPATTENDINGPLANDISCUSS_GEN_ALL_CORE
ACT team.
ICU staff, family
ICU team, all questions answered.
ICU team, all questions answered.
Patient, ICU team.
SICU team.
Family ( last night) . all questions answered.
Family, SICU staff, all questions answered.
ICU tea, all questions answered.
Partner, ICU and patient. all questions answered.
SICU team. all questions answered.
Surgical ICU team
Dr. Kenisha Taylor
Kameron BHATIA, Dr. Garcia
PATTIE BHATIA
ICU team, all questions answered.
Marcello BHATIA, Dr. Garcia
Marcello Campos
Patient, ACS and IR team. all questions answered.
patient, SICU team,

## 2017-08-04 NOTE — PROGRESS NOTE ADULT - PROBLEM SELECTOR PROBLEM 4
Peritonitis
Peritonitis
Palliative care encounter
Palliative care encounter
Peritonitis
Peritonitis
Acute respiratory failure with hypercapnia
Bile leak, postoperative
Chronic atrial fibrillation
Congestive heart failure
Encephalopathy
Acute respiratory failure with hypoxia and hypercapnia
Acute respiratory failure with hypoxia and hypercapnia
Chronic atrial fibrillation
Encephalopathy
Encounter for palliative care
Fever, unspecified fever cause
Functional quadriplegia
Functional quadriplegia
Palliative care encounter
Palliative care encounter
Urinary retention
Encounter for palliative care

## 2017-08-04 NOTE — CHART NOTE - NSCHARTNOTESELECT_GEN_ALL_CORE
Event Note
Event Note/SICU
Nutrition Services
SICU, DNR/Event Note
SICU/Event Note
Withdrawal of care/Event Note
Event Note

## 2017-08-04 NOTE — PROGRESS NOTE ADULT - PROVIDER SPECIALTY LIST ADULT
Anesthesia
Anesthesia
Cardiology
Critical Care
Electrophysiology
Gastroenterology
Infectious Disease
Neurology
Palliative Care
Rehab Medicine
SICU
Surgery
Trauma Surgery
Critical Care
Neurology
Palliative Care
SICU

## 2017-08-04 NOTE — PROGRESS NOTE ADULT - SUBJECTIVE AND OBJECTIVE BOX
Patient on comfort care.  Ventilator transferred to Pressure Support Ventilation.  Cont Dilaudid drip.

## 2017-08-04 NOTE — PROGRESS NOTE ADULT - PROBLEM SELECTOR PROBLEM 2
Elevated liver enzymes
Urinary retention
Elevated liver enzymes
Urinary retention
Ventricular tachycardia
Cardiogenic shock
Cardiogenic shock
Elevated liver enzymes
Elevated liver enzymes
Acute respiratory failure with hypercapnia
Anisocoria
Anisocoria
Bile leak, postoperative
Cardiac arrest
Congestive heart failure
Encephalopathy
Encephalopathy
Septic shock
Septic shock
Urinary retention
Urinary retention
Acute respiratory failure with hypercapnia
Acute respiratory failure with hypercapnia
Atrial fibrillation
Atrial fibrillation
Bile leak, postoperative
Cardiac arrest
Cardiac arrest
Cardiogenic shock
Coronary artery disease with other form of angina pectoris
Encephalopathy
Encephalopathy
Gastrointestinal hemorrhage, unspecified gastrointestinal hemorrhage type
Septic shock
Urinary retention
Ventricular tachycardia
Anisocoria

## 2017-08-04 NOTE — AIRWAY REMOVAL NOTE  ADULT & PEDS - ARTIFICAL AIRWAY REMOVAL COMMENTS
pt terminally weaned; removed from ventilator, and ETT removed.  Pt not placed on any additioanl oxygen per JANNETTE Bailey.
patient tolerated extubation well
racemic epi neb given post extubation for stridor

## 2017-08-04 NOTE — PROGRESS NOTE ADULT - PROBLEM SELECTOR PROBLEM 1
Bile leak, postoperative
Constipation, unspecified constipation type
Fever, unspecified fever cause
Fever, unspecified fever cause
Cardiac arrest
Fever, unspecified fever cause
Fever, unspecified fever cause
Ventilator dependence
Ventilator dependence
Acute respiratory failure with hypercapnia
Anisocoria
Anisocoria
Bile leak, postoperative
Cardiogenic shock
Constipation, unspecified constipation type
Encephalopathy
Gastrointestinal hemorrhage, unspecified gastrointestinal hemorrhage type
Peritonitis
Acute respiratory failure with hypercapnia
Acute respiratory failure with hypoxia and hypercapnia
Acute respiratory failure with hypoxia and hypercapnia
Anisocoria
Bile leak, postoperative
Cardiac arrest
Cardiogenic shock
Chronic atrial fibrillation
Constipation, unspecified constipation type
Gastrointestinal hemorrhage, unspecified gastrointestinal hemorrhage type
Peritonitis
Ventilator dependence
Ventilator dependence

## 2017-08-04 NOTE — PROGRESS NOTE ADULT - PROBLEM SELECTOR PROBLEM 3
Bile leak, postoperative
Congestive heart failure
Bile leak, postoperative
ROCIO (acute kidney injury)
ROCIO (acute kidney injury)
Acute respiratory failure with hypoxia and hypercapnia
Bile leak, postoperative
Cardiac arrest
Congestive heart failure
Functional quadriplegia
Urinary retention
Urinary retention
Acidosis, metabolic
Acute respiratory failure with hypercapnia
Cardiac arrest
Cardiogenic shock
Chronic atrial fibrillation
Congestive heart failure
Encephalopathy
Fever, unspecified fever cause
Functional quadriplegia
Functional quadriplegia
ROCIO (acute kidney injury)
ROCIO (acute kidney injury)
Septic shock
Septic shock

## 2017-08-04 NOTE — PROGRESS NOTE ADULT - PROBLEM SELECTOR PLAN 4
Monitoring off antibiotics.
Several attempts ( 7/19, 7/20 and 7/24)  made to obtain telephone number of wife and children in [facility]. Contacted Audrey Lion, niece ( daughter of patient's sister, Imelda) who has been unable to obtain number. Last phone call, I briefly spoke with Audrey but phone got disconnected ( ?). I called back and received her voice mail, for which  I left a message and gave my phone number. Thus far, no once has returned my call. Case discussed with Rahel Reina LCSW ,  of Social Work and contact information given who will try to contact family. Surrogates are currently patient's siblings.
-comfort measures only.
-comfort measures only. Patient appears much more comfortable today, will continue to provide psychosocial support to family.
Monitoring off antibiotics.
continue vanco and ertapenem
- maintain montoya  - strict I/O
-met with family with Marcello BHATIA, Chaplain Figueroa, and  Monse. We explained overall grave prognosis. They were stuck on the fact of why he was sent home after his gallbladder procedure. We explained that there was no evidence of complications and it is standard practice to send people home after that type of procedure and unfortunately in looking back now we see the complications. They understand that we have reached our limit with regard to treatments that can benefit him. I explained that sometimes in medicine, the best thing we can do for someone is set them free when medical therapy is no longer beneficial, and allow for peace and comfort. They agree. I explained that we will no longer be poking and proding him for blood work and that we would only be giving him medications for his pain and comfort. We will be stopping his cardiac medications as it is not helping him right now.
-was with Marcello BHATIA when he spoke using pacific interpreters by phone to the family. He explained the guarded prognosis and despite best medical efforts, he has continued to deteriorate, and unfortunately we have reached a point where further aggressive therapies will not be therapeutic to this gentlemen. Family agreed that should his heart stop, we should not perform CPR, and we will no longer be escalating aggressive treatments. Marcello explained next steps would be to discuss elective removal of the ventilator.
Assist with ADLs
Assist with ADLs
Patient with long complicated hospitalization. Spoke with abdon Lion and updated on current guarded condition. Tried to obtain telephone number of legal wife who lives in [facility]. She currently  does not have telephone number, but will see if she can obtain it . Offered to meet with her and patient's sister ( her mother)  Imelda. Difficult secondary to work schedule. Offered early morning meeting if that works better. She will check with her mother  and  touch base tomorrow.
See plan as above.
continue vanco and ertapenem

## 2017-10-14 ENCOUNTER — TRANSCRIPTION ENCOUNTER (OUTPATIENT)
Age: 52
End: 2017-10-14

## 2018-05-22 NOTE — PROGRESS NOTE ADULT - ASSESSMENT
Hpi Title: Evaluation of Skin Lesions How Severe Are Your Spot(S)?: moderate Have Your Spot(S) Been Treated In The Past?: has not been treated IMPRESSION:  The patient is a 51 year old male with significant cardiovascular history including atrial fibrillation and cardiomyopathy s/p laparoscopic cholecystectomy. GI f/u for bile leak.   - first ercp 5/9/17: papilla appeared to be stenosed. During attempts at biliary cannulation, patient developed hypotension. Procedure aborted. Patient coded. Intubated. Patient resuscitated. ACS team took over, and performed exploratory laparotomy.  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   - Off vasopressors now. Intubated. LFTs improving. On NGT feeding.  - afebrile today    PLAN:  NGT feeding  IV antibiotics  SICU monitoring  monitor cbc, serum lytes, and LFTs  f/u septic w/u  will f/u Additional History: Lov 2016

## 2018-07-01 NOTE — H&P ADULT - PMH
Asthma    Atrial fibrillation    CAD (coronary artery disease)    Cardiomyopathy, nonischemic    Mitral regurgitation  severe MR [id number] Detailed

## 2019-01-02 NOTE — PATIENT PROFILE ADULT. - AS SC BRADEN NUTRITION
Your current Orthopedic problem we are working together to treat is:  Soft tissue mass left thigh.      ~ SURGERY  It is recommended that you be scheduled for the following surgical procedure: Resection soft tissue mass, left thigh. You will be contacted by our surgical scheduler, otherwise you can contact Jenny at [phone].       It is recommended you schedule a follow-up appointment with Jordan Alaniz MD approximately 2 week(s) after surgery.      Office hours are 8:00 am to 5:00 pm Monday through Friday. If it is urgent that you speak with someone outside of these hours, our [facility] Call Center will be able to assist you. You can reach the office by calling the [facility] scheduling line at [phone].     ** Please be aware of our Orthopedic Department refill policy in which you must allow 3 clinic days for a refill to be completed. Pain medications will not be refilled after business hours nor on weekends. **    Please feel free to visit our websites that may allow you to have a more thorough understanding of our goals of treatment, and allow interaction with your physician.    [email or web address]  [email or web address]  [email or web address]    We do highly recommend [facility], if you do not already have this. You can request access via the internet or by simply talking with a  at any of the clinics.     [email or web address]    Thank you for choosing [facility] as your Orthopedic provider!       (3) adequate

## 2019-04-04 NOTE — PROCEDURE NOTE - PROCEDURE
Pt discharging home today. Family to transport home.    Home Health arrangements complete with [facility], pt's choice of companies.    CM inquired about a hospital bed, pt declined, stating he does not want a bed at this time.    No further CM needs or barriers for discharge.     04/04/19 1204   Final Note   Assessment Type Final Discharge Note   Anticipated Discharge Disposition Home-Health   What phone number can be called within the next 1-3 days to see how you are doing after discharge? [phone]   Hospital Follow Up  Appt(s) scheduled? Yes   Discharge plans and expectations educations in teach back method with documentation complete? Yes   Right Care Referral Info   Post Acute Recommendation   (pt declines SNF, going home with HH)     
Central venous catheter insertion  07/09/2017    Active  Huey Brown
Armaan Grey placement  05/09/2017    Active  [email or web address]  Central line placement  05/09/2017  right IJ cordis introducer  Active  [email or web address]
Endotracheal intubation at bedside  07/13/2017    Active  TYLER
Armaan Grey placement  05/09/2017    Active  Wan Anthony
Central venous catheter insertion  07/09/2017    Active  SHALA
Chest tube placement  06/09/2017    Active  SHALA

## 2019-07-15 NOTE — ED ADULT TRIAGE NOTE - ACCOMPANIED BY
Addended by: RANJEET CHAN on: 7/15/2019 10:18 AM     Modules accepted: Orders     Spouse/Significant other

## 2019-08-06 NOTE — H&P ADULT - ASSESSMENT
51 year old male with abdominal pain, hyponatremia, hyperbilirubinemia, constipation, and urinary retention s/p laparoscopic cholecystectomy POD#2 Yes

## 2019-08-06 NOTE — BRIEF OPERATIVE NOTE - TYPE OF ANESTHESIA
Called patient back to review surgery plan as followed:  Left cataract extraction 2019. Start Ketorolac tomorrow BID. Start Prednisolone 2019 morning of surgery QID. Reminded to wait 5 minutes between Ketorolac & Prednisolone.  1 day post op 08/15/2019 Dr Wilder at 10:30 am 2 wk post op 2019 at 10:00 am  See Dr Lagos for possible injection 2019 at 9:40   Will give new drop schedule & surgery sheet for right eye. Review & sign new consent for right eye (current will )  Right cataract extraction now scheduled for 2019 Start Ketorolac 2019BID. Start Prednisolone 2019 morning of surgery QID. Reminded to wait 5 minutes between Ketorolac & Prednisolone.  1 day post op 2019 Dr Wilder at 11:00 am 2 wk post op 2019 at 11:45 am       General

## 2020-11-16 NOTE — PRE-OP CHECKLIST - ORDERS/MEDICATION ADMINISTRATION RECORD ON CHART
Patients mother called stating physical forms and immunization document was lost. I have refaxed those to [phone] given by mother
done
done

## 2021-06-17 NOTE — H&P ADULT - NSHPPHYSICALEXAM_GEN_ALL_CORE
Gen: AAOx3, Mild distress due to pain  CV: RRR, S1/S2  Pulm: CTAB  ABd: S, ND, TTP RUQ, no taylor  Ext: FROMx4  Vasc: +2 pulses all throughout  Neuro: grossly intact 17-Jun-2021 02:45

## 2021-10-25 NOTE — CONSULT NOTE ADULT - PROBLEM SELECTOR PROBLEM 4
PIV INSERTED IN LAC G#20, GOOD BLOOD RETURN NOTED, INTACT, PATENT AND FLUSHING 
WELL. Functional quadriplegia

## 2021-12-06 NOTE — PRE-OP CHECKLIST - PATIENT SENT TO
operating room
Partially impaired: cannot see medication labels or newsprint, but can see obstacles in path, and the surrounding layout; can count fingers at arm's length

## 2022-06-13 NOTE — ED ADULT TRIAGE NOTE - CHIEF COMPLAINT QUOTE
[phone]
Patient arrived to ED today with c/o shortness of breath and abdominal pain for the past 4 days.  Patient states he has cardiac history.  Patient states he feels SOB at rest.

## 2023-01-24 NOTE — ED ADULT NURSE NOTE - AS SC BRADEN MOISTURE
Thank you for bringing Kevin Davis in today! Please go to the  to make your next appointment. Return in about 7 months (around 8/24/2023) for well check, bring vaccine records.    Get blood tests: as below. Go to our clinic lab or one of our ACL labs. We will call or [employer] message you with results.    See you next time!  Dr. Andres            
(4) rarely moist

## 2023-03-31 NOTE — PATIENT PROFILE ADULT. - DOES PATIENT HAVE ADVANCE DIRECTIVE
[facility]  at [facility] [address], [address] [facility], [address] [place] [address], [address] [place] [address]  [phone]      Oncology Note        Patient: Devorah Kothari MRN: [id number]  SSN: [id number]    YOB: 1943  Age: [de-identified] y.o. Sex: female      Subjective:      Devorah Kothari is a [de-identified] y.o. female who I am seeing for advanced NSCLC. She suffers with severe COPD and in on ambulatory O2 by nasal canula. IN imaging studies she was noted to have an IAN lung nodule and enlarged mediastinal LNs and in the recent PET there is an abnormal area in the liver. She denies pain or headache.              Review of Systems:  Constitutional: negative  Eyes: negative  Ears, Nose, Mouth, Throat, and Face: negative  Respiratory: positive for dyspnea on exertion  Cardiovascular: negative  Gastrointestinal: negative  Genitourinary:negative  Integument/Breast: negative  Hematologic/Lymphatic: negative  Musculoskeletal:negative  Neurological: negative        Past Medical History:   Diagnosis Date    Atrial fibrillation ([address]     Complete heart block ([address] 9/6/2014    COPD     BY CHEST XRAY    Early satiety 1/28/2016    Hypertension     ICD (implantable cardioverter-defibrillator) in place     Long term current use of anticoagulant therapy     Myocardial infarction ([address] 2014    Nausea & vomiting     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    Tubulovillous adenoma rectum 2016    Occult blood in stools 1/28/2016    Pacemaker     S/P ablation of atrial fibrillation 3/23/2017    S/P ablation of atrial flutter 3/23/2017     Past Surgical History:   Procedure Laterality Date    COLONOSCOPY N/A 5/11/2017    COLONOSCOPY performed by Dc Norton MD at [facility] ENDOSCOPY    COLONOSCOPY N/A 11/30/2017    COLONOSCOPY performed by Dc Norton MD at [facility] ENDOSCOPY    COLONOSCOPY Left 7/9/2019    COLONOSCOPY performed by Tiana Davies MD at [address] [place] [address] FRANCOIS,SNARE  2017         HX HERNIA REPAIR  2015    Incarcerated left femoral hernia repair,. HX PACEMAKER Left     IL UNLISTED PROCEDURE ABDOMEN PERITONEUM & OMENTUM      inguinal hernia repair right      Family History   Problem Relation Age of Onset    Heart Disease Mother     Cancer Father         BLADDER    Other Sister         RA    Other Brother         RA    Alcohol abuse Brother      Social History     Tobacco Use    Smoking status: Every Day     Packs/day: 0.50     Years: 50.00     Pack years: 25.00     Types: Cigarettes     Last attempt to quit: 2014     Years since quittin.5    Smokeless tobacco: Never    Tobacco comments:     lives with a smoker     Pt did quit smoking in  but restarted and smokes about 1/2 PPD. Substance Use Topics    Alcohol use: No      Prior to Admission medications    Medication Sig Start Date End Date Taking? Authorizing Provider   Andreina Ellipta 100-62.5-25 mcg inhaler TAKE 1 PUFF BY MOUTH EVERY DAY 3/17/23  Yes Provider, Historical   acetaminophen (TYLENOL) 325 mg tablet Take 2 Tablets by mouth every six (6) hours as needed for Fever or Pain. 22  Yes Flori Vivar MD   apixaban (Eliquis) 5 mg tablet TAKE 1 TABLET BY MOUTH TWICE A DAY 21  Yes McGuiness, Juli Meigs, NP   carvediloL (COREG) 12.5 mg tablet TAKE 1 TABLET BY MOUTH TWICE A DAY WITH MEALS 21  Yes McGuiness, Juli Meigs, NP   OXYGEN-AIR DELIVERY SYSTEMS Take 3 L/min by inhalation as needed. 19  Yes Provider, Historical   albuterol (PROVENTIL HFA, VENTOLIN HFA, PROAIR HFA) 90 mcg/actuation inhaler Take 2 Puffs by inhalation every four (4) hours as needed for Wheezing. Yes Provider, Historical   atorvastatin (LIPITOR) 20 mg tablet TAKE 1 TABLET BY MOUTH EVERY DAY 17  Yes Eric Grubbs NP   multivitamin (ONE A DAY) tablet Take 1 Tab by mouth daily. Yes Provider, Historical   aspirin delayed-release 81 mg tablet Take 1 Tablet by mouth daily.   Patient not taking: Reported on 3/31/2023 12/30/22   Sunni Zamudio MD   balsam peru-castor oiL (VENELEX) ointment Apply  to affected area two (2) times a day. Patient not taking: Reported on 3/31/2023 12/29/22   Sunni Zamudio MD   budesonide (PULMICORT) 0.5 mg/2 mL nbsp 2 mL by Nebulization route two (2) times a day. Patient not taking: Reported on 3/31/2023 12/29/22   Sunni Zamudoi MD   methylPREDNISolone ([facility]) 4 mg tablet Use as directed  Patient not taking: Reported on 3/31/2023 12/29/22   Sunni Zamudio MD   amoxicillin-clavulanate (Augmentin) 875-125 mg per tablet Take 1 Tablet by mouth two (2) times a day. Patient not taking: Reported on 3/31/2023 12/29/22   Sunni Zamudio MD   prednisoLONE acetate (PRED FORTE) 1 % ophthalmic suspension Administer 1 Drop to both eyes four (4) times daily. Patient not taking: Reported on 3/31/2023    Other, MD Sukhi Yip 200-5 mcg/actuation HFA inhaler TAKE 2 PUFFS BY MOUTH TWICE A DAY  Patient not taking: Reported on 3/31/2023 3/4/21   Provider, Historical   tiotropium bromide (SPIRIVA RESPIMAT) 2.5 mcg/actuation inhaler Take 2 Puffs by inhalation daily. Patient not taking: Reported on 3/31/2023    Provider, Historical              No Known Allergies        Objective:     Vitals:    03/31/23 1358   BP: 134/76   Pulse: 78   Temp: 97.7 °F (36.5 °C)   TempSrc: Temporal   SpO2: 96%   Weight: 133 lb 12.8 oz (60.7 kg)   Height: 5' 11\" (1.803 m)            Physical Exam:  GENERAL: alert, cooperative, cachectic  EYE: conjunctivae/corneas clear. PERRL, EOM's intact  LYMPHATIC: Cervical, supraclavicular, and axillary nodes normal.   THROAT & NECK: normal and no erythema or exudates noted. LUNG: decreased air entry b/l, nasal O2 on  HEART: regular rate and rhythm, S1, S2 normal  ABDOMEN: soft, non-tender.  Bowel sounds normal. No masses,  no organomegaly  EXTREMITIES:  extremities normal, atraumatic, no cyanosis or edema  SKIN: no rash or abnormalities  NEUROLOGIC: AOx3. Gait normal. Reflexes and motor strength normal and symmetric. Cranial nerves 2-12 and sensation grossly intact. PET Results (most recent):  Results from [facility] encounter on 03/09/23    PET/CT TUMOR IMAGE SKULL THIGH W (INI)    Narrative  PET/CT SCAN    PROCEDURE: Following IV injection of 10.1 mCi 18 Fluoro 2 deoxyglucose (FDG) and  a standard uptake delay, PET imaging is performed from head to thigh and axial,  sagittal and coronal images were acquired. Unenhanced CT is obtained for  anatomic localization, and attenuation correction of the PET scan. Patient  preprocedure blood glucose level: 87 mg/dL. CORRELATIVE IMAGING STUDIES: CT chest 12/26/2022. COMPARISON PET:    HISTORY: The study is requested for initial treatment strategy. Left upper lobe  nodule. FINDINGS:    HEAD/NECK: No apparent foci of abnormal hypermetabolism. Cerebral evaluation is  limited by normal intense activity. CHEST: 1.7 x 0.9 cm left upper lobe nodule demonstrates increased metabolic  activity of 8.1. There is focal increased metabolic activity in the superior left hilum with SUV  of 6.9. There are changes of emphysema. Band of increased density left base demonstrates  no increased metabolic activity may represent atelectasis/scarring. There is a 4  mm nodule in the right lower lobe peripherally which demonstrates no increased  metabolic activity. .  There is a right paraspinal low-density in the inferior chest which demonstrates  no increased metabolic activity may represent a meningocele. ABDOMEN/PELVIS: There is focal increased metabolic activity in the liver in the  right lobe near the gallbladder most likely segment 5 with SUV of 7.9 may  represent a metastatic lesion. There are extensive atherosclerotic changes. SKELETON: There is focal increased metabolic activity in the right seventh rib  laterally. .    Impression  1.  There is increased metabolic activity in the left upper lobe nodule  suspicious for primary neoplasm. There is increased metabolic activity in left  hilar lymph node suspicious for metastatic disease. 2. There is increased metabolic activity in the right seventh rib laterally and  there is focal increased metabolic activity in the right lobe of the liver  suspicious for metastatic disease. . 23X        Assessment:     1. IAN lung nodule, mediastinal adenopathy and liver metastasis    ECOG PS 2    The findings are suspicious for metastatic NSCLC. I recommended USG guided liver Bx  I shall see her after the Bx report is available.          Plan:       USG guided Liver biopsy  Return to clinic in 2-3 weeks      Signed By: Ag Tello MD     March 31, 2023 No

## 2023-06-21 NOTE — BRIEF OPERATIVE NOTE - POST-OP DX
Malnutrition  06/12/2017  OROPHARYNGEAL DYSPHAGIA AND DYSFUNTION  Active  Cherrie Telles Oxybutynin Counseling:  I discussed with the patient the risks of oxybutynin including but not limited to skin rash, drowsiness, dry mouth, difficulty urinating, and blurred vision.

## 2023-09-30 NOTE — PROGRESS NOTE ADULT - SUBJECTIVE AND OBJECTIVE BOX
Diarrhea-A-     INTERVAL HPI/OVERNIGHT EVENTS:    Today, precedex decreased.  Wound vac changed with clean wound borders noted. ON:  Pt with acute episode of severe agitation, thrashing.  Given versed/Haldol/fentanyl to resolve episode.  Despite sedation pt continued with rapid Afib with RVR broken with Diltiazem 10 mg IVP x 1.  Pt with no further episodes.  Tapered off precedex.  Pt does not appear agitated.      MEDICATIONS  (STANDING):  chlorhexidine 0.12% Liquid 15milliLiter(s) Swish and Spit two times a day  petrolatum Ophthalmic Ointment 1Application(s) Both EYES two times a day  digoxin     Tablet 0.125milliGRAM(s) Oral daily  amiodarone    Tablet 200milliGRAM(s) Oral daily  acetaminophen    Suspension 650milliGRAM(s) Oral every 6 hours  melatonin 6milliGRAM(s) Oral at bedtime  QUEtiapine 50milliGRAM(s) Oral daily  metoprolol 12.5milliGRAM(s) Enteral Tube two times a day  pantoprazole  Injectable 40milliGRAM(s) IV Push daily  piperacillin/tazobactam IVPB. 3.375Gram(s) IV Intermittent every 8 hours  vancomycin  IVPB 1000milliGRAM(s) IV Intermittent every 8 hours  vancomycin  IVPB  IV Intermittent   dexmedetomidine Infusion 0.2MICROgram(s)/kG/Hr IV Continuous <Continuous>  heparin  Injectable 5000Unit(s) SubCutaneous every 8 hours  nystatin Powder 1Application(s) Topical two times a day  furosemide    Tablet 40milliGRAM(s) Oral daily  magnesium sulfate  IVPB 2Gram(s) IV Intermittent once    MEDICATIONS  (PRN):      Drug Dosing Weight  Height (cm): 165.1 (04 May 2017 13:12)  Weight (kg): 93.9 (09 May 2017 05:12)  BMI (kg/m2): 34.4 (09 May 2017 05:12)  BSA (m2): 2.01 (09 May 2017 05:12)        PAST MEDICAL & SURGICAL HISTORY:  Mitral regurgitation: severe MR  Cardiomyopathy, nonischemic  CAD (coronary artery disease)  Asthma  Atrial fibrillation  Heart disease  S/P laparoscopic cholecystectomy  History of humerus fracture: Metal pins in Left Humerus  Artificial pacemaker      ICU Vital Signs Last 24 Hrs  T(C): 37.8, Max: 37.9 (05-25 @ 00:00)  T(F): 100, Max: 100.2 (05-25 @ 00:00)  HR: 62 (56 - 162)  BP: 102/69 (96/74 - 149/82)  BP(mean): 81 (74 - 104)  ABP: --  ABP(mean): --  RR: 24 (21 - 32)  SpO2: 100% (65% - 100%)      ABG - ( 25 May 2017 03:45 )  pH: 7.49  /  pCO2: 39    /  pO2: 150   / HCO3: 30    / Base Excess: 6.1   /  SaO2: 100                 I&O's Detail  I & Os for 24h ending 24 May 2017 07:00  =============================================  IN:    multiple electrolytes Injection Type 1multiple electrolytes Injection Type 1: 900 ml    Pivot: 561 ml    Solution: 500 ml    dexmedetomidine Infusion: 227.6 ml    Solution: 150 ml    Enteral Tube Flush: 60 ml    Total IN: 2398.6 ml  ---------------------------------------------  OUT:    Indwelling Catheter - Urethral: 1550 ml    Bulb: 90 ml    Total OUT: 1640 ml  ---------------------------------------------  Total NET: 758.6 ml    I & Os for current day (as of 25 May 2017 05:02)  =============================================  IN:    Enteral Tube Flush: 900 ml    Pivot: 816 ml    Solution: 250 ml    Solution: 250 ml    dexmedetomidine Infusion: 181.8 ml    Total IN: 2397.8 ml  ---------------------------------------------  OUT:    Indwelling Catheter - Urethral: 2060 ml    Total OUT: 2060 ml  ---------------------------------------------  Total NET: 337.8 ml      Mode: AC/ CMV (Assist Control/ Continuous Mandatory Ventilation)  RR (machine): 10  TV (machine): 450  FiO2: 40  PEEP: 8  MAP: 14  PIP: 25    NUTRITION/IVF: Tube feeds.    CENTRAL LINE: N :    MONTOYA: Y  DATE INSERTED: 5/21    A-LINE: N      MISC: VANESSA from IR minimal output.    PHYSICAL EXAM:    Gen:  NAD    Eyes: PERRLA    Neurological: RASS -1 to -2, tracks consistently, beginning to follow some but not all simple commands     ENMT: macerated, devitalized tip of tongue    Pulmonary: Non labored.    Cardiovascular: Irregular rhythm, normal rate    Gastrointestinal: Soft, mild distention.     Genitourinary: Montoya, scrotal and penile edema.    Extremities: Moderate to severe pitting edema.      LABS:  CBC Full  -  ( 25 May 2017 04:06 )  WBC Count : 9.6 K/uL  Hemoglobin : 8.7 g/dL  Hematocrit : 28.3 %  Platelet Count - Automated : 320 K/uL  Mean Cell Volume : 95.6 fl  Mean Cell Hemoglobin : 29.4 pg  Mean Cell Hemoglobin Concentration : 30.7 g/dL  Auto Neutrophil # : 7.6 K/uL  Auto Lymphocyte # : 1.2 K/uL  Auto Monocyte # : 0.5 K/uL  Auto Eosinophil # : 0.1 K/uL  Auto Basophil # : 0.0 K/uL  Auto Neutrophil % : 79.4 %  Auto Lymphocyte % : 13.1 %  Auto Monocyte % : 5.5 %  Auto Eosinophil % : 1.3 %  Auto Basophil % : 0.2 %    05-25    138  |  97<L>  |  22.0<H>  ----------------------------<  141<H>  3.6   |  29.0  |  0.89    Ca    8.0<L>      25 May 2017 04:06  Phos  3.5     05-25  Mg     1.8     05-25    TPro  6.3<L>  /  Alb  2.7<L>  /  TBili  0.7  /  DBili  x   /  AST  62<H>  /  ALT  132<H>  /  AlkPhos  128<H>  05-24          ASSESSMENT/PLAN:  51yMale presenting with:    Neuro: Encephalopathy continues.  Will wean precedex and see if can get pt to follow commands more reliably, however patient with severe agitation as sedation weaned off.        HEENT: Plastic following for tongue injury.      CV: A-fib rate controlled.  Hold anticoagulation secondary to bleeding risk.      Pulm: Resp failure.  Try to move to PS/CPAP.  Wean as tolerated.  Trach planned for today.       GI/Nutrition: Tube feeds back on, tolerating.  No further GI bleeding.      /Renal: Home lasix.  Function appears baseline.  Failed tov a few days ago. Cont montoya for retention.      ID: Likely gall bladder fossa abscess.  Now drained.  Leukocytosis improving.  Tawny abx to cultures.      Proph: Heparin ppx    Dispo: Plan for trach and PEG today.
